# Patient Record
Sex: MALE | Race: WHITE | Employment: OTHER | URBAN - METROPOLITAN AREA
[De-identification: names, ages, dates, MRNs, and addresses within clinical notes are randomized per-mention and may not be internally consistent; named-entity substitution may affect disease eponyms.]

---

## 2018-03-18 ENCOUNTER — HOSPITAL ENCOUNTER (EMERGENCY)
Age: 68
Discharge: HOME | End: 2018-03-19
Payer: MEDICARE

## 2018-03-18 VITALS
WEIGHT: 180 LBS | RESPIRATION RATE: 14 BRPM | HEIGHT: 65 IN | SYSTOLIC BLOOD PRESSURE: 114 MMHG | OXYGEN SATURATION: 94 % | BODY MASS INDEX: 29.99 KG/M2 | DIASTOLIC BLOOD PRESSURE: 77 MMHG | HEART RATE: 82 BPM | TEMPERATURE: 98 F

## 2018-03-18 DIAGNOSIS — S01.01XA LACERATION OF SCALP, INITIAL ENCOUNTER: Primary | ICD-10-CM

## 2018-03-18 PROCEDURE — 99282 EMERGENCY DEPT VISIT SF MDM: CPT

## 2018-03-18 PROCEDURE — 12002 RPR S/N/AX/GEN/TRNK2.6-7.5CM: CPT

## 2018-04-14 ENCOUNTER — APPOINTMENT (OUTPATIENT)
Dept: CT IMAGING | Age: 68
End: 2018-04-14
Payer: MEDICARE

## 2018-04-14 ENCOUNTER — HOSPITAL ENCOUNTER (EMERGENCY)
Age: 68
Discharge: HOME | End: 2018-04-14
Attending: EMERGENCY MEDICINE
Payer: MEDICARE

## 2018-04-14 VITALS
TEMPERATURE: 97.9 F | OXYGEN SATURATION: 93 % | BODY MASS INDEX: 28.29 KG/M2 | SYSTOLIC BLOOD PRESSURE: 102 MMHG | HEART RATE: 60 BPM | WEIGHT: 170 LBS | DIASTOLIC BLOOD PRESSURE: 68 MMHG | RESPIRATION RATE: 14 BRPM

## 2018-04-14 DIAGNOSIS — S01.111A LACERATION OF RIGHT EYEBROW, INITIAL ENCOUNTER: Primary | ICD-10-CM

## 2018-04-14 PROCEDURE — 6360000002 HC RX W HCPCS: Performed by: EMERGENCY MEDICINE

## 2018-04-14 PROCEDURE — 2500000003 HC RX 250 WO HCPCS

## 2018-04-14 PROCEDURE — 99283 EMERGENCY DEPT VISIT LOW MDM: CPT

## 2018-04-14 PROCEDURE — 70450 CT HEAD/BRAIN W/O DYE: CPT

## 2018-04-14 PROCEDURE — 96372 THER/PROPH/DIAG INJ SC/IM: CPT

## 2018-04-14 PROCEDURE — 12013 RPR F/E/E/N/L/M 2.6-5.0 CM: CPT

## 2018-04-14 RX ORDER — LIDOCAINE HYDROCHLORIDE AND EPINEPHRINE 10; 10 MG/ML; UG/ML
INJECTION, SOLUTION INFILTRATION; PERINEURAL
Status: COMPLETED
Start: 2018-04-14 | End: 2018-04-14

## 2018-04-14 RX ORDER — LIDOCAINE HYDROCHLORIDE 10 MG/ML
INJECTION, SOLUTION INFILTRATION; PERINEURAL
Status: DISCONTINUED
Start: 2018-04-14 | End: 2018-04-14 | Stop reason: WASHOUT

## 2018-04-14 RX ORDER — LORAZEPAM 2 MG/ML
2 INJECTION INTRAMUSCULAR ONCE
Status: COMPLETED | OUTPATIENT
Start: 2018-04-14 | End: 2018-04-14

## 2018-04-14 RX ADMIN — LORAZEPAM 2 MG: 2 INJECTION, SOLUTION INTRAMUSCULAR; INTRAVENOUS at 10:22

## 2018-04-14 RX ADMIN — LIDOCAINE HYDROCHLORIDE AND EPINEPHRINE 5 ML: 10; 10 INJECTION, SOLUTION INFILTRATION; PERINEURAL at 11:45

## 2018-05-02 ENCOUNTER — OFFICE VISIT (OUTPATIENT)
Dept: NEUROLOGY | Age: 68
End: 2018-05-02
Payer: MEDICARE

## 2018-05-02 VITALS — SYSTOLIC BLOOD PRESSURE: 98 MMHG | DIASTOLIC BLOOD PRESSURE: 54 MMHG | HEART RATE: 65 BPM | HEIGHT: 65 IN

## 2018-05-02 DIAGNOSIS — G40.209 PARTIAL SYMPTOMATIC EPILEPSY WITH COMPLEX PARTIAL SEIZURES, NOT INTRACTABLE, WITHOUT STATUS EPILEPTICUS (HCC): Primary | ICD-10-CM

## 2018-05-02 PROCEDURE — 1123F ACP DISCUSS/DSCN MKR DOCD: CPT | Performed by: CLINICAL NURSE SPECIALIST

## 2018-05-02 PROCEDURE — 99214 OFFICE O/P EST MOD 30 MIN: CPT | Performed by: CLINICAL NURSE SPECIALIST

## 2018-05-02 PROCEDURE — 1036F TOBACCO NON-USER: CPT | Performed by: CLINICAL NURSE SPECIALIST

## 2018-05-02 PROCEDURE — G8419 CALC BMI OUT NRM PARAM NOF/U: HCPCS | Performed by: CLINICAL NURSE SPECIALIST

## 2018-05-02 PROCEDURE — G8427 DOCREV CUR MEDS BY ELIG CLIN: HCPCS | Performed by: CLINICAL NURSE SPECIALIST

## 2018-05-02 PROCEDURE — 3017F COLORECTAL CA SCREEN DOC REV: CPT | Performed by: CLINICAL NURSE SPECIALIST

## 2018-05-02 PROCEDURE — 4040F PNEUMOC VAC/ADMIN/RCVD: CPT | Performed by: CLINICAL NURSE SPECIALIST

## 2018-07-10 ENCOUNTER — OFFICE VISIT (OUTPATIENT)
Dept: VASCULAR SURGERY | Age: 68
End: 2018-07-10
Payer: MEDICARE

## 2018-07-10 DIAGNOSIS — I70.203 ATHEROSCLEROSIS OF NATIVE ARTERY OF BOTH LOWER EXTREMITIES, WITH UNSPECIFIED PRESENCE OF CLINICAL MANIFESTATION (HCC): Primary | ICD-10-CM

## 2018-07-10 PROCEDURE — G8419 CALC BMI OUT NRM PARAM NOF/U: HCPCS | Performed by: SURGERY

## 2018-07-10 PROCEDURE — G8599 NO ASA/ANTIPLAT THER USE RNG: HCPCS | Performed by: SURGERY

## 2018-07-10 PROCEDURE — G8427 DOCREV CUR MEDS BY ELIG CLIN: HCPCS | Performed by: SURGERY

## 2018-07-10 PROCEDURE — 1036F TOBACCO NON-USER: CPT | Performed by: SURGERY

## 2018-07-10 PROCEDURE — 99213 OFFICE O/P EST LOW 20 MIN: CPT | Performed by: SURGERY

## 2018-07-10 PROCEDURE — 4040F PNEUMOC VAC/ADMIN/RCVD: CPT | Performed by: SURGERY

## 2018-07-10 PROCEDURE — 3017F COLORECTAL CA SCREEN DOC REV: CPT | Performed by: SURGERY

## 2018-07-10 PROCEDURE — 1123F ACP DISCUSS/DSCN MKR DOCD: CPT | Performed by: SURGERY

## 2018-07-13 ENCOUNTER — TELEPHONE (OUTPATIENT)
Dept: NEUROLOGY | Age: 68
End: 2018-07-13

## 2018-07-13 NOTE — TELEPHONE ENCOUNTER
[facility] from [facility] called regarding pt's Valproic acid level results from this morning. [facility] states pt's level was 102 with a reference range of . Pt takes Depakote ER 500mg 1 tab at 7AM and 3 tabs at 9PM daily. [facility] was advised to send a copy of the lab results to our office for documentation (see ). Please contact [facility] with further recommendations.   Electronically signed by Rosemary Obrien on 7/13/18 at 3:56 PM

## 2018-07-16 ENCOUNTER — TELEPHONE (OUTPATIENT)
Dept: NEUROLOGY | Age: 68
End: 2018-07-16

## 2018-10-21 ENCOUNTER — HOSPITAL ENCOUNTER (EMERGENCY)
Age: 68
Discharge: HOME | End: 2018-10-21
Attending: FAMILY MEDICINE
Payer: MEDICARE

## 2018-10-21 VITALS
RESPIRATION RATE: 20 BRPM | SYSTOLIC BLOOD PRESSURE: 117 MMHG | DIASTOLIC BLOOD PRESSURE: 89 MMHG | OXYGEN SATURATION: 96 % | HEART RATE: 60 BPM | HEIGHT: 65 IN | BODY MASS INDEX: 28.32 KG/M2 | WEIGHT: 170 LBS | TEMPERATURE: 97.8 F

## 2018-10-21 DIAGNOSIS — S01.81XA LACERATION OF FOREHEAD, INITIAL ENCOUNTER: Primary | ICD-10-CM

## 2018-10-21 PROCEDURE — 99282 EMERGENCY DEPT VISIT SF MDM: CPT

## 2018-10-21 PROCEDURE — 12014 RPR F/E/E/N/L/M 5.1-7.5 CM: CPT

## 2018-10-21 PROCEDURE — 2500000003 HC RX 250 WO HCPCS: Performed by: FAMILY MEDICINE

## 2018-10-21 RX ORDER — LIDOCAINE HYDROCHLORIDE AND EPINEPHRINE 10; 10 MG/ML; UG/ML
5 INJECTION, SOLUTION INFILTRATION; PERINEURAL ONCE
Status: COMPLETED | OUTPATIENT
Start: 2018-10-21 | End: 2018-10-21

## 2018-10-21 RX ADMIN — LIDOCAINE HYDROCHLORIDE AND EPINEPHRINE 5 ML: 10; 10 INJECTION, SOLUTION INFILTRATION; PERINEURAL at 13:09

## 2018-10-21 ASSESSMENT — PAIN SCALES - GENERAL: PAINLEVEL_OUTOF10: 0

## 2018-10-21 NOTE — ED NOTES
Lacy Hood, patient's guardian called and wanting update on patient at this time      Annamaria Mahmood RN  10/21/18 [id number]

## 2019-04-22 ENCOUNTER — OFFICE VISIT (OUTPATIENT)
Dept: NEUROLOGY | Age: 69
End: 2019-04-22
Payer: MEDICARE

## 2019-04-22 ENCOUNTER — HOSPITAL ENCOUNTER (EMERGENCY)
Age: 69
Discharge: HOME | End: 2019-04-22
Attending: EMERGENCY MEDICINE
Payer: MEDICARE

## 2019-04-22 VITALS
OXYGEN SATURATION: 97 % | SYSTOLIC BLOOD PRESSURE: 139 MMHG | HEART RATE: 60 BPM | WEIGHT: 170 LBS | RESPIRATION RATE: 16 BRPM | TEMPERATURE: 97.9 F | DIASTOLIC BLOOD PRESSURE: 77 MMHG | HEIGHT: 65 IN | BODY MASS INDEX: 28.32 KG/M2

## 2019-04-22 VITALS — BODY MASS INDEX: 28.29 KG/M2 | RESPIRATION RATE: 18 BRPM | HEIGHT: 65 IN | HEART RATE: 80 BPM

## 2019-04-22 DIAGNOSIS — G40.209 PARTIAL SYMPTOMATIC EPILEPSY WITH COMPLEX PARTIAL SEIZURES, NOT INTRACTABLE, WITHOUT STATUS EPILEPTICUS (HCC): Primary | ICD-10-CM

## 2019-04-22 DIAGNOSIS — Z00.00 WELL ADULT HEALTH CHECK: Primary | ICD-10-CM

## 2019-04-22 PROCEDURE — 4040F PNEUMOC VAC/ADMIN/RCVD: CPT | Performed by: CLINICAL NURSE SPECIALIST

## 2019-04-22 PROCEDURE — G8599 NO ASA/ANTIPLAT THER USE RNG: HCPCS | Performed by: CLINICAL NURSE SPECIALIST

## 2019-04-22 PROCEDURE — 1036F TOBACCO NON-USER: CPT | Performed by: CLINICAL NURSE SPECIALIST

## 2019-04-22 PROCEDURE — 99214 OFFICE O/P EST MOD 30 MIN: CPT | Performed by: CLINICAL NURSE SPECIALIST

## 2019-04-22 PROCEDURE — 1123F ACP DISCUSS/DSCN MKR DOCD: CPT | Performed by: CLINICAL NURSE SPECIALIST

## 2019-04-22 PROCEDURE — G8427 DOCREV CUR MEDS BY ELIG CLIN: HCPCS | Performed by: CLINICAL NURSE SPECIALIST

## 2019-04-22 PROCEDURE — 3017F COLORECTAL CA SCREEN DOC REV: CPT | Performed by: CLINICAL NURSE SPECIALIST

## 2019-04-22 PROCEDURE — G8419 CALC BMI OUT NRM PARAM NOF/U: HCPCS | Performed by: CLINICAL NURSE SPECIALIST

## 2019-04-22 PROCEDURE — 99283 EMERGENCY DEPT VISIT LOW MDM: CPT

## 2019-04-22 NOTE — PROGRESS NOTES
daily as needed. Historical Provider, MD   divalproex (DEPAKOTE) 500 MG DR tablet Take 500 mg by mouth 2 times daily One tab at 7am and three tabs at 9pm  Historical Provider, MD   acetaminophen (TYLENOL) 325 MG tablet Take 650 mg by mouth every 4 hours as needed for Pain. Historical Provider, MD   guaiFENesin-dextromethorphan (ROBITUSSIN DM) 100-10 MG/5ML syrup Take 10 mLs by mouth 4 times daily as needed for Cough   Historical Provider, MD   ibuprofen (ADVIL;MOTRIN) 200 MG tablet Take 200 mg by mouth every 4 hours as needed for Pain   Historical Provider, MD   Bismuth Subsalicylate (PINK BISMUTH PO) Take by mouth as needed   Historical Provider, MD   simvastatin (ZOCOR) 20 MG tablet Take 20 mg by mouth nightly. Historical Provider, MD   benztropine (COGENTIN) 1 MG tablet Take 1 mg by mouth 2 times daily. TAKE AM OF OR WITH A LITTLE WATER 04/24/2015  Historical Provider, MD   levocarnitine (CARNITOR) 330 MG tablet Take 330 mg by mouth 2 times daily. Historical Provider, MD   vitamin D (CHOLECALCIFEROL) 1000 UNIT TABS tablet Take 1,000 Units by mouth daily.   Historical Provider, MD     Allergies as of 04/22/2019    (No Known Allergies)       Objective:     Pulse 80   Resp 18   Ht 5' 5\" (1.651 m)   BMI 28.29 kg/m²   Afebrile      General appearance: alert and cooperative  Head: Normocephalic, without obvious abnormality, atraumatic  Neck: no adenopathy, no carotid bruit and limited ROM - transmitted murmur audible  Lungs: clear to auscultation bilaterally  Heart: regular rate and rhythm and systolic ejection murmur audible  Extremities: boots on bilaterally - no edema  Pulses: 2+ and symmetric   Skin: no rashes or lesions     Mental Status: awake - moaning on occasion     Nonverbal  Not following commands    No Myerson's sign     Cranial Nerves:  I: smell    II: visual acuity     II: visual fields Bilateral threat responses   II: pupils NOMI   III,VII: ptosis    III,IV,VI: extraocular muscles  Looks around Radha Peterson  8:24 AM  4/22/2019

## 2019-04-23 NOTE — ED PROVIDER NOTES
Resp 16   Ht 5' 5\" (1.651 m)   Wt 170 lb (77.1 kg)   SpO2 97%   BMI 28.29 kg/m²   Oxygen Saturation Interpretation: Normal      ---------------------------------------------------PHYSICAL EXAM--------------------------------------      Constitutional/General: Alert a  Head: Normocephalic and atraumatic  Eyes: PERRL, EOMI  Mouth: Oropharynx clear, handling secretions, no trismus  Neck: Supple, full ROM, no meningeal illness  Pulmonary: Lungs clear to auscultation bilaterally, no wheezes, rales, or rhonchi. Not in respiratory distress  Cardiovascular:  Regular rate and rhythm, no murmurs, gallops, or rubs. 2+ distal pulses  Abdomen: Soft, non tender, non distended,   Extremities: Moves all extremities x 4. Warm and well perfused  Skin: warm and dry without rash  Neurologic: No focal deficits   Psych: Normal Affect      ------------------------------ ED COURSE/MEDICAL DECISION MAKING----------------------  Medications - No data to display      ED COURSE:       Medical Decision Making:    Patient hemodynamically stable. At his baseline mental status per staff. Therefore, patient was discharged. Counseling: The emergency provider has spoken with the patient and discussed todays results, in addition to providing specific details for the plan of care and counseling regarding the diagnosis and prognosis. Questions are answered at this time and they are agreeable with the plan.      --------------------------------- IMPRESSION AND DISPOSITION ---------------------------------    IMPRESSION  1. Well adult health check        DISPOSITION  Disposition: Discharge to home  Patient condition is stable      NOTE: This report was transcribed using voice recognition software.  Every effort was made to ensure accuracy; however, inadvertent computerized transcription errors may be present        Min Perdomo MD  04/22/19 6011

## 2019-07-15 ENCOUNTER — OFFICE VISIT (OUTPATIENT)
Dept: NEUROLOGY | Age: 69
End: 2019-07-15
Payer: MEDICARE

## 2019-07-15 VITALS — RESPIRATION RATE: 15 BRPM | HEART RATE: 88 BPM

## 2019-07-15 DIAGNOSIS — G40.209 PARTIAL SYMPTOMATIC EPILEPSY WITH COMPLEX PARTIAL SEIZURES, NOT INTRACTABLE, WITHOUT STATUS EPILEPTICUS (HCC): Primary | ICD-10-CM

## 2019-07-15 PROCEDURE — G8419 CALC BMI OUT NRM PARAM NOF/U: HCPCS | Performed by: CLINICAL NURSE SPECIALIST

## 2019-07-15 PROCEDURE — 3017F COLORECTAL CA SCREEN DOC REV: CPT | Performed by: CLINICAL NURSE SPECIALIST

## 2019-07-15 PROCEDURE — G8427 DOCREV CUR MEDS BY ELIG CLIN: HCPCS | Performed by: CLINICAL NURSE SPECIALIST

## 2019-07-15 PROCEDURE — 4040F PNEUMOC VAC/ADMIN/RCVD: CPT | Performed by: CLINICAL NURSE SPECIALIST

## 2019-07-15 PROCEDURE — 1036F TOBACCO NON-USER: CPT | Performed by: CLINICAL NURSE SPECIALIST

## 2019-07-15 PROCEDURE — 1123F ACP DISCUSS/DSCN MKR DOCD: CPT | Performed by: CLINICAL NURSE SPECIALIST

## 2019-07-15 PROCEDURE — G8599 NO ASA/ANTIPLAT THER USE RNG: HCPCS | Performed by: CLINICAL NURSE SPECIALIST

## 2019-07-15 PROCEDURE — 99214 OFFICE O/P EST MOD 30 MIN: CPT | Performed by: CLINICAL NURSE SPECIALIST

## 2019-07-15 NOTE — PROGRESS NOTES
sensation     V,VII: corneal reflex  Present   VII: facial muscle function - upper     VII: facial muscle function - lower Normal   VIII: hearing Normal   IX: soft palate elevation  Normal   IX,X: gag reflex Present   XI: trapezius strength     XI: sternocleidomastoid strength    XI: neck extension strength     XII: tongue strength  Normal     Motor:  Moving all limbs sporadically and symmetrically - though minimally  Normal bulk    Minimal right hand resting tremor appreciated  cogwheel rigidity noted in wrists     Sensory:  Withdraws to PP throughout    Gait:  In wheelchair today     DTR:   Right Brachioradialis reflex 0  Left Brachioradialis reflex 0  Right Biceps reflex 1+  Left Biceps reflex 1+  Right Quadriceps reflex 1+  Left Quadriceps reflex 1+  Right Achilles reflex 0  Left Achilles reflex 0    No Grey's     Laboratory/Radiology:     CBC:   Lab Results   Component Value Date    WBC 7.1 10/27/2017    RBC 4.51 10/27/2017    HGB 14.5 10/27/2017    HCT 42.8 10/27/2017    MCV 94.9 10/27/2017    MCH 32.2 10/27/2017    MCHC 33.9 10/27/2017    RDW 13.5 10/27/2017     10/27/2017    MPV 9.5 10/27/2017     BMP:    Lab Results   Component Value Date     10/27/2017    K 4.9 10/27/2017     10/27/2017    CO2 25 10/27/2017    BUN 22 10/27/2017    LABALBU 4.0 10/27/2017    LABALBU 4.5 05/18/2012    CREATININE 0.9 10/27/2017    CALCIUM 8.3 10/27/2017    GFRAA >60 10/27/2017    LABGLOM >60 10/27/2017    GLUCOSE 113 10/27/2017    GLUCOSE 71 05/18/2012     Depakote level 99    Tegretol level 13.3    Labs were personally reviewed by myself today   Assessment:     Seizure disorder   Stable -- one \"mild\" reported seizure    Maintained on dual therapy, though at minimal doses     Increasing tremors - unchanged with lower doses of Depakote    ?  Parkinsonisms vs underlying PD    Plan:     Stop Depakote    Try Neupro patches and re-evaluate in 6 weeks     Windy Ma  10:51 AM  7/15/2019

## 2019-09-27 ENCOUNTER — OFFICE VISIT (OUTPATIENT)
Dept: NEUROLOGY | Age: 69
End: 2019-09-27
Payer: MEDICARE

## 2019-09-27 VITALS — HEART RATE: 80 BPM | RESPIRATION RATE: 15 BRPM

## 2019-09-27 DIAGNOSIS — G40.209 PARTIAL SYMPTOMATIC EPILEPSY WITH COMPLEX PARTIAL SEIZURES, NOT INTRACTABLE, WITHOUT STATUS EPILEPTICUS (HCC): Primary | ICD-10-CM

## 2019-09-27 PROCEDURE — 4040F PNEUMOC VAC/ADMIN/RCVD: CPT | Performed by: CLINICAL NURSE SPECIALIST

## 2019-09-27 PROCEDURE — G8599 NO ASA/ANTIPLAT THER USE RNG: HCPCS | Performed by: CLINICAL NURSE SPECIALIST

## 2019-09-27 PROCEDURE — 99214 OFFICE O/P EST MOD 30 MIN: CPT | Performed by: CLINICAL NURSE SPECIALIST

## 2019-09-27 PROCEDURE — 1036F TOBACCO NON-USER: CPT | Performed by: CLINICAL NURSE SPECIALIST

## 2019-09-27 PROCEDURE — G8427 DOCREV CUR MEDS BY ELIG CLIN: HCPCS | Performed by: CLINICAL NURSE SPECIALIST

## 2019-09-27 PROCEDURE — 1123F ACP DISCUSS/DSCN MKR DOCD: CPT | Performed by: CLINICAL NURSE SPECIALIST

## 2019-09-27 PROCEDURE — G8419 CALC BMI OUT NRM PARAM NOF/U: HCPCS | Performed by: CLINICAL NURSE SPECIALIST

## 2019-09-27 PROCEDURE — 3017F COLORECTAL CA SCREEN DOC REV: CPT | Performed by: CLINICAL NURSE SPECIALIST

## 2019-09-27 NOTE — PROGRESS NOTES
V: mastication    V: facial light touch sensation     V,VII: corneal reflex  Present   VII: facial muscle function - upper     VII: facial muscle function - lower Normal   VIII: hearing Normal   IX: soft palate elevation  Normal   IX,X: gag reflex Present   XI: trapezius strength     XI: sternocleidomastoid strength    XI: neck extension strength     XII: tongue strength  Normal     Motor:  Moving all limbs sporadically and symmetrically - though minimally  Normal bulk    Minimal right hand resting tremor appreciated  cogwheel rigidity noted in wrists     Sensory:  Withdraws to PP throughout    Gait:  In wheelchair today     DTR:   Right Brachioradialis reflex 0  Left Brachioradialis reflex 0  Right Biceps reflex 1+  Left Biceps reflex 1+  Right Quadriceps reflex 1+  Left Quadriceps reflex 1+  Right Achilles reflex 0  Left Achilles reflex 0    No Grye's     Laboratory/Radiology:     CBC:   Lab Results   Component Value Date    WBC 7.1 10/27/2017    RBC 4.51 10/27/2017    HGB 14.5 10/27/2017    HCT 42.8 10/27/2017    MCV 94.9 10/27/2017    MCH 32.2 10/27/2017    MCHC 33.9 10/27/2017    RDW 13.5 10/27/2017     10/27/2017    MPV 9.5 10/27/2017     BMP:    Lab Results   Component Value Date     10/27/2017    K 4.9 10/27/2017     10/27/2017    CO2 25 10/27/2017    BUN 22 10/27/2017    LABALBU 4.0 10/27/2017    LABALBU 4.5 05/18/2012    CREATININE 0.9 10/27/2017    CALCIUM 8.3 10/27/2017    GFRAA >60 10/27/2017    LABGLOM >60 10/27/2017    GLUCOSE 113 10/27/2017    GLUCOSE 71 05/18/2012     Tegretol level 13.3    Labs were personally reviewed by myself today     Assessment:     Seizure disorder   Stable on Trileptal     Increasing tremors - unchanged with lower doses of Depakote    Not changed with low dose Neupro but his gait improved with Neupro        Plan:     Increase Neupro to 4mg daily    RTO 6 weeks     Remer Duane  10:42 AM  9/27/2019

## 2019-11-26 ENCOUNTER — OFFICE VISIT (OUTPATIENT)
Dept: NEUROLOGY | Age: 69
End: 2019-11-26
Payer: MEDICARE

## 2019-11-26 VITALS — RESPIRATION RATE: 18 BRPM | HEART RATE: 80 BPM

## 2019-11-26 DIAGNOSIS — G20 PARKINSON DISEASE (HCC): ICD-10-CM

## 2019-11-26 DIAGNOSIS — G40.209 PARTIAL SYMPTOMATIC EPILEPSY WITH COMPLEX PARTIAL SEIZURES, NOT INTRACTABLE, WITHOUT STATUS EPILEPTICUS (HCC): Primary | ICD-10-CM

## 2019-11-26 PROCEDURE — G8417 CALC BMI ABV UP PARAM F/U: HCPCS | Performed by: CLINICAL NURSE SPECIALIST

## 2019-11-26 PROCEDURE — 4040F PNEUMOC VAC/ADMIN/RCVD: CPT | Performed by: CLINICAL NURSE SPECIALIST

## 2019-11-26 PROCEDURE — G8599 NO ASA/ANTIPLAT THER USE RNG: HCPCS | Performed by: CLINICAL NURSE SPECIALIST

## 2019-11-26 PROCEDURE — 1036F TOBACCO NON-USER: CPT | Performed by: CLINICAL NURSE SPECIALIST

## 2019-11-26 PROCEDURE — 99214 OFFICE O/P EST MOD 30 MIN: CPT | Performed by: CLINICAL NURSE SPECIALIST

## 2019-11-26 PROCEDURE — 3017F COLORECTAL CA SCREEN DOC REV: CPT | Performed by: CLINICAL NURSE SPECIALIST

## 2019-11-26 PROCEDURE — 1123F ACP DISCUSS/DSCN MKR DOCD: CPT | Performed by: CLINICAL NURSE SPECIALIST

## 2019-11-26 PROCEDURE — G8484 FLU IMMUNIZE NO ADMIN: HCPCS | Performed by: CLINICAL NURSE SPECIALIST

## 2019-11-26 PROCEDURE — G8427 DOCREV CUR MEDS BY ELIG CLIN: HCPCS | Performed by: CLINICAL NURSE SPECIALIST

## 2020-01-14 ENCOUNTER — OFFICE VISIT (OUTPATIENT)
Dept: NEUROLOGY | Age: 70
End: 2020-01-14
Payer: MEDICARE

## 2020-01-14 VITALS — RESPIRATION RATE: 18 BRPM | HEART RATE: 84 BPM

## 2020-01-14 PROCEDURE — 99214 OFFICE O/P EST MOD 30 MIN: CPT | Performed by: CLINICAL NURSE SPECIALIST

## 2020-01-14 PROCEDURE — 3017F COLORECTAL CA SCREEN DOC REV: CPT | Performed by: CLINICAL NURSE SPECIALIST

## 2020-01-14 PROCEDURE — G8427 DOCREV CUR MEDS BY ELIG CLIN: HCPCS | Performed by: CLINICAL NURSE SPECIALIST

## 2020-01-14 PROCEDURE — 4040F PNEUMOC VAC/ADMIN/RCVD: CPT | Performed by: CLINICAL NURSE SPECIALIST

## 2020-01-14 PROCEDURE — G8417 CALC BMI ABV UP PARAM F/U: HCPCS | Performed by: CLINICAL NURSE SPECIALIST

## 2020-01-14 PROCEDURE — 1123F ACP DISCUSS/DSCN MKR DOCD: CPT | Performed by: CLINICAL NURSE SPECIALIST

## 2020-01-14 PROCEDURE — 1036F TOBACCO NON-USER: CPT | Performed by: CLINICAL NURSE SPECIALIST

## 2020-01-14 PROCEDURE — G8484 FLU IMMUNIZE NO ADMIN: HCPCS | Performed by: CLINICAL NURSE SPECIALIST

## 2020-01-14 NOTE — PROGRESS NOTES
Meagan Kaufman is a 81 y.o.  male     From [facility]    Long history of epilepsy but no seizures reported in years   Maintained on Tegretol 300BID   1 reported seizure over summer but remains on minimal AED (none since last appt)     His Depakote was suspected to have been started for behavioral issues rather than his epilepsy - this was discontinued d/t tremors     Its discontinuation did not improve his tremors -- we questioned PD vs parkinsonisms     We tried Neupro patches for PD vs parkinsonisms    His tremors have not improved but his walking did on low dose   We continued at 4mg and staff has noted no change in tremor but he has become more agitated and screaming out     ROS unobtainable due to patient condition    Prior to Visit Medications    Medication Sig Taking? Authorizing Provider   Loperamide HCl (IMODIUM PO) Take by mouth Yes Historical Provider, MD   magnesium citrate (CITROMA) SOLN Take 150 mLs by mouth as needed  Yes Historical Provider, MD   lactulose (CHRONULAC) 10 GM/15ML solution Take 30 g by mouth 2 times daily Yes Historical Provider, MD   LORazepam (ATIVAN) 2 MG tablet Take 2 mg by mouth as needed for Anxiety Yes Historical Provider, MD   melatonin 3 MG TABS tablet Take 5 mg by mouth daily  Yes Historical Provider, MD   polyethylene glycol (MIRALAX) powder Take 17 g by mouth daily Yes Historical Provider, MD   metoprolol (TOPROL-XL) 25 MG XL tablet Take 25 mg by mouth nightly Yes Historical Provider, MD   omeprazole (PRILOSEC) 20 MG capsule Take 20 mg by mouth 2 times daily Yes Historical Provider, MD   carBAMazepine (TEGRETOL) 200 MG tablet Take 200 mg by mouth 2 times daily  Yes Historical Provider, MD   fluvoxaMINE (LUVOX) 100 MG tablet Take 300 mg by mouth 2 times daily  Yes Historical Provider, MD   acetaminophen (TYLENOL) 325 MG tablet Take 650 mg by mouth every 4 hours as needed for Pain.  Yes Historical Provider, MD   guaiFENesin-dextromethorphan (ROBITUSSIN DM) 100-10 MG/5ML

## 2020-01-22 ENCOUNTER — HOSPITAL ENCOUNTER (EMERGENCY)
Age: 70
Discharge: HOME | End: 2020-01-22
Attending: EMERGENCY MEDICINE
Payer: MEDICARE

## 2020-01-22 ENCOUNTER — APPOINTMENT (OUTPATIENT)
Dept: CT IMAGING | Age: 70
End: 2020-01-22
Payer: MEDICARE

## 2020-01-22 ENCOUNTER — APPOINTMENT (OUTPATIENT)
Dept: GENERAL RADIOLOGY | Age: 70
End: 2020-01-22
Payer: MEDICARE

## 2020-01-22 VITALS
HEART RATE: 85 BPM | TEMPERATURE: 100.8 F | DIASTOLIC BLOOD PRESSURE: 67 MMHG | SYSTOLIC BLOOD PRESSURE: 107 MMHG | RESPIRATION RATE: 18 BRPM | OXYGEN SATURATION: 95 % | BODY MASS INDEX: 28.32 KG/M2 | WEIGHT: 170 LBS | HEIGHT: 65 IN

## 2020-01-22 LAB
ALBUMIN SERPL-MCNC: 3.5 G/DL (ref 3.5–5.2)
ALP BLD-CCNC: 93 U/L (ref 40–129)
ALT SERPL-CCNC: 27 U/L (ref 0–40)
ANION GAP SERPL CALCULATED.3IONS-SCNC: 14 MMOL/L (ref 7–16)
APTT: 29.5 SEC (ref 24.5–35.1)
AST SERPL-CCNC: 33 U/L (ref 0–39)
BACTERIA: ABNORMAL /HPF
BASOPHILS ABSOLUTE: 0.01 E9/L (ref 0–0.2)
BASOPHILS RELATIVE PERCENT: 0.2 % (ref 0–2)
BILIRUB SERPL-MCNC: 0.2 MG/DL (ref 0–1.2)
BILIRUBIN URINE: NEGATIVE
BLOOD, URINE: ABNORMAL
BUN BLDV-MCNC: 35 MG/DL (ref 8–23)
CALCIUM SERPL-MCNC: 8.7 MG/DL (ref 8.6–10.2)
CHLORIDE BLD-SCNC: 106 MMOL/L (ref 98–107)
CLARITY: CLEAR
CO2: 26 MMOL/L (ref 22–29)
COLOR: YELLOW
CREAT SERPL-MCNC: 0.8 MG/DL (ref 0.7–1.2)
CRYSTALS, UA: ABNORMAL
EKG ATRIAL RATE: 92 BPM
EKG P AXIS: 53 DEGREES
EKG P-R INTERVAL: 134 MS
EKG Q-T INTERVAL: 390 MS
EKG QRS DURATION: 74 MS
EKG QTC CALCULATION (BAZETT): 482 MS
EKG R AXIS: 62 DEGREES
EKG T AXIS: -36 DEGREES
EKG VENTRICULAR RATE: 92 BPM
EOSINOPHILS ABSOLUTE: 0 E9/L (ref 0.05–0.5)
EOSINOPHILS RELATIVE PERCENT: 0 % (ref 0–6)
GFR AFRICAN AMERICAN: >60
GFR NON-AFRICAN AMERICAN: >60 ML/MIN/1.73
GLUCOSE BLD-MCNC: 96 MG/DL (ref 74–99)
GLUCOSE URINE: NEGATIVE MG/DL
HCT VFR BLD CALC: 41.1 % (ref 37–54)
HEMOGLOBIN: 13.2 G/DL (ref 12.5–16.5)
IMMATURE GRANULOCYTES #: 0.01 E9/L
IMMATURE GRANULOCYTES %: 0.2 % (ref 0–5)
INR BLD: 1.1
KETONES, URINE: 15 MG/DL
LACTIC ACID: 0.9 MMOL/L (ref 0.5–2.2)
LEUKOCYTE ESTERASE, URINE: NEGATIVE
LYMPHOCYTES ABSOLUTE: 0.94 E9/L (ref 1.5–4)
LYMPHOCYTES RELATIVE PERCENT: 17.3 % (ref 20–42)
MAGNESIUM: 2.5 MG/DL (ref 1.6–2.6)
MCH RBC QN AUTO: 32 PG (ref 26–35)
MCHC RBC AUTO-ENTMCNC: 32.1 % (ref 32–34.5)
MCV RBC AUTO: 99.5 FL (ref 80–99.9)
MONOCYTES ABSOLUTE: 0.75 E9/L (ref 0.1–0.95)
MONOCYTES RELATIVE PERCENT: 13.8 % (ref 2–12)
NEUTROPHILS ABSOLUTE: 3.72 E9/L (ref 1.8–7.3)
NEUTROPHILS RELATIVE PERCENT: 68.5 % (ref 43–80)
NITRITE, URINE: NEGATIVE
PDW BLD-RTO: 12.8 FL (ref 11.5–15)
PH UA: 5.5 (ref 5–9)
PLATELET # BLD: 156 E9/L (ref 130–450)
PMV BLD AUTO: 9.5 FL (ref 7–12)
POTASSIUM REFLEX MAGNESIUM: 3.9 MMOL/L (ref 3.5–5)
PROTEIN UA: NEGATIVE MG/DL
PROTHROMBIN TIME: 12.7 SEC (ref 9.3–12.4)
RBC # BLD: 4.13 E12/L (ref 3.8–5.8)
RBC UA: ABNORMAL /HPF (ref 0–2)
SODIUM BLD-SCNC: 146 MMOL/L (ref 132–146)
SPECIFIC GRAVITY UA: >=1.03 (ref 1–1.03)
TOTAL PROTEIN: 6.8 G/DL (ref 6.4–8.3)
UROBILINOGEN, URINE: 0.2 E.U./DL
WBC # BLD: 5.4 E9/L (ref 4.5–11.5)
WBC UA: ABNORMAL /HPF (ref 0–5)

## 2020-01-22 PROCEDURE — 36415 COLL VENOUS BLD VENIPUNCTURE: CPT

## 2020-01-22 PROCEDURE — 70450 CT HEAD/BRAIN W/O DYE: CPT

## 2020-01-22 PROCEDURE — 71045 X-RAY EXAM CHEST 1 VIEW: CPT

## 2020-01-22 PROCEDURE — 85730 THROMBOPLASTIN TIME PARTIAL: CPT

## 2020-01-22 PROCEDURE — 93010 ELECTROCARDIOGRAM REPORT: CPT | Performed by: INTERNAL MEDICINE

## 2020-01-22 PROCEDURE — 6360000002 HC RX W HCPCS: Performed by: EMERGENCY MEDICINE

## 2020-01-22 PROCEDURE — 85610 PROTHROMBIN TIME: CPT

## 2020-01-22 PROCEDURE — 80053 COMPREHEN METABOLIC PANEL: CPT

## 2020-01-22 PROCEDURE — 81001 URINALYSIS AUTO W/SCOPE: CPT

## 2020-01-22 PROCEDURE — 93005 ELECTROCARDIOGRAM TRACING: CPT | Performed by: EMERGENCY MEDICINE

## 2020-01-22 PROCEDURE — 85025 COMPLETE CBC W/AUTO DIFF WBC: CPT

## 2020-01-22 PROCEDURE — 83735 ASSAY OF MAGNESIUM: CPT

## 2020-01-22 PROCEDURE — 83605 ASSAY OF LACTIC ACID: CPT

## 2020-01-22 PROCEDURE — 96374 THER/PROPH/DIAG INJ IV PUSH: CPT

## 2020-01-22 PROCEDURE — 99285 EMERGENCY DEPT VISIT HI MDM: CPT

## 2020-01-22 RX ORDER — LORAZEPAM 2 MG/ML
1 INJECTION INTRAMUSCULAR ONCE
Status: COMPLETED | OUTPATIENT
Start: 2020-01-22 | End: 2020-01-22

## 2020-01-22 RX ADMIN — LORAZEPAM 1 MG: 2 INJECTION INTRAMUSCULAR; INTRAVENOUS at 15:01

## 2020-01-22 NOTE — ED PROVIDER NOTES
HPI:  1/22/20,   Time: 3:09 PM       Susanna Bunyd is a 81 y.o. male presenting to the ED for epistaxis and shaking episodes, beginning 1 hr ago. The complaint has been intermittent, moderate in severity, and worsened by nothing. Pt nonverbal from [facility], hx seizures, last 6 months ago, had nosebleed at facility, and then multiple shaking episodes observed by ems    Review of Systems:   Pertinent positives and negatives are stated within HPI, all other systems reviewed and are negative.          --------------------------------------------- PAST HISTORY ---------------------------------------------  Past Medical History:  has a past medical history of Acquired deformity of neck, Autism spectrum, Bilateral cataracts, Blepharochalasis, Cardiomegaly, Cataract of both eyes, Developmental delay, Gastritis, Hyperlipidemia, Kyphosis of thoracic region, Mental retardation, Obsessive compulsive disorder, Parkinsonism ([address] Seizures ([address] and Tourette disorder. Past Surgical History:  has a past surgical history that includes Toe amputation (Left). Social History:  reports that he has never smoked. He has never used smokeless tobacco. He reports that he does not drink alcohol or use drugs. Family History: family history is not on file. The patients home medications have been reviewed. Allergies: Patient has no known allergies.         ---------------------------------------------------PHYSICAL EXAM--------------------------------------    Constitutional/General: Alert, non verbal.  well appearing, non toxic in NAD  Head: Normocephalic and atraumatic  Eyes: PERRL, EOMI, conjunctive normal, sclera non icteric  Mouth: Oropharynx clear, handling secretions, no trismus, no asymmetry of the posterior oropharynx or uvular edema  Neck: Supple, full ROM, non tender to palpation in the midline, no stridor, no crepitus, no meningeal signs  Respiratory: Lungs clear to auscultation bilaterally, no wheezes, rales, or DISPOSITION  Disposition: Discharge to nursing home  Patient condition is stable    NOTE: This report was transcribed using voice recognition software.  Every effort was made to ensure accuracy; however, inadvertent computerized transcription errors may be present        Peng Chong MD  01/24/20 1007

## 2020-01-22 NOTE — ED NOTES
Care giver reports she will take pt back [facility]. Guardian at bedside.      Feliz Miguel RN  01/22/20 1640

## 2020-01-28 ENCOUNTER — APPOINTMENT (OUTPATIENT)
Dept: GENERAL RADIOLOGY | Age: 70
DRG: 640 | End: 2020-01-28
Payer: MEDICARE

## 2020-01-28 ENCOUNTER — APPOINTMENT (OUTPATIENT)
Dept: CT IMAGING | Age: 70
DRG: 640 | End: 2020-01-28
Payer: MEDICARE

## 2020-01-28 ENCOUNTER — HOSPITAL ENCOUNTER (INPATIENT)
Age: 70
LOS: 4 days | Discharge: ANOTHER HOSPITAL | DRG: 640 | End: 2020-02-02
Attending: EMERGENCY MEDICINE | Admitting: INTERNAL MEDICINE
Payer: MEDICARE

## 2020-01-28 LAB
BASOPHILS ABSOLUTE: 0 E9/L (ref 0–0.2)
BASOPHILS RELATIVE PERCENT: 0.2 % (ref 0–2)
EOSINOPHILS ABSOLUTE: 0 E9/L (ref 0.05–0.5)
EOSINOPHILS RELATIVE PERCENT: 0 % (ref 0–6)
HCT VFR BLD CALC: 47.6 % (ref 37–54)
HEMOGLOBIN: 14.1 G/DL (ref 12.5–16.5)
LACTIC ACID: 1.7 MMOL/L (ref 0.5–2.2)
LYMPHOCYTES ABSOLUTE: 0.91 E9/L (ref 1.5–4)
LYMPHOCYTES RELATIVE PERCENT: 10.4 % (ref 20–42)
MCH RBC QN AUTO: 31.1 PG (ref 26–35)
MCHC RBC AUTO-ENTMCNC: 29.6 % (ref 32–34.5)
MCV RBC AUTO: 105.1 FL (ref 80–99.9)
MONOCYTES ABSOLUTE: 0.36 E9/L (ref 0.1–0.95)
MONOCYTES RELATIVE PERCENT: 4.3 % (ref 2–12)
NEUTROPHILS ABSOLUTE: 7.74 E9/L (ref 1.8–7.3)
NEUTROPHILS RELATIVE PERCENT: 85.2 % (ref 43–80)
PDW BLD-RTO: 13.7 FL (ref 11.5–15)
PLATELET # BLD: 164 E9/L (ref 130–450)
PMV BLD AUTO: 11.2 FL (ref 7–12)
POIKILOCYTES: ABNORMAL
POLYCHROMASIA: ABNORMAL
PRO-BNP: 315 PG/ML (ref 0–125)
RBC # BLD: 4.53 E12/L (ref 3.8–5.8)
SCHISTOCYTES: ABNORMAL
TEAR DROP CELLS: ABNORMAL
TROPONIN: 0.03 NG/ML (ref 0–0.03)
WBC # BLD: 9.1 E9/L (ref 4.5–11.5)

## 2020-01-28 PROCEDURE — 70450 CT HEAD/BRAIN W/O DYE: CPT

## 2020-01-28 PROCEDURE — 94640 AIRWAY INHALATION TREATMENT: CPT

## 2020-01-28 PROCEDURE — 2580000003 HC RX 258: Performed by: EMERGENCY MEDICINE

## 2020-01-28 PROCEDURE — 96361 HYDRATE IV INFUSION ADD-ON: CPT

## 2020-01-28 PROCEDURE — 84484 ASSAY OF TROPONIN QUANT: CPT

## 2020-01-28 PROCEDURE — 71045 X-RAY EXAM CHEST 1 VIEW: CPT

## 2020-01-28 PROCEDURE — 83605 ASSAY OF LACTIC ACID: CPT

## 2020-01-28 PROCEDURE — 36415 COLL VENOUS BLD VENIPUNCTURE: CPT

## 2020-01-28 PROCEDURE — 6360000002 HC RX W HCPCS

## 2020-01-28 PROCEDURE — 99285 EMERGENCY DEPT VISIT HI MDM: CPT

## 2020-01-28 PROCEDURE — 80053 COMPREHEN METABOLIC PANEL: CPT

## 2020-01-28 PROCEDURE — 87040 BLOOD CULTURE FOR BACTERIA: CPT

## 2020-01-28 PROCEDURE — 83880 ASSAY OF NATRIURETIC PEPTIDE: CPT

## 2020-01-28 PROCEDURE — 96372 THER/PROPH/DIAG INJ SC/IM: CPT

## 2020-01-28 PROCEDURE — 96360 HYDRATION IV INFUSION INIT: CPT

## 2020-01-28 PROCEDURE — 6370000000 HC RX 637 (ALT 250 FOR IP): Performed by: EMERGENCY MEDICINE

## 2020-01-28 PROCEDURE — 85025 COMPLETE CBC W/AUTO DIFF WBC: CPT

## 2020-01-28 PROCEDURE — 93005 ELECTROCARDIOGRAM TRACING: CPT | Performed by: EMERGENCY MEDICINE

## 2020-01-28 RX ORDER — IPRATROPIUM BROMIDE AND ALBUTEROL SULFATE 2.5; .5 MG/3ML; MG/3ML
1 SOLUTION RESPIRATORY (INHALATION) ONCE
Status: COMPLETED | OUTPATIENT
Start: 2020-01-28 | End: 2020-01-28

## 2020-01-28 RX ORDER — 0.9 % SODIUM CHLORIDE 0.9 %
1000 INTRAVENOUS SOLUTION INTRAVENOUS ONCE
Status: COMPLETED | OUTPATIENT
Start: 2020-01-28 | End: 2020-01-29

## 2020-01-28 RX ORDER — LORAZEPAM 2 MG/ML
INJECTION INTRAMUSCULAR
Status: COMPLETED
Start: 2020-01-28 | End: 2020-01-28

## 2020-01-28 RX ORDER — LORAZEPAM 2 MG/ML
0.5 INJECTION INTRAMUSCULAR ONCE
Status: COMPLETED | OUTPATIENT
Start: 2020-01-28 | End: 2020-01-28

## 2020-01-28 RX ADMIN — LORAZEPAM 0.5 MG: 2 INJECTION INTRAMUSCULAR; INTRAVENOUS at 20:54

## 2020-01-28 RX ADMIN — IPRATROPIUM BROMIDE AND ALBUTEROL SULFATE 1 AMPULE: 2.5; .5 SOLUTION RESPIRATORY (INHALATION) at 21:09

## 2020-01-28 RX ADMIN — SODIUM CHLORIDE 1000 ML: 9 INJECTION, SOLUTION INTRAVENOUS at 22:01

## 2020-01-28 RX ADMIN — LORAZEPAM 0.5 MG: 2 INJECTION INTRAMUSCULAR at 20:54

## 2020-01-29 PROBLEM — E87.0 HYPERNATREMIA: Status: ACTIVE | Noted: 2020-01-29

## 2020-01-29 LAB
ADENOVIRUS BY PCR: NOT DETECTED
ALBUMIN SERPL-MCNC: 3.2 G/DL (ref 3.5–5.2)
ALP BLD-CCNC: 88 U/L (ref 40–129)
ALT SERPL-CCNC: 47 U/L (ref 0–40)
AMMONIA: 28 UMOL/L (ref 16–60)
ANION GAP SERPL CALCULATED.3IONS-SCNC: 11 MMOL/L (ref 7–16)
ANION GAP SERPL CALCULATED.3IONS-SCNC: 16 MMOL/L (ref 7–16)
ANION GAP SERPL CALCULATED.3IONS-SCNC: 7 MMOL/L (ref 7–16)
ANION GAP SERPL CALCULATED.3IONS-SCNC: 8 MMOL/L (ref 7–16)
ANION GAP SERPL CALCULATED.3IONS-SCNC: 8 MMOL/L (ref 7–16)
ANION GAP SERPL CALCULATED.3IONS-SCNC: 9 MMOL/L (ref 7–16)
AST SERPL-CCNC: 67 U/L (ref 0–39)
B.E.: 1.3 MMOL/L (ref -3–3)
BACTERIA: ABNORMAL /HPF
BILIRUB SERPL-MCNC: 0.3 MG/DL (ref 0–1.2)
BILIRUBIN URINE: NEGATIVE
BLOOD, URINE: ABNORMAL
BORDETELLA PARAPERTUSSIS BY PCR: NOT DETECTED
BORDETELLA PERTUSSIS BY PCR: NOT DETECTED
BUN BLDV-MCNC: 40 MG/DL (ref 8–23)
BUN BLDV-MCNC: 45 MG/DL (ref 8–23)
BUN BLDV-MCNC: 45 MG/DL (ref 8–23)
BUN BLDV-MCNC: 51 MG/DL (ref 8–23)
BUN BLDV-MCNC: 55 MG/DL (ref 8–23)
BUN BLDV-MCNC: 67 MG/DL (ref 8–23)
CALCIUM SERPL-MCNC: 8.3 MG/DL (ref 8.6–10.2)
CALCIUM SERPL-MCNC: 8.4 MG/DL (ref 8.6–10.2)
CALCIUM SERPL-MCNC: 8.4 MG/DL (ref 8.6–10.2)
CALCIUM SERPL-MCNC: 8.5 MG/DL (ref 8.6–10.2)
CALCIUM SERPL-MCNC: 8.5 MG/DL (ref 8.6–10.2)
CALCIUM SERPL-MCNC: 9 MG/DL (ref 8.6–10.2)
CHLAMYDOPHILIA PNEUMONIAE BY PCR: NOT DETECTED
CHLORIDE BLD-SCNC: 137 MMOL/L (ref 98–107)
CHLORIDE BLD-SCNC: 138 MMOL/L (ref 98–107)
CHLORIDE BLD-SCNC: 140 MMOL/L (ref 98–107)
CHLORIDE BLD-SCNC: 140 MMOL/L (ref 98–107)
CHLORIDE BLD-SCNC: >140 MMOL/L (ref 98–107)
CHLORIDE BLD-SCNC: >140 MMOL/L (ref 98–107)
CHLORIDE URINE RANDOM: 99 MMOL/L
CLARITY: CLEAR
CO2: 25 MMOL/L (ref 22–29)
CO2: 27 MMOL/L (ref 22–29)
CO2: 27 MMOL/L (ref 22–29)
CO2: 28 MMOL/L (ref 22–29)
CO2: 28 MMOL/L (ref 22–29)
CO2: 30 MMOL/L (ref 22–29)
COHB: 0.2 % (ref 0–1.5)
COLOR: YELLOW
CORONAVIRUS 229E BY PCR: NOT DETECTED
CORONAVIRUS HKU1 BY PCR: DETECTED
CORONAVIRUS NL63 BY PCR: NOT DETECTED
CORONAVIRUS OC43 BY PCR: NOT DETECTED
CREAT SERPL-MCNC: 1 MG/DL (ref 0.7–1.2)
CREAT SERPL-MCNC: 1.1 MG/DL (ref 0.7–1.2)
CREAT SERPL-MCNC: 1.1 MG/DL (ref 0.7–1.2)
CREAT SERPL-MCNC: 1.2 MG/DL (ref 0.7–1.2)
CREAT SERPL-MCNC: 1.4 MG/DL (ref 0.7–1.2)
CREAT SERPL-MCNC: 1.8 MG/DL (ref 0.7–1.2)
CREATININE URINE: 93 MG/DL (ref 40–278)
CRITICAL: ABNORMAL
D DIMER: 1050 NG/ML DDU
DATE ANALYZED: ABNORMAL
DATE OF COLLECTION: ABNORMAL
EKG ATRIAL RATE: 124 BPM
EKG P AXIS: 38 DEGREES
EKG P-R INTERVAL: 120 MS
EKG Q-T INTERVAL: 274 MS
EKG QRS DURATION: 64 MS
EKG QTC CALCULATION (BAZETT): 393 MS
EKG R AXIS: 49 DEGREES
EKG T AXIS: 10 DEGREES
EKG VENTRICULAR RATE: 124 BPM
FOLATE: >20 NG/ML (ref 4.8–24.2)
GFR AFRICAN AMERICAN: 46
GFR AFRICAN AMERICAN: >60
GFR NON-AFRICAN AMERICAN: 38 ML/MIN/1.73
GFR NON-AFRICAN AMERICAN: 50 ML/MIN/1.73
GFR NON-AFRICAN AMERICAN: >60 ML/MIN/1.73
GLUCOSE BLD-MCNC: 125 MG/DL (ref 74–99)
GLUCOSE BLD-MCNC: 127 MG/DL (ref 74–99)
GLUCOSE BLD-MCNC: 128 MG/DL (ref 74–99)
GLUCOSE BLD-MCNC: 136 MG/DL (ref 74–99)
GLUCOSE BLD-MCNC: 164 MG/DL (ref 74–99)
GLUCOSE BLD-MCNC: 93 MG/DL (ref 74–99)
GLUCOSE URINE: NEGATIVE MG/DL
HCO3: 26.6 MMOL/L (ref 22–26)
HHB: 2.9 % (ref 0–5)
HUMAN METAPNEUMOVIRUS BY PCR: NOT DETECTED
HUMAN RHINOVIRUS/ENTEROVIRUS BY PCR: NOT DETECTED
INFLUENZA A H1-2009 BY PCR: DETECTED
INFLUENZA B BY PCR: NOT DETECTED
KETONES, URINE: NEGATIVE MG/DL
LAB: ABNORMAL
LEUKOCYTE ESTERASE, URINE: ABNORMAL
Lab: ABNORMAL
MAGNESIUM: 3 MG/DL (ref 1.6–2.6)
METHB: 0.4 % (ref 0–1.5)
MODE: ABNORMAL
MYCOPLASMA PNEUMONIAE BY PCR: NOT DETECTED
NITRITE, URINE: POSITIVE
O2 CONTENT: 16.7 ML/DL
O2 SATURATION: 97.1 % (ref 92–98.5)
O2HB: 96.5 % (ref 94–97)
OPERATOR ID: ABNORMAL
OSMOLALITY URINE: 800 MOSM/KG (ref 300–900)
PARAINFLUENZA VIRUS 1 BY PCR: NOT DETECTED
PARAINFLUENZA VIRUS 2 BY PCR: NOT DETECTED
PARAINFLUENZA VIRUS 3 BY PCR: NOT DETECTED
PARAINFLUENZA VIRUS 4 BY PCR: NOT DETECTED
PATIENT TEMP: 37 C
PCO2: 45.2 MMHG (ref 35–45)
PH BLOOD GAS: 7.39 (ref 7.35–7.45)
PH UA: 7 (ref 5–9)
PHOSPHORUS: 3.3 MG/DL (ref 2.5–4.5)
PO2: 105 MMHG (ref 75–100)
POTASSIUM REFLEX MAGNESIUM: 4.3 MMOL/L (ref 3.5–5)
POTASSIUM SERPL-SCNC: 3.6 MMOL/L (ref 3.5–5)
POTASSIUM SERPL-SCNC: 3.8 MMOL/L (ref 3.5–5)
POTASSIUM, UR: 61 MMOL/L
PROCALCITONIN: 0.35 NG/ML (ref 0–0.08)
PROTEIN UA: 30 MG/DL
RBC UA: ABNORMAL /HPF (ref 0–2)
REASON FOR REJECTION: NORMAL
REJECTED TEST: NORMAL
RESPIRATORY SYNCYTIAL VIRUS BY PCR: NOT DETECTED
SODIUM BLD-SCNC: 172 MMOL/L (ref 132–146)
SODIUM BLD-SCNC: 175 MMOL/L (ref 132–146)
SODIUM BLD-SCNC: 176 MMOL/L (ref 132–146)
SODIUM BLD-SCNC: 178 MMOL/L (ref 132–146)
SODIUM BLD-SCNC: 179 MMOL/L (ref 132–146)
SODIUM BLD-SCNC: 179 MMOL/L (ref 132–146)
SODIUM URINE: 108 MMOL/L
SOURCE, BLOOD GAS: ABNORMAL
SPECIFIC GRAVITY UA: 1.02 (ref 1–1.03)
THB: 12.2 G/DL (ref 11.5–16.5)
TIME ANALYZED: 957
TOTAL PROTEIN: 7.8 G/DL (ref 6.4–8.3)
TROPONIN: 0.03 NG/ML (ref 0–0.03)
TROPONIN: 0.03 NG/ML (ref 0–0.03)
UREA NITROGEN, UR: 1239 MG/DL (ref 800–1666)
UROBILINOGEN, URINE: 0.2 E.U./DL
VITAMIN B-12: 1230 PG/ML (ref 211–946)
WBC UA: ABNORMAL /HPF (ref 0–5)

## 2020-01-29 PROCEDURE — 82746 ASSAY OF FOLIC ACID SERUM: CPT

## 2020-01-29 PROCEDURE — 93010 ELECTROCARDIOGRAM REPORT: CPT | Performed by: INTERNAL MEDICINE

## 2020-01-29 PROCEDURE — 2500000003 HC RX 250 WO HCPCS: Performed by: INTERNAL MEDICINE

## 2020-01-29 PROCEDURE — 85378 FIBRIN DEGRADE SEMIQUANT: CPT

## 2020-01-29 PROCEDURE — 83935 ASSAY OF URINE OSMOLALITY: CPT

## 2020-01-29 PROCEDURE — 84100 ASSAY OF PHOSPHORUS: CPT

## 2020-01-29 PROCEDURE — 2580000003 HC RX 258: Performed by: INTERNAL MEDICINE

## 2020-01-29 PROCEDURE — 6360000002 HC RX W HCPCS: Performed by: INTERNAL MEDICINE

## 2020-01-29 PROCEDURE — 87081 CULTURE SCREEN ONLY: CPT

## 2020-01-29 PROCEDURE — 84145 PROCALCITONIN (PCT): CPT

## 2020-01-29 PROCEDURE — 82570 ASSAY OF URINE CREATININE: CPT

## 2020-01-29 PROCEDURE — 81001 URINALYSIS AUTO W/SCOPE: CPT

## 2020-01-29 PROCEDURE — 82436 ASSAY OF URINE CHLORIDE: CPT

## 2020-01-29 PROCEDURE — 87088 URINE BACTERIA CULTURE: CPT

## 2020-01-29 PROCEDURE — 2000000000 HC ICU R&B

## 2020-01-29 PROCEDURE — 82607 VITAMIN B-12: CPT

## 2020-01-29 PROCEDURE — 36415 COLL VENOUS BLD VENIPUNCTURE: CPT

## 2020-01-29 PROCEDURE — 96361 HYDRATE IV INFUSION ADD-ON: CPT

## 2020-01-29 PROCEDURE — 82805 BLOOD GASES W/O2 SATURATION: CPT

## 2020-01-29 PROCEDURE — 84300 ASSAY OF URINE SODIUM: CPT

## 2020-01-29 PROCEDURE — 83735 ASSAY OF MAGNESIUM: CPT

## 2020-01-29 PROCEDURE — C9113 INJ PANTOPRAZOLE SODIUM, VIA: HCPCS | Performed by: INTERNAL MEDICINE

## 2020-01-29 PROCEDURE — 6360000002 HC RX W HCPCS: Performed by: EMERGENCY MEDICINE

## 2020-01-29 PROCEDURE — 0100U HC RESPIRPTHGN MULT REV TRANS & AMP PRB TECH 21 TRGT: CPT

## 2020-01-29 PROCEDURE — 51702 INSERT TEMP BLADDER CATH: CPT

## 2020-01-29 PROCEDURE — 84540 ASSAY OF URINE/UREA-N: CPT

## 2020-01-29 PROCEDURE — 6370000000 HC RX 637 (ALT 250 FOR IP): Performed by: INTERNAL MEDICINE

## 2020-01-29 PROCEDURE — 6370000000 HC RX 637 (ALT 250 FOR IP): Performed by: EMERGENCY MEDICINE

## 2020-01-29 PROCEDURE — 82140 ASSAY OF AMMONIA: CPT

## 2020-01-29 PROCEDURE — 2580000003 HC RX 258: Performed by: EMERGENCY MEDICINE

## 2020-01-29 PROCEDURE — 80048 BASIC METABOLIC PNL TOTAL CA: CPT

## 2020-01-29 PROCEDURE — 84484 ASSAY OF TROPONIN QUANT: CPT

## 2020-01-29 PROCEDURE — 84133 ASSAY OF URINE POTASSIUM: CPT

## 2020-01-29 PROCEDURE — 99223 1ST HOSP IP/OBS HIGH 75: CPT | Performed by: INTERNAL MEDICINE

## 2020-01-29 RX ORDER — LORAZEPAM 1 MG/1
2 TABLET ORAL EVERY 6 HOURS PRN
Status: DISCONTINUED | OUTPATIENT
Start: 2020-01-29 | End: 2020-01-29

## 2020-01-29 RX ORDER — DEXTROSE AND SODIUM CHLORIDE 5; .2 G/100ML; G/100ML
INJECTION, SOLUTION INTRAVENOUS CONTINUOUS
Status: DISCONTINUED | OUTPATIENT
Start: 2020-01-29 | End: 2020-01-31

## 2020-01-29 RX ORDER — SODIUM CHLORIDE 0.9 % (FLUSH) 0.9 %
10 SYRINGE (ML) INJECTION EVERY 12 HOURS SCHEDULED
Status: DISCONTINUED | OUTPATIENT
Start: 2020-01-29 | End: 2020-01-29 | Stop reason: SDUPTHER

## 2020-01-29 RX ORDER — LORAZEPAM 1 MG/1
2 TABLET ORAL PRN
Status: DISCONTINUED | OUTPATIENT
Start: 2020-01-29 | End: 2020-01-29

## 2020-01-29 RX ORDER — METOPROLOL SUCCINATE 50 MG/1
25 TABLET, EXTENDED RELEASE ORAL NIGHTLY
Status: DISCONTINUED | OUTPATIENT
Start: 2020-01-29 | End: 2020-02-02 | Stop reason: HOSPADM

## 2020-01-29 RX ORDER — HEPARIN SODIUM 10000 [USP'U]/ML
5000 INJECTION, SOLUTION INTRAVENOUS; SUBCUTANEOUS EVERY 8 HOURS SCHEDULED
Status: DISCONTINUED | OUTPATIENT
Start: 2020-01-30 | End: 2020-02-02 | Stop reason: HOSPADM

## 2020-01-29 RX ORDER — SODIUM CHLORIDE 9 MG/ML
INJECTION, SOLUTION INTRAVENOUS CONTINUOUS
Status: DISCONTINUED | OUTPATIENT
Start: 2020-01-29 | End: 2020-01-29

## 2020-01-29 RX ORDER — SODIUM CHLORIDE 0.9 % (FLUSH) 0.9 %
10 SYRINGE (ML) INJECTION EVERY 12 HOURS SCHEDULED
Status: DISCONTINUED | OUTPATIENT
Start: 2020-01-29 | End: 2020-02-02 | Stop reason: HOSPADM

## 2020-01-29 RX ORDER — SODIUM CHLORIDE 450 MG/100ML
INJECTION, SOLUTION INTRAVENOUS ONCE
Status: COMPLETED | OUTPATIENT
Start: 2020-01-29 | End: 2020-01-29

## 2020-01-29 RX ORDER — SODIUM CHLORIDE 0.9 % (FLUSH) 0.9 %
10 SYRINGE (ML) INJECTION PRN
Status: DISCONTINUED | OUTPATIENT
Start: 2020-01-29 | End: 2020-02-02 | Stop reason: HOSPADM

## 2020-01-29 RX ORDER — CARBAMAZEPINE 200 MG/1
200 TABLET ORAL 2 TIMES DAILY
Status: DISCONTINUED | OUTPATIENT
Start: 2020-01-29 | End: 2020-02-02 | Stop reason: HOSPADM

## 2020-01-29 RX ORDER — FOLIC ACID 5 MG/ML
1 INJECTION, SOLUTION INTRAMUSCULAR; INTRAVENOUS; SUBCUTANEOUS DAILY
Status: DISCONTINUED | OUTPATIENT
Start: 2020-01-29 | End: 2020-02-02 | Stop reason: HOSPADM

## 2020-01-29 RX ORDER — ACETAMINOPHEN 650 MG/1
650 SUPPOSITORY RECTAL ONCE
Status: COMPLETED | OUTPATIENT
Start: 2020-01-29 | End: 2020-01-29

## 2020-01-29 RX ORDER — SODIUM CHLORIDE 9 MG/ML
10 INJECTION INTRAVENOUS DAILY
Status: DISCONTINUED | OUTPATIENT
Start: 2020-01-29 | End: 2020-02-02 | Stop reason: HOSPADM

## 2020-01-29 RX ORDER — SODIUM CHLORIDE 0.9 % (FLUSH) 0.9 %
10 SYRINGE (ML) INJECTION PRN
Status: DISCONTINUED | OUTPATIENT
Start: 2020-01-29 | End: 2020-01-29 | Stop reason: SDUPTHER

## 2020-01-29 RX ORDER — BENZTROPINE MESYLATE 1 MG/1
1 TABLET ORAL 2 TIMES DAILY
Status: DISCONTINUED | OUTPATIENT
Start: 2020-01-29 | End: 2020-02-02 | Stop reason: HOSPADM

## 2020-01-29 RX ORDER — OSELTAMIVIR PHOSPHATE 30 MG/1
30 CAPSULE ORAL DAILY
Status: DISCONTINUED | OUTPATIENT
Start: 2020-01-29 | End: 2020-01-29

## 2020-01-29 RX ORDER — DEXTROSE MONOHYDRATE 50 MG/ML
INJECTION, SOLUTION INTRAVENOUS EVERY 12 HOURS
Status: DISCONTINUED | OUTPATIENT
Start: 2020-01-30 | End: 2020-02-02 | Stop reason: HOSPADM

## 2020-01-29 RX ORDER — DEXTROSE AND SODIUM CHLORIDE 5; .45 G/100ML; G/100ML
INJECTION, SOLUTION INTRAVENOUS CONTINUOUS
Status: DISCONTINUED | OUTPATIENT
Start: 2020-01-29 | End: 2020-01-29

## 2020-01-29 RX ORDER — OSELTAMIVIR PHOSPHATE 30 MG/1
30 CAPSULE ORAL 2 TIMES DAILY
Status: DISCONTINUED | OUTPATIENT
Start: 2020-01-29 | End: 2020-02-02 | Stop reason: HOSPADM

## 2020-01-29 RX ORDER — ACETAMINOPHEN 325 MG/1
650 TABLET ORAL EVERY 4 HOURS PRN
Status: DISCONTINUED | OUTPATIENT
Start: 2020-01-29 | End: 2020-02-02 | Stop reason: HOSPADM

## 2020-01-29 RX ORDER — ONDANSETRON 2 MG/ML
4 INJECTION INTRAMUSCULAR; INTRAVENOUS EVERY 6 HOURS PRN
Status: DISCONTINUED | OUTPATIENT
Start: 2020-01-29 | End: 2020-02-02 | Stop reason: HOSPADM

## 2020-01-29 RX ORDER — DIMETHICONE, OXYBENZONE, AND PADIMATE O 2; 2.5; 6.6 G/100G; G/100G; G/100G
STICK TOPICAL PRN
Status: DISCONTINUED | OUTPATIENT
Start: 2020-01-29 | End: 2020-02-02 | Stop reason: HOSPADM

## 2020-01-29 RX ORDER — ACETAMINOPHEN 325 MG/1
650 TABLET ORAL EVERY 4 HOURS PRN
Status: DISCONTINUED | OUTPATIENT
Start: 2020-01-29 | End: 2020-01-29 | Stop reason: SDUPTHER

## 2020-01-29 RX ORDER — LORAZEPAM 2 MG/ML
1 INJECTION INTRAMUSCULAR EVERY 6 HOURS PRN
Status: DISCONTINUED | OUTPATIENT
Start: 2020-01-29 | End: 2020-02-02 | Stop reason: HOSPADM

## 2020-01-29 RX ORDER — PANTOPRAZOLE SODIUM 40 MG/10ML
40 INJECTION, POWDER, LYOPHILIZED, FOR SOLUTION INTRAVENOUS DAILY
Status: DISCONTINUED | OUTPATIENT
Start: 2020-01-29 | End: 2020-02-02 | Stop reason: HOSPADM

## 2020-01-29 RX ADMIN — SODIUM CHLORIDE, PRESERVATIVE FREE 10 ML: 5 INJECTION INTRAVENOUS at 09:44

## 2020-01-29 RX ADMIN — OLANZAPINE 7.5 MG: 5 TABLET, FILM COATED ORAL at 22:18

## 2020-01-29 RX ADMIN — OSELTAMIVIR PHOSPHATE 30 MG: 30 CAPSULE ORAL at 12:38

## 2020-01-29 RX ADMIN — DEXTROSE AND SODIUM CHLORIDE: 5; 200 INJECTION, SOLUTION INTRAVENOUS at 20:51

## 2020-01-29 RX ADMIN — SODIUM CHLORIDE: 4.5 INJECTION, SOLUTION INTRAVENOUS at 02:33

## 2020-01-29 RX ADMIN — PANTOPRAZOLE SODIUM 40 MG: 40 INJECTION, POWDER, FOR SOLUTION INTRAVENOUS at 09:44

## 2020-01-29 RX ADMIN — Medication 10 ML: at 09:45

## 2020-01-29 RX ADMIN — Medication: at 20:22

## 2020-01-29 RX ADMIN — CEFEPIME HYDROCHLORIDE 2 G: 2 INJECTION, POWDER, FOR SOLUTION INTRAVENOUS at 20:16

## 2020-01-29 RX ADMIN — ENOXAPARIN SODIUM 80 MG: 80 INJECTION SUBCUTANEOUS at 02:33

## 2020-01-29 RX ADMIN — OSELTAMIVIR PHOSPHATE 30 MG: 30 CAPSULE ORAL at 22:20

## 2020-01-29 RX ADMIN — CEFTRIAXONE SODIUM 1 G: 1 INJECTION, POWDER, FOR SOLUTION INTRAMUSCULAR; INTRAVENOUS at 02:33

## 2020-01-29 RX ADMIN — Medication 10 ML: at 20:53

## 2020-01-29 RX ADMIN — ACETAMINOPHEN 650 MG: 650 SUPPOSITORY RECTAL at 06:12

## 2020-01-29 RX ADMIN — LORAZEPAM 1 MG: 2 INJECTION INTRAMUSCULAR; INTRAVENOUS at 20:23

## 2020-01-29 RX ADMIN — DEXTROSE AND SODIUM CHLORIDE: 5; 450 INJECTION, SOLUTION INTRAVENOUS at 08:23

## 2020-01-29 RX ADMIN — DEXTROSE AND SODIUM CHLORIDE: 5; 450 INJECTION, SOLUTION INTRAVENOUS at 16:26

## 2020-01-29 RX ADMIN — LORAZEPAM 1 MG: 2 INJECTION INTRAMUSCULAR; INTRAVENOUS at 08:58

## 2020-01-29 RX ADMIN — FOLIC ACID 1 MG: 5 INJECTION, SOLUTION INTRAMUSCULAR; INTRAVENOUS; SUBCUTANEOUS at 09:44

## 2020-01-29 RX ADMIN — CARBAMAZEPINE 200 MG: 200 TABLET ORAL at 20:19

## 2020-01-29 RX ADMIN — METOPROLOL SUCCINATE 25 MG: 50 TABLET, EXTENDED RELEASE ORAL at 20:21

## 2020-01-29 RX ADMIN — BENZTROPINE MESYLATE 1 MG: 1 TABLET ORAL at 20:19

## 2020-01-29 RX ADMIN — CEFEPIME HYDROCHLORIDE 2 G: 2 INJECTION, POWDER, FOR SOLUTION INTRAVENOUS at 09:44

## 2020-01-29 ASSESSMENT — PAIN SCALES - GENERAL
PAINLEVEL_OUTOF10: 0
PAINLEVEL_OUTOF10: 0
PAINLEVEL_OUTOF10: 3
PAINLEVEL_OUTOF10: 0

## 2020-01-29 ASSESSMENT — PAIN SCALES - WONG BAKER: WONGBAKER_NUMERICALRESPONSE: 0

## 2020-01-29 ASSESSMENT — PAIN SCALES - PAIN ASSESSMENT IN ADVANCED DEMENTIA (PAINAD)
CONSOLABILITY: 0
BREATHING: 1
TOTALSCORE: 5
NEGVOCALIZATION: 0
BODYLANGUAGE: 2
TOTALSCORE: 3
TOTALSCORE: 3
NEGVOCALIZATION: 0
BREATHING: 1
CONSOLABILITY: 2
FACIALEXPRESSION: 0
BODYLANGUAGE: 2
BREATHING: 1
NEGVOCALIZATION: 0
FACIALEXPRESSION: 0
FACIALEXPRESSION: 0
BREATHING: 1
CONSOLABILITY: 0
FACIALEXPRESSION: 0
NEGVOCALIZATION: 0
BODYLANGUAGE: 2
BODYLANGUAGE: 2
CONSOLABILITY: 2
TOTALSCORE: 5

## 2020-01-29 NOTE — CONSULTS
daily    Historical Provider, MD   LORazepam (ATIVAN) 2 MG tablet Take 2 mg by mouth as needed for Anxiety    Historical Provider, MD   melatonin 3 MG TABS tablet Take 5 mg by mouth daily     Historical Provider, MD   polyethylene glycol (MIRALAX) powder Take 17 g by mouth daily    Historical Provider, MD   Lactobacillus (ACIDOPHILUS) CAPS capsule Take 2 capsules by mouth 2 times daily    Historical Provider, MD   metoprolol (TOPROL-XL) 25 MG XL tablet Take 25 mg by mouth nightly    Historical Provider, MD   OLANZapine (ZYPREXA) 10 MG tablet Take 7.5 mg by mouth nightly     Historical Provider, MD   omeprazole (PRILOSEC) 20 MG capsule Take 20 mg by mouth 2 times daily    Historical Provider, MD   carBAMazepine (TEGRETOL) 200 MG tablet Take 200 mg by mouth 2 times daily  6/5/15   Historical Provider, MD   fluvoxaMINE (LUVOX) 100 MG tablet Take 300 mg by mouth 2 times daily  6/11/15   Historical Provider, MD   desloratadine (CLARINEX) 5 MG tablet Take 5 mg by mouth daily as needed. Historical Provider, MD   acetaminophen (TYLENOL) 325 MG tablet Take 650 mg by mouth every 4 hours as needed for Pain. Historical Provider, MD   guaiFENesin-dextromethorphan (ROBITUSSIN DM) 100-10 MG/5ML syrup Take 10 mLs by mouth 4 times daily as needed for Cough     Historical Provider, MD   ibuprofen (ADVIL;MOTRIN) 200 MG tablet Take 200 mg by mouth every 4 hours as needed for Pain     Historical Provider, MD   Bismuth Subsalicylate (PINK BISMUTH PO) Take by mouth as needed     Historical Provider, MD   simvastatin (ZOCOR) 20 MG tablet Take 20 mg by mouth nightly. Historical Provider, MD   benztropine (COGENTIN) 1 MG tablet Take 1 mg by mouth 2 times daily. TAKE AM OF OR WITH A LITTLE WATER 04/24/2015    Historical Provider, MD   levocarnitine (CARNITOR) 330 MG tablet Take 330 mg by mouth 2 times daily. Historical Provider, MD   vitamin D (CHOLECALCIFEROL) 1000 UNIT TABS tablet Take 1,000 Units by mouth daily.     Historical Provider, MD       Allergies:  Patient has no known allergies. Social History:   TOBACCO:   reports that he has never smoked. He has never used smokeless tobacco.  ETOH:   reports no history of alcohol use. Family History:   History reviewed. No pertinent family history. REVIEW OF SYSTEMS:  Unable to obtain due to patient's current status    Physical Exam       Physical Exam:  · Vitals: /75   Pulse 115   Temp 99.1 °F (37.3 °C) (Rectal)   Resp 23   Ht 5' 5\" (1.651 m)   Wt 88 lb 1.6 oz (40 kg)   SpO2 95%   BMI 14.66 kg/m²     · I & O - 24hr: No intake/output data recorded. General appearance: Contracted, diffuse muscle wasting, agitated and nonresponsive  Skin: Color, texture, turgor reduced. No rashes or lesions. Head: Normocephalic. No masses, lesions, tenderness or abnormalities   Face: Symmetric no visible lesions  Eyes: Conjunctivae/cornea clear. Ears: External appearance normal.  Not assess hearing  Nose/Sinuses: Nares normal. No paranasal sinus tenderness. Mouth: Cannot assess lips appear normal  Neck: Contracted, should  Chest: Kyphotic with poor ventilatory excursion  Lungs: Clear to auscultation. No rhonchi, crackles or rales. Heart: S1 > S2. Rhythm is regular and rate is normal. No gallop rub or murmur.   Abdomen: Abdominal musculature tense there is no indication of pain cannot assess organs  Extremities: N multiple deformities  Musculoskeletal: Diffuse wasting cannot assess strength although it appears to be good based on his agitation  Neuro:   · Cannot fully assess although there is no focal deficit  · Mental status: Awake, nonresponsive noninteractive but agitated    Labs     CBC:   Lab Results   Component Value Date    WBC 9.1 01/28/2020    RBC 4.53 01/28/2020    HGB 14.1 01/28/2020    HCT 47.6 01/28/2020     01/28/2020    .1 01/28/2020     BMP:    Lab Results   Component Value Date     01/29/2020    K 3.8 01/29/2020    K 4.3 01/28/2020     01/29/2020    CO2 30 01/29/2020    BUN 51 01/29/2020    CREATININE 1.2 01/29/2020    GLUCOSE 136 01/29/2020    GLUCOSE 71 05/18/2012    CALCIUM 8.5 01/29/2020     Hepatic Function Panel:    Lab Results   Component Value Date    ALKPHOS 88 01/28/2020    AST 67 01/28/2020    ALT 47 01/28/2020    PROT 7.8 01/28/2020    LABALBU 3.2 01/28/2020    LABALBU 4.5 05/18/2012    BILITOT 0.3 01/28/2020     Cardiac Enzymes:   Lab Results   Component Value Date    CKTOTAL 799 (H) 10/27/2017    TROPONINI 0.03 01/29/2020    TROPONINI 0.03 01/28/2020    TROPONINI <0.01 07/12/2016     PT/INR:    Lab Results   Component Value Date    PROTIME 12.7 01/22/2020    INR 1.1 01/22/2020     ABG:  No results found for: PHART, LLI4JWO, PO2ART, C7YWJYDO, TNC1IKE, BEART  TSH:    Lab Results   Component Value Date    TSH 3.940 09/01/2015        Resident's Assessment & Plan     Assessment: 1. Acute hypoxic respiratory failure, 2/2 flu+ and Coronavirus   -Possibility of PE per ED records, Wells score moderate risk, s/p 1 dose of therapeutic Lovenox   -ABG findings noted, less likely PE  2. Hypernatremia, FWD related as 4.5L to correct Na 146,   - likely 2/2 poor po intake in the setting of viral illness   -Urine Na noted elevated with high osm, discussed with Nephrology, likely 2/2 high protein or soulte intake. 3.CRIS, likely pre-renal in the setting of poor po intake  4. UTI  5. Elevated troponin, 2/2 CRIS  6. Seizure-like disorder with possible underlying parkinsonism  7.  Behavioral abnormalities    Plan:  -Remains on 4L NC, sustaining good sats  -Started Tamiflu, renally dose, droplets isolation  -Continue D5 1/2NS, BMP Q6H, Nephrology following  -Follow pancultures, procalcitonin  -Will empirically cover with cefepime for UTI given the patient coming from a SNF  -Continue home meds, seizures precautions      DVT/GI prophylaxis  Heparin/Protonix      Rick Salmerno M.D. , PGY-2  Internal Medicine    Attending Physician: Dr. Sierra Alberts  Addendum ICU Attending Statement     Duran Roa was seen, examined and discussed with the multi-disciplinary ICU team during rounds. A addendum note may be separate to the residents note. If not then, I have personally seen and examined the patient and the key elements of the encounter were performed by me (> 85 % time). The medications & laboratory data was discussed and adjusted where necessary. The radiographic images were reviewed either as a group or with radiologist.  Any changes are document or changed if felt dis-concordant with the exam or history. The above findings were corroborated, plans confirmed and changes made if needed. Family was updated at the bedside as available. Key issues of the case were discussed among consultants. Critical Care time is documented if appropriate.       Sheryl Johnson DO, MPH  Professor of Medicine

## 2020-01-29 NOTE — PLAN OF CARE
Problem: Restraint Use - Nonviolent/Non-Self-Destructive Behavior:  Goal: Absence of restraint-related injury  Description  Absence of restraint-related injury  Outcome: Met This Shift     Problem: Falls - Risk of:  Goal: Will remain free from falls  Description  Will remain free from falls  Outcome: Met This Shift     Problem: Falls - Risk of:  Goal: Absence of physical injury  Description  Absence of physical injury  Outcome: Met This Shift     Problem: Risk for Impaired Skin Integrity  Goal: Tissue integrity - skin and mucous membranes  Description  Structural intactness and normal physiological function of skin and  mucous membranes. Outcome: Met This Shift   Plan of care discussed with patient / family.

## 2020-01-29 NOTE — CARE COORDINATION
MORA spoke with Legal Eddie Bush. [place] [facility] stated that Pt is to return to [facility]

## 2020-01-29 NOTE — H&P
Admission History and Physical                                                                                    Nitesh Bazzi MD, FACP                   Patient Name: Clay Ashby                   Age:  81 y.o. Gender:   male    CC: Altered mental status agitation hypoxia    HPI: This is a 68-year-old male resident of a group home. Severe mental and physical retardation, nonverbal, who presents with shortness of breath and tachypnea as well as hypoxia  Initial evaluation indicates that he has dehydration and severe hypernatremia, this morning in the 170s  He was transitioned to the intensive care unit    In the intensive care unit he is examined found to be noninteractive, agitated, severe cognitive impairment, multiple contractures and skeletal deformities    BUN was 67 creatinine 1.8 and sodium 179. There was no leukocytosis      Past Medical History:   Diagnosis Date    Acquired deformity of neck     Autism spectrum     Bilateral cataracts     Blepharochalasis     Cardiomegaly     Cataract of both eyes     Developmental delay     Gastritis     Hyperlipidemia     Kyphosis of thoracic region     Mental retardation     SEVERE MR  NON VERBAL, NEEDS WHEELCHAIR FOR LONG DISTANCE    Obsessive compulsive disorder     Parkinsonism (HCC)     Seizures (HCC)     Tourette disorder        Past Surgical History:   Procedure Laterality Date    TOE AMPUTATION Left     /3rd       Family Status   Relation Name Status    Mother      Father          Prior to Admission medications    Medication Sig Start Date End Date Taking?  Authorizing Provider   Loperamide HCl (IMODIUM PO) Take by mouth    Historical Provider, MD   magnesium citrate (CITROMA) SOLN Take 150 mLs by mouth as needed     Historical Provider, MD   lactulose (CHRONULAC) 10 GM/15ML solution Take 30 g by mouth 2 times daily    Historical Provider, MD LORazepam (ATIVAN) 2 MG tablet Take 2 mg by mouth as needed for Anxiety    Historical Provider, MD   melatonin 3 MG TABS tablet Take 5 mg by mouth daily     Historical Provider, MD   polyethylene glycol (MIRALAX) powder Take 17 g by mouth daily    Historical Provider, MD   Lactobacillus (ACIDOPHILUS) CAPS capsule Take 2 capsules by mouth 2 times daily    Historical Provider, MD   metoprolol (TOPROL-XL) 25 MG XL tablet Take 25 mg by mouth nightly    Historical Provider, MD   OLANZapine (ZYPREXA) 10 MG tablet Take 7.5 mg by mouth nightly     Historical Provider, MD   omeprazole (PRILOSEC) 20 MG capsule Take 20 mg by mouth 2 times daily    Historical Provider, MD   carBAMazepine (TEGRETOL) 200 MG tablet Take 200 mg by mouth 2 times daily  6/5/15   Historical Provider, MD   fluvoxaMINE (LUVOX) 100 MG tablet Take 300 mg by mouth 2 times daily  6/11/15   Historical Provider, MD   desloratadine (CLARINEX) 5 MG tablet Take 5 mg by mouth daily as needed. Historical Provider, MD   acetaminophen (TYLENOL) 325 MG tablet Take 650 mg by mouth every 4 hours as needed for Pain. Historical Provider, MD   guaiFENesin-dextromethorphan (ROBITUSSIN DM) 100-10 MG/5ML syrup Take 10 mLs by mouth 4 times daily as needed for Cough     Historical Provider, MD   ibuprofen (ADVIL;MOTRIN) 200 MG tablet Take 200 mg by mouth every 4 hours as needed for Pain     Historical Provider, MD   Bismuth Subsalicylate (PINK BISMUTH PO) Take by mouth as needed     Historical Provider, MD   simvastatin (ZOCOR) 20 MG tablet Take 20 mg by mouth nightly. Historical Provider, MD   benztropine (COGENTIN) 1 MG tablet Take 1 mg by mouth 2 times daily. TAKE AM OF OR WITH A LITTLE WATER 04/24/2015    Historical Provider, MD   levocarnitine (CARNITOR) 330 MG tablet Take 330 mg by mouth 2 times daily. Historical Provider, MD   vitamin D (CHOLECALCIFEROL) 1000 UNIT TABS tablet Take 1,000 Units by mouth daily.     Historical Provider, MD        Social History lesions. Head: Normocephalic. No masses, lesions, tenderness or abnormalities   Face: Symmetric no visible lesions  Eyes: Conjunctivae/cornea clear. Ears: External appearance normal.  Not assess hearing  Nose/Sinuses: Nares normal. No paranasal sinus tenderness. Mouth: Cannot assess lips appear normal  Neck: Contracted, should  Chest: Kyphotic with poor ventilatory excursion  Lungs: Clear to auscultation. No rhonchi, crackles or rales. Heart: S1 > S2. Rhythm is regular and rate is normal. No gallop rub or murmur.   Abdomen: Abdominal musculature tense there is no indication of pain cannot assess organs  Extremities: N multiple deformities  Musculoskeletal: Diffuse wasting cannot assess strength although it appears to be good based on his agitation  Neuro:   · Cannot fully assess although there is no focal deficit  Mental status: Awake, nonresponsive noninteractive agitated  He is nonambulatory     Labs     CBC:   Lab Results   Component Value Date    WBC 9.1 01/28/2020    RBC 4.53 01/28/2020    HGB 14.1 01/28/2020    HCT 47.6 01/28/2020     01/28/2020    .1 01/28/2020     BMP:    Lab Results   Component Value Date     01/29/2020    K 3.8 01/29/2020    K 4.3 01/28/2020    CL >140 01/29/2020    CO2 28 01/29/2020    BUN 55 01/29/2020    CREATININE 1.4 01/29/2020    GLUCOSE 93 01/29/2020    GLUCOSE 71 05/18/2012    CALCIUM 8.5 01/29/2020     Hepatic Function Panel:    Lab Results   Component Value Date    ALKPHOS 88 01/28/2020    AST 67 01/28/2020    ALT 47 01/28/2020    PROT 7.8 01/28/2020    LABALBU 3.2 01/28/2020    LABALBU 4.5 05/18/2012    BILITOT 0.3 01/28/2020     Magnesium:    Lab Results   Component Value Date    MG 3.0 01/29/2020     Cardiac Enzymes:   Lab Results   Component Value Date    CKTOTAL 799 (H) 10/27/2017    TROPONINI 0.03 01/28/2020    TROPONINI <0.01 07/12/2016    TROPONINI <0.01 06/20/2016     LDH:  No results found for: LDH  PT/INR:    Lab Results   Component Value Date PROTIME 12.7 01/22/2020    INR 1.1 01/22/2020     BNP: No results for input(s): BNP in the last 72 hours.    TSH:   Lab Results   Component Value Date    TSH 3.940 09/01/2015      Cardiac Injury Profile:   Recent Labs     01/28/20  2146   TROPONINI 0.03      Lipid Profile:   Lab Results   Component Value Date    TRIG 146 03/18/2016    HDL 44 03/18/2016    LDLCALC 93 03/18/2016    CHOL 166 03/18/2016      Hemoglobin A1C: No components found for: HGBA1C   U/A:   Lab Results   Component Value Date    LEUKOCYTESUR TRACE 01/29/2020    PHUR 7.0 01/29/2020    WBCUA 10-20 01/29/2020    RBCUA NONE 01/29/2020    RBCUA NONE 10/08/2012    BACTERIA MODERATE 01/29/2020    SPECGRAV 1.020 01/29/2020    BLOODU MODERATE 01/29/2020    GLUCOSEU Negative 01/29/2020    GLUCOSEU NEGATIVE 09/18/2011         ADMISSION SCHEDULED MEDS:   Current Facility-Administered Medications   Medication Dose Route Frequency Provider Last Rate Last Dose    benztropine (COGENTIN) tablet 1 mg  1 mg Oral BID Rafaela Cox MD        carBAMazepine (TEGRETOL) tablet 200 mg  200 mg Oral BID Rafaela Cox MD        OLANZapine (ZYPREXA) tablet 7.5 mg  7.5 mg Oral Nightly Rafaela Cox MD        metoprolol succinate (TOPROL XL) extended release tablet 25 mg  25 mg Oral Nightly Rafaela Cox MD        sodium chloride flush 0.9 % injection 10 mL  10 mL Intravenous 2 times per day Rafaela Cox MD   10 mL at 01/29/20 0945    sodium chloride flush 0.9 % injection 10 mL  10 mL Intravenous PRN Rafaela Cox MD        magnesium hydroxide (MILK OF MAGNESIA) 400 MG/5ML suspension 30 mL  30 mL Oral Daily PRN Rafaela Cox MD        ondansetron (ZOFRAN) injection 4 mg  4 mg Intravenous Q6H PRN Rafaela Cox MD        dextrose 5 % and 0.45 % sodium chloride infusion   Intravenous Continuous Shai Card  mL/hr at 01/29/20 0941      pantoprazole (PROTONIX) injection 40 mg  40 mg Intravenous Daily Rafaela Cox MD   40 mg at 01/29/20 0944    And    sodium chloride (PF) 0.9 % injection 10 mL  10 mL Intravenous Daily Polina Berg MD   10 mL at 20/74/62 6027    folic acid injection 1 mg  1 mg Intravenous Daily Polina Berg MD   1 mg at 01/29/20 0944    [START ON 1/30/2020] heparin (porcine) injection 5,000 Units  5,000 Units Subcutaneous 3 times per day Polina Berg MD        acetaminophen (TYLENOL) tablet 650 mg  650 mg Oral Q4H PRN Polina Berg MD        cefepime (MAXIPIME) 2 g IVPB extended (mini-bag)  2 g Intravenous Q12H Polina Berg MD 12.5 mL/hr at 01/29/20 0944 2 g at 01/29/20 0944    And    0.9 % sodium chloride infusion admixture   Intravenous Q12H Polina Berg MD        LORazepam (ATIVAN) injection 1 mg  1 mg Intravenous Q6H PRN Polina Berg MD   1 mg at 01/29/20 2806       Current  Infusions   dextrose 5 % and 0.45 % NaCl 200 mL/hr at 01/29/20 0941       Prn Meds  sodium chloride flush, magnesium hydroxide, ondansetron, acetaminophen, LORazepam    Radiology Review:  CT Head WO Contrast   Final Result      No acute intracranial hemorrhage or edema. XR CHEST PORTABLE   Final Result      No airspace opacities or pleural effusion.                   ASSESSMENT:  Active diagnoses treated at this admission:  · Profound hypernatremia  · Dehydration  · Protein calorie malnutrition  · Hypoxemia    Problem list:  Patient Active Problem List   Diagnosis    Seizure ([facility] [address]    Pressure ulcer of toe of right foot, stage 3 (HCC)    Atherosclerosis of native artery of both lower extremities (HCC)    Hypernatremia       PLAN:  Reviewed treatment plan under ICU protocol  Pulmonary support with nebulizers  Broad-spectrum antibiotics  Cultures  Viral scan        See  Orders  Raffaele Noel MD, Nimo Ayala, American Board of Internal Medicine  Diplomate, [address] of Internal Medicine  10:49 AM  1/29/2020

## 2020-01-29 NOTE — ED PROVIDER NOTES
Affect        ------------------------------ ED COURSE/MEDICAL DECISION MAKING----------------------  Medications   sodium chloride flush 0.9 % injection 10 mL (has no administration in time range)   sodium chloride flush 0.9 % injection 10 mL (has no administration in time range)   acetaminophen (TYLENOL) tablet 650 mg (has no administration in time range)   ipratropium-albuterol (DUONEB) nebulizer solution 1 ampule (1 ampule Inhalation Given 1/28/20 2109)   LORazepam (ATIVAN) injection 0.5 mg (0.5 mg Intramuscular Given 1/28/20 2054)   0.9 % sodium chloride bolus (0 mLs Intravenous Stopped 1/29/20 0051)   cefTRIAXone (ROCEPHIN) 1 g in dextrose 5 % 50 mL IVPB (vial-mate) (1 g Intravenous New Bag 1/29/20 0233)   enoxaparin (LOVENOX) injection 80 mg (80 mg Subcutaneous Given 1/29/20 0233)   0.45 % sodium chloride infusion ( Intravenous New Bag 1/29/20 0233)     EKG shows sinus tachycardia 124 bpm.  Normal axis. Normal QRS. Nonspecific ST-T wave changes noted. No significant ST depression or elevation. No STEMI. Medical Decision Making: This is a 73-year-old male who presented to the ED for lethargy as well as decreased oxygenation. On arrival to the ED the patient was tachycardic tachypneic with progressive tremors. The patient was given Ativan at this time with improvement of his symptoms. Patient underwent laboratory work-up revealed a normal CBC. Chemistry showed a sodium 179 chloride of 138 glucose of 125 with a BUN/creatinine 67/1.8. Patient's troponin was 0.03. BMP unremarkable at 315. Urinalysis consistent with UTI. Lactic acid was normal at 1.7. Head CT showed no acute findings. Chest x-ray unremarkable. Did the patient having an episode of hypoxia as well as being tachycardic the patient did have a d-dimer to try to rule out PE. It was +1050 therefore the patient will need PE rule out in the hospital.  We are unable to do the CTA at this time due to the patient's kidney function. Patient was given a one-time dose of Lovenox for possible PE. The patient's main problem is likely secondary to dehydration causing electrolyte abnormalities and an CRIS. The patient was given a dose of Rocephin for his UTI as well as IV fluids. The patient has been started on half-normal saline to slowly correct his hypernatremia at 0.5/h. The patient's case was discussed with the intensivist who has accepted the patient for admission. The case is also discussed with Dr. Vanda Martínez who is agreed to admit the patient for further care. Critical Care: 35 minutes    Counseling: The emergency provider has spoken with the patient and caregivers and wife and discussed todays results, in addition to providing specific details for the plan of care and counseling regarding the diagnosis and prognosis. Questions are answered at this time and they are agreeable with the plan.      --------------------------------- IMPRESSION AND DISPOSITION ---------------------------------    IMPRESSION  1. Severe dehydration    2. Hypernatremia    3. Hyperchloremia    4. Acute cystitis without hematuria        DISPOSITION  Disposition: Admit to CCU/ICU  Patient condition is stable    1/28/20, 8:51 PM.    This note is prepared by Ray Billy, acting as Scribe for Tony Gil DO. Tony Gil DO:  The scribe's documentation has been prepared under my direction and personally reviewed by me in its entirety. I confirm that the note above accurately reflects all work, treatment, procedures, and medical decision making performed by me.           Tony Gil DO  01/29/20 5252

## 2020-01-29 NOTE — ED NOTES
PTs guardian requested that we call her w/ outcome of ED visit. Phone number on file.       Catia Christensen RN  01/28/20 0067

## 2020-01-29 NOTE — CONSULTS
Department of Internal Medicine  Nephrology attending Consult Note      Reason for Consult:  Hypernatremia   Requesting Physician:  Dr Arevalo Cousins:  Lethargy     History Obtained From:  patient, electronic medical record    HISTORY OF PRESENT ILLNESS:  Briefly Isi May is 81 y.o. male with history of developmental delay, seizure disorder on carbamazepine, tourette disorder, kyphosis of thoracic region, parkinsonism on benztropine, bilateral cataracts who was admitted on 1/28/2020 from [facility] with complaints of shortness of breath and tachypnea. He was found to be hypoxic and tachycardic in the ED. according to the nursing home staff, patient was actively shaking yesterday afternoon and had decreasing positive attitude. The caregiver was unsure whether this is patient's baseline or experiencing seizure. Patient's initial BMP showed sodium of 179 mmol/liter, creatinine of 1.8 mg/dL which is the reason for this consultation.                Past Medical History:        Diagnosis Date    Acquired deformity of neck     Autism spectrum     Bilateral cataracts     Blepharochalasis     Cardiomegaly     Cataract of both eyes     Developmental delay     Gastritis     Hyperlipidemia     Kyphosis of thoracic region     Mental retardation     SEVERE MR  NON VERBAL, NEEDS WHEELCHAIR FOR LONG DISTANCE    Obsessive compulsive disorder     Parkinsonism (HCC)     Seizures (HCC)     Tourette disorder      Past Surgical History:        Procedure Laterality Date    TOE AMPUTATION Left     2nd/3rd     Current Medications:    Current Facility-Administered Medications: benztropine (COGENTIN) tablet 1 mg, 1 mg, Oral, BID  carBAMazepine (TEGRETOL) tablet 200 mg, 200 mg, Oral, BID  OLANZapine (ZYPREXA) tablet 7.5 mg, 7.5 mg, Oral, Nightly  metoprolol succinate (TOPROL XL) extended release tablet 25 mg, 25 mg, Oral, Nightly  sodium chloride flush 0.9 % injection 10 mL, 10 mL, Intravenous, 2 times per day  sodium chloride flush 0.9 % injection 10 mL, 10 mL, Intravenous, PRN  magnesium hydroxide (MILK OF MAGNESIA) 400 MG/5ML suspension 30 mL, 30 mL, Oral, Daily PRN  ondansetron (ZOFRAN) injection 4 mg, 4 mg, Intravenous, Q6H PRN  dextrose 5 % and 0.45 % sodium chloride infusion, , Intravenous, Continuous  pantoprazole (PROTONIX) injection 40 mg, 40 mg, Intravenous, Daily **AND** sodium chloride (PF) 0.9 % injection 10 mL, 10 mL, Intravenous, Daily  folic acid injection 1 mg, 1 mg, Intravenous, Daily  [START ON 1/30/2020] heparin (porcine) injection 5,000 Units, 5,000 Units, Subcutaneous, 3 times per day  acetaminophen (TYLENOL) tablet 650 mg, 650 mg, Oral, Q4H PRN  LORazepam (ATIVAN) injection 1 mg, 1 mg, Intravenous, Q6H PRN  oseltamivir (TAMIFLU) capsule 30 mg, 30 mg, Oral, Daily  cefepime (MAXIPIME) 2 g IVPB extended (mini-bag), 2 g, Intravenous, Q12H **AND** [START ON 1/30/2020] dextrose 5 % solution, , Intravenous, Q12H  Allergies:  Patient has no known allergies.     Social History:    Social History     Socioeconomic History    Marital status: Single     Spouse name: Not on file    Number of children: Not on file    Years of education: Not on file    Highest education level: Not on file   Occupational History    Not on file   Social Needs    Financial resource strain: Not on file    Food insecurity:     Worry: Not on file     Inability: Not on file    Transportation needs:     Medical: Not on file     Non-medical: Not on file   Tobacco Use    Smoking status: Never Smoker    Smokeless tobacco: Never Used   Substance and Sexual Activity    Alcohol use: No    Drug use: No    Sexual activity: Never   Lifestyle    Physical activity:     Days per week: Not on file     Minutes per session: Not on file    Stress: Not on file   Relationships    Social connections:     Talks on phone: Not on file     Gets together: Not on file     Attends Rastafari service: Not on file 01/29/2020    PO2 105.0 01/29/2020    HCO3 26.6 01/29/2020    BE 1.3 01/29/2020    O2SAT 97.1 01/29/2020       Urine studies   Urine osmolality  800   Ur Na  108  Ur K  61  Ur Cr  93  Ur  Nitrogen  1239      Family History:   History reviewed. No pertinent family history.     REVIEW OF SYSTEMS:    Patient nonverbal     PHYSICAL EXAM:      Vitals:    VITALS:  /67   Pulse 106   Temp 99.1 °F (37.3 °C) (Rectal)   Resp 24   Ht 5' 5\" (1.651 m)   Wt 88 lb 1.6 oz (40 kg)   SpO2 96%   BMI 14.66 kg/m²   24HR INTAKE/OUTPUT:      Intake/Output Summary (Last 24 hours) at 1/29/2020 1333  Last data filed at 1/29/2020 1300  Gross per 24 hour   Intake 50 ml   Output 370 ml   Net -320 ml     Constitutional: Nonverbal, agitated, looks very dry  HEENT: PERRLA  NECK: Supple  Respiratory: Bilateral clear  Cardiovascular/Edema: Increased rate, S1, S2   Gastrointestinal: Soft,  nontender  Musculoskeletal: Contracted lower extremities, skin tenting present  Neurologic: Nonverbal, underlying developmental delay  Other:  William catheter     DATA:    CBC:   Lab Results   Component Value Date    WBC 9.1 01/28/2020    RBC 4.53 01/28/2020    HGB 14.1 01/28/2020    HCT 47.6 01/28/2020    .1 01/28/2020    MCH 31.1 01/28/2020    MCHC 29.6 01/28/2020    RDW 13.7 01/28/2020     01/28/2020    MPV 11.2 01/28/2020     CMP:    Lab Results   Component Value Date     01/29/2020    K 3.8 01/29/2020    K 4.3 01/28/2020    CL >140 01/29/2020    CO2 28 01/29/2020    BUN 55 01/29/2020    CREATININE 1.4 01/29/2020    GFRAA >60 01/29/2020    LABGLOM 50 01/29/2020    GLUCOSE 93 01/29/2020    GLUCOSE 71 05/18/2012    PROT 7.8 01/28/2020    LABALBU 3.2 01/28/2020    LABALBU 4.5 05/18/2012    CALCIUM 8.5 01/29/2020    BILITOT 0.3 01/28/2020    ALKPHOS 88 01/28/2020    AST 67 01/28/2020    ALT 47 01/28/2020     Magnesium:    Lab Results   Component Value Date    MG 3.0 01/29/2020     Phosphorus:    Lab Results   Component Value Date PHOS 3.3 01/29/2020       Radiology Review:      1/28/2020 CT head WO contrast        No acute intracranial hemorrhage or edema. CXR 1/28/2020       No airspace opacities or pleural effusion. BRIEF SUMMARY OF INITIAL CONSULT:     Briefly Susanna Bundy is 81 y.o. male with history of developmental delay, seizure disorder, tourette disorder, kyphosis of thoracic region, parkinsonism , bilateral cataracts who was admitted on 1/28/[facility] from [facility] with complaints of shortness of breath and tachypnea. He was found to be hypoxic and tachycardic in the ED. according to the nursing home staff, patient was actively shaking yesterday afternoon and had decreasing positive attitude. The caregiver was unsure whether this is patient's baseline or experiencing seizure. Patient's initial BMP showed sodium of 179 mmol/liter, creatinine of 1.8 mg/dL which is the reason for this consultation. IMPRESSION/RECOMMENDATIONS:      1. Hypernatremia (sodium 179 mmol/L), with significant dehydration, and intravascular volume depletion due to impaired fluid intake in the setting of developmental delay. Urine osmolality 800 mL mOsm/L (> 600 mOsm/L). Urinary sodium noted to be high, 108 mmol/L (>20 mmol/L) which could be due to more protein/solute than free water intake. Total body water deficit calculated as about 6 L.     2. CRIS, stage 2, volume responsive prerenal CRIS, kidney function improved after fluids. 3. Acute hypoxic respiratory failure on nasal cannula  4. Influenza and coronavirus positive and respiratory panel on Tamiflu   5. Seizure disorder on carbamazepine  6. Parkinsonism on benztropine  7. Underlying developmental disorder  8. UTI under cefepime  9. Diet n.p.o.     PLAN:    · Continue D5 1/2 NS at 125 cc/h with a goal rate of correction of 10-12 mmol/ per day, avoid overcorrection  · Strict intake output  · Monitor BMP every 6 hour  · Droplet precaution      Thank you very much Dr. Pilo Boston for allowing us to participate in the care of Bartondayan Quintero.        Electronically signed by Franck Charles MD on 1/29/2020 at 1:33 PM

## 2020-01-30 LAB
ANION GAP SERPL CALCULATED.3IONS-SCNC: 12 MMOL/L (ref 7–16)
ANION GAP SERPL CALCULATED.3IONS-SCNC: 14 MMOL/L (ref 7–16)
ANION GAP SERPL CALCULATED.3IONS-SCNC: 7 MMOL/L (ref 7–16)
ANION GAP SERPL CALCULATED.3IONS-SCNC: 8 MMOL/L (ref 7–16)
BASOPHILS ABSOLUTE: 0.01 E9/L (ref 0–0.2)
BASOPHILS RELATIVE PERCENT: 0.1 % (ref 0–2)
BUN BLDV-MCNC: 27 MG/DL (ref 8–23)
BUN BLDV-MCNC: 31 MG/DL (ref 8–23)
BUN BLDV-MCNC: 34 MG/DL (ref 8–23)
BUN BLDV-MCNC: 38 MG/DL (ref 8–23)
CALCIUM SERPL-MCNC: 8.4 MG/DL (ref 8.6–10.2)
CALCIUM SERPL-MCNC: 8.4 MG/DL (ref 8.6–10.2)
CALCIUM SERPL-MCNC: 8.6 MG/DL (ref 8.6–10.2)
CALCIUM SERPL-MCNC: 8.6 MG/DL (ref 8.6–10.2)
CHLORIDE BLD-SCNC: 125 MMOL/L (ref 98–107)
CHLORIDE BLD-SCNC: 132 MMOL/L (ref 98–107)
CHLORIDE BLD-SCNC: 134 MMOL/L (ref 98–107)
CHLORIDE BLD-SCNC: 135 MMOL/L (ref 98–107)
CHLORIDE URINE RANDOM: 196 MMOL/L
CO2: 25 MMOL/L (ref 22–29)
CO2: 25 MMOL/L (ref 22–29)
CO2: 28 MMOL/L (ref 22–29)
CO2: 30 MMOL/L (ref 22–29)
CREAT SERPL-MCNC: 0.8 MG/DL (ref 0.7–1.2)
CREAT SERPL-MCNC: 0.9 MG/DL (ref 0.7–1.2)
CREAT SERPL-MCNC: 0.9 MG/DL (ref 0.7–1.2)
CREAT SERPL-MCNC: 1 MG/DL (ref 0.7–1.2)
CREATININE URINE: 93 MG/DL (ref 40–278)
EOSINOPHILS ABSOLUTE: 0.22 E9/L (ref 0.05–0.5)
EOSINOPHILS RELATIVE PERCENT: 2.4 % (ref 0–6)
GFR AFRICAN AMERICAN: >60
GFR NON-AFRICAN AMERICAN: >60 ML/MIN/1.73
GLUCOSE BLD-MCNC: 111 MG/DL (ref 74–99)
GLUCOSE BLD-MCNC: 125 MG/DL (ref 74–99)
GLUCOSE BLD-MCNC: 97 MG/DL (ref 74–99)
GLUCOSE BLD-MCNC: 98 MG/DL (ref 74–99)
HCT VFR BLD CALC: 45.6 % (ref 37–54)
HEMOGLOBIN: 13.1 G/DL (ref 12.5–16.5)
IMMATURE GRANULOCYTES #: 0.04 E9/L
IMMATURE GRANULOCYTES %: 0.4 % (ref 0–5)
LYMPHOCYTES ABSOLUTE: 1.76 E9/L (ref 1.5–4)
LYMPHOCYTES RELATIVE PERCENT: 19.6 % (ref 20–42)
MAGNESIUM: 3.1 MG/DL (ref 1.6–2.6)
MCH RBC QN AUTO: 31.4 PG (ref 26–35)
MCHC RBC AUTO-ENTMCNC: 28.7 % (ref 32–34.5)
MCV RBC AUTO: 109.4 FL (ref 80–99.9)
MONOCYTES ABSOLUTE: 0.47 E9/L (ref 0.1–0.95)
MONOCYTES RELATIVE PERCENT: 5.2 % (ref 2–12)
MRSA CULTURE ONLY: NORMAL
NEUTROPHILS ABSOLUTE: 6.49 E9/L (ref 1.8–7.3)
NEUTROPHILS RELATIVE PERCENT: 72.3 % (ref 43–80)
PDW BLD-RTO: 13.6 FL (ref 11.5–15)
PHOSPHORUS: 2.3 MG/DL (ref 2.5–4.5)
PLATELET # BLD: 146 E9/L (ref 130–450)
PMV BLD AUTO: 11.3 FL (ref 7–12)
POTASSIUM SERPL-SCNC: 3.6 MMOL/L (ref 3.5–5)
POTASSIUM SERPL-SCNC: 3.6 MMOL/L (ref 3.5–5)
POTASSIUM SERPL-SCNC: 3.8 MMOL/L (ref 3.5–5)
POTASSIUM SERPL-SCNC: 4 MMOL/L (ref 3.5–5)
POTASSIUM, UR: 35.3 MMOL/L
PROCALCITONIN: 0.32 NG/ML (ref 0–0.08)
RBC # BLD: 4.17 E12/L (ref 3.8–5.8)
RBC # BLD: NORMAL 10*6/UL
REASON FOR REJECTION: NORMAL
REJECTED TEST: NORMAL
SODIUM BLD-SCNC: 164 MMOL/L (ref 132–146)
SODIUM BLD-SCNC: 170 MMOL/L (ref 132–146)
SODIUM BLD-SCNC: 170 MMOL/L (ref 132–146)
SODIUM BLD-SCNC: 171 MMOL/L (ref 132–146)
SODIUM URINE: 172 MMOL/L
TSH SERPL DL<=0.05 MIU/L-ACNC: 2.37 UIU/ML (ref 0.27–4.2)
UREA NITROGEN, UR: 1263 MG/DL (ref 800–1666)
WBC # BLD: 9 E9/L (ref 4.5–11.5)

## 2020-01-30 PROCEDURE — 80048 BASIC METABOLIC PNL TOTAL CA: CPT

## 2020-01-30 PROCEDURE — 6370000000 HC RX 637 (ALT 250 FOR IP): Performed by: INTERNAL MEDICINE

## 2020-01-30 PROCEDURE — 84100 ASSAY OF PHOSPHORUS: CPT

## 2020-01-30 PROCEDURE — 2580000003 HC RX 258: Performed by: INTERNAL MEDICINE

## 2020-01-30 PROCEDURE — 83735 ASSAY OF MAGNESIUM: CPT

## 2020-01-30 PROCEDURE — 99233 SBSQ HOSP IP/OBS HIGH 50: CPT | Performed by: INTERNAL MEDICINE

## 2020-01-30 PROCEDURE — 84145 PROCALCITONIN (PCT): CPT

## 2020-01-30 PROCEDURE — 84133 ASSAY OF URINE POTASSIUM: CPT

## 2020-01-30 PROCEDURE — 36415 COLL VENOUS BLD VENIPUNCTURE: CPT

## 2020-01-30 PROCEDURE — 6360000002 HC RX W HCPCS: Performed by: INTERNAL MEDICINE

## 2020-01-30 PROCEDURE — 84443 ASSAY THYROID STIM HORMONE: CPT

## 2020-01-30 PROCEDURE — 82570 ASSAY OF URINE CREATININE: CPT

## 2020-01-30 PROCEDURE — 84300 ASSAY OF URINE SODIUM: CPT

## 2020-01-30 PROCEDURE — 2000000000 HC ICU R&B

## 2020-01-30 PROCEDURE — 85025 COMPLETE CBC W/AUTO DIFF WBC: CPT

## 2020-01-30 PROCEDURE — 82436 ASSAY OF URINE CHLORIDE: CPT

## 2020-01-30 PROCEDURE — C9113 INJ PANTOPRAZOLE SODIUM, VIA: HCPCS | Performed by: INTERNAL MEDICINE

## 2020-01-30 PROCEDURE — 2500000003 HC RX 250 WO HCPCS: Performed by: INTERNAL MEDICINE

## 2020-01-30 PROCEDURE — 84540 ASSAY OF URINE/UREA-N: CPT

## 2020-01-30 RX ADMIN — BENZTROPINE MESYLATE 1 MG: 1 TABLET ORAL at 09:30

## 2020-01-30 RX ADMIN — Medication 10 ML: at 19:43

## 2020-01-30 RX ADMIN — OSELTAMIVIR PHOSPHATE 30 MG: 30 CAPSULE ORAL at 09:31

## 2020-01-30 RX ADMIN — LORAZEPAM 1 MG: 2 INJECTION INTRAMUSCULAR; INTRAVENOUS at 22:11

## 2020-01-30 RX ADMIN — CEFEPIME HYDROCHLORIDE 2 G: 2 INJECTION, POWDER, FOR SOLUTION INTRAVENOUS at 07:48

## 2020-01-30 RX ADMIN — LORAZEPAM 1 MG: 2 INJECTION INTRAMUSCULAR; INTRAVENOUS at 16:11

## 2020-01-30 RX ADMIN — OLANZAPINE 7.5 MG: 5 TABLET, FILM COATED ORAL at 21:28

## 2020-01-30 RX ADMIN — POTASSIUM PHOSPHATE, MONOBASIC AND POTASSIUM PHOSPHATE, DIBASIC 30 MMOL: 224; 236 INJECTION, SOLUTION, CONCENTRATE INTRAVENOUS at 09:36

## 2020-01-30 RX ADMIN — OSELTAMIVIR PHOSPHATE 30 MG: 30 CAPSULE ORAL at 19:43

## 2020-01-30 RX ADMIN — LORAZEPAM 1 MG: 2 INJECTION INTRAMUSCULAR; INTRAVENOUS at 10:52

## 2020-01-30 RX ADMIN — CARBAMAZEPINE 200 MG: 200 TABLET ORAL at 21:28

## 2020-01-30 RX ADMIN — SODIUM CHLORIDE, PRESERVATIVE FREE 10 ML: 5 INJECTION INTRAVENOUS at 22:11

## 2020-01-30 RX ADMIN — HEPARIN SODIUM 5000 UNITS: 10000 INJECTION, SOLUTION INTRAVENOUS; SUBCUTANEOUS at 06:01

## 2020-01-30 RX ADMIN — HEPARIN SODIUM 5000 UNITS: 10000 INJECTION, SOLUTION INTRAVENOUS; SUBCUTANEOUS at 13:18

## 2020-01-30 RX ADMIN — HEPARIN SODIUM 5000 UNITS: 10000 INJECTION, SOLUTION INTRAVENOUS; SUBCUTANEOUS at 22:11

## 2020-01-30 RX ADMIN — DEXTROSE MONOHYDRATE: 50 INJECTION, SOLUTION INTRAVENOUS at 11:51

## 2020-01-30 RX ADMIN — LORAZEPAM 1 MG: 2 INJECTION INTRAMUSCULAR; INTRAVENOUS at 04:41

## 2020-01-30 RX ADMIN — DEXTROSE AND SODIUM CHLORIDE: 5; 200 INJECTION, SOLUTION INTRAVENOUS at 06:05

## 2020-01-30 RX ADMIN — METOPROLOL SUCCINATE 25 MG: 50 TABLET, EXTENDED RELEASE ORAL at 19:43

## 2020-01-30 RX ADMIN — CEFEPIME HYDROCHLORIDE 2 G: 2 INJECTION, POWDER, FOR SOLUTION INTRAVENOUS at 19:43

## 2020-01-30 RX ADMIN — DEXTROSE MONOHYDRATE: 50 INJECTION, SOLUTION INTRAVENOUS at 00:29

## 2020-01-30 RX ADMIN — CARBAMAZEPINE 200 MG: 200 TABLET ORAL at 09:30

## 2020-01-30 RX ADMIN — FOLIC ACID 1 MG: 5 INJECTION, SOLUTION INTRAMUSCULAR; INTRAVENOUS; SUBCUTANEOUS at 09:36

## 2020-01-30 RX ADMIN — Medication 10 ML: at 09:32

## 2020-01-30 RX ADMIN — DEXTROSE AND SODIUM CHLORIDE: 5; 200 INJECTION, SOLUTION INTRAVENOUS at 18:50

## 2020-01-30 RX ADMIN — PANTOPRAZOLE SODIUM 40 MG: 40 INJECTION, POWDER, FOR SOLUTION INTRAVENOUS at 09:31

## 2020-01-30 RX ADMIN — SODIUM CHLORIDE, PRESERVATIVE FREE 10 ML: 5 INJECTION INTRAVENOUS at 09:32

## 2020-01-30 RX ADMIN — BENZTROPINE MESYLATE 1 MG: 1 TABLET ORAL at 21:28

## 2020-01-30 ASSESSMENT — PAIN SCALES - PAIN ASSESSMENT IN ADVANCED DEMENTIA (PAINAD)
NEGVOCALIZATION: 1
FACIALEXPRESSION: 0
CONSOLABILITY: 1
TOTALSCORE: 2
FACIALEXPRESSION: 0
BREATHING: 0
FACIALEXPRESSION: 1
BREATHING: 0
TOTALSCORE: 0
CONSOLABILITY: 1
BREATHING: 0
BODYLANGUAGE: 1
CONSOLABILITY: 1
BODYLANGUAGE: 0
BODYLANGUAGE: 1
TOTALSCORE: 2
CONSOLABILITY: 1
BODYLANGUAGE: 0
BREATHING: 0
CONSOLABILITY: 0
TOTALSCORE: 2
NEGVOCALIZATION: 0
CONSOLABILITY: 1
TOTALSCORE: 4
TOTALSCORE: 3
CONSOLABILITY: 0
FACIALEXPRESSION: 0
CONSOLABILITY: 1
BREATHING: 0
TOTALSCORE: 2
FACIALEXPRESSION: 0
NEGVOCALIZATION: 0
CONSOLABILITY: 1
NEGVOCALIZATION: 0
FACIALEXPRESSION: 0
BREATHING: 0
NEGVOCALIZATION: 0
CONSOLABILITY: 1
BODYLANGUAGE: 2
FACIALEXPRESSION: 1
TOTALSCORE: 0
BODYLANGUAGE: 2
NEGVOCALIZATION: 0
BODYLANGUAGE: 1
NEGVOCALIZATION: 0
TOTALSCORE: 3
FACIALEXPRESSION: 0
BODYLANGUAGE: 1
TOTALSCORE: 4
BREATHING: 0
BODYLANGUAGE: 1
NEGVOCALIZATION: 0
FACIALEXPRESSION: 0
BREATHING: 0
BODYLANGUAGE: 1
NEGVOCALIZATION: 1
BREATHING: 0
NEGVOCALIZATION: 0
BREATHING: 0
FACIALEXPRESSION: 0

## 2020-01-30 NOTE — PROGRESS NOTES
Dr. Nino Patel notified of 2200 sodium being 172, see new order. To call with 0200 sodium if less than 170.

## 2020-01-30 NOTE — PROGRESS NOTES
13.6 01/30/2020     01/30/2020    MPV 11.3 01/30/2020     CMP:    Lab Results   Component Value Date     01/30/2020    K 3.6 01/30/2020    K 4.3 01/28/2020     01/30/2020    CO2 30 01/30/2020    BUN 31 01/30/2020    CREATININE 0.9 01/30/2020    GFRAA >60 01/30/2020    LABGLOM >60 01/30/2020    GLUCOSE 125 01/30/2020    GLUCOSE 71 05/18/2012    PROT 7.8 01/28/2020    LABALBU 3.2 01/28/2020    LABALBU 4.5 05/18/2012    CALCIUM 8.4 01/30/2020    BILITOT 0.3 01/28/2020    ALKPHOS 88 01/28/2020    AST 67 01/28/2020    ALT 47 01/28/2020     Magnesium:    Lab Results   Component Value Date    MG 3.1 01/30/2020     Phosphorus:    Lab Results   Component Value Date    PHOS 2.3 01/30/2020       Radiology Review:         1/28/2020 CT head WO contrast        No acute intracranial hemorrhage or edema.         CXR 1/28/2020       No airspace opacities or pleural effusion. BRIEF SUMMARY OF INITIAL CONSULT:      Briefly Nathan Sharma is 81 y.o. male with history of developmental delay, seizure disorder, tourette disorder, kyphosis of thoracic region, parkinsonism , bilateral cataracts who was admitted on 1/28/2020 from [facility] with complaints of shortness of breath and tachypnea. He was found to be hypoxic and tachycardic in the ED. according to the nursing home staff, patient was actively shaking yesterday afternoon and had decreasing positive attitude. The caregiver was unsure whether this is patient's baseline or experiencing seizure. Patient's initial BMP showed sodium of 179 mmol/liter, creatinine of 1.8 mg/dL which is the reason for this consultation.            IMPRESSION/RECOMMENDATIONS:       1. Hypernatremia (sodium 179 mmol/L), with significant dehydration, and intravascular volume depletion due to impaired fluid intake in the setting of developmental delay. Urine osmolality 800 mL mOsm/L (> 600 mOsm/L).   Urinary sodium noted to be high, 108 mmol/L (>20 mmol/L) which

## 2020-01-30 NOTE — PROGRESS NOTES
100.8 °F (38.2 °C) (Axillary)   04/22/19 97.9 °F (36.6 °C) (Temporal)     BP Readings from Last 3 Encounters:   01/30/20 112/73   01/22/20 107/67   04/22/19 139/77     Pulse Readings from Last 3 Encounters:   01/30/20 76   01/22/20 85   01/14/20 84       General appearance: Contracted, diffuse muscle wasting, agitated and nonresponsive  Skin: Color, texture, turgor reduced. No rashes or lesions. Head: Normocephalic. No masses, lesions, tenderness or abnormalities   Face: Symmetric no visible lesions  Eyes: Conjunctivae/cornea clear. Ears: External appearance normal.  Not assess hearing  Nose/Sinuses: Nares normal. No paranasal sinus tenderness. Mouth: Cannot assess lips appear normal  Neck: Contracted, should  Chest: Kyphotic with poor ventilatory excursion  Lungs: Clear to auscultation. No rhonchi, crackles or rales. Heart: S1 > S2. Rhythm is regular and rate is normal. No gallop rub or murmur.   Abdomen: Abdominal musculature tense there is no indication of pain cannot assess organs  Extremities: N multiple deformities  Musculoskeletal: Diffuse wasting cannot assess strength although it appears to be good based on his agitation  Neuro:   · Cannot fully assess although there is no focal deficit  Mental status: Awake, nonresponsive noninteractive agitated  He is nonambulatory     Labs     CBC:   Lab Results   Component Value Date    WBC 9.1 01/28/2020    RBC 4.53 01/28/2020    HGB 14.1 01/28/2020    HCT 47.6 01/28/2020     01/28/2020    .1 01/28/2020     BMP:    Lab Results   Component Value Date     01/30/2020    K 4.0 01/30/2020    K 4.3 01/28/2020     01/30/2020    CO2 25 01/30/2020    BUN 38 01/30/2020    CREATININE 1.0 01/30/2020    GLUCOSE 111 01/30/2020    GLUCOSE 71 05/18/2012    CALCIUM 8.6 01/30/2020     Hepatic Function Panel:    Lab Results   Component Value Date    ALKPHOS 88 01/28/2020    AST 67 01/28/2020    ALT 47 01/28/2020    PROT 7.8 01/28/2020    LABALBU 3.2 Intravenous PRN Monica Villagomez MD        magnesium hydroxide (MILK OF MAGNESIA) 400 MG/5ML suspension 30 mL  30 mL Oral Daily PRN Monica Villagomez MD        ondansetron [facility]) injection 4 mg  4 mg Intravenous Q6H PRN Monica Villagomez MD        pantoprazole (PROTONIX) injection 40 mg  40 mg Intravenous Daily Monica Villagomez MD   40 mg at 01/29/20 0944    And    sodium chloride (PF) 0.9 % injection 10 mL  10 mL Intravenous Daily Monica Villagomez MD   10 mL at 09/81/45 9109    folic acid injection 1 mg  1 mg Intravenous Daily Monica Villagomez MD   1 mg at 01/29/20 0944    heparin (porcine) injection 5,000 Units  5,000 Units Subcutaneous 3 times per day Monica Villagomez MD   5,000 Units at 01/30/20 0601    acetaminophen (TYLENOL) tablet 650 mg  650 mg Oral Q4H PRN Monica Villagomez MD        LORazepam (ATIVAN) injection 1 mg  1 mg Intravenous Q6H PRN Monica Villagomez MD   1 mg at 01/30/20 0441    cefepime (MAXIPIME) 2 g IVPB extended (mini-bag)  2 g Intravenous Q12H Irvin Saunders DO   Stopped at 01/30/20 9003    And    dextrose 5 % solution   Intravenous Q12H Irvin Saunders DO   Stopped at 01/30/20 0229    oseltamivir (TAMIFLU) capsule 30 mg  30 mg Oral BID Sandra Bennett MD   30 mg at 01/29/20 2220    medicated lip balm (BLISTEX/CARMEX) stick   Topical PRN Dilan Kaur MD        dextrose 5 % and 0.2 % NaCl infusion   Intravenous Continuous Vikash Plata MD 80 mL/hr at 01/30/20 0605         Current  Infusions   dextrose Stopped (01/30/20 0229)    dextrose 5 % and 0.2 % NaCl 80 mL/hr at 01/30/20 0605       Prn Meds  sodium chloride flush, magnesium hydroxide, ondansetron, acetaminophen, LORazepam, medicated lip balm    Radiology Review:  CT Head WO Contrast   Final Result      No acute intracranial hemorrhage or edema. XR CHEST PORTABLE   Final Result      No airspace opacities or pleural effusion.                   ASSESSMENT:  Active diagnoses treated at this admission:  · Profound hypernatremia  · Dehydration  · Protein calorie malnutrition  · Hypoxemia-with respiratory failure  · Influenza  · Coronavirus    Problem list:  Patient Active Problem List   Diagnosis    Seizure ([facility] [address]    Pressure ulcer of toe of right foot, stage 3 ([facility] [address]    Atherosclerosis of native artery of both lower extremities (HCC)    Hypernatremia       PLAN:  Reviewed treatment plan under ICU protocol  Pulmonary support with nebulizers  Broad-spectrum antibiotics  Cultures  Viral scan-completed results noted  Continue supportive treatment  Continue treatment for the hypernatremia  Basic metabolic panel monitored every 6 hours  Currently in droplet precautions        See  Orders  Rashaun Mazariegos MD, Ene Moore, American Board of Internal Medicine  Diplomate, [address] of Internal Medicine  6:39 AM  1/30/2020

## 2020-01-30 NOTE — PROGRESS NOTES
72 hours. Fasting Lipid Panel:    Lab Results   Component Value Date    CHOL 166 2016    TRIG 146 2016    HDL 44 2016       Cardiac Enzymes:    Lab Results   Component Value Date    CKTOTAL 799 (H) 10/27/2017    TROPONINI 0.03 2020    TROPONINI 0.03 2020    TROPONINI 0.03 2020       Notable Cultures:      Blood cultures   Blood Culture, Routine   Date Value Ref Range Status   2020 24 Hours- no growth  Preliminary     Respiratory cultures No results found for: RESPCULTURE   Gram Stain Result   Date Value Ref Range Status   2015 Refer to ordered Gram stain for results  Final     Urine   Urine Culture, Routine   Date Value Ref Range Status   2020 Growth not present, incubation continues  Preliminary     Legionella No results found for: LABLEGI  C Diff PCR No results found for: CDIFPCR  Wound culture/abscess: No results for input(s): WNDABS in the last 72 hours. Tip culture:No results for input(s): CXCATHTIP in the last 72 hours. Oxygen: Additional Respiratory  Assessments  Pulse: 90  Resp: 19  SpO2: 98 %  Oral Care: Mouth swabbed   Urethral Catheter Coude 16 fr-Output (mL): 60 mL    Imaging Studies:    Ct Head Wo Contrast    Result Date: 2020  Patient MRN:  [id number] : 1950 Age: 81 years Gender: Male Order Date:  2020 9:15 PM EXAM: CT HEAD WO CONTRAST COMPARISON: 2020 INDICATION:  seizure like activity seizure like activity TECHNIQUE: Axial unenhanced CT scanning was performed through the head without the use of intravenous contrast. Low-dose CT acquisition technique included one of the following options; 1. Automated exposure control, 2. Adjustment of mA and/or kV according to the patient's size or 3. Use of iterative reconstruction. FINDINGS: No acute intracranial hemorrhage or edema. No abnormal extra-axial fluid collections.  There is prominence of sulci, cisterns, and ventricles related to stable generalized proximal volume loss. There are areas of hypoattenuation in periventricular white matter appearing similar since previous examination suggestive of chronic microvascular ischemic change. Mild mucosal thickening is seen involving visualized left maxillary sinus. Mastoid air cells are clear. No acute intracranial hemorrhage or edema. Xr Chest Portable    Result Date: 2020  Patient MRN:  [id number] : 1950 Age: 81 years Gender: Male Order Date:  2020 9:00 PM EXAM: XR CHEST PORTABLE COMPARISON: 2020 INDICATION:  sob sob FINDINGS: The heart is normal in size. No focal airspace opacity. There is no pleural effusion. There is no pneumothorax. No free air beneath the diaphragms. There is a chronic right clavicle fracture. No airspace opacities or pleural effusion. Ct Head Wo Contrast    Result Date: 2020  Patient MRN:  [id number] : 1950 Age: 81 years Gender: Male Order Date:  2020 9:15 PM EXAM: CT HEAD WO CONTRAST COMPARISON: 2020 INDICATION:  seizure like activity seizure like activity TECHNIQUE: Axial unenhanced CT scanning was performed through the head without the use of intravenous contrast. Low-dose CT acquisition technique included one of the following options; 1. Automated exposure control, 2. Adjustment of mA and/or kV according to the patient's size or 3. Use of iterative reconstruction. FINDINGS: No acute intracranial hemorrhage or edema. No abnormal extra-axial fluid collections. There is prominence of sulci, cisterns, and ventricles related to stable generalized proximal volume loss. There are areas of hypoattenuation in periventricular white matter appearing similar since previous examination suggestive of chronic microvascular ischemic change. Mild mucosal thickening is seen involving visualized left maxillary sinus. Mastoid air cells are clear. No acute intracranial hemorrhage or edema.     Ct Head Wo Contrast    Result Date: 2020  Patient MRN: [id number] : 1950 Age:  81 years Gender: Male Order Date: 2020 3:00 PM Exam: CT HEAD WO CONTRAST Number of Images: 715 views Indication:   recurrent seizure recurrent seizure Comparison: 2018 TECHNIQUE: Region of study: Head. CT images acquired without intravenous contrast. One or more of these dose optimization techniques were utilized: Automated exposure control; mA and/or kV adjustment per patient size (includes targeted exams where dose is matched to clinical indication); or iterative reconstruction. FINDINGS: No mass, hemorrhage or midline shift. There is moderate atrophy and mild chronic microvascular ischemic disease. Bony calvarium unremarkable. Moderate atrophy and mild chronic microvascular ischemic disease. Xr Chest Portable    Result Date: 2020  Patient MRN:  [id number] : 1950 Age: 81 years Gender: Male Order Date:  2020 9:00 PM EXAM: XR CHEST PORTABLE COMPARISON: 2020 INDICATION:  sob sob FINDINGS: The heart is normal in size. No focal airspace opacity. There is no pleural effusion. There is no pneumothorax. No free air beneath the diaphragms. There is a chronic right clavicle fracture. No airspace opacities or pleural effusion. Xr Chest Portable    Result Date: 2020  Patient MRN: [id number] : 1950 Age:  81 years Gender: Male Order Date: 2020 3:00 PM Exam: XR CHEST PORTABLE Number of Images: 1 view Indication:   fever fever Comparison: Prior study from 10/27/2017 is available. Findings: The lungs are clear. There is hyperaeration of lungs with flattening of both hemidiaphragms suggesting of chronic obstructive pulmonary disease. There is no evidence of pulmonary infiltrate or pleural effusion. The pulmonary vascularity is unremarkable. There is mild cardiomegaly. There is uncoiling atherosclerotic change of thoracic aorta. .  The bony thorax demonstrates no gross abnormality.      Chronic obstructive pulmonary disease Cardiomegaly with left ventricular contour There is no evidence of any airspace consolidation or pulmonary venous congestion. Resident's Assessment and PLan     Assessment: 1. Acute hypoxic respiratory failure, 2/2 flu+ and Coronavirus - Improving              -Possibility of PE per ED records, Wells score moderate risk, s/p 1 dose of therapeutic Lovenox              -ABG findings noted, less likely PE  2. Hypernatremia - Improving              - likely 2/2 poor po intake in the setting of viral illness              -Urine Na noted elevated with high osm, discussed with Nephrology, likely 2/2 high protein or soulte intake. 3.CRIS, likely pre-renal in the setting of poor po intake  4. UTI  5. Elevated troponin, 2/2 CRIS  6. Seizure-like disorder with possible underlying parkinsonism  7. Behavioral abnormalities     Plan:  -Remains on 4L NC, sustaining good sats  -Continue Tamiflu, renally dose, droplets isolation  -IVF switched to D5 0.2 NS, follow BMP Q6H   -Culture remains negative, continue Cefepime  -Continue home meds, seizures precautions        DVT/GI prophylaxis  Heparin/Protonix        Rick Salmeron M.D. , PGY-2  Internal Medicine     Attending Physician: Dr. Sierra Alberts  Addendum ICU Attending Statement       Addendum ICU Attending Statement     Radhika Silverman was seen, examined and discussed with the multi-disciplinary ICU team during rounds. A addendum note may be separate to the residents note. If not then, I have personally seen and examined the patient and the key elements of the encounter were performed by me (> 85 % time). The medications & laboratory data was discussed and adjusted where necessary. The radiographic images were reviewed either as a group or with radiologist.  Any changes are document or changed if felt dis-concordant with the exam or history. The above findings were corroborated, plans confirmed and changes made if needed. Family was updated at the bedside as available.  Key issues of the case were discussed among consultants. Critical Care time is documented if appropriate.       Agata Joel DO, MPH  Professor of Medicine

## 2020-01-31 LAB
ANION GAP SERPL CALCULATED.3IONS-SCNC: 11 MMOL/L (ref 7–16)
ANION GAP SERPL CALCULATED.3IONS-SCNC: 14 MMOL/L (ref 7–16)
ANION GAP SERPL CALCULATED.3IONS-SCNC: 5 MMOL/L (ref 7–16)
ANION GAP SERPL CALCULATED.3IONS-SCNC: 9 MMOL/L (ref 7–16)
ATYPICAL LYMPHOCYTE RELATIVE PERCENT: 0.9 % (ref 0–4)
BASOPHILS ABSOLUTE: 0 E9/L (ref 0–0.2)
BASOPHILS RELATIVE PERCENT: 0.1 % (ref 0–2)
BUN BLDV-MCNC: 22 MG/DL (ref 8–23)
BUN BLDV-MCNC: 24 MG/DL (ref 8–23)
CALCIUM SERPL-MCNC: 8 MG/DL (ref 8.6–10.2)
CALCIUM SERPL-MCNC: 8.1 MG/DL (ref 8.6–10.2)
CALCIUM SERPL-MCNC: 8.2 MG/DL (ref 8.6–10.2)
CALCIUM SERPL-MCNC: 8.3 MG/DL (ref 8.6–10.2)
CHLORIDE BLD-SCNC: 121 MMOL/L (ref 98–107)
CHLORIDE BLD-SCNC: 122 MMOL/L (ref 98–107)
CHLORIDE BLD-SCNC: 122 MMOL/L (ref 98–107)
CHLORIDE BLD-SCNC: 123 MMOL/L (ref 98–107)
CO2: 21 MMOL/L (ref 22–29)
CO2: 24 MMOL/L (ref 22–29)
CO2: 24 MMOL/L (ref 22–29)
CO2: 29 MMOL/L (ref 22–29)
CREAT SERPL-MCNC: 0.7 MG/DL (ref 0.7–1.2)
CREAT SERPL-MCNC: 0.8 MG/DL (ref 0.7–1.2)
EOSINOPHILS ABSOLUTE: 0.19 E9/L (ref 0.05–0.5)
EOSINOPHILS RELATIVE PERCENT: 2.6 % (ref 0–6)
GFR AFRICAN AMERICAN: >60
GFR NON-AFRICAN AMERICAN: >60 ML/MIN/1.73
GLUCOSE BLD-MCNC: 112 MG/DL (ref 74–99)
GLUCOSE BLD-MCNC: 148 MG/DL (ref 74–99)
GLUCOSE BLD-MCNC: 93 MG/DL (ref 74–99)
GLUCOSE BLD-MCNC: 96 MG/DL (ref 74–99)
HCT VFR BLD CALC: 38.2 % (ref 37–54)
HEMOGLOBIN: 12 G/DL (ref 12.5–16.5)
LYMPHOCYTES ABSOLUTE: 1.18 E9/L (ref 1.5–4)
LYMPHOCYTES RELATIVE PERCENT: 14.8 % (ref 20–42)
MAGNESIUM: 2.3 MG/DL (ref 1.6–2.6)
MCH RBC QN AUTO: 31.7 PG (ref 26–35)
MCHC RBC AUTO-ENTMCNC: 31.4 % (ref 32–34.5)
MCV RBC AUTO: 100.8 FL (ref 80–99.9)
METAMYELOCYTES RELATIVE PERCENT: 0.9 % (ref 0–1)
MONOCYTES ABSOLUTE: 0 E9/L (ref 0.1–0.95)
MONOCYTES RELATIVE PERCENT: 3.9 % (ref 2–12)
NEUTROPHILS ABSOLUTE: 6.07 E9/L (ref 1.8–7.3)
NEUTROPHILS RELATIVE PERCENT: 80.9 % (ref 43–80)
PDW BLD-RTO: 12.8 FL (ref 11.5–15)
PHOSPHORUS: 2.6 MG/DL (ref 2.5–4.5)
PLATELET # BLD: 134 E9/L (ref 130–450)
PMV BLD AUTO: 11.3 FL (ref 7–12)
POLYCHROMASIA: ABNORMAL
POTASSIUM SERPL-SCNC: 3.5 MMOL/L (ref 3.5–5)
POTASSIUM SERPL-SCNC: 3.5 MMOL/L (ref 3.5–5)
POTASSIUM SERPL-SCNC: 4.1 MMOL/L (ref 3.5–5)
POTASSIUM SERPL-SCNC: 4.2 MMOL/L (ref 3.5–5)
RBC # BLD: 3.79 E12/L (ref 3.8–5.8)
REASON FOR REJECTION: NORMAL
REJECTED TEST: NORMAL
SODIUM BLD-SCNC: 154 MMOL/L (ref 132–146)
SODIUM BLD-SCNC: 157 MMOL/L (ref 132–146)
URINE CULTURE, ROUTINE: NORMAL
WBC # BLD: 7.4 E9/L (ref 4.5–11.5)

## 2020-01-31 PROCEDURE — 6360000002 HC RX W HCPCS: Performed by: INTERNAL MEDICINE

## 2020-01-31 PROCEDURE — 2580000003 HC RX 258: Performed by: INTERNAL MEDICINE

## 2020-01-31 PROCEDURE — 36415 COLL VENOUS BLD VENIPUNCTURE: CPT

## 2020-01-31 PROCEDURE — 85025 COMPLETE CBC W/AUTO DIFF WBC: CPT

## 2020-01-31 PROCEDURE — 2060000000 HC ICU INTERMEDIATE R&B

## 2020-01-31 PROCEDURE — 84100 ASSAY OF PHOSPHORUS: CPT

## 2020-01-31 PROCEDURE — 83735 ASSAY OF MAGNESIUM: CPT

## 2020-01-31 PROCEDURE — 2500000003 HC RX 250 WO HCPCS: Performed by: INTERNAL MEDICINE

## 2020-01-31 PROCEDURE — 6370000000 HC RX 637 (ALT 250 FOR IP): Performed by: INTERNAL MEDICINE

## 2020-01-31 PROCEDURE — 99233 SBSQ HOSP IP/OBS HIGH 50: CPT | Performed by: INTERNAL MEDICINE

## 2020-01-31 PROCEDURE — C9113 INJ PANTOPRAZOLE SODIUM, VIA: HCPCS | Performed by: INTERNAL MEDICINE

## 2020-01-31 PROCEDURE — 80048 BASIC METABOLIC PNL TOTAL CA: CPT

## 2020-01-31 RX ADMIN — Medication 10 ML: at 09:32

## 2020-01-31 RX ADMIN — OSELTAMIVIR PHOSPHATE 30 MG: 30 CAPSULE ORAL at 09:32

## 2020-01-31 RX ADMIN — FOLIC ACID 1 MG: 5 INJECTION, SOLUTION INTRAMUSCULAR; INTRAVENOUS; SUBCUTANEOUS at 09:31

## 2020-01-31 RX ADMIN — CEFEPIME HYDROCHLORIDE 2 G: 2 INJECTION, POWDER, FOR SOLUTION INTRAVENOUS at 08:11

## 2020-01-31 RX ADMIN — BENZTROPINE MESYLATE 1 MG: 1 TABLET ORAL at 09:31

## 2020-01-31 RX ADMIN — OLANZAPINE 7.5 MG: 5 TABLET, FILM COATED ORAL at 23:07

## 2020-01-31 RX ADMIN — OSELTAMIVIR PHOSPHATE 30 MG: 30 CAPSULE ORAL at 23:09

## 2020-01-31 RX ADMIN — DEXTROSE MONOHYDRATE: 50 INJECTION, SOLUTION INTRAVENOUS at 11:58

## 2020-01-31 RX ADMIN — BENZTROPINE MESYLATE 1 MG: 1 TABLET ORAL at 23:06

## 2020-01-31 RX ADMIN — CARBAMAZEPINE 200 MG: 200 TABLET ORAL at 23:09

## 2020-01-31 RX ADMIN — Medication 10 ML: at 22:19

## 2020-01-31 RX ADMIN — HEPARIN SODIUM 5000 UNITS: 10000 INJECTION, SOLUTION INTRAVENOUS; SUBCUTANEOUS at 23:10

## 2020-01-31 RX ADMIN — LORAZEPAM 1 MG: 2 INJECTION INTRAMUSCULAR; INTRAVENOUS at 07:50

## 2020-01-31 RX ADMIN — HEPARIN SODIUM 5000 UNITS: 10000 INJECTION, SOLUTION INTRAVENOUS; SUBCUTANEOUS at 13:32

## 2020-01-31 RX ADMIN — CEFEPIME HYDROCHLORIDE 2 G: 2 INJECTION, POWDER, FOR SOLUTION INTRAVENOUS at 22:19

## 2020-01-31 RX ADMIN — HEPARIN SODIUM 5000 UNITS: 10000 INJECTION, SOLUTION INTRAVENOUS; SUBCUTANEOUS at 07:15

## 2020-01-31 RX ADMIN — POTASSIUM CHLORIDE: 2 INJECTION, SOLUTION, CONCENTRATE INTRAVENOUS at 11:55

## 2020-01-31 RX ADMIN — SODIUM CHLORIDE, PRESERVATIVE FREE 10 ML: 5 INJECTION INTRAVENOUS at 09:32

## 2020-01-31 RX ADMIN — PANTOPRAZOLE SODIUM 40 MG: 40 INJECTION, POWDER, FOR SOLUTION INTRAVENOUS at 09:34

## 2020-01-31 RX ADMIN — METOPROLOL SUCCINATE 25 MG: 50 TABLET, EXTENDED RELEASE ORAL at 23:08

## 2020-01-31 RX ADMIN — CARBAMAZEPINE 200 MG: 200 TABLET ORAL at 09:31

## 2020-01-31 ASSESSMENT — PAIN SCALES - PAIN ASSESSMENT IN ADVANCED DEMENTIA (PAINAD)
TOTALSCORE: 2
TOTALSCORE: 2
NEGVOCALIZATION: 1
FACIALEXPRESSION: 0
NEGVOCALIZATION: 1
NEGVOCALIZATION: 1
BODYLANGUAGE: 1
BODYLANGUAGE: 1
CONSOLABILITY: 0
BREATHING: 0
BREATHING: 0
TOTALSCORE: 2
FACIALEXPRESSION: 0
BREATHING: 0
TOTALSCORE: 2
NEGVOCALIZATION: 1
FACIALEXPRESSION: 0
TOTALSCORE: 2
CONSOLABILITY: 0
CONSOLABILITY: 0
BODYLANGUAGE: 1
CONSOLABILITY: 0
FACIALEXPRESSION: 0
TOTALSCORE: 2
BODYLANGUAGE: 1
BODYLANGUAGE: 1
BREATHING: 0
CONSOLABILITY: 0
BREATHING: 0
TOTALSCORE: 2
BREATHING: 0
BODYLANGUAGE: 1
BODYLANGUAGE: 1
NEGVOCALIZATION: 1
BREATHING: 0
BREATHING: 0
CONSOLABILITY: 0
FACIALEXPRESSION: 0
CONSOLABILITY: 0
TOTALSCORE: 2
BODYLANGUAGE: 1
NEGVOCALIZATION: 1
NEGVOCALIZATION: 1
CONSOLABILITY: 0
NEGVOCALIZATION: 1
FACIALEXPRESSION: 0

## 2020-01-31 ASSESSMENT — PAIN SCALES - GENERAL: PAINLEVEL_OUTOF10: 0

## 2020-01-31 NOTE — PROGRESS NOTES
Bilateral wrist restraints discontinued at this time. Patient continued to attempt to pull out lines/tubes, but a patient  has been placed with the patient to prevent this.

## 2020-01-31 NOTE — PLAN OF CARE
Problem: Restraint Use - Nonviolent/Non-Self-Destructive Behavior:  Goal: Absence of restraint indications  Description  Absence of restraint indications  Outcome: Not Met This Shift     Problem: Restraint Use - Nonviolent/Non-Self-Destructive Behavior:  Goal: Absence of restraint-related injury  Description  Absence of restraint-related injury  1/30/2020 2054 by Ed Gauthier RN  Outcome: Met This Shift

## 2020-01-31 NOTE — PROGRESS NOTES
[facility] Quality Flow/Interdisciplinary Rounds Progress Note        Quality Flow Rounds held on January 31, 2020    Disciplines Attending:  Bedside Nurse, , , Nursing Unit Leadership, Physical Therapy, Occupational Therapy and ICU team     Deedee Monroe was admitted on 1/28/2020  8:31 PM    Anticipated Discharge Date:       Disposition:    Gino Score:  Gino Scale Score: 12    Readmission Risk              Risk of Unplanned Readmission:        18           Discussed patient goal for the day, patient clinical progression, and barriers to discharge.   The following Goal(s) of the Day/Commitment(s) have been identified:  Monitor labs, possible transfer if ok with renal.       Tiffany Vargas  January 31, 2020

## 2020-01-31 NOTE — PROGRESS NOTES
deficit  · Mental status: Awake, nonresponsive noninteractive but agitated       Medications     Continuous Infusions:   IV infusion builder 80 mL/hr at 01/31/20 1155    dextrose Stopped (01/31/20 1358)     Scheduled Meds:   benztropine  1 mg Oral BID    carBAMazepine  200 mg Oral BID    OLANZapine  7.5 mg Oral Nightly    metoprolol succinate  25 mg Oral Nightly    sodium chloride flush  10 mL Intravenous 2 times per day    pantoprazole  40 mg Intravenous Daily    And    sodium chloride (PF)  10 mL Intravenous Daily    folic acid  1 mg Intravenous Daily    heparin (porcine)  5,000 Units Subcutaneous 3 times per day    cefepime  2 g Intravenous Q12H    oseltamivir  30 mg Oral BID     PRN Meds: sodium chloride flush, magnesium hydroxide, ondansetron, acetaminophen, LORazepam, medicated lip balm    Labs and Imaging Studies     CBC:   Recent Labs     01/28/20 2146 01/30/20  0600 01/31/20  0710   WBC 9.1 9.0 7.4   RBC 4.53 4.17 3.79*   HGB 14.1 13.1 12.0*   HCT 47.6 45.6 38.2   .1* 109.4* 100.8*   MCH 31.1 31.4 31.7   MCHC 29.6* 28.7* 31.4*   RDW 13.7 13.6 12.8    146 134   MPV 11.2 11.3 11.3       BMP:    Recent Labs     01/28/20 2146 01/31/20  0230 01/31/20  0710 01/31/20  1130   *   < > 157* 157* 157*   K 4.3   < > 3.5 3.5 4.1   *   < > 122* 122* 123*   CO2 25   < > 24 21* 29   BUN 67*   < > 24* 22 22   CREATININE 1.8*   < > 0.7 0.7 0.8   GLUCOSE 125*   < > 112* 93 148*   CALCIUM 9.0   < > 8.0* 8.2* 8.3*   PROT 7.8  --   --   --   --    LABALBU 3.2*  --   --   --   --    BILITOT 0.3  --   --   --   --    ALKPHOS 88  --   --   --   --    AST 67*  --   --   --   --    ALT 47*  --   --   --   --     < > = values in this interval not displayed. LIVER PROFILE:   Recent Labs     01/28/20 2146   AST 67*   ALT 47*   BILITOT 0.3   ALKPHOS 88       PT/INR:   No results for input(s): PROTIME, INR in the last 72 hours. APTT:   No results for input(s):  APTT in the last 72 contour There is no evidence of any airspace consolidation or pulmonary venous congestion. Resident's Assessment and PLan     Assessment: 1. Acute hypoxic respiratory failure, 2/2 flu+ and Coronavirus - Improving              -Possibility of PE per ED records, Wells score moderate risk, s/p 1 dose of therapeutic Lovenox              -ABG findings noted, less likely PE  2. Hypernatremia - Improving              - likely 2/2 poor po intake in the setting of viral illness              -Urine Na noted elevated with high osm, discussed with Nephrology, likely 2/2 high protein or soulte intake. 3.CRIS, likely pre-renal in the setting of poor po intake  4. UTI  5. Elevated troponin, 2/2 CRIS  6. Seizure-like disorder with possible underlying parkinsonism  7. Behavioral abnormalities     Plan:  -Remains on 4L NC, sustaining good sats  -Continue Tamiflu, renally dose, droplets isolation  -IVF switched to D5 with KCL, follow BMP Q6H   -Culture remains negative, continue Cefepime to finish the course for UTI  -Continue home meds, seizures precautions        DVT/GI prophylaxis  Heparin/Protonix        Rick Salmeron M.D. , PGY-2  Internal Medicine     Attending Physician: Dr. Sierra Alberts  Addendum ICU Attending Statement     Radhika Herrera was seen, examined and discussed with the multi-disciplinary ICU team during rounds. A addendum note may be separate to the residents note. If not then, I have personally seen and examined the patient and the key elements of the encounter were performed by me (> 85 % time). The medications & laboratory data was discussed and adjusted where necessary. The radiographic images were reviewed either as a group or with radiologist.  Any changes are document or changed if felt dis-concordant with the exam or history. The above findings were corroborated, plans confirmed and changes made if needed. Family was updated at the bedside as available.  Key issues of the case were discussed among

## 2020-01-31 NOTE — PROGRESS NOTES
Component Value Date     01/31/2020    K 3.5 01/31/2020    K 4.3 01/28/2020     01/31/2020    CO2 21 01/31/2020    BUN 22 01/31/2020    CREATININE 0.7 01/31/2020    GFRAA >60 01/31/2020    LABGLOM >60 01/31/2020    GLUCOSE 93 01/31/2020    GLUCOSE 71 05/18/2012    PROT 7.8 01/28/2020    LABALBU 3.2 01/28/2020    LABALBU 4.5 05/18/2012    CALCIUM 8.2 01/31/2020    BILITOT 0.3 01/28/2020    ALKPHOS 88 01/28/2020    AST 67 01/28/2020    ALT 47 01/28/2020     Magnesium:    Lab Results   Component Value Date    MG 2.3 01/31/2020     Phosphorus:    Lab Results   Component Value Date    PHOS 2.6 01/31/2020       Radiology Review:         1/28/2020 CT head WO contrast        No acute intracranial hemorrhage or edema.         CXR 1/28/2020       No airspace opacities or pleural effusion. BRIEF SUMMARY OF INITIAL CONSULT:      Briefly Vinicius Vale is 81 y.o. male with history of developmental delay, seizure disorder, tourette disorder, kyphosis of thoracic region, parkinsonism , bilateral cataracts who was admitted on 1/28/2020 from [facility] with complaints of shortness of breath and tachypnea. He was found to be hypoxic and tachycardic in the ED. according to the nursing home staff, patient was actively shaking yesterday afternoon and had decreasing positive attitude. The caregiver was unsure whether this is patient's baseline or experiencing seizure. Patient's initial BMP showed sodium of 179 mmol/liter, creatinine of 1.8 mg/dL which is the reason for this consultation.            IMPRESSION/RECOMMENDATIONS:       1. Hypernatremia (sodium 179 mmol/L), with significant dehydration, and intravascular volume depletion due to impaired fluid intake in the setting of developmental delay. Urine osmolality 800 mL mOsm/L (> 600 mOsm/L). Urinary sodium noted to be high, 108 mmol/L (>20 mmol/L) which could be due to more protein/solute than free water intake.   · Sodium level 157 mmol/L today, at appropriate rate of correction  of<12 mmol/L/day last 48 hours.                                 2. CRIS, stage 2, volume responsive prerenal CRIS, resolved   3. Acute hypoxic respiratory failure on nasal cannula  4. Influenza and coronavirus positive, on Tamiflu   5. Seizure disorder on carbamazepine  6. Parkinsonism on benztropine  7. Underlying developmental disorder  8. UTI under cefepime  9. Diet n.p.o.     PLAN:     · Change fluids to D5W with 30 M EQ of KCl at 80 cc/h. Avoid overcorrection.    · Okay for diet  · Strict intake output  · Repeat urine studies   · Monitor BMP every 6 hour  · Droplet precaution              Electronically signed by Luis Enrique Khalil MD on 1/31/2020 at 10:57 AM

## 2020-01-31 NOTE — CARE COORDINATION
Spoke with patients guardian Sherri Jones regarding discharge planning. She would like plan to remain for patient to return to [facility] when medically stable. They do not handle IV ATB. If facility is needed prior to return, she would CONSIDER [facility] at the hospital but would need to call back and confirm the need with her- Rachel Corbett from [facility] is following. CM/SW will continue to follow for discharge planning.

## 2020-02-01 LAB
ANION GAP SERPL CALCULATED.3IONS-SCNC: 8 MMOL/L (ref 7–16)
ANION GAP SERPL CALCULATED.3IONS-SCNC: 9 MMOL/L (ref 7–16)
BASOPHILS ABSOLUTE: 0.02 E9/L (ref 0–0.2)
BASOPHILS RELATIVE PERCENT: 0.3 % (ref 0–2)
BUN BLDV-MCNC: 17 MG/DL (ref 8–23)
BUN BLDV-MCNC: 17 MG/DL (ref 8–23)
CALCIUM SERPL-MCNC: 8.2 MG/DL (ref 8.6–10.2)
CALCIUM SERPL-MCNC: 8.5 MG/DL (ref 8.6–10.2)
CHLORIDE BLD-SCNC: 113 MMOL/L (ref 98–107)
CHLORIDE BLD-SCNC: 114 MMOL/L (ref 98–107)
CO2: 26 MMOL/L (ref 22–29)
CO2: 27 MMOL/L (ref 22–29)
CREAT SERPL-MCNC: 0.6 MG/DL (ref 0.7–1.2)
CREAT SERPL-MCNC: 0.6 MG/DL (ref 0.7–1.2)
EOSINOPHILS ABSOLUTE: 0.13 E9/L (ref 0.05–0.5)
EOSINOPHILS RELATIVE PERCENT: 1.7 % (ref 0–6)
GFR AFRICAN AMERICAN: >60
GFR AFRICAN AMERICAN: >60
GFR NON-AFRICAN AMERICAN: >60 ML/MIN/1.73
GFR NON-AFRICAN AMERICAN: >60 ML/MIN/1.73
GLUCOSE BLD-MCNC: 100 MG/DL (ref 74–99)
GLUCOSE BLD-MCNC: 90 MG/DL (ref 74–99)
HCT VFR BLD CALC: 38.4 % (ref 37–54)
HEMOGLOBIN: 11.9 G/DL (ref 12.5–16.5)
IMMATURE GRANULOCYTES #: 0.02 E9/L
IMMATURE GRANULOCYTES %: 0.3 % (ref 0–5)
LYMPHOCYTES ABSOLUTE: 1.35 E9/L (ref 1.5–4)
LYMPHOCYTES RELATIVE PERCENT: 18.1 % (ref 20–42)
MAGNESIUM: 2.1 MG/DL (ref 1.6–2.6)
MCH RBC QN AUTO: 31.2 PG (ref 26–35)
MCHC RBC AUTO-ENTMCNC: 31 % (ref 32–34.5)
MCV RBC AUTO: 100.5 FL (ref 80–99.9)
MONOCYTES ABSOLUTE: 0.35 E9/L (ref 0.1–0.95)
MONOCYTES RELATIVE PERCENT: 4.7 % (ref 2–12)
NEUTROPHILS ABSOLUTE: 5.57 E9/L (ref 1.8–7.3)
NEUTROPHILS RELATIVE PERCENT: 74.9 % (ref 43–80)
PDW BLD-RTO: 12.4 FL (ref 11.5–15)
PHOSPHORUS: 2.2 MG/DL (ref 2.5–4.5)
PLATELET # BLD: 139 E9/L (ref 130–450)
PMV BLD AUTO: 12.1 FL (ref 7–12)
POTASSIUM SERPL-SCNC: 4.2 MMOL/L (ref 3.5–5)
POTASSIUM SERPL-SCNC: 4.8 MMOL/L (ref 3.5–5)
PROCALCITONIN: 0.16 NG/ML (ref 0–0.08)
RBC # BLD: 3.82 E12/L (ref 3.8–5.8)
SODIUM BLD-SCNC: 148 MMOL/L (ref 132–146)
SODIUM BLD-SCNC: 149 MMOL/L (ref 132–146)
WBC # BLD: 7.4 E9/L (ref 4.5–11.5)

## 2020-02-01 PROCEDURE — 2500000003 HC RX 250 WO HCPCS: Performed by: INTERNAL MEDICINE

## 2020-02-01 PROCEDURE — 2580000003 HC RX 258: Performed by: INTERNAL MEDICINE

## 2020-02-01 PROCEDURE — 84100 ASSAY OF PHOSPHORUS: CPT

## 2020-02-01 PROCEDURE — C9113 INJ PANTOPRAZOLE SODIUM, VIA: HCPCS | Performed by: INTERNAL MEDICINE

## 2020-02-01 PROCEDURE — 80048 BASIC METABOLIC PNL TOTAL CA: CPT

## 2020-02-01 PROCEDURE — 6370000000 HC RX 637 (ALT 250 FOR IP): Performed by: INTERNAL MEDICINE

## 2020-02-01 PROCEDURE — 6360000002 HC RX W HCPCS: Performed by: INTERNAL MEDICINE

## 2020-02-01 PROCEDURE — 36415 COLL VENOUS BLD VENIPUNCTURE: CPT

## 2020-02-01 PROCEDURE — 84145 PROCALCITONIN (PCT): CPT

## 2020-02-01 PROCEDURE — 85025 COMPLETE CBC W/AUTO DIFF WBC: CPT

## 2020-02-01 PROCEDURE — 2060000000 HC ICU INTERMEDIATE R&B

## 2020-02-01 PROCEDURE — 83735 ASSAY OF MAGNESIUM: CPT

## 2020-02-01 RX ADMIN — METOPROLOL SUCCINATE 25 MG: 50 TABLET, EXTENDED RELEASE ORAL at 21:53

## 2020-02-01 RX ADMIN — CARBAMAZEPINE 200 MG: 200 TABLET ORAL at 09:36

## 2020-02-01 RX ADMIN — BENZTROPINE MESYLATE 1 MG: 1 TABLET ORAL at 21:53

## 2020-02-01 RX ADMIN — Medication 10 ML: at 21:59

## 2020-02-01 RX ADMIN — DEXTROSE MONOHYDRATE: 50 INJECTION, SOLUTION INTRAVENOUS at 12:25

## 2020-02-01 RX ADMIN — CARBAMAZEPINE 200 MG: 200 TABLET ORAL at 21:54

## 2020-02-01 RX ADMIN — BENZTROPINE MESYLATE 1 MG: 1 TABLET ORAL at 09:36

## 2020-02-01 RX ADMIN — FOLIC ACID 1 MG: 5 INJECTION, SOLUTION INTRAMUSCULAR; INTRAVENOUS; SUBCUTANEOUS at 09:26

## 2020-02-01 RX ADMIN — SODIUM PHOSPHATE, MONOBASIC, MONOHYDRATE 10 MMOL: 276; 142 INJECTION, SOLUTION INTRAVENOUS at 15:47

## 2020-02-01 RX ADMIN — HEPARIN SODIUM 5000 UNITS: 10000 INJECTION, SOLUTION INTRAVENOUS; SUBCUTANEOUS at 21:55

## 2020-02-01 RX ADMIN — POTASSIUM CHLORIDE: 2 INJECTION, SOLUTION, CONCENTRATE INTRAVENOUS at 01:11

## 2020-02-01 RX ADMIN — CEFEPIME HYDROCHLORIDE 2 G: 2 INJECTION, POWDER, FOR SOLUTION INTRAVENOUS at 09:17

## 2020-02-01 RX ADMIN — OSELTAMIVIR PHOSPHATE 30 MG: 30 CAPSULE ORAL at 09:36

## 2020-02-01 RX ADMIN — LORAZEPAM 1 MG: 2 INJECTION INTRAMUSCULAR; INTRAVENOUS at 09:32

## 2020-02-01 RX ADMIN — LORAZEPAM 1 MG: 2 INJECTION INTRAMUSCULAR; INTRAVENOUS at 18:22

## 2020-02-01 RX ADMIN — Medication 10 ML: at 09:18

## 2020-02-01 RX ADMIN — PANTOPRAZOLE SODIUM 40 MG: 40 INJECTION, POWDER, FOR SOLUTION INTRAVENOUS at 09:30

## 2020-02-01 RX ADMIN — DEXTROSE MONOHYDRATE: 50 INJECTION, SOLUTION INTRAVENOUS at 02:05

## 2020-02-01 RX ADMIN — HEPARIN SODIUM 5000 UNITS: 10000 INJECTION, SOLUTION INTRAVENOUS; SUBCUTANEOUS at 15:53

## 2020-02-01 RX ADMIN — CEFEPIME HYDROCHLORIDE 2 G: 2 INJECTION, POWDER, FOR SOLUTION INTRAVENOUS at 21:46

## 2020-02-01 RX ADMIN — OLANZAPINE 7.5 MG: 5 TABLET, FILM COATED ORAL at 21:52

## 2020-02-01 RX ADMIN — POTASSIUM CHLORIDE: 2 INJECTION, SOLUTION, CONCENTRATE INTRAVENOUS at 21:49

## 2020-02-01 RX ADMIN — SODIUM CHLORIDE, PRESERVATIVE FREE 10 ML: 5 INJECTION INTRAVENOUS at 09:30

## 2020-02-01 RX ADMIN — LORAZEPAM 1 MG: 2 INJECTION INTRAMUSCULAR; INTRAVENOUS at 00:56

## 2020-02-01 RX ADMIN — HEPARIN SODIUM 5000 UNITS: 10000 INJECTION, SOLUTION INTRAVENOUS; SUBCUTANEOUS at 05:47

## 2020-02-01 RX ADMIN — OSELTAMIVIR PHOSPHATE 30 MG: 30 CAPSULE ORAL at 21:53

## 2020-02-01 ASSESSMENT — PAIN SCALES - GENERAL
PAINLEVEL_OUTOF10: 4
PAINLEVEL_OUTOF10: 5

## 2020-02-01 NOTE — PROGRESS NOTES
Janae Khalil MD, FACP                   Patient Name: Isi May                   Age:  81 y.o. Gender:   male    CC: Altered mental status agitation hypoxia    HPI: This is a 61-year-old male resident of a group home. Severe mental and physical retardation, nonverbal, who presents with shortness of breath and tachypnea as well as hypoxia  Initial evaluation indicates that he has dehydration and severe hypernatremia, this morning in the 170s  He was transitioned to the intensive care unit    In the intensive care unit he is examined found to be noninteractive, agitated, severe cognitive impairment, multiple contractures and skeletal deformities    BUN was 67 creatinine 1.8 and sodium 179.   There was no leukocytosis    1/30:  Patient status remains unchanged  Reviewed consultant evaluation  Hypernatremia slightly improved at 171  Positive for influenza and coronavirus  Tamiflu started    His mental status remains unchanged    2/1  Patient has transitioned to stepdown unit  Neurocognitive status unchanged  Sodium is near normal        Past Medical History:   Diagnosis Date    Acquired deformity of neck     Autism spectrum     Bilateral cataracts     Blepharochalasis     Cardiomegaly     Cataract of both eyes     Developmental delay     Gastritis     Hyperlipidemia     Kyphosis of thoracic region     Mental retardation     SEVERE MR  NON VERBAL, NEEDS WHEELCHAIR FOR LONG DISTANCE    Obsessive compulsive disorder     Parkinsonism ([facility]     Seizures ([facility])     Tourette disorder          The patient's medical records have been reviewed contingent on availability        Review of Systems:   · General: un-Obtainable      Physical Examination:      Wt Readings from Last 3 Encounters:   02/01/20 96 lb 1.6 oz (43.6 kg)   01/22/20 170 lb (77.1 kg)   04/22/19 170 lb (77.1 kg)     Temp Readings from Last 3 Encounters:   02/01/20 97.7 °F (36.5 °C) (Temporal)   01/22/20 100.8 °F (38.2 °C) (Axillary)   04/22/19 97.9 °F (36.6 °C) (Temporal)     BP Readings from Last 3 Encounters:   02/01/20 103/75   01/22/20 107/67   04/22/19 139/77     Pulse Readings from Last 3 Encounters:   02/01/20 105   01/22/20 85   01/14/20 84       General appearance: Contracted, diffuse muscle wasting, agitated and nonresponsive  Skin: Color, texture, turgor reduced. No rashes or lesions. Head: Normocephalic. No masses, lesions, tenderness or abnormalities   Face: Symmetric no visible lesions  Eyes: Conjunctivae/cornea clear. Ears: External appearance normal.  Not assess hearing  Nose/Sinuses: Nares normal. No paranasal sinus tenderness. Mouth: Cannot assess lips appear normal  Neck: Contracted, should  Chest: Kyphotic with poor ventilatory excursion  Lungs: Clear to auscultation. No rhonchi, crackles or rales. Heart: S1 > S2. Rhythm is regular and rate is normal. No gallop rub or murmur.   Abdomen: Abdominal musculature tense there is no indication of pain cannot assess organs  Extremities: N multiple deformities  Musculoskeletal: Diffuse wasting cannot assess strength although it appears to be good based on his agitation  Neuro:   · Cannot fully assess although there is no focal deficit  Mental status: Awake, nonresponsive noninteractive agitated  He is nonambulatory     Labs     CBC:   Lab Results   Component Value Date    WBC 7.4 02/01/2020    RBC 3.82 02/01/2020    HGB 11.9 02/01/2020    HCT 38.4 02/01/2020     02/01/2020    .5 02/01/2020     BMP:    Lab Results   Component Value Date     02/01/2020    K 4.2 02/01/2020    K 4.3 01/28/2020     02/01/2020    CO2 27 02/01/2020    BUN 17 02/01/2020    CREATININE 0.6 02/01/2020    GLUCOSE 100 02/01/2020    GLUCOSE 71 05/18/2012    CALCIUM 8.2 02/01/2020     Hepatic Function Panel:    Lab Results   Component Value Date ALKPHOS 88 01/28/2020    AST 67 01/28/2020    ALT 47 01/28/2020    PROT 7.8 01/28/2020    LABALBU 3.2 01/28/2020    LABALBU 4.5 05/18/2012    BILITOT 0.3 01/28/2020     Magnesium:    Lab Results   Component Value Date    MG 2.1 02/01/2020     Cardiac Enzymes:   Lab Results   Component Value Date    CKTOTAL 799 (H) 10/27/2017    TROPONINI 0.03 01/29/2020    TROPONINI 0.03 01/29/2020    TROPONINI 0.03 01/28/2020     LDH:  No results found for: LDH  PT/INR:    Lab Results   Component Value Date    PROTIME 12.7 01/22/2020    INR 1.1 01/22/2020     BNP: No results for input(s): BNP in the last 72 hours.    TSH:   Lab Results   Component Value Date    TSH 2.370 01/30/2020      Cardiac Injury Profile:   Recent Labs     01/29/20  1755   TROPONINI 0.03      Lipid Profile:   Lab Results   Component Value Date    TRIG 146 03/18/2016    HDL 44 03/18/2016    LDLCALC 93 03/18/2016    CHOL 166 03/18/2016      Hemoglobin A1C: No components found for: HGBA1C   U/A:   Lab Results   Component Value Date    LEUKOCYTESUR TRACE 01/29/2020    PHUR 7.0 01/29/2020    WBCUA 10-20 01/29/2020    RBCUA NONE 01/29/2020    RBCUA NONE 10/08/2012    BACTERIA MODERATE 01/29/2020    SPECGRAV 1.020 01/29/2020    BLOODU MODERATE 01/29/2020    GLUCOSEU Negative 01/29/2020    GLUCOSEU NEGATIVE 09/18/2011         ADMISSION SCHEDULED MEDS:   Current Facility-Administered Medications   Medication Dose Route Frequency Provider Last Rate Last Dose    sodium phosphate 10 mmol in dextrose 5 % 250 mL IVPB  10 mmol Intravenous Once Daria Gilliam MD        potassium chloride 30 mEq in dextrose 5 % 1,000 mL infusion   Intravenous Continuous Rosemary Ybarra MD 80 mL/hr at 02/01/20 0553      benztropine (COGENTIN) tablet 1 mg  1 mg Oral BID Liborio Geiger MD   1 mg at 02/01/20 0936    carBAMazepine (TEGRETOL) tablet 200 mg  200 mg Oral BID Liborio Geiger MD   200 mg at 02/01/20 0936    OLANZapine (ZYPREXA) tablet 7.5 mg  7.5 mg Oral Nightly Leartis Punch lip balm    Radiology Review:  CT Head WO Contrast   Final Result      No acute intracranial hemorrhage or edema. XR CHEST PORTABLE   Final Result      No airspace opacities or pleural effusion.                   ASSESSMENT:  Active diagnoses treated at this admission:  · Profound hypernatremia  · Dehydration  · Protein calorie malnutrition  · Hypoxemia-with respiratory failure  · Influenza  · Coronavirus  · Hypoxic respiratory failure    Problem list:  Patient Active Problem List   Diagnosis    Seizure ([address]    Pressure ulcer of toe of right foot, stage 3 ([address]    Atherosclerosis of native artery of both lower extremities ([address]    Hypernatremia       PLAN:  Continues to improve  Pulmonary support with nebulizers  Broad-spectrum antibiotics  Continue supportive treatment  Continue treatment for the hypernatremia  Basic metabolic panel monitored every 6 hours  Currently in droplet precautions  Prepare for discharge in 24 hours        See  Orders  Patricia Garcia MD, Shant Alvarado, American Board of Internal Medicine  Diplomate, [address] of Internal Medicine  11:14 AM  2/1/2020

## 2020-02-01 NOTE — PROGRESS NOTES
Lab Results   Component Value Date     02/01/2020    K 4.2 02/01/2020    K 4.3 01/28/2020     02/01/2020    CO2 27 02/01/2020    BUN 17 02/01/2020    CREATININE 0.6 02/01/2020    GFRAA >60 02/01/2020    LABGLOM >60 02/01/2020    GLUCOSE 100 02/01/2020    GLUCOSE 71 05/18/2012    PROT 7.8 01/28/2020    LABALBU 3.2 01/28/2020    LABALBU 4.5 05/18/2012    CALCIUM 8.2 02/01/2020    BILITOT 0.3 01/28/2020    ALKPHOS 88 01/28/2020    AST 67 01/28/2020    ALT 47 01/28/2020     Magnesium:    Lab Results   Component Value Date    MG 2.1 02/01/2020     Phosphorus:    Lab Results   Component Value Date    PHOS 2.2 02/01/2020       Radiology Review:         1/28/2020 CT head WO contrast        No acute intracranial hemorrhage or edema.         CXR 1/28/2020       No airspace opacities or pleural effusion. BRIEF SUMMARY OF INITIAL CONSULT:      Briefly Meagan Kaufman is 81 y.o. male with history of developmental delay, seizure disorder, tourette disorder, kyphosis of thoracic region, parkinsonism , bilateral cataracts who was admitted on 1/28/2020 from [facility] with complaints of shortness of breath and tachypnea. He was found to be hypoxic and tachycardic in the ED. according to the nursing home staff, patient was actively shaking yesterday afternoon and had decreasing positive attitude. The caregiver was unsure whether this is patient's baseline or experiencing seizure. Patient's initial BMP showed sodium of 179 mmol/liter, creatinine of 1.8 mg/dL which is the reason for this consultation. Problems resolved:    · Acute hypoxic respiratory failure on nasal cannula  · CRIS, stage 2, volume responsive prerenal RCIS, resolved       IMPRESSION/RECOMMENDATIONS:       1. Hypernatremia (sodium 179 mmol/L), with significant dehydration, and intravascular volume depletion due to impaired fluid intake in the setting of developmental delay.     · Sodium level 148 mmol/L today, at appropriate rate of correction  of<12 mmol/L/day last 72 hours.                                 2. Hypophosphatemia, due to lack of oral intake  3. Influenza and coronavirus positive, on Tamiflu   4. Seizure disorder on carbamazepine  5. Parkinsonism on benztropine  6. Underlying developmental disorder  7. UTI ,on cefepime     PLAN:     · Continue fluids D5W + 20 MEQ of KCl at 80 cc/h.    · Replace phosphorus  · Continue to monitor sodium levels, change BMP to every 8 hours     Electronically signed by Mikeal Cowden, MD on 2/1/2020 at 9:37 AM

## 2020-02-02 VITALS
TEMPERATURE: 97.8 F | BODY MASS INDEX: 16.23 KG/M2 | RESPIRATION RATE: 18 BRPM | HEART RATE: 105 BPM | DIASTOLIC BLOOD PRESSURE: 76 MMHG | OXYGEN SATURATION: 96 % | SYSTOLIC BLOOD PRESSURE: 147 MMHG | HEIGHT: 65 IN | WEIGHT: 97.4 LBS

## 2020-02-02 LAB
ANION GAP SERPL CALCULATED.3IONS-SCNC: 10 MMOL/L (ref 7–16)
ANION GAP SERPL CALCULATED.3IONS-SCNC: 11 MMOL/L (ref 7–16)
BASOPHILS ABSOLUTE: 0.01 E9/L (ref 0–0.2)
BASOPHILS RELATIVE PERCENT: 0.1 % (ref 0–2)
BUN BLDV-MCNC: 15 MG/DL (ref 8–23)
BUN BLDV-MCNC: 18 MG/DL (ref 8–23)
CALCIUM SERPL-MCNC: 8.2 MG/DL (ref 8.6–10.2)
CALCIUM SERPL-MCNC: 8.3 MG/DL (ref 8.6–10.2)
CHLORIDE BLD-SCNC: 109 MMOL/L (ref 98–107)
CHLORIDE BLD-SCNC: 110 MMOL/L (ref 98–107)
CO2: 24 MMOL/L (ref 22–29)
CO2: 24 MMOL/L (ref 22–29)
CREAT SERPL-MCNC: 0.6 MG/DL (ref 0.7–1.2)
CREAT SERPL-MCNC: 0.6 MG/DL (ref 0.7–1.2)
EOSINOPHILS ABSOLUTE: 0.15 E9/L (ref 0.05–0.5)
EOSINOPHILS RELATIVE PERCENT: 1.5 % (ref 0–6)
GFR AFRICAN AMERICAN: >60
GFR AFRICAN AMERICAN: >60
GFR NON-AFRICAN AMERICAN: >60 ML/MIN/1.73
GFR NON-AFRICAN AMERICAN: >60 ML/MIN/1.73
GLUCOSE BLD-MCNC: 90 MG/DL (ref 74–99)
GLUCOSE BLD-MCNC: 94 MG/DL (ref 74–99)
HCT VFR BLD CALC: 39 % (ref 37–54)
HEMOGLOBIN: 12.5 G/DL (ref 12.5–16.5)
IMMATURE GRANULOCYTES #: 0.05 E9/L
IMMATURE GRANULOCYTES %: 0.5 % (ref 0–5)
LYMPHOCYTES ABSOLUTE: 1.49 E9/L (ref 1.5–4)
LYMPHOCYTES RELATIVE PERCENT: 15 % (ref 20–42)
MAGNESIUM: 2.1 MG/DL (ref 1.6–2.6)
MCH RBC QN AUTO: 31.4 PG (ref 26–35)
MCHC RBC AUTO-ENTMCNC: 32.1 % (ref 32–34.5)
MCV RBC AUTO: 98 FL (ref 80–99.9)
MONOCYTES ABSOLUTE: 0.57 E9/L (ref 0.1–0.95)
MONOCYTES RELATIVE PERCENT: 5.7 % (ref 2–12)
NEUTROPHILS ABSOLUTE: 7.67 E9/L (ref 1.8–7.3)
NEUTROPHILS RELATIVE PERCENT: 77.2 % (ref 43–80)
PDW BLD-RTO: 12.2 FL (ref 11.5–15)
PHOSPHORUS: 1.9 MG/DL (ref 2.5–4.5)
PLATELET # BLD: 143 E9/L (ref 130–450)
PMV BLD AUTO: 12.5 FL (ref 7–12)
POIKILOCYTES: ABNORMAL
POTASSIUM SERPL-SCNC: 4 MMOL/L (ref 3.5–5)
POTASSIUM SERPL-SCNC: 4.1 MMOL/L (ref 3.5–5)
RBC # BLD: 3.98 E12/L (ref 3.8–5.8)
SCHISTOCYTES: ABNORMAL
SODIUM BLD-SCNC: 143 MMOL/L (ref 132–146)
SODIUM BLD-SCNC: 145 MMOL/L (ref 132–146)
WBC # BLD: 9.9 E9/L (ref 4.5–11.5)

## 2020-02-02 PROCEDURE — 80048 BASIC METABOLIC PNL TOTAL CA: CPT

## 2020-02-02 PROCEDURE — 2580000003 HC RX 258: Performed by: INTERNAL MEDICINE

## 2020-02-02 PROCEDURE — 6370000000 HC RX 637 (ALT 250 FOR IP): Performed by: INTERNAL MEDICINE

## 2020-02-02 PROCEDURE — 84100 ASSAY OF PHOSPHORUS: CPT

## 2020-02-02 PROCEDURE — C9113 INJ PANTOPRAZOLE SODIUM, VIA: HCPCS | Performed by: INTERNAL MEDICINE

## 2020-02-02 PROCEDURE — 2700000000 HC OXYGEN THERAPY PER DAY

## 2020-02-02 PROCEDURE — 6360000002 HC RX W HCPCS: Performed by: INTERNAL MEDICINE

## 2020-02-02 PROCEDURE — 83735 ASSAY OF MAGNESIUM: CPT

## 2020-02-02 PROCEDURE — 36415 COLL VENOUS BLD VENIPUNCTURE: CPT

## 2020-02-02 PROCEDURE — 85025 COMPLETE CBC W/AUTO DIFF WBC: CPT

## 2020-02-02 RX ORDER — OSELTAMIVIR PHOSPHATE 30 MG/1
30 CAPSULE ORAL 2 TIMES DAILY
Qty: 10 CAPSULE | Refills: 0 | DISCHARGE
Start: 2020-02-02 | End: 2020-02-02

## 2020-02-02 RX ORDER — OSELTAMIVIR PHOSPHATE 30 MG/1
30 CAPSULE ORAL 2 TIMES DAILY
Qty: 10 CAPSULE | Refills: 0 | Status: SHIPPED | OUTPATIENT
Start: 2020-02-02 | End: 2020-02-07

## 2020-02-02 RX ADMIN — BENZTROPINE MESYLATE 1 MG: 1 TABLET ORAL at 08:52

## 2020-02-02 RX ADMIN — Medication 10 ML: at 08:56

## 2020-02-02 RX ADMIN — DEXTROSE MONOHYDRATE: 50 INJECTION, SOLUTION INTRAVENOUS at 01:50

## 2020-02-02 RX ADMIN — SODIUM CHLORIDE, PRESERVATIVE FREE 10 ML: 5 INJECTION INTRAVENOUS at 08:56

## 2020-02-02 RX ADMIN — HEPARIN SODIUM 5000 UNITS: 10000 INJECTION, SOLUTION INTRAVENOUS; SUBCUTANEOUS at 06:58

## 2020-02-02 RX ADMIN — CEFEPIME HYDROCHLORIDE 2 G: 2 INJECTION, POWDER, FOR SOLUTION INTRAVENOUS at 09:08

## 2020-02-02 RX ADMIN — LORAZEPAM 1 MG: 2 INJECTION INTRAMUSCULAR; INTRAVENOUS at 08:59

## 2020-02-02 RX ADMIN — LORAZEPAM 1 MG: 2 INJECTION INTRAMUSCULAR; INTRAVENOUS at 00:28

## 2020-02-02 RX ADMIN — CARBAMAZEPINE 200 MG: 200 TABLET ORAL at 08:52

## 2020-02-02 RX ADMIN — POTASSIUM & SODIUM PHOSPHATES POWDER PACK 280-160-250 MG 250 MG: 280-160-250 PACK at 13:13

## 2020-02-02 RX ADMIN — PANTOPRAZOLE SODIUM 40 MG: 40 INJECTION, POWDER, FOR SOLUTION INTRAVENOUS at 08:56

## 2020-02-02 RX ADMIN — OSELTAMIVIR PHOSPHATE 30 MG: 30 CAPSULE ORAL at 08:52

## 2020-02-02 ASSESSMENT — PAIN SCALES - GENERAL: PAINLEVEL_OUTOF10: 5

## 2020-02-02 NOTE — DISCHARGE SUMMARY
Contrast    Result Date: 2020  Patient MRN:  [id number] : 1950 Age: 81 years Gender: Male Order Date:  2020 9:15 PM EXAM: CT HEAD WO CONTRAST COMPARISON: 2020 INDICATION:  seizure like activity seizure like activity TECHNIQUE: Axial unenhanced CT scanning was performed through the head without the use of intravenous contrast. Low-dose CT acquisition technique included one of the following options; 1. Automated exposure control, 2. Adjustment of mA and/or kV according to the patient's size or 3. Use of iterative reconstruction. FINDINGS: No acute intracranial hemorrhage or edema. No abnormal extra-axial fluid collections. There is prominence of sulci, cisterns, and ventricles related to stable generalized proximal volume loss. There are areas of hypoattenuation in periventricular white matter appearing similar since previous examination suggestive of chronic microvascular ischemic change. Mild mucosal thickening is seen involving visualized left maxillary sinus. Mastoid air cells are clear. No acute intracranial hemorrhage or edema. Xr Chest Portable    Result Date: 2020  Patient MRN:  [id number] : 1950 Age: 81 years Gender: Male Order Date:  2020 9:00 PM EXAM: XR CHEST PORTABLE COMPARISON: 2020 INDICATION:  sob sob FINDINGS: The heart is normal in size. No focal airspace opacity. There is no pleural effusion. There is no pneumothorax. No free air beneath the diaphragms. There is a chronic right clavicle fracture. No airspace opacities or pleural effusion. Hospital Course:  CC: Altered mental status agitation hypoxia     HPI: This is a 73-year-old male resident of a group home.   Severe mental and physical retardation, nonverbal, who presents with shortness of breath and tachypnea as well as hypoxia  Initial evaluation indicates that he has dehydration and severe hypernatremia, this morning in the 170s  He was transitioned to the intensive care 7.5 mg by mouth nightly              omeprazole (PRILOSEC) 20 MG capsule  Take 20 mg by mouth 2 times daily             oseltamivir (TAMIFLU) 30 MG capsule  Take 1 capsule by mouth 2 times daily for 5 days             polyethylene glycol (MIRALAX) powder  Take 17 g by mouth daily             vitamin D (CHOLECALCIFEROL) 1000 UNIT TABS tablet  Take 1,000 Units by mouth daily. Discharge Exam:  General appearance: Contracted, diffuse muscle wasting, agitated and nonresponsive  Skin: Color, texture, turgor reduced. No rashes or lesions. Head: Normocephalic. No masses, lesions, tenderness or abnormalities   Face: Symmetric no visible lesions  Eyes: Conjunctivae/cornea clear. Ears: External appearance normal.  Not assess hearing  Nose/Sinuses: Nares normal. No paranasal sinus tenderness. Mouth: Cannot assess lips appear normal  Neck: Contracted, should  Chest: Kyphotic with poor ventilatory excursion  Lungs: Clear to auscultation. No rhonchi, crackles or rales. Heart: S1 > S2. Rhythm is regular and rate is normal. No gallop rub or murmur.   Abdomen: Abdominal musculature tense there is no indication of pain cannot assess organs  Extremities: N multiple deformities  Musculoskeletal: Diffuse wasting cannot assess strength although it appears to be good based on his agitation  Neuro:   · Cannot fully assess although there is no focal deficit  · Mental status: Awake, nonresponsive noninteractive but agitated      Disposition: Facility    Patient Instructions:   REFER TO AVR or NICOLASA document    Signed:  Stefan Ortiz  [facility]  American Board of Geriatric Medicine  2/2/2020, 8:39 AM

## 2020-02-02 NOTE — PROGRESS NOTES
Lab Results   Component Value Date     02/02/2020    K 4.0 02/02/2020    K 4.3 01/28/2020     02/02/2020    CO2 24 02/02/2020    BUN 15 02/02/2020    CREATININE 0.6 02/02/2020    GFRAA >60 02/02/2020    LABGLOM >60 02/02/2020    GLUCOSE 94 02/02/2020    GLUCOSE 71 05/18/2012    PROT 7.8 01/28/2020    LABALBU 3.2 01/28/2020    LABALBU 4.5 05/18/2012    CALCIUM 8.2 02/02/2020    BILITOT 0.3 01/28/2020    ALKPHOS 88 01/28/2020    AST 67 01/28/2020    ALT 47 01/28/2020     Magnesium:    Lab Results   Component Value Date    MG 2.1 02/02/2020     Phosphorus:    Lab Results   Component Value Date    PHOS 1.9 02/02/2020       Radiology Review:         1/28/2020 CT head WO contrast        No acute intracranial hemorrhage or edema.         CXR 1/28/2020       No airspace opacities or pleural effusion. BRIEF SUMMARY OF INITIAL CONSULT:      Marcio Coronado is 81 y.o. male with history of developmental delay, seizure disorder, tourette disorder, kyphosis of thoracic region, parkinsonism , bilateral cataracts who was admitted on 1/28/2020 from [facility] with complaints of shortness of breath and tachypnea. He was found to be hypoxic and tachycardic in the ED. according to the nursing home staff, patient was actively shaking yesterday afternoon and had decreasing positive attitude. The caregiver was unsure whether this is patient's baseline or experiencing seizure. Patient's initial BMP showed sodium of 179 mmol/liter, creatinine of 1.8 mg/dL which is the reason for this consultation. Problems resolved:    · Acute hypoxic respiratory failure on nasal cannula  · CRIS, stage 2, volume responsive prerenal CRIS, resolved       IMPRESSION/RECOMMENDATIONS:       1. Hypernatremia (sodium 179 mmol/L on admission), with significant dehydration, and intravascular volume depletion due to impaired fluid intake in the setting of developmental delay.   Resolved, sodium levels quite improved at adequate rate of correction. .                                 2. Hypophosphatemia, due to lack of oral intake, rule out vitamin D deficiency  3. Influenza and coronavirus positive, on Tamiflu   4. Seizure disorder on carbamazepine  5. Parkinsonism on benztropine  6. Underlying developmental disorder  7. UTI ,on cefepime     PLAN:     · Continue fluids D5W + 20 MEQ of KCl at 80 cc/h.    · Replace phosphorus, K-Phos 15 mmol IV x1  · Continue to monitor sodium levels, change BMP to every 8 hours      Electronically signed by Jane Bright MD on 2/2/2020 at 9:37 AM

## 2020-02-02 NOTE — PROGRESS NOTES
Discharge instructions given to Ginger Mahoney from [address] [place] [address]. Faxed [facility] to number given. Spoke with Her about prescriptions sent to [facility].

## 2020-02-02 NOTE — DISCHARGE INSTR - COC
Continuity of Care Form    Patient Name: Duran Roa   :  1950  MRN:  [id number]    Admit date:  2020  Discharge date:  ***    Code Status Order: Full Code   Advance Directives:     Admitting Physician:  Radha Padilla MD  PCP: No primary care provider on file.     Discharging Nurse: [facility] Unit/Room#: 1942/1997-P  Discharging Unit Phone Number: ***    Emergency Contact:   Extended Emergency Contact Information  Primary Emergency Contact: Zheng Pitt [address] Phone: [phone]  Relation: Legal Guardian  Secondary Emergency Contact: Meg Phone: [phone]  Relation: Brother/Sister    Past Surgical History:  Past Surgical History:   Procedure Laterality Date    TOE AMPUTATION Left            Immunization History:   Immunization History   Administered Date(s) Administered    Influenza, High Dose (Fluzone 65 yrs and older) 10/17/2018    Tdap (Boostrix, Adacel) 2016       Active Problems:  Patient Active Problem List   Diagnosis Code    Seizure ([facility] [address] R56.9    Pressure ulcer of toe of right foot, stage 3 ([facility] [address] L94.030    Atherosclerosis of native artery of both lower extremities ([facility] [address] I70.203    Hypernatremia E87.0       Isolation/Infection:   Isolation          Droplet        Patient Infection Status     Infection Onset Added Last Indicated Last Indicated By Review Planned Expiration Resolved Resolved By    INFLUENZA 20 Respiratory Panel, Molecular 20            Nurse Assessment:  Last Vital Signs: /60   Pulse 101   Temp 98.6 °F (37 °C) (Oral)   Resp 18   Ht 5' 5\" (1.651 m)   Wt 97 lb 6.4 oz (44.2 kg)   SpO2 93%   BMI 16.21 kg/m²     Last documented pain score (0-10 scale): Pain Level: 5  Last Weight:   Wt Readings from Last 1 Encounters:   20 97 lb 6.4 oz (44.2 kg)     Mental Status:  {IP PT MENTAL STATUS:80379}    IV Access:  - None    Nursing Mobility/ADLs:  Walking

## 2020-02-02 NOTE — CARE COORDINATION
Call placed to [phone], nurse from [address] [place] [address]. Informed her that the pt will be returning to the facility today. She reported that the facility will make arrangements to transport the pt back to the facility.  Charge nurse notified

## 2020-02-03 ENCOUNTER — CARE COORDINATION (OUTPATIENT)
Dept: CASE MANAGEMENT | Age: 70
End: 2020-02-03

## 2020-02-03 LAB
BLOOD CULTURE, ROUTINE: NORMAL
CULTURE, BLOOD 2: NORMAL

## 2020-02-04 ENCOUNTER — HOSPITAL ENCOUNTER (OUTPATIENT)
Age: 70
Discharge: HOME | End: 2020-02-04
Payer: MEDICARE

## 2020-02-04 ENCOUNTER — CARE COORDINATION (OUTPATIENT)
Dept: CASE MANAGEMENT | Age: 70
End: 2020-02-04

## 2020-02-04 LAB
ANION GAP SERPL CALCULATED.3IONS-SCNC: 11 MMOL/L (ref 7–16)
BUN BLDV-MCNC: 24 MG/DL (ref 8–23)
CALCIUM SERPL-MCNC: 8.6 MG/DL (ref 8.6–10.2)
CHLORIDE BLD-SCNC: 109 MMOL/L (ref 98–107)
CO2: 24 MMOL/L (ref 22–29)
CREAT SERPL-MCNC: 0.7 MG/DL (ref 0.7–1.2)
GFR AFRICAN AMERICAN: >60
GFR NON-AFRICAN AMERICAN: >60 ML/MIN/1.73
GLUCOSE BLD-MCNC: 104 MG/DL (ref 74–99)
POTASSIUM SERPL-SCNC: 4.2 MMOL/L (ref 3.5–5)
SODIUM BLD-SCNC: 144 MMOL/L (ref 132–146)

## 2020-02-04 PROCEDURE — 80048 BASIC METABOLIC PNL TOTAL CA: CPT

## 2020-02-04 PROCEDURE — 1111F DSCHRG MED/CURRENT MED MERGE: CPT | Performed by: INTERNAL MEDICINE

## 2020-02-04 PROCEDURE — 36415 COLL VENOUS BLD VENIPUNCTURE: CPT

## 2020-02-04 PROCEDURE — 84443 ASSAY THYROID STIM HORMONE: CPT

## 2020-02-04 NOTE — CARE COORDINATION
baseline. She states patient's PCP is Dr. Adrienne Thompson.      Care Transitions 24 Hour Call    Do you have any ongoing symptoms?:  No  Do you have a copy of your discharge instructions?:  Yes  Do you have all of your prescriptions and are they filled?:  Yes  Have you scheduled your follow up appointment?:  No (Comment: Patient is seen by PCP that visits patient at facility)  Were you discharged with any Home Care or Post Acute Services:  No  Care Transitions Interventions                                 Follow Up  Future Appointments   Date Time Provider Ricco Hart   3/3/2020  9:40 [facility], [facility]       Tania Steward RN

## 2020-02-05 LAB — TSH SERPL DL<=0.05 MIU/L-ACNC: 1.7 UIU/ML (ref 0.27–4.2)

## 2020-03-06 ENCOUNTER — APPOINTMENT (OUTPATIENT)
Dept: ULTRASOUND IMAGING | Age: 70
End: 2020-03-06
Payer: MEDICARE

## 2020-03-06 ENCOUNTER — HOSPITAL ENCOUNTER (EMERGENCY)
Age: 70
Discharge: HOME | End: 2020-03-06
Attending: EMERGENCY MEDICINE
Payer: MEDICARE

## 2020-03-06 VITALS
SYSTOLIC BLOOD PRESSURE: 128 MMHG | OXYGEN SATURATION: 98 % | HEART RATE: 60 BPM | RESPIRATION RATE: 18 BRPM | DIASTOLIC BLOOD PRESSURE: 84 MMHG | TEMPERATURE: 98.5 F

## 2020-03-06 LAB
BASOPHILS ABSOLUTE: 0.05 E9/L (ref 0–0.2)
BASOPHILS RELATIVE PERCENT: 1 % (ref 0–2)
EOSINOPHILS ABSOLUTE: 0.11 E9/L (ref 0.05–0.5)
EOSINOPHILS RELATIVE PERCENT: 2.2 % (ref 0–6)
HCT VFR BLD CALC: 38.8 % (ref 37–54)
HEMOGLOBIN: 12.5 G/DL (ref 12.5–16.5)
IMMATURE GRANULOCYTES #: 0.02 E9/L
IMMATURE GRANULOCYTES %: 0.4 % (ref 0–5)
LYMPHOCYTES ABSOLUTE: 1.52 E9/L (ref 1.5–4)
LYMPHOCYTES RELATIVE PERCENT: 29.9 % (ref 20–42)
MCH RBC QN AUTO: 32.9 PG (ref 26–35)
MCHC RBC AUTO-ENTMCNC: 32.2 % (ref 32–34.5)
MCV RBC AUTO: 102.1 FL (ref 80–99.9)
MONOCYTES ABSOLUTE: 0.6 E9/L (ref 0.1–0.95)
MONOCYTES RELATIVE PERCENT: 11.8 % (ref 2–12)
NEUTROPHILS ABSOLUTE: 2.78 E9/L (ref 1.8–7.3)
NEUTROPHILS RELATIVE PERCENT: 54.7 % (ref 43–80)
PDW BLD-RTO: 14.5 FL (ref 11.5–15)
PLATELET # BLD: 219 E9/L (ref 130–450)
PMV BLD AUTO: 9.7 FL (ref 7–12)
RBC # BLD: 3.8 E12/L (ref 3.8–5.8)
WBC # BLD: 5.1 E9/L (ref 4.5–11.5)

## 2020-03-06 PROCEDURE — 93971 EXTREMITY STUDY: CPT

## 2020-03-06 PROCEDURE — 85025 COMPLETE CBC W/AUTO DIFF WBC: CPT

## 2020-03-06 PROCEDURE — 99283 EMERGENCY DEPT VISIT LOW MDM: CPT

## 2020-03-06 PROCEDURE — 36415 COLL VENOUS BLD VENIPUNCTURE: CPT

## 2020-03-06 RX ORDER — CEPHALEXIN 500 MG/1
500 CAPSULE ORAL 4 TIMES DAILY
Qty: 28 CAPSULE | Refills: 0 | Status: SHIPPED | OUTPATIENT
Start: 2020-03-06 | End: 2020-03-13

## 2020-03-06 RX ORDER — MUPIROCIN CALCIUM 20 MG/G
CREAM TOPICAL
Qty: 15 G | Refills: 0 | Status: SHIPPED | OUTPATIENT
Start: 2020-03-06 | End: 2020-04-05

## 2020-03-06 NOTE — ED PROVIDER NOTES
HPI:  3/6/20,   Time: 2:19 PM         Clay Ashby is a 78 y.o. male presenting to the ED for redness of left foot, beginning 1 day ago. The complaint has been persistent, moderate in severity, and worsened by nothing. Patient is disabled from a group home with diagnosis of autism spectrum disorder. He presents with his caregiver with redness of the left foot. ROS:   Pertinent positives and negatives are stated within HPI, all other systems reviewed and are negative.  --------------------------------------------- PAST HISTORY ---------------------------------------------  Past Medical History:  has a past medical history of Acquired deformity of neck, Autism spectrum, Bilateral cataracts, Blepharochalasis, Cardiomegaly, Cataract of both eyes, Developmental delay, Gastritis, Hyperlipidemia, Kyphosis of thoracic region, Mental retardation, Obsessive compulsive disorder, Parkinsonism ([address] Seizures ([address] and Tourette disorder. Past Surgical History:  has a past surgical history that includes Toe amputation (Left). Social History:  reports that he has never smoked. He has never used smokeless tobacco. He reports that he does not drink alcohol or use drugs. Family History: family history is not on file. The patients home medications have been reviewed. Allergies: Patient has no known allergies.     -------------------------------------------------- RESULTS -------------------------------------------------  All laboratory and radiology results have been personally reviewed by myself   LABS:  Results for orders placed or performed during the hospital encounter of 03/06/20   CBC Auto Differential   Result Value Ref Range    WBC 5.1 4.5 - 11.5 E9/L    RBC 3.80 3.80 - 5.80 E12/L    Hemoglobin 12.5 12.5 - 16.5 g/dL    Hematocrit 38.8 37.0 - 54.0 %    .1 (H) 80.0 - 99.9 fL    MCH 32.9 26.0 - 35.0 pg    MCHC 32.2 32.0 - 34.5 %    RDW 14.5 11.5 - 15.0 fL    Platelets 568 975 - 454 E9/L    MPV 9.7

## 2020-03-12 ENCOUNTER — HOSPITAL ENCOUNTER (OUTPATIENT)
Age: 70
Discharge: HOME | End: 2020-03-12
Payer: MEDICARE

## 2020-03-12 LAB
ALBUMIN SERPL-MCNC: 3.9 G/DL (ref 3.5–5.2)
ALP BLD-CCNC: 103 U/L (ref 40–129)
ALT SERPL-CCNC: 12 U/L (ref 0–40)
ANION GAP SERPL CALCULATED.3IONS-SCNC: 10 MMOL/L (ref 7–16)
AST SERPL-CCNC: 21 U/L (ref 0–39)
BILIRUB SERPL-MCNC: 0.3 MG/DL (ref 0–1.2)
BUN BLDV-MCNC: 19 MG/DL (ref 8–23)
C-REACTIVE PROTEIN: 0.1 MG/DL (ref 0–0.4)
CALCIUM SERPL-MCNC: 9.1 MG/DL (ref 8.6–10.2)
CHLORIDE BLD-SCNC: 103 MMOL/L (ref 98–107)
CO2: 29 MMOL/L (ref 22–29)
CREAT SERPL-MCNC: 0.7 MG/DL (ref 0.7–1.2)
GFR AFRICAN AMERICAN: >60
GFR NON-AFRICAN AMERICAN: >60 ML/MIN/1.73
GLUCOSE BLD-MCNC: 86 MG/DL (ref 74–99)
HCT VFR BLD CALC: 38.9 % (ref 37–54)
HEMOGLOBIN: 12.7 G/DL (ref 12.5–16.5)
MCH RBC QN AUTO: 32.9 PG (ref 26–35)
MCHC RBC AUTO-ENTMCNC: 32.6 % (ref 32–34.5)
MCV RBC AUTO: 100.8 FL (ref 80–99.9)
PDW BLD-RTO: 13.9 FL (ref 11.5–15)
PLATELET # BLD: 180 E9/L (ref 130–450)
PMV BLD AUTO: 9.5 FL (ref 7–12)
POTASSIUM SERPL-SCNC: 4 MMOL/L (ref 3.5–5)
RBC # BLD: 3.86 E12/L (ref 3.8–5.8)
SEDIMENTATION RATE, ERYTHROCYTE: 5 MM/HR (ref 0–15)
SODIUM BLD-SCNC: 142 MMOL/L (ref 132–146)
TOTAL PROTEIN: 6.9 G/DL (ref 6.4–8.3)
WBC # BLD: 4.8 E9/L (ref 4.5–11.5)

## 2020-03-12 PROCEDURE — 36415 COLL VENOUS BLD VENIPUNCTURE: CPT

## 2020-03-12 PROCEDURE — 85027 COMPLETE CBC AUTOMATED: CPT

## 2020-03-12 PROCEDURE — 85651 RBC SED RATE NONAUTOMATED: CPT

## 2020-03-12 PROCEDURE — 80053 COMPREHEN METABOLIC PANEL: CPT

## 2020-03-12 PROCEDURE — 86140 C-REACTIVE PROTEIN: CPT

## 2020-03-24 ENCOUNTER — OFFICE VISIT (OUTPATIENT)
Dept: NEUROLOGY | Age: 70
End: 2020-03-24
Payer: MEDICARE

## 2020-03-24 VITALS — RESPIRATION RATE: 18 BRPM

## 2020-03-24 PROCEDURE — 99214 OFFICE O/P EST MOD 30 MIN: CPT | Performed by: CLINICAL NURSE SPECIALIST

## 2020-03-24 PROCEDURE — 1036F TOBACCO NON-USER: CPT | Performed by: CLINICAL NURSE SPECIALIST

## 2020-03-24 PROCEDURE — 1123F ACP DISCUSS/DSCN MKR DOCD: CPT | Performed by: CLINICAL NURSE SPECIALIST

## 2020-03-24 PROCEDURE — G8427 DOCREV CUR MEDS BY ELIG CLIN: HCPCS | Performed by: CLINICAL NURSE SPECIALIST

## 2020-03-24 PROCEDURE — G8418 CALC BMI BLW LOW PARAM F/U: HCPCS | Performed by: CLINICAL NURSE SPECIALIST

## 2020-03-24 PROCEDURE — 4040F PNEUMOC VAC/ADMIN/RCVD: CPT | Performed by: CLINICAL NURSE SPECIALIST

## 2020-03-24 PROCEDURE — 3017F COLORECTAL CA SCREEN DOC REV: CPT | Performed by: CLINICAL NURSE SPECIALIST

## 2020-03-24 PROCEDURE — G8484 FLU IMMUNIZE NO ADMIN: HCPCS | Performed by: CLINICAL NURSE SPECIALIST

## 2020-03-24 RX ORDER — MIRTAZAPINE 30 MG/1
30 TABLET, FILM COATED ORAL NIGHTLY
Status: ON HOLD | COMMUNITY
Start: 2020-02-12 | End: 2021-01-01 | Stop reason: HOSPADM

## 2020-03-24 RX ORDER — FLUVOXAMINE MALEATE 150 MG/1
150 CAPSULE, EXTENDED RELEASE ORAL DAILY
Status: ON HOLD | COMMUNITY
End: 2021-01-01 | Stop reason: HOSPADM

## 2020-03-24 NOTE — PROGRESS NOTES
visual fields Bilateral threat responses   II: pupils NOMI   III,VII: ptosis    III,IV,VI: extraocular muscles  Looks around the room   V: mastication    V: facial light touch sensation     V,VII: corneal reflex  Present   VII: facial muscle function - upper     VII: facial muscle function - lower Normal   VIII: hearing Normal   IX: soft palate elevation  Normal   IX,X: gag reflex Present   XI: trapezius strength     XI: sternocleidomastoid strength    XI: neck extension strength     XII: tongue strength  Normal     Motor:  Moving all limbs sporadically and symmetrically - though minimally  Normal bulk    Minimal right hand resting tremor appreciated  cogwheel rigidity noted in wrists     Sensory:  Withdraws to PP throughout    Gait:  In wheelchair today     DTR:   Right Brachioradialis reflex 0  Left Brachioradialis reflex 0  Right Biceps reflex 1+  Left Biceps reflex 1+  Right Quadriceps reflex 1+  Left Quadriceps reflex 1+  Right Achilles reflex 0  Left Achilles reflex 0    No Grey's     Laboratory/Radiology:     CBC:   Lab Results   Component Value Date    WBC 4.8 03/12/2020    RBC 3.86 03/12/2020    HGB 12.7 03/12/2020    HCT 38.9 03/12/2020    .8 03/12/2020    MCH 32.9 03/12/2020    MCHC 32.6 03/12/2020    RDW 13.9 03/12/2020     03/12/2020    MPV 9.5 03/12/2020     BMP:    Lab Results   Component Value Date     03/12/2020    K 4.0 03/12/2020    K 4.3 01/28/2020     03/12/2020    CO2 29 03/12/2020    BUN 19 03/12/2020    LABALBU 3.9 03/12/2020    LABALBU 4.5 05/18/2012    CREATININE 0.7 03/12/2020    CALCIUM 9.1 03/12/2020    GFRAA >60 03/12/2020    LABGLOM >60 03/12/2020    GLUCOSE 86 03/12/2020    GLUCOSE 71 05/18/2012     Tegretol level 13.3    Labs were personally reviewed by myself today     Assessment:     Seizure disorder   Stable on Trileptal     Increasing tremors - unchanged with lower doses of Depakote - unchanged with Neupro at 4mg   Agitation improved with Neupro discontinuation      Plan:     Try small dose of Sinemet 25/100 to be given at 0800 and 1600      RTO 6-8 weeks     Nel Flower  1:45 PM  3/24/2020

## 2020-03-28 ENCOUNTER — APPOINTMENT (OUTPATIENT)
Dept: CT IMAGING | Age: 70
DRG: 101 | End: 2020-03-28
Payer: MEDICARE

## 2020-03-28 ENCOUNTER — APPOINTMENT (OUTPATIENT)
Dept: GENERAL RADIOLOGY | Age: 70
DRG: 101 | End: 2020-03-28
Payer: MEDICARE

## 2020-03-28 ENCOUNTER — HOSPITAL ENCOUNTER (INPATIENT)
Age: 70
LOS: 1 days | Discharge: HOME | DRG: 101 | End: 2020-03-31
Attending: EMERGENCY MEDICINE | Admitting: INTERNAL MEDICINE
Payer: MEDICARE

## 2020-03-28 PROBLEM — G40.901 STATUS EPILEPTICUS (HCC): Status: ACTIVE | Noted: 2020-03-28

## 2020-03-28 LAB
ALBUMIN SERPL-MCNC: 3.6 G/DL (ref 3.5–5.2)
ALP BLD-CCNC: 93 U/L (ref 40–129)
ALT SERPL-CCNC: <5 U/L (ref 0–40)
ANION GAP SERPL CALCULATED.3IONS-SCNC: 18 MMOL/L (ref 7–16)
APTT: 26.2 SEC (ref 24.5–35.1)
AST SERPL-CCNC: 22 U/L (ref 0–39)
BACTERIA: ABNORMAL /HPF
BASOPHILS ABSOLUTE: 0.02 E9/L (ref 0–0.2)
BASOPHILS RELATIVE PERCENT: 0.5 % (ref 0–2)
BILIRUB SERPL-MCNC: <0.2 MG/DL (ref 0–1.2)
BILIRUBIN URINE: NEGATIVE
BLOOD, URINE: NEGATIVE
BUN BLDV-MCNC: 23 MG/DL (ref 8–23)
CALCIUM SERPL-MCNC: 8.4 MG/DL (ref 8.6–10.2)
CHLORIDE BLD-SCNC: 103 MMOL/L (ref 98–107)
CLARITY: CLEAR
CO2: 19 MMOL/L (ref 22–29)
COLOR: YELLOW
CREAT SERPL-MCNC: 0.6 MG/DL (ref 0.7–1.2)
EOSINOPHILS ABSOLUTE: 0.03 E9/L (ref 0.05–0.5)
EOSINOPHILS RELATIVE PERCENT: 0.7 % (ref 0–6)
GFR AFRICAN AMERICAN: >60
GFR NON-AFRICAN AMERICAN: >60 ML/MIN/1.73
GLUCOSE BLD-MCNC: 127 MG/DL (ref 74–99)
GLUCOSE URINE: NEGATIVE MG/DL
HCT VFR BLD CALC: 33.7 % (ref 37–54)
HEMOGLOBIN: 10.7 G/DL (ref 12.5–16.5)
HYALINE CASTS: ABNORMAL /LPF (ref 0–2)
IMMATURE GRANULOCYTES #: 0.03 E9/L
IMMATURE GRANULOCYTES %: 0.7 % (ref 0–5)
INR BLD: 1.1
KETONES, URINE: NEGATIVE MG/DL
LACTIC ACID: 9.6 MMOL/L (ref 0.5–2.2)
LEUKOCYTE ESTERASE, URINE: NEGATIVE
LIPASE: 87 U/L (ref 13–60)
LYMPHOCYTES ABSOLUTE: 0.42 E9/L (ref 1.5–4)
LYMPHOCYTES RELATIVE PERCENT: 9.9 % (ref 20–42)
MAGNESIUM: 1.9 MG/DL (ref 1.6–2.6)
MCH RBC QN AUTO: 31.9 PG (ref 26–35)
MCHC RBC AUTO-ENTMCNC: 31.8 % (ref 32–34.5)
MCV RBC AUTO: 100.6 FL (ref 80–99.9)
MONOCYTES ABSOLUTE: 0.38 E9/L (ref 0.1–0.95)
MONOCYTES RELATIVE PERCENT: 9 % (ref 2–12)
NEUTROPHILS ABSOLUTE: 3.35 E9/L (ref 1.8–7.3)
NEUTROPHILS RELATIVE PERCENT: 79.2 % (ref 43–80)
NITRITE, URINE: NEGATIVE
PDW BLD-RTO: 13.2 FL (ref 11.5–15)
PH UA: 5.5 (ref 5–9)
PLATELET # BLD: 150 E9/L (ref 130–450)
PMV BLD AUTO: 9.9 FL (ref 7–12)
POTASSIUM REFLEX MAGNESIUM: 3.3 MMOL/L (ref 3.5–5)
PROTEIN UA: NORMAL MG/DL
PROTHROMBIN TIME: 12.5 SEC (ref 9.3–12.4)
RBC # BLD: 3.35 E12/L (ref 3.8–5.8)
RBC # BLD: NORMAL 10*6/UL
RBC UA: ABNORMAL /HPF (ref 0–2)
SODIUM BLD-SCNC: 140 MMOL/L (ref 132–146)
SPECIFIC GRAVITY UA: >=1.03 (ref 1–1.03)
TOTAL PROTEIN: 6 G/DL (ref 6.4–8.3)
TROPONIN: <0.01 NG/ML (ref 0–0.03)
UROBILINOGEN, URINE: 0.2 E.U./DL
WBC # BLD: 4.2 E9/L (ref 4.5–11.5)
WBC UA: ABNORMAL /HPF (ref 0–5)

## 2020-03-28 PROCEDURE — 96365 THER/PROPH/DIAG IV INF INIT: CPT

## 2020-03-28 PROCEDURE — 93005 ELECTROCARDIOGRAM TRACING: CPT | Performed by: GENERAL PRACTICE

## 2020-03-28 PROCEDURE — 6360000002 HC RX W HCPCS: Performed by: GENERAL PRACTICE

## 2020-03-28 PROCEDURE — 70450 CT HEAD/BRAIN W/O DYE: CPT

## 2020-03-28 PROCEDURE — 71045 X-RAY EXAM CHEST 1 VIEW: CPT

## 2020-03-28 PROCEDURE — 81001 URINALYSIS AUTO W/SCOPE: CPT

## 2020-03-28 PROCEDURE — 84484 ASSAY OF TROPONIN QUANT: CPT

## 2020-03-28 PROCEDURE — 87040 BLOOD CULTURE FOR BACTERIA: CPT

## 2020-03-28 PROCEDURE — 80053 COMPREHEN METABOLIC PANEL: CPT

## 2020-03-28 PROCEDURE — 2500000003 HC RX 250 WO HCPCS: Performed by: EMERGENCY MEDICINE

## 2020-03-28 PROCEDURE — 83605 ASSAY OF LACTIC ACID: CPT

## 2020-03-28 PROCEDURE — 96367 TX/PROPH/DG ADDL SEQ IV INF: CPT

## 2020-03-28 PROCEDURE — 85610 PROTHROMBIN TIME: CPT

## 2020-03-28 PROCEDURE — 85730 THROMBOPLASTIN TIME PARTIAL: CPT

## 2020-03-28 PROCEDURE — G0378 HOSPITAL OBSERVATION PER HR: HCPCS

## 2020-03-28 PROCEDURE — 83690 ASSAY OF LIPASE: CPT

## 2020-03-28 PROCEDURE — 2580000003 HC RX 258: Performed by: GENERAL PRACTICE

## 2020-03-28 PROCEDURE — 99285 EMERGENCY DEPT VISIT HI MDM: CPT

## 2020-03-28 PROCEDURE — 85025 COMPLETE CBC W/AUTO DIFF WBC: CPT

## 2020-03-28 PROCEDURE — 87088 URINE BACTERIA CULTURE: CPT

## 2020-03-28 PROCEDURE — 83735 ASSAY OF MAGNESIUM: CPT

## 2020-03-28 PROCEDURE — 2580000003 HC RX 258: Performed by: EMERGENCY MEDICINE

## 2020-03-28 RX ORDER — CARBAMAZEPINE 200 MG/1
200 TABLET ORAL 2 TIMES DAILY
Status: DISCONTINUED | OUTPATIENT
Start: 2020-03-29 | End: 2020-03-31 | Stop reason: HOSPADM

## 2020-03-28 RX ORDER — ONDANSETRON 2 MG/ML
4 INJECTION INTRAMUSCULAR; INTRAVENOUS EVERY 6 HOURS PRN
Status: DISCONTINUED | OUTPATIENT
Start: 2020-03-28 | End: 2020-03-31 | Stop reason: HOSPADM

## 2020-03-28 RX ORDER — SODIUM CHLORIDE 0.9 % (FLUSH) 0.9 %
10 SYRINGE (ML) INJECTION PRN
Status: DISCONTINUED | OUTPATIENT
Start: 2020-03-28 | End: 2020-03-31 | Stop reason: HOSPADM

## 2020-03-28 RX ORDER — ACETAMINOPHEN 650 MG/1
650 SUPPOSITORY RECTAL EVERY 6 HOURS PRN
Status: DISCONTINUED | OUTPATIENT
Start: 2020-03-28 | End: 2020-03-31 | Stop reason: HOSPADM

## 2020-03-28 RX ORDER — SODIUM CHLORIDE 0.9 % (FLUSH) 0.9 %
10 SYRINGE (ML) INJECTION EVERY 12 HOURS SCHEDULED
Status: DISCONTINUED | OUTPATIENT
Start: 2020-03-29 | End: 2020-03-31 | Stop reason: HOSPADM

## 2020-03-28 RX ORDER — POLYETHYLENE GLYCOL 3350 17 G/17G
17 POWDER, FOR SOLUTION ORAL DAILY PRN
Status: DISCONTINUED | OUTPATIENT
Start: 2020-03-28 | End: 2020-03-31 | Stop reason: HOSPADM

## 2020-03-28 RX ORDER — BENZTROPINE MESYLATE 1 MG/1
1 TABLET ORAL 2 TIMES DAILY
Status: DISCONTINUED | OUTPATIENT
Start: 2020-03-29 | End: 2020-03-31 | Stop reason: HOSPADM

## 2020-03-28 RX ORDER — SODIUM CHLORIDE 9 MG/ML
INJECTION, SOLUTION INTRAVENOUS CONTINUOUS
Status: DISCONTINUED | OUTPATIENT
Start: 2020-03-29 | End: 2020-03-31 | Stop reason: HOSPADM

## 2020-03-28 RX ORDER — POTASSIUM CHLORIDE 20 MEQ/1
40 TABLET, EXTENDED RELEASE ORAL ONCE
Status: DISCONTINUED | OUTPATIENT
Start: 2020-03-28 | End: 2020-03-31 | Stop reason: HOSPADM

## 2020-03-28 RX ORDER — MIRTAZAPINE 15 MG/1
15 TABLET, FILM COATED ORAL NIGHTLY
Status: DISCONTINUED | OUTPATIENT
Start: 2020-03-29 | End: 2020-03-31 | Stop reason: HOSPADM

## 2020-03-28 RX ORDER — 0.9 % SODIUM CHLORIDE 0.9 %
1000 INTRAVENOUS SOLUTION INTRAVENOUS ONCE
Status: COMPLETED | OUTPATIENT
Start: 2020-03-28 | End: 2020-03-28

## 2020-03-28 RX ORDER — POLYETHYLENE GLYCOL 3350 17 G/17G
17 POWDER, FOR SOLUTION ORAL DAILY
Status: DISCONTINUED | OUTPATIENT
Start: 2020-03-29 | End: 2020-03-31 | Stop reason: HOSPADM

## 2020-03-28 RX ORDER — ACETAMINOPHEN 325 MG/1
650 TABLET ORAL EVERY 4 HOURS PRN
Status: DISCONTINUED | OUTPATIENT
Start: 2020-03-28 | End: 2020-03-28 | Stop reason: SDUPTHER

## 2020-03-28 RX ORDER — LEVETIRACETAM 10 MG/ML
1000 INJECTION INTRAVASCULAR ONCE
Status: COMPLETED | OUTPATIENT
Start: 2020-03-28 | End: 2020-03-28

## 2020-03-28 RX ORDER — LACTULOSE 10 G/15ML
30 SOLUTION ORAL 2 TIMES DAILY
Status: DISCONTINUED | OUTPATIENT
Start: 2020-03-29 | End: 2020-03-31 | Stop reason: HOSPADM

## 2020-03-28 RX ORDER — PANTOPRAZOLE SODIUM 40 MG/1
40 TABLET, DELAYED RELEASE ORAL
Status: DISCONTINUED | OUTPATIENT
Start: 2020-03-29 | End: 2020-03-31 | Stop reason: HOSPADM

## 2020-03-28 RX ORDER — LORAZEPAM 1 MG/1
2 TABLET ORAL PRN
Status: DISCONTINUED | OUTPATIENT
Start: 2020-03-28 | End: 2020-03-31 | Stop reason: HOSPADM

## 2020-03-28 RX ORDER — FLUVOXAMINE MALEATE 150 MG/1
150 CAPSULE, EXTENDED RELEASE ORAL NIGHTLY
Status: DISCONTINUED | OUTPATIENT
Start: 2020-03-29 | End: 2020-03-31 | Stop reason: HOSPADM

## 2020-03-28 RX ORDER — PROMETHAZINE HYDROCHLORIDE 25 MG/1
12.5 TABLET ORAL EVERY 6 HOURS PRN
Status: DISCONTINUED | OUTPATIENT
Start: 2020-03-28 | End: 2020-03-31 | Stop reason: HOSPADM

## 2020-03-28 RX ORDER — METOPROLOL SUCCINATE 25 MG/1
25 TABLET, EXTENDED RELEASE ORAL NIGHTLY
Status: DISCONTINUED | OUTPATIENT
Start: 2020-03-29 | End: 2020-03-31 | Stop reason: HOSPADM

## 2020-03-28 RX ORDER — VITAMIN B COMPLEX
1000 TABLET ORAL DAILY
Status: DISCONTINUED | OUTPATIENT
Start: 2020-03-29 | End: 2020-03-31 | Stop reason: HOSPADM

## 2020-03-28 RX ORDER — ACETAMINOPHEN 325 MG/1
650 TABLET ORAL EVERY 6 HOURS PRN
Status: DISCONTINUED | OUTPATIENT
Start: 2020-03-28 | End: 2020-03-31 | Stop reason: HOSPADM

## 2020-03-28 RX ADMIN — LEVETIRACETAM 1000 MG: 10 INJECTION INTRAVENOUS at 20:36

## 2020-03-28 RX ADMIN — DOXYCYCLINE 100 MG: 100 INJECTION, POWDER, LYOPHILIZED, FOR SOLUTION INTRAVENOUS at 22:09

## 2020-03-28 RX ADMIN — SODIUM CHLORIDE 1000 ML: 9 INJECTION, SOLUTION INTRAVENOUS at 21:22

## 2020-03-28 RX ADMIN — SODIUM CHLORIDE 1000 ML: 9 INJECTION, SOLUTION INTRAVENOUS at 20:36

## 2020-03-28 ASSESSMENT — PAIN SCALES - PAIN ASSESSMENT IN ADVANCED DEMENTIA (PAINAD)
BREATHING: 0
FACIALEXPRESSION: 0
TOTALSCORE: 0
BODYLANGUAGE: 0
CONSOLABILITY: 0
NEGVOCALIZATION: 0

## 2020-03-29 LAB
EKG ATRIAL RATE: 92 BPM
EKG P AXIS: 80 DEGREES
EKG P-R INTERVAL: 154 MS
EKG Q-T INTERVAL: 368 MS
EKG QRS DURATION: 80 MS
EKG QTC CALCULATION (BAZETT): 455 MS
EKG R AXIS: 58 DEGREES
EKG T AXIS: 44 DEGREES
EKG VENTRICULAR RATE: 92 BPM
LACTIC ACID: 1 MMOL/L (ref 0.5–2.2)

## 2020-03-29 PROCEDURE — 6360000002 HC RX W HCPCS: Performed by: PSYCHIATRY & NEUROLOGY

## 2020-03-29 PROCEDURE — 93010 ELECTROCARDIOGRAM REPORT: CPT | Performed by: INTERNAL MEDICINE

## 2020-03-29 PROCEDURE — 96376 TX/PRO/DX INJ SAME DRUG ADON: CPT

## 2020-03-29 PROCEDURE — 99221 1ST HOSP IP/OBS SF/LOW 40: CPT | Performed by: PHYSICIAN ASSISTANT

## 2020-03-29 PROCEDURE — 99218 PR INITIAL OBSERVATION CARE/DAY 30 MINUTES: CPT | Performed by: PSYCHIATRY & NEUROLOGY

## 2020-03-29 PROCEDURE — 6370000000 HC RX 637 (ALT 250 FOR IP): Performed by: INTERNAL MEDICINE

## 2020-03-29 PROCEDURE — 2580000003 HC RX 258: Performed by: INTERNAL MEDICINE

## 2020-03-29 PROCEDURE — 83605 ASSAY OF LACTIC ACID: CPT

## 2020-03-29 PROCEDURE — 6360000002 HC RX W HCPCS: Performed by: INTERNAL MEDICINE

## 2020-03-29 PROCEDURE — G0378 HOSPITAL OBSERVATION PER HR: HCPCS

## 2020-03-29 PROCEDURE — 36415 COLL VENOUS BLD VENIPUNCTURE: CPT

## 2020-03-29 PROCEDURE — 96372 THER/PROPH/DIAG INJ SC/IM: CPT

## 2020-03-29 RX ORDER — LEVETIRACETAM 5 MG/ML
500 INJECTION INTRAVASCULAR EVERY 12 HOURS
Status: DISCONTINUED | OUTPATIENT
Start: 2020-03-29 | End: 2020-03-29

## 2020-03-29 RX ORDER — LEVETIRACETAM 10 MG/ML
1000 INJECTION INTRAVASCULAR EVERY 12 HOURS
Status: DISCONTINUED | OUTPATIENT
Start: 2020-03-29 | End: 2020-03-31 | Stop reason: HOSPADM

## 2020-03-29 RX ADMIN — CARBAMAZEPINE 200 MG: 200 TABLET ORAL at 08:47

## 2020-03-29 RX ADMIN — PANTOPRAZOLE SODIUM 40 MG: 40 TABLET, DELAYED RELEASE ORAL at 05:53

## 2020-03-29 RX ADMIN — ENOXAPARIN SODIUM 40 MG: 40 INJECTION SUBCUTANEOUS at 08:46

## 2020-03-29 RX ADMIN — MIRTAZAPINE 15 MG: 15 TABLET, FILM COATED ORAL at 21:34

## 2020-03-29 RX ADMIN — SODIUM CHLORIDE: 9 INJECTION, SOLUTION INTRAVENOUS at 02:00

## 2020-03-29 RX ADMIN — POTASSIUM & SODIUM PHOSPHATES POWDER PACK 280-160-250 MG 250 MG: 280-160-250 PACK at 08:46

## 2020-03-29 RX ADMIN — OLANZAPINE 7.5 MG: 2.5 TABLET, FILM COATED ORAL at 21:34

## 2020-03-29 RX ADMIN — CARBAMAZEPINE 200 MG: 200 TABLET ORAL at 21:34

## 2020-03-29 RX ADMIN — LORAZEPAM 2 MG: 1 TABLET ORAL at 21:50

## 2020-03-29 RX ADMIN — POTASSIUM & SODIUM PHOSPHATES POWDER PACK 280-160-250 MG 250 MG: 280-160-250 PACK at 21:37

## 2020-03-29 RX ADMIN — LEVETIRACETAM 500 MG: 5 INJECTION INTRAVENOUS at 11:13

## 2020-03-29 RX ADMIN — LEVETIRACETAM 1000 MG: 10 INJECTION INTRAVENOUS at 21:35

## 2020-03-29 RX ADMIN — LORAZEPAM 2 MG: 1 TABLET ORAL at 05:53

## 2020-03-29 RX ADMIN — BENZTROPINE MESYLATE 1 MG: 1 TABLET ORAL at 21:35

## 2020-03-29 ASSESSMENT — PAIN SCALES - PAIN ASSESSMENT IN ADVANCED DEMENTIA (PAINAD)
NEGVOCALIZATION: 0
BREATHING: 0
BODYLANGUAGE: 0
TOTALSCORE: 0
CONSOLABILITY: 0
CONSOLABILITY: 0
FACIALEXPRESSION: 0
BREATHING: 0
FACIALEXPRESSION: 0
NEGVOCALIZATION: 0
TOTALSCORE: 0
BODYLANGUAGE: 0

## 2020-03-29 NOTE — ED PROVIDER NOTES
medications, cardiac monitoring and continuous pulse oximetry    This patient has remained hemodynamically stable during their ED course. Diagnosis:  1. Status epilepticus ([facility] [facility]    2. Hypokalemia    3. Cellulitis of foot        Disposition:  Patient's disposition: Admit to telemetry  Patient's condition is fair. David Obando  PGY-1  11:06 PM EDT                 Adrian  43., DO  Resident  03/28/20 [id number]

## 2020-03-29 NOTE — CONSULTS
VENTRICLES:No hydrocephalus. No extra-axial fluid. CALVARIUM:The calvarium is intact without fracture or focal lesion. SINUSES:The visualized paranasal sinuses and mastoids are clear. No acute intracranial abnormality. Xr Chest Portable    Result Date: 3/28/2020  Patient MRN: [id number] : 1950 Age:  78 years Gender: Male Order Date: 3/28/2020 8:30 PM Exam: XR CHEST PORTABLE Number of Images: 1 view Indication:   hypotensive hypotensive Comparison: Prior study from 2020 is available Findings: Study demonstrate mild cardiomegaly. The lung fields are clear. There is hyperaeration of lungs with flattening of both hemidiaphragms suggesting of chronic obstructive pulmonary disease. There is no evidence of airspace consolidation or pulmonary venous congestion. There is uncoiling and atherosclerotic change of thoracic aorta. The bony thorax demonstrate no gross abnormality. Cardiomegaly Chronic obstructive pulmonary disease No evidence of airspace consolidation or pulmonary venous congestion. Assessment/Plan      Active Hospital Problems    Diagnosis Date Noted    Status epilepticus ([facility] [address] [G40.901] 2020       Seizure disorder:  -  Controlled for extended period, new onset  -   Neurology following, EEG, keppra    Palliative Care Encounter/Recommendations:  Palliative medicine to contact guardian. If the patient's code status is to be changed and a PEG tube will not be placed if needed there will likely need to be 2 physicians that will attest to the plan and support the change in code status. We will discuss further with guardian. Discharge planning: EEG, control of seizures  to be determined    Discussed patient and the plan of care with the Palliative medicine IDT members. Referrals to: none today    Time/Communication  Greater than 51% of time spent, total 30 minutes face-to-face with patient in his room.       Janet Fatima PA-C  Palliative Medicine    Thank you for allowing

## 2020-03-29 NOTE — H&P
Admission History and Physical                                                                                    Bobby Heller MD, FACP                   Patient Name: Trista Scruggs                   Age:  78 y.o. Gender:   male    CC: Seizure-like activity    HPI: This patient was brought to the emergency room from a group home reportedly having seizures 4 x 2 to 3 minutes each. This patient is nonverbal with rather advanced MR. This patient has been extensively evaluated for seizures  It has been determined that he has tremors and a history of seizures  He was most recently seen 5 days ago by neurology on 3/24    Medications for attempting to control these tremors have been adjusted and most recently Sinemet was trialed for again reported increasing tremors.     Staff indicate today that he does not take anything orally  He remains agitated and spits things out  He is not a candidate for PEG tube or for enteric feeding    This is all been discussed on previous admissions    Reviewed all of the notes from neurology with his regular visits  He is treated for both seizures and tremors  Neurology reports that he has not had any seizure breakthrough but increasing tremors      Past Medical History:   Diagnosis Date    Acquired deformity of neck     Autism spectrum     Bilateral cataracts     Blepharochalasis     Cardiomegaly     Cataract of both eyes     Developmental delay     Gastritis     Hyperlipidemia     Kyphosis of thoracic region     Mental retardation     SEVERE MR  NON VERBAL, NEEDS WHEELCHAIR FOR LONG DISTANCE    Obsessive compulsive disorder     Parkinsonism ([address]     Seizures ([address]     Tourette disorder        Past Surgical History:   Procedure Laterality Date    TOE AMPUTATION Left            Family Status   Relation Name Status    Mother      Father          Prior to Admission medications MG/5ML syrup Take 10 mLs by mouth 4 times daily as needed for Cough     Historical Provider, MD   Bismuth Subsalicylate (PINK BISMUTH PO) Take by mouth as needed     Historical Provider, MD   benztropine (COGENTIN) 1 MG tablet Take 1 mg by mouth 2 times daily. TAKE AM OF OR WITH A LITTLE WATER 04/24/2015    Historical Provider, MD   vitamin D (CHOLECALCIFEROL) 1000 UNIT TABS tablet Take 1,000 Units by mouth daily.     Historical Provider, MD        Social History     Socioeconomic History    Marital status: Single     Spouse name: None    Number of children: None    Years of education: None    Highest education level: None   Occupational History    None   Social Needs    Financial resource strain: None    Food insecurity     Worry: None     Inability: None    Transportation needs     Medical: None     Non-medical: None   Tobacco Use    Smoking status: Never Smoker    Smokeless tobacco: Never Used   Substance and Sexual Activity    Alcohol use: No    Drug use: No    Sexual activity: Never   Lifestyle    Physical activity     Days per week: None     Minutes per session: None    Stress: None   Relationships    Social connections     Talks on phone: None     Gets together: None     Attends Jain service: None     Active member of club or organization: None     Attends meetings of clubs or organizations: None     Relationship status: None    Intimate partner violence     Fear of current or ex partner: None     Emotionally abused: None     Physically abused: None     Forced sexual activity: None   Other Topics Concern    None   Social History Narrative    None       No Known Allergies    The patient's medical records have been reviewed contingent on availability        Review of Systems:   · General: Unobtainable due to the patient's general condition      Physical Examination:      Wt Readings from Last 3 Encounters:   03/28/20 110 lb 4 oz (50 kg)   02/02/20 97 lb 6.4 oz (44.2 kg)   01/22/20 170 lb (77.1 kg)     Temp Readings from Last 3 Encounters:   03/29/20 97.5 °F (36.4 °C) (Axillary)   03/06/20 98.5 °F (36.9 °C) (Temporal)   02/02/20 97.8 °F (36.6 °C) (Temporal)     BP Readings from Last 3 Encounters:   03/29/20 (!) 107/54   03/06/20 128/84   02/02/20 (!) 147/76     Pulse Readings from Last 3 Encounters:   03/29/20 74   03/06/20 60   02/02/20 105       General appearance: He is lying in the fetal position nonresponsive  Skin: Color, texture, turgor normal.  He has multiple areas of healed abrasion and irritation significant deformities of the feet  Head: Normocephalic. No masses, lesions, tenderness or abnormalities   Face: Symmetric no visible lesions  Eyes: Conjunctivae/cornea clear. Dickie Olguin. Sclera non icteric. Ears: External appearance normal.   Mouth: Cannot assess mouth  Neck:  Symmetric. No adenopathy. Does not move  Lungs: Clear to auscultation. No rhonchi, crackles or rales. Poor ventilatory excursion due to his positioning  Heart: S1 > S2. Rhythm is regular and rate is normal. No gallop rub or murmur.   Abdomen: Cannot assess due to the patient's fetal position however appears to be nontender  Extremities: Very resistant to any movement diffuse muscle wasting with atrophy deformities of the feet  Musculoskeletal: As noted above  Neuro:   · Cannot accurately assess however there appears to be no focal neurologic deficit  Mental status: Patient does not respond although he is awake episodic periods of agitation and tremor     Labs     CBC:   Lab Results   Component Value Date    WBC 4.2 03/28/2020    RBC 3.35 03/28/2020    HGB 10.7 03/28/2020    HCT 33.7 03/28/2020     03/28/2020    .6 03/28/2020     BMP:    Lab Results   Component Value Date     03/28/2020    K 3.3 03/28/2020     03/28/2020    CO2 19 03/28/2020    BUN 23 03/28/2020    CREATININE 0.6 03/28/2020    GLUCOSE 127 03/28/2020    GLUCOSE 71 05/18/2012    CALCIUM 8.4 03/28/2020     Hepatic Function Panel:    Lab Results   Component Value Date    ALKPHOS 93 03/28/2020    AST 22 03/28/2020    ALT <5 03/28/2020    PROT 6.0 03/28/2020    LABALBU 3.6 03/28/2020    LABALBU 4.5 05/18/2012    BILITOT <0.2 03/28/2020     Magnesium:    Lab Results   Component Value Date    MG 1.9 03/28/2020     Cardiac Enzymes:   Lab Results   Component Value Date    CKTOTAL 799 (H) 10/27/2017    TROPONINI <0.01 03/28/2020    TROPONINI 0.03 01/29/2020    TROPONINI 0.03 01/29/2020     LDH:  No results found for: LDH  PT/INR:    Lab Results   Component Value Date    PROTIME 12.5 03/28/2020    INR 1.1 03/28/2020     BNP: No results for input(s): BNP in the last 72 hours.    TSH:   Lab Results   Component Value Date    TSH 1.700 02/04/2020      Cardiac Injury Profile:   Recent Labs     03/28/20 2030   TROPONINI <0.01      Lipid Profile:   Lab Results   Component Value Date    TRIG 146 03/18/2016    HDL 44 03/18/2016    LDLCALC 93 03/18/2016    CHOL 166 03/18/2016      Hemoglobin A1C: No components found for: HGBA1C   U/A:   Lab Results   Component Value Date    LEUKOCYTESUR Negative 03/28/2020    PHUR 5.5 03/28/2020    WBCUA 1-3 03/28/2020    RBCUA NONE 03/28/2020    RBCUA NONE 10/08/2012    BACTERIA RARE 03/28/2020    SPECGRAV >=1.030 03/28/2020    BLOODU Negative 03/28/2020    GLUCOSEU Negative 03/28/2020    GLUCOSEU NEGATIVE 09/18/2011         ADMISSION SCHEDULED MEDS:   Current Facility-Administered Medications   Medication Dose Route Frequency Provider Last Rate Last Dose    potassium chloride (KLOR-CON M) extended release tablet 40 mEq  40 mEq Oral Once Awais Gonzalez MD        benztropine (COGENTIN) tablet 1 mg  1 mg Oral BID Awais Gonzalez MD        carBAMazepine (TEGRETOL) tablet 200 mg  200 mg Oral BID Awais Gonzalez MD   200 mg at 03/29/20 0847    fluvoxaMINE (LUVOX CR) extended release capsule 150 mg  150 mg Oral Nightly Awais Gonzalez MD        lactulose (CHRONULAC) 10 GM/15ML solution 30 g  30 g Oral BID flush, acetaminophen **OR** acetaminophen, polyethylene glycol, promethazine **OR** ondansetron    Radiology Review:  CT Head WO Contrast   Final Result      No acute intracranial abnormality. XR CHEST PORTABLE   Final Result      Cardiomegaly      Chronic obstructive pulmonary disease      No evidence of airspace consolidation or pulmonary venous congestion. ASSESSMENT:  Active diagnoses treated at this admission:  · Uncontrolled paroxysmal tremor  · Severe MR  · History of epilepsy and seizure    Problem list:  Patient Active Problem List   Diagnosis    Seizure ([address]    Pressure ulcer of toe of right foot, stage 3 ([address]    Atherosclerosis of native artery of both lower extremities (HCC)    Hypernatremia    Status epilepticus ([address]       PLAN:  Patient's condition is relatively poor  Options for feeding this patient are limited, he is requiring restraints.   PEG tube is not an option and nasogastric tube is not an option  IV would have a limited life due to his agitation  Await neurologic opinion\  Would recommend palliative care      See  Orders  Enio Montez MD, Micki Villalba, American Board of Internal Medicine  Diplomate, Geriatric Medicine, [address] [place] [address] Rd., [address] of Internal Medicine  9:30 AM  3/29/2020

## 2020-03-30 PROBLEM — R56.9 SEIZURES (HCC): Status: ACTIVE | Noted: 2020-03-30

## 2020-03-30 LAB
ANION GAP SERPL CALCULATED.3IONS-SCNC: 13 MMOL/L (ref 7–16)
BUN BLDV-MCNC: 8 MG/DL (ref 8–23)
CALCIUM SERPL-MCNC: 8.9 MG/DL (ref 8.6–10.2)
CHLORIDE BLD-SCNC: 106 MMOL/L (ref 98–107)
CO2: 24 MMOL/L (ref 22–29)
CREAT SERPL-MCNC: 0.5 MG/DL (ref 0.7–1.2)
GFR AFRICAN AMERICAN: >60
GFR NON-AFRICAN AMERICAN: >60 ML/MIN/1.73
GLUCOSE BLD-MCNC: 68 MG/DL (ref 74–99)
POTASSIUM REFLEX MAGNESIUM: 3.9 MMOL/L (ref 3.5–5)
SODIUM BLD-SCNC: 143 MMOL/L (ref 132–146)
URINE CULTURE, ROUTINE: NORMAL

## 2020-03-30 PROCEDURE — 6370000000 HC RX 637 (ALT 250 FOR IP): Performed by: INTERNAL MEDICINE

## 2020-03-30 PROCEDURE — 80048 BASIC METABOLIC PNL TOTAL CA: CPT

## 2020-03-30 PROCEDURE — 99225 PR SBSQ OBSERVATION CARE/DAY 25 MINUTES: CPT | Performed by: CLINICAL NURSE SPECIALIST

## 2020-03-30 PROCEDURE — 2060000000 HC ICU INTERMEDIATE R&B

## 2020-03-30 PROCEDURE — 6360000002 HC RX W HCPCS: Performed by: PSYCHIATRY & NEUROLOGY

## 2020-03-30 PROCEDURE — 2580000003 HC RX 258: Performed by: INTERNAL MEDICINE

## 2020-03-30 PROCEDURE — 36415 COLL VENOUS BLD VENIPUNCTURE: CPT

## 2020-03-30 PROCEDURE — 6360000002 HC RX W HCPCS: Performed by: INTERNAL MEDICINE

## 2020-03-30 RX ADMIN — LEVETIRACETAM 1000 MG: 10 INJECTION INTRAVENOUS at 10:08

## 2020-03-30 RX ADMIN — BENZTROPINE MESYLATE 1 MG: 1 TABLET ORAL at 21:50

## 2020-03-30 RX ADMIN — OLANZAPINE 7.5 MG: 2.5 TABLET, FILM COATED ORAL at 21:51

## 2020-03-30 RX ADMIN — MIRTAZAPINE 15 MG: 15 TABLET, FILM COATED ORAL at 22:43

## 2020-03-30 RX ADMIN — LACTULOSE 30 G: 20 SOLUTION ORAL at 21:52

## 2020-03-30 RX ADMIN — LORAZEPAM 2 MG: 1 TABLET ORAL at 23:33

## 2020-03-30 RX ADMIN — ENOXAPARIN SODIUM 40 MG: 40 INJECTION SUBCUTANEOUS at 10:08

## 2020-03-30 RX ADMIN — POTASSIUM & SODIUM PHOSPHATES POWDER PACK 280-160-250 MG 250 MG: 280-160-250 PACK at 21:52

## 2020-03-30 RX ADMIN — METOPROLOL SUCCINATE 25 MG: 25 TABLET, EXTENDED RELEASE ORAL at 21:34

## 2020-03-30 RX ADMIN — SODIUM CHLORIDE: 9 INJECTION, SOLUTION INTRAVENOUS at 04:33

## 2020-03-30 RX ADMIN — LEVETIRACETAM 1000 MG: 10 INJECTION INTRAVENOUS at 21:34

## 2020-03-30 RX ADMIN — PANTOPRAZOLE SODIUM 40 MG: 40 TABLET, DELAYED RELEASE ORAL at 06:30

## 2020-03-30 RX ADMIN — CARBAMAZEPINE 200 MG: 200 TABLET ORAL at 21:51

## 2020-03-30 ASSESSMENT — PAIN SCALES - PAIN ASSESSMENT IN ADVANCED DEMENTIA (PAINAD)
TOTALSCORE: 0
BODYLANGUAGE: 0
BODYLANGUAGE: 0
CONSOLABILITY: 0
FACIALEXPRESSION: 0
NEGVOCALIZATION: 0
CONSOLABILITY: 0
BODYLANGUAGE: 0
FACIALEXPRESSION: 0
BODYLANGUAGE: 0
CONSOLABILITY: 0
NEGVOCALIZATION: 0
CONSOLABILITY: 0
FACIALEXPRESSION: 0
BREATHING: 0
TOTALSCORE: 0
NEGVOCALIZATION: 0
BREATHING: 0
BREATHING: 0
NEGVOCALIZATION: 0
BREATHING: 0
FACIALEXPRESSION: 0
TOTALSCORE: 0
TOTALSCORE: 0

## 2020-03-30 NOTE — PROGRESS NOTES
Attempted to hook up patient for EEG when he pulled all the leads off and doesn't follow commands or won't cooperate for the test. Notified the floor and let his nurse know.  Dominick Kearns

## 2020-03-30 NOTE — PROGRESS NOTES
Present   VII: facial muscle function - upper     VII: facial muscle function - lower Normal   VIII: hearing Normal   IX: soft palate elevation  Normal   IX,X: gag reflex Present   XI: trapezius strength     XI: sternocleidomastoid strength    XI: neck extension strength     XII: tongue strength  Normal     Motor:  Moving all limbs sporadically and symmetrically - though minimally  Normal bulk - spastic legs   Minimal right hand resting tremor appreciated  cogwheel rigidity noted in wrists     Sensory:  Withdraws to PP throughout    Gait:  Not ambulated today     DTR:   Right Brachioradialis reflex 0  Left Brachioradialis reflex 0  Right Biceps reflex 1+  Left Biceps reflex 1+  Right Quadriceps reflex 0  Left Quadriceps reflex 0  Right Achilles reflex 0  Left Achilles reflex 0    No Grey's     Laboratory/Radiology:     CBC:   Lab Results   Component Value Date    WBC 4.2 03/28/2020    RBC 3.35 03/28/2020    HGB 10.7 03/28/2020    HCT 33.7 03/28/2020    .6 03/28/2020    MCH 31.9 03/28/2020    MCHC 31.8 03/28/2020    RDW 13.2 03/28/2020     03/28/2020    MPV 9.9 03/28/2020     BMP:    Lab Results   Component Value Date     03/28/2020    K 3.3 03/28/2020     03/28/2020    CO2 19 03/28/2020    BUN 23 03/28/2020    LABALBU 3.6 03/28/2020    LABALBU 4.5 05/18/2012    CREATININE 0.6 03/28/2020    CALCIUM 8.4 03/28/2020    GFRAA >60 03/28/2020    LABGLOM >60 03/28/2020    GLUCOSE 127 03/28/2020    GLUCOSE 71 05/18/2012     Tegretol level 13.3    CT Head: March 2020  No acute abnormality     Labs were personally reviewed by myself today     Assessment:     Seizure disorder   Was previously stable on Trileptal monotherapy   Keppra added this admission for recurrent seizures     Increasing tremors/movements - unchanged with lower doses of Depakote - unchanged with Neupro at 4mg   Agitation improved with Neupro discontinuation    Questionably responding to Sinemet at facility versus causing peak dose dyskinesias      Plan:     Review EEG    Continue Keppra and Tegretol    Administer Sinemet when taking PO    Thien Becerra  8:27 AM  3/30/2020

## 2020-03-30 NOTE — PLAN OF CARE
Problem: Falls - Risk of:  Goal: Will remain free from falls  Description: Will remain free from falls  Outcome: Met This Shift  Goal: Absence of physical injury  Description: Absence of physical injury  Outcome: Met This Shift     Problem: Restraint Use - Nonviolent/Non-Self-Destructive Behavior:  Goal: Absence of restraint-related injury  Description: Absence of restraint-related injury  Outcome: Met This Shift

## 2020-03-30 NOTE — PLAN OF CARE
Problem: Falls - Risk of:  Goal: Will remain free from falls  Description: Will remain free from falls  3/30/2020 0022 by Tiffanie Christiansen RN  Outcome: Met This Shift     Problem: Falls - Risk of:  Goal: Absence of physical injury  Description: Absence of physical injury  3/30/2020 0022 by Tiffanie Christiansen RN  Outcome: Met This Shift     Problem: Restraint Use - Nonviolent/Non-Self-Destructive Behavior:  Goal: Absence of restraint-related injury  Description: Absence of restraint-related injury  3/30/2020 0022 by Tiffanie Christiansen RN  Outcome: Met This Shift     Problem: Restraint Use - Nonviolent/Non-Self-Destructive Behavior:  Goal: Absence of restraint indications  Description: Absence of restraint indications  3/30/2020 0022 by Tiffanie Christiansen RN  Outcome: Not Met This Shift

## 2020-03-30 NOTE — PROGRESS NOTES
 promethazine (PHENERGAN) tablet 12.5 mg  12.5 mg Oral Q6H PRN Abby Summers MD        Or    ondansetron [facility]) injection 4 mg  4 mg Intravenous Q6H PRN Abby Summers MD        enoxaparin (LOVENOX) injection 40 mg  40 mg Subcutaneous Daily Abby Summers MD   40 mg at 03/30/20 1008    0.9 % sodium chloride infusion   Intravenous Continuous Abby Summers  mL/hr at 03/30/20 8511         Current  Infusions   sodium chloride 100 mL/hr at 03/30/20 0433       Prn Meds  LORazepam, magnesium citrate, sodium chloride flush, acetaminophen **OR** acetaminophen, polyethylene glycol, promethazine **OR** ondansetron    Radiology Review:  CT Head WO Contrast   Final Result      No acute intracranial abnormality. XR CHEST PORTABLE   Final Result      Cardiomegaly      Chronic obstructive pulmonary disease      No evidence of airspace consolidation or pulmonary venous congestion. ASSESSMENT:  Active diagnoses treated at this admission:  · Uncontrolled paroxysmal tremor being assessed and treated for both seizure and tremor  · Severe MR  · History of epilepsy and seizure    Problem list:  Patient Active Problem List   Diagnosis    Seizure ([address]    Pressure ulcer of toe of right foot, stage 3 ([address]    Atherosclerosis of native artery of both lower extremities ([address]    Hypernatremia    Status epilepticus ([address]    Seizures ([address]       PLAN:  Patient's condition is relatively poor  Options for feeding this patient are limited, he is requiring restraints.   PEG tube is not an option and nasogastric tube is not an option  IV would have a limited life due to his agitation  Await neurologic opinion\  Would recommend palliative care      See  Orders  Estrellita Glover MD, Lito Arauz, American Board of Internal Medicine  Diplomate, Geriatric Medicine, [address] of Internal Medicine  10:43 AM  3/30/2020

## 2020-03-30 NOTE — PROGRESS NOTES
Attempted to remove bilateral wrist restraints, pt still pulling at owens catheter, IV lines, and being combative. Pt placed back in bilateral wrist restraints. Will continue to monitor.

## 2020-03-31 VITALS
WEIGHT: 110.25 LBS | TEMPERATURE: 96.7 F | DIASTOLIC BLOOD PRESSURE: 70 MMHG | SYSTOLIC BLOOD PRESSURE: 113 MMHG | HEART RATE: 58 BPM | HEIGHT: 65 IN | BODY MASS INDEX: 18.37 KG/M2 | RESPIRATION RATE: 18 BRPM | OXYGEN SATURATION: 98 %

## 2020-03-31 PROBLEM — G40.901 SEIZURE DISORDER, STATUS EPILEPTICUS, CONVULSIVE (HCC): Status: ACTIVE | Noted: 2020-03-31

## 2020-03-31 PROCEDURE — 6370000000 HC RX 637 (ALT 250 FOR IP): Performed by: INTERNAL MEDICINE

## 2020-03-31 PROCEDURE — 99232 SBSQ HOSP IP/OBS MODERATE 35: CPT | Performed by: CLINICAL NURSE SPECIALIST

## 2020-03-31 PROCEDURE — 6360000002 HC RX W HCPCS: Performed by: INTERNAL MEDICINE

## 2020-03-31 PROCEDURE — 6360000002 HC RX W HCPCS: Performed by: PSYCHIATRY & NEUROLOGY

## 2020-03-31 RX ORDER — LEVETIRACETAM 500 MG/1
1000 TABLET ORAL 2 TIMES DAILY
Qty: 60 TABLET | Refills: 3 | DISCHARGE
Start: 2020-03-31 | End: 2020-09-30 | Stop reason: DRUGHIGH

## 2020-03-31 RX ADMIN — BENZTROPINE MESYLATE 1 MG: 1 TABLET ORAL at 09:35

## 2020-03-31 RX ADMIN — CARBAMAZEPINE 200 MG: 200 TABLET ORAL at 09:35

## 2020-03-31 RX ADMIN — LORAZEPAM 2 MG: 1 TABLET ORAL at 11:45

## 2020-03-31 RX ADMIN — ENOXAPARIN SODIUM 40 MG: 40 INJECTION SUBCUTANEOUS at 09:35

## 2020-03-31 RX ADMIN — POTASSIUM & SODIUM PHOSPHATES POWDER PACK 280-160-250 MG 250 MG: 280-160-250 PACK at 09:35

## 2020-03-31 RX ADMIN — LEVETIRACETAM 1000 MG: 10 INJECTION INTRAVENOUS at 09:35

## 2020-03-31 RX ADMIN — PANTOPRAZOLE SODIUM 40 MG: 40 TABLET, DELAYED RELEASE ORAL at 05:07

## 2020-03-31 RX ADMIN — VITAMIN D, TAB 1000IU (100/BT) 1000 UNITS: 25 TAB at 09:35

## 2020-03-31 ASSESSMENT — PAIN SCALES - PAIN ASSESSMENT IN ADVANCED DEMENTIA (PAINAD)
FACIALEXPRESSION: 0
BODYLANGUAGE: 0
BREATHING: 0
CONSOLABILITY: 0
TOTALSCORE: 0
CONSOLABILITY: 0
NEGVOCALIZATION: 0
TOTALSCORE: 0
TOTALSCORE: 0
BODYLANGUAGE: 0
NEGVOCALIZATION: 0
FACIALEXPRESSION: 0
BREATHING: 0
BREATHING: 0
BODYLANGUAGE: 0
CONSOLABILITY: 0
NEGVOCALIZATION: 0
FACIALEXPRESSION: 0

## 2020-03-31 NOTE — PROGRESS NOTES
Went in to assess pt and IV was infiltrated. IV removed and this RN and another attempted x3 for new IV site unsuccessfully.  IV team notified

## 2020-03-31 NOTE — PROGRESS NOTES
Looks around the room   V: mastication    V: facial light touch sensation     V,VII: corneal reflex  Present   VII: facial muscle function - upper     VII: facial muscle function - lower Normal   VIII: hearing Normal   IX: soft palate elevation  Normal   IX,X: gag reflex Present   XI: trapezius strength     XI: sternocleidomastoid strength    XI: neck extension strength     XII: tongue strength  Normal     Motor:  Moving all limbs sporadically and symmetrically - though minimally  spastic legs   right hand resting tremor appreciated  cogwheel rigidity noted in both wrists     Sensory:  Withdraws to PP throughout    Gait:  Not ambulated today   2pt soft restraints noted     DTR:   Right Brachioradialis reflex 0  Left Brachioradialis reflex 0  Right Biceps reflex 1+  Left Biceps reflex 1+  Right Quadriceps reflex 0  Left Quadriceps reflex 0  Right Achilles reflex 0  Left Achilles reflex 0    No Grey's     Laboratory/Radiology:     CBC:   Lab Results   Component Value Date    WBC 4.2 03/28/2020    RBC 3.35 03/28/2020    HGB 10.7 03/28/2020    HCT 33.7 03/28/2020    .6 03/28/2020    MCH 31.9 03/28/2020    MCHC 31.8 03/28/2020    RDW 13.2 03/28/2020     03/28/2020    MPV 9.9 03/28/2020     BMP:    Lab Results   Component Value Date     03/30/2020    K 3.9 03/30/2020     03/30/2020    CO2 24 03/30/2020    BUN 8 03/30/2020    LABALBU 3.6 03/28/2020    LABALBU 4.5 05/18/2012    CREATININE 0.5 03/30/2020    CALCIUM 8.9 03/30/2020    GFRAA >60 03/30/2020    LABGLOM >60 03/30/2020    GLUCOSE 68 03/30/2020    GLUCOSE 71 05/18/2012     Tegretol level 13.3    CT Head: March 2020  No acute abnormality     Labs were personally reviewed by myself today     Assessment:     Seizure disorder   Was previously stable on Trileptal monotherapy   Keppra added this admission for recurrent seizures     Increasing tremors/movements - unchanged with lower doses of Depakote - unchanged with Neupro at 4mg   Agitation

## 2020-03-31 NOTE — DISCHARGE SUMMARY
Discharge Summary    Shila Estrella  :  1950  MRN:  [id number]    Admit date:  3/28/2020  Discharge date:  3/31/2020 9:43 AM    Admitting Physician:  Arnav Zelaya MD    Discharge Diagnoses:    Patient Active Problem List    Diagnosis Date Noted    Seizure disorder, status epilepticus, convulsive ([address] 2020    Seizures ([address] 2020    Status epilepticus ([address] 2020    Hypernatremia 2020    Atherosclerosis of native artery of both lower extremities ([address] 07/10/2018    Pressure ulcer of toe of right foot, stage 3 ([address] 2016    Seizure ([address] 06/10/2013       Past Medical Hx :   Past Medical History:   Diagnosis Date    Acquired deformity of neck     Autism spectrum     Bilateral cataracts     Blepharochalasis     Cardiomegaly     Cataract of both eyes     Developmental delay     Gastritis     Hyperlipidemia     Kyphosis of thoracic region     Mental retardation     SEVERE MR  NON VERBAL, NEEDS WHEELCHAIR FOR LONG DISTANCE    Obsessive compulsive disorder     Parkinsonism ([address]     Seizures ([address]     Tourette disorder        Past Surgical Hx :   Past Surgical History:   Procedure Laterality Date    TOE AMPUTATION Left            Admission Condition:  poor    Discharged Condition:  fair    Labs:  CBC:   Lab Results   Component Value Date    WBC 4.2 2020    RBC 3.35 2020    HGB 10.7 2020    HCT 33.7 2020    .6 2020    MCH 31.9 2020    MCHC 31.8 2020    RDW 13.2 2020     2020    MPV 9.9 2020     CMP:    Lab Results   Component Value Date     2020    K 3.9 2020     2020    CO2 24 2020    BUN 8 2020    CREATININE 0.5 2020    GFRAA >60 2020    LABGLOM >60 2020    GLUCOSE 68 2020    GLUCOSE 71 2012    PROT 6.0 2020    LABALBU 3.6 2020    LABALBU 4.5 2012    CALCIUM 8.9 2020    BILITOT <0.2 recently seen 5 days ago by neurology on 3/24   Medications for attempting to control these tremors have been adjusted and most recently Sinemet was trialed for again reported increasing tremors. Staff indicate today that he does not take anything orally   He remains agitated and spits things out   He is not a candidate for PEG tube or for enteric feeding   This is all been discussed on previous admissions   Reviewed all of the notes from neurology with his regular visits   He is treated for both seizures and tremors   Neurology reports that he has not had any seizure breakthrough but increasing tremors   3/30   Patient has been evaluated by neurology and is currently being maintained on Keppra and Tegretol   Sinemet recommended when taking p.o. again   Patient remains agitated   EEG was attempted cannot be performed since the patient is so uncooperative    Following the administration of Keppra over the past 24 hours the patient has been stable without seizure activity and without tremors  The patient is started taking in orally satisfactory and consumed 100% of his meal  Medications cannot be administered orally    Discharge Medications:    [facility], [address] [place] [address] Road Medication Instructions HXT:[id number]    Printed on:03/31/20 4957   Medication Information                      acetaminophen (TYLENOL) 325 MG tablet  Take 650 mg by mouth every 4 hours as needed for Pain. benztropine (COGENTIN) 1 MG tablet  Take 1 mg by mouth 2 times daily.  TAKE AM OF OR WITH A LITTLE WATER 04/24/2015             Bismuth Subsalicylate (PINK BISMUTH PO)  Take by mouth as needed              carBAMazepine (TEGRETOL) 200 MG tablet  Take 200 mg by mouth 2 times daily              carbidopa-levodopa (SINEMET)  MG per tablet  Twice a day at 0800 and 1600             fluvoxaMINE (LUVOX CR) 150 MG CP24 extended release capsule  fluvoxamine  mg capsule,extended release 24 hr             guaiFENesin-dextromethorphan (ROBITUSSIN DM) 100-10 MG/5ML syrup  Take 10 mLs by mouth 4 times daily as needed for Cough              Lactobacillus (ACIDOPHILUS) CAPS capsule  Take 2 capsules by mouth 2 times daily             lactulose (CHRONULAC) 10 GM/15ML solution  Take 30 g by mouth 2 times daily             levETIRAcetam (KEPPRA) 500 MG tablet  Take 2 tablets by mouth 2 times daily             Loperamide HCl (IMODIUM PO)  Take by mouth             LORazepam (ATIVAN) 2 MG tablet  Take 2 mg by mouth as needed for Anxiety             magnesium citrate (CITROMA) SOLN  Take 150 mLs by mouth as needed              melatonin 3 MG TABS tablet  Take 5 mg by mouth daily              metoprolol (TOPROL-XL) 25 MG XL tablet  Take 25 mg by mouth nightly             mirtazapine (REMERON) 15 MG tablet               mupirocin (BACTROBAN) 2 % cream  Apply topically 3 times daily. OLANZapine (ZYPREXA) 10 MG tablet  Take 7.5 mg by mouth nightly              omeprazole (PRILOSEC) 20 MG capsule  Take 20 mg by mouth 2 times daily             polyethylene glycol (MIRALAX) powder  Take 17 g by mouth daily             potassium & sodium phosphates (PHOS-NAK) 280-160-250 MG PACK  Take 1 packet by mouth 2 times daily             vitamin D (CHOLECALCIFEROL) 1000 UNIT TABS tablet  Take 1,000 Units by mouth daily. Discharge Exam:  General appearance: This patient is in two-point restraints trying to get up he is awake but uncooperative   Skin: Color, texture, turgor normal. He has multiple areas of healed abrasion and irritation significant deformities of the feet   Head: Normocephalic. No masses, lesions, tenderness or abnormalities   Face: Symmetric no visible lesions   Eyes: Conjunctivae/cornea clear. Juliet Peon. Sclera non icteric. Mouth: Cannot assess mouth   Neck: Symmetric. No adenopathy. Does not move passively   Lungs: Clear to auscultation. No rhonchi, crackles or rales.  Poor ventilatory excursion due to his positioning

## 2020-03-31 NOTE — PROGRESS NOTES
Pt agitated, pulling at IV site, owens and attempting to bite/hit staff. Bilateral soft wrist restraints are still indicated at this time. ROM exercies preformed.  Will continue to monitor

## 2020-03-31 NOTE — DISCHARGE INSTR - COC
Continuity of Care Form    Patient Name: Janet Obando   :  1950  MRN:  [id number]    Admit date:  3/28/2020  Discharge date:  3/31    Code Status Order: Full Code   Advance Directives:   [facility] Documentation     Date/Time Healthcare Directive Type of Healthcare Directive Copy in [facility] [address] Agent's Name Healthcare Agent's Phone Number    [phone]  No, patient does not have an advance directive for healthcare treatment -- -- -- -- --          Admitting Physician:  Kayy Carver MD  PCP: No primary care provider on file.     Discharging Nurse: [facility] Unit/Room#: 4157/9721-G  Discharging Unit Phone Number: [phone]    Emergency Contact:   Extended Emergency Contact Information  Primary Emergency Contact: Bella Rosas   [address] Phone: [phone]  Relation: Legal Guardian    Past Surgical History:  Past Surgical History:   Procedure Laterality Date    TOE AMPUTATION Left            Immunization History:   Immunization History   Administered Date(s) Administered    Influenza, High Dose (Fluzone 65 yrs and older) 10/17/2018    Tdap (Boostrix, Adacel) 2016       Active Problems:  Patient Active Problem List   Diagnosis Code    Seizure ([facility] R56.9    Pressure ulcer of toe of right foot, stage 3 ([address] Q55.167    Atherosclerosis of native artery of both lower extremities ([facility] I70.203    Hypernatremia E87.0    Status epilepticus ([address] G40.901    Seizures ([address] R56.9    Seizure disorder, status epilepticus, convulsive ([address] G40.301       Isolation/Infection:   Isolation          No Isolation        Patient Infection Status     Infection Onset Added Last Indicated Last Indicated By Review Planned Expiration Resolved Resolved By    None active    Resolved    C-diff Rule Out 20 Clostridium difficile EIA (Ordered)   20 Lamar Zuñiga RN    INFLUENZA 20 select all that are sent with patient):  None    RN SIGNATURE:  Electronically signed by Shona Valentino RN on 3/31/20 at 11:22 AM EDT    CASE MANAGEMENT/SOCIAL WORK SECTION    Inpatient Status Date: ***    Readmission Risk Assessment Score:  Readmission Risk              Risk of Unplanned Readmission:        25           Discharging to Facility/ Agency   · Name:   · Address:  · Phone:  · Fax:    Dialysis Facility (if applicable)   · Name:  · Address:  · Dialysis Schedule:  · Phone:  · Fax:    / signature: {Esignature:[id number]}    PHYSICIAN SECTION    Prognosis: {Prognosis:2695723619}    Condition at Discharge: Percy Leyva Patient Condition:213704933}    Rehab Potential (if transferring to Rehab): {Prognosis:0631070937}    Recommended Labs or Other Treatments After Discharge: ***    Physician Certification: I certify the above information and transfer of Shila Estrella  is necessary for the continuing treatment of the diagnosis listed and that he requires {Admit to Appropriate Level of Care:99224} for {GREATER/LESS:073421822} 30 days.      Update Admission H&P: {CHP DME Changes in DHSIW:616167597}    PHYSICIAN SIGNATURE:  Electronically signed by Arnav Zelaya MD on 3/31/20 at 9:43 AM EDT

## 2020-03-31 NOTE — PROGRESS NOTES
CLINICAL PHARMACY: DISCHARGE MED RECONCILIATION/REVIEW    [facility] ([facility]) Select Patient?: Yes  Total # of Interventions Recommended: 0   -   Total # Interventions Accepted: 0  Intervention Severity:   - Level 1 Intervention Present?: No   - Level 2 #: 0   - Level 3 #: 0   Time Spent (min): 6801 Demetrio Ordonez, Pharm D 3/31/2020 10:24 AM

## 2020-04-01 ENCOUNTER — CARE COORDINATION (OUTPATIENT)
Dept: CASE MANAGEMENT | Age: 70
End: 2020-04-01

## 2020-04-01 NOTE — CARE COORDINATION
[facility] Transitions Initial Follow Up Call    Call within 2 business days of discharge: Yes    Patient: Shila Estrella Patient : 1950   MRN: [id number]  Reason for Admission: Status epilepticus  Discharge Date: 3/31/20 RARS: Readmission Risk Score: 25      Last Discharge [facility]       Complaint Diagnosis Description Type Department Provider    3/28/20 Seizures Status epilepticus ([facility] . .. ED to Hosp-Admission (Discharged) (ADMITTED) Y[facility] 8SE Anay De Los Santos MD; Denisa Dias. .. Spoke with: Tito GARCÍA    First attempt to reach the patient for Care Transition call post hospital discharge. Jef Almeida is currently driving and will return call to this CTN. Contact information provided.       Follow Up  Future Appointments   Date Time Provider Ricco Hart   6/3/2020  9:20 AM MARIJA Akhtar - CNS [facility]       Zhang Hwang RN

## 2020-04-02 ENCOUNTER — CARE COORDINATION (OUTPATIENT)
Dept: CASE MANAGEMENT | Age: 70
End: 2020-04-02

## 2020-04-02 LAB
BLOOD CULTURE, ROUTINE: NORMAL
CULTURE, BLOOD 2: NORMAL

## 2020-04-02 RX ORDER — ERGOCALCIFEROL 1.25 MG/1
50000 CAPSULE ORAL
COMMUNITY
End: 2021-01-01 | Stop reason: ALTCHOICE

## 2020-04-02 NOTE — CARE COORDINATION
daily. Patient takes Ativan prn prior to appts. Patient presented to the emergency department on 3/28/20 for seizures x 4. Per Jair Pantoja lpn, no seizure activity since discharge. Jair Pantoja reports patient is eating and taking his meds. Denies falls. Jair Pantoja lpn denies any further needs, questions, or concerns at this time. This CTN to sign off.     Follow Up  Future Appointments   Date Time Provider Ricco Hart   6/3/2020  9:20 AM MARIJA Jones - CNS [facility] Neuro [facility]       Ace Mariano RN

## 2020-06-08 ENCOUNTER — APPOINTMENT (OUTPATIENT)
Dept: GENERAL RADIOLOGY | Age: 70
DRG: 682 | End: 2020-06-08
Payer: MEDICARE

## 2020-06-08 ENCOUNTER — APPOINTMENT (OUTPATIENT)
Dept: CT IMAGING | Age: 70
DRG: 682 | End: 2020-06-08
Payer: MEDICARE

## 2020-06-08 ENCOUNTER — HOSPITAL ENCOUNTER (INPATIENT)
Age: 70
LOS: 14 days | Discharge: NURSING, REHAB OR LONG-TERM CARE | DRG: 682 | End: 2020-06-23
Attending: EMERGENCY MEDICINE | Admitting: INTERNAL MEDICINE
Payer: MEDICARE

## 2020-06-08 LAB
APTT: 28.9 SEC (ref 24.5–35.1)
B.E.: -4.6 MMOL/L (ref -3–3)
COHB: 0.8 % (ref 0–1.5)
CRITICAL: ABNORMAL
DATE ANALYZED: ABNORMAL
DATE OF COLLECTION: ABNORMAL
HCO3: 21.4 MMOL/L (ref 22–26)
HHB: 6.6 % (ref 0–5)
INR BLD: 1.4
LAB: ABNORMAL
LACTIC ACID, SEPSIS: 2.2 MMOL/L (ref 0.5–1.9)
Lab: ABNORMAL
METER GLUCOSE: 165 MG/DL (ref 74–99)
METHB: 0.3 % (ref 0–1.5)
MODE: ABNORMAL
O2 CONTENT: 19.5 ML/DL
O2 SATURATION: 93.3 % (ref 92–98.5)
O2HB: 92.3 % (ref 94–97)
OPERATOR ID: ABNORMAL
PATIENT TEMP: 37 C
PCO2: 42.7 MMHG (ref 35–45)
PH BLOOD GAS: 7.32 (ref 7.35–7.45)
PO2: 76.8 MMHG (ref 75–100)
PRO-BNP: 83 PG/ML (ref 0–125)
PROTHROMBIN TIME: 16.6 SEC (ref 9.3–12.4)
SOURCE, BLOOD GAS: ABNORMAL
THB: 15 G/DL (ref 11.5–16.5)
TIME ANALYZED: 2305

## 2020-06-08 PROCEDURE — 82962 GLUCOSE BLOOD TEST: CPT

## 2020-06-08 PROCEDURE — 36415 COLL VENOUS BLD VENIPUNCTURE: CPT

## 2020-06-08 PROCEDURE — 82436 ASSAY OF URINE CHLORIDE: CPT

## 2020-06-08 PROCEDURE — 80156 ASSAY CARBAMAZEPINE TOTAL: CPT

## 2020-06-08 PROCEDURE — 93005 ELECTROCARDIOGRAM TRACING: CPT | Performed by: EMERGENCY MEDICINE

## 2020-06-08 PROCEDURE — 83605 ASSAY OF LACTIC ACID: CPT

## 2020-06-08 PROCEDURE — 70450 CT HEAD/BRAIN W/O DYE: CPT

## 2020-06-08 PROCEDURE — 83935 ASSAY OF URINE OSMOLALITY: CPT

## 2020-06-08 PROCEDURE — 87040 BLOOD CULTURE FOR BACTERIA: CPT

## 2020-06-08 PROCEDURE — 84300 ASSAY OF URINE SODIUM: CPT

## 2020-06-08 PROCEDURE — 85025 COMPLETE CBC W/AUTO DIFF WBC: CPT

## 2020-06-08 PROCEDURE — 82805 BLOOD GASES W/O2 SATURATION: CPT

## 2020-06-08 PROCEDURE — 82570 ASSAY OF URINE CREATININE: CPT

## 2020-06-08 PROCEDURE — 84133 ASSAY OF URINE POTASSIUM: CPT

## 2020-06-08 PROCEDURE — 96360 HYDRATION IV INFUSION INIT: CPT

## 2020-06-08 PROCEDURE — 2580000003 HC RX 258: Performed by: EMERGENCY MEDICINE

## 2020-06-08 PROCEDURE — 99285 EMERGENCY DEPT VISIT HI MDM: CPT

## 2020-06-08 PROCEDURE — 71045 X-RAY EXAM CHEST 1 VIEW: CPT

## 2020-06-08 PROCEDURE — 81001 URINALYSIS AUTO W/SCOPE: CPT

## 2020-06-08 PROCEDURE — 83880 ASSAY OF NATRIURETIC PEPTIDE: CPT

## 2020-06-08 PROCEDURE — 80177 DRUG SCRN QUAN LEVETIRACETAM: CPT

## 2020-06-08 PROCEDURE — 85730 THROMBOPLASTIN TIME PARTIAL: CPT

## 2020-06-08 PROCEDURE — 80053 COMPREHEN METABOLIC PANEL: CPT

## 2020-06-08 PROCEDURE — 85610 PROTHROMBIN TIME: CPT

## 2020-06-08 PROCEDURE — 84484 ASSAY OF TROPONIN QUANT: CPT

## 2020-06-08 RX ORDER — 0.9 % SODIUM CHLORIDE 0.9 %
500 INTRAVENOUS SOLUTION INTRAVENOUS ONCE
Status: DISCONTINUED | OUTPATIENT
Start: 2020-06-08 | End: 2020-06-08

## 2020-06-08 RX ORDER — 0.9 % SODIUM CHLORIDE 0.9 %
30 INTRAVENOUS SOLUTION INTRAVENOUS ONCE
Status: COMPLETED | OUTPATIENT
Start: 2020-06-08 | End: 2020-06-09

## 2020-06-08 RX ORDER — SODIUM CHLORIDE 0.9 % (FLUSH) 0.9 %
10 SYRINGE (ML) INJECTION PRN
Status: DISCONTINUED | OUTPATIENT
Start: 2020-06-08 | End: 2020-06-23 | Stop reason: HOSPADM

## 2020-06-08 RX ORDER — SODIUM CHLORIDE 0.9 % (FLUSH) 0.9 %
10 SYRINGE (ML) INJECTION EVERY 12 HOURS SCHEDULED
Status: DISCONTINUED | OUTPATIENT
Start: 2020-06-08 | End: 2020-06-23 | Stop reason: HOSPADM

## 2020-06-08 RX ADMIN — SODIUM CHLORIDE 1497 ML: 9 INJECTION, SOLUTION INTRAVENOUS at 23:04

## 2020-06-09 ENCOUNTER — APPOINTMENT (OUTPATIENT)
Dept: ULTRASOUND IMAGING | Age: 70
DRG: 682 | End: 2020-06-09
Payer: MEDICARE

## 2020-06-09 ENCOUNTER — APPOINTMENT (OUTPATIENT)
Dept: GENERAL RADIOLOGY | Age: 70
DRG: 682 | End: 2020-06-09
Payer: MEDICARE

## 2020-06-09 LAB
ALBUMIN SERPL-MCNC: 3.3 G/DL (ref 3.5–5.2)
ALBUMIN SERPL-MCNC: 3.8 G/DL (ref 3.5–5.2)
ALP BLD-CCNC: 90 U/L (ref 40–129)
ALT SERPL-CCNC: 31 U/L (ref 0–40)
AMORPHOUS: ABNORMAL
ANION GAP SERPL CALCULATED.3IONS-SCNC: 15 MMOL/L (ref 7–16)
ANION GAP SERPL CALCULATED.3IONS-SCNC: 16 MMOL/L (ref 7–16)
AST SERPL-CCNC: 30 U/L (ref 0–39)
BACTERIA: ABNORMAL /HPF
BASOPHILS ABSOLUTE: 0.01 E9/L (ref 0–0.2)
BASOPHILS RELATIVE PERCENT: 0.1 % (ref 0–2)
BILIRUB SERPL-MCNC: 0.3 MG/DL (ref 0–1.2)
BILIRUBIN URINE: NEGATIVE
BLOOD, URINE: NEGATIVE
BUN BLDV-MCNC: 132 MG/DL (ref 8–23)
BUN BLDV-MCNC: 139 MG/DL (ref 8–23)
BUN BLDV-MCNC: 156 MG/DL (ref 8–23)
BUN BLDV-MCNC: 157 MG/DL (ref 8–23)
BUN BLDV-MCNC: 159 MG/DL (ref 8–23)
BUN BLDV-MCNC: 165 MG/DL (ref 8–23)
BUN BLDV-MCNC: 172 MG/DL (ref 8–23)
CALCIUM SERPL-MCNC: 7.7 MG/DL (ref 8.6–10.2)
CALCIUM SERPL-MCNC: 7.8 MG/DL (ref 8.6–10.2)
CALCIUM SERPL-MCNC: 8 MG/DL (ref 8.6–10.2)
CALCIUM SERPL-MCNC: 8.9 MG/DL (ref 8.6–10.2)
CARBAMAZEPINE DOSE: NORMAL
CARBAMAZEPINE LEVEL: 8.9 MCG/ML (ref 4–10)
CHLORIDE BLD-SCNC: >140 MMOL/L (ref 98–107)
CHLORIDE URINE RANDOM: <20 MMOL/L
CLARITY: CLEAR
CO2: 24 MMOL/L (ref 22–29)
CO2: 25 MMOL/L (ref 22–29)
COARSE CASTS, UA: ABNORMAL /LPF (ref 0–2)
COLOR: YELLOW
CREAT SERPL-MCNC: 2.1 MG/DL (ref 0.7–1.2)
CREAT SERPL-MCNC: 2.2 MG/DL (ref 0.7–1.2)
CREAT SERPL-MCNC: 2.5 MG/DL (ref 0.7–1.2)
CREAT SERPL-MCNC: 2.7 MG/DL (ref 0.7–1.2)
CREAT SERPL-MCNC: 2.7 MG/DL (ref 0.7–1.2)
CREAT SERPL-MCNC: 3 MG/DL (ref 0.7–1.2)
CREAT SERPL-MCNC: 3 MG/DL (ref 0.7–1.2)
CREATININE URINE: 182 MG/DL (ref 40–278)
EKG ATRIAL RATE: 100 BPM
EKG P AXIS: 33 DEGREES
EKG P-R INTERVAL: 120 MS
EKG Q-T INTERVAL: 302 MS
EKG QRS DURATION: 80 MS
EKG QTC CALCULATION (BAZETT): 389 MS
EKG R AXIS: 57 DEGREES
EKG T AXIS: 52 DEGREES
EKG VENTRICULAR RATE: 100 BPM
EOSINOPHILS ABSOLUTE: 0.01 E9/L (ref 0.05–0.5)
EOSINOPHILS RELATIVE PERCENT: 0.1 % (ref 0–6)
EPITHELIAL CELLS, UA: ABNORMAL /HPF
GFR AFRICAN AMERICAN: 25
GFR AFRICAN AMERICAN: 25
GFR AFRICAN AMERICAN: 29
GFR AFRICAN AMERICAN: 29
GFR AFRICAN AMERICAN: 31
GFR AFRICAN AMERICAN: 36
GFR AFRICAN AMERICAN: 38
GFR NON-AFRICAN AMERICAN: 21 ML/MIN/1.73
GFR NON-AFRICAN AMERICAN: 21 ML/MIN/1.73
GFR NON-AFRICAN AMERICAN: 24 ML/MIN/1.73
GFR NON-AFRICAN AMERICAN: 24 ML/MIN/1.73
GFR NON-AFRICAN AMERICAN: 26 ML/MIN/1.73
GFR NON-AFRICAN AMERICAN: 30 ML/MIN/1.73
GFR NON-AFRICAN AMERICAN: 32 ML/MIN/1.73
GLUCOSE BLD-MCNC: 110 MG/DL (ref 74–99)
GLUCOSE BLD-MCNC: 118 MG/DL (ref 74–99)
GLUCOSE BLD-MCNC: 118 MG/DL (ref 74–99)
GLUCOSE BLD-MCNC: 152 MG/DL (ref 74–99)
GLUCOSE BLD-MCNC: 165 MG/DL (ref 74–99)
GLUCOSE BLD-MCNC: 170 MG/DL (ref 74–99)
GLUCOSE BLD-MCNC: 193 MG/DL (ref 74–99)
GLUCOSE URINE: NEGATIVE MG/DL
HCT VFR BLD CALC: 48.9 % (ref 37–54)
HCT VFR BLD CALC: 54.1 % (ref 37–54)
HEMOGLOBIN: 14.1 G/DL (ref 12.5–16.5)
HEMOGLOBIN: 16 G/DL (ref 12.5–16.5)
HYALINE CASTS: ABNORMAL /LPF (ref 0–2)
IMMATURE GRANULOCYTES #: 0.05 E9/L
IMMATURE GRANULOCYTES %: 0.7 % (ref 0–5)
KETONES, URINE: NEGATIVE MG/DL
LACTIC ACID: 2.1 MMOL/L (ref 0.5–2.2)
LEUKOCYTE ESTERASE, URINE: NEGATIVE
LYMPHOCYTES ABSOLUTE: 0.95 E9/L (ref 1.5–4)
LYMPHOCYTES RELATIVE PERCENT: 13.3 % (ref 20–42)
MCH RBC QN AUTO: 32.4 PG (ref 26–35)
MCH RBC QN AUTO: 32.6 PG (ref 26–35)
MCHC RBC AUTO-ENTMCNC: 28.8 % (ref 32–34.5)
MCHC RBC AUTO-ENTMCNC: 29.6 % (ref 32–34.5)
MCV RBC AUTO: 109.5 FL (ref 80–99.9)
MCV RBC AUTO: 113.2 FL (ref 80–99.9)
MONOCYTES ABSOLUTE: 0.38 E9/L (ref 0.1–0.95)
MONOCYTES RELATIVE PERCENT: 5.3 % (ref 2–12)
NEUTROPHILS ABSOLUTE: 5.76 E9/L (ref 1.8–7.3)
NEUTROPHILS RELATIVE PERCENT: 80.5 % (ref 43–80)
NITRITE, URINE: NEGATIVE
OSMOLALITY URINE: 742 MOSM/KG (ref 300–900)
OSMOLALITY: 465 MOSM/KG (ref 285–310)
PDW BLD-RTO: 14.7 FL (ref 11.5–15)
PDW BLD-RTO: 14.7 FL (ref 11.5–15)
PH UA: 5 (ref 5–9)
PHOSPHORUS: 4.5 MG/DL (ref 2.5–4.5)
PLATELET # BLD: 42 E9/L (ref 130–450)
PLATELET # BLD: 48 E9/L (ref 130–450)
PLATELET CONFIRMATION: NORMAL
PLATELET CONFIRMATION: NORMAL
PMV BLD AUTO: 12.3 FL (ref 7–12)
PMV BLD AUTO: 13.3 FL (ref 7–12)
POTASSIUM SERPL-SCNC: 3.4 MMOL/L (ref 3.5–5)
POTASSIUM SERPL-SCNC: 3.6 MMOL/L (ref 3.5–5)
POTASSIUM SERPL-SCNC: 3.6 MMOL/L (ref 3.5–5)
POTASSIUM SERPL-SCNC: 3.8 MMOL/L (ref 3.5–5)
POTASSIUM SERPL-SCNC: 3.8 MMOL/L (ref 3.5–5)
POTASSIUM, UR: 73.2 MMOL/L
PROTEIN UA: ABNORMAL MG/DL
RBC # BLD: 4.32 E12/L (ref 3.8–5.8)
RBC # BLD: 4.94 E12/L (ref 3.8–5.8)
RBC UA: ABNORMAL /HPF (ref 0–2)
SARS-COV-2, NAAT: NOT DETECTED
SODIUM BLD-SCNC: >180 MMOL/L (ref 132–146)
SODIUM URINE: 39 MMOL/L
SPECIFIC GRAVITY UA: >=1.03 (ref 1–1.03)
TOTAL PROTEIN: 7.8 G/DL (ref 6.4–8.3)
TROPONIN: 0.06 NG/ML (ref 0–0.03)
UROBILINOGEN, URINE: 0.2 E.U./DL
WBC # BLD: 6.5 E9/L (ref 4.5–11.5)
WBC # BLD: 7.2 E9/L (ref 4.5–11.5)
WBC UA: ABNORMAL /HPF (ref 0–5)

## 2020-06-09 PROCEDURE — 99223 1ST HOSP IP/OBS HIGH 75: CPT | Performed by: INTERNAL MEDICINE

## 2020-06-09 PROCEDURE — 93010 ELECTROCARDIOGRAM REPORT: CPT | Performed by: INTERNAL MEDICINE

## 2020-06-09 PROCEDURE — 2580000003 HC RX 258: Performed by: EMERGENCY MEDICINE

## 2020-06-09 PROCEDURE — 2580000003 HC RX 258: Performed by: INTERNAL MEDICINE

## 2020-06-09 PROCEDURE — 96361 HYDRATE IV INFUSION ADD-ON: CPT

## 2020-06-09 PROCEDURE — U0002 COVID-19 LAB TEST NON-CDC: HCPCS

## 2020-06-09 PROCEDURE — 74018 RADEX ABDOMEN 1 VIEW: CPT

## 2020-06-09 PROCEDURE — 36415 COLL VENOUS BLD VENIPUNCTURE: CPT

## 2020-06-09 PROCEDURE — 6360000002 HC RX W HCPCS: Performed by: INTERNAL MEDICINE

## 2020-06-09 PROCEDURE — 80069 RENAL FUNCTION PANEL: CPT

## 2020-06-09 PROCEDURE — 83605 ASSAY OF LACTIC ACID: CPT

## 2020-06-09 PROCEDURE — 85027 COMPLETE CBC AUTOMATED: CPT

## 2020-06-09 PROCEDURE — 83930 ASSAY OF BLOOD OSMOLALITY: CPT

## 2020-06-09 PROCEDURE — 76770 US EXAM ABDO BACK WALL COMP: CPT

## 2020-06-09 PROCEDURE — 80048 BASIC METABOLIC PNL TOTAL CA: CPT

## 2020-06-09 PROCEDURE — 2060000000 HC ICU INTERMEDIATE R&B

## 2020-06-09 PROCEDURE — 99999 PR OFFICE/OUTPT VISIT,PROCEDURE ONLY: CPT | Performed by: INTERNAL MEDICINE

## 2020-06-09 PROCEDURE — 6370000000 HC RX 637 (ALT 250 FOR IP): Performed by: INTERNAL MEDICINE

## 2020-06-09 RX ORDER — SODIUM CHLORIDE 0.9 % (FLUSH) 0.9 %
10 SYRINGE (ML) INJECTION EVERY 12 HOURS SCHEDULED
Status: DISCONTINUED | OUTPATIENT
Start: 2020-06-09 | End: 2020-06-23 | Stop reason: HOSPADM

## 2020-06-09 RX ORDER — ACETAMINOPHEN 325 MG/1
650 TABLET ORAL EVERY 6 HOURS PRN
Status: DISCONTINUED | OUTPATIENT
Start: 2020-06-09 | End: 2020-06-23 | Stop reason: HOSPADM

## 2020-06-09 RX ORDER — DEXTROSE MONOHYDRATE 50 MG/ML
INJECTION, SOLUTION INTRAVENOUS CONTINUOUS
Status: DISCONTINUED | OUTPATIENT
Start: 2020-06-09 | End: 2020-06-11

## 2020-06-09 RX ORDER — SODIUM CHLORIDE 450 MG/100ML
INJECTION, SOLUTION INTRAVENOUS ONCE
Status: COMPLETED | OUTPATIENT
Start: 2020-06-09 | End: 2020-06-09

## 2020-06-09 RX ORDER — DEXTROSE AND POTASSIUM CHLORIDE 5; .15 G/100ML; G/100ML
SOLUTION INTRAVENOUS CONTINUOUS
Status: DISCONTINUED | OUTPATIENT
Start: 2020-06-09 | End: 2020-06-09

## 2020-06-09 RX ORDER — POLYETHYLENE GLYCOL 3350 17 G/17G
17 POWDER, FOR SOLUTION ORAL DAILY PRN
Status: DISCONTINUED | OUTPATIENT
Start: 2020-06-09 | End: 2020-06-23 | Stop reason: HOSPADM

## 2020-06-09 RX ORDER — ONDANSETRON 2 MG/ML
4 INJECTION INTRAMUSCULAR; INTRAVENOUS EVERY 6 HOURS PRN
Status: DISCONTINUED | OUTPATIENT
Start: 2020-06-09 | End: 2020-06-23 | Stop reason: HOSPADM

## 2020-06-09 RX ORDER — ACETAMINOPHEN 650 MG/1
650 SUPPOSITORY RECTAL EVERY 6 HOURS PRN
Status: DISCONTINUED | OUTPATIENT
Start: 2020-06-09 | End: 2020-06-23 | Stop reason: HOSPADM

## 2020-06-09 RX ORDER — SODIUM CHLORIDE 0.9 % (FLUSH) 0.9 %
10 SYRINGE (ML) INJECTION PRN
Status: DISCONTINUED | OUTPATIENT
Start: 2020-06-09 | End: 2020-06-23 | Stop reason: HOSPADM

## 2020-06-09 RX ORDER — PROMETHAZINE HYDROCHLORIDE 25 MG/1
12.5 TABLET ORAL EVERY 6 HOURS PRN
Status: DISCONTINUED | OUTPATIENT
Start: 2020-06-09 | End: 2020-06-23 | Stop reason: HOSPADM

## 2020-06-09 RX ORDER — 0.9 % SODIUM CHLORIDE 0.9 %
500 INTRAVENOUS SOLUTION INTRAVENOUS ONCE
Status: COMPLETED | OUTPATIENT
Start: 2020-06-09 | End: 2020-06-09

## 2020-06-09 RX ADMIN — SODIUM CHLORIDE, PRESERVATIVE FREE 10 ML: 5 INJECTION INTRAVENOUS at 06:40

## 2020-06-09 RX ADMIN — LEVETIRACETAM 1000 MG: 100 INJECTION, SOLUTION INTRAVENOUS at 06:59

## 2020-06-09 RX ADMIN — LEVETIRACETAM 1000 MG: 100 INJECTION, SOLUTION INTRAVENOUS at 17:33

## 2020-06-09 RX ADMIN — Medication 10 ML: at 09:45

## 2020-06-09 RX ADMIN — SODIUM CHLORIDE, PRESERVATIVE FREE 10 ML: 5 INJECTION INTRAVENOUS at 09:53

## 2020-06-09 RX ADMIN — SODIUM CHLORIDE 500 ML: 9 INJECTION, SOLUTION INTRAVENOUS at 06:40

## 2020-06-09 RX ADMIN — CARBIDOPA AND LEVODOPA 1 TABLET: 25; 100 TABLET ORAL at 09:44

## 2020-06-09 RX ADMIN — SODIUM CHLORIDE, PRESERVATIVE FREE 10 ML: 5 INJECTION INTRAVENOUS at 23:28

## 2020-06-09 RX ADMIN — SODIUM CHLORIDE: 4.5 INJECTION, SOLUTION INTRAVENOUS at 01:38

## 2020-06-09 RX ADMIN — DEXTROSE MONOHYDRATE: 50 INJECTION, SOLUTION INTRAVENOUS at 23:28

## 2020-06-09 RX ADMIN — POTASSIUM CHLORIDE AND DEXTROSE MONOHYDRATE 100 ML/HR: 150; 5 INJECTION, SOLUTION INTRAVENOUS at 10:34

## 2020-06-09 RX ADMIN — SODIUM CHLORIDE 500 ML: 4.5 INJECTION, SOLUTION INTRAVENOUS at 14:20

## 2020-06-09 NOTE — PROGRESS NOTES
Pt admitted earlier by night physician   Nephrology has already seen pt and continuing to monitor him on current floor. Would recommend higher level of care on discharge.    Chart reviewed and updated by nursing staff  D/w case management

## 2020-06-09 NOTE — CARE COORDINATION
SOCIAL WORK / DISCHARGE PLANNING:  COVID test pending today. Pt is MR, admitted from [address] [place] [address] group home on [address]. Radha spoke with pt's legal guardian, Alexis Escoto [phone]. She has been his guardian for approx 30 yrs. Sw did discuss with her concerns noted in ED and she was already aware of most of them as she had been present in ED for a period of time. The concern of home pt was transported via w/c with feet dragging she has already discussed with [facility]  [phone]. She states pt is non verbal, mobilizes mainly in a w/c but needs supervision due to impulsiveness. He has halter on chest in w/c. She states pt \"does what he wants, when he wants\". She states they have been very focused on getting pt to eat and drink more. She is not concerned of intentional abuse or neglect, she calls frequently, visits monthly. She has wondered if Robert Hernández may need higher level of care but has been concerned about his frequent moves in the last few years. He was at [facility] for many years but it closed and this is second group home. Pt attends workshop although has been on hold due to pandemic and Keisha Davis has recently verbalized to Cleo Tolbert,  [phone] that she is not ready for him to return. This is another concern she has of pt not returning to group home as she is positive they do not have COVID in the group home. Keisha Davis is questioning if pt's Depakote is affecting his dehyrdation, he has insomnia as well and they have been adjusting his psychotropics. Radha left voice message for [facility] grp home manager and request return call. Currently dc plan would potentially be to return to group home.                Electronically signed by STACIE Corado on 6/9/2020 at 11:54 AM

## 2020-06-09 NOTE — ED PROVIDER NOTES
61-year-old male dropped off in his wheelchair by caretakers from [facility]. Patient is nonverbal.  Caretakers dropped patient off and left without providing information beyond telling the triage nurse he has more unresponsive than normal.  Paperwork states history of intellectual disability, seizures, GI ulcers, Parkinson's, autism and constipation. Review of Systems   Unable to perform ROS: Patient nonverbal        Physical Exam  Constitutional:       Appearance: He is cachectic. He is ill-appearing. HENT:      Head: Normocephalic and atraumatic. Mouth/Throat:      Comments: Likely Candida to tongue  Eyes:      Pupils: Pupils are equal, round, and reactive to light. Neck:      Musculoskeletal: Neck rigidity present. Cardiovascular:      Rate and Rhythm: Tachycardia present. Heart sounds: No murmur. No friction rub. Pulmonary:      Effort: Tachypnea present. Breath sounds: Normal breath sounds. Abdominal:      General: There is no distension. Palpations: There is no mass. Tenderness: There is no abdominal tenderness. Comments: cachectic   Genitourinary:     Penis: Normal.    Musculoskeletal:         General: Swelling (Bilateral feet, mottled) present. Skin:     General: Skin is warm and dry. Comments: Patient with fresh abrasions to bilateral feet actively bleeding. Patient with wounds of various stages throughout body to shins, ecchymosis to upper chest near neck and left hip. Sacral pressure ulcers. Abrasions/ulcers to thoracic spine. Neurological:      Mental Status: He is lethargic. Comments: Opens eyes and occasionally moves limbs but otherwise unresponsive. contractures to arms and feet.   Severely kyphotic          Fresh abrasions to toes      Ecchymosis to sternum      Sacral ulcers      Procedures     MDM       --------------------------------------------- PAST HISTORY ---------------------------------------------  Past Medical History: has a past medical history of Acquired deformity of neck, Autism spectrum, Bilateral cataracts, Blepharochalasis, Cardiomegaly, Cataract of both eyes, Developmental delay, Gastritis, Hyperlipidemia, Kyphosis of thoracic region, Mental retardation, Obsessive compulsive disorder, Parkinsonism ([address] Seizures ([address] and Tourette disorder. Past Surgical History:  has a past surgical history that includes Toe amputation (Left). Social History:  reports that he has never smoked. He has never used smokeless tobacco. He reports that he does not drink alcohol or use drugs. Family History: family history is not on file. The patients home medications have been reviewed. Allergies: Patient has no known allergies.     -------------------------------------------------- RESULTS -------------------------------------------------  Labs:  Results for orders placed or performed during the hospital encounter of 06/08/20   Lactate, Sepsis   Result Value Ref Range    Lactic Acid, Sepsis 2.2 (H) 0.5 - 1.9 mmol/L   CBC Auto Differential   Result Value Ref Range    WBC 7.2 4.5 - 11.5 E9/L    RBC 4.94 3.80 - 5.80 E12/L    Hemoglobin 16.0 12.5 - 16.5 g/dL    Hematocrit 54.1 (H) 37.0 - 54.0 %    .5 (H) 80.0 - 99.9 fL    MCH 32.4 26.0 - 35.0 pg    MCHC 29.6 (L) 32.0 - 34.5 %    RDW 14.7 11.5 - 15.0 fL    Platelets 48 (L) 624 - 450 E9/L    MPV 13.3 (H) 7.0 - 12.0 fL    Neutrophils % 80.5 (H) 43.0 - 80.0 %    Immature Granulocytes % 0.7 0.0 - 5.0 %    Lymphocytes % 13.3 (L) 20.0 - 42.0 %    Monocytes % 5.3 2.0 - 12.0 %    Eosinophils % 0.1 0.0 - 6.0 %    Basophils % 0.1 0.0 - 2.0 %    Neutrophils Absolute 5.76 1.80 - 7.30 E9/L    Immature Granulocytes # 0.05 E9/L    Lymphocytes Absolute 0.95 (L) 1.50 - 4.00 E9/L    Monocytes Absolute 0.38 0.10 - 0.95 E9/L    Eosinophils Absolute 0.01 (L) 0.05 - 0.50 E9/L    Basophils Absolute 0.01 0.00 - 0.20 E9/L   Comprehensive Metabolic Panel   Result Value Ref Range    Sodium >180 (HH) 132 - 146 mmol/L    Potassium 3.6 3.5 - 5.0 mmol/L    Chloride >140 (HH) 98 - 107 mmol/L    CO2 24 22 - 29 mmol/L    Anion Gap 16 7 - 16 mmol/L    Glucose 193 (H) 74 - 99 mg/dL     (H) 8 - 23 mg/dL    CREATININE 3.0 (H) 0.7 - 1.2 mg/dL    GFR Non-African American 21 >=60 mL/min/1.73    GFR African American 25     Calcium 8.9 8.6 - 10.2 mg/dL    Total Protein 7.8 6.4 - 8.3 g/dL    Alb 3.8 3.5 - 5.2 g/dL    Total Bilirubin 0.3 0.0 - 1.2 mg/dL    Alkaline Phosphatase 90 40 - 129 U/L    ALT 31 0 - 40 U/L    AST 30 0 - 39 U/L   Urinalysis with Microscopic   Result Value Ref Range    Color, UA Yellow Straw/Yellow    Clarity, UA Clear Clear    Glucose, Ur Negative Negative mg/dL    Bilirubin Urine Negative Negative    Ketones, Urine Negative Negative mg/dL    Specific Gravity, UA >=1.030 1.005 - 1.030    Blood, Urine Negative Negative    pH, UA 5.0 5.0 - 9.0    Protein, UA TRACE Negative mg/dL    Urobilinogen, Urine 0.2 <2.0 E.U./dL    Nitrite, Urine Negative Negative    Leukocyte Esterase, Urine Negative Negative    Hyaline Casts, UA 3-5 (A) 0 - 2 /LPF    Coarse Casts, UA 0-2 0 - 2 /LPF    WBC, UA NONE 0 - 5 /HPF    RBC, UA 1-3 0 - 2 /HPF    Epithelial Cells, UA FEW /HPF    Bacteria, UA MODERATE (A) None Seen /HPF    Amorphous, UA FEW    Protime-INR   Result Value Ref Range    Protime 16.6 (H) 9.3 - 12.4 sec    INR 1.4    APTT   Result Value Ref Range    aPTT 28.9 24.5 - 35.1 sec   Troponin   Result Value Ref Range    Troponin 0.06 (H) 0.00 - 0.03 ng/mL   Brain Natriuretic Peptide   Result Value Ref Range    Pro-BNP 83 0 - 125 pg/mL   Carbamazepine level, total   Result Value Ref Range    Carbamazepine Lvl 8.9 4.0 - 10.0 mcg/mL    Carbamazepine Dose Unknown    Blood Gas, Arterial   Result Value Ref Range    Date Analyzed [id number]     Time Analyzed 5837     Source: Blood Arterial     pH, Blood Gas 7.317 (L) 7.350 - 7.450    PCO2 42.7 35.0 - 45.0 mmHg    PO2 76.8 75.0 - 100.0 mmHg    HCO3 21.4 (L) 22.0 - 26.0 mmol/L B.E. -4.6 (L) -3.0 - 3.0 mmol/L    O2 Sat 93.3 92.0 - 98.5 %    O2Hb 92.3 (L) 94.0 - 97.0 %    COHb 0.8 0.0 - 1.5 %    MetHb 0.3 0.0 - 1.5 %    O2 Content 19.5 mL/dL    HHb 6.6 (H) 0.0 - 5.0 %    tHb (est) 15.0 11.5 - 16.5 g/dL    Mode RA     Date Of Collection      Time Collected      Pt Temp 37.0 C     ID [id number]     Lab [id number]     Critical(s) Notified . No Critical Values    Platelet Confirmation   Result Value Ref Range    Platelet Confirmation CONFIRMED    Osmolality, Serum   Result Value Ref Range    Osmolality 465 (H) 285 - 310 mOsm/Kg   Creatinine, Random Urine   Result Value Ref Range    Creatinine, Ur 182 40 - 278 mg/dL   Urine electrolytes   Result Value Ref Range    Sodium, Ur 39 Not Established mmol/L    Potassium, Ur 73.2 Not Established mmol/L    Chloride <20 Not Established mmol/L   OSMOLALITY, URINE   Result Value Ref Range    Osmolality, Ur 742 300 - 900 mOsm/kg   Basic Metabolic Panel   Result Value Ref Range    Sodium >180 (HH) 132 - 146 mmol/L    Potassium 3.8 3.5 - 5.0 mmol/L    Chloride >140 (HH) 98 - 107 mmol/L    CO2 25 22 - 29 mmol/L    Anion Gap 15 7 - 16 mmol/L    Glucose 152 (H) 74 - 99 mg/dL     (H) 8 - 23 mg/dL    CREATININE 3.0 (H) 0.7 - 1.2 mg/dL    GFR Non-African American 21 >=60 mL/min/1.73    GFR African American 25     Calcium 8.0 (L) 8.6 - 10.2 mg/dL   POCT Glucose   Result Value Ref Range    Meter Glucose 165 (H) 74 - 99 mg/dL   EKG 12 Lead   Result Value Ref Range    Ventricular Rate 100 BPM    Atrial Rate 100 BPM    P-R Interval 120 ms    QRS Duration 80 ms    Q-T Interval 302 ms    QTc Calculation (Bazett) 389 ms    P Axis 33 degrees    R Axis 57 degrees    T Axis 52 degrees       Radiology:  CT Head WO Contrast   Final Result      No evidence for acute intracranial process. Cortical atrophy and chronic periventricular microangiopathy.       XR CHEST PORTABLE   Final Result      NO ACUTE CARDIOPULMONARY PROCESS             EKG:  This EKG is signed by emergency department physician. Rate: 100  Rhythm: Sinus  Interpretation: no acute changes  Comparison: normalized compared to previous 3/28/20    ------------------------- NURSING NOTES AND VITALS REVIEWED ---------------------------  Date / Time Roomed:  6/8/2020  9:58 PM  ED Bed Assignment:  09/09    The nursing notes within the ED encounter and vital signs as below have been reviewed. /67   Pulse 78   Temp 98.2 °F (36.8 °C) (Oral)   Resp 16   Ht 5' 5\" (1.651 m)   Wt 110 lb (49.9 kg)   BMI 18.30 kg/m²   Oxygen Saturation Interpretation: Normal        --------------------------------- ADDITIONAL PROVIDER NOTES ---------------------------------    ED Course as of Jun 09 0253 Mon Jun 08, 2020   2213 I called the on-call LPN, Bryan Taylor,  for [employer]. She reports she is not present take care of this patient but was called by his caretakers and told that he was less responsive than usual.  Her arrival patient was ill-appearing with the following vitals. 34, 120, 98/52 98.1 Unable to get a pulse ox. She called her supervisor suggesting that the patient be called [phone] due to his condition but they insisted that the caretakers would drive him. Caretakers do not give her information about how long he had been like this or any further information. This LPN does not know medical history and does not know his CODE STATUS she will call me back with further information    [MF]   [facility] - He is a full code. Guardian: [id number]  [phone]    [MF]   9263 Patient's guardian at bedside. Per [place] manager patient apparently had a fever on Wednesday of last week has been afebrile since then. She is unable to tell me when patient developed decreased responsiveness    [MF]   2300 Unable to get SPO2 reading on patient. ABG drawn from right radial artery is dark blue and sluggish.   Patient given IV fluids and placed on nonrebreather    [MF]   2318 O2Hb(!): 92.3 [MF]   2319 New thrombocytopenia Platelet Count(!): 48 [MF]      0006 Patient in severe hypernatremia and acute renal failure. Will recheck BMP now. Nephrology paged. Comprehensive Metabolic Panel(!!):    Sodium >180(!!)   Potassium 3.6   Chloride >140(!!)   CO2 24   Anion Gap 16   Glucose 193(!)   (!)   Creatinine 3.0(!)   GFR Non- 21   GFR African American 25   Calcium 8.9   Total Protein 7.8   Albumin 3.8   Bilirubin 0.3   Alk Phos 90   ALT 31   AST 30 [MF]   0022 Updated guardian on results. He is given 4 glasses of fluid daily per guardian. His PCP is Asiya Barajas. []   0118 Discussed case with Dr. Obey Rowell   Q 3  Na checks. 1/2 ns at 48     [MF]   0122 Discussed case with Dr. Mary Goodrich, he will admit patient. []      ED Course User Index  [] Tim Sheppard,              Medical Decision Makin old nonverbal male presents from group home for decreased responsiveness and abnormal vital signs. Patient is ill and cachectic appearing with diffuse bruises and wounds. Patient appears severely dehydrated and malnourished. Per nursing staff, patients' caretakers brought patient in his wheelchair with his feet dragging underneath him causing abrasions and bleeding to his toes, and then left without providing history beyond he is not acting himself. I have serious concerns about patient's caretakers. This was expressed to the managers at [facility] [employer] for better living as well as the admitting physicians. Recommend social work consult during day hours to further investigate. Patient immediately given 1/2 L IV fluids with improvement in vitals and responsiveness. We were initially unable to get an SPO2 reading but ABG showed a saturation of approximately 92% so he was placed on 2 L nasal cannula. I have reviewed interpreted the labs, which reveal a severe hyponatremia I think is likely hypovolemic as well as acute kidney injury.   Stat nephrology consult was obtained and patient started on half-normal saline per the recommendations. No infectious sources found at this time. Patient made for further evaluation and treatment. Counseling: The emergency provider has spoken with the patient and discussed todays results, in addition to providing specific details for the plan of care and counseling regarding the diagnosis and prognosis. Questions are answered at this time and they are agreeable with the plan.          --------------------------------- IMPRESSION AND DISPOSITION ---------------------------------    IMPRESSION  1. Hypernatremia    2. Acute renal failure, unspecified acute renal failure type ([address]    3. Dehydration        DISPOSITION  Disposition: Admit to telemetry  Patient condition is serious    New Prescriptions    No medications on file       NOTE: This report was transcribed using voice recognition software.  Every effort was made to ensure accuracy; however, inadvertent computerized transcription errors may be present           Brooks Klein DO  Resident  06/09/20 2038

## 2020-06-09 NOTE — FLOWSHEET NOTE
Initial npatient Wound Care    Admit Date: 6/8/2020  9:58 PM    Reason for consult:  Coccyx wounds  Significant history: severe MR, Tourette disorder, seizure, parkinsonism, OCD, kyphosis, neck deformity  Wound history:  POA  Findings: non verbal, not responsive    06/09/20 1739   Wound 06/09/20 Foot Left bunion area   Date First Assessed: 06/09/20   Present on Hospital Admission: Yes  Primary Wound Type: Pressure Injury  Location: Foot  Wound Location Orientation: Left  Wound Description (Comments): bunion area   Wound Image    Dressing Change Due 06/09/20   Wound Length (cm) 1 cm   Wound Width (cm) 1 cm   Wound Depth (cm) 0.1 cm   Wound Surface Area (cm^2) 1 cm^2   Wound Volume (cm^3) 0.1 cm^3   Wound Assessment Pink;Yellow   Drainage Amount None   Odor None   Fabby-wound Assessment Red   Wound 06/09/20 Sacrum Left   Date First Assessed/Time First Assessed: 06/09/20 1742   Present on Hospital Admission: Yes  Primary Wound Type: Pressure Injury  Location: Sacrum  Wound Location Orientation: Left   Wound Image    Wound Pressure Unstageable   Dressing Change Due 06/10/20   Wound Length (cm) 4 cm   Wound Width (cm) 4 cm   Wound Depth (cm)   (obs)   Wound Surface Area (cm^2) 16 cm^2   Wound Assessment Pink;Yellow  (2 yellow brown areas)   Drainage Amount Small   Drainage Description Serosanguinous   Odor None   Fabby-wound Assessment Pink   Wound 06/09/20 Coccyx   Date First Assessed/Time First Assessed: 06/09/20 1747   Present on Hospital Admission: Yes  Primary Wound Type: Pressure Injury  Location: Coccyx   Wound Image    Wound Pressure Unstageable   Dressing Change Due 06/10/20   Wound Length (cm) 2 cm   Wound Width (cm) 2 cm   Wound Depth (cm)   (obs)   Wound Surface Area (cm^2) 4 cm^2   Wound Assessment Pink;Yellow   Drainage Amount Scant   Drainage Description Serosanguinous   Odor None   Fabby-wound Assessment Pink   also has open area lateral to above coccyx area 1.4x1.4xo. 3. pink yellow    Rt hip red blanchable area approx 4x4. Patient tends to prefer that side  Impression:  unstageable coccyx and sacral pressure injuries POA  Wound bunion left POA  Interventions in place: SOS, lo air loss, mepilex to areas of sacrum and coccyx, egg crates  Dietician  Plan: continue above interventions  Wound gel opticel to sacral coccyx wounds  mepilex to left bunion wound  Preventive mepilex to rt hip  Rachel Timmons.  SHANELLE Pereira, Juan David 6/9/2020 5:51 PM

## 2020-06-09 NOTE — H&P
[facility] History and Physical      CHIEF COMPLAINT: Altered mental status    History of Present Illness: 32-year-old male with a history of severe MR resides in nursing home, seizure disorder and tremors. He is on Tegretol, Sinemet and Keppra for these. Recently admitted to the hospital and monitor for breakthrough tremor/seizure activity. Brought in from group home due to altered mental status. No further information provided. He was noted in the ED that he appeared neglected, reportedly found to have bruising, contaminated stool. Sodium was greater than 180 and he had a creatinine of 3 from baseline of 0.5. He was given IV fluid bolus with reported some improvement in his mentation. Nephrology was consulted recommended half-normal saline. Patient was admitted for further evaluation. When seen he was asleep and not readily arousable. Per RN the patient will grunt in response to stimulation. He is nonverbal at baseline. Informant(s) for H&P: Chart review    REVIEW OF SYSTEMS:  Unable to obtain as patient does not answer questions    PMH:  Past Medical History:   Diagnosis Date    Acquired deformity of neck     Autism spectrum     Bilateral cataracts     Blepharochalasis     Cardiomegaly     Cataract of both eyes     Developmental delay     Gastritis     Hyperlipidemia     Kyphosis of thoracic region     Mental retardation     SEVERE MR  NON VERBAL, NEEDS WHEELCHAIR FOR LONG DISTANCE    Obsessive compulsive disorder     Parkinsonism ([address]     Seizures (HCC)     Tourette disorder        Surgical History:  Past Surgical History:   Procedure Laterality Date    TOE AMPUTATION Left     2nd/3rd       Medications Prior to Admission:    Prior to Admission medications    Medication Sig Start Date End Date Taking?  Authorizing Provider   vitamin D (ERGOCALCIFEROL) 1.25 MG (04316 UT) CAPS capsule Take 50,000 Units by mouth every 30 days On the 14th of each month   Yes Historical Provider, MD   levETIRAcetam (KEPPRA) 500 MG tablet Take 2 tablets by mouth 2 times daily 3/31/20  Yes Santiago Glover MD   fluvoxaMINE (LUVOX CR) 150 MG CP24 extended release capsule Take 150 mg by mouth daily    Yes Historical Provider, MD   mirtazapine (REMERON) 30 MG tablet Take 30 mg by mouth nightly  2/12/20  Yes Historical Provider, MD   carbidopa-levodopa (SINEMET)  MG per tablet Twice a day at 0800 and 1600 3/24/20  Yes MARIJA Gomez - CNS   lactulose (CHRONULAC) 10 GM/15ML solution Take 30 g by mouth 2 times daily   Yes Historical Provider, MD   LORazepam (ATIVAN) 2 MG tablet Take 2 mg by mouth as needed for Anxiety (prior to appts). Yes Historical Provider, MD   melatonin 5 MG TABS tablet Take 5 mg by mouth nightly    Yes Historical Provider, MD   polyethylene glycol (MIRALAX) powder Take 17 g by mouth daily as needed    Yes Historical Provider, MD   metoprolol (TOPROL-XL) 25 MG XL tablet Take 25 mg by mouth nightly   Yes Historical Provider, MD   OLANZapine (ZYPREXA) 7.5 MG tablet Take 7.5 mg by mouth nightly    Yes Historical Provider, MD   omeprazole (PRILOSEC) 20 MG capsule Take 20 mg by mouth Daily    Yes Historical Provider, MD   carBAMazepine (TEGRETOL) 200 MG tablet Take 200 mg by mouth 2 times daily  6/5/15  Yes Historical Provider, MD   acetaminophen (TYLENOL) 325 MG tablet Take 650 mg by mouth every 4 hours as needed for Pain. Yes Historical Provider, MD   Bismuth Subsalicylate (PINK BISMUTH PO) Take 30 mLs by mouth as needed    Yes Historical Provider, MD   benztropine (COGENTIN) 1 MG tablet Take 1 mg by mouth 2 times daily.  TAKE AM OF OR WITH A LITTLE WATER 04/24/2015   Yes Historical Provider, MD   Loperamide HCl (IMODIUM PO) Take by mouth    Historical Provider, MD   magnesium citrate (CITROMA) SOLN Take 150 mLs by mouth as needed     Historical Provider, MD   Lactobacillus (ACIDOPHILUS) CAPS capsule Take 2 capsules by mouth 2 times daily    Historical Provider, MD   guaiFENesin-dextromethorphan (ROBITUSSIN DM) 100-10 MG/5ML syrup Take 10 mLs by mouth 4 times daily as needed for Cough     Historical Provider, MD       Allergies:    Patient has no known allergies. Social History:    reports that he has never smoked. He has never used smokeless tobacco. He reports that he does not drink alcohol or use drugs. Family History:   Unable to obtain as patient does not answer questions      PHYSICAL EXAM:  Vitals:  BP (!) 83/51   Pulse 73   Temp 97.4 °F (36.3 °C) (Temporal)   Resp 18   Ht 5' 5\" (1.651 m)   Wt 89 lb 12.8 oz (40.7 kg)   SpO2 99%   BMI 14.94 kg/m²   General Appearance: Cachectic. Not readily arousable when seen  Skin: warm and dry, turgor is diminished  Head: normocephalic and atraumatic  Eyes: Eyes were closed  Neck: neck supple and non tender without mass   Pulmonary/Chest: clear to auscultation bilaterally- no wheezes, rales or rhonchi, normal air movement, no respiratory distress  Cardiovascular: normal rate, normal S1 and S2 and no M/R/R  Abdomen: soft, non-tender, non-distended, normal bowel sounds, no masses or organomegaly  Extremities: no cyanosis, no clubbing and no edema  Neurologic: Limited by lack of cooperation        LABS:  Recent Labs     06/08/20  2254 06/09/20  0102   NA >180* >180*   K 3.6 3.8   CL >140* >140*   CO2 24 25   * 165*   CREATININE 3.0* 3.0*   GLUCOSE 193* 152*   CALCIUM 8.9 8.0*       Recent Labs     06/08/20  2254   WBC 7.2   RBC 4.94   HGB 16.0   HCT 54.1*   .5*   MCH 32.4   MCHC 29.6*   RDW 14.7   PLT 48*   MPV 13.3*       No results for input(s): POCGLU in the last 72 hours. Radiology:   CT Head WO Contrast   Final Result      No evidence for acute intracranial process. Cortical atrophy and chronic periventricular microangiopathy.       XR CHEST PORTABLE   Final Result      NO ACUTE CARDIOPULMONARY PROCESS             EKG: No acute normality    ASSESSMENT:      Active Problems: Hypernatremia  Acute kidney injury  Lactic acidosis  Metabolic encephalopathy  Thrombocytopenia  History of MR  History of seizures  History of tremors  Protein calorie malnutrition: Reportedly not a candidate for PEG tube and has prior decreased p.o. intake. Decubitus ulcers    PLAN:  Hypernatremia: Nephrology consulted in ED. Half-normal saline at 50 cc an hour. Precise sodium unavailable as it is too high to quantitate. Will trend sodium every 4 until it is within range of detection. Free water deficit at least 6 L. Has hypotension so we will pursue normal saline boluses. Repeat lactic acid. CRIS: From dehydration. May be ATN at this point. Trend    Metabolic encephalopathy: Likely due to above as well as possible component of uremia with  , trend with IV fluids. Nephrology on board. Monitor for any source of infection. History of seizures: On Tegretol and Keppra as an outpatient. Even on prior admission it was reported that he has difficulty with p.o. intake but is not a candidate for PEG tube. Switch Keppra to IV for now. Place seizure precautions. Thrombocytopenia: Unclear etiology. Repeat. Decubitus ulcers: Wound care    Code Status: Was full code during prior hospitalization  DVT prophylaxis: Hold pending eval of platelets      NOTE: This report was transcribed using voice recognition software. Every effort was made to ensure accuracy; however, inadvertent computerized transcription errors may be present.   Electronically signed by Prem Jama MD on 6/9/2020 at 5:32 AM

## 2020-06-09 NOTE — CONSULTS
Nephrology Consult Note    Patient's Name: Dina Peter  9:11 AM  6/9/2020    Nephrologist: Lyssa Banks MD    Reason for Consult:  CRIS, Hypernatremia, Hyperchloremia  Requesting Physician:  Yuriy Connelly MD    Chief Complaint: Altered mental status    History Obtained From: EMR     History of Present Ilness:    Dina Peter is a 78 y.o. male with Briefly Dina Peter is 81 y.o. male with history of developmental delay, seizure disorder on carbamazepine, tourette disorder, kyphosis of thoracic region, parkinsonism on benztropine, bilateral cataracts  is brought to the ED from his group home for altered mental status. All the history is obtained from EMR as there was no caregiver present at bedside, as per the nursing patient is able to eat and drink in the nursing home. There were signs of neglect noted on admission, labs on admission revealed a sodium of over 180, chloride of over 140, BUN of 172 with a creatinine of 3.0, lactic acid level of 2.2, he was hypotensive on admission with a blood pressure of 86/50. He received 1.5 L of normal saline bolus so far, he is lying in bed in fetal position, contractures of extremities noted, he is on supplemental oxygen at 3 L via nasal cannula. Past Medical History:   Diagnosis Date    Acquired deformity of neck     Autism spectrum     Bilateral cataracts     Blepharochalasis     Cardiomegaly     Cataract of both eyes     Developmental delay     Gastritis     Hyperlipidemia     Kyphosis of thoracic region     Mental retardation     SEVERE MR  NON VERBAL, NEEDS WHEELCHAIR FOR LONG DISTANCE    Obsessive compulsive disorder     Parkinsonism (HCC)     Seizures (HCC)     Tourette disorder        Past Surgical History:   Procedure Laterality Date    TOE AMPUTATION Left     2nd/3rd       History reviewed. No pertinent family history. reports that he has never smoked.  He has never used smokeless tobacco. He reports that he does not drink alcohol or use drugs. Allergies:  Patient has no known allergies.     Current Medications:    carbidopa-levodopa (SINEMET)  MG per tablet 1 tablet, BID  levETIRAcetam (KEPPRA) 1,000 mg in sodium chloride 0.9 % 100 mL IVPB, Q12H  sodium chloride flush 0.9 % injection 10 mL, 2 times per day  sodium chloride flush 0.9 % injection 10 mL, PRN  acetaminophen (TYLENOL) tablet 650 mg, Q6H PRN    Or  acetaminophen (TYLENOL) suppository 650 mg, Q6H PRN  polyethylene glycol (GLYCOLAX) packet 17 g, Daily PRN  promethazine (PHENERGAN) tablet 12.5 mg, Q6H PRN    Or  ondansetron (ZOFRAN) injection 4 mg, Q6H PRN  sodium chloride flush 0.9 % injection 10 mL, 2 times per day  sodium chloride flush 0.9 % injection 10 mL, PRN        Review of Systems:   Unable to obtain a 10 point review of system as the patient is altered and caregiver is not at bedside    Physical exam:   Constitutional:    Vitals: BP (!) 86/52   Pulse 73   Temp 97.4 °F (36.3 °C) (Temporal)   Resp 18   Ht 5' 5\" (1.651 m)   Wt 89 lb 12.8 oz (40.7 kg)   SpO2 99%   BMI 14.94 kg/m²      Elderly  male, appears in mild distress, disoriented, FiO2 requirement is at 3 L via nasal cannula  Skin: no rash, turgor wnl  Heent:  eomi, mmm  Neck: no bruits or jvd noted  Cardiovascular:  S1, S2 without m/r/g  Respiratory: CTA B without w/r/r, difficult exam, decreased breath sounds in bases  Abdomen:  +bs, soft, nt, nd, William catheter is in place draining clear urine  Ext: No lower extremity edema, contractures noted in all extremities  Psychiatric: Unable to assess    Data:   Labs:  CBC:   Lab Results   Component Value Date    WBC 6.5 06/09/2020    RBC 4.32 06/09/2020    HGB 14.1 06/09/2020    HCT 48.9 06/09/2020    .2 06/09/2020    MCH 32.6 06/09/2020    MCHC 28.8 06/09/2020    RDW 14.7 06/09/2020    PLT 42 06/09/2020    MPV 12.3 06/09/2020     CMP:    Lab Results   Component Value Date    NA >180 06/09/2020    K 3.4 06/09/2020    K 3.4 06/09/2020 K 3.9 03/30/2020    CL >140 06/09/2020    CO2 24 06/09/2020    CO2 24 06/09/2020     06/09/2020     06/09/2020    CREATININE 2.7 06/09/2020    CREATININE 2.7 06/09/2020    GFRAA 29 06/09/2020    GFRAA 29 06/09/2020    LABGLOM 24 06/09/2020    LABGLOM 24 06/09/2020    GLUCOSE 118 06/09/2020    GLUCOSE 118 06/09/2020    GLUCOSE 71 05/18/2012    PROT 7.8 06/08/2020    LABALBU 3.3 06/09/2020    LABALBU 4.5 05/18/2012    CALCIUM 7.8 06/09/2020    CALCIUM 7.8 06/09/2020    BILITOT 0.3 06/08/2020    ALKPHOS 90 06/08/2020    AST 30 06/08/2020    ALT 31 06/08/2020     BMP:    Lab Results   Component Value Date    NA >180 06/09/2020    K 3.4 06/09/2020    K 3.4 06/09/2020    K 3.9 03/30/2020    CL >140 06/09/2020    CO2 24 06/09/2020    CO2 24 06/09/2020     06/09/2020     06/09/2020    LABALBU 3.3 06/09/2020    LABALBU 4.5 05/18/2012    CREATININE 2.7 06/09/2020    CREATININE 2.7 06/09/2020    CALCIUM 7.8 06/09/2020    CALCIUM 7.8 06/09/2020    GFRAA 29 06/09/2020    GFRAA 29 06/09/2020    LABGLOM 24 06/09/2020    LABGLOM 24 06/09/2020    GLUCOSE 118 06/09/2020    GLUCOSE 118 06/09/2020    GLUCOSE 71 05/18/2012     Calcium:    Lab Results   Component Value Date    CALCIUM 7.8 06/09/2020    CALCIUM 7.8 06/09/2020     Phosphorus:    Lab Results   Component Value Date    PHOS 4.5 06/09/2020     Uric Acid:  No results found for: URICACID  U/A:    Lab Results   Component Value Date    NITRU Negative 06/08/2020    COLORU Yellow 06/08/2020    PHUR 5.0 06/08/2020    WBCUA NONE 06/08/2020    RBCUA 1-3 06/08/2020    RBCUA NONE 10/08/2012    BACTERIA MODERATE 06/08/2020    CLARITYU Clear 06/08/2020    SPECGRAV >=1.030 06/08/2020    LEUKOCYTESUR Negative 06/08/2020    UROBILINOGEN 0.2 06/08/2020    BILIRUBINUR Negative 06/08/2020    BILIRUBINUR NEGATIVE 09/18/2011    BLOODU Negative 06/08/2020    GLUCOSEU Negative 06/08/2020    GLUCOSEU NEGATIVE 09/18/2011    KETUA Negative 06/08/2020    AMORPHOUS FEW 2020    Results for Amber Zamora (MRN [id number]) as of 2020 09:11   Ref. Range 2020 22:54   Chloride Latest Ref Range: Not Established mmol/L <20   Creatinine, Ur Latest Ref Range: 40 - 278 mg/dL 182   Osmolality, Ur Latest Ref Range: 300 - 900 mOsm/kg 742   Potassium, Ur Latest Ref Range: Not Established mmol/L 73.2   Sodium, Ur Latest Ref Range: Not Established mmol/L 39       Imaging:  Ct Head Wo Contrast    Result Date: 2020  Clinical indications: Altered mental status. COMPARISON: 2020. 2020 Exposure control: This examination and all examinations utilizing ionizing radiation at this facility done so according to the ALARA (as low as reasonably achievable) principal for radiation dose reduction. Technique: Axial, sagittal and coronal computed tomography of the brain and calvarium was performed without contrast. FINDINGS: There is no evidence for acute intracranial hemorrhage, midline shift or mass effect. There is enlargement of the ventricles, sulci and cisterns bilaterally. There is patchy hypoattenuation in the periventricular deep white matter bilaterally. There are calcifications within the carotid siphons and vertebrobasilar arterial systems. Mucosal thickening within the paranasal sinuses but no fluid levels are evident. Otherwise the brain parenchyma, CSF spaces, paranasal sinuses and mastoid air cells and surrounding soft tissue and osseous structures have a satisfactory appearance. The gray-white matter junction is otherwise preserved. The cerebellopontine angle and cisterns are unremarkable. The bony calvarium is negative for acute fracture or aggressive process. The radha and brainstem are unremarkable. No evidence for acute intracranial process. Cortical atrophy and chronic periventricular microangiopathy.     Xr Chest Portable    Result Date: 2020  Patient MRN: [id number] : 1950 Age:  78 years Gender: Male Order Date: 2020 10:15 PM Exam: XR CHEST PORTABLE Number of Images: 1 view Indication:   possible sepsis possible sepsis Comparison: None. Findings: The lungs are clear. There is no evidence of pulmonary infiltrate or pleural effusion. The pulmonary vascularity is unremarkable. The cardiac, hilar and mediastinal silhouettes are satisfactory. There is uncoiling atherosclerotic change of thoracic ureter. There is a moderate-sized hiatal hernia. The bony thorax demonstrates osteopenia. NO ACUTE CARDIOPULMONARY PROCESS       Assessment  1. Severe hypernatremia secondary to poor p.o. intake and no access to free water, total calculated free water deficit is 6 L, goal of correction is 8 to 10 mEq in a 24-hour. 2. Acute kidney injury secondary to severe prerenal azotemia from volume depletion, severe hypotension  3. Hyperchloremia secondary to volume depletion  4. Hypokalemia, dietary from poor potassium intake  5. Parkinson's disease on Sinemet  6. History of seizure activity with breakthrough seizures    Plan  1. He received 1.5 L of normal saline so far, sodium is still over 180, start IV D5 water with 20 mEq of potassium chloride at 100 mL/h  2. Check BMP every 4 hours, nurse to call if the sodium is less than 170 or greater than 178  3. Urine electrolytes reviewed and urine osmolality is over 700 suggesting adequate ADH activity ruling out diabetes insipidus contributing to hypernatremia  4. He has evidence of recurrent hypernatremia, overall looks very deconditioned, he may need a PEG tube for adequate nutrition and water intake  5.  Obtain renal ultrasound to rule out obstruction  Discussed with the RN, discussed with the ER admitting physician    Thank you Dr. Reji Mota for allowing us to participate in care of Elsy Hugo           Electronically signed by Josephine Mabry MD on 6/9/2020 at 9:11 AM

## 2020-06-09 NOTE — ED NOTES
Pt has 3 small open areas to coccyx, with large reddened area. Bruising noted to chest. Right elbow red. Multiple open bloody areas noted to bilat toes. Multiple bruises and redness noted to bilat legs/knees. Pts arms and legs are contracted.      Lisandra Kennedy, RN  06/09/20 902 [address] [place] [address], RN  06/09/20 0489

## 2020-06-09 NOTE — PROGRESS NOTES
Spoke to Dr. Choco Gu. Patient just had nasogastric tube placement for tube feeding and free water. Patient immediately attempts to remove NGT despite verbal redirection, repositioning, and 2 staff members at the bedside. Patient is not redirectable. Order received for bilateral soft wrist restraints at this time.

## 2020-06-10 ENCOUNTER — APPOINTMENT (OUTPATIENT)
Dept: GENERAL RADIOLOGY | Age: 70
DRG: 682 | End: 2020-06-10
Payer: MEDICARE

## 2020-06-10 PROBLEM — E43 SEVERE PROTEIN-CALORIE MALNUTRITION (HCC): Chronic | Status: ACTIVE | Noted: 2020-06-10

## 2020-06-10 LAB
ANION GAP SERPL CALCULATED.3IONS-SCNC: 10 MMOL/L (ref 7–16)
ANION GAP SERPL CALCULATED.3IONS-SCNC: 12 MMOL/L (ref 7–16)
ANION GAP SERPL CALCULATED.3IONS-SCNC: 15 MMOL/L (ref 7–16)
ANION GAP SERPL CALCULATED.3IONS-SCNC: 15 MMOL/L (ref 7–16)
ANION GAP SERPL CALCULATED.3IONS-SCNC: 16 MMOL/L (ref 7–16)
ANION GAP SERPL CALCULATED.3IONS-SCNC: 16 MMOL/L (ref 7–16)
BUN BLDV-MCNC: 101 MG/DL (ref 8–23)
BUN BLDV-MCNC: 108 MG/DL (ref 8–23)
BUN BLDV-MCNC: 118 MG/DL (ref 8–23)
BUN BLDV-MCNC: 71 MG/DL (ref 8–23)
BUN BLDV-MCNC: 81 MG/DL (ref 8–23)
BUN BLDV-MCNC: 90 MG/DL (ref 8–23)
CALCIUM SERPL-MCNC: 7.5 MG/DL (ref 8.6–10.2)
CALCIUM SERPL-MCNC: 7.6 MG/DL (ref 8.6–10.2)
CALCIUM SERPL-MCNC: 7.8 MG/DL (ref 8.6–10.2)
CALCIUM SERPL-MCNC: 7.9 MG/DL (ref 8.6–10.2)
CALCIUM SERPL-MCNC: 7.9 MG/DL (ref 8.6–10.2)
CALCIUM SERPL-MCNC: 8 MG/DL (ref 8.6–10.2)
CHLORIDE BLD-SCNC: >140 MMOL/L (ref 98–107)
CHLORIDE URINE RANDOM: 30 MMOL/L
CO2: 23 MMOL/L (ref 22–29)
CO2: 23 MMOL/L (ref 22–29)
CO2: 24 MMOL/L (ref 22–29)
CO2: 24 MMOL/L (ref 22–29)
CO2: 25 MMOL/L (ref 22–29)
CO2: 25 MMOL/L (ref 22–29)
CREAT SERPL-MCNC: 1.3 MG/DL (ref 0.7–1.2)
CREAT SERPL-MCNC: 1.3 MG/DL (ref 0.7–1.2)
CREAT SERPL-MCNC: 1.4 MG/DL (ref 0.7–1.2)
CREAT SERPL-MCNC: 1.6 MG/DL (ref 0.7–1.2)
CREAT SERPL-MCNC: 1.6 MG/DL (ref 0.7–1.2)
CREAT SERPL-MCNC: 1.8 MG/DL (ref 0.7–1.2)
CREATININE URINE: 64 MG/DL (ref 40–278)
GFR AFRICAN AMERICAN: 46
GFR AFRICAN AMERICAN: 52
GFR AFRICAN AMERICAN: 52
GFR AFRICAN AMERICAN: >60
GFR NON-AFRICAN AMERICAN: 38 ML/MIN/1.73
GFR NON-AFRICAN AMERICAN: 43 ML/MIN/1.73
GFR NON-AFRICAN AMERICAN: 43 ML/MIN/1.73
GFR NON-AFRICAN AMERICAN: 50 ML/MIN/1.73
GFR NON-AFRICAN AMERICAN: 55 ML/MIN/1.73
GFR NON-AFRICAN AMERICAN: 55 ML/MIN/1.73
GLUCOSE BLD-MCNC: 102 MG/DL (ref 74–99)
GLUCOSE BLD-MCNC: 138 MG/DL (ref 74–99)
GLUCOSE BLD-MCNC: 166 MG/DL (ref 74–99)
GLUCOSE BLD-MCNC: 172 MG/DL (ref 74–99)
GLUCOSE BLD-MCNC: 185 MG/DL (ref 74–99)
GLUCOSE BLD-MCNC: 198 MG/DL (ref 74–99)
HCT VFR BLD CALC: 43.6 % (ref 37–54)
HEMOGLOBIN: 12.6 G/DL (ref 12.5–16.5)
KEPPRA: 45 UG/ML (ref 12–46)
MCH RBC QN AUTO: 32.5 PG (ref 26–35)
MCHC RBC AUTO-ENTMCNC: 28.9 % (ref 32–34.5)
MCV RBC AUTO: 112.4 FL (ref 80–99.9)
OSMOLALITY URINE: 845 MOSM/KG (ref 300–900)
OSMOLALITY: 398 MOSM/KG (ref 285–310)
PDW BLD-RTO: 14.6 FL (ref 11.5–15)
PLATELET # BLD: 36 E9/L (ref 130–450)
PLATELET CONFIRMATION: NORMAL
PMV BLD AUTO: 13.4 FL (ref 7–12)
POTASSIUM SERPL-SCNC: 3.1 MMOL/L (ref 3.5–5)
POTASSIUM SERPL-SCNC: 3.3 MMOL/L (ref 3.5–5)
POTASSIUM SERPL-SCNC: 3.5 MMOL/L (ref 3.5–5)
POTASSIUM SERPL-SCNC: 3.6 MMOL/L (ref 3.5–5)
POTASSIUM, UR: 28.6 MMOL/L
RBC # BLD: 3.88 E12/L (ref 3.8–5.8)
SODIUM BLD-SCNC: 175 MMOL/L (ref 132–146)
SODIUM BLD-SCNC: 175 MMOL/L (ref 132–146)
SODIUM BLD-SCNC: 178 MMOL/L (ref 132–146)
SODIUM BLD-SCNC: >180 MMOL/L (ref 132–146)
SODIUM URINE: 29 MMOL/L
WBC # BLD: 5.9 E9/L (ref 4.5–11.5)

## 2020-06-10 PROCEDURE — 73620 X-RAY EXAM OF FOOT: CPT

## 2020-06-10 PROCEDURE — 85027 COMPLETE CBC AUTOMATED: CPT

## 2020-06-10 PROCEDURE — 74018 RADEX ABDOMEN 1 VIEW: CPT

## 2020-06-10 PROCEDURE — 6370000000 HC RX 637 (ALT 250 FOR IP): Performed by: NURSE PRACTITIONER

## 2020-06-10 PROCEDURE — 2060000000 HC ICU INTERMEDIATE R&B

## 2020-06-10 PROCEDURE — 82436 ASSAY OF URINE CHLORIDE: CPT

## 2020-06-10 PROCEDURE — 71045 X-RAY EXAM CHEST 1 VIEW: CPT

## 2020-06-10 PROCEDURE — 99232 SBSQ HOSP IP/OBS MODERATE 35: CPT | Performed by: INTERNAL MEDICINE

## 2020-06-10 PROCEDURE — 83930 ASSAY OF BLOOD OSMOLALITY: CPT

## 2020-06-10 PROCEDURE — 80048 BASIC METABOLIC PNL TOTAL CA: CPT

## 2020-06-10 PROCEDURE — APPSS30 APP SPLIT SHARED TIME 16-30 MINUTES: Performed by: PHYSICIAN ASSISTANT

## 2020-06-10 PROCEDURE — 82570 ASSAY OF URINE CREATININE: CPT

## 2020-06-10 PROCEDURE — 2580000003 HC RX 258: Performed by: INTERNAL MEDICINE

## 2020-06-10 PROCEDURE — 84133 ASSAY OF URINE POTASSIUM: CPT

## 2020-06-10 PROCEDURE — 84300 ASSAY OF URINE SODIUM: CPT

## 2020-06-10 PROCEDURE — 83935 ASSAY OF URINE OSMOLALITY: CPT

## 2020-06-10 PROCEDURE — 6360000002 HC RX W HCPCS: Performed by: INTERNAL MEDICINE

## 2020-06-10 PROCEDURE — 36415 COLL VENOUS BLD VENIPUNCTURE: CPT

## 2020-06-10 PROCEDURE — 6370000000 HC RX 637 (ALT 250 FOR IP): Performed by: INTERNAL MEDICINE

## 2020-06-10 RX ORDER — OXYMETAZOLINE HYDROCHLORIDE 0.05 G/100ML
2 SPRAY NASAL ONCE
Status: COMPLETED | OUTPATIENT
Start: 2020-06-10 | End: 2020-06-10

## 2020-06-10 RX ORDER — LIDOCAINE HYDROCHLORIDE 20 MG/ML
JELLY TOPICAL ONCE
Status: COMPLETED | OUTPATIENT
Start: 2020-06-10 | End: 2020-06-10

## 2020-06-10 RX ADMIN — DEXTROSE MONOHYDRATE: 50 INJECTION, SOLUTION INTRAVENOUS at 06:22

## 2020-06-10 RX ADMIN — DEXTROSE MONOHYDRATE: 50 INJECTION, SOLUTION INTRAVENOUS at 13:08

## 2020-06-10 RX ADMIN — LIDOCAINE HYDROCHLORIDE: 20 JELLY TOPICAL at 11:11

## 2020-06-10 RX ADMIN — LEVETIRACETAM 1000 MG: 100 INJECTION, SOLUTION INTRAVENOUS at 06:23

## 2020-06-10 RX ADMIN — CARBIDOPA AND LEVODOPA 1 TABLET: 25; 100 TABLET ORAL at 22:08

## 2020-06-10 RX ADMIN — Medication 2 SPRAY: at 11:11

## 2020-06-10 RX ADMIN — LEVETIRACETAM 1000 MG: 100 INJECTION, SOLUTION INTRAVENOUS at 18:53

## 2020-06-10 ASSESSMENT — PAIN SCALES - WONG BAKER
WONGBAKER_NUMERICALRESPONSE: 10
WONGBAKER_NUMERICALRESPONSE: 0

## 2020-06-10 NOTE — PROGRESS NOTES
Notified Dr. Diane Cristina of critical labs and NGT finally being placed and TF with free water flushes started, continue to monitor.

## 2020-06-10 NOTE — PROGRESS NOTES
Paged Dr. Wolfgang Michaels, on call for Dr. Favio Schimdt regarding critical labs and NGT being placed and then removed by patient with unsuccessful attempts to replace it. New orders received.

## 2020-06-10 NOTE — PROGRESS NOTES
Nephrology Progress Note      Events Reviewed. Seen an examined at bedside. Nursing placing new NG as patient has pulled it out again despite being restrained. TF currently on hold until new Ng is placed. Now has a sitter. Subjective:   We are following Mr. Pat Leija for CRIS, hypernatremia, and hyperchloremia    Physical exam:     Vitals: BP (!) 87/56   Pulse 57   Temp 97.5 °F (36.4 °C) (Oral)   Resp 16   Ht 5' 5\" (1.651 m)   Wt 92 lb (41.7 kg)   SpO2 95%   BMI 15.31 kg/m²      Constitutional:  Elderly  male, appears in mild distress, disoriented, FiO2 requirement is at 3 L via nasal cannula  Skin: no rash, turgor wnl  Heent:  eomi, mmm  Neck: no bruits or jvd noted  Cardiovascular:  S1, S2 without m/r/g  Respiratory: CTA B without w/r/r, difficult exam, decreased breath sounds in bases  Abdomen:  +bs, soft, nt, nd, William catheter is in place draining clear urine  Ext: No lower extremity edema, contractures noted in all extremities  Psychiatric: Unable to assess    Data:   Labs:  CBC:   Lab Results   Component Value Date    WBC 5.9 06/10/2020    RBC 3.88 06/10/2020    HGB 12.6 06/10/2020    HCT 43.6 06/10/2020    .4 06/10/2020    MCH 32.5 06/10/2020    MCHC 28.9 06/10/2020    RDW 14.6 06/10/2020    PLT 36 06/10/2020    MPV 13.4 06/10/2020     CMP:    Lab Results   Component Value Date    NA >180 06/10/2020    K 3.5 06/10/2020    K 3.9 03/30/2020    CL >140 06/10/2020    CO2 24 06/10/2020     06/10/2020    CREATININE 1.6 06/10/2020    GFRAA 52 06/10/2020    LABGLOM 43 06/10/2020    GLUCOSE 172 06/10/2020    GLUCOSE 71 05/18/2012    PROT 7.8 06/08/2020    LABALBU 3.3 06/09/2020    LABALBU 4.5 05/18/2012    CALCIUM 7.9 06/10/2020    BILITOT 0.3 06/08/2020    ALKPHOS 90 06/08/2020    AST 30 06/08/2020    ALT 31 06/08/2020     BMP:    Lab Results   Component Value Date    NA >180 06/10/2020    K 3.5 06/10/2020    K 3.9 03/30/2020    CL >140 06/10/2020    CO2 24 06/10/2020     06/10/2020 enlargement of the ventricles, sulci and cisterns bilaterally. There is patchy hypoattenuation in the periventricular deep white matter bilaterally. There are calcifications within the carotid siphons and vertebrobasilar arterial systems. Mucosal thickening within the paranasal sinuses but no fluid levels are evident. Otherwise the brain parenchyma, CSF spaces, paranasal sinuses and mastoid air cells and surrounding soft tissue and osseous structures have a satisfactory appearance. The gray-white matter junction is otherwise preserved. The cerebellopontine angle and cisterns are unremarkable. The bony calvarium is negative for acute fracture or aggressive process. The radha and brainstem are unremarkable. No evidence for acute intracranial process. Cortical atrophy and chronic periventricular microangiopathy. Xr Chest Portable    Result Date: 2020  Patient MRN: [id number] : 1950 Age:  78 years Gender: Male Order Date: 2020 10:15 PM Exam: XR CHEST PORTABLE Number of Images: 1 view Indication:   possible sepsis possible sepsis Comparison: None. Findings: The lungs are clear. There is no evidence of pulmonary infiltrate or pleural effusion. The pulmonary vascularity is unremarkable. The cardiac, hilar and mediastinal silhouettes are satisfactory. There is uncoiling atherosclerotic change of thoracic ureter. There is a moderate-sized hiatal hernia. The bony thorax demonstrates osteopenia. NO ACUTE CARDIOPULMONARY PROCESS     US Retroperitoneal 20  The right kidney measures9.4 x 4.6 x 3.6 cm, and the left   kidney measures 9.1 x 5.3 x 4.3 cm.     1. Renal cortical echogenicity suggestive spectrum of medical renal   disease bilaterally, without evidence of obstruction on either side. 2. The bladder wall is thickened, without intraluminal debris. This   can be a manifestation of cystitis or hypertrophic change         Assessment  1.  Severe hypernatremia secondary to poor p.o. intake and no

## 2020-06-10 NOTE — PROGRESS NOTES
[facility]   Progress Note    Admitting Date and Time: 6/8/2020  9:58 PM  Admit Dx: Hypernatremia [E87.0]  Hypernatremia [E87.0]  Hypernatremia [E87.0]    Subjective:    Patient was admitted with Hypernatremia [E87.0]  Hypernatremia [E87.0]  Hypernatremia [E87.0]. Patient lying in bed is nonverbal.  Patient appears comfortable at this time. Sitter at bedside. Per RN: No acute events overnight. Patient did pull out NG tube. Now placing Dobbhoff tube    ROS: Not obtained secondary to nonverbal     carbidopa-levodopa  1 tablet Oral BID    levetiracetam  1,000 mg Intravenous Q12H    sodium chloride flush  10 mL Intravenous 2 times per day    sodium chloride flush  10 mL Intravenous 2 times per day     sodium chloride flush, 10 mL, PRN  acetaminophen, 650 mg, Q6H PRN    Or  acetaminophen, 650 mg, Q6H PRN  polyethylene glycol, 17 g, Daily PRN  promethazine, 12.5 mg, Q6H PRN    Or  ondansetron, 4 mg, Q6H PRN  sodium chloride flush, 10 mL, PRN         Objective:    /65   Pulse 63   Temp 97.5 °F (36.4 °C) (Axillary)   Resp 16   Ht 5' 5\" (1.651 m)   Wt 92 lb (41.7 kg)   SpO2 100%   BMI 15.31 kg/m²   General Appearance: in no acute distress, alert, frail-appearing and cachectic  Skin: warm and dry, no rash or erythema  Pulmonary/Chest: clear to auscultation bilaterally- no wheezes, rales or rhonchi, normal air movement, no respiratory distress  Cardiovascular: normal rate, regular rhythm, normal S1 and S2, no murmurs, no gallops and no JVD  Abdomen: soft, non-tender, non-distended, normal bowel sounds, no masses or organomegaly  Extremities: no cyanosis, no clubbing and multiple areas of erythema on bilateral lower extremities. Bilateral feet with some edema as well as erythema.       Recent Labs     06/10/20  0555 06/10/20  0953 06/10/20  1409   NA >180* >180* 178*   K 3.5 3.1* 3.5   CL >140* >140* >140*   CO2 24 24 23   * 101* 90*   CREATININE 1.6* 1.6* 1.4* GLUCOSE 172* 198* 138*   CALCIUM 7.9* 7.5* 7.8*       Recent Labs     06/08/20  2254 06/09/20  0815 06/10/20  0555   WBC 7.2 6.5 5.9   RBC 4.94 4.32 3.88   HGB 16.0 14.1 12.6   HCT 54.1* 48.9 43.6   .5* 113.2* 112.4*   MCH 32.4 32.6 32.5   MCHC 29.6* 28.8* 28.9*   RDW 14.7 14.7 14.6   PLT 48* 42* 36*   MPV 13.3* 12.3* 13.4*       Radiology:   XR Chest Abdomen Ng Placement   Final Result      XR Chest Abdomen Ng Placement   Final Result   The weighted feeding tube tip is in the epigastric region, directed up   into the right, presumably in the body the stomach, although the   inferior margin of the small hiatus hernia is not as well seen. Advancing the tube 3 to 4 cm should determine whether the tip is in   the stomach or the inferior margin of the hiatus hernia. Subtle density in the right lower lung may be artifact related to   expiratory imaging or could represent subtle interstitial disease   without effusion. Otherwise unremarkable. Conchetta Peaks ALERT:  THIS IS AN ABNORMAL REPORT-tube position is equivocal, likely   in the stomach, but the weighted portion could be in the inferior   margin of the hiatus hernia. Advancing the tube 4 5 cm with advanced   the weighted portion into the stomach if it is in the hiatus hernia,   and would confirm a distal gastric position if it is in the stomach. XR Chest Abdomen Ng Placement   Final Result   Nasogastric tube appears to be malpositioned, with the tip likely in   the right lower lobe bronchus. Findings were conveyed to patient's   nurse, Ms. Juline Brittle, RN 10:24  a.m. on 6/20/2020. She was aware   of the findings and had removed the NG tube. XR ABDOMEN FOR NG/OG/NE TUBE PLACEMENT   Final Result   Feeding tube is traceable to the stomach. This report has been electronically signed by Isatu Lim MD.      XR ABDOMEN FOR NG/OG/NE TUBE PLACEMENT   Final Result   Feeding tube is coiled, presumably within a hiatal hernia.  Consider retraction    by approximately 8 cm with slight readvance . This report has been electronically signed by Lili Meeks MD.      XR ABDOMEN FOR NG/OG/NE TUBE PLACEMENT   Final Result      Nasogastric tube follows the expected contours of the esophagus with   distal tip overlying the air in the gastric lumen and distal sideholes   overlying the esophagogastric junction. US RETROPERITONEAL COMPLETE   Final Result      1. Renal cortical echogenicity suggestive spectrum of medical renal   disease bilaterally, without evidence of obstruction on either side. 2. The bladder wall is thickened, without intraluminal debris. This   can be a manifestation of cystitis or hypertrophic change. CT Head WO Contrast   Final Result      No evidence for acute intracranial process. Cortical atrophy and chronic periventricular microangiopathy. XR CHEST PORTABLE   Final Result      NO ACUTE CARDIOPULMONARY PROCESS         XR FOOT LEFT (2 VIEWS)    (Results Pending)   XR FOOT RIGHT (2 VIEWS)    (Results Pending)       Assessment:    Active Problems:    Hypernatremia    Severe protein-calorie malnutrition (HCC)  Resolved Problems:    * No resolved hospital problems. *      Plan:  1. Hypernatremia  - Sodium still > 180 on labs,  Nephrology consulted, Urine studies reportedly ruled out DI   - Renal US per nephrology    2. CRIS  -Nephrology following, appreciate their recommendations. 3. Metabolic encephalopathy  -Unknown baseline, continue to monitor. 4. Hypokalemia   - K 3.1, appreciate nephrology replacement     5. H/o Seizure Disorder  -No seizure activity noted at this time. 6. Protein Calorie Malnutrition   - NG tube not positioned correctly on imaging, this was removed and Dobbhoff tube was placed    7. Thrombocytopenia  -Platelets are 36, down from 42. Will discuss with Dr. Chanel Rodriguez    8. Decubitus Ulcer   - Wound care to follow    9. Hypotension    - Intermittent, seems to be improving with fluids.  Will continue to monitor         Electronically signed by Stan Fletcher PA-C on 6/10/2020 at 3:16 PM   HOSPITALIST ATTENDING PHYSICIAN NOTE 6/10/2020 1954PM:    Details of the evaluation - subjective assessment (including medication profile, past medical, family and social history when applicable), examination, review of lab and test data, diagnostic impressions and medical decision making - performed by Stan Fletcher PA-C, were discussed with me on the date of service and I agree with clinical information herein unless otherwise noted. The patient has been evaluated by me personally earlier today. Pt reports no fevers, chills,n/v     Exam: heart reg at rate of 64,lungs cta, abd pos bs soft nt, ext neg for le edema     I agree with the assessment and plan of Azalia Lyles PA-C    Hypernatremia  cristina  Hypokalemia  dehydration  Severe protein calorie malnutrition  Thrombocytopenia  Elevated lactic acid level      Electronically signed by Daisy Mckeon D.O.   Hospitalist  [facility] [place] [address]

## 2020-06-10 NOTE — PLAN OF CARE
Problem: Malnutrition  (NI-5.2)  Goal: Food and/or Nutrient Delivery  Pt will tolerate nutrition progression  Description: Individualized approach for food/nutrient provision.   Outcome: Ongoing

## 2020-06-10 NOTE — CARE COORDINATION
SOCIAL WORK / DISCHARGE PLANNING:  COVID negative 6/9. Mora spoke with pt's legal guardian, Elmira Maldonado via phone today. Discussed that physicians are recommending higher level of care at discharge. She in not completely opposed to this if unable to return to [facility] but has discussed with Belén Parker and they are seeking transfer to one of their medical group homes that has 24 hr nursing on site. Mora did speak with Aurora about other level of cares such as LTAC, she is very open to this if he meets criteria. She choses [facility] as she is very pleased with this campus. Sw made referral to Shira Sale, [facility] to review if has criteria. Pt currently to have ng but pt pulled out, has soft restraints. MORA did discuss SNF if needed even short term at OR if medical group home is not available. Aurora had positive experience with another client at [facility] but again remains concerned about COVID. Mora did make referral to Fidelia Patel [facility] to follow but [facility] is not accepting pt until at least 6/17, may need to discuss alternate choice. Mora did speak with Belén Parker for [facility] [phone] [phone]. Sw discussed concerns with her about pt's admitting condition, she does state that investigation is in place but states that JOHNSON wishes for pt's to be in least restrictive environments and staff not always nurses give meds etc. If pt does not wish to eat , she says its is evident pt will only do what he wants and we can not restrain etc. She says the chances are unlikely that bed will be available in group home with nursing prior to dc. She is agreeable to LTAC as well but she likes [facility] but will defer to guardian. Addendum: 137pm- referral made to [address] [place] [address] to follow as well.            Electronically signed by STACIE Melo Res on 6/10/2020 at 11:20 AM

## 2020-06-10 NOTE — PROGRESS NOTES
6/10/2020  2:01 PM      Nutrition Assessment    Type and Reason for Visit: Consult, Positive Nutrition Screen, Initial    Nutrition Recommendations: Nutrition progression, as appropriate with POC    Nutrition Assessment: Pt admit from group home w/hypernatremia. Pt has significant impaired skin integrity/wounds. TF was ordered but pt pulled NGT out twice. Will continue to monitor POC. Pt would benefit nutritionally from EN support, pending level of care POC per POA    Malnutrition Assessment:  · Malnutrition Status: Meets the criteria for severe malnutrition  · Context: Chronic illness  · Findings of the 6 clinical characteristics of malnutrition (Minimum of 2 out of 6 clinical characteristics is required to make the diagnosis of moderate or severe Protein Calorie Malnutrition based on AND/ASPEN Guidelines):  1. Energy Intake-Less than or equal to 75% of estimated energy requirement, Unable to assess    2. Weight Loss-7.5% loss or greater, in 3 months  3. Fat Loss-Severe subcutaneous fat loss, Triceps  4. Muscle Loss-Severe muscle mass loss, Clavicles (pectoralis and deltoids)  5. Fluid Accumulation-No significant fluid accumulation,    6.   Strength-Not measured    Nutrition Risk Level: High    Nutrient Needs:  · Estimated Daily Total Kcal:  (MSJ x 1.35)  · Estimated Daily Protein (g): 60-75 (1.5-1.8 g/kg)  · Estimated Daily Total Fluid (ml/day):  (1 ml/deedee) or per Renal    Nutrition Diagnosis:   · Problem: Severe malnutrition, In context of chronic illness  · Etiology: related to Insufficient energy/nutrient consumption     Signs and symptoms:  as evidenced by Diet history of poor intake, NPO status due to medical condition, Weight loss greater than or equal to 7.5% in 3 months, Severe muscle loss, Severe loss of subcutaneous fat    Objective Information:  · Nutrition-Focused Physical Findings: underweight, contractures, nonverbal, +BS, +I/O, no edema  · Wound Type: Pressure Ulcer, Unstageable, Multiple(per wound care)  · Current Nutrition Therapies:  · Oral Diet Orders: NPO   · Anthropometric Measures:  · Ht: 5' 5\" (165.1 cm)   · Current Body Wt: 92 lb (41.7 kg)(6/10 bedwt)  · Admission Body Wt: 89 lb (40.4 kg)(6/9 bedwt)  · Usual Body Wt: 110 lb (49.9 kg)(3 mo ago EMR)  · % Weight Change:  ,  19% loss in 3 mo  · Ideal Body Wt: 136 lb (61.7 kg), % Ideal Body 68%  · BMI Classification: BMI <18.5 Underweight    Nutrition Interventions:   Continue NPO(pending POC (TF?))  Continued Inpatient Monitoring, Education Not Indicated    Nutrition Evaluation:   · Evaluation: Goals set   · Goals: Nutrition progression    · Monitoring: Nutrition Progression, Skin Integrity, Wound Healing, I&O, Weight, Pertinent Labs, Monitor Bowel Function      Electronically signed by Tresa Shah RD, CNSC, LD on 6/10/20 at 2:01 PM EDT    Contact Number: [phone]

## 2020-06-10 NOTE — PLAN OF CARE
This Shift  Goal: Decrease in sensory misperception frequency  Description: Decrease in sensory misperception frequency  Outcome: Met This Shift  Goal: Able to refrain from responding to false sensory perceptions  Description: Able to refrain from responding to false sensory perceptions  Outcome: Met This Shift  Goal: Demonstrates accurate environmental perceptions  Description: Demonstrates accurate environmental perceptions  Outcome: Met This Shift  Goal: Able to distinguish between reality-based and nonreality-based thinking  Description: Able to distinguish between reality-based and nonreality-based thinking  Outcome: Met This Shift  Goal: Able to interrupt nonreality-based thinking  Description: Able to interrupt nonreality-based thinking  Outcome: Met This Shift     Problem: Sleep Pattern Disturbance:  Goal: Appears well-rested  Description: Appears well-rested  Outcome: Met This Shift     Problem: Malnutrition  (NI-5.2)  Goal: Food and/or Nutrient Delivery  Description: Individualized approach for food/nutrient provision.   6/10/2020 1401 by Anneliese Romano RD, CNSC, LD  Outcome: Ongoing     Problem: Restraint Use - Nonviolent/Non-Self-Destructive Behavior:  Goal: Absence of restraint indications  Description: Absence of restraint indications  Outcome: Completed  Goal: Absence of restraint-related injury  Description: Absence of restraint-related injury  Outcome: Completed

## 2020-06-10 NOTE — PROGRESS NOTES
Upon entering pt room, NGT found to be pulled regardless of pt being 2 point soft wrist restrained. This RN attempted to replace NGT x2 with no success, will have another RN attempt.

## 2020-06-11 ENCOUNTER — APPOINTMENT (OUTPATIENT)
Dept: GENERAL RADIOLOGY | Age: 70
DRG: 682 | End: 2020-06-11
Payer: MEDICARE

## 2020-06-11 LAB
ANION GAP SERPL CALCULATED.3IONS-SCNC: 5 MMOL/L (ref 7–16)
ANION GAP SERPL CALCULATED.3IONS-SCNC: 6 MMOL/L (ref 7–16)
ANION GAP SERPL CALCULATED.3IONS-SCNC: 7 MMOL/L (ref 7–16)
ANION GAP SERPL CALCULATED.3IONS-SCNC: 7 MMOL/L (ref 7–16)
ANION GAP SERPL CALCULATED.3IONS-SCNC: 8 MMOL/L (ref 7–16)
BUN BLDV-MCNC: 49 MG/DL (ref 8–23)
BUN BLDV-MCNC: 53 MG/DL (ref 8–23)
BUN BLDV-MCNC: 56 MG/DL (ref 8–23)
BUN BLDV-MCNC: 59 MG/DL (ref 8–23)
BUN BLDV-MCNC: 66 MG/DL (ref 8–23)
CALCIUM SERPL-MCNC: 7.7 MG/DL (ref 8.6–10.2)
CALCIUM SERPL-MCNC: 7.8 MG/DL (ref 8.6–10.2)
CALCIUM SERPL-MCNC: 7.8 MG/DL (ref 8.6–10.2)
CALCIUM SERPL-MCNC: 7.9 MG/DL (ref 8.6–10.2)
CALCIUM SERPL-MCNC: 8 MG/DL (ref 8.6–10.2)
CHLORIDE BLD-SCNC: 132 MMOL/L (ref 98–107)
CHLORIDE BLD-SCNC: 135 MMOL/L (ref 98–107)
CHLORIDE BLD-SCNC: 137 MMOL/L (ref 98–107)
CHLORIDE BLD-SCNC: 139 MMOL/L (ref 98–107)
CHLORIDE BLD-SCNC: 139 MMOL/L (ref 98–107)
CO2: 21 MMOL/L (ref 22–29)
CO2: 22 MMOL/L (ref 22–29)
CO2: 24 MMOL/L (ref 22–29)
CO2: 25 MMOL/L (ref 22–29)
CO2: 25 MMOL/L (ref 22–29)
CREAT SERPL-MCNC: 1.1 MG/DL (ref 0.7–1.2)
CREAT SERPL-MCNC: 1.2 MG/DL (ref 0.7–1.2)
CREAT SERPL-MCNC: 1.3 MG/DL (ref 0.7–1.2)
GFR AFRICAN AMERICAN: >60
GFR NON-AFRICAN AMERICAN: 55 ML/MIN/1.73
GFR NON-AFRICAN AMERICAN: >60 ML/MIN/1.73
GLUCOSE BLD-MCNC: 109 MG/DL (ref 74–99)
GLUCOSE BLD-MCNC: 111 MG/DL (ref 74–99)
GLUCOSE BLD-MCNC: 112 MG/DL (ref 74–99)
GLUCOSE BLD-MCNC: 113 MG/DL (ref 74–99)
GLUCOSE BLD-MCNC: 117 MG/DL (ref 74–99)
POTASSIUM SERPL-SCNC: 3.3 MMOL/L (ref 3.5–5)
POTASSIUM SERPL-SCNC: 3.3 MMOL/L (ref 3.5–5)
POTASSIUM SERPL-SCNC: 3.4 MMOL/L (ref 3.5–5)
POTASSIUM SERPL-SCNC: 3.7 MMOL/L (ref 3.5–5)
POTASSIUM SERPL-SCNC: 3.7 MMOL/L (ref 3.5–5)
SODIUM BLD-SCNC: 162 MMOL/L (ref 132–146)
SODIUM BLD-SCNC: 165 MMOL/L (ref 132–146)
SODIUM BLD-SCNC: 167 MMOL/L (ref 132–146)
SODIUM BLD-SCNC: 169 MMOL/L (ref 132–146)
SODIUM BLD-SCNC: 169 MMOL/L (ref 132–146)

## 2020-06-11 PROCEDURE — 6360000002 HC RX W HCPCS: Performed by: INTERNAL MEDICINE

## 2020-06-11 PROCEDURE — 71045 X-RAY EXAM CHEST 1 VIEW: CPT

## 2020-06-11 PROCEDURE — 36415 COLL VENOUS BLD VENIPUNCTURE: CPT

## 2020-06-11 PROCEDURE — 6370000000 HC RX 637 (ALT 250 FOR IP): Performed by: INTERNAL MEDICINE

## 2020-06-11 PROCEDURE — 2060000000 HC ICU INTERMEDIATE R&B

## 2020-06-11 PROCEDURE — 2580000003 HC RX 258: Performed by: INTERNAL MEDICINE

## 2020-06-11 PROCEDURE — 80048 BASIC METABOLIC PNL TOTAL CA: CPT

## 2020-06-11 PROCEDURE — APPSS30 APP SPLIT SHARED TIME 16-30 MINUTES: Performed by: PHYSICIAN ASSISTANT

## 2020-06-11 PROCEDURE — 6360000002 HC RX W HCPCS: Performed by: NURSE PRACTITIONER

## 2020-06-11 PROCEDURE — 99233 SBSQ HOSP IP/OBS HIGH 50: CPT | Performed by: INTERNAL MEDICINE

## 2020-06-11 RX ORDER — POTASSIUM CHLORIDE 7.45 MG/ML
10 INJECTION INTRAVENOUS
Status: DISCONTINUED | OUTPATIENT
Start: 2020-06-11 | End: 2020-06-11 | Stop reason: ALTCHOICE

## 2020-06-11 RX ORDER — DEXTROSE AND POTASSIUM CHLORIDE 5; .15 G/100ML; G/100ML
SOLUTION INTRAVENOUS CONTINUOUS
Status: DISCONTINUED | OUTPATIENT
Start: 2020-06-11 | End: 2020-06-12

## 2020-06-11 RX ADMIN — LEVETIRACETAM 1000 MG: 100 INJECTION, SOLUTION INTRAVENOUS at 06:09

## 2020-06-11 RX ADMIN — LEVETIRACETAM 1000 MG: 100 INJECTION, SOLUTION INTRAVENOUS at 17:52

## 2020-06-11 RX ADMIN — CARBIDOPA AND LEVODOPA 1 TABLET: 25; 100 TABLET ORAL at 21:52

## 2020-06-11 RX ADMIN — ACETAMINOPHEN 650 MG: 650 SUPPOSITORY RECTAL at 15:31

## 2020-06-11 RX ADMIN — DEXTROSE MONOHYDRATE: 50 INJECTION, SOLUTION INTRAVENOUS at 10:14

## 2020-06-11 RX ADMIN — ACETAMINOPHEN 650 MG: 325 TABLET ORAL at 21:52

## 2020-06-11 RX ADMIN — POTASSIUM CHLORIDE 10 MEQ: 7.46 INJECTION, SOLUTION INTRAVENOUS at 10:10

## 2020-06-11 RX ADMIN — CARBIDOPA AND LEVODOPA 1 TABLET: 25; 100 TABLET ORAL at 09:23

## 2020-06-11 RX ADMIN — POTASSIUM CHLORIDE AND DEXTROSE MONOHYDRATE 60 ML/HR: 150; 5 INJECTION, SOLUTION INTRAVENOUS at 11:34

## 2020-06-11 RX ADMIN — ACETAMINOPHEN 650 MG: 650 SUPPOSITORY RECTAL at 07:49

## 2020-06-11 ASSESSMENT — PAIN SCALES - GENERAL
PAINLEVEL_OUTOF10: 0
PAINLEVEL_OUTOF10: 0

## 2020-06-11 NOTE — CARE COORDINATION
CASE MANAGEMENT. ... [facility] are both following Mr Orestes Gonzalez. At this time, [facility] states patient has minimal critera, but will cont to follow. [facility] with [facility] states patient has criteria and will address with administration. Will await pcp and renal input today. Cont to follow. IN ADDENDUM. ... [facility] states that patient currently has criteria and can accept as early as today. No precert needed. COVID TEST REQUIRED PRIOR TO DC. Spoke with patients guardian, Abi [facility] [phone]. I attempted to discuss discharge options with her. She was not interested in listening. States her plan is for Mr Orestes Gonzalez to start eating again and to discharge to a higher level group home -where nursing is available 24hrs/day, but if he needs [facility] she is agreeable.

## 2020-06-11 NOTE — PLAN OF CARE
Problem: Falls - Risk of:  Goal: Will remain free from falls  Description: Will remain free from falls  Outcome: Met This Shift  Goal: Absence of physical injury  Description: Absence of physical injury  Outcome: Met This Shift     Problem: Confusion - Acute:  Goal: Absence of continued neurological deterioration signs and symptoms  Description: Absence of continued neurological deterioration signs and symptoms  Outcome: Met This Shift  Goal: Mental status will be restored to baseline  Description: Mental status will be restored to baseline  Outcome: Met This Shift     Problem: Discharge Planning:  Goal: Ability to perform activities of daily living will improve  Description: Ability to perform activities of daily living will improve  Outcome: Met This Shift  Goal: Participates in care planning  Description: Participates in care planning  Outcome: Met This Shift     Problem: Injury - Risk of, Physical Injury:  Goal: Will remain free from falls  Description: Will remain free from falls  Outcome: Met This Shift  Goal: Absence of physical injury  Description: Absence of physical injury  Outcome: Met This Shift     Problem: Mood - Altered:  Goal: Mood stable  Description: Mood stable  Outcome: Met This Shift  Goal: Absence of abusive behavior  Description: Absence of abusive behavior  Outcome: Met This Shift  Goal: Verbalizations of feeling emotionally comfortable while being cared for will increase  Description: Verbalizations of feeling emotionally comfortable while being cared for will increase  Outcome: Met This Shift     Problem: Psychomotor Activity - Altered:  Goal: Absence of psychomotor disturbance signs and symptoms  Description: Absence of psychomotor disturbance signs and symptoms  Outcome: Met This Shift     Problem: Sensory Perception - Impaired:  Goal: Demonstrations of improved sensory functioning will increase  Description: Demonstrations of improved sensory functioning will increase  Outcome:  Met This Shift  Goal: Decrease in sensory misperception frequency  Description: Decrease in sensory misperception frequency  Outcome: Met This Shift  Goal: Able to refrain from responding to false sensory perceptions  Description: Able to refrain from responding to false sensory perceptions  Outcome: Met This Shift  Goal: Demonstrates accurate environmental perceptions  Description: Demonstrates accurate environmental perceptions  Outcome: Met This Shift  Goal: Able to distinguish between reality-based and nonreality-based thinking  Description: Able to distinguish between reality-based and nonreality-based thinking  Outcome: Met This Shift  Goal: Able to interrupt nonreality-based thinking  Description: Able to interrupt nonreality-based thinking  Outcome: Met This Shift     Problem: Sleep Pattern Disturbance:  Goal: Appears well-rested  Description: Appears well-rested  Outcome: Met This Shift

## 2020-06-11 NOTE — PROGRESS NOTES
[facility] Hospitalist   Progress Note    Admitting Date and Time: 6/8/2020  9:58 PM  Admit Dx: Hypernatremia [E87.0]  Hypernatremia [E87.0]  Hypernatremia [E87.0]    Subjective:    Patient was admitted with Hypernatremia [E87.0]  Hypernatremia [E87.0]  Hypernatremia [E87.0]. Patient Nonverbal. Resting in bed comfortably. Sitter at [facility]. Pt seems more awake today. Per RN: No acute events overnight. ROS: Not obtained secondary to mental status      potassium chloride  10 mEq Intravenous Q1H    carbidopa-levodopa  1 tablet Oral BID    levetiracetam  1,000 mg Intravenous Q12H    sodium chloride flush  10 mL Intravenous 2 times per day    sodium chloride flush  10 mL Intravenous 2 times per day     sodium chloride flush, 10 mL, PRN  acetaminophen, 650 mg, Q6H PRN    Or  acetaminophen, 650 mg, Q6H PRN  polyethylene glycol, 17 g, Daily PRN  promethazine, 12.5 mg, Q6H PRN    Or  ondansetron, 4 mg, Q6H PRN  sodium chloride flush, 10 mL, PRN         Objective:    BP (!) 95/54   Pulse 92   Temp 98.4 °F (36.9 °C) (Oral)   Resp 18   Ht 5' 5\" (1.651 m)   Wt 110 lb (49.9 kg)   SpO2 95%   BMI 18.30 kg/m²   General Appearance: in no acute distress and alert  Skin: warm and dry, no rash or erythema  Pulmonary/Chest: clear to auscultation bilaterally- no wheezes, rales or rhonchi, normal air movement, no respiratory distress  Cardiovascular: normal rate, regular rhythm, normal S1 and S2, no murmurs, no gallops and no JVD  Abdomen: soft, non-tender, non-distended, normal bowel sounds, no masses or organomegaly  Extremities: no cyanosis, no clubbing, no edema and multiple areas of erythema noted on bilateral lower extremities.       Recent Labs     06/10/20  2210 06/11/20  0210 06/11/20  0605   * 169* 169*   K 3.5 3.3* 3.3*   CL >140* 137* 139*   CO2 25 25 22   BUN 71* 66* 59*   CREATININE 1.3* 1.3* 1.2   GLUCOSE 102* 111* 113*   CALCIUM 7.9* 8.0* 7.8*       Recent Labs     06/08/20  5043 it is in the stomach. XR Chest Abdomen Ng Placement   Final Result   Nasogastric tube appears to be malpositioned, with the tip likely in   the right lower lobe bronchus. Findings were conveyed to patient's   nurse, Ms. Juline Brittle, RN 10:24  a.m. on 6/20/2020. She was aware   of the findings and had removed the NG tube. XR ABDOMEN FOR NG/OG/NE TUBE PLACEMENT   Final Result   Feeding tube is traceable to the stomach. This report has been electronically signed by Isatu Lim MD.      XR ABDOMEN FOR NG/OG/NE TUBE PLACEMENT   Final Result   Feeding tube is coiled, presumably within a hiatal hernia. Consider retraction    by approximately 8 cm with slight readvance . This report has been electronically signed by Isatu Lim MD.      XR ABDOMEN FOR NG/OG/NE TUBE PLACEMENT   Final Result      Nasogastric tube follows the expected contours of the esophagus with   distal tip overlying the air in the gastric lumen and distal sideholes   overlying the esophagogastric junction. US RETROPERITONEAL COMPLETE   Final Result      1. Renal cortical echogenicity suggestive spectrum of medical renal   disease bilaterally, without evidence of obstruction on either side. 2. The bladder wall is thickened, without intraluminal debris. This   can be a manifestation of cystitis or hypertrophic change. CT Head WO Contrast   Final Result      No evidence for acute intracranial process. Cortical atrophy and chronic periventricular microangiopathy. XR CHEST PORTABLE   Final Result      NO ACUTE CARDIOPULMONARY PROCESS             Assessment:    Active Problems:    Hypernatremia    Severe protein-calorie malnutrition (HCC)    Acute renal failure (HCC)    Thrombocytopenia (HCC)  Resolved Problems:    * No resolved hospital problems. *      Plan:  1.  Hypernatremia  - Sodium now 169,  Nephrology consulted, Urine studies reportedly ruled out DI   - Renal US without obstruction    2. CRIS  -Nephrology following, appreciate their recommendations.     3. Metabolic encephalopathy  -Unknown baseline, continue to monitor.     4. Hypokalemia   - K 3.3, appreciate nephrology replacement      5. H/o Seizure Disorder  -No seizure activity noted at this time.     6. Protein Calorie Malnutrition   - NG tube not positioned correctly on imaging, this was removed and Dobbhoff tube was placed     7. Thrombocytopenia  -Platelets are 36,  unknown etiology. Will discuss with Dr. Silva Hui     8. Decubitus Ulcer   - Wound care to follow     9. Hypotension    - Intermittent, seems to be improving with fluids. Will continue to monitor     10. ? Pneumonia   - mentioned on imaging to check NG placement. Pt now with low grade fever. Will order CXR.          Electronically signed by Dell Cotton PA-C on 6/11/2020 at 10:14 AM

## 2020-06-11 NOTE — PROGRESS NOTES
Nephrology Progress Note      Events Reviewed. Seen an examined at bedside. Sitter at bedside. Corpak has been placed. More alert today. Subjective: We are following Mr. Gerardo Flanagan for CRIS, hypernatremia, and hyperchloremia    Physical exam:     Vitals: BP (!) 95/54   Pulse 92   Temp 98.4 °F (36.9 °C) (Oral)   Resp 18   Ht 5' 5\" (1.651 m)   Wt 110 lb (49.9 kg)   SpO2 95%   BMI 18.30 kg/m²      Constitutional:  Elderly  male, appears in no distress, disoriented, FiO2 requirement is at 3 L via nasal cannula.   Skin: no rash, turgor wnl  Heent:  eomi, mmm  Neck: no bruits or jvd noted  Cardiovascular:  S1, S2 without m/r/g  Respiratory: CTA B without w/r/r, difficult exam, decreased breath sounds in bases  Abdomen:  +bs, soft, nt, nd, William catheter is in place draining clear urine  Ext:1+ lower extremity edema, contractures noted in all extremities  Psychiatric: Unable to assess    Data:   Labs:  CBC:   Lab Results   Component Value Date    WBC 5.9 06/10/2020    RBC 3.88 06/10/2020    HGB 12.6 06/10/2020    HCT 43.6 06/10/2020    .4 06/10/2020    MCH 32.5 06/10/2020    MCHC 28.9 06/10/2020    RDW 14.6 06/10/2020    PLT 36 06/10/2020    MPV 13.4 06/10/2020     CMP:    Lab Results   Component Value Date     06/11/2020    K 3.3 06/11/2020    K 3.9 03/30/2020     06/11/2020    CO2 22 06/11/2020    BUN 59 06/11/2020    CREATININE 1.2 06/11/2020    GFRAA >60 06/11/2020    LABGLOM >60 06/11/2020    GLUCOSE 113 06/11/2020    GLUCOSE 71 05/18/2012    PROT 7.8 06/08/2020    LABALBU 3.3 06/09/2020    LABALBU 4.5 05/18/2012    CALCIUM 7.8 06/11/2020    BILITOT 0.3 06/08/2020    ALKPHOS 90 06/08/2020    AST 30 06/08/2020    ALT 31 06/08/2020     BMP:    Lab Results   Component Value Date     06/11/2020    K 3.3 06/11/2020    K 3.9 03/30/2020     06/11/2020    CO2 22 06/11/2020    BUN 59 06/11/2020    LABALBU 3.3 06/09/2020    LABALBU 4.5 05/18/2012    CREATININE 1.2 06/11/2020 CALCIUM 7.8 06/11/2020    GFRAA >60 06/11/2020    LABGLOM >60 06/11/2020    GLUCOSE 113 06/11/2020    GLUCOSE 71 05/18/2012     Calcium:    Lab Results   Component Value Date    CALCIUM 7.8 06/11/2020     Phosphorus:    Lab Results   Component Value Date    PHOS 4.5 06/09/2020     Uric Acid:  No results found for: URICACID  U/A:    Lab Results   Component Value Date    NITRU Negative 06/08/2020    COLORU Yellow 06/08/2020    PHUR 5.0 06/08/2020    WBCUA NONE 06/08/2020    RBCUA 1-3 06/08/2020    RBCUA NONE 10/08/2012    BACTERIA MODERATE 06/08/2020    CLARITYU Clear 06/08/2020    SPECGRAV >=1.030 06/08/2020    LEUKOCYTESUR Negative 06/08/2020    UROBILINOGEN 0.2 06/08/2020    BILIRUBINUR Negative 06/08/2020    BILIRUBINUR NEGATIVE 09/18/2011    BLOODU Negative 06/08/2020    GLUCOSEU Negative 06/08/2020    GLUCOSEU NEGATIVE 09/18/2011    KETUA Negative 06/08/2020    AMORPHOUS FEW 06/08/2020      Results for Amy Clifton (MRN [id number]) as of 6/9/2020 09:11   Ref. Range 6/8/2020 22:54   Chloride Latest Ref Range: Not Established mmol/L <20   Creatinine, Ur Latest Ref Range: 40 - 278 mg/dL 182   Osmolality, Ur Latest Ref Range: 300 - 900 mOsm/kg 742   Potassium, Ur Latest Ref Range: Not Established mmol/L 73.2   Sodium, Ur Latest Ref Range: Not Established mmol/L 39       Imaging:  Ct Head Wo Contrast    Result Date: 6/8/2020  Clinical indications: Altered mental status. COMPARISON: March 28, 2020. January 28, 2020 Exposure control: This examination and all examinations utilizing ionizing radiation at this facility done so according to the ALARA (as low as reasonably achievable) principal for radiation dose reduction. Technique: Axial, sagittal and coronal computed tomography of the brain and calvarium was performed without contrast. FINDINGS: There is no evidence for acute intracranial hemorrhage, midline shift or mass effect. There is enlargement of the ventricles, sulci and cisterns bilaterally.  There is patchy hypoattenuation in the periventricular deep white matter bilaterally. There are calcifications within the carotid siphons and vertebrobasilar arterial systems. Mucosal thickening within the paranasal sinuses but no fluid levels are evident. Otherwise the brain parenchyma, CSF spaces, paranasal sinuses and mastoid air cells and surrounding soft tissue and osseous structures have a satisfactory appearance. The gray-white matter junction is otherwise preserved. The cerebellopontine angle and cisterns are unremarkable. The bony calvarium is negative for acute fracture or aggressive process. The radha and brainstem are unremarkable. No evidence for acute intracranial process. Cortical atrophy and chronic periventricular microangiopathy. Xr Chest Portable    Result Date: 2020  Patient MRN: [id number] : 1950 Age:  78 years Gender: Male Order Date: 2020 10:15 PM Exam: XR CHEST PORTABLE Number of Images: 1 view Indication:   possible sepsis possible sepsis Comparison: None. Findings: The lungs are clear. There is no evidence of pulmonary infiltrate or pleural effusion. The pulmonary vascularity is unremarkable. The cardiac, hilar and mediastinal silhouettes are satisfactory. There is uncoiling atherosclerotic change of thoracic ureter. There is a moderate-sized hiatal hernia. The bony thorax demonstrates osteopenia. NO ACUTE CARDIOPULMONARY PROCESS     US Retroperitoneal 20  The right kidney measures9.4 x 4.6 x 3.6 cm, and the left   kidney measures 9.1 x 5.3 x 4.3 cm.     1. Renal cortical echogenicity suggestive spectrum of medical renal   disease bilaterally, without evidence of obstruction on either side. 2. The bladder wall is thickened, without intraluminal debris. This   can be a manifestation of cystitis or hypertrophic change         Assessment  1.  Severe hypernatremia secondary to poor p.o. intake and no access to free water, total calculated free water deficit is 6 L, goal of correction is 8 to 10 mEq in a 24-hour. 2. Acute kidney injury secondary to severe prerenal azotemia from volume depletion, severe hypotension, continues to improve with fluid administration. Urine output 1,425mL in 24 hour period. 3. Hyperchloremia secondary to volume depletion  4. Hypokalemia, dietary from poor potassium intake  5. Parkinson's disease on Sinemet  6. History of seizure activity with breakthrough seizures, on Keppra and Tegretol  7. Vitamin D deficiency, on Cholecalciferol   8. Nutrition: TF at 30cc/hr. FW at 50cc/hr. Plan  1. Change IVF D5 water + 20kcl at 60 mL/h.  2. Change BMP checks to q 6 hours, nurse to call if sodium is less than 160 or greater than 169  3. KCL 20 meq IV once. 4. He has evidence of recurrent hypernatremia, overall looks very deconditioned, he may need a PEG tube for adequate nutrition and water intake for discharge.       Hubert Arias MD

## 2020-06-12 ENCOUNTER — APPOINTMENT (OUTPATIENT)
Dept: CT IMAGING | Age: 70
DRG: 682 | End: 2020-06-12
Payer: MEDICARE

## 2020-06-12 LAB
ADENOVIRUS BY PCR: NOT DETECTED
ALBUMIN SERPL-MCNC: 2.7 G/DL (ref 3.5–5.2)
ALP BLD-CCNC: 73 U/L (ref 40–129)
ALT SERPL-CCNC: 17 U/L (ref 0–40)
ANION GAP SERPL CALCULATED.3IONS-SCNC: 4 MMOL/L (ref 7–16)
ANION GAP SERPL CALCULATED.3IONS-SCNC: 7 MMOL/L (ref 7–16)
ANION GAP SERPL CALCULATED.3IONS-SCNC: 7 MMOL/L (ref 7–16)
ANTISTREPTOLYSIN-O: 379 IU/ML (ref 0–200)
AST SERPL-CCNC: 73 U/L (ref 0–39)
BACTERIA: ABNORMAL /HPF
BILIRUB SERPL-MCNC: 0.3 MG/DL (ref 0–1.2)
BILIRUBIN DIRECT: <0.2 MG/DL (ref 0–0.3)
BILIRUBIN URINE: NEGATIVE
BILIRUBIN, INDIRECT: ABNORMAL MG/DL (ref 0–1)
BLOOD, URINE: ABNORMAL
BORDETELLA PARAPERTUSSIS BY PCR: NOT DETECTED
BORDETELLA PERTUSSIS BY PCR: NOT DETECTED
BUN BLDV-MCNC: 38 MG/DL (ref 8–23)
BUN BLDV-MCNC: 41 MG/DL (ref 8–23)
BUN BLDV-MCNC: 44 MG/DL (ref 8–23)
C-REACTIVE PROTEIN: 3.6 MG/DL (ref 0–0.4)
CALCIUM SERPL-MCNC: 7.8 MG/DL (ref 8.6–10.2)
CALCIUM SERPL-MCNC: 7.9 MG/DL (ref 8.6–10.2)
CALCIUM SERPL-MCNC: 8.2 MG/DL (ref 8.6–10.2)
CHLAMYDOPHILIA PNEUMONIAE BY PCR: NOT DETECTED
CHLORIDE BLD-SCNC: 127 MMOL/L (ref 98–107)
CHLORIDE BLD-SCNC: 130 MMOL/L (ref 98–107)
CHLORIDE BLD-SCNC: 132 MMOL/L (ref 98–107)
CLARITY: ABNORMAL
CO2: 21 MMOL/L (ref 22–29)
CO2: 22 MMOL/L (ref 22–29)
CO2: 23 MMOL/L (ref 22–29)
COLOR: YELLOW
CORONAVIRUS 229E BY PCR: NOT DETECTED
CORONAVIRUS HKU1 BY PCR: NOT DETECTED
CORONAVIRUS NL63 BY PCR: NOT DETECTED
CORONAVIRUS OC43 BY PCR: NOT DETECTED
CREAT SERPL-MCNC: 1 MG/DL (ref 0.7–1.2)
CRYSTALS, UA: ABNORMAL /HPF
EPITHELIAL CELLS, UA: ABNORMAL /HPF
GFR AFRICAN AMERICAN: >60
GFR NON-AFRICAN AMERICAN: >60 ML/MIN/1.73
GLUCOSE BLD-MCNC: 112 MG/DL (ref 74–99)
GLUCOSE BLD-MCNC: 128 MG/DL (ref 74–99)
GLUCOSE BLD-MCNC: 97 MG/DL (ref 74–99)
GLUCOSE URINE: NEGATIVE MG/DL
HCT VFR BLD CALC: 34.8 % (ref 37–54)
HEMOGLOBIN: 10.8 G/DL (ref 12.5–16.5)
HUMAN METAPNEUMOVIRUS BY PCR: NOT DETECTED
HUMAN RHINOVIRUS/ENTEROVIRUS BY PCR: NOT DETECTED
INFLUENZA A BY PCR: NOT DETECTED
INFLUENZA B BY PCR: NOT DETECTED
KETONES, URINE: NEGATIVE MG/DL
LACTIC ACID: 1.1 MMOL/L (ref 0.5–2.2)
LEUKOCYTE ESTERASE, URINE: ABNORMAL
MCH RBC QN AUTO: 32.4 PG (ref 26–35)
MCHC RBC AUTO-ENTMCNC: 31 % (ref 32–34.5)
MCV RBC AUTO: 104.5 FL (ref 80–99.9)
MYCOPLASMA PNEUMONIAE BY PCR: NOT DETECTED
NITRITE, URINE: NEGATIVE
PARAINFLUENZA VIRUS 1 BY PCR: NOT DETECTED
PARAINFLUENZA VIRUS 2 BY PCR: NOT DETECTED
PARAINFLUENZA VIRUS 3 BY PCR: NOT DETECTED
PARAINFLUENZA VIRUS 4 BY PCR: NOT DETECTED
PDW BLD-RTO: 13.2 FL (ref 11.5–15)
PH UA: 7.5 (ref 5–9)
PLATELET # BLD: 38 E9/L (ref 130–450)
PLATELET CONFIRMATION: NORMAL
PMV BLD AUTO: ABNORMAL FL (ref 7–12)
POTASSIUM SERPL-SCNC: 3.7 MMOL/L (ref 3.5–5)
POTASSIUM SERPL-SCNC: 3.7 MMOL/L (ref 3.5–5)
POTASSIUM SERPL-SCNC: 3.8 MMOL/L (ref 3.5–5)
PROCALCITONIN: 0.17 NG/ML (ref 0–0.08)
PROTEIN UA: ABNORMAL MG/DL
RBC # BLD: 3.33 E12/L (ref 3.8–5.8)
RBC UA: ABNORMAL /HPF (ref 0–2)
RESPIRATORY SYNCYTIAL VIRUS BY PCR: NOT DETECTED
SEDIMENTATION RATE, ERYTHROCYTE: 14 MM/HR (ref 0–15)
SODIUM BLD-SCNC: 157 MMOL/L (ref 132–146)
SODIUM BLD-SCNC: 158 MMOL/L (ref 132–146)
SODIUM BLD-SCNC: 158 MMOL/L (ref 132–146)
SPECIFIC GRAVITY UA: 1.02 (ref 1–1.03)
TOTAL PROTEIN: 5.5 G/DL (ref 6.4–8.3)
UROBILINOGEN, URINE: 0.2 E.U./DL
WBC # BLD: 6.5 E9/L (ref 4.5–11.5)
WBC UA: >20 /HPF (ref 0–5)

## 2020-06-12 PROCEDURE — 87088 URINE BACTERIA CULTURE: CPT

## 2020-06-12 PROCEDURE — 86140 C-REACTIVE PROTEIN: CPT

## 2020-06-12 PROCEDURE — 99233 SBSQ HOSP IP/OBS HIGH 50: CPT | Performed by: INTERNAL MEDICINE

## 2020-06-12 PROCEDURE — 36415 COLL VENOUS BLD VENIPUNCTURE: CPT

## 2020-06-12 PROCEDURE — APPSS30 APP SPLIT SHARED TIME 16-30 MINUTES: Performed by: PHYSICIAN ASSISTANT

## 2020-06-12 PROCEDURE — 84145 PROCALCITONIN (PCT): CPT

## 2020-06-12 PROCEDURE — 71250 CT THORAX DX C-: CPT

## 2020-06-12 PROCEDURE — 0100U HC RESPIRPTHGN MULT REV TRANS & AMP PRB TECH 21 TRGT: CPT

## 2020-06-12 PROCEDURE — 6360000002 HC RX W HCPCS: Performed by: NURSE PRACTITIONER

## 2020-06-12 PROCEDURE — 87186 SC STD MICRODIL/AGAR DIL: CPT

## 2020-06-12 PROCEDURE — 2580000003 HC RX 258: Performed by: EMERGENCY MEDICINE

## 2020-06-12 PROCEDURE — 2580000003 HC RX 258: Performed by: INTERNAL MEDICINE

## 2020-06-12 PROCEDURE — 80076 HEPATIC FUNCTION PANEL: CPT

## 2020-06-12 PROCEDURE — 6360000002 HC RX W HCPCS: Performed by: INTERNAL MEDICINE

## 2020-06-12 PROCEDURE — 74150 CT ABDOMEN W/O CONTRAST: CPT

## 2020-06-12 PROCEDURE — 85027 COMPLETE CBC AUTOMATED: CPT

## 2020-06-12 PROCEDURE — 2060000000 HC ICU INTERMEDIATE R&B

## 2020-06-12 PROCEDURE — 2580000003 HC RX 258: Performed by: NURSE PRACTITIONER

## 2020-06-12 PROCEDURE — 83605 ASSAY OF LACTIC ACID: CPT

## 2020-06-12 PROCEDURE — 86060 ANTISTREPTOLYSIN O TITER: CPT

## 2020-06-12 PROCEDURE — 80048 BASIC METABOLIC PNL TOTAL CA: CPT

## 2020-06-12 PROCEDURE — 85651 RBC SED RATE NONAUTOMATED: CPT

## 2020-06-12 PROCEDURE — 81001 URINALYSIS AUTO W/SCOPE: CPT

## 2020-06-12 PROCEDURE — 6370000000 HC RX 637 (ALT 250 FOR IP): Performed by: INTERNAL MEDICINE

## 2020-06-12 RX ORDER — DEXTROSE AND POTASSIUM CHLORIDE 5; .15 G/100ML; G/100ML
SOLUTION INTRAVENOUS CONTINUOUS
Status: DISCONTINUED | OUTPATIENT
Start: 2020-06-12 | End: 2020-06-14

## 2020-06-12 RX ORDER — DEXTROSE, SODIUM CHLORIDE, AND POTASSIUM CHLORIDE 5; .45; .15 G/100ML; G/100ML; G/100ML
INJECTION INTRAVENOUS
Status: DISCONTINUED
Start: 2020-06-12 | End: 2020-06-12 | Stop reason: WASHOUT

## 2020-06-12 RX ADMIN — POTASSIUM CHLORIDE AND DEXTROSE MONOHYDRATE 75 ML/HR: 150; 5 INJECTION, SOLUTION INTRAVENOUS at 16:37

## 2020-06-12 RX ADMIN — CARBIDOPA AND LEVODOPA 1 TABLET: 25; 100 TABLET ORAL at 20:25

## 2020-06-12 RX ADMIN — SODIUM CHLORIDE, PRESERVATIVE FREE 10 ML: 5 INJECTION INTRAVENOUS at 08:52

## 2020-06-12 RX ADMIN — LEVETIRACETAM 1000 MG: 100 INJECTION, SOLUTION INTRAVENOUS at 17:24

## 2020-06-12 RX ADMIN — CALCIUM GLUCONATE 1 G: 98 INJECTION, SOLUTION INTRAVENOUS at 11:41

## 2020-06-12 RX ADMIN — LEVETIRACETAM 1000 MG: 100 INJECTION, SOLUTION INTRAVENOUS at 06:04

## 2020-06-12 RX ADMIN — CARBIDOPA AND LEVODOPA 1 TABLET: 25; 100 TABLET ORAL at 08:37

## 2020-06-12 ASSESSMENT — PAIN SCALES - GENERAL
PAINLEVEL_OUTOF10: 0
PAINLEVEL_OUTOF10: 0

## 2020-06-12 NOTE — PLAN OF CARE
Problem: Malnutrition  (NI-5.2)  Goal: Food and/or Nutrient Delivery  Pt will tolerate EN at goal rate  Description: Individualized approach for food/nutrient provision.   Outcome: Met This Shift

## 2020-06-12 NOTE — PATIENT CARE CONFERENCE
[facility] Quality Flow/Interdisciplinary Rounds Progress Note        Quality Flow Rounds held on June 12, 2020    Disciplines Attending:  Bedside Nurse, ,  and Nursing Unit Leadership    Angelo Daley was admitted on 6/8/2020  9:58 PM    Anticipated Discharge Date:  Expected Discharge Date: 06/12/20    Disposition:    Gino Score:  Gino Scale Score: 10    Readmission Score:         Discussed patient goal for the day, patient clinical progression, and barriers to discharge.   The following Goal(s) of the Day/Commitment(s) have been identified:    Discharge planning, monitor labs      Jim Witt  June 12, 2020

## 2020-06-12 NOTE — CARE COORDINATION
SOCIAL WORK / DISCHARGE PLANNING:  COVID neg 6/9. Pt from [facility], spoke with Tala Shipman, legal guardian via phone today. Per Bushra Iniguez pt currently does not have adequate LTAC critieria. Sw explained this to guardian who remains focused on pt returning to group home setting with more medical supervision. Sw attempted to reinforce with her that when this Sw spoke with Ludy Fournier DON an opening at more medical group home might take awhile. Sw then attempted to reinforce may need to consider short term [facility] which guardian says she would be ok with if recommended but remains focused on group home setting. Guardian had chosen [facility] who accepted only if does not have Corepak and can not accept pt's until 6/17. Discussed case with Roya Cat, [address] [place] [address] , obstacles to dc currently is Corepak and sitter unless to LTAC. Editha Goltz is continuing to follow.                  Electronically signed by STACIE Odonnell on 6/12/2020 at 1:03 PM

## 2020-06-12 NOTE — PROGRESS NOTES
3. 88 3.33*   HGB 12.6 10.8*   HCT 43.6 34.8*   .4* 104.5*   MCH 32.5 32.4   MCHC 28.9* 31.0*   RDW 14.6 13.2   PLT 36* 38*   MPV 13.4* NOT CALC         Radiology:   RADIOLOGY REPORT   Final Result      XR CHEST PORTABLE   Final Result      Bibasilar atelectasis is more pronounced in the right lung base      Feeding enteric tube with the tip in the antrum of the stomach. XR FOOT LEFT (2 VIEWS)   Final Result      The second and third toes are absent. Severe remodeling of the distal aspects of the first, second, and   third metatarsals. Severe degenerative changes with bunion formation of the first   metatarsophalangeal articulation. No evidence of fracture is appreciated. XR FOOT RIGHT (2 VIEWS)   Final Result      Plantar calcaneal spur is present. Severe degenerative change of the first metatarsophalangeal   articulation with bunion formation. Abnormal configuration of the second and third metatarsophalangeal   articulation which appears to be chronic and degenerative. No evidence of acute fracture is identified. Osteoarthritis. XR Chest Abdomen Ng Placement   Final Result      XR Chest Abdomen Ng Placement   Final Result   The weighted feeding tube tip is in the epigastric region, directed up   into the right, presumably in the body the stomach, although the   inferior margin of the small hiatus hernia is not as well seen. Advancing the tube 3 to 4 cm should determine whether the tip is in   the stomach or the inferior margin of the hiatus hernia. Subtle density in the right lower lung may be artifact related to   expiratory imaging or could represent subtle interstitial disease   without effusion. Otherwise unremarkable. Conchetta Peaks ALERT:  THIS IS AN ABNORMAL REPORT-tube position is equivocal, likely   in the stomach, but the weighted portion could be in the inferior   margin of the hiatus hernia.  Advancing the tube 4 5 cm with advanced the weighted portion into the stomach if it is in the hiatus hernia,   and would confirm a distal gastric position if it is in the stomach. XR Chest Abdomen Ng Placement   Final Result   Nasogastric tube appears to be malpositioned, with the tip likely in   the right lower lobe bronchus. Findings were conveyed to patient's   nurse, Ms. Linda Jaramillo RN 10:24  a.m. on 6/20/2020. She was aware   of the findings and had removed the NG tube. XR ABDOMEN FOR NG/OG/NE TUBE PLACEMENT   Final Result   Feeding tube is traceable to the stomach. This report has been electronically signed by Rhonda Padilla MD.      XR ABDOMEN FOR NG/OG/NE TUBE PLACEMENT   Final Result   Feeding tube is coiled, presumably within a hiatal hernia. Consider retraction    by approximately 8 cm with slight readvance . This report has been electronically signed by Rhonda Padilla MD.      XR ABDOMEN FOR NG/OG/NE TUBE PLACEMENT   Final Result      Nasogastric tube follows the expected contours of the esophagus with   distal tip overlying the air in the gastric lumen and distal sideholes   overlying the esophagogastric junction. US RETROPERITONEAL COMPLETE   Final Result      1. Renal cortical echogenicity suggestive spectrum of medical renal   disease bilaterally, without evidence of obstruction on either side. 2. The bladder wall is thickened, without intraluminal debris. This   can be a manifestation of cystitis or hypertrophic change. CT Head WO Contrast   Final Result      No evidence for acute intracranial process. Cortical atrophy and chronic periventricular microangiopathy. XR CHEST PORTABLE   Final Result      NO ACUTE CARDIOPULMONARY PROCESS             Assessment:    Active Problems:    Hypernatremia    Severe protein-calorie malnutrition (HCC)    Acute renal failure (HCC)    Thrombocytopenia (HCC)  Resolved Problems:    * No resolved hospital problems. *      Plan:  1.  Hypernatremia  - Sodium now 158,  Nephrology consulted, Urine studies reportedly ruled out DI   - Renal US without obstruction      2. CRIS  -Nephrology following, appreciate their recommendations.     3. Metabolic encephalopathy  -Unknown baseline, continue to monitor.     4. Hypokalemia   - K 3.8, appreciate nephrology replacement      5. H/o Seizure Disorder  -No seizure activity noted at this time.     6. Protein Calorie Malnutrition   -  Dobbhoff tube was placed     7. Thrombocytopenia  -Platelets are 29,  unknown etiology, possibly d/t infection      8. Decubitus Ulcer   - Wound care to follow, do not appear infected      9. Hypotension    - Intermittent, seems to be improving with fluids. Will continue to monitor      10. Fever  - Infiltrate mentioned on imaging to check NG placement. Repeat x-ray with only atelectisis, procal 0.17, no leukocytosis and CRP 3.6, still with fever 24 Hours Tmax 101. Unknown source of infection.  Discussed at length with Dr. Mauricio Matthew, Will check respiratory panel, ESR, Urine culture and some imaging        Electronically signed by Elsa Puga PA-C on 6/12/2020 at 9:55 AM

## 2020-06-12 NOTE — PROGRESS NOTES
6/12/2020  2:10 PM           Nutrition Assessment (Enteral Nutrition)    Type and Reason for Visit: Reassess    Nutrition Recommendations: Continue current TF and increase goal rate to 50 ml/hr. Also recommend add TF Modular - Wound Healing Modular (Milton) twice daily    TF RECOMMENDATION:  Standard TF with Fiber (Jevity 1.2) to goal rate 50 ml/hr and TF Modular-Wound Healing modular (Milton) BID and flushes per Renal  This will provide: 1200 ml, 1640 deedee, 67 g pro, 968 ml free water from TF     Nutrition Assessment: Pt has corepak and TF. Will continue to monitor and recommend rate to meet needs and recommend Wound Healing modular (Milton)    Malnutrition Assessment:  · Malnutrition Status: Meets the criteria for severe malnutrition  · Context: Chronic illness  · Findings of the 6 clinical characteristics of malnutrition (Minimum of 2 out of 6 clinical characteristics is required to make the diagnosis of moderate or severe Protein Calorie Malnutrition based on AND/ASPEN Guidelines):  1. Energy Intake-Less than or equal to 75% of estimated energy requirement, Unable to assess    2. Weight Loss-7.5% loss or greater, in 3 months  3. Fat Loss-Severe subcutaneous fat loss, Triceps  4. Muscle Loss-Severe muscle mass loss, Clavicles (pectoralis and deltoids)  5. Fluid Accumulation-No significant fluid accumulation,    6.  Strength-Not measured    Nutrition Risk Level:  Moderate    Nutrition Needs:  · Estimated Daily Total Kcal:  (MSJ x 1.35)  · Estimated Daily Protein (g): 60-75 (1.5-1.8 g/kg)  · Estimated Daily Fluid (ml/day):  (1 ml/deedee) or per Renal    Nutrition Diagnosis:   · Problem: Severe malnutrition, In context of chronic illness  · Etiology: related to Insufficient energy/nutrient consumption     Signs and symptoms:  as evidenced by Diet history of poor intake, NPO status due to medical condition, Weight loss greater than or equal to 7.5% in 3 months, Severe muscle loss, Severe loss of subcutaneous fat    Objective Information:  · Nutrition-Focused Physical Findings: contractures, NGT to TF/flushes, febrile, nonverbal, diarrhea, no edema, +I/O 3L  · Wound Type: Pressure Ulcer, Unstageable, Multiple(per wound care)  · Current Nutrition Therapies:  · Oral Diet Orders: NPO   · Tube Feeding (TF) Orders:   · Feeding Route: Nasogastric  · Formula: Standard w/Fiber  · Rate (ml/hr):30 ml/hr=goal rate    · Volume (ml/day): 720 ml/d  · Duration: Continuous  · Water Flushes: 50 ml/hr  · Current TF & Flush Orders Provides: at goal   · Goal TF & Flush Orders Provides: 864 deedee, 40 g pro, 1781 ml total free water  · Anthropometric Measures:  · Ht: 5' 5\" (165.1 cm)   · Current Body Wt: 105 lb (47.6 kg)(6/12 bedscale - wt gain w/+I/O 3L)  · Admission Body Wt: 89 lb (40.4 kg)(6/9 bedwt)  · Usual Body Wt: 110 lb (49.9 kg)(3 mo ago EMR)  · Weight Change: 19% loss in 3 mo   · Ideal Body Wt: 136 lb (61.7 kg), % Ideal Body 68% (admit)  ·     · BMI Classification: BMI <18.5 Underweight    Nutrition Interventions:   Modify current Tube Feeding(Gradually increase goal rate to 50 ml/hr and add Milton 2x/d)  Continued Inpatient Monitoring, Education not appropriate at this time    Nutrition Evaluation:   · Evaluation: Progressing toward goals   · Goals: Pt will tolerate EN at goal rate   · Monitoring: TF Intake, Skin Integrity, Wound Healing, I&O, Mental Status/Confusion, Weight, Pertinent Labs, Monitor Bowel Function, Diarrhea      Electronically signed by Jan Gibbs RD, CNSC, LD on 6/12/20 at 2:11 PM EDT    Contact Number: [phone]

## 2020-06-12 NOTE — PROGRESS NOTES
Nephrology Progress Note      Events Reviewed. Seen an examined at bedside. Sitter at bedside. Subjective: We are following Mr. Iqbal Factor for CRIS, hypernatremia, and hyperchloremia   no acute overnight events  I held his IV D5 water this a.m. Physical exam:     Vitals: BP (!) 119/51   Pulse 100   Temp 99 °F (37.2 °C) (Temporal)   Resp 16   Ht 5' 5\" (1.651 m)   Wt 105 lb 11.2 oz (47.9 kg)   SpO2 94%   BMI 17.59 kg/m²      Patient seen and examined today at bedside. Constitutional:  Elderly  male, appears in no distress, on room air  Skin: no rash, turgor wnl  Heent:  eomi, mmm, corpak in.   Neck: no bruits or jvd noted  Cardiovascular:  S1, S2 without m/r/g  Respiratory: CTA B without w/r/r, difficult exam  Abdomen:  +bs, soft, nt, nd, William catheter is in place draining clear urine  Ext:1+ lower extremity edema, contractures noted in all extremities  Psychiatric: Unable to assess, nonverbal    Data:   Labs:  CBC:   Lab Results   Component Value Date    WBC 6.5 06/12/2020    RBC 3.33 06/12/2020    HGB 10.8 06/12/2020    HCT 34.8 06/12/2020    .5 06/12/2020    MCH 32.4 06/12/2020    MCHC 31.0 06/12/2020    RDW 13.2 06/12/2020    PLT 38 06/12/2020    MPV NOT CALC 06/12/2020     CMP:    Lab Results   Component Value Date     06/12/2020    K 3.8 06/12/2020    K 3.9 03/30/2020     06/12/2020    CO2 21 06/12/2020    BUN 41 06/12/2020    CREATININE 1.0 06/12/2020    GFRAA >60 06/12/2020    LABGLOM >60 06/12/2020    GLUCOSE 112 06/12/2020    GLUCOSE 71 05/18/2012    PROT 7.8 06/08/2020    LABALBU 3.3 06/09/2020    LABALBU 4.5 05/18/2012    CALCIUM 7.9 06/12/2020    BILITOT 0.3 06/08/2020    ALKPHOS 90 06/08/2020    AST 30 06/08/2020    ALT 31 06/08/2020     BMP:    Lab Results   Component Value Date     06/12/2020    K 3.8 06/12/2020    K 3.9 03/30/2020     06/12/2020    CO2 21 06/12/2020    BUN 41 06/12/2020    LABALBU 3.3 06/09/2020    LABALBU 4.5 05/18/2012    CREATININE There is patchy hypoattenuation in the periventricular deep white matter bilaterally. There are calcifications within the carotid siphons and vertebrobasilar arterial systems. Mucosal thickening within the paranasal sinuses but no fluid levels are evident. Otherwise the brain parenchyma, CSF spaces, paranasal sinuses and mastoid air cells and surrounding soft tissue and osseous structures have a satisfactory appearance. The gray-white matter junction is otherwise preserved. The cerebellopontine angle and cisterns are unremarkable. The bony calvarium is negative for acute fracture or aggressive process. The radha and brainstem are unremarkable. No evidence for acute intracranial process. Cortical atrophy and chronic periventricular microangiopathy. Xr Chest Portable    Result Date: 2020  Patient MRN: [id number] : 1950 Age:  78 years Gender: Male Order Date: 2020 10:15 PM Exam: XR CHEST PORTABLE Number of Images: 1 view Indication:   possible sepsis possible sepsis Comparison: None. Findings: The lungs are clear. There is no evidence of pulmonary infiltrate or pleural effusion. The pulmonary vascularity is unremarkable. The cardiac, hilar and mediastinal silhouettes are satisfactory. There is uncoiling atherosclerotic change of thoracic ureter. There is a moderate-sized hiatal hernia. The bony thorax demonstrates osteopenia. NO ACUTE CARDIOPULMONARY PROCESS     US Retroperitoneal 20  The right kidney measures9.4 x 4.6 x 3.6 cm, and the left   kidney measures 9.1 x 5.3 x 4.3 cm.     1. Renal cortical echogenicity suggestive spectrum of medical renal   disease bilaterally, without evidence of obstruction on either side. 2. The bladder wall is thickened, without intraluminal debris.  This   can be a manifestation of cystitis or hypertrophic change     Problems resolved:   · Acute kidney injury secondary to severe prerenal azotemia from volume depletion, severe hypotension, Resolved- now 1.0.    Assessment  1. Severe hypernatremia secondary to poor p.o. intake and no access to free water as he is exclusively fed. Urine osmolality on admission was over 800 suggesting adequate ADH, ruling out central diabetes insipidus. Total calculated free water deficit improved to 3 L with IV D5 water infusion  2.  goal of correction is 8 to 10 mEq in a 24-hour. 3. Acute kidney injury secondary to severe prerenal azotemia from volume depletion, resolved now, creatinine returned to his baseline  4. Hyperchloremia secondary to volume depletion  5. Hypokalemia, dietary from poor potassium intake  6. Parkinson's disease on Sinemet  7. History of seizure activity with breakthrough seizures, on Keppra  8. Vitamin D deficiency, on Cholecalciferol   9. Hypocalcemia- Corrected calcium 8.1mg/dl- nutritional  10. Nutrition: TF at 30cc/hr. FW at 50cc/hr. Plan  1. Increase IV D5 water with 20 mEq of potassium chloride to 75 mL/h, continue with tube feed and free water flushes  2. Change BMP checks to every 8 hours now, nurse to call if the sodium is less than 150 or greater than 159  3. Discussed with Dr. Brianna Salgado about recurrent hypernatremia from lack of access to free water, he will benefit from a PEG tube placement. 4. Give 1 gm calcium gluconate x1.       Felisa Buerger, MD

## 2020-06-13 ENCOUNTER — APPOINTMENT (OUTPATIENT)
Dept: GENERAL RADIOLOGY | Age: 70
DRG: 682 | End: 2020-06-13
Payer: MEDICARE

## 2020-06-13 LAB
ANION GAP SERPL CALCULATED.3IONS-SCNC: 7 MMOL/L (ref 7–16)
ANION GAP SERPL CALCULATED.3IONS-SCNC: 7 MMOL/L (ref 7–16)
ANION GAP SERPL CALCULATED.3IONS-SCNC: 8 MMOL/L (ref 7–16)
BUN BLDV-MCNC: 27 MG/DL (ref 8–23)
BUN BLDV-MCNC: 31 MG/DL (ref 8–23)
BUN BLDV-MCNC: 33 MG/DL (ref 8–23)
CALCIUM SERPL-MCNC: 7.7 MG/DL (ref 8.6–10.2)
CALCIUM SERPL-MCNC: 8.1 MG/DL (ref 8.6–10.2)
CALCIUM SERPL-MCNC: 8.1 MG/DL (ref 8.6–10.2)
CHLORIDE BLD-SCNC: 112 MMOL/L (ref 98–107)
CHLORIDE BLD-SCNC: 118 MMOL/L (ref 98–107)
CHLORIDE BLD-SCNC: 122 MMOL/L (ref 98–107)
CO2: 24 MMOL/L (ref 22–29)
CREAT SERPL-MCNC: 0.8 MG/DL (ref 0.7–1.2)
CREAT SERPL-MCNC: 0.9 MG/DL (ref 0.7–1.2)
CREAT SERPL-MCNC: 0.9 MG/DL (ref 0.7–1.2)
GFR AFRICAN AMERICAN: >60
GFR NON-AFRICAN AMERICAN: >60 ML/MIN/1.73
GLUCOSE BLD-MCNC: 100 MG/DL (ref 74–99)
GLUCOSE BLD-MCNC: 108 MG/DL (ref 74–99)
GLUCOSE BLD-MCNC: 137 MG/DL (ref 74–99)
HCT VFR BLD CALC: 32.4 % (ref 37–54)
HEMOGLOBIN: 10.3 G/DL (ref 12.5–16.5)
MAGNESIUM: 2.2 MG/DL (ref 1.6–2.6)
MCH RBC QN AUTO: 32.2 PG (ref 26–35)
MCHC RBC AUTO-ENTMCNC: 31.8 % (ref 32–34.5)
MCV RBC AUTO: 101.3 FL (ref 80–99.9)
PDW BLD-RTO: 12.8 FL (ref 11.5–15)
PHOSPHORUS: 2.1 MG/DL (ref 2.5–4.5)
PLATELET # BLD: 45 E9/L (ref 130–450)
PLATELET CONFIRMATION: NORMAL
PMV BLD AUTO: 12.8 FL (ref 7–12)
POTASSIUM SERPL-SCNC: 3.5 MMOL/L (ref 3.5–5)
POTASSIUM SERPL-SCNC: 3.6 MMOL/L (ref 3.5–5)
POTASSIUM SERPL-SCNC: 3.8 MMOL/L (ref 3.5–5)
RBC # BLD: 3.2 E12/L (ref 3.8–5.8)
SODIUM BLD-SCNC: 144 MMOL/L (ref 132–146)
SODIUM BLD-SCNC: 149 MMOL/L (ref 132–146)
SODIUM BLD-SCNC: 153 MMOL/L (ref 132–146)
WBC # BLD: 5.5 E9/L (ref 4.5–11.5)

## 2020-06-13 PROCEDURE — 85027 COMPLETE CBC AUTOMATED: CPT

## 2020-06-13 PROCEDURE — 84100 ASSAY OF PHOSPHORUS: CPT

## 2020-06-13 PROCEDURE — 36415 COLL VENOUS BLD VENIPUNCTURE: CPT

## 2020-06-13 PROCEDURE — 6360000002 HC RX W HCPCS: Performed by: INTERNAL MEDICINE

## 2020-06-13 PROCEDURE — 2580000003 HC RX 258: Performed by: INTERNAL MEDICINE

## 2020-06-13 PROCEDURE — 83735 ASSAY OF MAGNESIUM: CPT

## 2020-06-13 PROCEDURE — 99233 SBSQ HOSP IP/OBS HIGH 50: CPT | Performed by: INTERNAL MEDICINE

## 2020-06-13 PROCEDURE — 2060000000 HC ICU INTERMEDIATE R&B

## 2020-06-13 PROCEDURE — 80048 BASIC METABOLIC PNL TOTAL CA: CPT

## 2020-06-13 PROCEDURE — 87040 BLOOD CULTURE FOR BACTERIA: CPT

## 2020-06-13 PROCEDURE — 2580000003 HC RX 258: Performed by: SPECIALIST

## 2020-06-13 PROCEDURE — 6360000002 HC RX W HCPCS: Performed by: SPECIALIST

## 2020-06-13 PROCEDURE — 6370000000 HC RX 637 (ALT 250 FOR IP): Performed by: INTERNAL MEDICINE

## 2020-06-13 PROCEDURE — APPSS30 APP SPLIT SHARED TIME 16-30 MINUTES: Performed by: PHYSICIAN ASSISTANT

## 2020-06-13 PROCEDURE — 99222 1ST HOSP IP/OBS MODERATE 55: CPT | Performed by: TRANSPLANT SURGERY

## 2020-06-13 PROCEDURE — 71045 X-RAY EXAM CHEST 1 VIEW: CPT

## 2020-06-13 RX ORDER — POTASSIUM CHLORIDE 7.45 MG/ML
10 INJECTION INTRAVENOUS ONCE
Status: COMPLETED | OUTPATIENT
Start: 2020-06-13 | End: 2020-06-13

## 2020-06-13 RX ORDER — SODIUM CHLORIDE 9 MG/ML
INJECTION, SOLUTION INTRAVENOUS ONCE
Status: COMPLETED | OUTPATIENT
Start: 2020-06-13 | End: 2020-06-13

## 2020-06-13 RX ADMIN — POTASSIUM CHLORIDE 10 MEQ: 7.46 INJECTION, SOLUTION INTRAVENOUS at 13:07

## 2020-06-13 RX ADMIN — CARBIDOPA AND LEVODOPA 1 TABLET: 25; 100 TABLET ORAL at 20:13

## 2020-06-13 RX ADMIN — CARBIDOPA AND LEVODOPA 1 TABLET: 25; 100 TABLET ORAL at 08:31

## 2020-06-13 RX ADMIN — CALCIUM GLUCONATE 1 G: 98 INJECTION, SOLUTION INTRAVENOUS at 12:06

## 2020-06-13 RX ADMIN — SODIUM CHLORIDE: 9 INJECTION, SOLUTION INTRAVENOUS at 19:30

## 2020-06-13 RX ADMIN — POTASSIUM CHLORIDE AND DEXTROSE MONOHYDRATE 75 ML/HR: 150; 5 INJECTION, SOLUTION INTRAVENOUS at 06:28

## 2020-06-13 RX ADMIN — CEFEPIME HYDROCHLORIDE 1 G: 1 INJECTION, POWDER, FOR SOLUTION INTRAMUSCULAR; INTRAVENOUS at 16:31

## 2020-06-13 RX ADMIN — POTASSIUM CHLORIDE AND DEXTROSE MONOHYDRATE 75 ML/HR: 150; 5 INJECTION, SOLUTION INTRAVENOUS at 20:12

## 2020-06-13 RX ADMIN — LEVETIRACETAM 1000 MG: 100 INJECTION, SOLUTION INTRAVENOUS at 06:36

## 2020-06-13 RX ADMIN — LEVETIRACETAM 1000 MG: 100 INJECTION, SOLUTION INTRAVENOUS at 17:30

## 2020-06-13 ASSESSMENT — PAIN SCALES - WONG BAKER: WONGBAKER_NUMERICALRESPONSE: 0

## 2020-06-13 ASSESSMENT — PAIN SCALES - GENERAL: PAINLEVEL_OUTOF10: 0

## 2020-06-13 NOTE — PROGRESS NOTES
[facility] AT [facility]   Progress Note    Admitting Date and Time: 6/8/2020  9:58 PM  Admit Dx: Hypernatremia [E87.0]  Hypernatremia [E87.0]  Hypernatremia [E87.0]    Subjective:    Patient was admitted with Hypernatremia [E87.0]  Hypernatremia [E87.0]  Hypernatremia [E87.0]. Patient nonverbal, lying in bed in no acute distress. Per RN: No acute events overnight    ROS: Not obtained secondary to mental status.      carbidopa-levodopa  1 tablet Oral BID    levetiracetam  1,000 mg Intravenous Q12H    sodium chloride flush  10 mL Intravenous 2 times per day    sodium chloride flush  10 mL Intravenous 2 times per day     sodium chloride flush, 10 mL, PRN  acetaminophen, 650 mg, Q6H PRN    Or  acetaminophen, 650 mg, Q6H PRN  polyethylene glycol, 17 g, Daily PRN  promethazine, 12.5 mg, Q6H PRN    Or  ondansetron, 4 mg, Q6H PRN  sodium chloride flush, 10 mL, PRN         Objective:    BP (!) 122/46   Pulse 85   Temp 98.8 °F (37.1 °C) (Axillary)   Resp 16   Ht 5' 5\" (1.651 m)   Wt 106 lb 14.4 oz (48.5 kg)   SpO2 99%   BMI 17.79 kg/m²   General Appearance: in no acute distress and alert  Skin: warm and dry, no rash or erythema  Pulmonary/Chest: clear to auscultation bilaterally- no wheezes, rales or rhonchi, normal air movement, no respiratory distress  Cardiovascular: normal rate, regular rhythm, normal S1 and S2, no murmurs, no gallops and no JVD  Abdomen: soft, non-tender, non-distended, normal bowel sounds, no masses or organomegaly  Extremities: no cyanosis, no clubbing and no edema, areas of erythema on bilateral lower extremities appear to be improving      Recent Labs     06/12/20  1430 06/12/20  2350 06/13/20  0856   * 153* 149*   K 3.7 3.6 3.5   * 122* 118*   CO2 23 24 24   BUN 38* 33* 31*   CREATININE 1.0 0.9 0.9   GLUCOSE 97 108* 137*   CALCIUM 8.2* 8.1* 7.7*       Recent Labs     06/12/20  0850 06/13/20  0856   WBC 6.5 5.5   RBC 3.33* 3.20*   HGB 10.8* 10.3*   HCT 34.8* although the   inferior margin of the small hiatus hernia is not as well seen. Advancing the tube 3 to 4 cm should determine whether the tip is in   the stomach or the inferior margin of the hiatus hernia. Subtle density in the right lower lung may be artifact related to   expiratory imaging or could represent subtle interstitial disease   without effusion. Otherwise unremarkable. Tonye Cogan ALERT:  THIS IS AN ABNORMAL REPORT-tube position is equivocal, likely   in the stomach, but the weighted portion could be in the inferior   margin of the hiatus hernia. Advancing the tube 4 5 cm with advanced   the weighted portion into the stomach if it is in the hiatus hernia,   and would confirm a distal gastric position if it is in the stomach. XR Chest Abdomen Ng Placement   Final Result   Nasogastric tube appears to be malpositioned, with the tip likely in   the right lower lobe bronchus. Findings were conveyed to patient's   nurse, Ms. Deon Badillo RN 10:24  a.m. on 6/20/2020. She was aware   of the findings and had removed the NG tube. XR ABDOMEN FOR NG/OG/NE TUBE PLACEMENT   Final Result   Feeding tube is traceable to the stomach. This report has been electronically signed by Steve Plummer MD.      XR ABDOMEN FOR NG/OG/NE TUBE PLACEMENT   Final Result   Feeding tube is coiled, presumably within a hiatal hernia. Consider retraction    by approximately 8 cm with slight readvance . This report has been electronically signed by Steve Plummer MD.      XR ABDOMEN FOR NG/OG/NE TUBE PLACEMENT   Final Result      Nasogastric tube follows the expected contours of the esophagus with   distal tip overlying the air in the gastric lumen and distal sideholes   overlying the esophagogastric junction. US RETROPERITONEAL COMPLETE   Final Result      1. Renal cortical echogenicity suggestive spectrum of medical renal   disease bilaterally, without evidence of obstruction on either side.    2. The bladder wall is thickened, without intraluminal debris. This   can be a manifestation of cystitis or hypertrophic change. CT Head WO Contrast   Final Result      No evidence for acute intracranial process. Cortical atrophy and chronic periventricular microangiopathy. XR CHEST PORTABLE   Final Result      NO ACUTE CARDIOPULMONARY PROCESS             Assessment:    Active Problems:    Hypernatremia    Severe protein-calorie malnutrition (HCC)    Acute renal failure (HCC)    Thrombocytopenia (HCC)    Fever of unknown origin  Resolved Problems:    * No resolved hospital problems. *      Plan:  1. Hypernatremia  - Sodium now 149,  Nephrology consulted, Urine studies reportedly ruled out DI   - Renal US without obstruction      2. CRIS  -Nephrology following, appreciate their recommendations.     3. Metabolic encephalopathy  -Unknown baseline, continue to monitor. 4. Hypokalemia   - K 3.5, continue to monitor     5. H/o Seizure Disorder  -No seizure activity noted at this time. 6. Protein Calorie Malnutrition   -  Dobbhoff tube was placed. General surgery consulted regarding PEG tube placement. 7. Thrombocytopenia  -Platelets slightly improved at 45, unknown etiology, possibly d/t infection     8. Decubitus Ulcer   - Wound care to follow, do not appear infected.       9. Hypotension    - Intermittent, seems to be improving with fluids. Will continue to monitor      10. Urinary tract infection  Urine cultures growing Proteus, ID was consulted. Appreciate their recommendations. 11.  Possible pneumonia with pleural effusion  -Infiltrate noted on CT of the chest, ID following. Appreciate their recommendations regarding antibiotics.  -Considering pulmonary consult. 12.  Gall[place]  -General surgery was consulted, no intervention needed at this time. 13.  Possible cellulitis of the foot  -ID consulted, appreciate their recommendations.     Electronically signed by Braxton Zamorano PA-C on 6/13/2020 at 10:39 AM

## 2020-06-13 NOTE — PROGRESS NOTES
Nephrology Progress Note      Events Reviewed. Seen an examined at bedside. Sitter at bedside. Subjective: We are following Mr. Claudio Lines for CRIS, hypernatremia, and hyperchloremia   no acute overnight events  Physical exam:     Vitals: BP (!) 122/46   Pulse 85   Temp 98.8 °F (37.1 °C) (Axillary)   Resp 16   Ht 5' 5\" (1.651 m)   Wt 106 lb 14.4 oz (48.5 kg)   SpO2 99%   BMI 17.79 kg/m²      Patient seen and examined today at bedside. Constitutional:  Elderly  male, appears in no distress, on room air  Skin: no rash, turgor wnl  Heent:  eomi, mmm, corpak in.   Neck: no bruits or jvd noted  Cardiovascular:  S1, S2 without m/r/g  Respiratory: CTA B without w/r/r, difficult exam  Abdomen:  +bs, soft, nt, nd, William catheter is in place draining clear urine  Ext:1+ lower extremity edema, contractures noted in all extremities  Psychiatric: Unable to assess, nonverbal    Data:   Labs:  CBC:   Lab Results   Component Value Date    WBC 5.5 06/13/2020    RBC 3.20 06/13/2020    HGB 10.3 06/13/2020    HCT 32.4 06/13/2020    .3 06/13/2020    MCH 32.2 06/13/2020    MCHC 31.8 06/13/2020    RDW 12.8 06/13/2020    PLT 45 06/13/2020    MPV 12.8 06/13/2020     CMP:    Lab Results   Component Value Date     06/13/2020    K 3.5 06/13/2020    K 3.9 03/30/2020     06/13/2020    CO2 24 06/13/2020    BUN 31 06/13/2020    CREATININE 0.9 06/13/2020    GFRAA >60 06/13/2020    LABGLOM >60 06/13/2020    GLUCOSE 137 06/13/2020    GLUCOSE 71 05/18/2012    PROT 5.5 06/12/2020    LABALBU 2.7 06/12/2020    LABALBU 4.5 05/18/2012    CALCIUM 7.7 06/13/2020    BILITOT 0.3 06/12/2020    ALKPHOS 73 06/12/2020    AST 73 06/12/2020    ALT 17 06/12/2020     BMP:    Lab Results   Component Value Date     06/13/2020    K 3.5 06/13/2020    K 3.9 03/30/2020     06/13/2020    CO2 24 06/13/2020    BUN 31 06/13/2020    LABALBU 2.7 06/12/2020    LABALBU 4.5 05/18/2012    CREATININE 0.9 06/13/2020    CALCIUM 7.7 06/13/2020    GFRAA >60 06/13/2020    LABGLOM >60 06/13/2020    GLUCOSE 137 06/13/2020    GLUCOSE 71 05/18/2012     Calcium:    Lab Results   Component Value Date    CALCIUM 7.7 06/13/2020     Phosphorus:    Lab Results   Component Value Date    PHOS 4.5 06/09/2020     Uric Acid:  No results found for: URICACID  U/A:    Lab Results   Component Value Date    NITRU Negative 06/12/2020    COLORU Yellow 06/12/2020    PHUR 7.5 06/12/2020    WBCUA >20 06/12/2020    RBCUA 5-10 06/12/2020    RBCUA NONE 10/08/2012    BACTERIA MANY 06/12/2020    CLARITYU SL CLOUDY 06/12/2020    SPECGRAV 1.020 06/12/2020    LEUKOCYTESUR SMALL 06/12/2020    UROBILINOGEN 0.2 06/12/2020    BILIRUBINUR Negative 06/12/2020    BILIRUBINUR NEGATIVE 09/18/2011    BLOODU MODERATE 06/12/2020    GLUCOSEU Negative 06/12/2020    GLUCOSEU NEGATIVE 09/18/2011    KETUA Negative 06/12/2020    AMORPHOUS FEW 06/08/2020      Results for Rita Rivera (MRN [id number]) as of 6/9/2020 09:11   Ref. Range 6/8/2020 22:54   Chloride Latest Ref Range: Not Established mmol/L <20   Creatinine, Ur Latest Ref Range: 40 - 278 mg/dL 182   Osmolality, Ur Latest Ref Range: 300 - 900 mOsm/kg 742   Potassium, Ur Latest Ref Range: Not Established mmol/L 73.2   Sodium, Ur Latest Ref Range: Not Established mmol/L 39       Imaging:  Ct Head Wo Contrast    Result Date: 6/8/2020  Clinical indications: Altered mental status. COMPARISON: March 28, 2020. January 28, 2020 Exposure control: This examination and all examinations utilizing ionizing radiation at this facility done so according to the ALARA (as low as reasonably achievable) principal for radiation dose reduction. Technique: Axial, sagittal and coronal computed tomography of the brain and calvarium was performed without contrast. FINDINGS: There is no evidence for acute intracranial hemorrhage, midline shift or mass effect. There is enlargement of the ventricles, sulci and cisterns bilaterally.  There is patchy hypoattenuation in hypernatremia secondary to poor p.o. intake and no access to free water as he is exclusively fed. Urine osmolality on admission was over 800 suggesting adequate ADH, ruling out central diabetes insipidus. Total calculated free water deficit improved to 3 L with IV D5 water infusion  2.  goal of correction is 8 to 10 mEq in a 24-hour. 3. Acute kidney injury secondary to severe prerenal azotemia from volume depletion, resolved now, creatinine returned to his baseline  4. Hyperchloremia secondary to volume depletion  5. Hypokalemia, dietary from poor potassium intake  6. Parkinson's disease on Sinemet  7. History of seizure activity with breakthrough seizures, on Keppra  8. Vitamin D deficiency, on Cholecalciferol   9. Hypocalcemia- Corrected calcium 8.1mg/dl- nutritional  10. Nutrition: TF at 30cc/hr. FW at 50cc/hr. Plan  1. Continue IV D5 water with 20 mEq of potassium chloride to 75 mL/h, continue with tube feed and free water flushes but decrease to 40 cc/hr  2. Continue BMP checks to every 8 hours now, nurse to call if the sodium is less than 145 or greater than 150  3. He will benefit from a PEG tube placement.   4. Give 1 gm calcium gluconate x1 and check amgnesium level    Discussed with JAZLYN Iyer MD

## 2020-06-13 NOTE — CONSULTS
Laterality Date    TOE AMPUTATION Left     2nd/3rd     Current Medications:   Scheduled Meds:   calcium gluconate IVPB  1 g Intravenous Once    potassium chloride  10 mEq Intravenous Once    carbidopa-levodopa  1 tablet Oral BID    levetiracetam  1,000 mg Intravenous Q12H    sodium chloride flush  10 mL Intravenous 2 times per day    sodium chloride flush  10 mL Intravenous 2 times per day     Continuous Infusions:   dextrose 5 % with KCl 20 mEq 75 mL/hr (06/13/20 0628)     PRN Meds:sodium chloride flush, acetaminophen **OR** acetaminophen, polyethylene glycol, promethazine **OR** ondansetron, sodium chloride flush    Allergies:  Patient has no known allergies. Social History:   Social History     Socioeconomic History    Marital status: Single     Spouse name: None    Number of children: None    Years of education: None    Highest education level: None   Occupational History    None   Social Needs    Financial resource strain: None    Food insecurity     Worry: None     Inability: None    Transportation needs     Medical: None     Non-medical: None   Tobacco Use    Smoking status: Never Smoker    Smokeless tobacco: Never Used   Substance and Sexual Activity    Alcohol use: No    Drug use: No    Sexual activity: Not Currently   Lifestyle    Physical activity     Days per week: None     Minutes per session: None    Stress: None   Relationships    Social connections     Talks on phone: None     Gets together: None     Attends [employer] service: None     Active member of club or organization: None     Attends meetings of clubs or organizations: None     Relationship status: None    Intimate partner violence     Fear of current or ex partner: None     Emotionally abused: None     Physically abused: None     Forced sexual activity: None   Other Topics Concern    None   Social History Narrative    None      [place] resident    Family History:   History reviewed. No pertinent family history. Rasheed Anderson Otherwise non-pertinent to the chief complaint. REVIEW OF SYSTEMS:    The patient has mental retardation and is nonverbal.  A 10 point review of systems could not be obtained from the patient. Otherwise, as in the HPI    PHYSICAL EXAM:    Vitals:   BP (!) 122/46   Pulse 85   Temp 98.8 °F (37.1 °C) (Axillary)   Resp 16   Ht 5' 5\" (1.651 m)   Wt 106 lb 14.4 oz (48.5 kg)   SpO2 99%   BMI 17.79 kg/m²   Constitutional: The patient is lying in bed. He is nonverbal.  He does not move spontaneously and does not respond. Slightly contracted. Skin: Warm and dry. No rashes were noted. HEENT: Eyes show round, and reactive pupils. No jaundice. Moist mucous membranes, no ulcerations, no thrush. NG tube. Neck: No lymphadenopathy. Mildly supple. Patient is contracted. Chest: No use of accessory muscles to breathe. Symmetrical expansion. Auscultation reveals no wheezing, crackles, or rhonchi. Cardiovascular: S1 and S2 are rhythmic and regular. No murmurs appreciated. Abdomen: Positive bowel sounds to auscultation. Benign to palpation. No masses felt. No hepatosplenomegaly. Extremities: Contracted. No edema. Deformity noted on both feet with severe hallux valgus and fusion of a couple of toes. Back: Stage II decubitus, shallow ulcers on coccyx. Lines: peripheral  William catheter.     CBC+dif:  Recent Labs     06/12/20  0850 06/13/20  0856   WBC 6.5 5.5   HGB 10.8* 10.3*   HCT 34.8* 32.4*   .5* 101.3*   PLT 38* 45*     Lab Results   Component Value Date    CRP 3.6 (H) 06/12/2020    CRP 0.1 03/12/2020    CRP 0.2 02/06/2015      No results found for: CRPHS  Lab Results   Component Value Date    SEDRATE 14 06/12/2020    SEDRATE 5 03/12/2020    SEDRATE 8 02/06/2015     Lab Results   Component Value Date    ALT 17 06/12/2020    AST 73 (H) 06/12/2020    ALKPHOS 73 06/12/2020    BILITOT 0.3 06/12/2020     Lab Results   Component Value Date     06/13/2020    K 3.5 06/13/2020    K 3.9 03/30/2020    CL 118 06/13/2020    CO2 24 06/13/2020    BUN 31 06/13/2020    CREATININE 0.9 06/13/2020    GFRAA >60 06/13/2020    LABGLOM >60 06/13/2020    GLUCOSE 137 06/13/2020    GLUCOSE 71 05/18/2012    PROT 5.5 06/12/2020    LABALBU 2.7 06/12/2020    LABALBU 4.5 05/18/2012    CALCIUM 7.7 06/13/2020    BILITOT 0.3 06/12/2020    ALKPHOS 73 06/12/2020    AST 73 06/12/2020    ALT 17 06/12/2020       Lab Results   Component Value Date    PROTIME 16.6 06/08/2020    INR 1.4 06/08/2020       Lab Results   Component Value Date    TSH 1.700 02/04/2020       Lab Results   Component Value Date    COLORU Yellow 06/12/2020    PHUR 7.5 06/12/2020    WBCUA >20 06/12/2020    RBCUA 5-10 06/12/2020    RBCUA NONE 10/08/2012    BACTERIA MANY 06/12/2020    CLARITYU SL CLOUDY 06/12/2020    SPECGRAV 1.020 06/12/2020    LEUKOCYTESUR SMALL 06/12/2020    UROBILINOGEN 0.2 06/12/2020    BILIRUBINUR Negative 06/12/2020    BILIRUBINUR NEGATIVE 09/18/2011    BLOODU MODERATE 06/12/2020    GLUCOSEU Negative 06/12/2020    GLUCOSEU NEGATIVE 09/18/2011    AMORPHOUS FEW 06/08/2020       Lab Results   Component Value Date    HCO3 21.4 06/08/2020    BE -4.6 06/08/2020    O2SAT 93.3 06/08/2020    PH 7.317 06/08/2020    PCO2 42.7 06/08/2020    PO2 76.8 06/08/2020     Radiology:  Noted    Microbiology:  Pending  No results for input(s): BC in the last 72 hours. Recent Labs     06/12/20  1525   ORG Proteus mirabilis*     No results for input(s): BLOODCULT2 in the last 72 hours. No results for input(s): STREPNEUMAGU in the last 72 hours. No results for input(s): LP1UAG in the last 72 hours. Recent Labs     06/12/20  1430   *     No results for input(s): CULTRESP in the last 72 hours.   Recent Labs     06/12/20  0237   PROCAL 0.17*       Assessment:  · Acute dehydration with volume contraction  · Severe hyponatremia  · Acute kidney injury associated to the above  · Probable complicated UTI secondary to the above    Plan:    · We will give him 1 g of Cefepime  · Repeat blood cultures, baseline ESR, CRP  · Monitor labs  · Will follow with you    Thank you for having us see this patient in consultation.   Discussed with hospitalist.    Jocelyn Meza  12:38 PM  6/13/2020

## 2020-06-13 NOTE — H&P
06/13/20 3305    sodium chloride flush 0.9 % injection 10 mL  10 mL Intravenous 2 times per day Dereje Dewitt MD   10 mL at 06/09/20 0945    sodium chloride flush 0.9 % injection 10 mL  10 mL Intravenous PRN Sharri Arellano MD   10 mL at 06/09/20 2328    acetaminophen (TYLENOL) tablet 650 mg  650 mg Oral Q6H PRN Dereje Dewitt MD   650 mg at 06/11/20 2152    Or    acetaminophen (TYLENOL) suppository 650 mg  650 mg Rectal Q6H PRN Dereje Dewitt MD   650 mg at 06/11/20 1531    polyethylene glycol (GLYCOLAX) packet 17 g  17 g Oral Daily PRN Sharri Arellano MD        promethazine (PHENERGAN) tablet 12.5 mg  12.5 mg Oral Q6H PRN Sharri Arellano MD        Or    ondansetron (ZOFRAN) injection 4 mg  4 mg Intravenous Q6H PRN Sharri Arellano MD        sodium chloride flush 0.9 % injection 10 mL  10 mL Intravenous 2 times per day Suzanne Zuñiga, DO   10 mL at 06/12/20 0852    sodium chloride flush 0.9 % injection 10 mL  10 mL Intravenous PRN Suzanne Escamillaguson, DO   10 mL at 06/09/20 0640       No Known Allergies    History reviewed. No pertinent family history.     Social History     Socioeconomic History    Marital status: Single     Spouse name: Not on file    Number of children: Not on file    Years of education: Not on file    Highest education level: Not on file   Occupational History    Not on file   Social Needs    Financial resource strain: Not on file    Food insecurity     Worry: Not on file     Inability: Not on file    Transportation needs     Medical: Not on file     Non-medical: Not on file   Tobacco Use    Smoking status: Never Smoker    Smokeless tobacco: Never Used   Substance and Sexual Activity    Alcohol use: No    Drug use: No    Sexual activity: Not Currently   Lifestyle    Physical activity     Days per week: Not on file     Minutes per session: Not on file    Stress: Not on file   Relationships    Social connections     Talks on phone: Not on file     Gets together: Not on file     Attends Orthodox service: Not on file     Active member of club or organization: Not on file     Attends meetings of clubs or organizations: Not on file     Relationship status: Not on file    Intimate partner violence     Fear of current or ex partner: Not on file     Emotionally abused: Not on file     Physically abused: Not on file     Forced sexual activity: Not on file   Other Topics Concern    Not on file   Social History Narrative    Not on file       ROS:   Unable to perform secondary to patient being non verbal;    Physical Exam:  Vitals:    06/13/20 0830   BP: (!) 122/46   Pulse: 85   Resp: 16   Temp: 98.8 °F (37.1 °C)   SpO2: 99%       PSYCH: mood and affect normal  CONSTITUTIONAL: No apparent distress, comfortable  EYES: Sclera white, pupils equal round and reactive to light  ENMT:  Hearing normal, trachea midline, ears externally intact  LYMPH: no lympadenopathy in neck. Nolympadenopathy in groins  RESP: Breath sounds were clear and equal with no rales, wheezes, or rhonchi. Respiratory effort was normal with no retractions or use of accessory muscles. CV: Heart soundswere normal with a regular rate and rhythm. No pedal edema  GI/ Abdomen: Soft, nondistended, nontender, no guarding, no peritoneal signs      MSK: no clubbing/ no cyanosis/ gaitnormal       Assessment/Plan:  Chronic cholecystitis secondary to cholelithiasis, dysphagia  - I reviewed his CT scan and compared it to his CTA chest from 2016 which includes his gallbladder  - his gallbladder is unchanged from then and appears the same  - will plan for PEG tube insertion with Dr. Luz Stovall Monday or Tuesday pending scheduling    Thank you for the consultation allowing me to take part in Mr. Sadie Palacio care.      Susan Lee M.D.  6/13/2020  12:26 PM

## 2020-06-13 NOTE — PLAN OF CARE
Problem: Falls - Risk of:  Goal: Will remain free from falls  Description: Will remain free from falls  Outcome: Met This Shift  Goal: Absence of physical injury  Description: Absence of physical injury  Outcome: Met This Shift     Problem: Injury - Risk of, Physical Injury:  Goal: Will remain free from falls  Description: Will remain free from falls  Outcome: Met This Shift  Goal: Absence of physical injury  Description: Absence of physical injury  Outcome: Met This Shift     Problem: Mood - Altered:  Goal: Absence of abusive behavior  Description: Absence of abusive behavior  Outcome: Met This Shift

## 2020-06-13 NOTE — PLAN OF CARE
Problem: Falls - Risk of:  Goal: Will remain free from falls  Description: Will remain free from falls  Outcome: Met This Shift     Problem: Falls - Risk of:  Goal: Absence of physical injury  Description: Absence of physical injury  Outcome: Met This Shift     Problem: Confusion - Acute:  Goal: Mental status will be restored to baseline  Description: Mental status will be restored to baseline  Outcome: Met This Shift     Problem: Injury - Risk of, Physical Injury:  Goal: Will remain free from falls  Description: Will remain free from falls  Outcome: Met This Shift     Problem: Injury - Risk of, Physical Injury:  Goal: Absence of physical injury  Description: Absence of physical injury  Outcome: Met This Shift

## 2020-06-14 ENCOUNTER — ANESTHESIA EVENT (OUTPATIENT)
Dept: INPATIENT UNIT | Age: 70
End: 2020-06-14

## 2020-06-14 ENCOUNTER — ANESTHESIA (OUTPATIENT)
Dept: INPATIENT UNIT | Age: 70
End: 2020-06-14

## 2020-06-14 LAB
ALBUMIN SERPL-MCNC: 2.6 G/DL (ref 3.5–5.2)
ALP BLD-CCNC: 76 U/L (ref 40–129)
ALT SERPL-CCNC: 38 U/L (ref 0–40)
ANION GAP SERPL CALCULATED.3IONS-SCNC: 7 MMOL/L (ref 7–16)
ANION GAP SERPL CALCULATED.3IONS-SCNC: 8 MMOL/L (ref 7–16)
AST SERPL-CCNC: 82 U/L (ref 0–39)
BILIRUB SERPL-MCNC: 0.4 MG/DL (ref 0–1.2)
BLOOD CULTURE, ROUTINE: NORMAL
BUN BLDV-MCNC: 21 MG/DL (ref 8–23)
BUN BLDV-MCNC: 23 MG/DL (ref 8–23)
C-REACTIVE PROTEIN: 4.2 MG/DL (ref 0–0.4)
CALCIUM SERPL-MCNC: 7.7 MG/DL (ref 8.6–10.2)
CALCIUM SERPL-MCNC: 8.3 MG/DL (ref 8.6–10.2)
CHLORIDE BLD-SCNC: 110 MMOL/L (ref 98–107)
CHLORIDE BLD-SCNC: 111 MMOL/L (ref 98–107)
CO2: 24 MMOL/L (ref 22–29)
CO2: 25 MMOL/L (ref 22–29)
CREAT SERPL-MCNC: 0.8 MG/DL (ref 0.7–1.2)
CREAT SERPL-MCNC: 0.9 MG/DL (ref 0.7–1.2)
CULTURE, BLOOD 2: NORMAL
GFR AFRICAN AMERICAN: >60
GFR AFRICAN AMERICAN: >60
GFR NON-AFRICAN AMERICAN: >60 ML/MIN/1.73
GFR NON-AFRICAN AMERICAN: >60 ML/MIN/1.73
GLUCOSE BLD-MCNC: 127 MG/DL (ref 74–99)
GLUCOSE BLD-MCNC: 88 MG/DL (ref 74–99)
HCT VFR BLD CALC: 36.2 % (ref 37–54)
HEMOGLOBIN: 11.7 G/DL (ref 12.5–16.5)
MCH RBC QN AUTO: 31.8 PG (ref 26–35)
MCHC RBC AUTO-ENTMCNC: 32.3 % (ref 32–34.5)
MCV RBC AUTO: 98.4 FL (ref 80–99.9)
ORGANISM: ABNORMAL
PDW BLD-RTO: 12.3 FL (ref 11.5–15)
PLATELET # BLD: 74 E9/L (ref 130–450)
PLATELET CONFIRMATION: NORMAL
PMV BLD AUTO: 13.1 FL (ref 7–12)
POTASSIUM SERPL-SCNC: 3.8 MMOL/L (ref 3.5–5)
POTASSIUM SERPL-SCNC: 4.2 MMOL/L (ref 3.5–5)
RBC # BLD: 3.68 E12/L (ref 3.8–5.8)
SEDIMENTATION RATE, ERYTHROCYTE: 43 MM/HR (ref 0–15)
SODIUM BLD-SCNC: 141 MMOL/L (ref 132–146)
SODIUM BLD-SCNC: 144 MMOL/L (ref 132–146)
TOTAL PROTEIN: 6.1 G/DL (ref 6.4–8.3)
URINE CULTURE, ROUTINE: ABNORMAL
VITAMIN D 25-HYDROXY: 17 NG/ML (ref 30–100)
WBC # BLD: 6 E9/L (ref 4.5–11.5)

## 2020-06-14 PROCEDURE — 86140 C-REACTIVE PROTEIN: CPT

## 2020-06-14 PROCEDURE — 82306 VITAMIN D 25 HYDROXY: CPT

## 2020-06-14 PROCEDURE — 2580000003 HC RX 258: Performed by: INTERNAL MEDICINE

## 2020-06-14 PROCEDURE — 2060000000 HC ICU INTERMEDIATE R&B

## 2020-06-14 PROCEDURE — 85027 COMPLETE CBC AUTOMATED: CPT

## 2020-06-14 PROCEDURE — 36415 COLL VENOUS BLD VENIPUNCTURE: CPT

## 2020-06-14 PROCEDURE — 99233 SBSQ HOSP IP/OBS HIGH 50: CPT | Performed by: INTERNAL MEDICINE

## 2020-06-14 PROCEDURE — 2500000003 HC RX 250 WO HCPCS: Performed by: INTERNAL MEDICINE

## 2020-06-14 PROCEDURE — 6360000002 HC RX W HCPCS: Performed by: INTERNAL MEDICINE

## 2020-06-14 PROCEDURE — 85651 RBC SED RATE NONAUTOMATED: CPT

## 2020-06-14 PROCEDURE — 6360000002 HC RX W HCPCS: Performed by: SPECIALIST

## 2020-06-14 PROCEDURE — 6370000000 HC RX 637 (ALT 250 FOR IP): Performed by: INTERNAL MEDICINE

## 2020-06-14 PROCEDURE — 80053 COMPREHEN METABOLIC PANEL: CPT

## 2020-06-14 PROCEDURE — APPSS30 APP SPLIT SHARED TIME 16-30 MINUTES: Performed by: PHYSICIAN ASSISTANT

## 2020-06-14 PROCEDURE — 2580000003 HC RX 258: Performed by: SPECIALIST

## 2020-06-14 PROCEDURE — 80048 BASIC METABOLIC PNL TOTAL CA: CPT

## 2020-06-14 RX ADMIN — LEVETIRACETAM 1000 MG: 100 INJECTION, SOLUTION INTRAVENOUS at 17:48

## 2020-06-14 RX ADMIN — POTASSIUM PHOSPHATE, MONOBASIC AND POTASSIUM PHOSPHATE, DIBASIC 10 MMOL: 224; 236 INJECTION, SOLUTION, CONCENTRATE INTRAVENOUS at 11:57

## 2020-06-14 RX ADMIN — CARBIDOPA AND LEVODOPA 1 TABLET: 25; 100 TABLET ORAL at 22:06

## 2020-06-14 RX ADMIN — WATER 1 G: 1 INJECTION INTRAMUSCULAR; INTRAVENOUS; SUBCUTANEOUS at 17:48

## 2020-06-14 RX ADMIN — Medication 10 ML: at 22:10

## 2020-06-14 RX ADMIN — CARBIDOPA AND LEVODOPA 1 TABLET: 25; 100 TABLET ORAL at 07:46

## 2020-06-14 RX ADMIN — LEVETIRACETAM 1000 MG: 100 INJECTION, SOLUTION INTRAVENOUS at 06:18

## 2020-06-14 ASSESSMENT — PAIN SCALES - GENERAL
PAINLEVEL_OUTOF10: 0

## 2020-06-14 ASSESSMENT — PAIN SCALES - WONG BAKER
WONGBAKER_NUMERICALRESPONSE: 0

## 2020-06-14 NOTE — PLAN OF CARE
Problem: Falls - Risk of:  Goal: Will remain free from falls  Description: Will remain free from falls  Outcome: Met This Shift  Goal: Absence of physical injury  Description: Absence of physical injury  Outcome: Met This Shift     Problem: Injury - Risk of, Physical Injury:  Goal: Will remain free from falls  Description: Will remain free from falls  Outcome: Met This Shift  Goal: Absence of physical injury  Description: Absence of physical injury  Outcome: Met This Shift     Problem: Mood - Altered:  Goal: Absence of abusive behavior  Description: Absence of abusive behavior  Outcome: Met This Shift     Problem: Sleep Pattern Disturbance:  Goal: Appears well-rested  Description: Appears well-rested  Outcome: Met This Shift

## 2020-06-14 NOTE — PROGRESS NOTES
[facility]   [facility] Note    Admitting Date and Time: 6/8/2020  9:58 PM  Admit Dx: Hypernatremia [E87.0]  Hypernatremia [E87.0]  Hypernatremia [E87.0]    Subjective:    Patient was admitted with Hypernatremia [E87.0]  Hypernatremia [E87.0]  Hypernatremia [E87.0]. Patient lying in bed resting comfortably. Patient is nonverbal appears to be in no acute distress. Sitter is at bedside reports no issues    Per RN: No acute events overnight. ROS: denies fever, chills, cp, sob, n/v, HA unless stated above.      carbidopa-levodopa  1 tablet Oral BID    levetiracetam  1,000 mg Intravenous Q12H    sodium chloride flush  10 mL Intravenous 2 times per day    sodium chloride flush  10 mL Intravenous 2 times per day     sodium chloride flush, 10 mL, PRN  acetaminophen, 650 mg, Q6H PRN    Or  acetaminophen, 650 mg, Q6H PRN  polyethylene glycol, 17 g, Daily PRN  promethazine, 12.5 mg, Q6H PRN    Or  ondansetron, 4 mg, Q6H PRN  sodium chloride flush, 10 mL, PRN         Objective:    BP (!) 112/53   Pulse 87   Temp 98.2 °F (36.8 °C) (Axillary)   Resp 16   Ht 5' 5\" (1.651 m)   Wt 109 lb (49.4 kg)   SpO2 99%   BMI 18.14 kg/m²   General Appearance: in no acute distress and alert  Skin: warm and dry, no rash or erythema  Pulmonary/Chest: clear to auscultation bilaterally- no wheezes, rales or rhonchi, normal air movement, no respiratory distress  Cardiovascular: normal rate, regular rhythm, normal S1 and S2, no murmurs, no gallops and no JVD  Abdomen: soft, non-tender, non-distended, normal bowel sounds, no masses or organomegaly  Extremities: no cyanosis, no clubbing, no edema and muscle wasting present       Recent Labs     06/13/20  1550 06/14/20  0115 06/14/20  0910    144 141   K 3.8 4.2 3.8   * 111* 110*   CO2 24 25 24   BUN 27* 23 21   CREATININE 0.8 0.9 0.8   GLUCOSE 100* 88 127*   CALCIUM 8.1* 8.3* 7.7*       Recent Labs     06/12/20  0850 06/13/20  0856 06/14/20  0115 WBC 6.5 5.5 6.0   RBC 3.33* 3.20* 3.68*   HGB 10.8* 10.3* 11.7*   HCT 34.8* 32.4* 36.2*   .5* 101.3* 98.4   MCH 32.4 32.2 31.8   MCHC 31.0* 31.8* 32.3   RDW 13.2 12.8 12.3   PLT 38* 45* 74*   MPV NOT CALC 12.8* 13.1*       Radiology:   XR Chest Abdomen Ng Placement   Final Result      Orogastric tube with the tip in the antrum of the stomach. Atelectasis and/or infiltrates in the right lung base         CT CHEST WO CONTRAST   Final Result      Infiltrate seen in the right lower lung posteriorly with moderate   right-sided pleural effusion      Feeding enteric tube with the tip in the antrum of the stomach. CT ABDOMEN WO CONTRAST Additional Contrast? Radiologist Recommendation   Final Result      Very distended gallbladder with a large gallstone seen in the neck   measuring 2.6 x 3.1 cm. I would recommend a gallbladder sonogram for   further workup. There is a feeding enteric tube with the tip in the antrum of the   stomach. Small to moderate right-sided pleural effusion with infiltrates in the   right lower lung. [facility] RADIOLOGY REPORT   Final Result      XR CHEST PORTABLE   Final Result      Bibasilar atelectasis is more pronounced in the right lung base      Feeding enteric tube with the tip in the antrum of the stomach. XR FOOT LEFT (2 VIEWS)   Final Result      The second and third toes are absent. Severe remodeling of the distal aspects of the first, second, and   third metatarsals. Severe degenerative changes with bunion formation of the first   metatarsophalangeal articulation. No evidence of fracture is appreciated. XR FOOT RIGHT (2 VIEWS)   Final Result      Plantar calcaneal spur is present. Severe degenerative change of the first metatarsophalangeal   articulation with bunion formation. Abnormal configuration of the second and third metatarsophalangeal   articulation which appears to be chronic and degenerative.        No evidence of acute fracture is identified. Osteoarthritis. XR Chest Abdomen Ng Placement   Final Result      XR Chest Abdomen Ng Placement   Final Result   The weighted feeding tube tip is in the epigastric region, directed up   into the right, presumably in the body the stomach, although the   inferior margin of the small hiatus hernia is not as well seen. Advancing the tube 3 to 4 cm should determine whether the tip is in   the stomach or the inferior margin of the hiatus hernia. Subtle density in the right lower lung may be artifact related to   expiratory imaging or could represent subtle interstitial disease   without effusion. Otherwise unremarkable. Socorro Diego ALERT:  THIS IS AN ABNORMAL REPORT-tube position is equivocal, likely   in the stomach, but the weighted portion could be in the inferior   margin of the hiatus hernia. Advancing the tube 4 5 cm with advanced   the weighted portion into the stomach if it is in the hiatus hernia,   and would confirm a distal gastric position if it is in the stomach. XR Chest Abdomen Ng Placement   Final Result   Nasogastric tube appears to be malpositioned, with the tip likely in   the right lower lobe bronchus. Findings were conveyed to patient's   nurse, Ms. Jeffy Pereira RN 10:24  a.m. on 6/20/2020. She was aware   of the findings and had removed the NG tube. XR ABDOMEN FOR NG/OG/NE TUBE PLACEMENT   Final Result   Feeding tube is traceable to the stomach. This report has been electronically signed by Flavio Matthew MD.      XR ABDOMEN FOR NG/OG/NE TUBE PLACEMENT   Final Result   Feeding tube is coiled, presumably within a hiatal hernia. Consider retraction    by approximately 8 cm with slight readvance .       This report has been electronically signed by Flavio Matthew MD.      XR ABDOMEN FOR NG/OG/NE TUBE PLACEMENT   Final Result      Nasogastric tube follows the expected contours of the esophagus with   distal tip overlying the air in the gastric lumen and distal sideholes   overlying the esophagogastric junction. US RETROPERITONEAL COMPLETE   Final Result      1. Renal cortical echogenicity suggestive spectrum of medical renal   disease bilaterally, without evidence of obstruction on either side. 2. The bladder wall is thickened, without intraluminal debris. This   can be a manifestation of cystitis or hypertrophic change. CT Head WO Contrast   Final Result      No evidence for acute intracranial process. Cortical atrophy and chronic periventricular microangiopathy. XR CHEST PORTABLE   Final Result      NO ACUTE CARDIOPULMONARY PROCESS             Assessment:    Active Problems:    Hypernatremia    Severe protein-calorie malnutrition (HCC)    Acute renal failure (HCC)    Thrombocytopenia (HCC)    Fever of unknown origin  Resolved Problems:    * No resolved hospital problems. *      Plan:  1. Hypernatremia  - Sodium now 141,  Nephrology consulted, Urine studies reportedly ruled out DI   - Renal US without obstruction      2. CRIS  -Nephrology following, appreciate their recommendations.     3. Metabolic encephalopathy  -Unknown baseline, continue to monitor.     4. Hypokalemia   - K 3.5, continue to monitor     5. H/o Seizure Disorder  -No seizure activity noted at this time.     6. Protein Calorie Malnutrition   -  Dobbhoff tube was placed. General surgery consulted regarding PEG tube placement. Planned for peg tomorrow     7. Thrombocytopenia  -Platelets slightly improved at 45, unknown etiology, possibly d/t infection      8. Decubitus Ulcer   - Wound care to follow, do not appear infected.       9. Hypotension    - Intermittent, seems to be improving with fluids. Will continue to monitor      10. Urinary tract infection  - Urine cultures growing Proteus, ID was consulted, gave pt cefepime     11. Possible pneumonia with pleural effusion  -Infiltrate noted on CT of the chest, ID following.   Appreciate their recommendations regarding antibiotics.   -Considering pulmonary consult.     12. Gallstone  -General surgery was consulted, no intervention needed at this time.         Electronically signed by Cleo Reid PA-C on 6/14/2020 at 10:25 AM

## 2020-06-14 NOTE — ANESTHESIA PRE PROCEDURE
Department of Anesthesiology  Preprocedure Note       Name:  Daryl Vega   Age:  78 y.o.  :  1950                                          MRN:  [id number]         Date:  2020      Surgeon: * No surgeons listed *    Procedure: * No procedures listed *    Medications prior to admission:   Prior to Admission medications    Medication Sig Start Date End Date Taking? Authorizing Provider   vitamin D (ERGOCALCIFEROL) 1.25 MG (39834 UT) CAPS capsule Take 50,000 Units by mouth every 30 days On the  of each month   Yes Historical Provider, MD   levETIRAcetam (KEPPRA) 500 MG tablet Take 2 tablets by mouth 2 times daily 3/31/20  Yes Sandra Cornejo MD   fluvoxaMINE (LUVOX CR) 150 MG CP24 extended release capsule Take 150 mg by mouth daily    Yes Historical Provider, MD   mirtazapine (REMERON) 30 MG tablet Take 30 mg by mouth nightly  20  Yes Historical Provider, MD   carbidopa-levodopa (SINEMET)  MG per tablet Twice a day at 0800 and 1600 3/24/20  Yes MARIJA Bazzi - CNS   lactulose (CHRONULAC) 10 GM/15ML solution Take 30 g by mouth 2 times daily   Yes Historical Provider, MD   LORazepam (ATIVAN) 2 MG tablet Take 2 mg by mouth as needed for Anxiety (prior to appts). Yes Historical Provider, MD   melatonin 5 MG TABS tablet Take 5 mg by mouth nightly    Yes Historical Provider, MD   polyethylene glycol (MIRALAX) powder Take 17 g by mouth daily as needed    Yes Historical Provider, MD   metoprolol (TOPROL-XL) 25 MG XL tablet Take 25 mg by mouth nightly   Yes Historical Provider, MD   OLANZapine (ZYPREXA) 7.5 MG tablet Take 7.5 mg by mouth nightly    Yes Historical Provider, MD   omeprazole (PRILOSEC) 20 MG capsule Take 20 mg by mouth Daily    Yes Historical Provider, MD   carBAMazepine (TEGRETOL) 200 MG tablet Take 200 mg by mouth 2 times daily  6/5/15  Yes Historical Provider, MD   acetaminophen (TYLENOL) 325 MG tablet Take 650 mg by mouth every 4 hours as needed for Pain.    Yes injection 4 mg  4 mg Intravenous Q6H PRN Sharri Arellano MD        sodium chloride flush 0.9 % injection 10 mL  10 mL Intravenous 2 times per day Marybel Pedraza, DO   10 mL at 06/12/20 0852    sodium chloride flush 0.9 % injection 10 mL  10 mL Intravenous PRN Suzanne Zuñiga, DO   10 mL at 06/09/20 8119       Allergies:  No Known Allergies    Problem List:    Patient Active Problem List   Diagnosis Code    Seizure ([address] R56.9    Pressure ulcer of toe of right foot, stage 3 ([facility]) E42.101    Atherosclerosis of native artery of both lower extremities ([facility]) I70.203    Hypernatremia E87.0    Status epilepticus ([address] G40.901    Seizures ([address] R56.9    Seizure disorder, status epilepticus, convulsive ([address] G40.301    Severe protein-calorie malnutrition ([address] E43    Acute renal failure ([facility]) N17.9    Thrombocytopenia ([facility]) D69.6    Fever of unknown origin R50.9       Past Medical History:        Diagnosis Date    Acquired deformity of neck     Autism spectrum     Bilateral cataracts     Blepharochalasis     Cardiomegaly     Cataract of both eyes     Developmental delay     Gastritis     Hyperlipidemia     Kyphosis of thoracic region     Mental retardation     SEVERE MR  NON VERBAL, NEEDS WHEELCHAIR FOR LONG DISTANCE    Obsessive compulsive disorder     Parkinsonism ([address]     Seizures ([address]     Tourette disorder        Past Surgical History:        Procedure Laterality Date    TOE AMPUTATION Left     2nd/3rd       Social History:    Social History     Tobacco Use    Smoking status: Never Smoker    Smokeless tobacco: Never Used   Substance Use Topics    Alcohol use:  No                                Counseling given: Not Answered      Vital Signs (Current):   Vitals:    06/13/20 1945 06/13/20 2323 06/14/20 0254 06/14/20 0730   BP: 112/67  111/65 (!) 112/53   Pulse: 83 85 95 87   Resp: 16  16 16   Temp: 37.2 °C (98.9 °F)  36.9 °C (98.4 °F) 36.8 °C (98.2 °F)   TempSrc: Axillary  Axillary and normal exam                               Cardiovascular:Negative CV ROS                      Neuro/Psych:   (+) seizures:, psychiatric history:             ROS comment: Non verbal pt d/t MR   GI/Hepatic/Renal:   (+) renal disease:,           Endo/Other: Negative Endo/Other ROS                    Abdominal:           Vascular:                                        Anesthesia Plan      general and MAC     ASA 3             Anesthetic plan and risks discussed with patient.                       Aida Osgood, APRN - CRNA   6/14/2020

## 2020-06-14 NOTE — PATIENT CARE CONFERENCE
Spoke with patient's legal guardian Christopher Kaufman this afternoon, who had several questions regarding PEG tube placement. She had wondered if this was truly necessary and I explained that placement of a PEG tube would allow us to provide adequate nutrition for Chirag Crawford and also measure exactly what he was taking in. This is especially true given that this is not the first admission for severe hypernatremia. She informed me that she was working with the group home to help patient gain weight; they had been giving him at least double meals and also multiple drinks per meal.  Her more significant concern, however, is that he at baseline will remove lines and devices such as monitor leads, NG tubes, and IVs, and says the only way we could be able to keep a PEG in would be to physically restrain him for the duration of the time the tube would be in. Self-removal of the PEG was also a concern I had had. She did say that she would be willing to consent to the procedure if it was truly necessary but would like to try to have the pt undergo swallow eval and try to avoid PEG at this time. Agreed to serial outpt labs for monitoring. Did explain that pt's imaging showed RLL infiltrate which is consistent with (but does not necessarily mean) aspiration. She would still like to try and avoid PEG for now.       Electronically signed by Cody Ferris DO on 6/14/2020 at 5:03 PM

## 2020-06-14 NOTE — PLAN OF CARE
Problem: Falls - Risk of:  Goal: Will remain free from falls  Description: Will remain free from falls  6/13/2020 2126 by Robyn Lemon RN  Outcome: Met This Shift  6/13/2020 1729 by Suri Davenport RN  Outcome: Met This Shift     Problem: Confusion - Acute:  Goal: Absence of continued neurological deterioration signs and symptoms  Description: Absence of continued neurological deterioration signs and symptoms  Outcome: Ongoing     Problem: Injury - Risk of, Physical Injury:  Goal: Will remain free from falls  Description: Will remain free from falls  6/13/2020 2126 by Robyn Lemon RN  Outcome: Met This Shift  6/13/2020 1729 by Suri Davenport RN  Outcome: Met This Shift     Problem: Mood - Altered:  Goal: Mood stable  Description: Mood stable  Outcome: Met This Shift

## 2020-06-14 NOTE — PLAN OF CARE
Problem: Mood - Altered:  Goal: Mood stable  Description: Mood stable  Outcome: Met This Shift     Problem: Skin Integrity:  Goal: Absence of new skin breakdown  Description: Absence of new skin breakdown  Outcome: Met This Shift

## 2020-06-14 NOTE — PROGRESS NOTES
[facility]    Chirichella covering for Angelina Rivera    Discussed case with Dr. Angelina Rivera  PEG tube scheduled for tomorrow    Electronically signed by Gage Paniagua MD on 6/14/2020 at 10:10 AM

## 2020-06-14 NOTE — PROGRESS NOTES
Nephrology Progress Note      Events Reviewed. Seen an examined at bedside. Sitter at bedside. Subjective: We are following Mr. Iqbal Factor for CRIS, hypernatremia, and hyperchloremia   no acute overnight events  Physical exam:     Vitals: BP (!) 112/53   Pulse 87   Temp 98.2 °F (36.8 °C) (Axillary)   Resp 16   Ht 5' 5\" (1.651 m)   Wt 109 lb (49.4 kg)   SpO2 99%   BMI 18.14 kg/m²      Patient seen and examined today at bedside. Constitutional:  Elderly  male, appears in no distress, on room air  Skin: no rash, turgor wnl  Heent:  eomi, mmm, corpak in.   Neck: no bruits or jvd noted  Cardiovascular:  S1, S2 without m/r/g  Respiratory: CTA B without w/r/r, difficult exam  Abdomen:  +bs, soft, nt, nd, William catheter is in place draining clear urine  Ext:1+ lower extremity edema, contractures noted in all extremities  Psychiatric: Unable to assess, nonverbal    Data:   Labs:  CBC:   Lab Results   Component Value Date    WBC 6.0 06/14/2020    RBC 3.68 06/14/2020    HGB 11.7 06/14/2020    HCT 36.2 06/14/2020    MCV 98.4 06/14/2020    MCH 31.8 06/14/2020    MCHC 32.3 06/14/2020    RDW 12.3 06/14/2020    PLT 74 06/14/2020    MPV 13.1 06/14/2020     CMP:    Lab Results   Component Value Date     06/14/2020    K 3.8 06/14/2020    K 3.9 03/30/2020     06/14/2020    CO2 24 06/14/2020    BUN 21 06/14/2020    CREATININE 0.8 06/14/2020    GFRAA >60 06/14/2020    LABGLOM >60 06/14/2020    GLUCOSE 127 06/14/2020    GLUCOSE 71 05/18/2012    PROT 6.1 06/14/2020    LABALBU 2.6 06/14/2020    LABALBU 4.5 05/18/2012    CALCIUM 7.7 06/14/2020    BILITOT 0.4 06/14/2020    ALKPHOS 76 06/14/2020    AST 82 06/14/2020    ALT 38 06/14/2020     BMP:    Lab Results   Component Value Date     06/14/2020    K 3.8 06/14/2020    K 3.9 03/30/2020     06/14/2020    CO2 24 06/14/2020    BUN 21 06/14/2020    LABALBU 2.6 06/14/2020    LABALBU 4.5 05/18/2012    CREATININE 0.8 06/14/2020    CALCIUM 7.7 06/14/2020 GFRAA >60 06/14/2020    LABGLOM >60 06/14/2020    GLUCOSE 127 06/14/2020    GLUCOSE 71 05/18/2012     Calcium:    Lab Results   Component Value Date    CALCIUM 7.7 06/14/2020     Phosphorus:    Lab Results   Component Value Date    PHOS 2.1 06/13/2020     Uric Acid:  No results found for: URICACID  U/A:    Lab Results   Component Value Date    NITRU Negative 06/12/2020    COLORU Yellow 06/12/2020    PHUR 7.5 06/12/2020    WBCUA >20 06/12/2020    RBCUA 5-10 06/12/2020    RBCUA NONE 10/08/2012    BACTERIA MANY 06/12/2020    CLARITYU SL CLOUDY 06/12/2020    SPECGRAV 1.020 06/12/2020    LEUKOCYTESUR SMALL 06/12/2020    UROBILINOGEN 0.2 06/12/2020    BILIRUBINUR Negative 06/12/2020    BILIRUBINUR NEGATIVE 09/18/2011    BLOODU MODERATE 06/12/2020    GLUCOSEU Negative 06/12/2020    GLUCOSEU NEGATIVE 09/18/2011    KETUA Negative 06/12/2020    AMORPHOUS FEW 06/08/2020      Results for Amber Zamora (MRN [id number]) as of 6/9/2020 09:11   Ref. Range 6/8/2020 22:54   Chloride Latest Ref Range: Not Established mmol/L <20   Creatinine, Ur Latest Ref Range: 40 - 278 mg/dL 182   Osmolality, Ur Latest Ref Range: 300 - 900 mOsm/kg 742   Potassium, Ur Latest Ref Range: Not Established mmol/L 73.2   Sodium, Ur Latest Ref Range: Not Established mmol/L 39       Imaging:  Ct Head Wo Contrast    Result Date: 6/8/2020  Clinical indications: Altered mental status. COMPARISON: March 28, 2020. January 28, 2020 Exposure control: This examination and all examinations utilizing ionizing radiation at this facility done so according to the ALARA (as low as reasonably achievable) principal for radiation dose reduction. Technique: Axial, sagittal and coronal computed tomography of the brain and calvarium was performed without contrast. FINDINGS: There is no evidence for acute intracranial hemorrhage, midline shift or mass effect. There is enlargement of the ventricles, sulci and cisterns bilaterally.  There is patchy hypoattenuation in the periventricular deep white matter bilaterally. There are calcifications within the carotid siphons and vertebrobasilar arterial systems. Mucosal thickening within the paranasal sinuses but no fluid levels are evident. Otherwise the brain parenchyma, CSF spaces, paranasal sinuses and mastoid air cells and surrounding soft tissue and osseous structures have a satisfactory appearance. The gray-white matter junction is otherwise preserved. The cerebellopontine angle and cisterns are unremarkable. The bony calvarium is negative for acute fracture or aggressive process. The radha and brainstem are unremarkable. No evidence for acute intracranial process. Cortical atrophy and chronic periventricular microangiopathy. Xr Chest Portable    Result Date: 2020  Patient MRN: [id number] : 1950 Age:  78 years Gender: Male Order Date: 2020 10:15 PM Exam: XR CHEST PORTABLE Number of Images: 1 view Indication:   possible sepsis possible sepsis Comparison: None. Findings: The lungs are clear. There is no evidence of pulmonary infiltrate or pleural effusion. The pulmonary vascularity is unremarkable. The cardiac, hilar and mediastinal silhouettes are satisfactory. There is uncoiling atherosclerotic change of thoracic ureter. There is a moderate-sized hiatal hernia. The bony thorax demonstrates osteopenia. NO ACUTE CARDIOPULMONARY PROCESS     US Retroperitoneal 20  The right kidney measures9.4 x 4.6 x 3.6 cm, and the left   kidney measures 9.1 x 5.3 x 4.3 cm.     1. Renal cortical echogenicity suggestive spectrum of medical renal   disease bilaterally, without evidence of obstruction on either side. 2. The bladder wall is thickened, without intraluminal debris. This   can be a manifestation of cystitis or hypertrophic change     Problems resolved:   · Acute kidney injury secondary to severe prerenal azotemia from volume depletion, severe hypotension, Resolved- now 1.0. Assessment  1.  Severe hypernatremia secondary to poor p.o. intake and no access to free water as he is exclusively fed. Urine osmolality on admission was over 800 suggesting adequate ADH, ruling out central diabetes insipidus. Serum sodium 141 , back to normal today  2. Acute kidney injury secondary to severe prerenal azotemia from volume depletion, resolved now, creatinine returned to his baseline  3. Hyperchloremia secondary to volume depletion  4. Hypokalemia, dietary from poor potassium intake  5. Parkinson's disease on Sinemet  6. History of seizure activity with breakthrough seizures, on Keppra  7. Vitamin D deficiency, on Cholecalciferol   8. Hypocalcemia- Corrected calcium 8.1mg/dl- nutritional  9. Nutrition: TF at 30cc/hr. FW at 40cc/hr. 10. Hypophosphatemia /?vit D deficiency    Plan  1. Continue IV D5 water with 20 mEq of potassium chloride to 75 mL/h, continue with tube feed and free water flushes at 40 cc/hr  2. Continue BMP checks to every 24 hours now, stop iv fluids  3. He will benefit from a PEG tube placement. 4. Check vit d level  5.  Replace phos    Discussed with JAZLYN Chiang MD

## 2020-06-15 ENCOUNTER — APPOINTMENT (OUTPATIENT)
Dept: GENERAL RADIOLOGY | Age: 70
DRG: 682 | End: 2020-06-15
Payer: MEDICARE

## 2020-06-15 LAB
ALBUMIN SERPL-MCNC: 2.7 G/DL (ref 3.5–5.2)
ALP BLD-CCNC: 82 U/L (ref 40–129)
ALT SERPL-CCNC: 88 U/L (ref 0–40)
ANION GAP SERPL CALCULATED.3IONS-SCNC: 8 MMOL/L (ref 7–16)
AST SERPL-CCNC: 103 U/L (ref 0–39)
BILIRUB SERPL-MCNC: <0.2 MG/DL (ref 0–1.2)
BUN BLDV-MCNC: 22 MG/DL (ref 8–23)
CALCIUM SERPL-MCNC: 8 MG/DL (ref 8.6–10.2)
CHLORIDE BLD-SCNC: 107 MMOL/L (ref 98–107)
CO2: 24 MMOL/L (ref 22–29)
CREAT SERPL-MCNC: 0.7 MG/DL (ref 0.7–1.2)
GFR AFRICAN AMERICAN: >60
GFR NON-AFRICAN AMERICAN: >60 ML/MIN/1.73
GLUCOSE BLD-MCNC: 95 MG/DL (ref 74–99)
HCT VFR BLD CALC: 33 % (ref 37–54)
HEMOGLOBIN: 10.9 G/DL (ref 12.5–16.5)
MCH RBC QN AUTO: 31.5 PG (ref 26–35)
MCHC RBC AUTO-ENTMCNC: 33 % (ref 32–34.5)
MCV RBC AUTO: 95.4 FL (ref 80–99.9)
PDW BLD-RTO: 12.1 FL (ref 11.5–15)
PHOSPHORUS: 3.4 MG/DL (ref 2.5–4.5)
PLATELET # BLD: 81 E9/L (ref 130–450)
PLATELET CONFIRMATION: NORMAL
PMV BLD AUTO: 13.7 FL (ref 7–12)
POTASSIUM SERPL-SCNC: 4.2 MMOL/L (ref 3.5–5)
RBC # BLD: 3.46 E12/L (ref 3.8–5.8)
SODIUM BLD-SCNC: 139 MMOL/L (ref 132–146)
TOTAL PROTEIN: 6 G/DL (ref 6.4–8.3)
WBC # BLD: 5 E9/L (ref 4.5–11.5)

## 2020-06-15 PROCEDURE — 92526 ORAL FUNCTION THERAPY: CPT | Performed by: SPEECH-LANGUAGE PATHOLOGIST

## 2020-06-15 PROCEDURE — APPSS30 APP SPLIT SHARED TIME 16-30 MINUTES: Performed by: PHYSICIAN ASSISTANT

## 2020-06-15 PROCEDURE — 36415 COLL VENOUS BLD VENIPUNCTURE: CPT

## 2020-06-15 PROCEDURE — 74230 X-RAY XM SWLNG FUNCJ C+: CPT

## 2020-06-15 PROCEDURE — 2060000000 HC ICU INTERMEDIATE R&B

## 2020-06-15 PROCEDURE — 99233 SBSQ HOSP IP/OBS HIGH 50: CPT | Performed by: INTERNAL MEDICINE

## 2020-06-15 PROCEDURE — 2580000003 HC RX 258: Performed by: SPECIALIST

## 2020-06-15 PROCEDURE — 6360000002 HC RX W HCPCS: Performed by: SPECIALIST

## 2020-06-15 PROCEDURE — 85027 COMPLETE CBC AUTOMATED: CPT

## 2020-06-15 PROCEDURE — 2580000003 HC RX 258: Performed by: INTERNAL MEDICINE

## 2020-06-15 PROCEDURE — 84100 ASSAY OF PHOSPHORUS: CPT

## 2020-06-15 PROCEDURE — 99232 SBSQ HOSP IP/OBS MODERATE 35: CPT | Performed by: SURGERY

## 2020-06-15 PROCEDURE — 6370000000 HC RX 637 (ALT 250 FOR IP): Performed by: INTERNAL MEDICINE

## 2020-06-15 PROCEDURE — 92611 MOTION FLUOROSCOPY/SWALLOW: CPT | Performed by: SPEECH-LANGUAGE PATHOLOGIST

## 2020-06-15 PROCEDURE — 6360000002 HC RX W HCPCS: Performed by: INTERNAL MEDICINE

## 2020-06-15 PROCEDURE — 80053 COMPREHEN METABOLIC PANEL: CPT

## 2020-06-15 PROCEDURE — 2500000003 HC RX 250 WO HCPCS: Performed by: RADIOLOGY

## 2020-06-15 RX ADMIN — BARIUM SULFATE 10 ML: 400 PASTE ORAL at 13:26

## 2020-06-15 RX ADMIN — Medication 10 ML: at 09:54

## 2020-06-15 RX ADMIN — BARIUM SULFATE 70 G: 0.81 POWDER, FOR SUSPENSION ORAL at 13:27

## 2020-06-15 RX ADMIN — Medication 10 ML: at 22:07

## 2020-06-15 RX ADMIN — CARBIDOPA AND LEVODOPA 1 TABLET: 25; 100 TABLET ORAL at 09:53

## 2020-06-15 RX ADMIN — LEVETIRACETAM 1000 MG: 100 INJECTION, SOLUTION INTRAVENOUS at 17:38

## 2020-06-15 RX ADMIN — BARIUM SULFATE 120 ML: 400 SUSPENSION ORAL at 13:26

## 2020-06-15 RX ADMIN — LEVETIRACETAM 1000 MG: 100 INJECTION, SOLUTION INTRAVENOUS at 05:58

## 2020-06-15 RX ADMIN — CARBIDOPA AND LEVODOPA 1 TABLET: 25; 100 TABLET ORAL at 22:07

## 2020-06-15 RX ADMIN — WATER 1 G: 1 INJECTION INTRAMUSCULAR; INTRAVENOUS; SUBCUTANEOUS at 14:28

## 2020-06-15 ASSESSMENT — PAIN SCALES - GENERAL
PAINLEVEL_OUTOF10: 0

## 2020-06-15 NOTE — PROGRESS NOTES
[address] [place] [address] [facility]  MATT  Progress Note    SUBJECTIVE:  Chief Complaint   Patient presents with    Altered Mental Status     Increasing over the past 2 weeks. Worsening in the am and progressively improves throughout day. [facility]. Patient is tolerating current antibiotics. He still has a feeding tube. Family has declined a PEG and wants a video swallow evaluation. Review of systems:  As stated above in the chief complaint, otherwise negative. Medications:  Scheduled Meds:   cefTRIAXone (ROCEPHIN) IV  1 g Intravenous Q24H    carbidopa-levodopa  1 tablet Oral BID    levetiracetam  1,000 mg Intravenous Q12H    sodium chloride flush  10 mL Intravenous 2 times per day    sodium chloride flush  10 mL Intravenous 2 times per day     Continuous Infusions:  PRN Meds:sodium chloride flush, acetaminophen **OR** acetaminophen, polyethylene glycol, promethazine **OR** ondansetron, sodium chloride flush    OBJECTIVE:  /60   Pulse 88   Temp 98.4 °F (36.9 °C) (Axillary)   Resp 18   Ht 5' 5\" (1.651 m)   Wt 107 lb 4.8 oz (48.7 kg)   SpO2 97%   BMI 17.86 kg/m²   Temp  Av.4 °F (36.9 °C)  Min: 98.4 °F (36.9 °C)  Max: 98.4 °F (36.9 °C)  Constitutional: The patient is lying in bed. Nonverbal.  Contracted. Sitter present. Somewhat emaciated. Skin: Warm and dry. No rashes were noted. HEENT: Round and reactive pupils. Moist mucous membranes. No ulcerations or thrush. CorPak feeding tube. Neck: Supple to movements. Chest: No respiratory distress. No crackles. Cardiovascular: Heart sounds rhythmic and regular. Abdomen: Positive bowel sounds to auscultation. Benign to palpation. Extremities: No clubbing, no cyanosis, no edema. Lines: peripheral  William catheter with clear urine.     Laboratory and Tests Review:  Lab Results   Component Value Date    WBC 5.0 06/15/2020    WBC 6.0 2020    WBC 5.5 2020    HGB 10.9 (L) 06/15/2020 HCT 33.0 (L) 06/15/2020    MCV 95.4 06/15/2020    PLT 81 (L) 06/15/2020     Lab Results   Component Value Date    NEUTROABS 5.76 06/08/2020    NEUTROABS 3.35 03/28/2020    NEUTROABS 2.78 03/06/2020     No results found for: [facility]  Lab Results   Component Value Date    ALT 88 (H) 06/15/2020     (H) 06/15/2020    ALKPHOS 82 06/15/2020    BILITOT <0.2 06/15/2020     Lab Results   Component Value Date     06/15/2020    K 4.2 06/15/2020    K 3.9 03/30/2020     06/15/2020    CO2 24 06/15/2020    BUN 22 06/15/2020    CREATININE 0.7 06/15/2020    CREATININE 0.8 06/14/2020    CREATININE 0.9 06/14/2020    GFRAA >60 06/15/2020    LABGLOM >60 06/15/2020    GLUCOSE 95 06/15/2020    GLUCOSE 71 05/18/2012    PROT 6.0 06/15/2020    LABALBU 2.7 06/15/2020    LABALBU 4.5 05/18/2012    CALCIUM 8.0 06/15/2020    BILITOT <0.2 06/15/2020    ALKPHOS 82 06/15/2020     06/15/2020    ALT 88 06/15/2020     Lab Results   Component Value Date    CRP 4.2 (H) 06/14/2020    CRP 3.6 (H) 06/12/2020    CRP 0.1 03/12/2020     Lab Results   Component Value Date    SEDRATE 43 (H) 06/14/2020    SEDRATE 14 06/12/2020    SEDRATE 5 03/12/2020     Radiology:    Microbiology:  Blood culture 6/8/2020: Negative x2  Blood culture 6/30/2020: Negative so far  Urine culture 6/12/2020 >100K Proteus mirabilis (Nitrofurantoin resistant)  Respiratory panel: Negative    Recent Labs     06/12/20  1430   *       Recent Labs     06/12/20  0237   PROCAL 0.17*       Assessment:  · Acute dehydration with volume contraction  · Severe hyponatremia  · Acute kidney injury associated to the above  · Complicated UTI associated to William catheter. Cultures growing Proteus  · Fever associated to the above, improving    Plan:    · Continue Ceftriaxone. · PEG has been declined by family.   They want a video swallow evaluation first  · CorPak to be removed for swallow evaluation    Tracy Siddiqui  12:06 PM  6/15/2020

## 2020-06-15 NOTE — PLAN OF CARE
Problem: Falls - Risk of:  Goal: Will remain free from falls  Description: Will remain free from falls  6/15/2020 0301 by Christiano Townsend RN  Outcome: Met This Shift     Problem: Falls - Risk of:  Goal: Absence of physical injury  Description: Absence of physical injury  6/15/2020 0301 by Christiano Townsend RN  Outcome: Met This Shift     Problem: Injury - Risk of, Physical Injury:  Goal: Will remain free from falls  Description: Will remain free from falls  6/15/2020 0301 by Christiano Townsend RN  Outcome: Met This Shift     Problem: Injury - Risk of, Physical Injury:  Goal: Absence of physical injury  Description: Absence of physical injury  6/15/2020 0301 by Christiano Townsend RN  Outcome: Met This Shift

## 2020-06-15 NOTE — PROGRESS NOTES
GENERAL SURGERY  DAILY PROGRESS NOTE  6/15/2020    Chief Complaint   Patient presents with    Altered Mental Status     Increasing over the past 2 weeks. Worsening in the am and progressively improves throughout day. [facility]. Subjective:  Guardian declining PEG yesterday evening due to concern that patient will remove PEG, and to have swallow evaluation    Objective:  BP (!) 116/59   Pulse 91   Temp 98.4 °F (36.9 °C) (Axillary)   Resp 16   Ht 5' 5\" (1.651 m)   Wt 107 lb 4.8 oz (48.7 kg)   SpO2 99%   BMI 17.86 kg/m²     General appearance: sleeping  Heart: RRR  Lungs: nonlabored breathingHeart: regular rate  Abdomen: soft, tube feeds running  Musculoskeletal: no c/c/e    Assessment/Plan:  78 y.o. male with poor PO intake    Please call if PEG is desired  Will be available as needed    Electronically signed by Fidelia Patterson MD on 6/15/2020 at 7:28 AM     I saw and examined the patient. I reviewed the above resident's note. I agree with the assessment and plan as outlined. Per nursing, the guardian would like to have a barium swallow done prior to agreeing to a PEG. Will await results.     Irina Padilla MD  General Surgery

## 2020-06-15 NOTE — PROGRESS NOTES
[facility]   Progress Note    Admitting Date and Time: 6/8/2020  9:58 PM  Admit Dx: Hypernatremia [E87.0]  Hypernatremia [E87.0]  Hypernatremia [E87.0]    Subjective:    Patient was admitted with Hypernatremia [E87.0]  Hypernatremia [E87.0]  Hypernatremia [E87.0]. Patient nonverbal. Resting in bed in the in the fetal position. Per RN: No acute events overnight. Patient to undergo swallow study. Cardiac restricted to PEG tube    ROS: Not obtained secondary to mental status     cefTRIAXone (ROCEPHIN) IV  1 g Intravenous Q24H    carbidopa-levodopa  1 tablet Oral BID    levetiracetam  1,000 mg Intravenous Q12H    sodium chloride flush  10 mL Intravenous 2 times per day    sodium chloride flush  10 mL Intravenous 2 times per day     sodium chloride flush, 10 mL, PRN  acetaminophen, 650 mg, Q6H PRN    Or  acetaminophen, 650 mg, Q6H PRN  polyethylene glycol, 17 g, Daily PRN  promethazine, 12.5 mg, Q6H PRN    Or  ondansetron, 4 mg, Q6H PRN  sodium chloride flush, 10 mL, PRN         Objective:    /60   Pulse 88   Temp 98.4 °F (36.9 °C) (Axillary)   Resp 18   Ht 5' 5\" (1.651 m)   Wt 107 lb 4.8 oz (48.7 kg)   SpO2 97%   BMI 17.86 kg/m²   General Appearance: in no acute distress, alert, frail-appearing and cachectic  Skin: warm and dry, no rash or erythema  Pulmonary/Chest: Coarse breath sounds noted, no adventitious breath sounds. Cardiovascular: normal rate, regular rhythm, normal S1 and S2, no murmurs, no gallops and no JVD  Abdomen: soft, non-tender, non-distended, normal bowel sounds, no masses or organomegaly  Extremities: no cyanosis, no clubbing, no edema and muscle wasting present.       Recent Labs     06/14/20  0115 06/14/20  0910 06/15/20  0812    141 139   K 4.2 3.8 4.2   * 110* 107   CO2 25 24 24   BUN 23 21 22   CREATININE 0.9 0.8 0.7   GLUCOSE 88 127* 95   CALCIUM 8.3* 7.7* 8.0*       Recent Labs     06/13/20  0856 06/14/20  0115 06/15/20  7895 WBC 5.5 6.0 5.0   RBC 3.20* 3.68* 3.46*   HGB 10.3* 11.7* 10.9*   HCT 32.4* 36.2* 33.0*   .3* 98.4 95.4   MCH 32.2 31.8 31.5   MCHC 31.8* 32.3 33.0   RDW 12.8 12.3 12.1   PLT 45* 74* 81*   MPV 12.8* 13.1* 13.7*       Radiology:   XR Chest Abdomen Ng Placement   Final Result      Orogastric tube with the tip in the antrum of the stomach. Atelectasis and/or infiltrates in the right lung base         CT CHEST WO CONTRAST   Final Result      Infiltrate seen in the right lower lung posteriorly with moderate   right-sided pleural effusion      Feeding enteric tube with the tip in the antrum of the stomach. CT ABDOMEN WO CONTRAST Additional Contrast? Radiologist Recommendation   Final Result      Very distended gallbladder with a large gallstone seen in the neck   measuring 2.6 x 3.1 cm. I would recommend a gallbladder sonogram for   further workup. There is a feeding enteric tube with the tip in the antrum of the   stomach. Small to moderate right-sided pleural effusion with infiltrates in the   right lower lung. Jeanette Cooper RADIOLOGY REPORT   Final Result      XR CHEST PORTABLE   Final Result      Bibasilar atelectasis is more pronounced in the right lung base      Feeding enteric tube with the tip in the antrum of the stomach. XR FOOT LEFT (2 VIEWS)   Final Result      The second and third toes are absent. Severe remodeling of the distal aspects of the first, second, and   third metatarsals. Severe degenerative changes with bunion formation of the first   metatarsophalangeal articulation. No evidence of fracture is appreciated. XR FOOT RIGHT (2 VIEWS)   Final Result      Plantar calcaneal spur is present. Severe degenerative change of the first metatarsophalangeal   articulation with bunion formation. Abnormal configuration of the second and third metatarsophalangeal   articulation which appears to be chronic and degenerative.        No evidence of acute fracture is identified. Osteoarthritis. XR Chest Abdomen Ng Placement   Final Result      XR Chest Abdomen Ng Placement   Final Result   The weighted feeding tube tip is in the epigastric region, directed up   into the right, presumably in the body the stomach, although the   inferior margin of the small hiatus hernia is not as well seen. Advancing the tube 3 to 4 cm should determine whether the tip is in   the stomach or the inferior margin of the hiatus hernia. Subtle density in the right lower lung may be artifact related to   expiratory imaging or could represent subtle interstitial disease   without effusion. Otherwise unremarkable. Socorro Diego ALERT:  THIS IS AN ABNORMAL REPORT-tube position is equivocal, likely   in the stomach, but the weighted portion could be in the inferior   margin of the hiatus hernia. Advancing the tube 4 5 cm with advanced   the weighted portion into the stomach if it is in the hiatus hernia,   and would confirm a distal gastric position if it is in the stomach. XR Chest Abdomen Ng Placement   Final Result   Nasogastric tube appears to be malpositioned, with the tip likely in   the right lower lobe bronchus. Findings were conveyed to patient's   nurse, Ms. Jeffy Pereira RN 10:24  a.m. on 6/20/2020. She was aware   of the findings and had removed the NG tube. XR ABDOMEN FOR NG/OG/NE TUBE PLACEMENT   Final Result   Feeding tube is traceable to the stomach. This report has been electronically signed by Flavio Matthew MD.      XR ABDOMEN FOR NG/OG/NE TUBE PLACEMENT   Final Result   Feeding tube is coiled, presumably within a hiatal hernia. Consider retraction    by approximately 8 cm with slight readvance .       This report has been electronically signed by Flavio Matthew MD.      XR ABDOMEN FOR NG/OG/NE TUBE PLACEMENT   Final Result      Nasogastric tube follows the expected contours of the esophagus with   distal tip overlying the air in the

## 2020-06-15 NOTE — PROGRESS NOTES
Nephrology Progress Note      Events Reviewed. Seen an examined at bedside. Sitter at bedside. Physical exam:     Vitals: /60   Pulse 88   Temp 98.4 °F (36.9 °C) (Axillary)   Resp 18   Ht 5' 5\" (1.651 m)   Wt 107 lb 4.8 oz (48.7 kg)   SpO2 97%   BMI 17.86 kg/m²      Patient seen and examined today at bedside. Constitutional:  Elderly  male, appears in no distress, on room air  Skin: no rash, turgor wnl  Heent:  eomi, mmm, corpak in.   Neck: no bruits or jvd noted  Cardiovascular:  S1, S2 without m/r/g  Respiratory: CTA B without w/r/r, difficult exam  Abdomen:  +bs, soft, nt, nd, William catheter is in place draining clear urine  Ext:1+ lower extremity edema, contractures noted in all extremities  Psychiatric: Unable to assess, nonverbal    Data:   Labs:  CBC:   Lab Results   Component Value Date    WBC 5.0 06/15/2020    RBC 3.46 06/15/2020    HGB 10.9 06/15/2020    HCT 33.0 06/15/2020    MCV 95.4 06/15/2020    MCH 31.5 06/15/2020    MCHC 33.0 06/15/2020    RDW 12.1 06/15/2020    PLT 81 06/15/2020    MPV 13.7 06/15/2020     CMP:    Lab Results   Component Value Date     06/15/2020    K 4.2 06/15/2020    K 3.9 03/30/2020     06/15/2020    CO2 24 06/15/2020    BUN 22 06/15/2020    CREATININE 0.7 06/15/2020    GFRAA >60 06/15/2020    LABGLOM >60 06/15/2020    GLUCOSE 95 06/15/2020    GLUCOSE 71 05/18/2012    PROT 6.0 06/15/2020    LABALBU 2.7 06/15/2020    LABALBU 4.5 05/18/2012    CALCIUM 8.0 06/15/2020    BILITOT <0.2 06/15/2020    ALKPHOS 82 06/15/2020     06/15/2020    ALT 88 06/15/2020     BMP:    Lab Results   Component Value Date     06/15/2020    K 4.2 06/15/2020    K 3.9 03/30/2020     06/15/2020    CO2 24 06/15/2020    BUN 22 06/15/2020    LABALBU 2.7 06/15/2020    LABALBU 4.5 05/18/2012    CREATININE 0.7 06/15/2020    CALCIUM 8.0 06/15/2020    GFRAA >60 06/15/2020    LABGLOM >60 06/15/2020    GLUCOSE 95 06/15/2020    GLUCOSE 71 05/18/2012 Calcium:    Lab Results   Component Value Date    CALCIUM 8.0 06/15/2020     Phosphorus:    Lab Results   Component Value Date    PHOS 3.4 06/15/2020     Uric Acid:  No results found for: URICACID  U/A:    Lab Results   Component Value Date    NITRU Negative 06/12/2020    COLORU Yellow 06/12/2020    PHUR 7.5 06/12/2020    WBCUA >20 06/12/2020    RBCUA 5-10 06/12/2020    RBCUA NONE 10/08/2012    BACTERIA MANY 06/12/2020    CLARITYU SL CLOUDY 06/12/2020    SPECGRAV 1.020 06/12/2020    LEUKOCYTESUR SMALL 06/12/2020    UROBILINOGEN 0.2 06/12/2020    BILIRUBINUR Negative 06/12/2020    BILIRUBINUR NEGATIVE 09/18/2011    BLOODU MODERATE 06/12/2020    GLUCOSEU Negative 06/12/2020    GLUCOSEU NEGATIVE 09/18/2011    KETUA Negative 06/12/2020    AMORPHOUS FEW 06/08/2020      Results for Moise Damon (MRN [id number]) as of 6/9/2020 09:11   Ref. Range 6/8/2020 22:54   Chloride Latest Ref Range: Not Established mmol/L <20   Creatinine, Ur Latest Ref Range: 40 - 278 mg/dL 182   Osmolality, Ur Latest Ref Range: 300 - 900 mOsm/kg 742   Potassium, Ur Latest Ref Range: Not Established mmol/L 73.2   Sodium, Ur Latest Ref Range: Not Established mmol/L 39       Imaging:  Ct Head Wo Contrast    Result Date: 6/8/2020  Clinical indications: Altered mental status. COMPARISON: March 28, 2020. January 28, 2020 Exposure control: This examination and all examinations utilizing ionizing radiation at this facility done so according to the ALARA (as low as reasonably achievable) principal for radiation dose reduction. Technique: Axial, sagittal and coronal computed tomography of the brain and calvarium was performed without contrast. FINDINGS: There is no evidence for acute intracranial hemorrhage, midline shift or mass effect. There is enlargement of the ventricles, sulci and cisterns bilaterally. There is patchy hypoattenuation in the periventricular deep white matter bilaterally.  There are calcifications within the carotid siphons and vertebrobasilar arterial systems. Mucosal thickening within the paranasal sinuses but no fluid levels are evident. Otherwise the brain parenchyma, CSF spaces, paranasal sinuses and mastoid air cells and surrounding soft tissue and osseous structures have a satisfactory appearance. The gray-white matter junction is otherwise preserved. The cerebellopontine angle and cisterns are unremarkable. The bony calvarium is negative for acute fracture or aggressive process. The radha and brainstem are unremarkable. No evidence for acute intracranial process. Cortical atrophy and chronic periventricular microangiopathy. Xr Chest Portable    Result Date: 2020  Patient MRN: [id number] : 1950 Age:  78 years Gender: Male Order Date: 2020 10:15 PM Exam: XR CHEST PORTABLE Number of Images: 1 view Indication:   possible sepsis possible sepsis Comparison: None. Findings: The lungs are clear. There is no evidence of pulmonary infiltrate or pleural effusion. The pulmonary vascularity is unremarkable. The cardiac, hilar and mediastinal silhouettes are satisfactory. There is uncoiling atherosclerotic change of thoracic ureter. There is a moderate-sized hiatal hernia. The bony thorax demonstrates osteopenia. NO ACUTE CARDIOPULMONARY PROCESS     US Retroperitoneal 20  The right kidney measures9.4 x 4.6 x 3.6 cm, and the left   kidney measures 9.1 x 5.3 x 4.3 cm.     1. Renal cortical echogenicity suggestive spectrum of medical renal   disease bilaterally, without evidence of obstruction on either side. 2. The bladder wall is thickened, without intraluminal debris. This   can be a manifestation of cystitis or hypertrophic change     Problems resolved:   · Acute kidney injury secondary to severe prerenal azotemia from volume depletion, severe hypotension, Resolved- now 1.0. Assessment  1. Severe hypernatremia secondary to poor p.o. intake and no access to free water as he is exclusively fed.   Urine osmolality on admission was over 800 suggesting adequate ADH, ruling out central diabetes insipidus. Serum sodium 141 , back to normal today  2. Acute kidney injury secondary to severe prerenal azotemia from volume depletion, resolved now, creatinine returned to his baseline  3. Hypokalemia, Resolved  4. Parkinson's disease on Sinemet  5. History of seizure activity with breakthrough seizures, on Keppra  6. Vitamin D deficiency, on Cholecalciferol   7. Hypocalcemia- Corrected calcium 8.1mg/dl- nutritional  8. Nutrition: TF at 30cc/hr. FW at 40cc/hr. 9. Hypophosphatemia /?vit D deficiency    Plan  1. Patient's fluid electrolyte problems are purely nutritional. Hypernatemia is from free water deficit. Nothing much to add renal stand point. Patient will need 1-1.2 liter water everyday to maintain Na level. PEG vs oral feed are the option. Patient can at least have temp feeding tube. 2. If not, nothing more to add stand stand point. Will follow from distance.     Discussed with RN      Aparna Morrow MD

## 2020-06-15 NOTE — PROGRESS NOTES
SPEECH/LANGUAGE PATHOLOGY  VIDEOFLUOROSCOPIC STUDY OF SWALLOWING (MBS)      PATIENT NAME:  Reanna Crowe      :  1950      TODAY'S DATE:  6/15/2020   ROOM:  [id number]    SUMMARY OF EVALUATION    Chart reviewed including most recent radiograph. Per chart pt was consuming solids and liquids prior to admit, however adequate oral intake was an issue. DYSPHAGIA DIAGNOSIS:  Mod-marked oral, moderate pharyngeal dysphagia-no penetration or aspiration observed, however limited view of pyriform sinus due to body habitus. Pt may require non oral feedings to meet nutritional needs. DIET RECOMMENDATIONS:  Dysphagia 1, Pureed solids with  nectar consistency (mildly thick) liquids as tolerated with [employer] Protocol to be initiated. Pt to be fed as upright as possible and with compensatory strategies controlled by staff to reduce risk of aspiration. Nectar thick liquids are recommended to improve oral control and reduce risk of aspiration if compensatory strategies are unable to followed at all times. FEEDING RECOMMENDATIONS:       Assistance level:  Full assistance is needed during all oral intake      Compensatory strategies recommended: Double swallow, Small bites/sips, Alternate solids and liquids, Check for oral pocketing and No straw-cues will be needed for all strategies    THERAPY RECOMMENDATIONS:      Dysphagia therapy is recommended 3-5 times per week for LOS or when goals are met.        Ongoing meal endurance analysis to provide diet modification and compensatory strategy implementation to minimize risk of aspiration associated with PO intake                  PROCEDURE     Consistencies Administered During the Evaluation   Liquids: thin liquid and nectar thick liquid   Solids:  pureed foods      Method of Intake:   cup, straw-pt unable to use, spoon  Fed by clinician      Position:   Seated, upright, Lateral plane-however view limited due to body habitus    Current the patient regarding results of evaluation. Explained that Speech Pathology intervention is warranted  at this time   Prognosis for improvements is fair -     This plan will be re-evaluated and revised in 1 week  if warranted. Patient stated goals: Could not state,   Treatment goals discussed with Patient   The Patient did not demonstrate understanding of the diagnosis, prognosis and plan of care         INTERVENTION/EDUCATION    Pt/nursing educated on above results and plan of care. Pt/nursing trained on compensatory strategies for safe swallow and [employer] Protocol with fair outcome. Pt/nursing encouraged to engage in question/answer session. All questions answered. CPT code:  05564  dysphagia study, 88685 dysphagia therapy 15 mins        [x]The admitting diagnosis and active problem list, as listed below have been reviewed prior to initiation of this evaluation.      ADMITTING DIAGNOSIS: Hypernatremia [E87.0]  Hypernatremia [E87.0]  Hypernatremia [E87.0]     ACTIVE PROBLEM LIST:   Patient Active Problem List   Diagnosis    Seizure ([address]    Pressure ulcer of toe of right foot, stage 3 ([address]    Atherosclerosis of native artery of both lower extremities (HCC)    Hypernatremia    Status epilepticus (HCC)    Seizures (HCC)    Seizure disorder, status epilepticus, convulsive ([address]    Severe protein-calorie malnutrition ([address]    Acute renal failure (HCC)    Thrombocytopenia (HCC)    Fever of unknown origin

## 2020-06-16 LAB
ANION GAP SERPL CALCULATED.3IONS-SCNC: 6 MMOL/L (ref 7–16)
BUN BLDV-MCNC: 21 MG/DL (ref 8–23)
CALCIUM SERPL-MCNC: 8.1 MG/DL (ref 8.6–10.2)
CHLORIDE BLD-SCNC: 109 MMOL/L (ref 98–107)
CO2: 25 MMOL/L (ref 22–29)
CREAT SERPL-MCNC: 0.7 MG/DL (ref 0.7–1.2)
GFR AFRICAN AMERICAN: >60
GFR NON-AFRICAN AMERICAN: >60 ML/MIN/1.73
GLUCOSE BLD-MCNC: 90 MG/DL (ref 74–99)
HCT VFR BLD CALC: 32.6 % (ref 37–54)
HEMOGLOBIN: 11.1 G/DL (ref 12.5–16.5)
MCH RBC QN AUTO: 32.3 PG (ref 26–35)
MCHC RBC AUTO-ENTMCNC: 34 % (ref 32–34.5)
MCV RBC AUTO: 94.8 FL (ref 80–99.9)
PDW BLD-RTO: 12.1 FL (ref 11.5–15)
PLATELET # BLD: 194 E9/L (ref 130–450)
PMV BLD AUTO: 11.4 FL (ref 7–12)
POTASSIUM SERPL-SCNC: 3.9 MMOL/L (ref 3.5–5)
RBC # BLD: 3.44 E12/L (ref 3.8–5.8)
SODIUM BLD-SCNC: 140 MMOL/L (ref 132–146)
WBC # BLD: 5.6 E9/L (ref 4.5–11.5)

## 2020-06-16 PROCEDURE — 97161 PT EVAL LOW COMPLEX 20 MIN: CPT

## 2020-06-16 PROCEDURE — 92526 ORAL FUNCTION THERAPY: CPT | Performed by: SPEECH-LANGUAGE PATHOLOGIST

## 2020-06-16 PROCEDURE — 6370000000 HC RX 637 (ALT 250 FOR IP): Performed by: INTERNAL MEDICINE

## 2020-06-16 PROCEDURE — 97165 OT EVAL LOW COMPLEX 30 MIN: CPT

## 2020-06-16 PROCEDURE — 2580000003 HC RX 258: Performed by: INTERNAL MEDICINE

## 2020-06-16 PROCEDURE — 99233 SBSQ HOSP IP/OBS HIGH 50: CPT | Performed by: INTERNAL MEDICINE

## 2020-06-16 PROCEDURE — 1200000000 HC SEMI PRIVATE

## 2020-06-16 PROCEDURE — 85027 COMPLETE CBC AUTOMATED: CPT

## 2020-06-16 PROCEDURE — 6370000000 HC RX 637 (ALT 250 FOR IP): Performed by: CLINICAL NURSE SPECIALIST

## 2020-06-16 PROCEDURE — 36415 COLL VENOUS BLD VENIPUNCTURE: CPT

## 2020-06-16 PROCEDURE — APPSS30 APP SPLIT SHARED TIME 16-30 MINUTES: Performed by: PHYSICIAN ASSISTANT

## 2020-06-16 PROCEDURE — 80048 BASIC METABOLIC PNL TOTAL CA: CPT

## 2020-06-16 PROCEDURE — 6360000002 HC RX W HCPCS: Performed by: INTERNAL MEDICINE

## 2020-06-16 RX ORDER — CEFDINIR 300 MG/1
300 CAPSULE ORAL EVERY 12 HOURS SCHEDULED
Status: DISCONTINUED | OUTPATIENT
Start: 2020-06-16 | End: 2020-06-22

## 2020-06-16 RX ADMIN — CARBIDOPA AND LEVODOPA 1 TABLET: 25; 100 TABLET ORAL at 09:16

## 2020-06-16 RX ADMIN — LEVETIRACETAM 1000 MG: 100 INJECTION, SOLUTION INTRAVENOUS at 17:02

## 2020-06-16 RX ADMIN — LEVETIRACETAM 1000 MG: 100 INJECTION, SOLUTION INTRAVENOUS at 05:40

## 2020-06-16 RX ADMIN — CEFDINIR 300 MG: 300 CAPSULE ORAL at 23:22

## 2020-06-16 RX ADMIN — CARBIDOPA AND LEVODOPA 1 TABLET: 25; 100 TABLET ORAL at 23:22

## 2020-06-16 RX ADMIN — Medication 10 ML: at 23:23

## 2020-06-16 RX ADMIN — ACETAMINOPHEN 650 MG: 325 TABLET ORAL at 23:30

## 2020-06-16 RX ADMIN — Medication 10 ML: at 09:16

## 2020-06-16 ASSESSMENT — PAIN SCALES - WONG BAKER: WONGBAKER_NUMERICALRESPONSE: 0

## 2020-06-16 NOTE — PROGRESS NOTES
[facility] CARE AT [facility] Hospitalist   Progress Note    Admitting Date and Time: 6/8/2020  9:58 PM  Admit Dx: Hypernatremia [E87.0]  Hypernatremia [E87.0]  Hypernatremia [E87.0]    Subjective:    Patient was admitted with Hypernatremia [E87.0]  Hypernatremia [E87.0]  Hypernatremia [E87.0]. Patient lying in bed resting comfortably. Patient is nonverbal  Per RN: No acute events overnight. Patient did eat all of breakfast and 2 applesauces    ROS: Not obtained secondary to mental status.      cefTRIAXone (ROCEPHIN) IV  1 g Intravenous Q24H    carbidopa-levodopa  1 tablet Oral BID    levetiracetam  1,000 mg Intravenous Q12H    sodium chloride flush  10 mL Intravenous 2 times per day    sodium chloride flush  10 mL Intravenous 2 times per day     sodium chloride flush, 10 mL, PRN  acetaminophen, 650 mg, Q6H PRN    Or  acetaminophen, 650 mg, Q6H PRN  polyethylene glycol, 17 g, Daily PRN  promethazine, 12.5 mg, Q6H PRN    Or  ondansetron, 4 mg, Q6H PRN  sodium chloride flush, 10 mL, PRN         Objective:    BP 96/61   Pulse 95   Temp 98.3 °F (36.8 °C) (Axillary)   Resp 16   Ht 5' 5\" (1.651 m)   Wt 107 lb 4.8 oz (48.7 kg)   SpO2 97%   BMI 17.86 kg/m²   General Appearance: in no acute distress and alert  Skin: warm and dry, no rash or erythema  Pulmonary/Chest: clear to auscultation bilaterally- no wheezes, rales or rhonchi, normal air movement, no respiratory distress  Cardiovascular: normal rate, regular rhythm, normal S1 and S2, no murmurs, no gallops and no JVD  Abdomen: soft, non-tender, non-distended, normal bowel sounds, no masses or organomegaly      Recent Labs     06/14/20  0115 06/14/20  0910 06/15/20  0812    141 139   K 4.2 3.8 4.2   * 110* 107   CO2 25 24 24   BUN 23 21 22   CREATININE 0.9 0.8 0.7   GLUCOSE 88 127* 95   CALCIUM 8.3* 7.7* 8.0*       Recent Labs     06/14/20  0115 06/15/20  0812 06/16/20  0925   WBC 6.0 5.0 5.6   RBC 3.68* 3.46* 3.44*   HGB 11.7* 10.9* 11.1*   HCT 36.2* 33.0* 32.6*   MCV 98.4 95.4 94.8   MCH 31.8 31.5 32.3   MCHC 32.3 33.0 34.0   RDW 12.3 12.1 12.1   PLT 74* 81*  --    MPV 13.1* 13.7* 11.4       Radiology:   Fluoroscopy modified barium swallow with video   Final Result   No evidence of aspiration or laryngeal penetration. The speech pathologist's report may be found in Epic. The study was performed and dictated by Karly Castaneda PA-C with   indirect attending supervision. Neelam Hayward. Marco Lamas MD, PhD has reviewed   the results and has finalized this report. XR Chest Abdomen Ng Placement   Final Result      Orogastric tube with the tip in the antrum of the stomach. Atelectasis and/or infiltrates in the right lung base         CT CHEST WO CONTRAST   Final Result      Infiltrate seen in the right lower lung posteriorly with moderate   right-sided pleural effusion      Feeding enteric tube with the tip in the antrum of the stomach. CT ABDOMEN WO CONTRAST Additional Contrast? Radiologist Recommendation   Final Result      Very distended gallbladder with a large gallstone seen in the neck   measuring 2.6 x 3.1 cm. I would recommend a gallbladder sonogram for   further workup. There is a feeding enteric tube with the tip in the antrum of the   stomach. Small to moderate right-sided pleural effusion with infiltrates in the   right lower lung. Binh Vigil RADIOLOGY REPORT   Final Result      XR CHEST PORTABLE   Final Result      Bibasilar atelectasis is more pronounced in the right lung base      Feeding enteric tube with the tip in the antrum of the stomach. XR FOOT LEFT (2 VIEWS)   Final Result      The second and third toes are absent. Severe remodeling of the distal aspects of the first, second, and   third metatarsals. Severe degenerative changes with bunion formation of the first   metatarsophalangeal articulation. No evidence of fracture is appreciated.             XR FOOT RIGHT (2 VIEWS)   Final Result by approximately 8 cm with slight readvance . This report has been electronically signed by Maritza Otoole MD.      XR ABDOMEN FOR NG/OG/NE TUBE PLACEMENT   Final Result      Nasogastric tube follows the expected contours of the esophagus with   distal tip overlying the air in the gastric lumen and distal sideholes   overlying the esophagogastric junction. US RETROPERITONEAL COMPLETE   Final Result      1. Renal cortical echogenicity suggestive spectrum of medical renal   disease bilaterally, without evidence of obstruction on either side. 2. The bladder wall is thickened, without intraluminal debris. This   can be a manifestation of cystitis or hypertrophic change. CT Head WO Contrast   Final Result      No evidence for acute intracranial process. Cortical atrophy and chronic periventricular microangiopathy. XR CHEST PORTABLE   Final Result      NO ACUTE CARDIOPULMONARY PROCESS             Assessment:    Active Problems:    Hypernatremia    Severe protein-calorie malnutrition (HCC)    Acute renal failure (HCC)    Thrombocytopenia (HCC)    Fever of unknown origin  Resolved Problems:    * No resolved hospital problems. *      Plan:  Plan:  1. Hypernatremia  - VEAWIW ,  Nephrology consulted, Urine studies reportedly ruled out DI . Nephrology stating patient will need 1 to 1.2 L/day to maintain sodium levels. Nephrology signing off to follow from a distance. -Nephrology does state that they feel patient would benefit from tube feedings.      2. CRIS  -Nephrology following, resolved.     3. Metabolic encephalopathy  -Unknown baseline, continue to monitor.     4. Hypokalemia   -Resolved, K 4.2, continue to monitor     5. H/o Seizure Disorder  -No seizure activity noted at this time.     6. Protein Calorie Malnutrition   -  Dobbhoff tube was placed.  General surgery consulted regarding PEG tube placement, guardian stating she would like to avoid PEG at this time.  -Patient did have a

## 2020-06-16 NOTE — PROGRESS NOTES
[address] [place] [address] [facility]  JAMESIDA  Progress Note    Face to face encounter   SUBJECTIVE:  Chief Complaint   Patient presents with    Altered Mental Status     Increasing over the past 2 weeks. Worsening in the am and progressively improves throughout day. [facility]. Patient is tolerating current antibiotics. Family has declined a PEG- had video swallow- no evidence of aspiration   Sitting at bedside- with therapy- no verbal communication     Review of systems:  As stated above in the chief complaint, otherwise negative. Medications:  Scheduled Meds:   cefTRIAXone (ROCEPHIN) IV  1 g Intravenous Q24H    carbidopa-levodopa  1 tablet Oral BID    levetiracetam  1,000 mg Intravenous Q12H    sodium chloride flush  10 mL Intravenous 2 times per day    sodium chloride flush  10 mL Intravenous 2 times per day     Continuous Infusions:  PRN Meds:sodium chloride flush, acetaminophen **OR** acetaminophen, polyethylene glycol, promethazine **OR** ondansetron, sodium chloride flush    OBJECTIVE:  BP 96/61   Pulse 95   Temp 98.3 °F (36.8 °C) (Axillary)   Resp 16   Ht 5' 5\" (1.651 m)   Wt 107 lb 4.8 oz (48.7 kg)   SpO2 97%   BMI 17.86 kg/m²   Temp  Av.9 °F (36.6 °C)  Min: 97.1 °F (36.2 °C)  Max: 98.3 °F (36.8 °C)  Constitutional: Nonverbal.  Contracted. Sitting at bedside with therapy. Somewhat emaciated. Skin: Warm and dry. No rashes were noted. HEENT: Round and reactive pupils. Moist mucous membranes. No ulcerations or thrush. Neck: Supple to movements. Chest: No respiratory distress. No crackles. + expiratory wheezes heard posteriorly   Cardiovascular: Heart sounds rhythmic and regular. Abdomen: Positive bowel sounds to auscultation. Benign to palpation. Extremities: No clubbing, no cyanosis, no edema. Lines: peripheral  William catheter with clear urine.     Laboratory and Tests Review:  Lab Results   Component Value Date    WBC 5.6 2020    WBC · Labs reviewed- LFTs- elevated     Leward Head  10:42 AM  6/16/2020    Patient seen and examined. I had a face to face encounter with the patient. Agree with exam.  Assessment and plan as outlined above and directed by me. Addition and corrections were done as deemed appropriate. My exam and plan include: The patient passed a swallow evaluation. Now eating food which is puréed. Obviously not finishing most of it. We are we will go ahead and change Ceftriaxone over to oral Cefdinir.   He can be discharged from [place] [address]  6/16/2020  1:47 PM

## 2020-06-16 NOTE — CARE COORDINATION
Social Work/Discharge Planning:  Received message to call Karina Junior with [place] [employer]. Called Roseanne Gold (ph: [phone]) and left a message. Will continue to follow.   Electronically signed by STACIE Zayas on 6/16/2020 at 3:46 PM

## 2020-06-16 NOTE — PLAN OF CARE
Problem: Falls - Risk of:  Goal: Will remain free from falls  Description: Will remain free from falls  Outcome: Met This Shift     Problem: Falls - Risk of:  Goal: Absence of physical injury  Description: Absence of physical injury  Outcome: Met This Shift     Problem: Injury - Risk of, Physical Injury:  Goal: Will remain free from falls  Description: Will remain free from falls  Outcome: Met This Shift     Problem: Injury - Risk of, Physical Injury:  Goal: Absence of physical injury  Description: Absence of physical injury  Outcome: Met This Shift

## 2020-06-16 NOTE — PROGRESS NOTES
Occupational Therapy  OCCUPATIONAL THERAPY INITIAL EVALUATION      Date:2020  Patient Name: Kurt Nick  MRN: [id number]  : 1950  Room: [address] [place] [address]    Referring Provider: Javier Ram DO    Evaluating OT: Brandy SOTOMAYOR/ISELA [id number]    AM-PAC Daily Activity Raw Score:     Recommended Adaptive Equipment: TBD    Diagnosis: hypernatremia. Pertinent Medical History: MR, autism, seizures, Parkinsonism, OCD   Precautions:  falls     Home Living: Pt is a poor historian unable to provide PLOF. PLOF from pt's legal guardian. Pt is from a group home. He completes short distance mobility with HHA in and out of the bathroom. Per guardian requires supervision due to pt's impulsiveness and tendency for falls. Use of wheelchair with harness if staff is not present to assist pt in mobility. Guardian reports pt is able to feed himself however receives staff assist to ensure he is getting enough calories. Staff assists in ADLs. Pain Level: no observable or reported signs of pain. Cognition: A&O: Pt does not respond to any stimuli or engage during session. Problem solving:  poor   Judgement/safety:  Poor     Functional Assessment:   Initial Eval Status  Date: 20 Treatment session:  Short Term Goals  Treatment frequency: 1-3x/wk PRN x1-3 wks     Feeding Dependent  Mod A   Grooming Dependent  Washing face  Max A   UB Dressing Dependent  Donning/doffing hospital gown  Max A   LB Dressing Dependent  Donning B socks      Bathing Dependent  Max A   Toileting Dependent  Mod A   Bed Mobility  Supine <> sit: Dependent  Rolling: Dependent     Functional Transfers STS: Dependent x4 trials  Blocking required at B feet and knees  Mod A   Functional Mobility Unable to complete  Mod A during ADLs   Balance Sitting: poor seated at EOB  Sitting catherine x9 min    Standing: poor  Standing catherine x3-5 seconds per trial, 4 trials     Activity Tolerance Poor  Cervical flexion, head down throughout session.  PROM to bring grossly to neutral but unable to maintain  standing catherine x3-4 min with fair minus balance during self care tasks   B UE Gentle PROM provided B UEs to tolerance, immediately postures UEs in flexion post session  Pt will tolerate gentle ROM techniques and HEP to maximize functional use of UEs during self care tasks and for contracture management. Treatment: Patient educated on techniques for completion of ADL, safe functional transfers and functional mobility. Patient required cues for follow through with proper hand/foot placement, pacing, safety, attention, sequencing, active participation and technique in bed mobility, functional transfers, UB dressing and LB dressing in preparation for maximum independence in all self care tasks.      Hand Dominance: Right []  Left []   Strength ROM Additional Info:    BUE  Does not follow formal assessment for strength tests Postures B UEs in flexion contracture  No active attempts to utilize UEs during assessment  Gentle PROM completed to pts tolerance  Poor  and FMC/dexterity noted during ADL tasks               Hearing: unknown does not respond to verbal stimuli  Vision: appears limited  Tone: postures B UE/LEs in flexion  Edema: none                             Long Term Goal (1-3 wks): Pt will maximize functional performance in all self care tasks/functional transfers with good follow through of all trained techniques for safe transition to next level of care    Eval Complexity: Low    Assessment of current deficits   Functional mobility [x]  ADLs [x] Strength [x]  Cognition []  Functional transfers  [x] IADLs [x] Safety Awareness [x]  Endurance [x]  Fine Motor Coordination [x] Balance [x] Vision/perception [] Sensation []   Gross Motor Coordination [x] ROM [x] Delirium []                  Motor Control [x]    Plan of Care:   ADL retraining [x]   Equipment needs [x]   Neuromuscular re-education [x] Energy Conservation Techniques [x]  Functional Transfer training [x] Patient and/or Family Education [x]  Functional Mobility training [x]  Environmental Modifications [x]  Cognitive re-training []   Compensatory techniques for ADLs [x]  Splinting Needs []   Positioning to improve overall function [x]   Therapeutic Activity [x]   Therapeutic Exercise  [x]  Visual/Perceptual: []    Delirium prevention/treatment  []   Other:  []    Rehab Potential: Guarded for established goals     Patient / Family Goal: Does not state goal.      Patient and/or family were instructed on functional diagnosis, prognosis/goals and OT plan of care. Pt unable to  verbalize understanding. Upon arrival, patient supine in bed. At end of session, patient supine in bed with call light and phone within reach, all lines and tubes intact. Pt would benefit from continued skilled OT to increase safety and independence with completion of ADL/IADL tasks for functional independence and quality of life. Bed/chair alarm: ON.      Low Evaluation + 0 timed treatment minutes    Evaluation time includes thorough review of current medical information, gathering information on past medical history/social history and prior level of function, completion of standardized testing/informal observation of tasks, assessment of data, and development of POC/Goals    Bebo Bryant OTR/L  [id number]

## 2020-06-16 NOTE — CARE COORDINATION
CASE MANAGEMENT. ..... Jeanette Cooper Chart reviewed. Patient passed bedside swallow eval yesterday. Per conversation with nursing, patient tolerated modified diet for breakfast this am with assistance and is taking his po meds. PT/OT evals completed. Per evaluation, patient is dependent. I had an extensive conversation with patients guardian, Mihaela Stearns [phone]. Mihaela Stearns states that prior to arrival, patient was unsteady on his feet but, with his shoes on and some assistance, he was able to transfer and ambulate. Due to patients mental retardation and multiple other medical issues, Mihaela Stearns is strongly requesting for patient to discharge back to [facility] to the facility that provides 24hr skilled nursing. She believes that he will do better in a smaller more familiar environment. If this is not feasible, she is agreeable to [facility], spoke with [facility], one of patients caregivers. She will bring in [employer] shoes and provide nursing with more information regarding his baseline activity etc. With patients labs improving and with him tolerating po diet and meds, I am anticipating he will be ready for discharge soon. SS aware of the above and, in the meantime, will start PASRR process for acceptance into [facility]. SS states that patient will more than likely \"trip the screen\". This means that patient will require state approval before entering a [facility] facility. COVID TEST REQUIRED BEFORE DISCHARGE TO [facility] OR [facility]. Will cont to follow along.

## 2020-06-16 NOTE — PROGRESS NOTES
Nephrology Progress Note      Events Reviewed. Seen an examined at bedside. Sitter at bedside. Physical exam:     Vitals: BP 96/61   Pulse 95   Temp 98.3 °F (36.8 °C) (Axillary)   Resp 16   Ht 5' 5\" (1.651 m)   Wt 107 lb 4.8 oz (48.7 kg)   SpO2 97%   BMI 17.86 kg/m²      Patient seen and examined today at bedside. Constitutional:  Elderly  male, appears in no distress, on room air  Skin: no rash, turgor wnl  Heent:  eomi, mmm, corpak in.   Neck: no bruits or jvd noted  Cardiovascular:  S1, S2 without m/r/g  Respiratory: CTA B without w/r/r, difficult exam  Abdomen:  +bs, soft, nt, nd, William catheter is in place draining clear urine  Ext:1+ lower extremity edema, contractures noted in all extremities  Psychiatric: Unable to assess, nonverbal    Data:   Labs:  CBC:   Lab Results   Component Value Date    WBC 5.6 06/16/2020    RBC 3.44 06/16/2020    HGB 11.1 06/16/2020    HCT 32.6 06/16/2020    MCV 94.8 06/16/2020    MCH 32.3 06/16/2020    MCHC 34.0 06/16/2020    RDW 12.1 06/16/2020     06/16/2020    MPV 11.4 06/16/2020     CMP:    Lab Results   Component Value Date     06/16/2020    K 3.9 06/16/2020    K 3.9 03/30/2020     06/16/2020    CO2 25 06/16/2020    BUN 21 06/16/2020    CREATININE 0.7 06/16/2020    GFRAA >60 06/16/2020    LABGLOM >60 06/16/2020    GLUCOSE 90 06/16/2020    GLUCOSE 71 05/18/2012    PROT 6.0 06/15/2020    LABALBU 2.7 06/15/2020    LABALBU 4.5 05/18/2012    CALCIUM 8.1 06/16/2020    BILITOT <0.2 06/15/2020    ALKPHOS 82 06/15/2020     06/15/2020    ALT 88 06/15/2020     BMP:    Lab Results   Component Value Date     06/16/2020    K 3.9 06/16/2020    K 3.9 03/30/2020     06/16/2020    CO2 25 06/16/2020    BUN 21 06/16/2020    LABALBU 2.7 06/15/2020    LABALBU 4.5 05/18/2012    CREATININE 0.7 06/16/2020    CALCIUM 8.1 06/16/2020    GFRAA >60 06/16/2020    LABGLOM >60 06/16/2020    GLUCOSE 90 06/16/2020    GLUCOSE 71 05/18/2012 Calcium:    Lab Results   Component Value Date    CALCIUM 8.1 06/16/2020     Phosphorus:    Lab Results   Component Value Date    PHOS 3.4 06/15/2020     Uric Acid:  No results found for: URICACID  U/A:    Lab Results   Component Value Date    NITRU Negative 06/12/2020    COLORU Yellow 06/12/2020    PHUR 7.5 06/12/2020    WBCUA >20 06/12/2020    RBCUA 5-10 06/12/2020    RBCUA NONE 10/08/2012    BACTERIA MANY 06/12/2020    CLARITYU SL CLOUDY 06/12/2020    SPECGRAV 1.020 06/12/2020    LEUKOCYTESUR SMALL 06/12/2020    UROBILINOGEN 0.2 06/12/2020    BILIRUBINUR Negative 06/12/2020    BILIRUBINUR NEGATIVE 09/18/2011    BLOODU MODERATE 06/12/2020    GLUCOSEU Negative 06/12/2020    GLUCOSEU NEGATIVE 09/18/2011    KETUA Negative 06/12/2020    AMORPHOUS FEW 06/08/2020      Results for Jeremy Daniel (MRN [id number]) as of 6/9/2020 09:11   Ref. Range 6/8/2020 22:54   Chloride Latest Ref Range: Not Established mmol/L <20   Creatinine, Ur Latest Ref Range: 40 - 278 mg/dL 182   Osmolality, Ur Latest Ref Range: 300 - 900 mOsm/kg 742   Potassium, Ur Latest Ref Range: Not Established mmol/L 73.2   Sodium, Ur Latest Ref Range: Not Established mmol/L 39       Imaging:  Ct Head Wo Contrast    Result Date: 6/8/2020  Clinical indications: Altered mental status. COMPARISON: March 28, 2020. January 28, 2020 Exposure control: This examination and all examinations utilizing ionizing radiation at this facility done so according to the ALARA (as low as reasonably achievable) principal for radiation dose reduction. Technique: Axial, sagittal and coronal computed tomography of the brain and calvarium was performed without contrast. FINDINGS: There is no evidence for acute intracranial hemorrhage, midline shift or mass effect. There is enlargement of the ventricles, sulci and cisterns bilaterally. There is patchy hypoattenuation in the periventricular deep white matter bilaterally.  There are calcifications within the carotid siphons and vertebrobasilar arterial systems. Mucosal thickening within the paranasal sinuses but no fluid levels are evident. Otherwise the brain parenchyma, CSF spaces, paranasal sinuses and mastoid air cells and surrounding soft tissue and osseous structures have a satisfactory appearance. The gray-white matter junction is otherwise preserved. The cerebellopontine angle and cisterns are unremarkable. The bony calvarium is negative for acute fracture or aggressive process. The radha and brainstem are unremarkable. No evidence for acute intracranial process. Cortical atrophy and chronic periventricular microangiopathy. Xr Chest Portable    Result Date: 2020  Patient MRN: [id number] : 1950 Age:  78 years Gender: Male Order Date: 2020 10:15 PM Exam: XR CHEST PORTABLE Number of Images: 1 view Indication:   possible sepsis possible sepsis Comparison: None. Findings: The lungs are clear. There is no evidence of pulmonary infiltrate or pleural effusion. The pulmonary vascularity is unremarkable. The cardiac, hilar and mediastinal silhouettes are satisfactory. There is uncoiling atherosclerotic change of thoracic ureter. There is a moderate-sized hiatal hernia. The bony thorax demonstrates osteopenia. NO ACUTE CARDIOPULMONARY PROCESS     US Retroperitoneal 20  The right kidney measures9.4 x 4.6 x 3.6 cm, and the left   kidney measures 9.1 x 5.3 x 4.3 cm.     1. Renal cortical echogenicity suggestive spectrum of medical renal   disease bilaterally, without evidence of obstruction on either side. 2. The bladder wall is thickened, without intraluminal debris. This   can be a manifestation of cystitis or hypertrophic change     Problems resolved:   · Acute kidney injury secondary to severe prerenal azotemia from volume depletion, severe hypotension, Resolved- now 1.0. Assessment  1. Severe hypernatremia secondary to poor p.o. intake and no access to free water as he is exclusively fed.   Urine osmolality on admission was over 800 suggesting adequate ADH, ruling out central diabetes insipidus. Serum sodium 140 , back to normal today  2. Acute kidney injury secondary to severe prerenal azotemia from volume depletion, resolved now, creatinine returned to his baseline  3. Hypokalemia, Resolved  4. Parkinson's disease on Sinemet  5. History of seizure activity with breakthrough seizures, on Keppra  6. Vitamin D deficiency, on Cholecalciferol   7. Hypocalcemia- Corrected calcium 8.1mg/dl- nutritional  8. Nutrition: TF at 30cc/hr. FW at 40cc/hr. 9. Hypophosphatemia /?vit D deficiency    Plan  1. Patient's fluid electrolyte problems are purely nutritional. Hypernatemia is from free water deficit. Nothing much to add renal stand point. Patient will need 1-1.2 liter water everyday to maintain Na level. PEG vs oral feed are the option. Patient can at least have temp feeding tube. 2. If not, nothing more to add stand stand point. Will follow from distance.     Discussed with JAZLYN Ervin MD

## 2020-06-16 NOTE — PROGRESS NOTES
Physical Therapy    Facility/Department: [address] INTERMEDIATE 1  Initial Assessment    NAME: Dina Peter  : 1950  MRN: [id number]    Date of Service: 2020       REQUIRES PT FOLLOW UP: Yes       Patient Diagnosis(es): The primary encounter diagnosis was Hypernatremia. Diagnoses of Acute renal failure, unspecified acute renal failure type ([address] and Dehydration were also pertinent to this visit. has a past medical history of Acquired deformity of neck, Autism spectrum, Bilateral cataracts, Blepharochalasis, Cardiomegaly, Cataract of both eyes, Developmental delay, Gastritis, Hyperlipidemia, Kyphosis of thoracic region, Mental retardation, Obsessive compulsive disorder, Parkinsonism ([address] Seizures ([address] and Tourette disorder. has a past surgical history that includes Toe amputation (Left). Evaluating Therapist: Elton Peace, PT     Referring Provider:  Veronica Abraham DO    Room #: 127   DIAGNOSIS:  Hypernatremia   Additional Pertinent History:MR, OCD, parkinsons, decubs   PRECAUTIONS:  Falls     Social:  Pt lives at a [facility] facility   Prior to admission: per pt's caregiver pt walked with HHA short distances. Caregiver states he sit in a chair with a harness and needs supervision due to pt impulsive      Initial Evaluation  Date:  2020 Treatment      Short Term/ Long Term   Goals   Was pt agreeable to Eval/treatment? Non verbal      Does pt have pain?  no pain behavior noted      Bed Mobility  Rolling:  Dep assist   Supine to sit:  Dep assist   Sit to supine:  Dep assist   Scooting:  Dep assist   Mod assist    Transfers Sit to stand:  Dep assist x 2   Stand to sit: dep assist   Stand pivot:  NT   Mod assist    Ambulation   NT , see comments    20  feet with  HHA  with  Mod assist    LE ROM  Did not follow commands for AROM, PROM  - decreased knee and hip ext and ankle df     LE strength       AM- PAC RAW score              Pt is alert.  Did not follow commands     Balance:  Dep assist in sit and stand   Bed/Chair alarm: yes      ASSESSMENT  Pt displays functional ability as noted in the objective portion of this evaluation. Treatment/Education:     pt lies in fetal position in bed. Sit <> stand x 4 for hygiene and linen change. Dep assist for balance. Pt stands on toes and with knees flexed. Was unable to take steps . Pt not placed in his chair due to  supervision unavailable. Pt educated on fall risk        Patient response to education:   Pt verbalized understanding Pt demonstrated skill Pt requires further education in this area   no no  yes        Comments:  Pt left  In bed after session, with call light in reach. Rehab potential is fair  for reaching above PT goals. Pts/ family goals   1. None stated      Patient and or family understand(s) diagnosis, prognosis, and plan of care. - pt no     PLAN  PT care will be provided in accordance with the objectives noted above. Whenever appropriate, clear delegation orders will be provided for nursing staff. Exercises and functional mobility practice will be used as well as appropriate assistive devices or modalities to obtain goals. Patient and family education will also be administered as needed. Frequency of treatments will be 2-5x/week x  7 days. Time in: 1020   Time out:  1036       Evaluation Time includes thorough review of current medical information, gathering information on past medical history/social history and prior level of function, completion of standardized testing/informal observation of tasks, assessment of data and education on plan of care and goals.     CPT codes:  [x] Low Complexity PT evaluation 83504  [] Moderate Complexity PT evaluation 47527  [] High Complexity PT evaluation 53397  [] PT Re-evaluation 25504  [] Gait training 92074  minutes  [] Therapeutic activities 86248  minutes  [] Therapeutic exercises 83889  minutes  [] Neuromuscular reeducation 83458  minutes       [facility] [address] number:  PT [phone]

## 2020-06-17 LAB
ANION GAP SERPL CALCULATED.3IONS-SCNC: 10 MMOL/L (ref 7–16)
BUN BLDV-MCNC: 24 MG/DL (ref 8–23)
CALCIUM SERPL-MCNC: 8.1 MG/DL (ref 8.6–10.2)
CHLORIDE BLD-SCNC: 108 MMOL/L (ref 98–107)
CO2: 24 MMOL/L (ref 22–29)
CREAT SERPL-MCNC: 0.6 MG/DL (ref 0.7–1.2)
GFR AFRICAN AMERICAN: >60
GFR NON-AFRICAN AMERICAN: >60 ML/MIN/1.73
GLUCOSE BLD-MCNC: 124 MG/DL (ref 74–99)
HCT VFR BLD CALC: 31.2 % (ref 37–54)
HEMOGLOBIN: 10.6 G/DL (ref 12.5–16.5)
MCH RBC QN AUTO: 32.7 PG (ref 26–35)
MCHC RBC AUTO-ENTMCNC: 34 % (ref 32–34.5)
MCV RBC AUTO: 96.3 FL (ref 80–99.9)
PDW BLD-RTO: 12.1 FL (ref 11.5–15)
PLATELET # BLD: 204 E9/L (ref 130–450)
PMV BLD AUTO: 10.7 FL (ref 7–12)
POTASSIUM SERPL-SCNC: 3.9 MMOL/L (ref 3.5–5)
RBC # BLD: 3.24 E12/L (ref 3.8–5.8)
SODIUM BLD-SCNC: 142 MMOL/L (ref 132–146)
WBC # BLD: 5.1 E9/L (ref 4.5–11.5)

## 2020-06-17 PROCEDURE — 2580000003 HC RX 258: Performed by: INTERNAL MEDICINE

## 2020-06-17 PROCEDURE — 97530 THERAPEUTIC ACTIVITIES: CPT

## 2020-06-17 PROCEDURE — 80048 BASIC METABOLIC PNL TOTAL CA: CPT

## 2020-06-17 PROCEDURE — APPSS30 APP SPLIT SHARED TIME 16-30 MINUTES: Performed by: NURSE PRACTITIONER

## 2020-06-17 PROCEDURE — 92526 ORAL FUNCTION THERAPY: CPT | Performed by: SPEECH-LANGUAGE PATHOLOGIST

## 2020-06-17 PROCEDURE — 6370000000 HC RX 637 (ALT 250 FOR IP): Performed by: CLINICAL NURSE SPECIALIST

## 2020-06-17 PROCEDURE — 6370000000 HC RX 637 (ALT 250 FOR IP): Performed by: INTERNAL MEDICINE

## 2020-06-17 PROCEDURE — 99233 SBSQ HOSP IP/OBS HIGH 50: CPT | Performed by: INTERNAL MEDICINE

## 2020-06-17 PROCEDURE — 1200000000 HC SEMI PRIVATE

## 2020-06-17 PROCEDURE — 97535 SELF CARE MNGMENT TRAINING: CPT

## 2020-06-17 PROCEDURE — 6360000002 HC RX W HCPCS: Performed by: INTERNAL MEDICINE

## 2020-06-17 PROCEDURE — 85027 COMPLETE CBC AUTOMATED: CPT

## 2020-06-17 PROCEDURE — 36415 COLL VENOUS BLD VENIPUNCTURE: CPT

## 2020-06-17 RX ADMIN — LEVETIRACETAM 1000 MG: 100 INJECTION, SOLUTION INTRAVENOUS at 17:01

## 2020-06-17 RX ADMIN — LEVETIRACETAM 1000 MG: 100 INJECTION, SOLUTION INTRAVENOUS at 06:25

## 2020-06-17 RX ADMIN — Medication 10 ML: at 21:23

## 2020-06-17 RX ADMIN — CARBIDOPA AND LEVODOPA 1 TABLET: 25; 100 TABLET ORAL at 21:23

## 2020-06-17 RX ADMIN — Medication 10 ML: at 08:45

## 2020-06-17 RX ADMIN — CARBIDOPA AND LEVODOPA 1 TABLET: 25; 100 TABLET ORAL at 08:45

## 2020-06-17 RX ADMIN — CEFDINIR 300 MG: 300 CAPSULE ORAL at 08:45

## 2020-06-17 RX ADMIN — CEFDINIR 300 MG: 300 CAPSULE ORAL at 21:23

## 2020-06-17 RX ADMIN — SODIUM CHLORIDE, PRESERVATIVE FREE 10 ML: 5 INJECTION INTRAVENOUS at 06:25

## 2020-06-17 ASSESSMENT — PAIN SCALES - GENERAL
PAINLEVEL_OUTOF10: 0

## 2020-06-17 NOTE — PROGRESS NOTES
SPEECH LANGUAGE PATHOLOGY  DAILY PROGRESS NOTE        PATIENT NAME:  Reginald Every      :  1950          TODAY'S DATE:  2020 ROOM:  [address] [place] [address]        SWALLOWING  Pt was seated upright in bed. Presented with cup sips of nectar thick liquids and puree with good toleration. No s/s of aspiration observed. RN reports good toleration of meals. DYSPHAGIA DIAGNOSIS:  Mod-marked oral, moderate pharyngeal dysphagia-no penetration or aspiration observed during video swallow study, however limited view of pyriform sinus due to body habitus.                    DIET RECOMMENDATIONS:  Dysphagia 1, Pureed solids with nectar consistency (mildly thick) liquids as tolerated with Nudge Protocol to be initiated.      Pt to be fed as upright as possible and with compensatory strategies controlled by staff to reduce risk of aspiration.      Nectar thick liquids are recommended to improve oral control and reduce risk of aspiration if compensatory strategies are unable to followed at all times.                  FEEDING RECOMMENDATIONS:                              Assistance level:  Full assistance is needed during all oral intake                             Compensatory strategies recommended: Double swallow, Small bites/sips, Alternate solids and liquids, Check for oral pocketing and No straw-cues will be needed for all strategies            Oral- inadequate labial seal resulting in anterior spillage and oral residue noted      Pharyngeal- No overt s/s of aspiration, no multiple swallows      Intervention/Education- Discussed PO intake and thickened liquids with RN. Will continue SP intervention as per previously established RIO. Emma Jovel  Student Speech Pathologist    Luisana FREITAS CCC/SLP [id number]  Speech Pathologist              CPT code(s) 85055  dysphagia tx  Total minutes :  15 minutes

## 2020-06-17 NOTE — PROGRESS NOTES
Occupational Therapy  OT BEDSIDE TREATMENT NOTE      Date:2020  Patient Name: Derek Neri  MRN: [id number]  : 1950  Room: [address] [place] [address]     Referring Provider: Grecia Copeland DO     Evaluating OT: Deanna Pascual OTR/L [id number]     AM-PAC Daily Activity Raw Score:      Recommended Adaptive Equipment: TBD     Diagnosis: hypernatremia. Pertinent Medical History: MR, autism, seizures, Parkinsonism, OCD   Precautions:  falls     Home Living: Pt is a poor historian unable to provide PLOF. PLOF from pt's legal guardian. Pt is from a group home. He completes short distance mobility with HHA in and out of the bathroom. Per guardian requires supervision due to pt's impulsiveness and tendency for falls. Use of wheelchair with harness if staff is not present to assist pt in mobility. Guardian reports pt is able to feed himself however receives staff assist to ensure he is getting enough calories. Staff assists in ADLs.    Pain Level: no observable or reported signs of pain.     Cognition: A&O: Pt does open eyes and is minimally able to visually track during assessment. Does occasionally attempt to squeeze B hands during functional transfers. Does not grasp hand on command.                Problem solving:  poor              Judgement/safety:  Poor                Functional Assessment:    Initial Eval Status  Date: 20 Treatment session:   20 Short Term Goals  Treatment frequency: 1-3x/wk PRN x1-3 wks      Feeding Dependent   Mod A   Grooming Dependent  Washing face  Dependent  Washing face Max A   UB Dressing Dependent  Donning/doffing hospital gown  Dependent  Donning/doffing hospital gown Max A   LB Dressing Dependent  Donning B socks Dependent  Donning B socks and shoes      Bathing Dependent   Max A   Toileting Dependent   Mod A   Bed Mobility  Supine <> sit: Dependent  Rolling: Dependent Supine <> Sit:   Dependent  Rolling: Dependent      Functional Transfers STS: Dependent x4 trials  Blocking required at B feet and knees STS: Dependent x3 trials  Blocking at B feet and knees  Pt does occasionally grasp B hands during transfer  Mod A   Functional Mobility Unable to complete   Mod A during ADLs   Balance Sitting: poor seated at EOB  Sitting catherine x9 min     Standing: poor  Standing catherine x3-5 seconds per trial, 4 trials Sitting: poor to poor plus seated EOB, increased attempts at trunk control seated EOB\  Sitting catherine x8 min    Standing: poor      Activity Tolerance Poor  Cervical flexion, head down throughout session. PROM to bring grossly to neutral but unable to maintain Poor  standing catherine x3-4 min with fair minus balance during self care tasks   B UE Gentle PROM provided B UEs to tolerance, immediately postures UEs in flexion post session Pt does not respond to UE ROM to command or with attempts at PROM. However at end of session pt observed to pull hands out of blankets and pull blanket down across his lap  Pt will tolerate gentle ROM techniques and HEP to maximize functional use of UEs during self care tasks and for contracture management. Education: Patient required cues for follow through with proper hand/foot placement, pacing, safety, attention, sequencing, active participation and technique in bed mobility, functional transfers, UB dressing and LB dressing in preparation for maximum independence in all self care tasks. Dependent in follow through of tasks. Comments: Upon arrival pt supine in bed. At end of session supine in bed all lines and tubes intact, call light and phone within reach. Bed/chair alarm: ON    · Pt has made poor progress towards set goals.    · Continue with current plan of care    Treatment Time In:930   Treatment Time Out: 945   Treatment Charges: Mins Units    ADL/Home Mgt 18318 9 1    Thera Activities 66897 6     Ther Ex 58813      Manual Therapy 08517      Neuro Re-ed 49774      Orthotic manage/training  27038      Non Billable Time      Total Timed

## 2020-06-17 NOTE — PLAN OF CARE
breakdown  Description: Absence of new skin breakdown  6/17/2020 0607 by Sherri Fraga, RN  Outcome: Met This Shift

## 2020-06-17 NOTE — CARE COORDINATION
Social Work/Discharge Planning:  Called patient Micaela Fowler (ph: [phone]) and she prefers patient to discharge to a [place] facility which provides 24 hour skilled nursing instead of rehab at [facility]. Sergey Zhang is okay with [facility], if there is no availability at [facility] 24 hour skilled facility. She states patient is at [facility] and will know by the end of today if patient will have a bed available at [facility]. Aline is working with [facility]. [facility] (ph[facility] [phone][facility] and confirmed they do not have a bed available at this time and she is uncertain when a bed will become available. This worker completed PASRR and patient tripped the screen, which requires him to be evaluated by the [place] and [facility], in order to determine if he is appropriate for snf placement.  faxed supporting documentation to [facility] and [facility]. Will continue to follow and wait for PASRR determination from [facility] and [facility]. Electronically signed by STACIE Monge on 6/17/2020 at 1:25 PM    Addendum:  [facility] with [facility] and confirmed they are following patient. Patient does not require a pre-cert, BUT they will need state results before patient can admit. Patient will need a negative COVID within 48 hours of discharge.   Electronically signed by STACIE Monge on 6/17/2020 at 3:54 PM

## 2020-06-17 NOTE — PROGRESS NOTES
Nutrition Therapies:  · Oral Diet Orders: Dysphagia Pureed (Dysphagia 1), Mildly Thick   · Oral Diet intake: 1-25%(pt at all of breakfast and extra applesauce this AM)  · Anthropometric Measures:  · Ht: 5' 5\" (165.1 cm)   · Current Body Wt: 101 lb (45.8 kg)(6/17 bedwt)  · Admission Body Wt: 89 lb (40.4 kg)(6/9 bedwt)  · Usual Body Wt: 110 lb (49.9 kg)(3 mo ago EMR)  · % Weight Change:  ,  19% loss in 3 mo  · Ideal Body Wt: 136 lb (61.7 kg), % Ideal Body 68% (admit)  · BMI Classification: BMI <18.5 Underweight    Nutrition Interventions:   Continue current diet, Start ONS  Continued Inpatient Monitoring, Education Not Indicated    Nutrition Evaluation:   · Evaluation: Progressing toward goals   · Goals: Pt will consume at least 50% at meals/ONS    · Monitoring: Meal Intake, Supplement Intake, Skin Integrity, Wound Healing, I&O, Mental Status/Confusion, Weight, Pertinent Labs, Chewing/Swallowing, Monitor Bowel Function      Electronically signed by Jc Howell RD, CNSC, LD on 6/17/20 at 11:02 AM EDT    Contact Number: [phone]

## 2020-06-17 NOTE — PROGRESS NOTES
Physical Therapy  Facility/Department: [address] INTERMEDIATE 1  Daily Treatment Note  NAME: Skye Phillips  : 1950  MRN: [id number]    Date of Service: 2020             Patient Diagnosis(es): The primary encounter diagnosis was Hypernatremia. Diagnoses of Acute renal failure, unspecified acute renal failure type ([address] and Dehydration were also pertinent to this visit. has a past medical history of Acquired deformity of neck, Autism spectrum, Bilateral cataracts, Blepharochalasis, Cardiomegaly, Cataract of both eyes, Developmental delay, Gastritis, Hyperlipidemia, Kyphosis of thoracic region, Mental retardation, Obsessive compulsive disorder, Parkinsonism ([address] Seizures ([address] and Tourette disorder. has a past surgical history that includes Toe amputation (Left). Evaluating Therapist: Deirdre Alexander PT      Referring Provider:  Beatrice Shepherd DO     Room #: 419   DIAGNOSIS:  Hypernatremia   Additional Pertinent History:MR, OCD, parkinsons, decubs   PRECAUTIONS:  Falls      Social:  Pt lives at a [facility]   Prior to admission: per pt's caregiver pt walked with HHA short distances. Caregiver states he sit in a chair with a harness and needs supervision due to pt impulsive        Initial Evaluation  Date:  2020 Treatment  2020      Short Term/ Long Term   Goals   Was pt agreeable to Eval/treatment?  Non verbal  None verbal       Does pt have pain?  no pain behavior noted   no pain behavior noted      Bed Mobility  Rolling:  Dep assist   Supine to sit:  Dep assist   Sit to supine:  Dep assist   Scooting:  Dep assist   dep assist all bed mobility  Mod assist    Transfers Sit to stand:  Dep assist x 2   Stand to sit: dep assist   Stand pivot:  NT   sit <> stand : dep assist x 1-2  Mod assist    Ambulation   NT , see comments   unable   20  feet with  HHA  with  Mod assist    LE ROM  Did not follow commands for AROM, PROM  - decreased knee and hip ext and ankle df       LE strength          AM- PAC RAW score  6/ 24 6/ 24               Pt is alert. Did not follow commands      Balance:  Dep assist in sit and stand   Bed/Chair alarm: yes      ASSESSMENT    Pt displays functional ability as noted in the objective portion of this treatment             Treatment/Education:     pt lies in fetal position in bed. Donned pt's shoes prior to mobility. Sit <> stand x 3 for  linen change. Dep assist for balance. Pt stands on toes and with knees flexed. Was unable to take steps . Pt not placed in his chair due to  supervision unavailable.      Pt educated on fall risk         Patient response to education:   Pt verbalized understanding Pt demonstrated skill Pt requires further education in this area   no no  yes          Comments:  Pt left  In bed after session, with call light in reach.            PLAN    PT care will be provided in accordance with the objectives noted above. Whenever appropriate, clear delegation orders will be provided for nursing staff. Exercises and functional mobility practice will be used as well as appropriate assistive devices or modalities to obtain goals. Patient and family education will also be administered as needed.     Frequency of treatments will be 2-5x/week x  7 days. Con't with PT POC      Time in: 0930   Time out:  0946           CPT codes:  []? Low Complexity PT evaluation [id number]  []? Moderate Complexity PT evaluation 72676  []? High Complexity PT evaluation [id number]  []? PT Re-evaluation [id number]  []? Gait training 70256  minutes  [x]? Therapeutic activities 09693 15  minutes  []? Therapeutic exercises 98645  minutes  []?  Neuromuscular reeducation 69363  minutes         Ay[facility] Nix number:  PT [id number]

## 2020-06-17 NOTE — PROGRESS NOTES
[address] [place] [address] [facility]  JAMESIDA  Progress Note    Face to face encounter   SUBJECTIVE:  Chief Complaint   Patient presents with    Altered Mental Status     Increasing over the past 2 weeks. Worsening in the am and progressively improves throughout day. [facility]. Patient is tolerating current antibiotics. Family has declined a PEG- had video swallow- no evidence of aspiration   Sitting at bedside- with therapy- no verbal communication     Review of systems:  As stated above in the chief complaint, otherwise negative. Medications:  Scheduled Meds:   cefdinir  300 mg Oral 2 times per day    carbidopa-levodopa  1 tablet Oral BID    levetiracetam  1,000 mg Intravenous Q12H    sodium chloride flush  10 mL Intravenous 2 times per day    sodium chloride flush  10 mL Intravenous 2 times per day     Continuous Infusions:  PRN Meds:sodium chloride flush, acetaminophen **OR** acetaminophen, polyethylene glycol, promethazine **OR** ondansetron, sodium chloride flush    OBJECTIVE:  BP (!) 91/58   Pulse 78   Temp 98.4 °F (36.9 °C) (Oral)   Resp 18   Ht 5' 5\" (1.651 m)   Wt 101 lb 8 oz (46 kg)   SpO2 98%   BMI 16.89 kg/m²   Temp  Av.3 °F (37.4 °C)  Min: 98.3 °F (36.8 °C)  Max: 101.3 °F (38.5 °C)  Constitutional: Nonverbal.  Contracted. Sitting at bedside with therapy. Somewhat emaciated. Skin: Warm and dry. No rashes were noted. HEENT: Round and reactive pupils. Moist mucous membranes. No ulcerations or thrush. Neck: Supple to movements. Chest: No respiratory distress. No crackles. + expiratory wheezes heard posteriorly   Cardiovascular: Heart sounds rhythmic and regular. Abdomen: Positive bowel sounds to auscultation. Benign to palpation. Extremities: Contracted. No edema.   Lines: peripheral    Laboratory and Tests Review:  Lab Results   Component Value Date    WBC 5.1 2020    WBC 5.6 2020    WBC 5.0 06/15/2020    HGB 10.6 (L) 06/17/2020    HCT 31.2 (L) 06/17/2020    MCV 96.3 06/17/2020     06/17/2020     Lab Results   Component Value Date    NEUTROABS 5.76 06/08/2020    NEUTROABS 3.35 03/28/2020    NEUTROABS 2.78 03/06/2020     No results found for: [facility]  Lab Results   Component Value Date    ALT 88 (H) 06/15/2020     (H) 06/15/2020    ALKPHOS 82 06/15/2020    BILITOT <0.2 06/15/2020     Lab Results   Component Value Date     06/17/2020    K 3.9 06/17/2020    K 3.9 03/30/2020     06/17/2020    CO2 24 06/17/2020    BUN 24 06/17/2020    CREATININE 0.6 06/17/2020    CREATININE 0.7 06/16/2020    CREATININE 0.7 06/15/2020    GFRAA >60 06/17/2020    LABGLOM >60 06/17/2020    GLUCOSE 124 06/17/2020    GLUCOSE 71 05/18/2012    PROT 6.0 06/15/2020    LABALBU 2.7 06/15/2020    LABALBU 4.5 05/18/2012    CALCIUM 8.1 06/17/2020    BILITOT <0.2 06/15/2020    ALKPHOS 82 06/15/2020     06/15/2020    ALT 88 06/15/2020     Lab Results   Component Value Date    CRP 4.2 (H) 06/14/2020    CRP 3.6 (H) 06/12/2020    CRP 0.1 03/12/2020     Lab Results   Component Value Date    SEDRATE 43 (H) 06/14/2020    SEDRATE 14 06/12/2020    SEDRATE 5 03/12/2020     Radiology:    Microbiology:  Blood culture 6/8/2020: Negative x2  Blood culture 6/30/2020: Negative so far  Urine culture 6/12/2020 >100K Proteus mirabilis (Nitrofurantoin resistant)  Respiratory panel: Negative    Recent Labs     06/12/20  1430   *       Recent Labs     06/12/20  0237   PROCAL 0.17*       Assessment:  · Acute dehydration with volume contraction  · Severe hyponatremia  · Acute kidney injury associated to the above  · Complicated UTI associated to William catheter. Cultures growing Proteus  · Fever associated to the above, improving  · Elevation of transaminases  · Malnutrition.   Patient eating poorly and family has declined a PEG    Plan:    · Continue Cefdinir until 6/22/2020    Quincy Estrada  12:23 PM  6/17/2020

## 2020-06-17 NOTE — CARE COORDINATION
CASE MANAGEMENT. Reece Galindo Spoke with Nav Harman from [address] [place] [address] [facility]. All questions answered regarding patient status.

## 2020-06-17 NOTE — PROGRESS NOTES
Nephrology Progress Note      Events Reviewed. Seen an examined at bedside. Sitter at bedside. Physical exam:     Vitals: BP (!) 91/58   Pulse 78   Temp 98.4 °F (36.9 °C) (Oral)   Resp 18   Ht 5' 5\" (1.651 m)   Wt 101 lb 8 oz (46 kg)   SpO2 98%   BMI 16.89 kg/m²      Patient seen and examined today at bedside. Constitutional:  Elderly  male, appears in no distress, on room air  Skin: no rash, turgor wnl  Heent:  eomi, mmm, corpak in.   Neck: no bruits or jvd noted  Cardiovascular:  S1, S2 without m/r/g  Respiratory: CTA B without w/r/r, difficult exam  Abdomen:  +bs, soft, nt, nd, William catheter is in place draining clear urine  Ext:1+ lower extremity edema, contractures noted in all extremities  Psychiatric: Unable to assess, nonverbal    Data:   Labs:  CBC:   Lab Results   Component Value Date    WBC 5.1 06/17/2020    RBC 3.24 06/17/2020    HGB 10.6 06/17/2020    HCT 31.2 06/17/2020    MCV 96.3 06/17/2020    MCH 32.7 06/17/2020    MCHC 34.0 06/17/2020    RDW 12.1 06/17/2020     06/17/2020    MPV 10.7 06/17/2020     CMP:    Lab Results   Component Value Date     06/17/2020    K 3.9 06/17/2020    K 3.9 03/30/2020     06/17/2020    CO2 24 06/17/2020    BUN 24 06/17/2020    CREATININE 0.6 06/17/2020    GFRAA >60 06/17/2020    LABGLOM >60 06/17/2020    GLUCOSE 124 06/17/2020    GLUCOSE 71 05/18/2012    PROT 6.0 06/15/2020    LABALBU 2.7 06/15/2020    LABALBU 4.5 05/18/2012    CALCIUM 8.1 06/17/2020    BILITOT <0.2 06/15/2020    ALKPHOS 82 06/15/2020     06/15/2020    ALT 88 06/15/2020     BMP:    Lab Results   Component Value Date     06/17/2020    K 3.9 06/17/2020    K 3.9 03/30/2020     06/17/2020    CO2 24 06/17/2020    BUN 24 06/17/2020    LABALBU 2.7 06/15/2020    LABALBU 4.5 05/18/2012    CREATININE 0.6 06/17/2020    CALCIUM 8.1 06/17/2020    GFRAA >60 06/17/2020    LABGLOM >60 06/17/2020    GLUCOSE 124 06/17/2020    GLUCOSE 71 05/18/2012     Calcium: arterial systems. Mucosal thickening within the paranasal sinuses but no fluid levels are evident. Otherwise the brain parenchyma, CSF spaces, paranasal sinuses and mastoid air cells and surrounding soft tissue and osseous structures have a satisfactory appearance. The gray-white matter junction is otherwise preserved. The cerebellopontine angle and cisterns are unremarkable. The bony calvarium is negative for acute fracture or aggressive process. The radha and brainstem are unremarkable. No evidence for acute intracranial process. Cortical atrophy and chronic periventricular microangiopathy. Xr Chest Portable    Result Date: 2020  Patient MRN: [id number] : 1950 Age:  78 years Gender: Male Order Date: 2020 10:15 PM Exam: XR CHEST PORTABLE Number of Images: 1 view Indication:   possible sepsis possible sepsis Comparison: None. Findings: The lungs are clear. There is no evidence of pulmonary infiltrate or pleural effusion. The pulmonary vascularity is unremarkable. The cardiac, hilar and mediastinal silhouettes are satisfactory. There is uncoiling atherosclerotic change of thoracic ureter. There is a moderate-sized hiatal hernia. The bony thorax demonstrates osteopenia. NO ACUTE CARDIOPULMONARY PROCESS     US Retroperitoneal 20  The right kidney measures9.4 x 4.6 x 3.6 cm, and the left   kidney measures 9.1 x 5.3 x 4.3 cm.     1. Renal cortical echogenicity suggestive spectrum of medical renal   disease bilaterally, without evidence of obstruction on either side. 2. The bladder wall is thickened, without intraluminal debris. This   can be a manifestation of cystitis or hypertrophic change     Problems resolved:   · Acute kidney injury secondary to severe prerenal azotemia from volume depletion, severe hypotension, Resolved- now 1.0. Assessment  1. Severe hypernatremia secondary to poor p.o. intake and no access to free water as he is exclusively fed.   Urine osmolality on admission was over 800 suggesting adequate ADH, ruling out central diabetes insipidus. Serum sodium 142 , back to normal today  2. Acute kidney injury secondary to severe prerenal azotemia from volume depletion, resolved now, creatinine returned to his baseline  3. Hypokalemia, Resolved  4. Parkinson's disease on Sinemet  5. History of seizure activity with breakthrough seizures, on Keppra  6. Vitamin D deficiency, on Cholecalciferol   7. Hypocalcemia- Corrected calcium 8.1mg/dl- nutritional  8. Nutrition: TF at 30cc/hr. FW at 40cc/hr. 9. Hypophosphatemia /?vit D deficiency    Plan  1. Patient's fluid electrolyte problems are purely nutritional. Hypernatemia is from free water deficit. Nothing much to add renal stand point. Patient will need 1-1.2 liter water everyday to maintain Na level. PEG vs oral feed are the option. Patient can at least have temp feeding tube. 2. If not, nothing more to add stand stand point  3.  Dietary input noted and appreciated        Mihaela Mckeon MD

## 2020-06-17 NOTE — PROGRESS NOTES
[facility] Progress Note    Admitting Date and Time: 6/8/2020  9:58 PM  Admit Dx: Hypernatremia [E87.0]  Hypernatremia [E87.0]  Hypernatremia [E87.0]    Subjective:  Patient is being followed for Hypernatremia [E87.0]  Hypernatremia [E87.0]  Hypernatremia [E87.0]     Patient awake, alert, resting in bed in no acute distress  Per nursing patient has been eating/ drinking with assistance- eating greater than 50% of meals  No diarrhea per nursing   T 101.3 last night        ROS: denies fever, chills, cp, sob, n/v, HA unless stated above.       cefdinir  300 mg Oral 2 times per day    carbidopa-levodopa  1 tablet Oral BID    levetiracetam  1,000 mg Intravenous Q12H    sodium chloride flush  10 mL Intravenous 2 times per day    sodium chloride flush  10 mL Intravenous 2 times per day     sodium chloride flush, 10 mL, PRN  acetaminophen, 650 mg, Q6H PRN    Or  acetaminophen, 650 mg, Q6H PRN  polyethylene glycol, 17 g, Daily PRN  promethazine, 12.5 mg, Q6H PRN    Or  ondansetron, 4 mg, Q6H PRN  sodium chloride flush, 10 mL, PRN         Objective:    BP (!) 91/58   Pulse 78   Temp 98.4 °F (36.9 °C) (Oral)   Resp 18   Ht 5' 5\" (1.651 m)   Wt 101 lb 8 oz (46 kg)   SpO2 98%   BMI 16.89 kg/m²   General Appearance: non verbal, h/o MR   Skin: warm and dry  Head: normocephalic and atraumatic  Eyes: pupils equal, round, and reactive to light, extraocular eye movements intact, conjunctivae normal  Neck: neck supple and non tender without mass   Pulmonary/Chest: clear to auscultation bilaterally  Cardiovascular: normal rate, normal S1 and S2 and no carotid bruits  Abdomen: soft, non-tender, non-distended, normal bowel sounds  Extremities: no cyanosis, no clubbing andcontracted   Neurologic: non verbal         Recent Labs     06/15/20  0812 06/16/20  0925 06/17/20  0856    140 142   K 4.2 3.9 3.9    109* 108*   CO2 24 25 24   BUN 22 21 24*   CREATININE 0.7 0.7 0.6*   GLUCOSE 95 90 124* CALCIUM 8.0* 8.1* 8.1*       Recent Labs     06/15/20  0812 06/16/20  0925 06/17/20  0856   WBC 5.0 5.6 5.1   RBC 3.46* 3.44* 3.24*   HGB 10.9* 11.1* 10.6*   HCT 33.0* 32.6* 31.2*   MCV 95.4 94.8 96.3   MCH 31.5 32.3 32.7   MCHC 33.0 34.0 34.0   RDW 12.1 12.1 12.1   PLT 81* 194 204   MPV 13.7* 11.4 10.7           Assessment:    Active Problems:    Hypernatremia    Severe protein-calorie malnutrition (HCC)    Acute renal failure (HCC)    Thrombocytopenia (HCC)    Fever of unknown origin  Resolved Problems:    * No resolved hospital problems. *      Plan:  1. Acute dehydration with hypernatremia: patient presented from the group home for altered mental status. NA on admission greater than 180. Appreciate nephrology input. Na 142 today. Per nursing patient tolerating diet- eating greater than 50% with assistance. Previously with corpak/TF. Guardian declining peg at this tim2. Barium swallow on 6/15 with no aspiration. Dysphagia 1 pureed solids. 2. Metabolic encephalopathy: likely due to above- unsure of baseline. Not verbalizing on exam.    3. CRIS: creatinine 2.7 on admission. Rehydrated- and resolving- creatinine 0.6    4. Hypokalemia: supplement    5. Protein calorie malnutrition. Dietary following. Guardian declining peg at this time. 6. Thrombocytopenia: monitor    7. Decubitus ulcer; does not appear infected    8. UTI: urine culture growing proteus: Appreciate ID input. Transitioned to po cefdinir    9. Gallstone: surgery consulted- no intervention    10. Deconditioning; PT/OT    11. H/o seizure disorder: continue meds      Dispo: patient has Guardian. Looking into other options for discharge planning. Possible placement      NOTE: This report was transcribed using voice recognition software. Every effort was made to ensure accuracy; however, inadvertent computerized transcription errors may be present.   Electronically signed by RICHARD Olivares on 6/17/2020 at 1:15 PM

## 2020-06-17 NOTE — CARE COORDINATION
Social Work/Discharge Planning:  Spoke with investigator Sj Arriola with [facility] Board of Developmental Disabilities and provided her with an update regarding patient status. Will continue to follow.   Electronically signed by STACIE Sawyer Cha on 6/17/2020 at 10:11 AM

## 2020-06-18 LAB
ANION GAP SERPL CALCULATED.3IONS-SCNC: 9 MMOL/L (ref 7–16)
BLOOD CULTURE, ROUTINE: NORMAL
BUN BLDV-MCNC: 22 MG/DL (ref 8–23)
CALCIUM SERPL-MCNC: 8.3 MG/DL (ref 8.6–10.2)
CHLORIDE BLD-SCNC: 108 MMOL/L (ref 98–107)
CO2: 24 MMOL/L (ref 22–29)
CREAT SERPL-MCNC: 0.6 MG/DL (ref 0.7–1.2)
CULTURE, BLOOD 2: NORMAL
GFR AFRICAN AMERICAN: >60
GFR NON-AFRICAN AMERICAN: >60 ML/MIN/1.73
GLUCOSE BLD-MCNC: 96 MG/DL (ref 74–99)
HCT VFR BLD CALC: 31.9 % (ref 37–54)
HEMOGLOBIN: 10.5 G/DL (ref 12.5–16.5)
MCH RBC QN AUTO: 31.5 PG (ref 26–35)
MCHC RBC AUTO-ENTMCNC: 32.9 % (ref 32–34.5)
MCV RBC AUTO: 95.8 FL (ref 80–99.9)
PDW BLD-RTO: 12.3 FL (ref 11.5–15)
PLATELET # BLD: 256 E9/L (ref 130–450)
PMV BLD AUTO: 10.4 FL (ref 7–12)
POTASSIUM SERPL-SCNC: 4.3 MMOL/L (ref 3.5–5)
RBC # BLD: 3.33 E12/L (ref 3.8–5.8)
SODIUM BLD-SCNC: 141 MMOL/L (ref 132–146)
WBC # BLD: 6.1 E9/L (ref 4.5–11.5)

## 2020-06-18 PROCEDURE — 6370000000 HC RX 637 (ALT 250 FOR IP): Performed by: INTERNAL MEDICINE

## 2020-06-18 PROCEDURE — 97535 SELF CARE MNGMENT TRAINING: CPT

## 2020-06-18 PROCEDURE — 2580000003 HC RX 258: Performed by: INTERNAL MEDICINE

## 2020-06-18 PROCEDURE — 80048 BASIC METABOLIC PNL TOTAL CA: CPT

## 2020-06-18 PROCEDURE — 36415 COLL VENOUS BLD VENIPUNCTURE: CPT

## 2020-06-18 PROCEDURE — 97530 THERAPEUTIC ACTIVITIES: CPT

## 2020-06-18 PROCEDURE — 6370000000 HC RX 637 (ALT 250 FOR IP): Performed by: CLINICAL NURSE SPECIALIST

## 2020-06-18 PROCEDURE — 99232 SBSQ HOSP IP/OBS MODERATE 35: CPT | Performed by: INTERNAL MEDICINE

## 2020-06-18 PROCEDURE — 85027 COMPLETE CBC AUTOMATED: CPT

## 2020-06-18 PROCEDURE — 6360000002 HC RX W HCPCS: Performed by: INTERNAL MEDICINE

## 2020-06-18 PROCEDURE — 6370000000 HC RX 637 (ALT 250 FOR IP): Performed by: NURSE PRACTITIONER

## 2020-06-18 PROCEDURE — 2580000003 HC RX 258: Performed by: EMERGENCY MEDICINE

## 2020-06-18 PROCEDURE — 1200000000 HC SEMI PRIVATE

## 2020-06-18 PROCEDURE — 92526 ORAL FUNCTION THERAPY: CPT | Performed by: SPEECH-LANGUAGE PATHOLOGIST

## 2020-06-18 RX ORDER — PANTOPRAZOLE SODIUM 40 MG/1
40 TABLET, DELAYED RELEASE ORAL
Status: DISCONTINUED | OUTPATIENT
Start: 2020-06-18 | End: 2020-06-23 | Stop reason: HOSPADM

## 2020-06-18 RX ORDER — LEVETIRACETAM 500 MG/1
1000 TABLET ORAL 2 TIMES DAILY
Status: DISCONTINUED | OUTPATIENT
Start: 2020-06-18 | End: 2020-06-18

## 2020-06-18 RX ORDER — LEVETIRACETAM 500 MG/1
1000 TABLET ORAL 2 TIMES DAILY
Status: DISCONTINUED | OUTPATIENT
Start: 2020-06-18 | End: 2020-06-19

## 2020-06-18 RX ORDER — OMEPRAZOLE 20 MG/1
20 CAPSULE, DELAYED RELEASE ORAL DAILY
Status: DISCONTINUED | OUTPATIENT
Start: 2020-06-18 | End: 2020-06-18

## 2020-06-18 RX ORDER — CARBAMAZEPINE 200 MG/1
200 TABLET ORAL 2 TIMES DAILY
Status: DISCONTINUED | OUTPATIENT
Start: 2020-06-18 | End: 2020-06-23 | Stop reason: HOSPADM

## 2020-06-18 RX ADMIN — LEVETIRACETAM 1000 MG: 100 INJECTION, SOLUTION INTRAVENOUS at 05:53

## 2020-06-18 RX ADMIN — CEFDINIR 300 MG: 300 CAPSULE ORAL at 22:30

## 2020-06-18 RX ADMIN — Medication 10 ML: at 08:41

## 2020-06-18 RX ADMIN — LEVETIRACETAM 1000 MG: 500 TABLET ORAL at 17:19

## 2020-06-18 RX ADMIN — SODIUM CHLORIDE, PRESERVATIVE FREE 10 ML: 5 INJECTION INTRAVENOUS at 08:41

## 2020-06-18 RX ADMIN — PANTOPRAZOLE SODIUM 40 MG: 40 TABLET, DELAYED RELEASE ORAL at 13:08

## 2020-06-18 RX ADMIN — CARBIDOPA AND LEVODOPA 1 TABLET: 25; 100 TABLET ORAL at 08:39

## 2020-06-18 RX ADMIN — SODIUM CHLORIDE, PRESERVATIVE FREE 10 ML: 5 INJECTION INTRAVENOUS at 05:53

## 2020-06-18 RX ADMIN — CARBIDOPA AND LEVODOPA 1 TABLET: 25; 100 TABLET ORAL at 22:30

## 2020-06-18 RX ADMIN — CARBAMAZEPINE 200 MG: 200 TABLET ORAL at 22:30

## 2020-06-18 RX ADMIN — OYSTER SHELL CALCIUM WITH VITAMIN D 1 TABLET: 500; 200 TABLET, FILM COATED ORAL at 22:30

## 2020-06-18 RX ADMIN — CARBAMAZEPINE 200 MG: 200 TABLET ORAL at 14:08

## 2020-06-18 RX ADMIN — Medication 10 ML: at 22:30

## 2020-06-18 RX ADMIN — CEFDINIR 300 MG: 300 CAPSULE ORAL at 08:39

## 2020-06-18 ASSESSMENT — PAIN SCALES - GENERAL
PAINLEVEL_OUTOF10: 0
PAINLEVEL_OUTOF10: 0

## 2020-06-18 NOTE — PROGRESS NOTES
Occupational Therapy  OT BEDSIDE TREATMENT NOTE      Date:2020  Patient Name: Curt Neal  MRN: [id number]  : 1950  Room: [address] [place], [place] [address]     Referring Provider: Matt Casas DO     Evaluating OT: Dwain Mendoza OTDAILY/L [id number]     AM-PAC Daily Activity Raw Score:      Recommended Adaptive Equipment: TBD     Diagnosis: hypernatremia.        Pertinent Medical History: MR, autism, seizures, Parkinsonism, OCD   Precautions:  falls     Home Living: Pt is a poor historian unable to provide PLOF. PLOF from pt's legal guardian. Pt is from a group home. He completes short distance mobility with HHA in and out of the bathroom. Per guardian requires supervision due to pt's impulsiveness and tendency for falls. Use of wheelchair with harness if staff is not present to assist pt in mobility. Guardian reports pt is able to feed himself however receives staff assist to ensure he is getting enough calories. Staff assists in ADLs.       Pain Level: No c/o pain demoed or verbalized     Cognition: Pt intermittently visually attends to stimuli but does not verbally respond to any questions.     Functional Assessment:    Initial Eval Status  Date: 20 Treatment session:   20 Short Term Goals  Treatment frequency: 1-3x/wk PRN x1-3 wks      Feeding Dependent   Mod A   Grooming Dependent  Washing face  Dependent  To wash face Max A   UB Dressing Dependent  Donning/doffing hospital gown  Dependent  To doff/ don gown Max A   LB Dressing Dependent  Donning B socks Dependent  Donning B socks      Bathing Dependent   Max A   Toileting Dependent   Mod A   Bed Mobility  Supine <> sit: Dependent  Rolling: Dependent Supine <> Sit:   Dependent   Rolling: Dependent      Functional Transfers STS: Dependent x4 trials  Blocking required at B feet and knees STS: NT due to increased flexor tone of trunk, B UE and B LE along with poor sitting balance.  Mod A   Functional Mobility Unable to complete   Mod A during ADLs   Balance

## 2020-06-18 NOTE — PLAN OF CARE
Verbalizations of feeling emotionally comfortable while being cared for will increase  Description: Verbalizations of feeling emotionally comfortable while being cared for will increase  Outcome: Met This Shift     Problem: Psychomotor Activity - Altered:  Goal: Absence of psychomotor disturbance signs and symptoms  Description: Absence of psychomotor disturbance signs and symptoms  Outcome: Met This Shift     Problem: Sensory Perception - Impaired:  Goal: Demonstrations of improved sensory functioning will increase  Description: Demonstrations of improved sensory functioning will increase  Outcome: Met This Shift  Goal: Decrease in sensory misperception frequency  Description: Decrease in sensory misperception frequency  Outcome: Met This Shift  Goal: Able to refrain from responding to false sensory perceptions  Description: Able to refrain from responding to false sensory perceptions  Outcome: Met This Shift  Goal: Demonstrates accurate environmental perceptions  Description: Demonstrates accurate environmental perceptions  Outcome: Met This Shift  Goal: Able to distinguish between reality-based and nonreality-based thinking  Description: Able to distinguish between reality-based and nonreality-based thinking  Outcome: Met This Shift  Goal: Able to interrupt nonreality-based thinking  Description: Able to interrupt nonreality-based thinking  Outcome: Met This Shift     Problem: Sleep Pattern Disturbance:  Goal: Appears well-rested  Description: Appears well-rested  Outcome: Met This Shift     Problem: Skin Integrity:  Goal: Will show no infection signs and symptoms  Description: Will show no infection signs and symptoms  Outcome: Met This Shift  Goal: Absence of new skin breakdown  Description: Absence of new skin breakdown  Outcome: Met This Shift

## 2020-06-18 NOTE — DISCHARGE INSTR - COC
Continuity of Care Form    Patient Name: Todd Hudson   :  1950  MRN:  [id number]    Admit date:  2020  Discharge date:  20    Code Status Order: Full Code   Advance Directives:   [facility] Documentation     Date/Time Healthcare Directive Type of Healthcare Directive Copy in [facility] [address] Agent's Name Healthcare Agent's Phone Number    20 [phone]  No, patient does not have an advance directive for healthcare treatment -- -- -- -- --          Admitting Physician:  Vita Woodward MD  PCP: Maruks Meza MD    Discharging Nurse: [facility] Unit/Room#: 8028/8780-R  Discharging Unit Phone Number: [phone]    Emergency Contact:   Extended Emergency Contact Information  Primary Emergency Contact: Bella Rosas   [address] Phone: [phone]  Relation: Legal Guardian    Past Surgical History:  Past Surgical History:   Procedure Laterality Date    TOE AMPUTATION Left            Immunization History:   Immunization History   Administered Date(s) Administered    Influenza, High Dose (Fluzone 65 yrs and older) 10/17/2018    Tdap (Boostrix, Adacel) 2016       Active Problems:  Patient Active Problem List   Diagnosis Code    Seizure ([address] R56.9    Pressure ulcer of toe of right foot, stage 3 ([address] A70.013    Atherosclerosis of native artery of both lower extremities ([address] I70.203    Hypernatremia E87.0    Status epilepticus ([address] G40.901    Seizures ([address] R56.9    Seizure disorder, status epilepticus, convulsive ([address] G40.301    Severe protein-calorie malnutrition ([address] E43    Acute renal failure ([address] N17.9    Thrombocytopenia ([address] D69.6    Fever of unknown origin R50.9       Isolation/Infection:   Isolation          No Isolation        Patient Infection Status     Infection Onset Added Last Indicated Last Indicated By Review Planned Expiration Resolved Resolved By    None active    Resolved COVID-19 Rule Out 20 COVID-19 (Ordered)   20 Rule-Out Test Resulted    Not detected 2020    C-diff Rule Out 20 Clostridium difficile EIA (Ordered)   20 Camacho Almendarez RN    INFLUENZA 20 Respiratory Panel, Molecular   20           Nurse Assessment:  Last Vital Signs: BP 91/64   Pulse 98   Temp 98.3 °F (36.8 °C) (Oral)   Resp 18   Ht 5' 5\" (1.651 m)   Wt 98 lb 11.2 oz (44.8 kg)   SpO2 98%   BMI 16.42 kg/m²     Last documented pain score (0-10 scale): Pain Level: 0  Last Weight:   Wt Readings from Last 1 Encounters:   20 98 lb 11.2 oz (44.8 kg)     Mental Status:  disoriented and unable to follow commands    IV Access:  - None    Nursing Mobility/ADLs:  Walking   Dependent  Transfer  Dependent  Bathing  Dependent  Dressing  Dependent  Toileting  Dependent  Feeding  Dependent  Med Admin  Dependent  Med Delivery   Crushed in pudding or apple sauce    Wound Care Documentation and Therapy:  Wound (Active)   Number of days:        Wound 20 Foot Left bunion area (Active)   Wound Image   2020  5:39 PM   Dressing Status Clean;Dry; Intact 2020  7:48 AM   Dressing Changed Changed/New 2020  3:11 AM   Dressing/Treatment Foam 2020  3:11 AM   Wound Cleansed Rinsed/Irrigated with saline 2020  3:11 AM   Dressing Change Due 20  7:48 AM   Wound Length (cm) 1 cm 2020  3:11 AM   Wound Width (cm) 1 cm 2020  3:11 AM   Wound Depth (cm) 0 cm 2020  3:11 AM   Wound Surface Area (cm^2) 1 cm^2 2020  3:11 AM   Change in Wound Size % (l*w) 0 2020  3:11 AM   Wound Volume (cm^3) 0 cm^3 2020  3:11 AM   Wound Healing % 100 2020  3:11 AM   Wound Assessment Other (Comment) 2020  7:48 AM   Drainage Amount None 2020  3:11 AM   Drainage Description Serosanguinous 2020  4:20 PM   Odor None 2020  4:20 PM   Fabby-wound Assessment Other (Comment) 6/18/2020  7:48 AM   Number of days: 9       Wound 06/09/20 Sacrum Left (Active)   Wound Image   6/9/2020  5:39 PM   Wound Pressure Unstageable 6/9/2020  5:39 PM   Dressing Status Changed;Clean;Dry; Intact 6/18/2020 10:28 AM   Dressing Changed Changed/New 6/18/2020 10:28 AM   Dressing/Treatment Alginate; Foam 6/18/2020 10:28 AM   Wound Cleansed Rinsed/Irrigated with saline 6/18/2020 10:28 AM   Dressing Change Due 06/19/20 6/18/2020 10:28 AM   Wound Length (cm) 4 cm 6/17/2020  3:11 AM   Wound Width (cm) 4 cm 6/17/2020  3:11 AM   Wound Depth (cm) 0 cm 6/17/2020  3:11 AM   Wound Surface Area (cm^2) 16 cm^2 6/17/2020  3:11 AM   Change in Wound Size % (l*w) 0 6/17/2020  3:11 AM   Wound Volume (cm^3) 0 cm^3 6/17/2020  3:11 AM   Wound Assessment Yellow;Pink; White 6/18/2020 10:28 AM   Drainage Amount Small 6/18/2020 10:28 AM   Drainage Description Serosanguinous 6/18/2020 10:28 AM   Odor None 6/18/2020 10:28 AM   Fabby-wound Assessment Pink;Fragile 6/18/2020 10:28 AM   Number of days: 8       Wound 06/09/20 Coccyx (Active)   Wound Image   6/9/2020  5:39 PM   Wound Pressure Unstageable 6/9/2020  5:39 PM   Dressing Status Changed;Clean;Dry; Intact 6/18/2020 10:28 AM   Dressing Changed Changed/New 6/18/2020 10:28 AM   Dressing/Treatment Alginate; Foam 6/18/2020 10:28 AM   Wound Cleansed Rinsed/Irrigated with saline 6/18/2020 10:28 AM   Dressing Change Due 06/19/20 6/18/2020 10:28 AM   Wound Length (cm) 1 cm 6/17/2020  3:11 AM   Wound Width (cm) 1 cm 6/17/2020  3:11 AM   Wound Surface Area (cm^2) 1 cm^2 6/17/2020  3:11 AM   Change in Wound Size % (l*w) 75 6/17/2020  3:11 AM   Wound Assessment Yellow; White;Pink 6/18/2020 10:28 AM   Drainage Amount Small 6/18/2020 10:28 AM   Drainage Description Serosanguinous 6/18/2020 10:28 AM   Odor None 6/18/2020 10:28 AM   Fabby-wound Assessment Intact;Fragile;Pink 6/18/2020 10:28 AM   Number of days: 8        Elimination:  Continence:   · Bowel: No  · Bladder: No  Urinary Catheter: None Colostomy/Ileostomy/Ileal Conduit: No       Date of Last BM: 6/20/20    Intake/Output Summary (Last 24 hours) at 6/18/2020 1451  Last data filed at 6/18/2020 1401  Gross per 24 hour   Intake 950 ml   Output 0 ml   Net 950 ml     I/O last 3 completed shifts: In: 1050 [P.O.:950; IV Piggyback:100]  Out: 400 [Urine:400]    Safety Concerns: At Risk for Falls and Aspiration Risk    Impairments/Disabilities:      Speech and Contractures - patient make some sounds but incomprehensible speech    Nutrition Therapy:  Current Nutrition Therapy:   - Oral Diet:  Dysphagia 1 pureed   Supplements frozen oral BID and Boost pudding BID    Routes of Feeding: Oral  Liquids: Nectar Thick Liquids  Daily Fluid Restriction: no  Last Modified Barium Swallow with Video (Video Swallowing Test): done on 6/15/20/***    Treatments at the Time of Hospital Discharge:   Respiratory Treatments:   Oxygen Therapy:  is not on home oxygen therapy. Ventilator:    - No ventilator support    Rehab Therapies: Physical Therapy and Occupational Therapy  Weight Bearing Status/Restrictions: No weight bearing restirctions  Other Medical Equipment (for information only, NOT a DME order):  wheelchair and hospital bed  Other Treatments:     Patient's personal belongings (please select all that are sent with patient):  wheelchair,pants, shirt    RN SIGNATURE:  Electronically signed by Mary Reynolds RN on 6/23/20 at 3:27 PM EDT    CASE MANAGEMENT/SOCIAL WORK SECTION    Inpatient Status Date: 6/9/2020    Readmission Risk Assessment Score:  Readmission Risk              Risk of Unplanned Readmission:        30           Discharging to Facility/ Agency   · Name: [facility]  · [facility]  [address] [place] [address]  · Phone: [phone]  · Fax: [phone]    Dialysis Facility (if applicable)   · Name:  · Address:  · Dialysis Schedule:  · Phone:  · Fax:    / signature: Electronically signed by Adonis Lanes,

## 2020-06-18 NOTE — PLAN OF CARE
Problem: Falls - Risk of:  Goal: Will remain free from falls  Description: Will remain free from falls  6/18/2020 0449 by Kenyatta Trinidad RN  Outcome: Met This Shift     Problem: Falls - Risk of:  Goal: Absence of physical injury  Description: Absence of physical injury  6/18/2020 0449 by Kenyatta Trinidad RN  Outcome: Met This Shift     Problem: Injury - Risk of, Physical Injury:  Goal: Will remain free from falls  Description: Will remain free from falls  6/18/2020 0449 by Kenyatta Trinidad RN  Outcome: Met This Shift     Problem: Injury - Risk of, Physical Injury:  Goal: Absence of physical injury  Description: Absence of physical injury  6/18/2020 0449 by Kenyatta Trinidad RN  Outcome: Met This Shift     Problem: Mood - Altered:  Goal: Mood stable  Description: Mood stable  6/18/2020 0449 by Kenyatta Trinidad RN  Outcome: Met This Shift     Problem: Mood - Altered:  Goal: Absence of abusive behavior  Description: Absence of abusive behavior  6/18/2020 0449 by Kenyatta Trinidad RN  Outcome: Met This Shift

## 2020-06-18 NOTE — PROGRESS NOTES
SPEECH LANGUAGE PATHOLOGY  DAILY PROGRESS NOTE        PATIENT NAME:  Malcolm Rodriguez      :  1950          TODAY'S DATE:  2020 ROOM:  [address] [place] [address]        SWALLOWING  Pt was seated upright in bed. Presented with cup sips of nectar thick liquids and puree with good toleration. No s/s of aspiration observed. DYSPHAGIA DIAGNOSIS:  Mod-marked oral, moderate pharyngeal dysphagia-no penetration or aspiration observed during video swallow study, however limited view of pyriform sinus due to body habitus.                    DIET RECOMMENDATIONS:  Dysphagia 1, Pureed solids with nectar consistency (mildly thick) liquids as tolerated with Add2paper Protocol to be initiated.      Pt to be fed as upright as possible and with compensatory strategies controlled by staff to reduce risk of aspiration.      Nectar thick liquids are recommended to improve oral control and reduce risk of aspiration if compensatory strategies are unable to followed at all times.                  FEEDING RECOMMENDATIONS:                              Assistance level:  Full assistance is needed during all oral intake                             Compensatory strategies recommended: Double swallow, Small bites/sips, Alternate solids and liquids, Check for oral pocketing and No straw-cues will be needed for all strategies. However, due to cognitive status implementation may vary. 0            Oral- inadequate labial seal resulting in anterior spillage and oral residue noted      Pharyngeal- No overt s/s of aspiration, no multiple swallows      Intervention/Education- Will continue to assess toleration of meals. Due to cognitive status education is not appropriate for pt. Will continue SP intervention as per previously established POC.       Abhishek Lee  Student Speech Pathologist        [facility] LOCO.[place], [address] Pathologist  [id number]  2020      CPT code(s) 21429 dysphagia tx  Total minutes :  15 minutes

## 2020-06-18 NOTE — PROGRESS NOTES
Physical Therapy  Facility/Department: [address] INTERMEDIATE 1  Daily Treatment Note  NAME: Yesika Christensen  : 1950  MRN: [id number]    Date of Service: 2020      Patient Diagnosis(es): The primary encounter diagnosis was Hypernatremia. Diagnoses of Acute renal failure, unspecified acute renal failure type ([address] and Dehydration were also pertinent to this visit. has a past medical history of Acquired deformity of neck, Autism spectrum, Bilateral cataracts, Blepharochalasis, Cardiomegaly, Cataract of both eyes, Developmental delay, Gastritis, Hyperlipidemia, Kyphosis of thoracic region, Mental retardation, Obsessive compulsive disorder, Parkinsonism ([address] Seizures ([address] and Tourette disorder. has a past surgical history that includes Toe amputation (Left). Evaluating Therapist: Claudell Junes, PT      Referring Provider:  Matt Marsh DO     Room #: 445   DIAGNOSIS:  Hypernatremia   Additional Pertinent History:MR, OCD, parkinsons, decubs   PRECAUTIONS:  Falls      Social:  Pt lives at a [facility] facility   Prior to admission: per pt's caregiver pt walked with HHA short distances. Caregiver states he sit in a chair with a harness and needs supervision due to pt impulsive        Initial Evaluation  Date:  2020 Treatment      Short Term/ Long Term   Goals   Was pt agreeable to Eval/treatment?  Non verbal  None verbal       Does pt have pain?  no pain behavior noted   no pain behavior noted      Bed Mobility  Rolling:  Dep assist   Supine to sit:  Dep assist   Sit to supine:  Dep assist   Scooting:  Dep assist   dep assist all bed mobility  Mod assist    Transfers Sit to stand:  Dep assist x 2   Stand to sit: dep assist   Stand pivot:  NT  NT Mod assist    Ambulation   NT , see comments   unable   20  feet with  HHA  with  Mod assist    LE ROM  Did not follow commands for AROM, PROM  - decreased knee and hip ext and ankle df       LE strength          AM- PAC RAW score             Therapeutic Exercises:  PROM to try to get pt;s knees out of flexion. Patient education  Pt educated on PT objectives during treatment session    Patient response to education:   Pt verbalized understanding Pt demonstrated skill Pt requires further education in this area       yes     ASSESSMENT:    Comments:  Pt found and left in bed with call light in reach and bed alarm on. Treatment:  Patient practiced and was instructed in the following treatment:    Functional mobility performed as documented above. Unable to get pt's B LEs past 90 degrees of flexion toward extension. PLAN:    Patient is making limited progress towards established goals. Will continue with current POC.      Time in  1051  Time out  1105    Total Treatment Time  14 minutes     CPT codes:    [x] Therapeutic activities 24681 14 minutes  [] Therapeutic exercises 28862  minutes      April Rebolledo, [address]

## 2020-06-18 NOTE — PROGRESS NOTES
[address] [place] [address] [facility]  Progress Note    Face to face encounter   SUBJECTIVE:  Chief Complaint   Patient presents with    Altered Mental Status     Increasing over the past 2 weeks. Worsening in the am and progressively improves throughout day. [facility]. Patient is tolerating current antibiotics. Family has declined a PEG- had video swallow- no evidence of aspiration   Sitting at bedside- with therapy- no verbal communication     Review of systems:  As stated above in the chief complaint, otherwise negative. Medications:  Scheduled Meds:   cefdinir  300 mg Oral 2 times per day    carbidopa-levodopa  1 tablet Oral BID    levetiracetam  1,000 mg Intravenous Q12H    sodium chloride flush  10 mL Intravenous 2 times per day    sodium chloride flush  10 mL Intravenous 2 times per day     Continuous Infusions:  PRN Meds:sodium chloride flush, acetaminophen **OR** acetaminophen, polyethylene glycol, promethazine **OR** ondansetron, sodium chloride flush    OBJECTIVE:  /72   Pulse 86   Temp 98.4 °F (36.9 °C) (Oral)   Resp 18   Ht 5' 5\" (1.651 m)   Wt 98 lb 11.2 oz (44.8 kg)   SpO2 98%   BMI 16.42 kg/m²   Temp  Av.3 °F (36.8 °C)  Min: 98.2 °F (36.8 °C)  Max: 98.4 °F (36.9 °C)  Constitutional: Nonverbal.  Contracted. Lying in bed. No distress. Skin: Warm and dry. No rashes were noted. HEENT: Round and reactive pupils. Moist mucous membranes. No ulcerations or thrush. Neck: Supple to movements. Chest: No respiratory distress. No crackles. Cardiovascular: Heart sounds rhythmic and regular. Abdomen: Positive bowel sounds to auscultation. Benign to palpation. Extremities: Contracted. No edema.   Lines: peripheral    Laboratory and Tests Review:  Lab Results   Component Value Date    WBC 6.1 2020    WBC 5.1 2020    WBC 5.6 2020    HGB 10.5 (L) 2020    HCT 31.9 (L) 2020    MCV 95.8 2020     Mandeep  11:48 AM  6/18/2020

## 2020-06-18 NOTE — PROGRESS NOTES
Date    CALCIUM 8.3 06/18/2020     Phosphorus:    Lab Results   Component Value Date    PHOS 3.4 06/15/2020     Uric Acid:  No results found for: URICACID  U/A:    Lab Results   Component Value Date    NITRU Negative 06/12/2020    COLORU Yellow 06/12/2020    PHUR 7.5 06/12/2020    WBCUA >20 06/12/2020    RBCUA 5-10 06/12/2020    RBCUA NONE 10/08/2012    BACTERIA MANY 06/12/2020    CLARITYU SL CLOUDY 06/12/2020    SPECGRAV 1.020 06/12/2020    LEUKOCYTESUR SMALL 06/12/2020    UROBILINOGEN 0.2 06/12/2020    BILIRUBINUR Negative 06/12/2020    BILIRUBINUR NEGATIVE 09/18/2011    BLOODU MODERATE 06/12/2020    GLUCOSEU Negative 06/12/2020    GLUCOSEU NEGATIVE 09/18/2011    KETUA Negative 06/12/2020    AMORPHOUS FEW 06/08/2020      Results for Jeff Dryer (MRN [id number]) as of 6/9/2020 09:11   Ref. Range 6/8/2020 22:54   Chloride Latest Ref Range: Not Established mmol/L <20   Creatinine, Ur Latest Ref Range: 40 - 278 mg/dL 182   Osmolality, Ur Latest Ref Range: 300 - 900 mOsm/kg 742   Potassium, Ur Latest Ref Range: Not Established mmol/L 73.2   Sodium, Ur Latest Ref Range: Not Established mmol/L 39       Imaging:  Ct Head Wo Contrast    Result Date: 6/8/2020  Clinical indications: Altered mental status. COMPARISON: March 28, 2020. January 28, 2020 Exposure control: This examination and all examinations utilizing ionizing radiation at this facility done so according to the ALARA (as low as reasonably achievable) principal for radiation dose reduction. Technique: Axial, sagittal and coronal computed tomography of the brain and calvarium was performed without contrast. FINDINGS: There is no evidence for acute intracranial hemorrhage, midline shift or mass effect. There is enlargement of the ventricles, sulci and cisterns bilaterally. There is patchy hypoattenuation in the periventricular deep white matter bilaterally. There are calcifications within the carotid siphons and vertebrobasilar arterial systems.  Mucosal thickening ruling out central diabetes insipidus. Serum sodium 142 , back to normal today  2. Acute kidney injury secondary to severe prerenal azotemia from volume depletion, resolved now, creatinine returned to his baseline  3. Hypokalemia, Resolved  4. Parkinson's disease on Sinemet  5. History of seizure activity with breakthrough seizures, on Keppra  6. Vitamin D deficiency, on Cholecalciferol   7. Hypocalcemia-from Vitamin D deifciency  8. Nutrition: taking PO Hypophosphatemia from Vitamin D deficiency  9. Plan  1. Sodium remains stable, his oral intake is adequater  2. Antibiotics per ID recommendation  3. Start Oscal D 1 tab PO BID   4.  OK to discharge from renal standpoint      Raz Akbar MD

## 2020-06-18 NOTE — PROGRESS NOTES
[facility] Progress Note    Admitting Date and Time: 6/8/2020  9:58 PM  Admit Dx: Hypernatremia [E87.0]  Hypernatremia [E87.0]  Hypernatremia [E87.0]    Subjective:  Patient is being followed for Hypernatremia [E87.0]  Hypernatremia [E87.0]  Hypernatremia [E87.0]     Patient awake, alert, resting in bed in no acute distress  Ate all of lunch  No issues with choking, gagging, coughing with eating per nursing   William dc- incontinent of urine per nursing         ROS: denies fever, chills, cp, sob, n/v, HA unless stated above.       cefdinir  300 mg Oral 2 times per day    carbidopa-levodopa  1 tablet Oral BID    levetiracetam  1,000 mg Intravenous Q12H    sodium chloride flush  10 mL Intravenous 2 times per day    sodium chloride flush  10 mL Intravenous 2 times per day     sodium chloride flush, 10 mL, PRN  acetaminophen, 650 mg, Q6H PRN    Or  acetaminophen, 650 mg, Q6H PRN  polyethylene glycol, 17 g, Daily PRN  promethazine, 12.5 mg, Q6H PRN    Or  ondansetron, 4 mg, Q6H PRN  sodium chloride flush, 10 mL, PRN         Objective:    /72   Pulse 86   Temp 98.4 °F (36.9 °C) (Oral)   Resp 18   Ht 5' 5\" (1.651 m)   Wt 98 lb 11.2 oz (44.8 kg)   SpO2 98%   BMI 16.42 kg/m²      General Appearance: non verbal, h/o MR   Skin: warm and dry  Head: normocephalic and atraumatic  Eyes: pupils equal, round, and reactive to light, extraocular eye movements intact, conjunctivae normal  Neck: neck supple and non tender without mass   Pulmonary/Chest: clear to auscultation bilaterally  Cardiovascular: normal rate, normal S1 and S2 and no carotid bruits  Abdomen: soft, non-tender, non-distended, normal bowel sounds  Extremities: no cyanosis, no clubbing andcontracted   Neurologic: non verbal          Recent Labs     06/16/20  0925 06/17/20  0856 06/18/20  0355    142 141   K 3.9 3.9 4.3   * 108* 108*   CO2 25 24 24   BUN 21 24* 22   CREATININE 0.7 0.6* 0.6*   GLUCOSE 90 124* 96   CALCIUM

## 2020-06-19 LAB
ANION GAP SERPL CALCULATED.3IONS-SCNC: 9 MMOL/L (ref 7–16)
BUN BLDV-MCNC: 18 MG/DL (ref 8–23)
CALCIUM SERPL-MCNC: 8.7 MG/DL (ref 8.6–10.2)
CHLORIDE BLD-SCNC: 103 MMOL/L (ref 98–107)
CO2: 25 MMOL/L (ref 22–29)
CREAT SERPL-MCNC: 0.6 MG/DL (ref 0.7–1.2)
GFR AFRICAN AMERICAN: >60
GFR NON-AFRICAN AMERICAN: >60 ML/MIN/1.73
GLUCOSE BLD-MCNC: 84 MG/DL (ref 74–99)
HCT VFR BLD CALC: 30.1 % (ref 37–54)
HEMOGLOBIN: 10.2 G/DL (ref 12.5–16.5)
MCH RBC QN AUTO: 32.8 PG (ref 26–35)
MCHC RBC AUTO-ENTMCNC: 33.9 % (ref 32–34.5)
MCV RBC AUTO: 96.8 FL (ref 80–99.9)
PDW BLD-RTO: 13 FL (ref 11.5–15)
PLATELET # BLD: 277 E9/L (ref 130–450)
PMV BLD AUTO: 10.4 FL (ref 7–12)
POTASSIUM SERPL-SCNC: 4 MMOL/L (ref 3.5–5)
RBC # BLD: 3.11 E12/L (ref 3.8–5.8)
SODIUM BLD-SCNC: 137 MMOL/L (ref 132–146)
WBC # BLD: 5 E9/L (ref 4.5–11.5)

## 2020-06-19 PROCEDURE — 80048 BASIC METABOLIC PNL TOTAL CA: CPT

## 2020-06-19 PROCEDURE — 85027 COMPLETE CBC AUTOMATED: CPT

## 2020-06-19 PROCEDURE — 36415 COLL VENOUS BLD VENIPUNCTURE: CPT

## 2020-06-19 PROCEDURE — 6370000000 HC RX 637 (ALT 250 FOR IP): Performed by: PSYCHIATRY & NEUROLOGY

## 2020-06-19 PROCEDURE — 99232 SBSQ HOSP IP/OBS MODERATE 35: CPT | Performed by: INTERNAL MEDICINE

## 2020-06-19 PROCEDURE — 6370000000 HC RX 637 (ALT 250 FOR IP): Performed by: INTERNAL MEDICINE

## 2020-06-19 PROCEDURE — APPSS30 APP SPLIT SHARED TIME 16-30 MINUTES: Performed by: NURSE PRACTITIONER

## 2020-06-19 PROCEDURE — 6370000000 HC RX 637 (ALT 250 FOR IP): Performed by: NURSE PRACTITIONER

## 2020-06-19 PROCEDURE — 92526 ORAL FUNCTION THERAPY: CPT | Performed by: SPEECH-LANGUAGE PATHOLOGIST

## 2020-06-19 PROCEDURE — 6370000000 HC RX 637 (ALT 250 FOR IP): Performed by: SPECIALIST

## 2020-06-19 PROCEDURE — 6370000000 HC RX 637 (ALT 250 FOR IP): Performed by: CLINICAL NURSE SPECIALIST

## 2020-06-19 PROCEDURE — 51798 US URINE CAPACITY MEASURE: CPT

## 2020-06-19 PROCEDURE — 1200000000 HC SEMI PRIVATE

## 2020-06-19 PROCEDURE — 2580000003 HC RX 258: Performed by: INTERNAL MEDICINE

## 2020-06-19 RX ORDER — BENZTROPINE MESYLATE 1 MG/1
1 TABLET ORAL 2 TIMES DAILY
Status: DISCONTINUED | OUTPATIENT
Start: 2020-06-19 | End: 2020-06-23 | Stop reason: HOSPADM

## 2020-06-19 RX ORDER — MIRTAZAPINE 15 MG/1
15 TABLET, FILM COATED ORAL NIGHTLY
Status: DISCONTINUED | OUTPATIENT
Start: 2020-06-19 | End: 2020-06-23 | Stop reason: HOSPADM

## 2020-06-19 RX ORDER — FLUVOXAMINE MALEATE 50 MG/1
150 TABLET, COATED ORAL NIGHTLY
Status: DISCONTINUED | OUTPATIENT
Start: 2020-06-19 | End: 2020-06-23 | Stop reason: HOSPADM

## 2020-06-19 RX ORDER — LEVETIRACETAM 100 MG/ML
1000 SOLUTION ORAL 2 TIMES DAILY
Status: DISCONTINUED | OUTPATIENT
Start: 2020-06-19 | End: 2020-06-23 | Stop reason: HOSPADM

## 2020-06-19 RX ADMIN — CARBIDOPA AND LEVODOPA 1 TABLET: 25; 100 TABLET ORAL at 09:04

## 2020-06-19 RX ADMIN — MIRTAZAPINE 15 MG: 15 TABLET, FILM COATED ORAL at 20:45

## 2020-06-19 RX ADMIN — LEVETIRACETAM 1000 MG: 500 TABLET ORAL at 06:34

## 2020-06-19 RX ADMIN — PANTOPRAZOLE SODIUM 40 MG: 40 TABLET, DELAYED RELEASE ORAL at 06:34

## 2020-06-19 RX ADMIN — Medication 10 ML: at 09:03

## 2020-06-19 RX ADMIN — FLUVOXAMINE MALEATE 150 MG: 50 TABLET, COATED ORAL at 20:45

## 2020-06-19 RX ADMIN — OYSTER SHELL CALCIUM WITH VITAMIN D 1 TABLET: 500; 200 TABLET, FILM COATED ORAL at 20:45

## 2020-06-19 RX ADMIN — LEVETIRACETAM 1000 MG: 500 SOLUTION ORAL at 20:45

## 2020-06-19 RX ADMIN — BENZTROPINE MESYLATE 1 MG: 1 TABLET ORAL at 20:45

## 2020-06-19 RX ADMIN — CARBAMAZEPINE 200 MG: 200 TABLET ORAL at 20:45

## 2020-06-19 RX ADMIN — BENZTROPINE MESYLATE 1 MG: 1 TABLET ORAL at 12:40

## 2020-06-19 RX ADMIN — CEFDINIR 300 MG: 300 CAPSULE ORAL at 09:04

## 2020-06-19 RX ADMIN — CEFDINIR 300 MG: 300 CAPSULE ORAL at 20:45

## 2020-06-19 RX ADMIN — OLANZAPINE 7.5 MG: 2.5 TABLET, FILM COATED ORAL at 20:45

## 2020-06-19 RX ADMIN — LEVETIRACETAM 1000 MG: 500 TABLET ORAL at 17:53

## 2020-06-19 RX ADMIN — CARBAMAZEPINE 200 MG: 200 TABLET ORAL at 09:04

## 2020-06-19 RX ADMIN — CARBIDOPA AND LEVODOPA 1 TABLET: 25; 100 TABLET ORAL at 20:45

## 2020-06-19 RX ADMIN — OYSTER SHELL CALCIUM WITH VITAMIN D 1 TABLET: 500; 200 TABLET, FILM COATED ORAL at 09:04

## 2020-06-19 RX ADMIN — Medication 10 ML: at 20:45

## 2020-06-19 ASSESSMENT — PAIN SCALES - GENERAL
PAINLEVEL_OUTOF10: 0
PAINLEVEL_OUTOF10: 0

## 2020-06-19 NOTE — CARE COORDINATION
Social Work/Discharge Planning:   received rule out for [facility] from [address] [place] [address]. Sent email to Adolfo Adair with [facility] Board of Developmental Disabilities regarding status of JOHNSON assessment. Catarina Porter states determination will be in HENS system once completed.  checked HENS system and no determination yet. Patient can NOT discharge to [facility] [place] until JOHNSON determination is received.  called liaison Matt Blanchard from [facility] [place] ot confirm if facility can accept patient over the weekend if he is ready and if results are received from JOHNSON. Matt Blanchard states they can accept patient ONCE results are received from JOHNSON. Patient does not require a pre-cert. Will continue to follow and wait for JOHNSON determination.   Electronically signed by STACIE Adams on 6/19/2020 at 3:08 PM

## 2020-06-19 NOTE — PLAN OF CARE
Problem: Falls - Risk of:  Goal: Will remain free from falls  Description: Will remain free from falls  Outcome: Met This Shift     Problem: Confusion - Acute:  Goal: Absence of continued neurological deterioration signs and symptoms  Description: Absence of continued neurological deterioration signs and symptoms  Outcome: Met This Shift     Problem: Discharge Planning:  Goal: Ability to perform activities of daily living will improve  Description: Ability to perform activities of daily living will improve  Outcome: Met This Shift     Problem: Injury - Risk of, Physical Injury:  Goal: Will remain free from falls  Description: Will remain free from falls  Outcome: Met This Shift     Problem: Mood - Altered:  Goal: Mood stable  Description: Mood stable  Outcome: Met This Shift     Problem: Mood - Altered:  Goal: Absence of abusive behavior  Description: Absence of abusive behavior  Outcome: Met This Shift     Problem: Sensory Perception - Impaired:  Goal: Demonstrations of improved sensory functioning will increase  Description: Demonstrations of improved sensory functioning will increase  Outcome: Met This Shift     Problem: Sensory Perception - Impaired:  Goal: Able to interrupt nonreality-based thinking  Description: Able to interrupt nonreality-based thinking  Outcome: Met This Shift     Problem: Sleep Pattern Disturbance:  Goal: Appears well-rested  Description: Appears well-rested  Outcome: Met This Shift

## 2020-06-19 NOTE — PROGRESS NOTES
Patient has had a decrease in urine output. Bladder scan obtained for >420. Dr. Gabrielle Petty notified. Order received for straight cath.

## 2020-06-19 NOTE — PATIENT CARE CONFERENCE
[place] Quality Flow/Interdisciplinary Rounds Progress Note        Quality Flow Rounds held on June 19, 2020    Disciplines Attending:  Bedside Nurse, ,  and Nursing Unit Leadership    Filomena Brooks was admitted on 6/8/2020  9:58 PM    Anticipated Discharge Date:  Expected Discharge Date: 06/12/20    Disposition:    Gino Score:  Gino Scale Score: 10    Readmission Score:         Discussed patient goal for the day, patient clinical progression, and barriers to discharge.   The following Goal(s) of the Day/Commitment(s) have been identified:  Continue to monitor labs, SS to assist with discharge plan to [facility]  June 19, 2020

## 2020-06-19 NOTE — PROGRESS NOTES
Nephrology Progress Note      Events Reviewed. Seen an examined at bedside. His oral intake is much improved  Urine output remains adequate      Physical exam:     Vitals: /66   Pulse 80   Temp 97.9 °F (36.6 °C) (Axillary)   Resp 18   Ht 5' 5\" (1.651 m)   Wt 97 lb 9.6 oz (44.3 kg)   SpO2 97%   BMI 16.24 kg/m²      Patient seen and examined today at bedside. Constitutional:  Elderly  male, appears in no distress, on room air  Skin: no rash, turgor wnl  Heent:  eomi, mmm, corpak in.   Neck: no bruits or jvd noted  Cardiovascular:  S1, S2 without m/r/g  Respiratory: CTA B without w/r/r, difficult exam  Abdomen:  +bs, soft, nt, nd  Ext: no lower extremity edema, contractures noted in all extremities  Psychiatric: Unable to assess, nonverbal    Data:   Labs:  CBC:   Lab Results   Component Value Date    WBC 5.0 06/19/2020    RBC 3.11 06/19/2020    HGB 10.2 06/19/2020    HCT 30.1 06/19/2020    MCV 96.8 06/19/2020    MCH 32.8 06/19/2020    MCHC 33.9 06/19/2020    RDW 13.0 06/19/2020     06/19/2020    MPV 10.4 06/19/2020     CMP:    Lab Results   Component Value Date     06/19/2020    K 4.0 06/19/2020    K 3.9 03/30/2020     06/19/2020    CO2 25 06/19/2020    BUN 18 06/19/2020    CREATININE 0.6 06/19/2020    GFRAA >60 06/19/2020    LABGLOM >60 06/19/2020    GLUCOSE 84 06/19/2020    GLUCOSE 71 05/18/2012    PROT 6.0 06/15/2020    LABALBU 2.7 06/15/2020    LABALBU 4.5 05/18/2012    CALCIUM 8.7 06/19/2020    BILITOT <0.2 06/15/2020    ALKPHOS 82 06/15/2020     06/15/2020    ALT 88 06/15/2020     BMP:    Lab Results   Component Value Date     06/19/2020    K 4.0 06/19/2020    K 3.9 03/30/2020     06/19/2020    CO2 25 06/19/2020    BUN 18 06/19/2020    LABALBU 2.7 06/15/2020    LABALBU 4.5 05/18/2012    CREATININE 0.6 06/19/2020    CALCIUM 8.7 06/19/2020    GFRAA >60 06/19/2020    LABGLOM >60 06/19/2020    GLUCOSE 84 06/19/2020    GLUCOSE 71 05/18/2012     Calcium: arterial systems. Mucosal thickening within the paranasal sinuses but no fluid levels are evident. Otherwise the brain parenchyma, CSF spaces, paranasal sinuses and mastoid air cells and surrounding soft tissue and osseous structures have a satisfactory appearance. The gray-white matter junction is otherwise preserved. The cerebellopontine angle and cisterns are unremarkable. The bony calvarium is negative for acute fracture or aggressive process. The radha and brainstem are unremarkable. No evidence for acute intracranial process. Cortical atrophy and chronic periventricular microangiopathy. Xr Chest Portable    Result Date: 2020  Patient MRN: [id number] : 1950 Age:  78 years Gender: Male Order Date: 2020 10:15 PM Exam: XR CHEST PORTABLE Number of Images: 1 view Indication:   possible sepsis possible sepsis Comparison: None. Findings: The lungs are clear. There is no evidence of pulmonary infiltrate or pleural effusion. The pulmonary vascularity is unremarkable. The cardiac, hilar and mediastinal silhouettes are satisfactory. There is uncoiling atherosclerotic change of thoracic ureter. There is a moderate-sized hiatal hernia. The bony thorax demonstrates osteopenia. NO ACUTE CARDIOPULMONARY PROCESS     US Retroperitoneal 20  The right kidney measures9.4 x 4.6 x 3.6 cm, and the left   kidney measures 9.1 x 5.3 x 4.3 cm.     1. Renal cortical echogenicity suggestive spectrum of medical renal   disease bilaterally, without evidence of obstruction on either side. 2. The bladder wall is thickened, without intraluminal debris. This   can be a manifestation of cystitis or hypertrophic change     Problems resolved:   · Acute kidney injury secondary to severe prerenal azotemia from volume depletion, severe hypotension, Resolved- now 1.0. Assessment  1. Severe hypernatremia secondary to poor p.o. intake and no access to free water as he is exclusively fed.   Urine osmolality on admission was over 800 suggesting adequate ADH, ruling out central diabetes insipidus. Serum sodium 142 , back to normal today  2. Acute kidney injury secondary to severe prerenal azotemia from volume depletion, resolved now, creatinine returned to his baseline  3. Hypokalemia, Resolved  4. Parkinson's disease on Sinemet  5. History of seizure activity with breakthrough seizures, on Keppra  6. Vitamin D deficiency, on Cholecalciferol   7. Hypocalcemia-from Vitamin D deifciency  8. Nutrition: taking PO Hypophosphatemia from Vitamin D deficiency  9. Plan  1. Sodium remains stable, his oral intake is adequate  2. Renal function returned to baseline  3. Antibiotics per ID recommendation  4. Start Oscal D 1 tab PO BID   5.  OK to discharge from renal standpoint      Yolanda Cortez MD

## 2020-06-19 NOTE — PROGRESS NOTES
[address] [place] [address] [facility]  Progress Note    Face to face encounter   SUBJECTIVE:  Chief Complaint   Patient presents with    Altered Mental Status     Increasing over the past 2 weeks. Worsening in the am and progressively improves throughout day. [facility]. Patient is tolerating antibiotics. Not eating 100% of his food. No fever. Review of systems:  As stated above in the chief complaint, otherwise negative. Medications:  Scheduled Meds:   OLANZapine  7.5 mg Oral Nightly    mirtazapine  15 mg Oral Nightly    fluvoxaMINE  150 mg Oral Nightly    benztropine  1 mg Oral BID    levETIRAcetam  1,000 mg Oral BID    pantoprazole  40 mg Oral QAM AC    carBAMazepine  200 mg Oral BID    calcium-vitamin D  1 tablet Oral BID    cefdinir  300 mg Oral 2 times per day    carbidopa-levodopa  1 tablet Oral BID    sodium chloride flush  10 mL Intravenous 2 times per day    sodium chloride flush  10 mL Intravenous 2 times per day     Continuous Infusions:  PRN Meds:sodium chloride flush, acetaminophen **OR** acetaminophen, polyethylene glycol, promethazine **OR** ondansetron, sodium chloride flush    OBJECTIVE:  /66   Pulse 80   Temp 97.9 °F (36.6 °C) (Axillary)   Resp 18   Ht 5' 5\" (1.651 m)   Wt 97 lb 9.6 oz (44.3 kg)   SpO2 97%   BMI 16.24 kg/m²   Temp  Av.1 °F (36.7 °C)  Min: 97.9 °F (36.6 °C)  Max: 98.3 °F (36.8 °C)  Constitutional: Nonverbal.  Contracted. Lying in bed. He is being spoonfed. No distress. Skin: Warm and dry. No rashes were noted. HEENT: Round and reactive pupils. Moist mucous membranes. No ulcerations or thrush. Neck: Supple to movements. Chest: No respiratory distress. No crackles. Cardiovascular: Heart sounds rhythmic and regular. Abdomen: Positive bowel sounds to auscultation. Benign to palpation. Extremities: Contracted. No edema.   Lines: peripheral    Laboratory and Tests Review:  Lab Results

## 2020-06-19 NOTE — CARE COORDINATION
CASE MANAGEMENT. ... Approval for [facility] still pending. Need authorization from the state for patient to discharge to [facility]. This may take some time. In the meantime, should bed become available at group home facility with 24 hr skilled nursing care, the plan will be to go there. No beds at this time. Will cont to follow along.

## 2020-06-19 NOTE — PROGRESS NOTES
[facility] Progress Note    Admitting Date and Time: 6/8/2020  9:58 PM  Admit Dx: Hypernatremia [E87.0]  Hypernatremia [E87.0]  Hypernatremia [E87.0]    Subjective:  Patient is being followed for Hypernatremia [E87.0]  Hypernatremia [E87.0]  Hypernatremia [E87.0]     Patient awake, alert, resting in bed comfortably in no acute distress  Nursing at bedside   Reporting good appetite today  No other issues        ROS: denies fever, chills, cp, sob, n/v, HA unless stated above.      OLANZapine  7.5 mg Oral Nightly    mirtazapine  15 mg Oral Nightly    fluvoxaMINE  150 mg Oral Nightly    benztropine  1 mg Oral BID    levETIRAcetam  1,000 mg Oral BID    pantoprazole  40 mg Oral QAM AC    carBAMazepine  200 mg Oral BID    calcium-vitamin D  1 tablet Oral BID    cefdinir  300 mg Oral 2 times per day    carbidopa-levodopa  1 tablet Oral BID    sodium chloride flush  10 mL Intravenous 2 times per day    sodium chloride flush  10 mL Intravenous 2 times per day     sodium chloride flush, 10 mL, PRN  acetaminophen, 650 mg, Q6H PRN    Or  acetaminophen, 650 mg, Q6H PRN  polyethylene glycol, 17 g, Daily PRN  promethazine, 12.5 mg, Q6H PRN    Or  ondansetron, 4 mg, Q6H PRN  sodium chloride flush, 10 mL, PRN         Objective:    /66   Pulse 80   Temp 97.9 °F (36.6 °C) (Axillary)   Resp 18   Ht 5' 5\" (1.651 m)   Wt 97 lb 9.6 oz (44.3 kg)   SpO2 97%   BMI 16.24 kg/m²      General Appearance: non verbal, h/o MR   Skin: warm and dry  Head: normocephalic and atraumatic  Eyes: pupils equal, round, and reactive to light, extraocular eye movements intact, conjunctivae normal  Neck: neck supple and non tender without mass   Pulmonary/Chest: clear to auscultation bilaterally  Cardiovascular: normal rate, normal S1 and S2 and no carotid bruits  Abdomen: soft, non-tender, non-distended, normal bowel sounds  Extremities: no cyanosis, no clubbing andcontracted   Neurologic: non disorder: continue meds     12. Psych disorder/ severe intellectual disability/ autism/ ocd/ non verbal: patient on multiple medications: most have been on hold during admission- Appreciate psych input. Psych reviewed medications and resumed all. remeron was restarted at lower dose. Per psych recommending neurology evaluation to evaluate anti-epileptic regimen. Unfortunately no neurology coverage here at [address]. He will need to follow outpatient.        Dispo: patient has [facility]. Appreciate social work input. Looking into other options for discharge planning. Plan is [facility] at discharge. Precert pending. NOTE: This report was transcribed using voice recognition software. Every effort was made to ensure accuracy; however, inadvertent computerized transcription errors may be present.   Electronically signed by RICHARD Goddard on 6/19/2020 at 11:04 AM

## 2020-06-19 NOTE — PROGRESS NOTES
SPEECH LANGUAGE PATHOLOGY  DAILY PROGRESS NOTE        PATIENT NAME:  Emma Issa      :  1950          TODAY'S DATE:  2020 ROOM:  [address] [place] [address]        SWALLOWING  Pt was seated upright in bed. Presented with cup sips of nectar thick liquids and puree with good toleration. No s/s of aspiration observed. RN reported no problems during feeding and pt is consistently consuming large quantity of meals. Recommending d/c from therapy due to pt's adequate swallowing function and no observed s/s of aspiration across multiple meal observations. DYSPHAGIA DIAGNOSIS:  Mod-marked oral, moderate pharyngeal dysphagia-no penetration or aspiration observed during video swallow study, however limited view of pyriform sinus due to body habitus.                    DIET RECOMMENDATIONS:  Dysphagia 1, Pureed solids with nectar consistency (mildly thick) liquids as tolerated with Object[employer] Protocol to be initiated.      Pt to be fed as upright as possible and with compensatory strategies controlled by staff to reduce risk of aspiration.      Nectar thick liquids are recommended to improve oral control and reduce risk of aspiration if compensatory strategies are unable to followed at all times.                  FEEDING RECOMMENDATIONS:                              Assistance level:  Full assistance is needed during all oral intake                             Compensatory strategies recommended: Double swallow, Small bites/sips, Alternate solids and liquids, Check for oral pocketing and No straw-cues will be needed for all strategies.  However, due to cognitive status implementation may vary.             Oral- inadequate labial seal resulting in anterior spillage and oral residue noted      Pharyngeal- No overt s/s of aspiration, no multiple swallows      Intervention/Education- Recommending d/c from therapy due to pt's adequate swallowing function and no observed s/s of aspiration across multiple meal [[email or web address]] : Reviewed labs, s/w pt. \par All acceptable.

## 2020-06-20 LAB
ANION GAP SERPL CALCULATED.3IONS-SCNC: 8 MMOL/L (ref 7–16)
BUN BLDV-MCNC: 17 MG/DL (ref 8–23)
CALCIUM SERPL-MCNC: 8.8 MG/DL (ref 8.6–10.2)
CHLORIDE BLD-SCNC: 102 MMOL/L (ref 98–107)
CO2: 27 MMOL/L (ref 22–29)
CREAT SERPL-MCNC: 0.6 MG/DL (ref 0.7–1.2)
GFR AFRICAN AMERICAN: >60
GFR NON-AFRICAN AMERICAN: >60 ML/MIN/1.73
GLUCOSE BLD-MCNC: 80 MG/DL (ref 74–99)
HCT VFR BLD CALC: 33.6 % (ref 37–54)
HEMOGLOBIN: 11.2 G/DL (ref 12.5–16.5)
MCH RBC QN AUTO: 32.1 PG (ref 26–35)
MCHC RBC AUTO-ENTMCNC: 33.3 % (ref 32–34.5)
MCV RBC AUTO: 96.3 FL (ref 80–99.9)
PDW BLD-RTO: 13.2 FL (ref 11.5–15)
PLATELET # BLD: 315 E9/L (ref 130–450)
PMV BLD AUTO: 10.1 FL (ref 7–12)
POTASSIUM SERPL-SCNC: 4.2 MMOL/L (ref 3.5–5)
RBC # BLD: 3.49 E12/L (ref 3.8–5.8)
SODIUM BLD-SCNC: 137 MMOL/L (ref 132–146)
WBC # BLD: 4.8 E9/L (ref 4.5–11.5)

## 2020-06-20 PROCEDURE — 6370000000 HC RX 637 (ALT 250 FOR IP): Performed by: SPECIALIST

## 2020-06-20 PROCEDURE — 2580000003 HC RX 258: Performed by: EMERGENCY MEDICINE

## 2020-06-20 PROCEDURE — 6370000000 HC RX 637 (ALT 250 FOR IP): Performed by: NURSE PRACTITIONER

## 2020-06-20 PROCEDURE — 6370000000 HC RX 637 (ALT 250 FOR IP): Performed by: INTERNAL MEDICINE

## 2020-06-20 PROCEDURE — 6370000000 HC RX 637 (ALT 250 FOR IP): Performed by: PSYCHIATRY & NEUROLOGY

## 2020-06-20 PROCEDURE — 85027 COMPLETE CBC AUTOMATED: CPT

## 2020-06-20 PROCEDURE — 36415 COLL VENOUS BLD VENIPUNCTURE: CPT

## 2020-06-20 PROCEDURE — 1200000000 HC SEMI PRIVATE

## 2020-06-20 PROCEDURE — 99232 SBSQ HOSP IP/OBS MODERATE 35: CPT | Performed by: INTERNAL MEDICINE

## 2020-06-20 PROCEDURE — 80048 BASIC METABOLIC PNL TOTAL CA: CPT

## 2020-06-20 PROCEDURE — 2580000003 HC RX 258: Performed by: INTERNAL MEDICINE

## 2020-06-20 RX ADMIN — FLUVOXAMINE MALEATE 150 MG: 50 TABLET, COATED ORAL at 21:14

## 2020-06-20 RX ADMIN — CEFDINIR 300 MG: 300 CAPSULE ORAL at 08:42

## 2020-06-20 RX ADMIN — CARBIDOPA AND LEVODOPA 1 TABLET: 25; 100 TABLET ORAL at 08:42

## 2020-06-20 RX ADMIN — BENZTROPINE MESYLATE 1 MG: 1 TABLET ORAL at 08:42

## 2020-06-20 RX ADMIN — CARBIDOPA AND LEVODOPA 1 TABLET: 25; 100 TABLET ORAL at 21:15

## 2020-06-20 RX ADMIN — OYSTER SHELL CALCIUM WITH VITAMIN D 1 TABLET: 500; 200 TABLET, FILM COATED ORAL at 21:15

## 2020-06-20 RX ADMIN — Medication 10 ML: at 08:42

## 2020-06-20 RX ADMIN — MIRTAZAPINE 15 MG: 15 TABLET, FILM COATED ORAL at 21:14

## 2020-06-20 RX ADMIN — LEVETIRACETAM 1000 MG: 500 SOLUTION ORAL at 21:14

## 2020-06-20 RX ADMIN — Medication 10 ML: at 21:15

## 2020-06-20 RX ADMIN — CARBAMAZEPINE 200 MG: 200 TABLET ORAL at 08:42

## 2020-06-20 RX ADMIN — OYSTER SHELL CALCIUM WITH VITAMIN D 1 TABLET: 500; 200 TABLET, FILM COATED ORAL at 08:43

## 2020-06-20 RX ADMIN — ACETAMINOPHEN 650 MG: 325 TABLET ORAL at 21:14

## 2020-06-20 RX ADMIN — CEFDINIR 300 MG: 300 CAPSULE ORAL at 21:15

## 2020-06-20 RX ADMIN — SODIUM CHLORIDE, PRESERVATIVE FREE 10 ML: 5 INJECTION INTRAVENOUS at 08:43

## 2020-06-20 RX ADMIN — LEVETIRACETAM 1000 MG: 500 SOLUTION ORAL at 08:42

## 2020-06-20 RX ADMIN — CARBAMAZEPINE 200 MG: 200 TABLET ORAL at 21:15

## 2020-06-20 RX ADMIN — OLANZAPINE 7.5 MG: 2.5 TABLET, FILM COATED ORAL at 21:15

## 2020-06-20 RX ADMIN — BENZTROPINE MESYLATE 1 MG: 1 TABLET ORAL at 21:15

## 2020-06-20 ASSESSMENT — PAIN SCALES - GENERAL
PAINLEVEL_OUTOF10: 0
PAINLEVEL_OUTOF10: 6
PAINLEVEL_OUTOF10: 1

## 2020-06-20 NOTE — PROGRESS NOTES
Nephrology Progress Note      Events Reviewed. Seen an examined at bedside. His oral intake is much improved  Urine output remains adequate      Physical exam:     Vitals: /73   Pulse 89   Temp 97.9 °F (36.6 °C) (Temporal)   Resp 18   Ht 5' 5\" (1.651 m)   Wt 92 lb 14.4 oz (42.1 kg)   SpO2 97%   BMI 15.46 kg/m²      Patient seen and examined today at bedside. Constitutional:  Elderly  male, appears in no distress, on room air  Skin: no rash, turgor wnl  Heent:  eomi, mmm, corpak in.   Neck: no bruits or jvd noted  Cardiovascular:  S1, S2 without m/r/g  Respiratory: CTA B without w/r/r, difficult exam  Abdomen:  +bs, soft, nt, nd  Ext: no lower extremity edema, contractures noted in all extremities  Psychiatric: Unable to assess, nonverbal    Data:   Labs:  CBC:   Lab Results   Component Value Date    WBC 4.8 06/20/2020    RBC 3.49 06/20/2020    HGB 11.2 06/20/2020    HCT 33.6 06/20/2020    MCV 96.3 06/20/2020    MCH 32.1 06/20/2020    MCHC 33.3 06/20/2020    RDW 13.2 06/20/2020     06/20/2020    MPV 10.1 06/20/2020     CMP:    Lab Results   Component Value Date     06/20/2020    K 4.2 06/20/2020    K 3.9 03/30/2020     06/20/2020    CO2 27 06/20/2020    BUN 17 06/20/2020    CREATININE 0.6 06/20/2020    GFRAA >60 06/20/2020    LABGLOM >60 06/20/2020    GLUCOSE 80 06/20/2020    GLUCOSE 71 05/18/2012    PROT 6.0 06/15/2020    LABALBU 2.7 06/15/2020    LABALBU 4.5 05/18/2012    CALCIUM 8.8 06/20/2020    BILITOT <0.2 06/15/2020    ALKPHOS 82 06/15/2020     06/15/2020    ALT 88 06/15/2020     BMP:    Lab Results   Component Value Date     06/20/2020    K 4.2 06/20/2020    K 3.9 03/30/2020     06/20/2020    CO2 27 06/20/2020    BUN 17 06/20/2020    LABALBU 2.7 06/15/2020    LABALBU 4.5 05/18/2012    CREATININE 0.6 06/20/2020    CALCIUM 8.8 06/20/2020    GFRAA >60 06/20/2020    LABGLOM >60 06/20/2020    GLUCOSE 80 06/20/2020    GLUCOSE 71 05/18/2012     Calcium: Lab Results   Component Value Date    CALCIUM 8.8 06/20/2020     Phosphorus:    Lab Results   Component Value Date    PHOS 3.4 06/15/2020     Uric Acid:  No results found for: URICACID  U/A:    Lab Results   Component Value Date    NITRU Negative 06/12/2020    COLORU Yellow 06/12/2020    PHUR 7.5 06/12/2020    WBCUA >20 06/12/2020    RBCUA 5-10 06/12/2020    RBCUA NONE 10/08/2012    BACTERIA MANY 06/12/2020    CLARITYU SL CLOUDY 06/12/2020    SPECGRAV 1.020 06/12/2020    LEUKOCYTESUR SMALL 06/12/2020    UROBILINOGEN 0.2 06/12/2020    BILIRUBINUR Negative 06/12/2020    BILIRUBINUR NEGATIVE 09/18/2011    BLOODU MODERATE 06/12/2020    GLUCOSEU Negative 06/12/2020    GLUCOSEU NEGATIVE 09/18/2011    KETUA Negative 06/12/2020    AMORPHOUS FEW 06/08/2020      Results for Michael Barefoot (MRN [id number]) as of 6/9/2020 09:11   Ref. Range 6/8/2020 22:54   Chloride Latest Ref Range: Not Established mmol/L <20   Creatinine, Ur Latest Ref Range: 40 - 278 mg/dL 182   Osmolality, Ur Latest Ref Range: 300 - 900 mOsm/kg 742   Potassium, Ur Latest Ref Range: Not Established mmol/L 73.2   Sodium, Ur Latest Ref Range: Not Established mmol/L 39       Imaging:  Ct Head Wo Contrast    Result Date: 6/8/2020  Clinical indications: Altered mental status. COMPARISON: March 28, 2020. January 28, 2020 Exposure control: This examination and all examinations utilizing ionizing radiation at this facility done so according to the ALARA (as low as reasonably achievable) principal for radiation dose reduction. Technique: Axial, sagittal and coronal computed tomography of the brain and calvarium was performed without contrast. FINDINGS: There is no evidence for acute intracranial hemorrhage, midline shift or mass effect. There is enlargement of the ventricles, sulci and cisterns bilaterally. There is patchy hypoattenuation in the periventricular deep white matter bilaterally.  There are calcifications within the carotid siphons and vertebrobasilar arterial systems. Mucosal thickening within the paranasal sinuses but no fluid levels are evident. Otherwise the brain parenchyma, CSF spaces, paranasal sinuses and mastoid air cells and surrounding soft tissue and osseous structures have a satisfactory appearance. The gray-white matter junction is otherwise preserved. The cerebellopontine angle and cisterns are unremarkable. The bony calvarium is negative for acute fracture or aggressive process. The radha and brainstem are unremarkable. No evidence for acute intracranial process. Cortical atrophy and chronic periventricular microangiopathy. Xr Chest Portable    Result Date: 2020  Patient MRN: [id number] : 1950 Age:  78 years Gender: Male Order Date: 2020 10:15 PM Exam: XR CHEST PORTABLE Number of Images: 1 view Indication:   possible sepsis possible sepsis Comparison: None. Findings: The lungs are clear. There is no evidence of pulmonary infiltrate or pleural effusion. The pulmonary vascularity is unremarkable. The cardiac, hilar and mediastinal silhouettes are satisfactory. There is uncoiling atherosclerotic change of thoracic ureter. There is a moderate-sized hiatal hernia. The bony thorax demonstrates osteopenia. NO ACUTE CARDIOPULMONARY PROCESS     US Retroperitoneal 20  The right kidney measures9.4 x 4.6 x 3.6 cm, and the left   kidney measures 9.1 x 5.3 x 4.3 cm.     1. Renal cortical echogenicity suggestive spectrum of medical renal   disease bilaterally, without evidence of obstruction on either side. 2. The bladder wall is thickened, without intraluminal debris. This   can be a manifestation of cystitis or hypertrophic change     Problems resolved:   · Acute kidney injury secondary to severe prerenal azotemia from volume depletion, severe hypotension, Resolved- now 1.0. Assessment  1. Severe hypernatremia secondary to poor p.o. intake and no access to free water as he is exclusively fed.   Urine osmolality on admission was over 800 suggesting adequate ADH, ruling out central diabetes insipidus. Serum sodium 142 , back to normal today  2. Acute kidney injury secondary to severe prerenal azotemia from volume depletion, resolved now, creatinine returned to his baseline  3. Hypokalemia, Resolved  4. Parkinson's disease on Sinemet  5. History of seizure activity with breakthrough seizures, on Keppra  6. Vitamin D deficiency, on Cholecalciferol   7. Hypocalcemia-from Vitamin D deifciency  8. Nutrition: taking PO Hypophosphatemia from Vitamin D deficiency  9. Plan  1. Sodium remains stable, his oral intake is adequate  2. Renal function returned to baseline  3. Renal sign off re consult if needed.

## 2020-06-20 NOTE — PROGRESS NOTES
[address] [place] [address] [facility]  JAMESIDA  Progress Note    Face to face encounter   SUBJECTIVE:  Chief Complaint   Patient presents with    Altered Mental Status     Increasing over the past 2 weeks. Worsening in the am and progressively improves throughout day. [facility]. Patient is tolerating antibiotics. Poor intake at lunch. No documented emesis or diarrhea  No fever. Review of systems:  As stated above in the chief complaint, otherwise negative. Medications:  Scheduled Meds:   OLANZapine  7.5 mg Oral Nightly    mirtazapine  15 mg Oral Nightly    fluvoxaMINE  150 mg Oral Nightly    benztropine  1 mg Oral BID    levETIRAcetam  1,000 mg Oral BID    pantoprazole  40 mg Oral QAM AC    carBAMazepine  200 mg Oral BID    calcium-vitamin D  1 tablet Oral BID    cefdinir  300 mg Oral 2 times per day    carbidopa-levodopa  1 tablet Oral BID    sodium chloride flush  10 mL Intravenous 2 times per day    sodium chloride flush  10 mL Intravenous 2 times per day     Continuous Infusions:  PRN Meds:sodium chloride flush, acetaminophen **OR** acetaminophen, polyethylene glycol, promethazine **OR** ondansetron, sodium chloride flush    OBJECTIVE:  /73   Pulse 89   Temp 97.9 °F (36.6 °C) (Temporal)   Resp 18   Ht 5' 5\" (1.651 m)   Wt 92 lb 14.4 oz (42.1 kg)   SpO2 97%   BMI 15.46 kg/m²   Temp  Av.8 °F (36.6 °C)  Min: 97.7 °F (36.5 °C)  Max: 97.9 °F (36.6 °C)  Constitutional: Nonverbal.  Contracted. Lying in bed. Nonverbal.  No distress. Skin: Warm and dry. No rashes were noted. HEENT: Round and reactive pupils. Moist mucous membranes. No ulcerations or thrush. Neck: Supple to movements. Chest: No respiratory distress. No crackles. Cardiovascular: Heart sounds rhythmic and regular. Abdomen: Positive bowel sounds to auscultation. Benign to palpation. Extremities: Contracted. No edema.   Lines: peripheral    Laboratory and Tests Review:  Lab Results   Component Value Date    WBC 4.8 06/20/2020    WBC 5.0 06/19/2020    WBC 6.1 06/18/2020    HGB 11.2 (L) 06/20/2020    HCT 33.6 (L) 06/20/2020    MCV 96.3 06/20/2020     06/20/2020     Lab Results   Component Value Date    NEUTROABS 5.76 06/08/2020    NEUTROABS 3.35 03/28/2020    NEUTROABS 2.78 03/06/2020     No results found for: [facility]  Lab Results   Component Value Date    ALT 88 (H) 06/15/2020     (H) 06/15/2020    ALKPHOS 82 06/15/2020    BILITOT <0.2 06/15/2020     Lab Results   Component Value Date     06/20/2020    K 4.2 06/20/2020    K 3.9 03/30/2020     06/20/2020    CO2 27 06/20/2020    BUN 17 06/20/2020    CREATININE 0.6 06/20/2020    CREATININE 0.6 06/19/2020    CREATININE 0.6 06/18/2020    GFRAA >60 06/20/2020    LABGLOM >60 06/20/2020    GLUCOSE 80 06/20/2020    GLUCOSE 71 05/18/2012    PROT 6.0 06/15/2020    LABALBU 2.7 06/15/2020    LABALBU 4.5 05/18/2012    CALCIUM 8.8 06/20/2020    BILITOT <0.2 06/15/2020    ALKPHOS 82 06/15/2020     06/15/2020    ALT 88 06/15/2020     Lab Results   Component Value Date    CRP 4.2 (H) 06/14/2020    CRP 3.6 (H) 06/12/2020    CRP 0.1 03/12/2020     Lab Results   Component Value Date    SEDRATE 43 (H) 06/14/2020    SEDRATE 14 06/12/2020    SEDRATE 5 03/12/2020     Radiology:    Microbiology:  Blood culture 6/8/2020: Negative x2  Blood culture 6/30/2020: Negative so far  Urine culture 6/12/2020 >100K Proteus mirabilis (Nitrofurantoin resistant)  Respiratory panel: Negative    Recent Labs     06/12/20  1430   *       Recent Labs     06/12/20  0237   PROCAL 0.17*       Assessment:  · Acute dehydration with volume contraction  · Severe hypernatremia, resolved  · Acute kidney injury associated to the above, resolved  · Complicated UTI associated to William catheter, resolved. Cultures growing Proteus  · Fever associated to the above, resolved  · Elevation of transaminases  · Malnutrition.   Patient eating poorly and family has declined a PEG    Plan:    · Continue Cefdinir until 6/22/2020. The patient might be finishing antibiotics here  · The patient can be discharged. Group home apparently will not take him back and he may have to go to a skilled nursing facility. Waiting for the state guardian to make transition    April Jose Reese  1:21 PM  6/20/2020  Pt seen and examined. Above discussed agree with advanced practice nurse. Labs, cultures, and radiographs reviewed. Face to Face encounter occurred. Changes made as necessary.      Samantha Benitez MD

## 2020-06-20 NOTE — PROGRESS NOTES
[facility] Progress Note    Admitting Date and Time: 6/8/2020  9:58 PM  Admit Dx: Hypernatremia [E87.0]  Hypernatremia [E87.0]  Hypernatremia [E87.0]    Subjective:  Patient is being followed for Hypernatremia [E87.0]  Hypernatremia [E87.0]  Hypernatremia [E87.0]     Patient resting and sleeping in bed in no acute distress- opens eyes and then back to sleep  Per nursing patient did eat 50% of breakfast, but did need encouragment  Bladder scan greater than 420- nursing was going to straight cath and then patient had large void  BM this am              ROS: denies fever, chills, cp, sob, n/v, HA unless stated above.       OLANZapine  7.5 mg Oral Nightly    mirtazapine  15 mg Oral Nightly    fluvoxaMINE  150 mg Oral Nightly    benztropine  1 mg Oral BID    levETIRAcetam  1,000 mg Oral BID    pantoprazole  40 mg Oral QAM AC    carBAMazepine  200 mg Oral BID    calcium-vitamin D  1 tablet Oral BID    cefdinir  300 mg Oral 2 times per day    carbidopa-levodopa  1 tablet Oral BID    sodium chloride flush  10 mL Intravenous 2 times per day    sodium chloride flush  10 mL Intravenous 2 times per day     sodium chloride flush, 10 mL, PRN  acetaminophen, 650 mg, Q6H PRN    Or  acetaminophen, 650 mg, Q6H PRN  polyethylene glycol, 17 g, Daily PRN  promethazine, 12.5 mg, Q6H PRN    Or  ondansetron, 4 mg, Q6H PRN  sodium chloride flush, 10 mL, PRN         Objective:    /73   Pulse 89   Temp 97.9 °F (36.6 °C) (Temporal)   Resp 18   Ht 5' 5\" (1.651 m)   Wt 92 lb 14.4 oz (42.1 kg)   SpO2 97%   BMI 15.46 kg/m²      General Appearance: non verbal, h/o MR   Skin: warm and dry  Head: normocephalic and atraumatic  Eyes: pupils equal, round, and reactive to light, extraocular eye movements intact, conjunctivae normal  Neck: neck supple and non tender without mass   Pulmonary/Chest: clear to auscultation bilaterally  Cardiovascular: normal rate, normal S1 and S2 and no carotid bruits  Abdomen: soft, non-tender, non-distended, normal bowel sounds  Extremities: no cyanosis, no clubbing andcontracted   Neurologic: non verbal          Recent Labs     06/18/20  0355 06/19/20  0330 06/20/20  0300    137 137   K 4.3 4.0 4.2   * 103 102   CO2 24 25 27   BUN 22 18 17   CREATININE 0.6* 0.6* 0.6*   GLUCOSE 96 84 80   CALCIUM 8.3* 8.7 8.8       Recent Labs     06/18/20  0355 06/19/20  0330 06/20/20  0300   WBC 6.1 5.0 4.8   RBC 3.33* 3.11* 3.49*   HGB 10.5* 10.2* 11.2*   HCT 31.9* 30.1* 33.6*   MCV 95.8 96.8 96.3   MCH 31.5 32.8 32.1   MCHC 32.9 33.9 33.3   RDW 12.3 13.0 13.2    277 315   MPV 10.4 10.4 10.1     Assessment:    Active Problems:    Hypernatremia    Severe protein-calorie malnutrition (HCC)    Acute renal failure (HCC)    Thrombocytopenia (HCC)    Fever of unknown origin    Encephalopathy    Urinary tract infection without hematuria  Resolved Problems:    * No resolved hospital problems. *      Plan:  1.  Acute dehydration with hypernatremia: patient presented from the group home for altered mental status. NA on admission greater than 180. Appreciate nephrology input. Na 137 today. Per nursing patient tolerating diet with good appetite- did need some encouragement today. Previously with corpak/TF. Guardian declining peg at this time. Barium swallow on 6/15 with no aspiration. Dysphagia 1 pureed solids- nectar thick liquids.     2. Metabolic encephalopathy: likely due to above- unsure of baseline. Not verbalizing on exam.     3. CRIS: creatinine 2.7 on admission. Rehydrated- and resolved.     4. Hypokalemia: supplemented. Resolved.      5. Protein calorie malnutrition. Dietary following. Guardian declining peg at this time.     6. Thrombocytopenia: resolved     7. Decubitus ulcer; does not appear infected     8. UTI ( not related to owens catheter which was inserted on admission)-: urine culture growing proteus: Appreciate ID input. Transitioned to po cefdinir until 6/22.  Owens cath [facility] and pt voiding without issue. Now with condom catheter.      9. Gallstone: surgery consulted- no intervention     10. Deconditioning; PT/OT     11. H/o seizure disorder: continue meds     12. Psych disorder/ severe intellectual disability/ autism/ ocd/ non verbal: patient on multiple medications: most have been on hold during admission- Appreciate psych input. Psych reviewed medications 6/19 and resumed all. remeron was restarted at lower dose. Per psych recommending neurology evaluation to evaluate anti-epileptic regimen. Unfortunately no neurology coverage here at [address] [place] [address]. He will need to follow outpatient. Today patient appears more drowsy on exam- discussed with attending. Monitor during the day- want to make sure meds are not too sedating/ which would cause decreased appetite.         Dispo: patient has [employer] work input. Looking into other options for discharge planning. Plan [facility]. Precert pending. Social work working on transitioning to different facility.       NOTE: This report was transcribed using voice recognition software. Every effort was made to ensure accuracy; however, inadvertent computerized transcription errors may be present.   Electronically signed by RICHARD Shirley on 6/20/2020 at 11:53 AM

## 2020-06-20 NOTE — PLAN OF CARE
Problem: Falls - Risk of:  Goal: Will remain free from falls  Description: Will remain free from falls  Outcome: Met This Shift     Problem: Falls - Risk of:  Goal: Absence of physical injury  Description: Absence of physical injury  Outcome: Met This Shift     Problem: Mood - Altered:  Goal: Mood stable  Description: Mood stable  Outcome: Met This Shift     Problem: Mood - Altered:  Goal: Absence of abusive behavior  Description: Absence of abusive behavior  Outcome: Met This Shift

## 2020-06-21 LAB
ANION GAP SERPL CALCULATED.3IONS-SCNC: 7 MMOL/L (ref 7–16)
BUN BLDV-MCNC: 19 MG/DL (ref 8–23)
CALCIUM SERPL-MCNC: 8.4 MG/DL (ref 8.6–10.2)
CHLORIDE BLD-SCNC: 105 MMOL/L (ref 98–107)
CO2: 29 MMOL/L (ref 22–29)
CREAT SERPL-MCNC: 0.6 MG/DL (ref 0.7–1.2)
GFR AFRICAN AMERICAN: >60
GFR NON-AFRICAN AMERICAN: >60 ML/MIN/1.73
GLUCOSE BLD-MCNC: 90 MG/DL (ref 74–99)
HCT VFR BLD CALC: 33.4 % (ref 37–54)
HEMOGLOBIN: 11 G/DL (ref 12.5–16.5)
MCH RBC QN AUTO: 31.9 PG (ref 26–35)
MCHC RBC AUTO-ENTMCNC: 32.9 % (ref 32–34.5)
MCV RBC AUTO: 96.8 FL (ref 80–99.9)
PDW BLD-RTO: 13.5 FL (ref 11.5–15)
PLATELET # BLD: 295 E9/L (ref 130–450)
PMV BLD AUTO: 9.5 FL (ref 7–12)
POTASSIUM SERPL-SCNC: 4.2 MMOL/L (ref 3.5–5)
RBC # BLD: 3.45 E12/L (ref 3.8–5.8)
SODIUM BLD-SCNC: 141 MMOL/L (ref 132–146)
WBC # BLD: 4.4 E9/L (ref 4.5–11.5)

## 2020-06-21 PROCEDURE — 6370000000 HC RX 637 (ALT 250 FOR IP): Performed by: INTERNAL MEDICINE

## 2020-06-21 PROCEDURE — 1200000000 HC SEMI PRIVATE

## 2020-06-21 PROCEDURE — 6370000000 HC RX 637 (ALT 250 FOR IP): Performed by: NURSE PRACTITIONER

## 2020-06-21 PROCEDURE — 2580000003 HC RX 258: Performed by: INTERNAL MEDICINE

## 2020-06-21 PROCEDURE — 99232 SBSQ HOSP IP/OBS MODERATE 35: CPT | Performed by: INTERNAL MEDICINE

## 2020-06-21 PROCEDURE — 80048 BASIC METABOLIC PNL TOTAL CA: CPT

## 2020-06-21 PROCEDURE — 6370000000 HC RX 637 (ALT 250 FOR IP): Performed by: SPECIALIST

## 2020-06-21 PROCEDURE — 36415 COLL VENOUS BLD VENIPUNCTURE: CPT

## 2020-06-21 PROCEDURE — 6370000000 HC RX 637 (ALT 250 FOR IP): Performed by: PSYCHIATRY & NEUROLOGY

## 2020-06-21 PROCEDURE — 2580000003 HC RX 258: Performed by: EMERGENCY MEDICINE

## 2020-06-21 PROCEDURE — 85027 COMPLETE CBC AUTOMATED: CPT

## 2020-06-21 PROCEDURE — APPSS30 APP SPLIT SHARED TIME 16-30 MINUTES: Performed by: NURSE PRACTITIONER

## 2020-06-21 RX ADMIN — CARBIDOPA AND LEVODOPA 1 TABLET: 25; 100 TABLET ORAL at 08:40

## 2020-06-21 RX ADMIN — FLUVOXAMINE MALEATE 150 MG: 50 TABLET, COATED ORAL at 21:03

## 2020-06-21 RX ADMIN — PANTOPRAZOLE SODIUM 40 MG: 40 TABLET, DELAYED RELEASE ORAL at 06:19

## 2020-06-21 RX ADMIN — OLANZAPINE 7.5 MG: 2.5 TABLET, FILM COATED ORAL at 21:02

## 2020-06-21 RX ADMIN — CARBAMAZEPINE 200 MG: 200 TABLET ORAL at 08:40

## 2020-06-21 RX ADMIN — CEFDINIR 300 MG: 300 CAPSULE ORAL at 21:02

## 2020-06-21 RX ADMIN — OYSTER SHELL CALCIUM WITH VITAMIN D 1 TABLET: 500; 200 TABLET, FILM COATED ORAL at 08:41

## 2020-06-21 RX ADMIN — MIRTAZAPINE 15 MG: 15 TABLET, FILM COATED ORAL at 21:02

## 2020-06-21 RX ADMIN — Medication 10 ML: at 08:40

## 2020-06-21 RX ADMIN — OYSTER SHELL CALCIUM WITH VITAMIN D 1 TABLET: 500; 200 TABLET, FILM COATED ORAL at 21:03

## 2020-06-21 RX ADMIN — CARBIDOPA AND LEVODOPA 1 TABLET: 25; 100 TABLET ORAL at 21:03

## 2020-06-21 RX ADMIN — BENZTROPINE MESYLATE 1 MG: 1 TABLET ORAL at 08:40

## 2020-06-21 RX ADMIN — CEFDINIR 300 MG: 300 CAPSULE ORAL at 08:40

## 2020-06-21 RX ADMIN — BENZTROPINE MESYLATE 1 MG: 1 TABLET ORAL at 21:03

## 2020-06-21 RX ADMIN — LEVETIRACETAM 1000 MG: 500 SOLUTION ORAL at 08:40

## 2020-06-21 RX ADMIN — Medication 10 ML: at 21:03

## 2020-06-21 RX ADMIN — LEVETIRACETAM 1000 MG: 500 SOLUTION ORAL at 21:03

## 2020-06-21 RX ADMIN — CARBAMAZEPINE 200 MG: 200 TABLET ORAL at 21:03

## 2020-06-21 RX ADMIN — SODIUM CHLORIDE, PRESERVATIVE FREE 10 ML: 5 INJECTION INTRAVENOUS at 21:03

## 2020-06-21 ASSESSMENT — PAIN SCALES - GENERAL
PAINLEVEL_OUTOF10: 0
PAINLEVEL_OUTOF10: 0

## 2020-06-21 NOTE — PROGRESS NOTES
[facility] Progress Note    Admitting Date and Time: 6/8/2020  9:58 PM  Admit Dx: Hypernatremia [E87.0]  Hypernatremia [E87.0]  Hypernatremia [E87.0]    Subjective:  Patient is being followed for Hypernatremia [E87.0]  Hypernatremia [E87.0]  Hypernatremia [E87.0]     Patient more awake, alert, today  During exam pushing my hands away  Does not appear to be in distress  Per nursing - guardian was at bedside earlier - apparently patient became agitated/ guardian claimed that \" he pinched her. \" unwitnessed            ROS: denies fever, chills, cp, sob, n/v, HA unless stated above.      OLANZapine  7.5 mg Oral Nightly    mirtazapine  15 mg Oral Nightly    fluvoxaMINE  150 mg Oral Nightly    benztropine  1 mg Oral BID    levETIRAcetam  1,000 mg Oral BID    pantoprazole  40 mg Oral QAM AC    carBAMazepine  200 mg Oral BID    calcium-vitamin D  1 tablet Oral BID    cefdinir  300 mg Oral 2 times per day    carbidopa-levodopa  1 tablet Oral BID    sodium chloride flush  10 mL Intravenous 2 times per day    sodium chloride flush  10 mL Intravenous 2 times per day     sodium chloride flush, 10 mL, PRN  acetaminophen, 650 mg, Q6H PRN    Or  acetaminophen, 650 mg, Q6H PRN  polyethylene glycol, 17 g, Daily PRN  promethazine, 12.5 mg, Q6H PRN    Or  ondansetron, 4 mg, Q6H PRN  sodium chloride flush, 10 mL, PRN         Objective:    /70   Pulse 85   Temp 97.5 °F (36.4 °C) (Temporal)   Resp 18   Ht 5' 5\" (1.651 m)   Wt 99 lb (44.9 kg)   SpO2 99%   BMI 16.47 kg/m²     General Appearance: non verbal, h/o MR- much more awake today  Skin: warm and dry  Head: normocephalic and atraumatic  Neck: neck supple and non tender without mass   Pulmonary/Chest: clear to auscultation bilaterally  Cardiovascular: normal rate, normal S1 and S2 and no carotid bruits  Abdomen: soft, non-tender, non-distended, normal bowel sounds  Extremities: no cyanosis, no clubbing andcontracted   Neurologic: non verbal       Recent Labs     06/19/20  0330 06/20/20  0300 06/21/20  0415    137 141   K 4.0 4.2 4.2    102 105   CO2 25 27 29   BUN 18 17 19   CREATININE 0.6* 0.6* 0.6*   GLUCOSE 84 80 90   CALCIUM 8.7 8.8 8.4*       Recent Labs     06/19/20  0330 06/20/20  0300 06/21/20  0415   WBC 5.0 4.8 4.4*   RBC 3.11* 3.49* 3.45*   HGB 10.2* 11.2* 11.0*   HCT 30.1* 33.6* 33.4*   MCV 96.8 96.3 96.8   MCH 32.8 32.1 31.9   MCHC 33.9 33.3 32.9   RDW 13.0 13.2 13.5    315 295   MPV 10.4 10.1 9.5           Assessment:    Active Problems:    Hypernatremia    Severe protein-calorie malnutrition (HCC)    Acute renal failure (HCC)    Thrombocytopenia (HCC)    Fever of unknown origin    Encephalopathy    Urinary tract infection without hematuria  Resolved Problems:    * No resolved hospital problems. *      Plan:  1.  Acute dehydration with hypernatremia: patient presented from the group home for altered mental status. NA on admission greater than 180. Appreciate nephrology input. Na 137 today. Per nursing patient tolerating diet with good appetite- did need some encouragement today. Previously with corpak/TF. Guardian declining peg at this time. Barium swallow on 6/15 with no aspiration. Dysphagia 1 pureed solids- nectar thick liquids.     2. Metabolic encephalopathy: likely due to above- unsure of baseline. Not verbalizing on exam.     3. CRIS: creatinine 2.7 on admission. Rehydrated- and resolved.     4. Hypokalemia: supplemented. Resolved.      5. Protein calorie malnutrition. Dietary following. Guardian declining peg at this time.     6. Thrombocytopenia: resolved     7. Decubitus ulcer; does not appear infected     8. UTI ( not related to owens catheter which was inserted on admission)-: urine culture growing proteus: Appreciate ID input. Transitioned to po cefdinir until 6/22. Owens cath dc and pt voiding without issue. Now with condom catheter.      9. Gallstone: surgery consulted- no intervention     10. Deconditioning; PT/OT     11. H/o seizure disorder: continue meds     12. Psych disorder/ severe intellectual disability/ autism/ ocd/ non verbal: patient on multiple medications: most have been on hold during admission- Appreciate psych input. Psych reviewed medications 6/19 and resumed all. remeron was restarted at lower dose. Per psych recommending neurology evaluation to evaluate anti-epileptic regimen. Unfortunately no neurology coverage here at [address] [place] [address]. He will need to follow outpatient. 6/20 patient appeared more drowsy on exam- discussed with attending. Monitor during the day- want to make sure meds are not too sedating/ which would cause decreased appetite. Today patient is much more awake/ alert. Attempting to push my hands away during exam.        Dispo: patient has [employer] work input. Looking into other options for discharge planning. Plan [facility]. Precert pending. Social work working on transitioning to different facility.         NOTE: This report was transcribed using voice recognition software. Every effort was made to ensure accuracy; however, inadvertent computerized transcription errors may be present.   Electronically signed by RICHARD Savage on 6/21/2020 at 10:11 AM

## 2020-06-21 NOTE — PROGRESS NOTES
[address] [place] [address] [facility]  Progress Note    Face to face encounter   SUBJECTIVE:  Chief Complaint   Patient presents with    Altered Mental Status     Increasing over the past 2 weeks. Worsening in the am and progressively improves throughout day. [facility]. Patient is tolerating antibiotics. Good intake at lunch. No documented emesis or diarrhea  No fever. Review of systems:  As stated above in the chief complaint, otherwise negative. Medications:  Scheduled Meds:   OLANZapine  7.5 mg Oral Nightly    mirtazapine  15 mg Oral Nightly    fluvoxaMINE  150 mg Oral Nightly    benztropine  1 mg Oral BID    levETIRAcetam  1,000 mg Oral BID    pantoprazole  40 mg Oral QAM AC    carBAMazepine  200 mg Oral BID    calcium-vitamin D  1 tablet Oral BID    cefdinir  300 mg Oral 2 times per day    carbidopa-levodopa  1 tablet Oral BID    sodium chloride flush  10 mL Intravenous 2 times per day    sodium chloride flush  10 mL Intravenous 2 times per day     Continuous Infusions:  PRN Meds:sodium chloride flush, acetaminophen **OR** acetaminophen, polyethylene glycol, promethazine **OR** ondansetron, sodium chloride flush    OBJECTIVE:  /70   Pulse 85   Temp 97.5 °F (36.4 °C) (Temporal)   Resp 18   Ht 5' 5\" (1.651 m)   Wt 99 lb (44.9 kg)   SpO2 99%   BMI 16.47 kg/m²   Temp  Av.9 °F (36.6 °C)  Min: 97.5 °F (36.4 °C)  Max: 98.2 °F (36.8 °C)  Constitutional: Nonverbal.  Contracted. Lying in bed. Nonverbal.  No distress. Skin: Warm and dry. No rashes were noted. HEENT: Round and reactive pupils. Moist mucous membranes. No ulcerations or thrush. Neck: Supple to movements. Chest: No respiratory distress. No crackles. Cardiovascular: Heart sounds rhythmic and regular. Abdomen: Positive bowel sounds to auscultation. Benign to palpation. Extremities: Contracted. No edema.   Lines: peripheral    Laboratory and Tests Review:  Lab Results Component Value Date    WBC 4.4 (L) 06/21/2020    WBC 4.8 06/20/2020    WBC 5.0 06/19/2020    HGB 11.0 (L) 06/21/2020    HCT 33.4 (L) 06/21/2020    MCV 96.8 06/21/2020     06/21/2020     Lab Results   Component Value Date    NEUTROABS 5.76 06/08/2020    NEUTROABS 3.35 03/28/2020    NEUTROABS 2.78 03/06/2020     No results found for: [facility]  Lab Results   Component Value Date    ALT 88 (H) 06/15/2020     (H) 06/15/2020    ALKPHOS 82 06/15/2020    BILITOT <0.2 06/15/2020     Lab Results   Component Value Date     06/21/2020    K 4.2 06/21/2020    K 3.9 03/30/2020     06/21/2020    CO2 29 06/21/2020    BUN 19 06/21/2020    CREATININE 0.6 06/21/2020    CREATININE 0.6 06/20/2020    CREATININE 0.6 06/19/2020    GFRAA >60 06/21/2020    LABGLOM >60 06/21/2020    GLUCOSE 90 06/21/2020    GLUCOSE 71 05/18/2012    PROT 6.0 06/15/2020    LABALBU 2.7 06/15/2020    LABALBU 4.5 05/18/2012    CALCIUM 8.4 06/21/2020    BILITOT <0.2 06/15/2020    ALKPHOS 82 06/15/2020     06/15/2020    ALT 88 06/15/2020     Lab Results   Component Value Date    CRP 4.2 (H) 06/14/2020    CRP 3.6 (H) 06/12/2020    CRP 0.1 03/12/2020     Lab Results   Component Value Date    SEDRATE 43 (H) 06/14/2020    SEDRATE 14 06/12/2020    SEDRATE 5 03/12/2020     Radiology:    Microbiology:  Blood culture 6/8/2020: Negative x2  Blood culture 6/30/2020: Negative so far  Urine culture 6/12/2020 >100K Proteus mirabilis (Nitrofurantoin resistant)  Respiratory panel: Negative    Recent Labs     06/12/20  1430   *       Recent Labs     06/12/20  0237   PROCAL 0.17*       Assessment:  · Acute dehydration with volume contraction  · Severe hypernatremia, resolved  · Acute kidney injury associated to the above, resolved  · Complicated UTI associated to William catheter, resolved. Cultures growing Proteus  · Fever associated to the above, resolved  · Elevation of transaminases  · Malnutrition.   Patient eating poorly and family has declined a PEG    Plan:    · Continue Cefdinir until 6/22/2020. The patient might be finishing antibiotics here  · The patient can be discharged. Group home apparently will not take him back and he may have to go to a skilled nursing facility. Waiting for the state guardian to make transition    April Roxanne Cline  12:36 PM  6/21/2020  Pt seen and examined. Above discussed agree with advanced practice nurse. Labs, cultures, and radiographs reviewed. Face to Face encounter occurred. Changes made as necessary.      Davy Montaño MD

## 2020-06-21 NOTE — PLAN OF CARE
Problem: Falls - Risk of:  Goal: Will remain free from falls  Description: Will remain free from falls  6/21/2020 0311 by Ekta Jennings RN  Outcome: Met This Shift     Problem: Falls - Risk of:  Goal: Absence of physical injury  Description: Absence of physical injury  6/21/2020 0311 by Ekta Jennings RN  Outcome: Met This Shift     Problem: Confusion - Acute:  Goal: Absence of continued neurological deterioration signs and symptoms  Description: Absence of continued neurological deterioration signs and symptoms  Outcome: Met This Shift     Problem: Confusion - Acute:  Goal: Mental status will be restored to baseline  Description: Mental status will be restored to baseline  Outcome: Met This Shift     Problem: Discharge Planning:  Goal: Ability to perform activities of daily living will improve  Description: Ability to perform activities of daily living will improve  Outcome: Met This Shift     Problem: Discharge Planning:  Goal: Participates in care planning  Description: Participates in care planning  Outcome: Met This Shift     Problem: Injury - Risk of, Physical Injury:  Goal: Will remain free from falls  Description: Will remain free from falls  6/21/2020 0311 by Ekta Jennings RN  Outcome: Met This Shift     Problem: Injury - Risk of, Physical Injury:  Goal: Absence of physical injury  Description: Absence of physical injury  6/21/2020 0311 by Ekta Jennings RN  Outcome: Met This Shift     Problem: Mood - Altered:  Goal: Mood stable  Description: Mood stable  6/21/2020 0311 by Ekta Jennings RN  Outcome: Met This Shift     Problem: Mood - Altered:  Goal: Absence of abusive behavior  Description: Absence of abusive behavior  6/21/2020 0311 by Ekta Jennings RN  Outcome: Met This Shift     Problem: Psychomotor Activity - Altered:  Goal: Absence of psychomotor disturbance signs and symptoms  Description: Absence of psychomotor disturbance signs and symptoms  Outcome: Met This Shift     Problem: Sensory Perception - Impaired:  Goal: Decrease in sensory misperception frequency  Description: Decrease in sensory misperception frequency  Outcome: Met This Shift     Problem: Sensory Perception - Impaired:  Goal: Able to refrain from responding to false sensory perceptions  Description: Able to refrain from responding to false sensory perceptions  Outcome: Met This Shift     Problem: Sensory Perception - Impaired:  Goal: Demonstrates accurate environmental perceptions  Description: Demonstrates accurate environmental perceptions  Outcome: Met This Shift     Problem: Sensory Perception - Impaired:  Goal: Able to distinguish between reality-based and nonreality-based thinking  Description: Able to distinguish between reality-based and nonreality-based thinking  Outcome: Met This Shift       Problem: Sensory Perception - Impaired:  Goal: Able to interrupt nonreality-based thinking  Description: Able to interrupt nonreality-based thinking  Outcome: Met This Shift     Problem: Sleep Pattern Disturbance:  Goal: Appears well-rested  Description: Appears well-rested  Outcome: Met This Shift     Problem: Skin Integrity:  Goal: Will show no infection signs and symptoms  Description: Will show no infection signs and symptoms  Outcome: Met This Shift     Problem: Skin Integrity:  Goal: Absence of new skin breakdown  Description: Absence of new skin breakdown  Outcome: Met This Shift

## 2020-06-21 NOTE — BH NOTE
Asked to reassess medications. Interim chart reviewed. Recommend continuing current treatment.     Electronically signed by Krista Steen MD on 6/21/2020 at 10:24 AM

## 2020-06-22 LAB
ANION GAP SERPL CALCULATED.3IONS-SCNC: 5 MMOL/L (ref 7–16)
BUN BLDV-MCNC: 20 MG/DL (ref 8–23)
CALCIUM SERPL-MCNC: 8.5 MG/DL (ref 8.6–10.2)
CHLORIDE BLD-SCNC: 107 MMOL/L (ref 98–107)
CO2: 30 MMOL/L (ref 22–29)
CREAT SERPL-MCNC: 0.6 MG/DL (ref 0.7–1.2)
GFR AFRICAN AMERICAN: >60
GFR NON-AFRICAN AMERICAN: >60 ML/MIN/1.73
GLUCOSE BLD-MCNC: 89 MG/DL (ref 74–99)
POTASSIUM SERPL-SCNC: 4.2 MMOL/L (ref 3.5–5)
SODIUM BLD-SCNC: 142 MMOL/L (ref 132–146)

## 2020-06-22 PROCEDURE — 2580000003 HC RX 258: Performed by: INTERNAL MEDICINE

## 2020-06-22 PROCEDURE — 80048 BASIC METABOLIC PNL TOTAL CA: CPT

## 2020-06-22 PROCEDURE — 99232 SBSQ HOSP IP/OBS MODERATE 35: CPT | Performed by: INTERNAL MEDICINE

## 2020-06-22 PROCEDURE — 36415 COLL VENOUS BLD VENIPUNCTURE: CPT

## 2020-06-22 PROCEDURE — 6370000000 HC RX 637 (ALT 250 FOR IP): Performed by: PSYCHIATRY & NEUROLOGY

## 2020-06-22 PROCEDURE — 6370000000 HC RX 637 (ALT 250 FOR IP): Performed by: INTERNAL MEDICINE

## 2020-06-22 PROCEDURE — 6370000000 HC RX 637 (ALT 250 FOR IP): Performed by: SPECIALIST

## 2020-06-22 PROCEDURE — 97530 THERAPEUTIC ACTIVITIES: CPT

## 2020-06-22 PROCEDURE — 6370000000 HC RX 637 (ALT 250 FOR IP): Performed by: NURSE PRACTITIONER

## 2020-06-22 PROCEDURE — 1200000000 HC SEMI PRIVATE

## 2020-06-22 RX ADMIN — PANTOPRAZOLE SODIUM 40 MG: 40 TABLET, DELAYED RELEASE ORAL at 06:42

## 2020-06-22 RX ADMIN — CARBAMAZEPINE 200 MG: 200 TABLET ORAL at 21:30

## 2020-06-22 RX ADMIN — CARBAMAZEPINE 200 MG: 200 TABLET ORAL at 09:09

## 2020-06-22 RX ADMIN — BENZTROPINE MESYLATE 1 MG: 1 TABLET ORAL at 09:09

## 2020-06-22 RX ADMIN — Medication 10 ML: at 21:30

## 2020-06-22 RX ADMIN — CARBIDOPA AND LEVODOPA 1 TABLET: 25; 100 TABLET ORAL at 21:30

## 2020-06-22 RX ADMIN — OYSTER SHELL CALCIUM WITH VITAMIN D 1 TABLET: 500; 200 TABLET, FILM COATED ORAL at 09:09

## 2020-06-22 RX ADMIN — Medication 10 ML: at 09:09

## 2020-06-22 RX ADMIN — CEFDINIR 300 MG: 300 CAPSULE ORAL at 09:09

## 2020-06-22 RX ADMIN — OYSTER SHELL CALCIUM WITH VITAMIN D 1 TABLET: 500; 200 TABLET, FILM COATED ORAL at 21:30

## 2020-06-22 RX ADMIN — FLUVOXAMINE MALEATE 150 MG: 50 TABLET, COATED ORAL at 21:30

## 2020-06-22 RX ADMIN — CARBIDOPA AND LEVODOPA 1 TABLET: 25; 100 TABLET ORAL at 09:09

## 2020-06-22 RX ADMIN — LEVETIRACETAM 1000 MG: 500 SOLUTION ORAL at 09:09

## 2020-06-22 RX ADMIN — OLANZAPINE 7.5 MG: 2.5 TABLET, FILM COATED ORAL at 21:30

## 2020-06-22 RX ADMIN — MIRTAZAPINE 15 MG: 15 TABLET, FILM COATED ORAL at 21:30

## 2020-06-22 RX ADMIN — BENZTROPINE MESYLATE 1 MG: 1 TABLET ORAL at 21:30

## 2020-06-22 RX ADMIN — LEVETIRACETAM 1000 MG: 500 SOLUTION ORAL at 21:30

## 2020-06-22 ASSESSMENT — PAIN SCALES - WONG BAKER: WONGBAKER_NUMERICALRESPONSE: 0

## 2020-06-22 ASSESSMENT — PAIN SCALES - GENERAL
PAINLEVEL_OUTOF10: 0
PAINLEVEL_OUTOF10: 0

## 2020-06-22 NOTE — PROGRESS NOTES
Physical Therapy  Facility/Department: [address] INTERMEDIATE 1  Daily Treatment Note  NAME: Krystin Cutler  : 1950  MRN: [id number]    Date of Service: 2020      Patient Diagnosis(es): The primary encounter diagnosis was Hypernatremia. Diagnoses of Acute renal failure, unspecified acute renal failure type ([address] and Dehydration were also pertinent to this visit. has a past medical history of Acquired deformity of neck, Autism spectrum, Bilateral cataracts, Blepharochalasis, Cardiomegaly, Cataract of both eyes, Developmental delay, Gastritis, Hyperlipidemia, Kyphosis of thoracic region, Mental retardation, Obsessive compulsive disorder, Parkinsonism ([address] Seizures ([address] and Tourette disorder. has a past surgical history that includes Toe amputation (Left). Evaluating Therapist: Jonelle Vitale PT      Referring Provider:  aMtt Payne DO     Room #: 445   DIAGNOSIS:  Hypernatremia   Additional Pertinent History:MR, OCD, parkinsons, decubs   PRECAUTIONS:  Falls      Social:  Pt lives at a [place] facility   Prior to admission: per pt's caregiver pt walked with HHA short distances. Caregiver states he sit in a chair with a harness and needs supervision due to pt impulsive        Initial Evaluation  Date:  2020 Treatment  2020    Short Term/ Long Term   Goals   Was pt agreeable to Eval/treatment? Non verbal  None verbal       Does pt have pain?  no pain behavior noted   no pain behavior noted      Bed Mobility  Rolling:  Dep assist   Supine to sit:  Dep assist   Sit to supine:  Dep assist   Scooting:  Dep assist  Rolling: Dep A  Supine to sit: Dep A  Sit to supine:  Mod A  Scooting: Dep A side to side in bed and up in bed supine Mod assist    Transfers Sit to stand:  Dep assist x 2   Stand to sit: dep assist   Stand pivot:  NT  NT Mod assist    Ambulation   NT , see comments   unable   20  feet with  HHA  with  Mod assist    LE ROM  Did not follow commands for AROM, PROM  - decreased knee and hip ext and ankle df       LE strength          AM- PAC RAW score  6/ 24 6/ 24           Pt is alert, unable to follow instruction  Balance: poor sitting EOB    Patient education  Pt was educated on UE usage to assist with sitting balance    Patient response to education:   Pt verbalized understanding Pt demonstrated skill Pt requires further education in this area   no With prompt yes     ASSESSMENT:   Comments: Nurse ok with rx. Pt found in bed, assisted to EOB, sat EOB with variation of max to min A for balance. Balance improved during EOB progression. After sitting approx 4 minutes, Pt then lifting his LEs back into bed, assist given to supine position. Treatment: Pt practiced and was instructed in the following treatment: Sitting balance      Pt was left in bed with call light in reach positioned on L side, pillow placed between LEs for comfort and to prevent heel rubbing    Time in 1405   Time out 1420   Total Treatment Time 15 minutes   CPT codes:     Therapeutic activities 31996 15 minutes   Therapeutic exercises 36389 0 minutes       Pt is showing consistent levels of functional activities with no progress towards PT goals. Continue with physical therapy current plan of care.     Sirena Carrasco PTA   License Number: [id number]

## 2020-06-22 NOTE — PROGRESS NOTES
Date    WBC 4.4 (L) 06/21/2020    WBC 4.8 06/20/2020    WBC 5.0 06/19/2020    HGB 11.0 (L) 06/21/2020    HCT 33.4 (L) 06/21/2020    MCV 96.8 06/21/2020     06/21/2020     Lab Results   Component Value Date    NEUTROABS 5.76 06/08/2020    NEUTROABS 3.35 03/28/2020    NEUTROABS 2.78 03/06/2020     No results found for: [facility]  Lab Results   Component Value Date    ALT 88 (H) 06/15/2020     (H) 06/15/2020    ALKPHOS 82 06/15/2020    BILITOT <0.2 06/15/2020     Lab Results   Component Value Date     06/22/2020    K 4.2 06/22/2020    K 3.9 03/30/2020     06/22/2020    CO2 30 06/22/2020    BUN 20 06/22/2020    CREATININE 0.6 06/22/2020    CREATININE 0.6 06/21/2020    CREATININE 0.6 06/20/2020    GFRAA >60 06/22/2020    LABGLOM >60 06/22/2020    GLUCOSE 89 06/22/2020    GLUCOSE 71 05/18/2012    PROT 6.0 06/15/2020    LABALBU 2.7 06/15/2020    LABALBU 4.5 05/18/2012    CALCIUM 8.5 06/22/2020    BILITOT <0.2 06/15/2020    ALKPHOS 82 06/15/2020     06/15/2020    ALT 88 06/15/2020     Lab Results   Component Value Date    CRP 4.2 (H) 06/14/2020    CRP 3.6 (H) 06/12/2020    CRP 0.1 03/12/2020     Lab Results   Component Value Date    SEDRATE 43 (H) 06/14/2020    SEDRATE 14 06/12/2020    SEDRATE 5 03/12/2020     Radiology:    Microbiology:  Blood culture 6/8/2020: Negative x2  Blood culture 6/30/2020: Negative so far  Urine culture 6/12/2020 >100K Proteus mirabilis (Nitrofurantoin resistant)  Respiratory panel: Negative    Recent Labs     06/12/20  1430   *       Recent Labs     06/12/20  0237   PROCAL 0.17*       Assessment:  · Acute dehydration with volume contraction  · Severe hypernatremia, resolved  · Acute kidney injury associated to the above, resolved  · Complicated UTI associated to William catheter, resolved. Cultures growing Proteus  · Fever associated to the above, resolved  · Elevation of transaminases  · Malnutrition.   Patient eating poorly and family has declined a PEG    Plan:    · Completed course of cefdinir today  · The patient can be discharged. Group home apparently will not take him back and he may have to go to a skilled nursing facility. Waiting for the state guardian to make transition  · ID will sign off.   Call if needed    Shira Key  11:42 AM  6/22/2020

## 2020-06-22 NOTE — CARE COORDINATION
Social Work/Discharge Planning:  Received call from patient [address] this morning states she was informed that [facility] has COVID patients at facility. Vincent Ochoa states she does not want patient to discharge to a facility that has COVID. Informed Bella that COVID can happen at any facility despite protocols that are in place. Bella requested referral to [facility]. Informed liaison Jonnie Faust from [facility] and she will continue to follow patient. Called liaison Dannial Scheuermann from [facility] and left a message regarding bed availability.  confirmed no results from [facility] on [facility] system. Called [facility] (ph: [phone]) and left a detailed message regarding status of PASRR results from [facility]. Will continue to follow. Electronically signed by STACIE Malik on 6/22/2020 at 11:26 AM     Addendum:  Referral made to liaison Dannial Scheuermann from [facility]. Dannial Scheuermann states facility will have a bed available tomorrow and will inform this worker if they can accept following review of patient information. Will continue to follow.   Electronically signed by STACIE Malik on 6/22/2020 at 1:24 PM

## 2020-06-22 NOTE — PROGRESS NOTES
[facility] Progress Note    Admitting Date and Time: 6/8/2020  9:58 PM  Admit Dx: Hypernatremia [E87.0]  Hypernatremia [E87.0]  Hypernatremia [E87.0]    Subjective:  Patient is being followed for Hypernatremia [E87.0]  Hypernatremia [E87.0]  Hypernatremia [E87.0]     Patient resting comfortably in bed in no acute distress  Awake and alert  Making eye contact  Attempting to push stethoscope away  Appetite good per nursing       ROS: denies fever, chills, cp, sob, n/v, HA unless stated above.  OLANZapine  7.5 mg Oral Nightly    mirtazapine  15 mg Oral Nightly    fluvoxaMINE  150 mg Oral Nightly    benztropine  1 mg Oral BID    levETIRAcetam  1,000 mg Oral BID    pantoprazole  40 mg Oral QAM AC    carBAMazepine  200 mg Oral BID    calcium-vitamin D  1 tablet Oral BID    cefdinir  300 mg Oral 2 times per day    carbidopa-levodopa  1 tablet Oral BID    sodium chloride flush  10 mL Intravenous 2 times per day    sodium chloride flush  10 mL Intravenous 2 times per day     sodium chloride flush, 10 mL, PRN  acetaminophen, 650 mg, Q6H PRN    Or  acetaminophen, 650 mg, Q6H PRN  polyethylene glycol, 17 g, Daily PRN  promethazine, 12.5 mg, Q6H PRN    Or  ondansetron, 4 mg, Q6H PRN  sodium chloride flush, 10 mL, PRN         Objective:    BP (!) 126/56   Pulse 77   Temp 98.2 °F (36.8 °C) (Axillary)   Resp 17   Ht 5' 5\" (1.651 m)   Wt 99 lb (44.9 kg)   SpO2 96%   BMI 16.47 kg/m²        General Appearance: non verbal, h/o MR- awake, alert, making eye contact.  Attempting to push hands away during exam.  Skin: warm and dry  Head: normocephalic and atraumatic  Neck: neck supple and non tender without mass   Pulmonary/Chest: clear to auscultation bilaterally  Cardiovascular: normal rate, normal S1 and S2 and no carotid bruits  Abdomen: soft, non-tender, non-distended, normal bowel sounds  Extremities: no cyanosis, no clubbing andcontracted   Neurologic: non verbal         Recent Labs 06/20/20  0300 06/21/20  0415 06/22/20  0342    141 142   K 4.2 4.2 4.2    105 107   CO2 27 29 30*   BUN 17 19 20   CREATININE 0.6* 0.6* 0.6*   GLUCOSE 80 90 89   CALCIUM 8.8 8.4* 8.5*       Recent Labs     06/20/20  0300 06/21/20  0415   WBC 4.8 4.4*   RBC 3.49* 3.45*   HGB 11.2* 11.0*   HCT 33.6* 33.4*   MCV 96.3 96.8   MCH 32.1 31.9   MCHC 33.3 32.9   RDW 13.2 13.5    295   MPV 10.1 9.5         Assessment:    Active Problems:    Hypernatremia    Severe protein-calorie malnutrition (HCC)    Acute renal failure (HCC)    Thrombocytopenia (HCC)    Fever of unknown origin    Encephalopathy    Urinary tract infection without hematuria  Resolved Problems:    * No resolved hospital problems. *      Plan:  1.  Acute dehydration with hypernatremia: patient presented from the group home for altered mental status. NA on admission greater than 180. Appreciate nephrology input. Na 142 today. Per nursing patient tolerating diet with good appetite- did need some encouragement today. Previously with corpak/TF. Guardian declining peg at this time. Barium swallow on 6/15 with no aspiration. Dysphagia 1 pureed solids- nectar thick liquids.     2. Metabolic encephalopathy: likely due to above- unsure of baseline. Awake/ alert. Non- verbal.     3. CRIS: creatinine 2.7 on admission. Rehydrated- and resolved.     4. Hypokalemia: supplemented. Resolved.      5. Protein calorie malnutrition. Dietary following. Guardian declining peg at this time.     6. Thrombocytopenia: resolved     7. Decubitus ulcer; does not appear infected     8. UTI ( not related to owens catheter which was inserted on admission)-: urine culture growing proteus: Appreciate ID input. Transitioned to po cefdinir until 6/22. Owens cath dc and pt voiding without issue. Now with condom catheter.      9. Gallstone: surgery consulted- no intervention     10. Deconditioning; PT/OT     11. H/o seizure disorder: continue meds     12.  Psych disorder/ severe intellectual disability/ autism/ ocd/ non verbal: patient on multiple medications: most have been on hold during admission- Appreciate psych input. Psych reviewed medications 6/19 and resumed all. remeron was restarted at lower dose. Per psych recommending neurology evaluation to evaluate anti-epileptic regimen. Unfortunately no neurology coverage here at [address] [place] [address]. He will need to follow outpatient. 6/20 patient appeared more drowsy on exam- discussed with attending. Psych recommended continuing medication regimen with no changes. He has been much more awake/ alert last 2 days. Appetite has been good.         Dispo: patient has [address] work input. Looking into other options for discharge planning. Plan is [facility] at [facility] working on transitioning to different facility. Patient needs ok from [facility] [address] [place] [address] assessment pending.             NOTE: This report was transcribed using voice recognition software. Every effort was made to ensure accuracy; however, inadvertent computerized transcription errors may be present.   Electronically signed by RICHARD Lynne on 6/22/2020 at 10:49 AM

## 2020-06-22 NOTE — PATIENT CARE CONFERENCE
[facility] Quality Flow/Interdisciplinary Rounds Progress Note        Quality Flow Rounds held on June 22, 2020    Disciplines Attending:  Bedside Nurse, ,  and Nursing Unit Leadership    Maylon Duverney was admitted on 6/8/2020  9:58 PM    Anticipated Discharge Date:  Expected Discharge Date: 06/12/20    Disposition:    Gino Score:  Gino Scale Score: 10    Readmission Score:         Discussed patient goal for the day, patient clinical progression, and barriers to discharge. The following Goal(s) of the Day/Commitment(s) have been identified:  Discharge planning to [facility].        Nanette Piña  June 22, 2020

## 2020-06-23 VITALS
TEMPERATURE: 97.9 F | DIASTOLIC BLOOD PRESSURE: 62 MMHG | BODY MASS INDEX: 16.68 KG/M2 | SYSTOLIC BLOOD PRESSURE: 115 MMHG | HEART RATE: 86 BPM | WEIGHT: 100.1 LBS | HEIGHT: 65 IN | RESPIRATION RATE: 16 BRPM | OXYGEN SATURATION: 96 %

## 2020-06-23 LAB — SARS-COV-2, NAAT: NOT DETECTED

## 2020-06-23 PROCEDURE — 6370000000 HC RX 637 (ALT 250 FOR IP): Performed by: INTERNAL MEDICINE

## 2020-06-23 PROCEDURE — 6370000000 HC RX 637 (ALT 250 FOR IP): Performed by: NURSE PRACTITIONER

## 2020-06-23 PROCEDURE — U0002 COVID-19 LAB TEST NON-CDC: HCPCS

## 2020-06-23 PROCEDURE — 99239 HOSP IP/OBS DSCHRG MGMT >30: CPT | Performed by: INTERNAL MEDICINE

## 2020-06-23 PROCEDURE — APPSS45 APP SPLIT SHARED TIME 31-45 MINUTES: Performed by: PHYSICIAN ASSISTANT

## 2020-06-23 PROCEDURE — 2580000003 HC RX 258: Performed by: INTERNAL MEDICINE

## 2020-06-23 PROCEDURE — 6370000000 HC RX 637 (ALT 250 FOR IP): Performed by: PSYCHIATRY & NEUROLOGY

## 2020-06-23 RX ADMIN — OYSTER SHELL CALCIUM WITH VITAMIN D 1 TABLET: 500; 200 TABLET, FILM COATED ORAL at 09:21

## 2020-06-23 RX ADMIN — CARBAMAZEPINE 200 MG: 200 TABLET ORAL at 09:18

## 2020-06-23 RX ADMIN — SODIUM CHLORIDE, PRESERVATIVE FREE 10 ML: 5 INJECTION INTRAVENOUS at 11:10

## 2020-06-23 RX ADMIN — LEVETIRACETAM 1000 MG: 500 SOLUTION ORAL at 09:17

## 2020-06-23 RX ADMIN — BENZTROPINE MESYLATE 1 MG: 1 TABLET ORAL at 09:17

## 2020-06-23 RX ADMIN — CARBIDOPA AND LEVODOPA 1 TABLET: 25; 100 TABLET ORAL at 09:17

## 2020-06-23 ASSESSMENT — PAIN SCALES - GENERAL: PAINLEVEL_OUTOF10: 0

## 2020-06-23 NOTE — CARE COORDINATION
Social Work/Discharge Planning:  Called [facility] (ph: [phone]) regarding status of PASRR determination and was transferred to [facility]. Left message for liaison Corbin Soto with [facility] regarding status of referral.  Will continue to follow and wait for return call.   Electronically signed by STACIE Martell on 6/23/2020 at 9:57 AM

## 2020-06-23 NOTE — DISCHARGE SUMMARY
[facility] EMERGENCY SERVICE Physician Discharge Summary       Kamar Bullock MD  [address] [facility] [address]  [phone]    Call in 1 week      [facility]  [address], [place] [address]    Call in 1 week        Activity level: As tolerated    Diet: DIET DYSPHAGIA PUREED; Dysphagia Pureed; Mildly Thick (Nectar)  Dietary Nutrition Supplements: Frozen Oral Supplement  Dietary Nutrition Supplements: Other Oral Supplement (see comment)    Dispo:Home    Condition at discharge: Stable    Patient ID:  Malcolm Rodriguez  [id number]  58 y.o.  1950    Admit date: 6/8/2020    Discharge date and time:  6/23/2020  3:38 PM    Admission Diagnoses: Active Problems:    Hypernatremia    Severe protein-calorie malnutrition (HCC)    Acute renal failure (HCC)    Thrombocytopenia (HCC)    Fever of unknown origin    Encephalopathy    Urinary tract infection without hematuria  Resolved Problems:    * No resolved hospital problems. *      Discharge Diagnoses: Active Problems:    Hypernatremia    Severe protein-calorie malnutrition (HCC)    Acute renal failure (HCC)    Thrombocytopenia (HCC)    Fever of unknown origin    Encephalopathy    Urinary tract infection without hematuria  Resolved Problems:    * No resolved hospital problems. *      Consults:  IP CONSULT TO DIETITIAN  IP CONSULT TO NEPHROLOGY  IP CONSULT TO IV TEAM  IP CONSULT TO IV TEAM  IP CONSULT TO INFECTIOUS DISEASES  IP CONSULT TO GENERAL SURGERY  IP CONSULT TO IV TEAM  IP CONSULT TO IV TEAM  IP CONSULT TO PSYCHIATRY  IP CONSULT TO IV TEAM    Procedures: None    Hospital Course: Patient was admitted with Hypernatremia [E87.0]  Hypernatremia [E87.0]  Hypernatremia [E87.0].  Patient is a 61-year-old male with a past medical history of developmental delay, seizure disorder and tremors who presented to the ED on 6-9-20 from his group home due to altered mental status. Patient was found to have severe hypernatremia on admission with a sodium greater than 180. Nephrology was consulted, patient received IV hydration and hypernatremia resolved. Nephrology states patient needs 1 to 1.2 L/day to maintain sodium levels. Due to fevers, ID was consulted. Patient was treated for urinary tract infection initially treated with IV antibiotics then transitioned to Ceftin ear and completed course of treatment on 6-23-20. Psychiatry was consulted due to patient having multiple psychiatric medications, home dosing were resumed and psychiatry recommend patient follow-up with neurology for antiepileptic regimen however we had noticed neurology coverage here [facility]. There was suggestion the patient received PEG tube however guardian was resistant to this. Swallow study was completed and patient is on dysphagia diet. Speech has been following patient regarding diet recommendations. Patient will be discharged to nursing facility today for further rehab. Discharge Exam:  Vitals:    06/23/20 0010 06/23/20 0030 06/23/20 0845 06/23/20 1459   BP: 129/67  111/68 115/62   Pulse: 73  82 86   Resp: 18  16 16   Temp: 97.3 °F (36.3 °C)  98.1 °F (36.7 °C) 97.9 °F (36.6 °C)   TempSrc: Temporal  Axillary Axillary   SpO2:   96%    Weight:  100 lb 1.6 oz (45.4 kg)     Height:           General Appearance: in no acute distress and alert  Skin: warm and dry, no rash or erythema  Pulmonary/Chest: clear to auscultation bilaterally- no wheezes, rales or rhonchi, normal air movement, no respiratory distress  Cardiovascular: normal rate, regular rhythm, normal S1 and S2, no murmurs, no gallops and no JVD  I/O last 3 completed shifts: In: 410 [P.O.:400; I.V.:10]  Out: 550 [Urine:550]  No intake/output data recorded.       LABS:  Recent Labs     06/21/20  0415 06/22/20  0342    142   K 4.2 4.2    107   CO2 29 30*   BUN 19 20   CREATININE 0.6* 0.6*   GLUCOSE 90 89   CALCIUM 8.4* 8.5*       Recent Labs     06/21/20  0415   WBC 4.4*   RBC 3.45*   HGB 11.0*   HCT 33.4*   MCV 96.8   MCH 31.9   MCHC 32.9   RDW 13.5      MPV 9.5       No results for input(s): POCGLU in the last 72 hours. Imaging:   Fluoroscopy modified barium swallow with video   Final Result   No evidence of aspiration or laryngeal penetration. The speech pathologist's report may be found in Epic. The study was performed and dictated by Gladis Monte PA-C with   indirect attending supervision. Angelita Ashley. Elizabeth Mchugh MD, PhD has reviewed   the results and has finalized this report. XR Chest Abdomen Ng Placement   Final Result      Orogastric tube with the tip in the antrum of the stomach. Atelectasis and/or infiltrates in the right lung base         CT CHEST WO CONTRAST   Final Result      Infiltrate seen in the right lower lung posteriorly with moderate   right-sided pleural effusion      Feeding enteric tube with the tip in the antrum of the stomach. CT ABDOMEN WO CONTRAST Additional Contrast? Radiologist Recommendation   Final Result      Very distended gallbladder with a large gallstone seen in the neck   measuring 2.6 x 3.1 cm. I would recommend a gallbladder sonogram for   further workup. There is a feeding enteric tube with the tip in the antrum of the   stomach. Small to moderate right-sided pleural effusion with infiltrates in the   right lower lung. [facility] RADIOLOGY REPORT   Final Result      XR CHEST PORTABLE   Final Result      Bibasilar atelectasis is more pronounced in the right lung base      Feeding enteric tube with the tip in the antrum of the stomach. XR FOOT LEFT (2 VIEWS)   Final Result      The second and third toes are absent. Severe remodeling of the distal aspects of the first, second, and   third metatarsals.       Severe degenerative changes with bunion formation of the first   metatarsophalangeal articulation. No evidence of fracture is appreciated. XR FOOT RIGHT (2 VIEWS)   Final Result      Plantar calcaneal spur is present. Severe degenerative change of the first metatarsophalangeal   articulation with bunion formation. Abnormal configuration of the second and third metatarsophalangeal   articulation which appears to be chronic and degenerative. No evidence of acute fracture is identified. Osteoarthritis. XR Chest Abdomen Ng Placement   Final Result      XR Chest Abdomen Ng Placement   Final Result   The weighted feeding tube tip is in the epigastric region, directed up   into the right, presumably in the body the stomach, although the   inferior margin of the small hiatus hernia is not as well seen. Advancing the tube 3 to 4 cm should determine whether the tip is in   the stomach or the inferior margin of the hiatus hernia. Subtle density in the right lower lung may be artifact related to   expiratory imaging or could represent subtle interstitial disease   without effusion. Otherwise unremarkable. [facility] ALERT:  THIS IS AN ABNORMAL REPORT-tube position is equivocal, likely   in the stomach, but the weighted portion could be in the inferior   margin of the hiatus hernia. Advancing the tube 4 5 cm with advanced   the weighted portion into the stomach if it is in the hiatus hernia,   and would confirm a distal gastric position if it is in the stomach. XR Chest Abdomen Ng Placement   Final Result   Nasogastric tube appears to be malpositioned, with the tip likely in   the right lower lobe bronchus. Findings were conveyed to patient's   nurse, Ms. Popeye Quevedo RN 10:24  a.m. on 6/20/2020. She was aware   of the findings and had removed the NG tube. XR ABDOMEN FOR NG/OG/NE TUBE PLACEMENT   Final Result   Feeding tube is traceable to the stomach.       This report has been electronically signed by Lana Newton MD.      XR ABDOMEN FOR NG/OG/NE TUBE PLACEMENT   Final Result   Feeding tube is coiled, presumably within a hiatal hernia. Consider retraction    by approximately 8 cm with slight readvance . This report has been electronically signed by Meng Paulson MD.      XR ABDOMEN FOR NG/OG/NE TUBE PLACEMENT   Final Result      Nasogastric tube follows the expected contours of the esophagus with   distal tip overlying the air in the gastric lumen and distal sideholes   overlying the esophagogastric junction. US RETROPERITONEAL COMPLETE   Final Result      1. Renal cortical echogenicity suggestive spectrum of medical renal   disease bilaterally, without evidence of obstruction on either side. 2. The bladder wall is thickened, without intraluminal debris. This   can be a manifestation of cystitis or hypertrophic change. CT Head WO Contrast   Final Result      No evidence for acute intracranial process. Cortical atrophy and chronic periventricular microangiopathy.       XR CHEST PORTABLE   Final Result      NO ACUTE CARDIOPULMONARY PROCESS             Patient Instructions:      Medication List      START taking these medications    calcium-vitamin D 500-200 MG-UNIT per tablet  Take 1 tablet by mouth 2 times daily        CONTINUE taking these medications    acetaminophen 325 MG tablet  Commonly known as:  TYLENOL     benztropine 1 MG tablet  Commonly known as:  COGENTIN     carBAMazepine 200 MG tablet  Commonly known as:  TEGRETOL     carbidopa-levodopa  MG per tablet  Commonly known as:  Sinemet  Twice a day at 0800 and 1600     fluvoxaMINE 150 MG Cp24 extended release capsule  Commonly known as:  LUVOX CR     levETIRAcetam 500 MG tablet  Commonly known as:  Keppra  Take 2 tablets by mouth 2 times daily     MiraLax 17 GM/SCOOP powder  Generic drug:  polyethylene glycol     mirtazapine 30 MG tablet  Commonly known as:  REMERON     OLANZapine 7.5 MG tablet  Commonly known as:  ZYPREXA     omeprazole 20 MG delayed release capsule  Commonly known as:  PRILOSEC     vitamin D 1.25 MG (08499 UT) Caps capsule  Commonly known as:  ERGOCALCIFEROL        STOP taking these medications    acidophilus Caps capsule     guaiFENesin-dextromethorphan 100-10 MG/5ML syrup  Commonly known as:  ROBITUSSIN DM     IMODIUM PO     lactulose 10 GM/15ML solution  Commonly known as:  CHRONULAC     LORazepam 2 MG tablet  Commonly known as:  ATIVAN     magnesium citrate Soln  Commonly known as:  CITROMA     melatonin 5 MG Tabs tablet     metoprolol succinate 25 MG extended release tablet  Commonly known as:  TOPROL XL     PINK BISMUTH PO     potassium & sodium phosphates 280-160-250 MG Pack  Commonly known as:  PHOS-NAK     vitamin D 1000 UNIT Tabs tablet  Commonly known as:  CHOLECALCIFEROL           Where to Get Your Medications      Information about where to get these medications is not yet available    Ask your nurse or doctor about these medications  · calcium-vitamin D 500-200 MG-UNIT per tablet           Note that more than 30 minutes was spent in preparing discharge papers, discussing discharge with patient, medication review, etc.    Signed:  Electronically signed by Dell Cotton PA-C on 6/23/2020 at 3:38 PM    NOTE: This report was transcribed using voice recognition software. Every effort was made to ensure accuracy; however, inadvertent computerized transcription errors may be present.

## 2020-06-23 NOTE — CARE COORDINATION
CASE MANAGEMENT. ... Following along with SS for discharge planning. I also reached out to Nimo De La Torre from [facility] regarding referral status. Await her decision and cont to follow. JOHNSON determination still pending.

## 2020-06-23 NOTE — PLAN OF CARE
Problem: Malnutrition  (NI-5.2)  Goal: Food and/or Nutrient Delivery  Continue Diet per SLP Rec. Continue Magic Cup Frozen ONS BID and Boost Pudding High Melvin ONS BID. Description: Individualized approach for food/nutrient provision.   Outcome: Met This Shift

## 2020-06-23 NOTE — PROGRESS NOTES
Unstageable  · Current Nutrition Therapies:  · Oral Diet Orders: Dysphagia Pureed (Dysphagia 1), Mildly Thick   · Oral Diet intake: %(per flowsheets)  · Oral Nutrition Supplement (ONS) Orders: Frozen Oral Supplement, Other Oral Supplement (See Comment)(Boost Pudding ONS)  · ONS intake: %  · Anthropometric Measures:  · Ht: 5' 5\" (165.1 cm)   · Current Body Wt: 100 lb (45.4 kg)(bed 6/23)  · Admission Body Wt: 89 lb (40.4 kg)(6/9 bedwt)  · Usual Body Wt: 110 lb (49.9 kg)(3 mo ago EMR)  · % Weight Change:  wt gain since adm noted however likely r/t fluid gains AEB +I/O's at this time  · Ideal Body Wt: 136 lb (61.7 kg), % Ideal Body 68% (admit)  · BMI Classification: BMI <18.5 Underweight    Nutrition Interventions:   Continue current diet, Continue current ONS(Continue Diet per SLP Rec. Continue Magic Cup Frozen ONS BID and Boost Pudding High Melvin ONS BID.   )  Continued Inpatient Monitoring, Education not appropriate at this time    Nutrition Evaluation:   · Evaluation: Goals set   · Goals: PO intake >75% of meals/ONS.      · Monitoring: Meal Intake, Supplement Intake, Diet Tolerance, Skin Integrity, Wound Healing, I&O, Mental Status/Confusion, Weight, Pertinent Labs, Chewing/Swallowing, Monitor Bowel Function      Electronically signed by Anay Pineda RD, MARY on 6/23/20 at 2:13 PM EDT    Contact Number: ext [phone]

## 2020-06-23 NOTE — PATIENT CARE CONFERENCE
[facility] Quality Flow/Interdisciplinary Rounds Progress Note        Quality Flow Rounds held on June 23, 2020    Disciplines Attending:  Bedside Nurse, ,  and Nursing Unit Leadership    Cele Cole was admitted on 6/8/2020  9:58 PM    Anticipated Discharge Date:  Expected Discharge Date: 06/12/20    Disposition:    Gino Score:  Gino Scale Score: 10    Readmission Score:         Discussed patient goal for the day, patient clinical progression, and barriers to discharge. The following Goal(s) of the Day/Commitment(s) have been identified:  Await precert to salvatore.        Benjamin Huddleston  June 23, 2020

## 2020-06-23 NOTE — PROGRESS NOTES
32.9   RDW 13.5      MPV 9.5        Radiology:   Fluoroscopy modified barium swallow with video   Final Result   No evidence of aspiration or laryngeal penetration. The speech pathologist's report may be found in Epic. The study was performed and dictated by Sandra Cason PA-C with   indirect attending supervision. Rehan Jackson. Yaneth Murrell MD, PhD has reviewed   the results and has finalized this report. XR Chest Abdomen Ng Placement   Final Result      Orogastric tube with the tip in the antrum of the stomach. Atelectasis and/or infiltrates in the right lung base         CT CHEST WO CONTRAST   Final Result      Infiltrate seen in the right lower lung posteriorly with moderate   right-sided pleural effusion      Feeding enteric tube with the tip in the antrum of the stomach. CT ABDOMEN WO CONTRAST Additional Contrast? Radiologist Recommendation   Final Result      Very distended gallbladder with a large gallstone seen in the neck   measuring 2.6 x 3.1 cm. I would recommend a gallbladder sonogram for   further workup. There is a feeding enteric tube with the tip in the antrum of the   stomach. Small to moderate right-sided pleural effusion with infiltrates in the   right lower lung. [facility] RADIOLOGY REPORT   Final Result      XR CHEST PORTABLE   Final Result      Bibasilar atelectasis is more pronounced in the right lung base      Feeding enteric tube with the tip in the antrum of the stomach. XR FOOT LEFT (2 VIEWS)   Final Result      The second and third toes are absent. Severe remodeling of the distal aspects of the first, second, and   third metatarsals. Severe degenerative changes with bunion formation of the first   metatarsophalangeal articulation. No evidence of fracture is appreciated. XR FOOT RIGHT (2 VIEWS)   Final Result      Plantar calcaneal spur is present.       Severe degenerative change of the first metatarsophalangeal articulation with bunion formation. Abnormal configuration of the second and third metatarsophalangeal   articulation which appears to be chronic and degenerative. No evidence of acute fracture is identified. Osteoarthritis. XR Chest Abdomen Ng Placement   Final Result      XR Chest Abdomen Ng Placement   Final Result   The weighted feeding tube tip is in the epigastric region, directed up   into the right, presumably in the body the stomach, although the   inferior margin of the small hiatus hernia is not as well seen. Advancing the tube 3 to 4 cm should determine whether the tip is in   the stomach or the inferior margin of the hiatus hernia. Subtle density in the right lower lung may be artifact related to   expiratory imaging or could represent subtle interstitial disease   without effusion. Otherwise unremarkable. [facility] Caballero ALERT:  THIS IS AN ABNORMAL REPORT-tube position is equivocal, likely   in the stomach, but the weighted portion could be in the inferior   margin of the hiatus hernia. Advancing the tube 4 5 cm with advanced   the weighted portion into the stomach if it is in the hiatus hernia,   and would confirm a distal gastric position if it is in the stomach. XR Chest Abdomen Ng Placement   Final Result   Nasogastric tube appears to be malpositioned, with the tip likely in   the right lower lobe bronchus. Findings were conveyed to patient's   nurse, Ms. Khari Weller RN 10:24  a.m. on 6/20/2020. She was aware   of the findings and had removed the NG tube. XR ABDOMEN FOR NG/OG/NE TUBE PLACEMENT   Final Result   Feeding tube is traceable to the stomach. This report has been electronically signed by Sade Dean MD.      XR ABDOMEN FOR NG/OG/NE TUBE PLACEMENT   Final Result   Feeding tube is coiled, presumably within a hiatal hernia. Consider retraction    by approximately 8 cm with slight readvance .       This report has been electronically signed by Mel Caban MD.      XR ABDOMEN FOR NG/OG/NE TUBE PLACEMENT   Final Result      Nasogastric tube follows the expected contours of the esophagus with   distal tip overlying the air in the gastric lumen and distal sideholes   overlying the esophagogastric junction. US RETROPERITONEAL COMPLETE   Final Result      1. Renal cortical echogenicity suggestive spectrum of medical renal   disease bilaterally, without evidence of obstruction on either side. 2. The bladder wall is thickened, without intraluminal debris. This   can be a manifestation of cystitis or hypertrophic change. CT Head WO Contrast   Final Result      No evidence for acute intracranial process. Cortical atrophy and chronic periventricular microangiopathy. XR CHEST PORTABLE   Final Result      NO ACUTE CARDIOPULMONARY PROCESS             Assessment:    Active Problems:    Hypernatremia    Severe protein-calorie malnutrition (HCC)    Acute renal failure (HCC)    Thrombocytopenia (HCC)    Fever of unknown origin    Encephalopathy    Urinary tract infection without hematuria  Resolved Problems:    * No resolved hospital problems. *      Plan:  1. Hypernatremia  -Improved, nephrology consulted, Urine studies reportedly ruled out DI .  Nephrology stating patient will need 1 to 1.2 L/day to maintain sodium levels.  Nephrology signing off to follow from a distance. -Nephrology does state that they feel patient would benefit from tube feedings.  Guardian declining PEG tube. Currently on dysphagia 1 puréed solids/nectar thick     2. [facility]  -Nephrology following, resolved.     3. Metabolic encephalopathy  -Unknown baseline, continue to monitor.     4. Hypokalemia   -Resolved, K 4.2, continue to monitor     5. H/o Seizure Disorder  -No seizure activity noted at this time.     6. Protein Calorie Malnutrition   -  Dobbhoff tube was placed.  General surgery consulted regarding PEG tube placement, guardian stating she would like to avoid PEG at this time.  -Patient did have a swallow study. Eating okay.     7. Thrombocytopenia   -Platelets improved HK 95, YEMYVAV etiology, possibly d/t infection      8. Decubitus Ulcer   - Wound care to follow, do not appear infected.       9. Hypotension    - Intermittent, seems to be improving with fluids. Will continue to monitor      10.  Urinary tract infection  - Urine cultures growing Proteus, ID was consulted, transition to cefdinir on 6-22-20, completed course 6-23-20     11.  Gallstone  -General surgery was consulted, no intervention needed at this time. 12.  Psych disorder/intellectual disability  -Psych was consulted, multiple medications were held during admission however resumed by psychiatry. Psychiatry recommends evaluation by neurology for antiepileptic regimen however no neurology coverage here [facility].     Dispo: Appreciate social work recommendations for set up at [facility]. Apparently patient needs approval from [facility] of developmental disabilities. Assessment is pending.         Electronically signed by Akila Garza PA-C on 6/23/2020 at 9:27 AM

## 2020-07-31 ENCOUNTER — TELEPHONE (OUTPATIENT)
Dept: NEUROLOGY | Age: 70
End: 2020-07-31

## 2020-07-31 NOTE — TELEPHONE ENCOUNTER
Trace Amos is questioning the diagnosis of paitent - tremors and/or parkinson's  Electronically signed by Cuca Javier on 7/31/20 at 3:05 PM EDT

## 2020-08-20 ENCOUNTER — CARE COORDINATION (OUTPATIENT)
Dept: CASE MANAGEMENT | Age: 70
End: 2020-08-20

## 2020-08-20 NOTE — CARE COORDINATION
[facility] Transitions Initial Follow Up Call    Call within 2 business days of discharge: Yes    Patient: Hermila Rivas Patient : 1950   MRN: <[id number]>  Reason for Admission:   Discharge Date: 20 RARS: Readmission Risk Score: 31      Last Discharge [facility]       Complaint Diagnosis Description Type Department Provider    20 Altered Mental Status Hypernatremia . .. ED to Hosp-Admission (Discharged) (ADMITTED) ARIK Cuadra MD; Case Gonzalez. .. Per Yovani patient is not followed d/t transition to LTC for needs.  NETTE Riley RN  Care Transition Nurse [phone]         Spoke with: Email from [address] [place] [address]: [address] [place] [address] services provided:      Care Transitions 24 Hour Call    Care Transitions Interventions         Follow Up  Future Appointments   Date Time Provider Ricco Hart   2020 11:20 AM MARIJA Fontanez - CNS BDM Neuro [facility]       Ismael Snow RN

## 2020-08-28 ENCOUNTER — APPOINTMENT (OUTPATIENT)
Dept: GENERAL RADIOLOGY | Age: 70
End: 2020-08-28
Payer: MEDICARE

## 2020-08-28 ENCOUNTER — HOSPITAL ENCOUNTER (EMERGENCY)
Age: 70
Discharge: HOME | End: 2020-08-29
Attending: EMERGENCY MEDICINE
Payer: MEDICARE

## 2020-08-28 ENCOUNTER — APPOINTMENT (OUTPATIENT)
Dept: CT IMAGING | Age: 70
End: 2020-08-28
Payer: MEDICARE

## 2020-08-28 LAB
ALBUMIN SERPL-MCNC: 3.8 G/DL (ref 3.5–5.2)
ALP BLD-CCNC: 108 U/L (ref 40–129)
ALT SERPL-CCNC: 14 U/L (ref 0–40)
ANION GAP SERPL CALCULATED.3IONS-SCNC: 14 MMOL/L (ref 7–16)
AST SERPL-CCNC: 21 U/L (ref 0–39)
BASOPHILS ABSOLUTE: 0.03 E9/L (ref 0–0.2)
BASOPHILS RELATIVE PERCENT: 0.6 % (ref 0–2)
BILIRUB SERPL-MCNC: <0.2 MG/DL (ref 0–1.2)
BUN BLDV-MCNC: 31 MG/DL (ref 8–23)
CALCIUM SERPL-MCNC: 8.6 MG/DL (ref 8.6–10.2)
CHLORIDE BLD-SCNC: 104 MMOL/L (ref 98–107)
CO2: 26 MMOL/L (ref 22–29)
CREAT SERPL-MCNC: 0.7 MG/DL (ref 0.7–1.2)
EOSINOPHILS ABSOLUTE: 0.16 E9/L (ref 0.05–0.5)
EOSINOPHILS RELATIVE PERCENT: 3.1 % (ref 0–6)
GFR AFRICAN AMERICAN: >60
GFR NON-AFRICAN AMERICAN: >60 ML/MIN/1.73
GLUCOSE BLD-MCNC: 117 MG/DL (ref 74–99)
HCT VFR BLD CALC: 35 % (ref 37–54)
HEMOGLOBIN: 11.5 G/DL (ref 12.5–16.5)
IMMATURE GRANULOCYTES #: 0.02 E9/L
IMMATURE GRANULOCYTES %: 0.4 % (ref 0–5)
INR BLD: 1
LACTIC ACID: 2.2 MMOL/L (ref 0.5–2.2)
LYMPHOCYTES ABSOLUTE: 0.85 E9/L (ref 1.5–4)
LYMPHOCYTES RELATIVE PERCENT: 16.4 % (ref 20–42)
MCH RBC QN AUTO: 32.8 PG (ref 26–35)
MCHC RBC AUTO-ENTMCNC: 32.9 % (ref 32–34.5)
MCV RBC AUTO: 99.7 FL (ref 80–99.9)
MONOCYTES ABSOLUTE: 0.69 E9/L (ref 0.1–0.95)
MONOCYTES RELATIVE PERCENT: 13.3 % (ref 2–12)
NEUTROPHILS ABSOLUTE: 3.44 E9/L (ref 1.8–7.3)
NEUTROPHILS RELATIVE PERCENT: 66.2 % (ref 43–80)
PDW BLD-RTO: 12.7 FL (ref 11.5–15)
PLATELET # BLD: 180 E9/L (ref 130–450)
PMV BLD AUTO: 9.3 FL (ref 7–12)
POTASSIUM SERPL-SCNC: 4.4 MMOL/L (ref 3.5–5)
PRO-BNP: 31 PG/ML (ref 0–125)
PROTHROMBIN TIME: 10.8 SEC (ref 9.3–12.4)
RBC # BLD: 3.51 E12/L (ref 3.8–5.8)
SARS-COV-2, NAAT: NOT DETECTED
SODIUM BLD-SCNC: 144 MMOL/L (ref 132–146)
TOTAL PROTEIN: 6.5 G/DL (ref 6.4–8.3)
TROPONIN: <0.01 NG/ML (ref 0–0.03)
WBC # BLD: 5.2 E9/L (ref 4.5–11.5)

## 2020-08-28 PROCEDURE — 83605 ASSAY OF LACTIC ACID: CPT

## 2020-08-28 PROCEDURE — 6360000004 HC RX CONTRAST MEDICATION: Performed by: RADIOLOGY

## 2020-08-28 PROCEDURE — 83880 ASSAY OF NATRIURETIC PEPTIDE: CPT

## 2020-08-28 PROCEDURE — 85610 PROTHROMBIN TIME: CPT

## 2020-08-28 PROCEDURE — 87040 BLOOD CULTURE FOR BACTERIA: CPT

## 2020-08-28 PROCEDURE — 84484 ASSAY OF TROPONIN QUANT: CPT

## 2020-08-28 PROCEDURE — 6370000000 HC RX 637 (ALT 250 FOR IP): Performed by: EMERGENCY MEDICINE

## 2020-08-28 PROCEDURE — 94664 DEMO&/EVAL PT USE INHALER: CPT

## 2020-08-28 PROCEDURE — 96374 THER/PROPH/DIAG INJ IV PUSH: CPT

## 2020-08-28 PROCEDURE — 36415 COLL VENOUS BLD VENIPUNCTURE: CPT

## 2020-08-28 PROCEDURE — 99284 EMERGENCY DEPT VISIT MOD MDM: CPT

## 2020-08-28 PROCEDURE — 71275 CT ANGIOGRAPHY CHEST: CPT

## 2020-08-28 PROCEDURE — 71045 X-RAY EXAM CHEST 1 VIEW: CPT

## 2020-08-28 PROCEDURE — 85025 COMPLETE CBC W/AUTO DIFF WBC: CPT

## 2020-08-28 PROCEDURE — 94640 AIRWAY INHALATION TREATMENT: CPT

## 2020-08-28 PROCEDURE — 99285 EMERGENCY DEPT VISIT HI MDM: CPT

## 2020-08-28 PROCEDURE — 80053 COMPREHEN METABOLIC PANEL: CPT

## 2020-08-28 PROCEDURE — U0002 COVID-19 LAB TEST NON-CDC: HCPCS

## 2020-08-28 PROCEDURE — 93005 ELECTROCARDIOGRAM TRACING: CPT | Performed by: EMERGENCY MEDICINE

## 2020-08-28 RX ORDER — LEVETIRACETAM 10 MG/ML
1000 INJECTION INTRAVASCULAR ONCE
Status: COMPLETED | OUTPATIENT
Start: 2020-08-28 | End: 2020-08-29

## 2020-08-28 RX ORDER — IPRATROPIUM BROMIDE AND ALBUTEROL SULFATE 2.5; .5 MG/3ML; MG/3ML
1 SOLUTION RESPIRATORY (INHALATION)
Status: COMPLETED | OUTPATIENT
Start: 2020-08-28 | End: 2020-08-28

## 2020-08-28 RX ADMIN — IPRATROPIUM BROMIDE AND ALBUTEROL SULFATE 1 AMPULE: .5; 3 SOLUTION RESPIRATORY (INHALATION) at 23:13

## 2020-08-28 RX ADMIN — IPRATROPIUM BROMIDE AND ALBUTEROL SULFATE 1 AMPULE: .5; 3 SOLUTION RESPIRATORY (INHALATION) at 23:20

## 2020-08-28 RX ADMIN — IPRATROPIUM BROMIDE AND ALBUTEROL SULFATE 1 AMPULE: .5; 3 SOLUTION RESPIRATORY (INHALATION) at 23:25

## 2020-08-28 RX ADMIN — IOPAMIDOL 75 ML: 755 INJECTION, SOLUTION INTRAVENOUS at 22:21

## 2020-08-29 VITALS
RESPIRATION RATE: 18 BRPM | HEIGHT: 65 IN | HEART RATE: 91 BPM | BODY MASS INDEX: 16.66 KG/M2 | OXYGEN SATURATION: 94 % | TEMPERATURE: 98.6 F | SYSTOLIC BLOOD PRESSURE: 113 MMHG | DIASTOLIC BLOOD PRESSURE: 71 MMHG | WEIGHT: 100 LBS

## 2020-08-29 LAB
EKG ATRIAL RATE: 124 BPM
EKG P AXIS: 12 DEGREES
EKG P-R INTERVAL: 120 MS
EKG Q-T INTERVAL: 416 MS
EKG QRS DURATION: 78 MS
EKG QTC CALCULATION (BAZETT): 597 MS
EKG R AXIS: 29 DEGREES
EKG T AXIS: 50 DEGREES
EKG VENTRICULAR RATE: 124 BPM

## 2020-08-29 PROCEDURE — 6360000002 HC RX W HCPCS: Performed by: EMERGENCY MEDICINE

## 2020-08-29 RX ADMIN — LEVETIRACETAM 1000 MG: 10 INJECTION, SOLUTION INTRAVENOUS at 00:26

## 2020-08-29 NOTE — ED PROVIDER NOTES
midline, no stridor, no crepitus, no meningeal signs  Pulmonary: Lungs clear to auscultation bilaterally, no wheezes, rales, or rhonchi. Not in respiratory distress  Cardiovascular: Tachycardic. Regular rhythm. No murmurs, gallops, or rubs. 2+ distal pulses  Chest: no chest wall tenderness  Abdomen: Soft. Non tender. Non distended. +BS. No rebound, guarding, or rigidity. No pulsatile masses appreciated. Musculoskeletal: Patient does not really commands as far as raising arms or legs. EMS reports he is at his baseline  Skin: warm and dry. No rashes. Neurologic: unAble to assess orientation    -------------------------------------------------- RESULTS -------------------------------------------------  I have personally reviewed all laboratory and imaging results for this patient. Results are listed below.      LABS:  Results for orders placed or performed during the hospital encounter of 08/28/20   CBC auto differential   Result Value Ref Range    WBC 5.2 4.5 - 11.5 E9/L    RBC 3.51 (L) 3.80 - 5.80 E12/L    Hemoglobin 11.5 (L) 12.5 - 16.5 g/dL    Hematocrit 35.0 (L) 37.0 - 54.0 %    MCV 99.7 80.0 - 99.9 fL    MCH 32.8 26.0 - 35.0 pg    MCHC 32.9 32.0 - 34.5 %    RDW 12.7 11.5 - 15.0 fL    Platelets 727 919 - 422 E9/L    MPV 9.3 7.0 - 12.0 fL    Neutrophils % 66.2 43.0 - 80.0 %    Immature Granulocytes % 0.4 0.0 - 5.0 %    Lymphocytes % 16.4 (L) 20.0 - 42.0 %    Monocytes % 13.3 (H) 2.0 - 12.0 %    Eosinophils % 3.1 0.0 - 6.0 %    Basophils % 0.6 0.0 - 2.0 %    Neutrophils Absolute 3.44 1.80 - 7.30 E9/L    Immature Granulocytes # 0.02 E9/L    Lymphocytes Absolute 0.85 (L) 1.50 - 4.00 E9/L    Monocytes Absolute 0.69 0.10 - 0.95 E9/L    Eosinophils Absolute 0.16 0.05 - 0.50 E9/L    Basophils Absolute 0.03 0.00 - 0.20 E9/L   Comprehensive Metabolic Panel   Result Value Ref Range    Sodium 144 132 - 146 mmol/L    Potassium 4.4 3.5 - 5.0 mmol/L    Chloride 104 98 - 107 mmol/L    CO2 26 22 - 29 mmol/L    Anion Gap 14 7 - 16 mmol/L    Glucose 117 (H) 74 - 99 mg/dL    BUN 31 (H) 8 - 23 mg/dL    CREATININE 0.7 0.7 - 1.2 mg/dL    GFR Non-African American >60 >=60 mL/min/1.73    GFR African American >60     Calcium 8.6 8.6 - 10.2 mg/dL    Total Protein 6.5 6.4 - 8.3 g/dL    Alb 3.8 3.5 - 5.2 g/dL    Total Bilirubin <0.2 0.0 - 1.2 mg/dL    Alkaline Phosphatase 108 40 - 129 U/L    ALT 14 0 - 40 U/L    AST 21 0 - 39 U/L   Troponin   Result Value Ref Range    Troponin <0.01 0.00 - 0.03 ng/mL   Brain Natriuretic Peptide   Result Value Ref Range    Pro-BNP 31 0 - 125 pg/mL   Lactic Acid, Plasma   Result Value Ref Range    Lactic Acid 2.2 0.5 - 2.2 mmol/L   Protime-INR   Result Value Ref Range    Protime 10.8 9.3 - 12.4 sec    INR 1.0    COVID-19   Result Value Ref Range    SARS-CoV-2, NAAT Not Detected Not Detected       RADIOLOGY:  Interpreted by Radiologist.  CTA CHEST W CONTRAST   Final Result      1. No large central pulmonary embolism detected. Segmental and   subsegmental pulmonary arteries are not optimally opacified and are   not optimally assessed. 2. Atelectatic changes are present in the right lung base. 3. Large hiatal hernia with adjacent compressive atelectasis within   medial left lower lobe. 4. View of the upper abdomen shows large amount of stool seen within   visualized colon. 4. Large calcified gallstone present within the gallbladder. XR CHEST PORTABLE   Final Result   Moderate size hiatal hand with discrete areas of   atelectasis in the left base. No acute cardiopulmonary process. EKG:  This EKG is signed and interpreted by me. Rate: 124  Rhythm: Sinus tachycardia  Interpretation: non-specific EKG  Comparison: no previous EKG available        ------------------------- NURSING NOTES AND VITALS REVIEWED ---------------------------   The nursing notes within the ED encounter and vital signs as below have been reviewed by myself.   /71   Pulse 91   Temp 99 °F (37.2 °C) (Temporal)   Resp 18   Ht 5' 5\" (1.651 m)   Wt 100 lb (45.4 kg)   SpO2 94%   BMI 16.64 kg/m²   Oxygen Saturation Interpretation: noted    The patients available past medical records and past encounters were reviewed. ------------------------------ ED COURSE/MEDICAL DECISION MAKING----------------------  Medications   levetiracetam (KEPPRA) 1000 mg/100 mL IVPB (has no administration in time range)   iopamidol (ISOVUE-370) 76 % injection 75 mL (75 mLs Intravenous Given 8/28/20 2221)   ipratropium-albuterol (DUONEB) nebulizer solution 1 ampule (1 ampule Inhalation Given 8/28/20 2325)             Medical Decision Making:        Re-Evaluations:             Re-evaluation. Patients symptoms show no change    Patient reevaluated and in no distress I did check his pulse ox myself on room air patient's pulse ox was 94%. Patient lungs are clear. Patient abdomen is soft and nontender. Patient rechecked multiple times is never dropped his pulse ox below 90 here. Patient current pulse ox is in 94-95%. Patient will be discharged home  Consultations:               Did speak to Shant Carroll and discussed labs and CT findings and felt patient can be discharged back to facility. Patient to return if symptoms worsen or persist  Critical Care: This patient's ED course included: a personal history and physicial eaxmination    This patient has been closely monitored during their ED course. Counseling: The emergency provider has spoken with the  and discussed todays results, in addition to providing specific details for the plan of care and counseling regarding the diagnosis and prognosis. Questions are answered at this time and they are agreeable with the plan.       --------------------------------- IMPRESSION AND DISPOSITION ---------------------------------    IMPRESSION  1. Dyspnea and respiratory abnormalities    2.  Hiatal hernia        DISPOSITION  Disposition: Discharge home  Patient

## 2020-08-29 NOTE — ED NOTES
POA called for update on pt, advised waiting on results and that pt is currently in CT. POA starts yelling at this RN regarding pt being alone in the room when she had first arrived. POA was advised that pt is now a COVID R/O and is no longer allowed to have visitors. POA continued yelling at RN and demanding to speak to charge RN. Yoon Nuñez notified of situation, she advised caretaker may be in room as long as she and the patient stay masked at all times . POA notified however still displeased with the situation.       Jordyn Norris RN  08/28/20 5331

## 2020-09-02 LAB
BLOOD CULTURE, ROUTINE: NORMAL
CULTURE, BLOOD 2: NORMAL

## 2020-09-30 ENCOUNTER — OFFICE VISIT (OUTPATIENT)
Dept: NEUROLOGY | Age: 70
End: 2020-09-30
Payer: MEDICARE

## 2020-09-30 VITALS
TEMPERATURE: 97.8 F | BODY MASS INDEX: 16.64 KG/M2 | OXYGEN SATURATION: 89 % | WEIGHT: 100 LBS | DIASTOLIC BLOOD PRESSURE: 83 MMHG | SYSTOLIC BLOOD PRESSURE: 124 MMHG

## 2020-09-30 PROCEDURE — 1123F ACP DISCUSS/DSCN MKR DOCD: CPT | Performed by: CLINICAL NURSE SPECIALIST

## 2020-09-30 PROCEDURE — G8427 DOCREV CUR MEDS BY ELIG CLIN: HCPCS | Performed by: CLINICAL NURSE SPECIALIST

## 2020-09-30 PROCEDURE — 1036F TOBACCO NON-USER: CPT | Performed by: CLINICAL NURSE SPECIALIST

## 2020-09-30 PROCEDURE — G8418 CALC BMI BLW LOW PARAM F/U: HCPCS | Performed by: CLINICAL NURSE SPECIALIST

## 2020-09-30 PROCEDURE — 99214 OFFICE O/P EST MOD 30 MIN: CPT | Performed by: CLINICAL NURSE SPECIALIST

## 2020-09-30 PROCEDURE — 4040F PNEUMOC VAC/ADMIN/RCVD: CPT | Performed by: CLINICAL NURSE SPECIALIST

## 2020-09-30 PROCEDURE — 3017F COLORECTAL CA SCREEN DOC REV: CPT | Performed by: CLINICAL NURSE SPECIALIST

## 2020-09-30 RX ORDER — LEVETIRACETAM 1000 MG/1
TABLET ORAL
COMMUNITY
Start: 2020-09-12 | End: 2020-09-30

## 2020-09-30 RX ORDER — LEVETIRACETAM 500 MG/1
1000 TABLET ORAL 2 TIMES DAILY
Qty: 60 TABLET | Refills: 3 | Status: SHIPPED
Start: 2020-09-30 | End: 2021-01-01

## 2020-09-30 NOTE — PROGRESS NOTES
Quique Miller is a 78 y.o.  male     From [facility]    Long history of epilepsy but no seizures reported in years   Maintained on Tegretol 300BID   1 reported seizure since last appt but remains on minimal AED (and daily Keppra?)    His Depakote was suspected to have been started for behavioral issues rather than his epilepsy - this was discontinued d/t tremors     Its discontinuation did not improve his tremors -- we questioned PD vs parkinsonisms     We tried Neupro patches for PD vs parkinsonisms    His tremors have not improved but his walking did on low dose   We continued at 4mg and staff has noted no change in tremor but he has become more agitated and screaming out    Neupro was discontinued and we started Sinemet twice a day    Staff has noted marked improvement -- not shaking as much -- able to hold juice cup -- doing well     No longer agitated per staff but tremors remain      ROS unobtainable due to patient condition    Prior to Visit Medications    Medication Sig Taking?  Authorizing Provider   levETIRAcetam (KEPPRA) 500 MG tablet Take 2 tablets by mouth 2 times daily Yes MARIJA Garvin - CNS   Calcium Carb-Cholecalciferol (CALCIUM-VITAMIN D) 500-200 MG-UNIT per tablet Take 1 tablet by mouth 2 times daily Yes Azalia Lyles PA-C   vitamin D (ERGOCALCIFEROL) 1.25 MG (60176 UT) CAPS capsule Take 50,000 Units by mouth every 30 days On the 14th of each month Yes Historical Provider, MD   fluvoxaMINE (LUVOX CR) 150 MG CP24 extended release capsule Take 150 mg by mouth daily  Yes Historical Provider, MD   mirtazapine (REMERON) 30 MG tablet Take 30 mg by mouth nightly  Yes Historical Provider, MD   carbidopa-levodopa (SINEMET)  MG per tablet Twice a day at 0800 and 1600 Yes MARIJA Garvin - CNS   polyethylene glycol (MIRALAX) powder Take 17 g by mouth daily as needed  Yes Historical Provider, MD   OLANZapine (ZYPREXA) 7.5 MG tablet Take 7.5 mg by mouth nightly  Yes Historical Provider, MD   omeprazole (PRILOSEC) 20 MG capsule Take 20 mg by mouth Daily  Yes Historical Provider, MD   carBAMazepine (TEGRETOL) 200 MG tablet Take 200 mg by mouth 2 times daily  Yes Historical Provider, MD   acetaminophen (TYLENOL) 325 MG tablet Take 650 mg by mouth every 4 hours as needed for Pain. Yes Historical Provider, MD   benztropine (COGENTIN) 1 MG tablet Take 1 mg by mouth 2 times daily.  TAKE AM OF OR WITH A LITTLE WATER 04/24/2015 Yes Historical Provider, MD     Allergies as of 09/30/2020    (No Known Allergies)     Objective:     /83 (Site: Left Upper Arm, Position: Sitting, Cuff Size: Medium Adult)   Temp 97.8 °F (36.6 °C)   Wt 100 lb (45.4 kg)   SpO2 (!) 89%   BMI 16.64 kg/m²  - unable to obtain BP      General appearance: alert and minimally cooperative  Head: Normocephalic, without obvious abnormality, atraumatic  Extremities: boots on bilaterally - no edema  Pulses: 2+ and symmetric   Skin: no rashes or lesions     Mental Status: awake - laughing     Nonverbal  Not following commands    No Myerson's sign     Cranial Nerves:  I: smell    II: visual acuity     II: visual fields Bilateral threat responses   II: pupils NOMI   III,VII: ptosis    III,IV,VI: extraocular muscles  Looks around the room   V: mastication    V: facial light touch sensation     V,VII: corneal reflex  Present   VII: facial muscle function - upper     VII: facial muscle function - lower Normal   VIII: hearing Normal   IX: soft palate elevation  Normal   IX,X: gag reflex Present   XI: trapezius strength     XI: sternocleidomastoid strength    XI: neck extension strength     XII: tongue strength  Normal     Motor:  Moving all limbs sporadically and symmetrically - though minimally  Normal bulk    No right hand resting tremor appreciated today   No cogwheel rigidity noted in wrists today     Sensory:  Withdraws to PP throughout    Gait:  In wheelchair today     DTR:   Right Brachioradialis reflex 0  Left Brachioradialis reflex 0  Right Biceps reflex 1+  Left Biceps reflex 1+  Right Quadriceps reflex 1+  Left Quadriceps reflex 1+  Right Achilles reflex 0  Left Achilles reflex 0    No Grey's     Laboratory/Radiology:     CBC:   Lab Results   Component Value Date    WBC 5.2 08/28/2020    RBC 3.51 08/28/2020    HGB 11.5 08/28/2020    HCT 35.0 08/28/2020    MCV 99.7 08/28/2020    MCH 32.8 08/28/2020    MCHC 32.9 08/28/2020    RDW 12.7 08/28/2020     08/28/2020    MPV 9.3 08/28/2020     BMP:    Lab Results   Component Value Date     08/28/2020    K 4.4 08/28/2020    K 3.9 03/30/2020     08/28/2020    CO2 26 08/28/2020    BUN 31 08/28/2020    LABALBU 3.8 08/28/2020    LABALBU 4.5 05/18/2012    CREATININE 0.7 08/28/2020    CALCIUM 8.6 08/28/2020    GFRAA >60 08/28/2020    LABGLOM >60 08/28/2020    GLUCOSE 117 08/28/2020    GLUCOSE 71 05/18/2012     Tegretol level 13.3    Labs were personally reviewed by myself today     Assessment:     Seizure disorder   One reported seizure -- now on Keppra 1000mg daily     Increasing tremors - unchanged with lower doses of Depakote - unchanged with Neupro at 4mg   Agitation improved with Neupro discontinuation    Tremors improved with Sinemet therefore I suspect Parkinson's disease      Plan:     Continue Sinemet 25/100 to be given at 0800 and 1600      Switch Keppra to 500mg BID     RTO 8 weeks     Dorcus Bobby  2:57 PM  9/30/2020

## 2020-10-19 ENCOUNTER — HOSPITAL ENCOUNTER (EMERGENCY)
Age: 70
Discharge: HOME | End: 2020-10-19
Attending: EMERGENCY MEDICINE
Payer: MEDICARE

## 2020-10-19 ENCOUNTER — APPOINTMENT (OUTPATIENT)
Dept: GENERAL RADIOLOGY | Age: 70
End: 2020-10-19
Payer: MEDICARE

## 2020-10-19 VITALS
SYSTOLIC BLOOD PRESSURE: 121 MMHG | OXYGEN SATURATION: 95 % | HEIGHT: 65 IN | RESPIRATION RATE: 16 BRPM | TEMPERATURE: 98.4 F | DIASTOLIC BLOOD PRESSURE: 80 MMHG | BODY MASS INDEX: 16.66 KG/M2 | WEIGHT: 100 LBS | HEART RATE: 92 BPM

## 2020-10-19 LAB
ALBUMIN SERPL-MCNC: 3.8 G/DL (ref 3.5–5.2)
ALP BLD-CCNC: 113 U/L (ref 40–129)
ALT SERPL-CCNC: 6 U/L (ref 0–40)
ANION GAP SERPL CALCULATED.3IONS-SCNC: 12 MMOL/L (ref 7–16)
AST SERPL-CCNC: 18 U/L (ref 0–39)
BASOPHILS ABSOLUTE: 0.05 E9/L (ref 0–0.2)
BASOPHILS RELATIVE PERCENT: 1.1 % (ref 0–2)
BILIRUB SERPL-MCNC: <0.2 MG/DL (ref 0–1.2)
BUN BLDV-MCNC: 30 MG/DL (ref 8–23)
CALCIUM SERPL-MCNC: 8.9 MG/DL (ref 8.6–10.2)
CHLORIDE BLD-SCNC: 103 MMOL/L (ref 98–107)
CO2: 24 MMOL/L (ref 22–29)
CREAT SERPL-MCNC: 0.7 MG/DL (ref 0.7–1.2)
EOSINOPHILS ABSOLUTE: 0.19 E9/L (ref 0.05–0.5)
EOSINOPHILS RELATIVE PERCENT: 4 % (ref 0–6)
GFR AFRICAN AMERICAN: >60
GFR NON-AFRICAN AMERICAN: >60 ML/MIN/1.73
GLUCOSE BLD-MCNC: 99 MG/DL (ref 74–99)
HCT VFR BLD CALC: 39.9 % (ref 37–54)
HEMOGLOBIN: 13.2 G/DL (ref 12.5–16.5)
IMMATURE GRANULOCYTES #: 0.01 E9/L
IMMATURE GRANULOCYTES %: 0.2 % (ref 0–5)
LYMPHOCYTES ABSOLUTE: 1.47 E9/L (ref 1.5–4)
LYMPHOCYTES RELATIVE PERCENT: 30.9 % (ref 20–42)
MCH RBC QN AUTO: 31.7 PG (ref 26–35)
MCHC RBC AUTO-ENTMCNC: 33.1 % (ref 32–34.5)
MCV RBC AUTO: 95.7 FL (ref 80–99.9)
MONOCYTES ABSOLUTE: 0.75 E9/L (ref 0.1–0.95)
MONOCYTES RELATIVE PERCENT: 15.8 % (ref 2–12)
NEUTROPHILS ABSOLUTE: 2.29 E9/L (ref 1.8–7.3)
NEUTROPHILS RELATIVE PERCENT: 48 % (ref 43–80)
PDW BLD-RTO: 12.3 FL (ref 11.5–15)
PLATELET # BLD: 148 E9/L (ref 130–450)
PMV BLD AUTO: 9.5 FL (ref 7–12)
POTASSIUM SERPL-SCNC: 4.4 MMOL/L (ref 3.5–5)
PRO-BNP: 41 PG/ML (ref 0–125)
RBC # BLD: 4.17 E12/L (ref 3.8–5.8)
SODIUM BLD-SCNC: 139 MMOL/L (ref 132–146)
TOTAL PROTEIN: 6.5 G/DL (ref 6.4–8.3)
TROPONIN: <0.01 NG/ML (ref 0–0.03)
WBC # BLD: 4.8 E9/L (ref 4.5–11.5)

## 2020-10-19 PROCEDURE — 99283 EMERGENCY DEPT VISIT LOW MDM: CPT

## 2020-10-19 PROCEDURE — 83880 ASSAY OF NATRIURETIC PEPTIDE: CPT

## 2020-10-19 PROCEDURE — 85025 COMPLETE CBC W/AUTO DIFF WBC: CPT

## 2020-10-19 PROCEDURE — 84484 ASSAY OF TROPONIN QUANT: CPT

## 2020-10-19 PROCEDURE — 71045 X-RAY EXAM CHEST 1 VIEW: CPT

## 2020-10-19 PROCEDURE — 93005 ELECTROCARDIOGRAM TRACING: CPT | Performed by: EMERGENCY MEDICINE

## 2020-10-19 PROCEDURE — 80053 COMPREHEN METABOLIC PANEL: CPT

## 2020-10-20 ENCOUNTER — HOSPITAL ENCOUNTER (EMERGENCY)
Age: 70
Discharge: HOME | End: 2020-10-20
Attending: EMERGENCY MEDICINE
Payer: MEDICARE

## 2020-10-20 VITALS
WEIGHT: 100 LBS | SYSTOLIC BLOOD PRESSURE: 131 MMHG | HEART RATE: 103 BPM | DIASTOLIC BLOOD PRESSURE: 81 MMHG | HEIGHT: 65 IN | BODY MASS INDEX: 16.66 KG/M2 | RESPIRATION RATE: 18 BRPM | OXYGEN SATURATION: 91 % | TEMPERATURE: 98.7 F

## 2020-10-20 LAB
BILIRUBIN URINE: NEGATIVE
BLOOD, URINE: NEGATIVE
CLARITY: CLEAR
COLOR: YELLOW
GLUCOSE URINE: NEGATIVE MG/DL
KETONES, URINE: NEGATIVE MG/DL
LEUKOCYTE ESTERASE, URINE: NEGATIVE
NITRITE, URINE: NEGATIVE
PH UA: 7 (ref 5–9)
PROTEIN UA: NEGATIVE MG/DL
SPECIFIC GRAVITY UA: 1.01 (ref 1–1.03)
UROBILINOGEN, URINE: 0.2 E.U./DL

## 2020-10-20 PROCEDURE — 99283 EMERGENCY DEPT VISIT LOW MDM: CPT

## 2020-10-20 PROCEDURE — 81003 URINALYSIS AUTO W/O SCOPE: CPT

## 2020-10-20 NOTE — ED NOTES
Bed: 20  Expected date:   Expected time:   Means of arrival:   Comments:  ems     Titi Adair RN  10/20/20 5664

## 2020-10-20 NOTE — ED PROVIDER NOTES
HPI:  10/19/20, Time: 8:40 PM EDT         John Conway is a 78 y.o. male presenting to the ED for history of wheezing, beginning hours ago. The complaint has been persistent, mild in severity, and worsened by nothing. Patient unable to give history. Patient does have history of developmentally being delayed. Patient reportedly was having history of wheezing at facility. Patient currently not wheezing and no cough. Patient does have history of Parkinson's and MR. Patient unable to give history. There is no history of vomiting or diarrhea there is no history of any fall or injury. ROS:   Pertinent positives and negatives are stated within HPI, all other systems reviewed and are negative.  --------------------------------------------- PAST HISTORY ---------------------------------------------  Past Medical History:  has a past medical history of Acquired deformity of neck, Autism spectrum, Bilateral cataracts, Blepharochalasis, Cardiomegaly, Cataract of both eyes, Developmental delay, Gastritis, Hyperlipidemia, Kyphosis of thoracic region, Mental retardation, Obsessive compulsive disorder, Parkinsonism ([address] Seizures ([address] and Tourette disorder. Past Surgical History:  has a past surgical history that includes Toe amputation (Left). Social History:  reports that he has never smoked. He has never used smokeless tobacco. He reports that he does not drink alcohol or use drugs. Family History: family history is not on file. The patients home medications have been reviewed. Allergies: Patient has no known allergies. ---------------------------------------------------PHYSICAL EXAM--------------------------------------    Constitutional/General: Alert and awake does not appear in any distress.   Head: Normocephalic and atraumatic  Eyes: PERRL, EOMI  Mouth: Oropharynx clear, handling secretions, no trismus  Neck: Supple, full ROM, non tender to palpation in the midline, no stridor, no crepitus, mmol/L    CO2 24 22 - 29 mmol/L    Anion Gap 12 7 - 16 mmol/L    Glucose 99 74 - 99 mg/dL    BUN 30 (H) 8 - 23 mg/dL    CREATININE 0.7 0.7 - 1.2 mg/dL    GFR Non-African American >60 >=60 mL/min/1.73    GFR African American >60     Calcium 8.9 8.6 - 10.2 mg/dL    Total Protein 6.5 6.4 - 8.3 g/dL    Alb 3.8 3.5 - 5.2 g/dL    Total Bilirubin <0.2 0.0 - 1.2 mg/dL    Alkaline Phosphatase 113 40 - 129 U/L    ALT 6 0 - 40 U/L    AST 18 0 - 39 U/L   Troponin   Result Value Ref Range    Troponin <0.01 0.00 - 0.03 ng/mL   Brain Natriuretic Peptide   Result Value Ref Range    Pro-BNP 41 0 - 125 pg/mL       RADIOLOGY:  Interpreted by Radiologist.  XR CHEST PORTABLE   Final Result   No acute disease on limited study. If there is high suspicion for pneumonia,   recommend follow up study with complete visualization of the lungs. EKG:  This EKG is signed and interpreted by me. Rate: 92  Rhythm: Sinus  Interpretation: no acute changes  Comparison: stable as compared to patient's most recent EKG        ------------------------- NURSING NOTES AND VITALS REVIEWED ---------------------------   The nursing notes within the ED encounter and vital signs as below have been reviewed by myself. /84   Pulse 104   Temp 97.3 °F (36.3 °C) (Temporal)   Resp 20   Ht 5' 5\" (1.651 m)   Wt 100 lb (45.4 kg)   SpO2 93%   BMI 16.64 kg/m²   Oxygen Saturation Interpretation: Normal    The patients available past medical records and past encounters were reviewed. ------------------------------ ED COURSE/MEDICAL DECISION MAKING----------------------  Medications - No data to display          Medical Decision Making:        Re-Evaluations:             Re-evaluation. Patients symptoms are improving  Patient reevaluated no distress lungs are clear. Patient present with guardian and acting appropriately. Patient vital signs noted. Patient is in no respiratory distress and plan will be to discharge home.   Patient's labs and chest x-ray showed no acute pathology. Consultations:                 Critical Care: This patient's ED course included: a personal history and physicial eaxmination    This patient has been closely monitored during their ED course. Counseling: The emergency provider has spoken with the patient and discussed todays results, in addition to providing specific details for the plan of care and counseling regarding the diagnosis and prognosis. Questions are answered at this time and they are agreeable with the plan.       --------------------------------- IMPRESSION AND DISPOSITION ---------------------------------    IMPRESSION  1. Hx of wheezing        DISPOSITION  Disposition: Discharge to home  Patient condition is stable        NOTE: This report was transcribed using voice recognition software.  Every effort was made to ensure accuracy; however, inadvertent computerized transcription errors may be present          Juliana Moore MD  10/19/20 1323       Juliana Moore MD  10/19/20 7149

## 2020-10-20 NOTE — ED NOTES
Nurse to nurse called to [address] at [address] [place] [address], [address] [place] [address]  10/19/20 [facility]

## 2020-10-20 NOTE — ED PROVIDER NOTES
person, place, and time. Procedures   BEDSIDE  ABDOMINAL ULTRASOUND   10/20/20       Time: 15:30    Risks, benefits and alternatives (for applicable procedures below) described. Performed By: Jamey Bruner DO. Indication: Bladder eval  Informed consent: by patient or legal gardian. Procedural Quadrants/Interpretations:        Pelvis: Bladder filled with urine. MDM  Number of Diagnoses or Management Options  Urinary retention:   Diagnosis management comments: Patient seen and examined.  + Ultrasound revealed patient did have urine in his bladder. Patient was straight cathed and had 600 mL of urine out. Patient urine was sent for testing he was felt safe for discharge with recommendation to return if he is still unable to urinate he likely need to be admitted for William catheter due to lack of ability to manage William at nursing home. The patient was felt safe for discharge after urinalysis found to be reassuring and nursing staff updated on possible need to straight cath patient. He is also provided urology follow-up and instructed to do so in the next week.             --------------------------------------------- PAST HISTORY ---------------------------------------------  Past Medical History:  has a past medical history of Acquired deformity of neck, Autism spectrum, Bilateral cataracts, Blepharochalasis, Cardiomegaly, Cataract of both eyes, Developmental delay, Gastritis, Hyperlipidemia, Kyphosis of thoracic region, Mental retardation, Obsessive compulsive disorder, Parkinsonism ([address] Seizures ([facility] [address] and Tourette disorder. Past Surgical History:  has a past surgical history that includes Toe amputation (Left). Social History:  reports that he has never smoked. He has never used smokeless tobacco. He reports that he does not drink alcohol or use drugs. Family History: family history is not on file. The patients home medications have been reviewed.     Allergies: Patient has no known allergies. -------------------------------------------------- RESULTS -------------------------------------------------  Labs:  Results for orders placed or performed during the hospital encounter of 10/20/20   URINALYSIS   Result Value Ref Range    Color, UA Yellow Straw/Yellow    Clarity, UA Clear Clear    Glucose, Ur Negative Negative mg/dL    Bilirubin Urine Negative Negative    Ketones, Urine Negative Negative mg/dL    Specific Gravity, UA 1.015 1.005 - 1.030    Blood, Urine Negative Negative    pH, UA 7.0 5.0 - 9.0    Protein, UA Negative Negative mg/dL    Urobilinogen, Urine 0.2 <2.0 E.U./dL    Nitrite, Urine Negative Negative    Leukocyte Esterase, Urine Negative Negative       Radiology:  No orders to display       ------------------------- NURSING NOTES AND VITALS REVIEWED ---------------------------  Date / Time Roomed:  10/20/2020  3:12 PM  ED Bed Assignment:  LYDIA/LYDIA    The nursing notes within the ED encounter and vital signs as below have been reviewed. /81   Pulse 103   Temp 98.7 °F (37.1 °C) (Tympanic)   Resp 18   Ht 5' 5\" (1.651 m)   Wt 100 lb (45.4 kg)   SpO2 91%   BMI 16.64 kg/m²   Oxygen Saturation Interpretation: Normal      ------------------------------------------ PROGRESS NOTES ------------------------------------------  I have spoken with the patient and discussed todays results, in addition to providing specific details for the plan of care and counseling regarding the diagnosis and prognosis. Their questions are answered at this time and they are agreeable with the plan. I discussed at length with them reasons for immediate return here for re evaluation. They will followup with their primary care physician by calling their office tomorrow.       --------------------------------- ADDITIONAL PROVIDER NOTES ---------------------------------  At this time the patient is without objective evidence of an acute process requiring hospitalization or inpatient management. They have remained hemodynamically stable throughout their entire ED visit and are stable for discharge with outpatient follow-up. The plan has been discussed in detail and they are aware of the specific conditions for emergent return, as well as the importance of follow-up. Discharge Medication List as of 10/20/2020  5:23 PM          Diagnosis:  1. Urinary retention        Disposition:  Patient's disposition: Discharge to home  Patient's condition is stable.          Jaleesa Mejía, DO  10/20/20 5454

## 2020-10-21 LAB
EKG ATRIAL RATE: 92 BPM
EKG P AXIS: 44 DEGREES
EKG P-R INTERVAL: 150 MS
EKG Q-T INTERVAL: 354 MS
EKG QRS DURATION: 78 MS
EKG QTC CALCULATION (BAZETT): 437 MS
EKG R AXIS: 31 DEGREES
EKG T AXIS: 21 DEGREES
EKG VENTRICULAR RATE: 92 BPM

## 2020-10-21 PROCEDURE — 93010 ELECTROCARDIOGRAM REPORT: CPT | Performed by: INTERNAL MEDICINE

## 2020-10-27 ENCOUNTER — TELEPHONE (OUTPATIENT)
Dept: NEUROLOGY | Age: 70
End: 2020-10-27

## 2020-10-27 NOTE — TELEPHONE ENCOUNTER
[facility] w/[facility] to set up Jan 2021 w/Eb Finch in [place]  Electronically signed by Falguni Herrmann on 10/27/20 at 9:36 AM EDT

## 2020-10-28 ENCOUNTER — APPOINTMENT (OUTPATIENT)
Dept: GENERAL RADIOLOGY | Age: 70
DRG: 206 | End: 2020-10-28
Payer: MEDICARE

## 2020-10-28 ENCOUNTER — HOSPITAL ENCOUNTER (INPATIENT)
Age: 70
LOS: 1 days | Discharge: NURSING, REHAB OR LONG-TERM CARE | DRG: 206 | End: 2020-10-29
Attending: EMERGENCY MEDICINE | Admitting: INTERNAL MEDICINE
Payer: MEDICARE

## 2020-10-28 PROBLEM — J18.9 PNEUMONIA: Status: ACTIVE | Noted: 2020-10-28

## 2020-10-28 LAB
ALBUMIN SERPL-MCNC: 4.1 G/DL (ref 3.5–5.2)
ALP BLD-CCNC: 136 U/L (ref 40–129)
ALT SERPL-CCNC: 6 U/L (ref 0–40)
ANION GAP SERPL CALCULATED.3IONS-SCNC: 12 MMOL/L (ref 7–16)
APTT: 29.7 SEC (ref 24.5–35.1)
AST SERPL-CCNC: 22 U/L (ref 0–39)
B.E.: 0.7 MMOL/L (ref -3–3)
BASOPHILS ABSOLUTE: 0.04 E9/L (ref 0–0.2)
BASOPHILS RELATIVE PERCENT: 0.7 % (ref 0–2)
BILIRUB SERPL-MCNC: <0.2 MG/DL (ref 0–1.2)
BUN BLDV-MCNC: 44 MG/DL (ref 8–23)
CALCIUM SERPL-MCNC: 9.6 MG/DL (ref 8.6–10.2)
CHLORIDE BLD-SCNC: 108 MMOL/L (ref 98–107)
CO2: 27 MMOL/L (ref 22–29)
COHB: 0.6 % (ref 0–1.5)
CREAT SERPL-MCNC: 1 MG/DL (ref 0.7–1.2)
CRITICAL: ABNORMAL
DATE ANALYZED: ABNORMAL
DATE OF COLLECTION: ABNORMAL
EOSINOPHILS ABSOLUTE: 0.22 E9/L (ref 0.05–0.5)
EOSINOPHILS RELATIVE PERCENT: 4 % (ref 0–6)
GFR AFRICAN AMERICAN: >60
GFR NON-AFRICAN AMERICAN: >60 ML/MIN/1.73
GLUCOSE BLD-MCNC: 107 MG/DL (ref 74–99)
HCO3: 25.3 MMOL/L (ref 22–26)
HCT VFR BLD CALC: 43.6 % (ref 37–54)
HEMOGLOBIN: 14.1 G/DL (ref 12.5–16.5)
HHB: 9.9 % (ref 0–5)
IMMATURE GRANULOCYTES #: 0.01 E9/L
IMMATURE GRANULOCYTES %: 0.2 % (ref 0–5)
INR BLD: 1
LAB: ABNORMAL
LACTIC ACID: 1.8 MMOL/L (ref 0.5–2.2)
LYMPHOCYTES ABSOLUTE: 1.49 E9/L (ref 1.5–4)
LYMPHOCYTES RELATIVE PERCENT: 26.8 % (ref 20–42)
Lab: ABNORMAL
MCH RBC QN AUTO: 31.6 PG (ref 26–35)
MCHC RBC AUTO-ENTMCNC: 32.3 % (ref 32–34.5)
MCV RBC AUTO: 97.8 FL (ref 80–99.9)
METHB: 0.1 % (ref 0–1.5)
MODE: ABNORMAL
MONOCYTES ABSOLUTE: 0.77 E9/L (ref 0.1–0.95)
MONOCYTES RELATIVE PERCENT: 13.8 % (ref 2–12)
NEUTROPHILS ABSOLUTE: 3.03 E9/L (ref 1.8–7.3)
NEUTROPHILS RELATIVE PERCENT: 54.5 % (ref 43–80)
O2 CONTENT: 18.7 ML/DL
O2 SATURATION: 90 % (ref 92–98.5)
O2HB: 89.4 % (ref 94–97)
OPERATOR ID: ABNORMAL
PATIENT TEMP: 37 C
PCO2: 40.8 MMHG (ref 35–45)
PDW BLD-RTO: 12.5 FL (ref 11.5–15)
PH BLOOD GAS: 7.41 (ref 7.35–7.45)
PLATELET # BLD: 185 E9/L (ref 130–450)
PMV BLD AUTO: 9.8 FL (ref 7–12)
PO2: 56 MMHG (ref 75–100)
POTASSIUM REFLEX MAGNESIUM: 4.3 MMOL/L (ref 3.5–5)
PROTHROMBIN TIME: 10.9 SEC (ref 9.3–12.4)
RBC # BLD: 4.46 E12/L (ref 3.8–5.8)
SARS-COV-2, NAAT: NOT DETECTED
SODIUM BLD-SCNC: 147 MMOL/L (ref 132–146)
SOURCE, BLOOD GAS: ABNORMAL
THB: 14.9 G/DL (ref 11.5–16.5)
TIME ANALYZED: 1950
TOTAL PROTEIN: 7.6 G/DL (ref 6.4–8.3)
TROPONIN: <0.01 NG/ML (ref 0–0.03)
WBC # BLD: 5.6 E9/L (ref 4.5–11.5)

## 2020-10-28 PROCEDURE — 2580000003 HC RX 258: Performed by: EMERGENCY MEDICINE

## 2020-10-28 PROCEDURE — U0002 COVID-19 LAB TEST NON-CDC: HCPCS

## 2020-10-28 PROCEDURE — 85610 PROTHROMBIN TIME: CPT

## 2020-10-28 PROCEDURE — 85025 COMPLETE CBC W/AUTO DIFF WBC: CPT

## 2020-10-28 PROCEDURE — 87040 BLOOD CULTURE FOR BACTERIA: CPT

## 2020-10-28 PROCEDURE — 71045 X-RAY EXAM CHEST 1 VIEW: CPT

## 2020-10-28 PROCEDURE — 82805 BLOOD GASES W/O2 SATURATION: CPT

## 2020-10-28 PROCEDURE — 93005 ELECTROCARDIOGRAM TRACING: CPT | Performed by: EMERGENCY MEDICINE

## 2020-10-28 PROCEDURE — 80053 COMPREHEN METABOLIC PANEL: CPT

## 2020-10-28 PROCEDURE — 83605 ASSAY OF LACTIC ACID: CPT

## 2020-10-28 PROCEDURE — 99283 EMERGENCY DEPT VISIT LOW MDM: CPT

## 2020-10-28 PROCEDURE — 85730 THROMBOPLASTIN TIME PARTIAL: CPT

## 2020-10-28 PROCEDURE — 6360000002 HC RX W HCPCS: Performed by: EMERGENCY MEDICINE

## 2020-10-28 PROCEDURE — 84484 ASSAY OF TROPONIN QUANT: CPT

## 2020-10-28 PROCEDURE — 1200000000 HC SEMI PRIVATE

## 2020-10-28 RX ADMIN — CEFTRIAXONE SODIUM 1 G: 1 INJECTION, POWDER, FOR SOLUTION INTRAMUSCULAR; INTRAVENOUS at 21:10

## 2020-10-28 RX ADMIN — AZITHROMYCIN MONOHYDRATE 500 MG: 500 INJECTION, POWDER, LYOPHILIZED, FOR SOLUTION INTRAVENOUS at 21:11

## 2020-10-28 NOTE — ED PROVIDER NOTES
HPI:  10/28/20,   Time: 7:25 PM EDT       Dwain Bellamy is a 78 y.o. male presenting to the ED for low o2, beginning 1 hr ago. The complaint has been persistent, moderate in severity, and worsened by nothing. No hx from pt, from [facility], checked o2 sat, found to be 88% on ra, sent for eval.  Ems brought, reporst no hx of cough/fever/n/v/d/change in ms    Review of Systems:   Pertinent positives and negatives are stated within HPI, all other systems reviewed and are negative.          --------------------------------------------- PAST HISTORY ---------------------------------------------  Past Medical History:  has a past medical history of Acquired deformity of neck, Autism spectrum, Bilateral cataracts, Blepharochalasis, Cardiomegaly, Cataract of both eyes, Developmental delay, Gastritis, Hyperlipidemia, Kyphosis of thoracic region, Mental retardation, Obsessive compulsive disorder, Parkinsonism ([address] Seizures ([address] and Tourette disorder. Past Surgical History:  has a past surgical history that includes Toe amputation (Left). Social History:  reports that he has never smoked. He has never used smokeless tobacco. He reports that he does not drink alcohol or use drugs. Family History: family history is not on file. The patients home medications have been reviewed. Allergies: Patient has no known allergies. ---------------------------------------------------PHYSICAL EXAM--------------------------------------    Constitutional/General: Alert non verbal, tremulous  Head: Normocephalic and atraumatic  Eyes: PERRL, EOMI, conjunctive normal, sclera non icteric  Mouth: Oropharynx clear, handling secretions, no trismus, no asymmetry of the posterior oropharynx or uvular edema  Neck: Supple, full ROM, non tender to palpation in the midline, no stridor, no crepitus, no meningeal signs  Respiratory: coarse bs deloris, tachypnic  Cardiovascular: tachy rate. Regular rhythm. No murmurs, gallops, or rubs.  2+ distal pulses  Chest: No chest wall tenderness  GI:  Abdomen Soft, Non tender, Non distended. +BS. No organomegaly, no palpable masses,  No rebound, guarding, or rigidity. Musculoskeletal: Moves all extremities x 4. Warm and well perfused, no clubbing, cyanosis, or edema. Capillary refill <3 seconds  Integument: skin warm and dry. No rashes. Lymphatic: no lymphadenopathy noted  Neurologic: GCS 10, no focal deficits, tremulous  Psychiatric: Normal Affect  -------------------------------------------------- RESULTS -------------------------------------------------  I have personally reviewed all laboratory and imaging results for this patient. Results are listed below.      LABS:  Results for orders placed or performed during the hospital encounter of 10/28/20   CBC Auto Differential   Result Value Ref Range    WBC 5.6 4.5 - 11.5 E9/L    RBC 4.46 3.80 - 5.80 E12/L    Hemoglobin 14.1 12.5 - 16.5 g/dL    Hematocrit 43.6 37.0 - 54.0 %    MCV 97.8 80.0 - 99.9 fL    MCH 31.6 26.0 - 35.0 pg    MCHC 32.3 32.0 - 34.5 %    RDW 12.5 11.5 - 15.0 fL    Platelets 201 364 - 753 E9/L    MPV 9.8 7.0 - 12.0 fL    Neutrophils % 54.5 43.0 - 80.0 %    Immature Granulocytes % 0.2 0.0 - 5.0 %    Lymphocytes % 26.8 20.0 - 42.0 %    Monocytes % 13.8 (H) 2.0 - 12.0 %    Eosinophils % 4.0 0.0 - 6.0 %    Basophils % 0.7 0.0 - 2.0 %    Neutrophils Absolute 3.03 1.80 - 7.30 E9/L    Immature Granulocytes # 0.01 E9/L    Lymphocytes Absolute 1.49 (L) 1.50 - 4.00 E9/L    Monocytes Absolute 0.77 0.10 - 0.95 E9/L    Eosinophils Absolute 0.22 0.05 - 0.50 E9/L    Basophils Absolute 0.04 0.00 - 0.20 E9/L   Comprehensive Metabolic Panel w/ Reflex to MG   Result Value Ref Range    Sodium 147 (H) 132 - 146 mmol/L    Potassium reflex Magnesium 4.3 3.5 - 5.0 mmol/L    Chloride 108 (H) 98 - 107 mmol/L    CO2 27 22 - 29 mmol/L    Anion Gap 12 7 - 16 mmol/L    Glucose 107 (H) 74 - 99 mg/dL    BUN 44 (H) 8 - 23 mg/dL    CREATININE 1.0 0.7 - 1.2 mg/dL    GFR Non-African American >60 >=60 mL/min/1.73    GFR African American >60     Calcium 9.6 8.6 - 10.2 mg/dL    Total Protein 7.6 6.4 - 8.3 g/dL    Alb 4.1 3.5 - 5.2 g/dL    Total Bilirubin <0.2 0.0 - 1.2 mg/dL    Alkaline Phosphatase 136 (H) 40 - 129 U/L    ALT 6 0 - 40 U/L    AST 22 0 - 39 U/L   Troponin   Result Value Ref Range    Troponin <0.01 0.00 - 0.03 ng/mL   Protime-INR   Result Value Ref Range    Protime 10.9 9.3 - 12.4 sec    INR 1.0    APTT   Result Value Ref Range    aPTT 29.7 24.5 - 35.1 sec   Lactic Acid, Plasma   Result Value Ref Range    Lactic Acid 1.8 0.5 - 2.2 mmol/L   COVID-19   Result Value Ref Range    SARS-CoV-2, NAAT Not Detected Not Detected   Blood Gas, Arterial   Result Value Ref Range    Date Analyzed 20201028     Time Analyzed 1950     Source: Blood Arterial     pH, Blood Gas 7.411 7.350 - 7.450    PCO2 40.8 35.0 - 45.0 mmHg    PO2 56.0 (L) 75.0 - 100.0 mmHg    HCO3 25.3 22.0 - 26.0 mmol/L    B.E. 0.7 -3.0 - 3.0 mmol/L    O2 Sat 90.0 (L) 92.0 - 98.5 %    O2Hb 89.4 (L) 94.0 - 97.0 %    COHb 0.6 0.0 - 1.5 %    MetHb 0.1 0.0 - 1.5 %    O2 Content 18.7 mL/dL    HHb 9.9 (H) 0.0 - 5.0 %    tHb (est) 14.9 11.5 - 16.5 g/dL    Mode RA     Date Of Collection      Time Collected      Pt Temp 37.0 C     ID [id number]     Lab [id number]     Critical(s) Notified . No Critical Values    EKG 12 Lead   Result Value Ref Range    Ventricular Rate 127 BPM    Atrial Rate 127 BPM    P-R Interval 136 ms    QRS Duration 74 ms    Q-T Interval 294 ms    QTc Calculation (Bazett) 427 ms    P Axis 21 degrees    R Axis 15 degrees    T Axis 14 degrees       RADIOLOGY:  Interpreted by Radiologist.  XR CHEST PORTABLE   Final Result   Atelectasis/infiltrates in the lung bases left more than right concerning for   pneumonia. EKG:  This EKG is signed and interpreted by the EP.     Time: 1949  Rate: 130  Rhythm: Sinus  Interpretation: non-specific EKG  Comparison: None      ------------------------- NURSING NOTES AND VITALS REVIEWED ---------------------------   The nursing notes within the ED encounter and vital signs as below have been reviewed by myself. BP (!) 158/56   Pulse 120   Temp 98 °F (36.7 °C)   Resp 18   Ht 5' 5\" (1.651 m)   Wt 125 lb (56.7 kg)   SpO2 90%   BMI 20.80 kg/m²   Oxygen Saturation Interpretation: Abnormal and Improved after treatment    The patients available past medical records and past encounters were reviewed. ------------------------------ ED COURSE/MEDICAL DECISION MAKING----------------------  Medications   cefTRIAXone (ROCEPHIN) 1 g in sterile water 10 mL IV syringe (has no administration in time range)     And   azithromycin (ZITHROMAX) 500 mg in D5W 250ml Vial Mate (has no administration in time range)         ED COURSE:       Medical Decision Making:    Hypoxia confirmed with abg, o2 titrated to keep sat >92, covid neg, cxr with pna, admit for further care      This patient's ED course included: a personal history and physicial examination    This patient has remained hemodynamically stable during their ED course. Re-Evaluations:             Re-evaluation. Patients symptoms are improving    Re-examination  10/28/20   7:25 PM EDT          Vital Signs:   Vitals:    10/28/20 1917   BP: (!) 158/56   Pulse: 120   Resp: 18   Temp: 98 °F (36.7 °C)   SpO2: 90%   Weight: 125 lb (56.7 kg)   Height: 5' 5\" (1.651 m)     Card/Pulm:  Rhythm: normal rate. Heart Sounds: no murmurs, gallops, or rubs. coarse bs deloris unlabored breathing. Capillary Refill: normal.  Radial Pulse:  equal.  Skin:  Warm. Consultations:             Dr kaden Sanders: 35 min             --------------------------------- IMPRESSION AND DISPOSITION ---------------------------------    IMPRESSION  1. Pneumonia due to organism    2. Hypoxia    3.  Hypernatremia        DISPOSITION  Disposition: Admit to telemetry  Patient condition is stable    NOTE: This report was transcribed using voice

## 2020-10-29 VITALS
WEIGHT: 125 LBS | SYSTOLIC BLOOD PRESSURE: 134 MMHG | TEMPERATURE: 98.2 F | OXYGEN SATURATION: 94 % | BODY MASS INDEX: 20.83 KG/M2 | DIASTOLIC BLOOD PRESSURE: 74 MMHG | HEART RATE: 86 BPM | RESPIRATION RATE: 18 BRPM | HEIGHT: 65 IN

## 2020-10-29 PROBLEM — E86.0 DEHYDRATION: Status: ACTIVE | Noted: 2020-10-28

## 2020-10-29 PROBLEM — J98.11 ATELECTASIS: Status: ACTIVE | Noted: 2020-10-29

## 2020-10-29 PROBLEM — R06.89 HYPOVENTILATION SYNDROME: Status: ACTIVE | Noted: 2020-10-29

## 2020-10-29 PROBLEM — J96.00 RESPIRATORY FAILURE, ACUTE (HCC): Status: ACTIVE | Noted: 2020-10-29

## 2020-10-29 LAB
ANION GAP SERPL CALCULATED.3IONS-SCNC: 9 MMOL/L (ref 7–16)
BUN BLDV-MCNC: 32 MG/DL (ref 8–23)
CALCIUM SERPL-MCNC: 8.7 MG/DL (ref 8.6–10.2)
CHLORIDE BLD-SCNC: 105 MMOL/L (ref 98–107)
CO2: 25 MMOL/L (ref 22–29)
CREAT SERPL-MCNC: 0.8 MG/DL (ref 0.7–1.2)
EKG ATRIAL RATE: 127 BPM
EKG P AXIS: 21 DEGREES
EKG P-R INTERVAL: 136 MS
EKG Q-T INTERVAL: 294 MS
EKG QRS DURATION: 74 MS
EKG QTC CALCULATION (BAZETT): 427 MS
EKG R AXIS: 15 DEGREES
EKG T AXIS: 14 DEGREES
EKG VENTRICULAR RATE: 127 BPM
GFR AFRICAN AMERICAN: >60
GFR NON-AFRICAN AMERICAN: >60 ML/MIN/1.73
GLUCOSE BLD-MCNC: 82 MG/DL (ref 74–99)
POTASSIUM REFLEX MAGNESIUM: 4.2 MMOL/L (ref 3.5–5)
PROCALCITONIN: 0.04 NG/ML (ref 0–0.08)
SODIUM BLD-SCNC: 139 MMOL/L (ref 132–146)

## 2020-10-29 PROCEDURE — 93010 ELECTROCARDIOGRAM REPORT: CPT | Performed by: INTERNAL MEDICINE

## 2020-10-29 PROCEDURE — 84145 PROCALCITONIN (PCT): CPT

## 2020-10-29 PROCEDURE — 6370000000 HC RX 637 (ALT 250 FOR IP): Performed by: INTERNAL MEDICINE

## 2020-10-29 PROCEDURE — 94640 AIRWAY INHALATION TREATMENT: CPT

## 2020-10-29 PROCEDURE — 36415 COLL VENOUS BLD VENIPUNCTURE: CPT

## 2020-10-29 PROCEDURE — 2580000003 HC RX 258: Performed by: INTERNAL MEDICINE

## 2020-10-29 PROCEDURE — 80048 BASIC METABOLIC PNL TOTAL CA: CPT

## 2020-10-29 PROCEDURE — 94664 DEMO&/EVAL PT USE INHALER: CPT

## 2020-10-29 PROCEDURE — 6360000002 HC RX W HCPCS: Performed by: INTERNAL MEDICINE

## 2020-10-29 RX ORDER — BENZTROPINE MESYLATE 1 MG/1
1 TABLET ORAL 2 TIMES DAILY
Status: DISCONTINUED | OUTPATIENT
Start: 2020-10-29 | End: 2020-10-29 | Stop reason: HOSPADM

## 2020-10-29 RX ORDER — POLYETHYLENE GLYCOL 3350 17 G/17G
17 POWDER, FOR SOLUTION ORAL 2 TIMES DAILY
Status: DISCONTINUED | OUTPATIENT
Start: 2020-10-29 | End: 2020-10-29 | Stop reason: SDUPTHER

## 2020-10-29 RX ORDER — POLYETHYLENE GLYCOL 3350 17 G/17G
17 POWDER, FOR SOLUTION ORAL 2 TIMES DAILY
Status: DISCONTINUED | OUTPATIENT
Start: 2020-10-29 | End: 2020-10-29 | Stop reason: HOSPADM

## 2020-10-29 RX ORDER — PROMETHAZINE HYDROCHLORIDE 25 MG/1
12.5 TABLET ORAL EVERY 6 HOURS PRN
Status: DISCONTINUED | OUTPATIENT
Start: 2020-10-29 | End: 2020-10-29 | Stop reason: HOSPADM

## 2020-10-29 RX ORDER — IPRATROPIUM BROMIDE AND ALBUTEROL SULFATE 2.5; .5 MG/3ML; MG/3ML
3 SOLUTION RESPIRATORY (INHALATION)
Qty: 372 ML | Refills: 0
Start: 2020-10-29 | End: 2020-10-29

## 2020-10-29 RX ORDER — OLANZAPINE 2.5 MG/1
7.5 TABLET ORAL NIGHTLY
Status: DISCONTINUED | OUTPATIENT
Start: 2020-10-29 | End: 2020-10-29 | Stop reason: HOSPADM

## 2020-10-29 RX ORDER — MIRTAZAPINE 15 MG/1
30 TABLET, FILM COATED ORAL NIGHTLY
Status: DISCONTINUED | OUTPATIENT
Start: 2020-10-29 | End: 2020-10-29 | Stop reason: HOSPADM

## 2020-10-29 RX ORDER — LEVETIRACETAM 500 MG/1
1000 TABLET ORAL 2 TIMES DAILY
Status: DISCONTINUED | OUTPATIENT
Start: 2020-10-29 | End: 2020-10-29 | Stop reason: HOSPADM

## 2020-10-29 RX ORDER — SODIUM CHLORIDE 9 MG/ML
INJECTION, SOLUTION INTRAVENOUS CONTINUOUS
Status: DISCONTINUED | OUTPATIENT
Start: 2020-10-29 | End: 2020-10-29 | Stop reason: HOSPADM

## 2020-10-29 RX ORDER — IPRATROPIUM BROMIDE AND ALBUTEROL SULFATE 2.5; .5 MG/3ML; MG/3ML
3 SOLUTION RESPIRATORY (INHALATION)
Qty: 360 ML | DISCHARGE
Start: 2020-10-29 | End: 2020-10-29

## 2020-10-29 RX ORDER — IPRATROPIUM BROMIDE AND ALBUTEROL SULFATE 2.5; .5 MG/3ML; MG/3ML
1 SOLUTION RESPIRATORY (INHALATION)
Status: DISCONTINUED | OUTPATIENT
Start: 2020-10-29 | End: 2020-10-29 | Stop reason: HOSPADM

## 2020-10-29 RX ORDER — POLYETHYLENE GLYCOL 3350 17 G/17G
17 POWDER, FOR SOLUTION ORAL DAILY PRN
Status: DISCONTINUED | OUTPATIENT
Start: 2020-10-29 | End: 2020-10-29 | Stop reason: HOSPADM

## 2020-10-29 RX ORDER — CARBAMAZEPINE 200 MG/1
200 TABLET ORAL 2 TIMES DAILY
Status: DISCONTINUED | OUTPATIENT
Start: 2020-10-29 | End: 2020-10-29 | Stop reason: HOSPADM

## 2020-10-29 RX ORDER — ONDANSETRON 2 MG/ML
4 INJECTION INTRAMUSCULAR; INTRAVENOUS EVERY 6 HOURS PRN
Status: DISCONTINUED | OUTPATIENT
Start: 2020-10-29 | End: 2020-10-29 | Stop reason: HOSPADM

## 2020-10-29 RX ORDER — ACETAMINOPHEN 650 MG/1
650 SUPPOSITORY RECTAL EVERY 6 HOURS PRN
Status: DISCONTINUED | OUTPATIENT
Start: 2020-10-29 | End: 2020-10-29 | Stop reason: HOSPADM

## 2020-10-29 RX ORDER — IPRATROPIUM BROMIDE AND ALBUTEROL SULFATE 2.5; .5 MG/3ML; MG/3ML
3 SOLUTION RESPIRATORY (INHALATION)
Qty: 372 ML | Refills: 0 | Status: SHIPPED | OUTPATIENT
Start: 2020-10-29 | End: 2021-01-01

## 2020-10-29 RX ORDER — SODIUM CHLORIDE 0.9 % (FLUSH) 0.9 %
10 SYRINGE (ML) INJECTION PRN
Status: DISCONTINUED | OUTPATIENT
Start: 2020-10-29 | End: 2020-10-29 | Stop reason: HOSPADM

## 2020-10-29 RX ORDER — PANTOPRAZOLE SODIUM 40 MG/1
40 TABLET, DELAYED RELEASE ORAL
Status: DISCONTINUED | OUTPATIENT
Start: 2020-10-29 | End: 2020-10-29 | Stop reason: HOSPADM

## 2020-10-29 RX ORDER — SODIUM CHLORIDE 0.9 % (FLUSH) 0.9 %
10 SYRINGE (ML) INJECTION EVERY 12 HOURS SCHEDULED
Status: DISCONTINUED | OUTPATIENT
Start: 2020-10-29 | End: 2020-10-29 | Stop reason: HOSPADM

## 2020-10-29 RX ORDER — ACETAMINOPHEN 325 MG/1
650 TABLET ORAL EVERY 4 HOURS PRN
Status: DISCONTINUED | OUTPATIENT
Start: 2020-10-29 | End: 2020-10-29 | Stop reason: SDUPTHER

## 2020-10-29 RX ORDER — ACETAMINOPHEN 325 MG/1
650 TABLET ORAL EVERY 6 HOURS PRN
Status: DISCONTINUED | OUTPATIENT
Start: 2020-10-29 | End: 2020-10-29 | Stop reason: HOSPADM

## 2020-10-29 RX ORDER — FLUVOXAMINE MALEATE 150 MG/1
150 CAPSULE, EXTENDED RELEASE ORAL DAILY
Status: DISCONTINUED | OUTPATIENT
Start: 2020-10-29 | End: 2020-10-29 | Stop reason: HOSPADM

## 2020-10-29 RX ADMIN — CARBAMAZEPINE 200 MG: 200 TABLET ORAL at 09:11

## 2020-10-29 RX ADMIN — BENZTROPINE MESYLATE 1 MG: 1 TABLET ORAL at 09:11

## 2020-10-29 RX ADMIN — SODIUM CHLORIDE: 9 INJECTION, SOLUTION INTRAVENOUS at 00:49

## 2020-10-29 RX ADMIN — IPRATROPIUM BROMIDE AND ALBUTEROL SULFATE 1 AMPULE: .5; 3 SOLUTION RESPIRATORY (INHALATION) at 19:58

## 2020-10-29 RX ADMIN — OLANZAPINE 7.5 MG: 2.5 TABLET, FILM COATED ORAL at 00:48

## 2020-10-29 RX ADMIN — CARBAMAZEPINE 200 MG: 200 TABLET ORAL at 00:47

## 2020-10-29 RX ADMIN — LEVETIRACETAM 1000 MG: 500 TABLET ORAL at 00:47

## 2020-10-29 RX ADMIN — LEVETIRACETAM 1000 MG: 500 TABLET ORAL at 09:12

## 2020-10-29 RX ADMIN — BENZTROPINE MESYLATE 1 MG: 1 TABLET ORAL at 00:47

## 2020-10-29 RX ADMIN — MIRTAZAPINE 30 MG: 15 TABLET, FILM COATED ORAL at 00:47

## 2020-10-29 RX ADMIN — IPRATROPIUM BROMIDE AND ALBUTEROL SULFATE 1 AMPULE: .5; 3 SOLUTION RESPIRATORY (INHALATION) at 10:32

## 2020-10-29 RX ADMIN — IPRATROPIUM BROMIDE AND ALBUTEROL SULFATE 1 AMPULE: .5; 3 SOLUTION RESPIRATORY (INHALATION) at 13:13

## 2020-10-29 RX ADMIN — CARBIDOPA AND LEVODOPA 1 TABLET: 25; 100 TABLET ORAL at 09:11

## 2020-10-29 RX ADMIN — CARBIDOPA AND LEVODOPA 1 TABLET: 25; 100 TABLET ORAL at 00:48

## 2020-10-29 RX ADMIN — POLYETHYLENE GLYCOL 3350 17 G: 17 POWDER, FOR SOLUTION ORAL at 09:11

## 2020-10-29 RX ADMIN — ENOXAPARIN SODIUM 40 MG: 40 INJECTION SUBCUTANEOUS at 09:12

## 2020-10-29 RX ADMIN — PANTOPRAZOLE SODIUM 40 MG: 40 TABLET, DELAYED RELEASE ORAL at 06:54

## 2020-10-29 RX ADMIN — SODIUM CHLORIDE: 9 INJECTION, SOLUTION INTRAVENOUS at 11:57

## 2020-10-29 NOTE — ED NOTES
Spoke with Nicole Sandra who is patients guardian, gave update on patients conditions and admit status. Informed her on visiting hours on the floors and she states she will be up at 7am to see the patient.        Bianca Currie RN  10/28/20 2534

## 2020-10-29 NOTE — H&P
Admission History and Physical                                                                                    Filemon Mann MD, FACP                   Patient Name: Ekta Scott                   Age:  78 y.o. Gender:   male    CC: Low oxygen saturation    HPI: This patient presents from a group home after having been found to have an oxygen saturation of 88% on room air  There is no history of cough fever nausea vomiting diarrhea or change in mental status  X-ray is consistent with atelectasis although there is possible mention of consideration of pneumonia    CTA of the chest performed in August demonstrates the same changes atelectasis in the right base  Chest x-ray in  demonstrates the same thing    Currently the patient is comfortable oxygen saturation ranges from 92 to 96% on room air and he is in no distress      Past Medical History:   Diagnosis Date    Acquired deformity of neck     Autism spectrum     Bilateral cataracts     Blepharochalasis     Cardiomegaly     Cataract of both eyes     Developmental delay     Gastritis     Hyperlipidemia     Kyphosis of thoracic region     Mental retardation     SEVERE MR  NON VERBAL, NEEDS WHEELCHAIR FOR LONG DISTANCE    Obsessive compulsive disorder     Parkinsonism ([address]     Seizures ([address]     Tourette disorder        Past Surgical History:   Procedure Laterality Date    TOE AMPUTATION Left            Family Status   Relation Name Status    Mother      Father          Prior to Admission medications    Medication Sig Start Date End Date Taking?  Authorizing Provider   levETIRAcetam (KEPPRA) 500 MG tablet Take 2 tablets by mouth 2 times daily 20  Yes MARIJA Harvey - CNS   Calcium Carb-Cholecalciferol (CALCIUM-VITAMIN D) 500-200 MG-UNIT per tablet Take 1 tablet by mouth 2 times daily 20  Yes Azalia Lyles PA-C   vitamin D (ERGOCALCIFEROL) 1.25 MG (20013 UT) CAPS capsule Take 50,000 Units by mouth every 30 days On the 14th of each month   Yes Historical Provider, MD   fluvoxaMINE (LUVOX CR) 150 MG CP24 extended release capsule Take 150 mg by mouth daily    Yes Historical Provider, MD   mirtazapine (REMERON) 30 MG tablet Take 30 mg by mouth nightly  2/12/20  Yes Historical Provider, MD   carbidopa-levodopa (SINEMET)  MG per tablet Twice a day at 0800 and 1600 3/24/20  Yes Bob Rodriguez APRN - CNS   polyethylene glycol (MIRALAX) powder Take 17 g by mouth daily as needed    Yes Historical Provider, MD   OLANZapine (ZYPREXA) 7.5 MG tablet Take 7.5 mg by mouth nightly    Yes Historical Provider, MD   omeprazole (PRILOSEC) 20 MG capsule Take 20 mg by mouth Daily    Yes Historical Provider, MD   carBAMazepine (TEGRETOL) 200 MG tablet Take 200 mg by mouth 2 times daily  6/5/15  Yes Historical Provider, MD   acetaminophen (TYLENOL) 325 MG tablet Take 650 mg by mouth every 4 hours as needed for Pain. Yes Historical Provider, MD   benztropine (COGENTIN) 1 MG tablet Take 1 mg by mouth 2 times daily.  TAKE AM OF OR WITH A LITTLE WATER 04/24/2015   Yes Historical Provider, MD        Social History     Socioeconomic History    Marital status: Single     Spouse name: Not on file    Number of children: Not on file    Years of education: Not on file    Highest education level: Not on file   Occupational History    Not on file   Social Needs    Financial resource strain: Not on file    Food insecurity     Worry: Not on file     Inability: Not on file    Transportation needs     Medical: Not on file     Non-medical: Not on file   Tobacco Use    Smoking status: Never Smoker    Smokeless tobacco: Never Used   Substance and Sexual Activity    Alcohol use: No    Drug use: No    Sexual activity: Not Currently   Lifestyle    Physical activity     Days per week: Not on file     Minutes per session: Not on file    Stress: Not on file Relationships    Social connections     Talks on phone: Not on file     Gets together: Not on file     Attends Synagogue service: Not on file     Active member of club or organization: Not on file     Attends meetings of clubs or organizations: Not on file     Relationship status: Not on file    Intimate partner violence     Fear of current or ex partner: Not on file     Emotionally abused: Not on file     Physically abused: Not on file     Forced sexual activity: Not on file   Other Topics Concern    Not on file   Social History Narrative    Not on file       No Known Allergies    The patient's medical records have been reviewed contingent on availability        Review of Systems:   · General: Denies malaise or weakness. Denies fever or chills. · Review of systems is limited but there are no other changes      Physical Examination:      Wt Readings from Last 3 Encounters:   10/28/20 125 lb (56.7 kg)   10/20/20 100 lb (45.4 kg)   10/19/20 100 lb (45.4 kg)     Temp Readings from Last 3 Encounters:   10/29/20 99.8 °F (37.7 °C) (Axillary)   10/20/20 98.7 °F (37.1 °C) (Tympanic)   10/19/20 98.4 °F (36.9 °C)     BP Readings from Last 3 Encounters:   10/29/20 128/75   10/20/20 131/81   10/19/20 121/80     Pulse Readings from Last 3 Encounters:   10/29/20 112   10/20/20 103   10/19/20 92       General appearance: Awake with no verbal response patient is curled in a fetal position  Skin: Color, texture, turgor normal. No rashes or lesions. Head: Normocephalic. No masses, lesions, tenderness or abnormalities   Face: Symmetric no visible lesions  Eyes: Conjunctivae/cornea clear. Radha Jenn. Sclera non icteric. Ears: External appearance normal.  Hearing grossly normal  Nose/Sinuses: Nares normal. No paranasal sinus tenderness. Mouth: Somewhat dry but cannot assess tongue or dentition  Neck:  Symmetric. No adenopathy. Chest: Even excursion limited excursion  Lungs: Clear to auscultation.  No rhonchi, crackles or rales. Very poor passive ventilation  Heart: S1 > S2. Rhythm is regular and rate is normal. No gallop rub or murmur. Abdomen: Soft, mildly protuberant, non-tender. Extremities: Patient is in a fetal position with contractures  Musculoskeletal: No unusual pain or swelling. Muscular strength intact. Neuro:   · There is no focal gross abnormality  Mental status: Awake, alert, cognizant of person, place, time. Patient appears capable of directing self care   Mood: Normal and appropriate affect  Gait & balance: not assessed:     Labs     CBC:   Lab Results   Component Value Date    WBC 5.6 10/28/2020    RBC 4.46 10/28/2020    HGB 14.1 10/28/2020    HCT 43.6 10/28/2020     10/28/2020    MCV 97.8 10/28/2020     BMP:    Lab Results   Component Value Date     10/29/2020    K 4.2 10/29/2020     10/29/2020    CO2 25 10/29/2020    BUN 32 10/29/2020    CREATININE 0.8 10/29/2020    GLUCOSE 82 10/29/2020    GLUCOSE 71 05/18/2012    CALCIUM 8.7 10/29/2020     Hepatic Function Panel:    Lab Results   Component Value Date    ALKPHOS 136 10/28/2020    AST 22 10/28/2020    ALT 6 10/28/2020    PROT 7.6 10/28/2020    LABALBU 4.1 10/28/2020    LABALBU 4.5 05/18/2012    BILITOT <0.2 10/28/2020     Magnesium:    Lab Results   Component Value Date    MG 2.2 06/13/2020     Cardiac Enzymes:   Lab Results   Component Value Date    CKTOTAL 799 (H) 10/27/2017    TROPONINI <0.01 10/28/2020    TROPONINI <0.01 10/19/2020    TROPONINI <0.01 08/28/2020     LDH:  No results found for: LDH  PT/INR:    Lab Results   Component Value Date    PROTIME 10.9 10/28/2020    INR 1.0 10/28/2020     BNP: No results for input(s): BNP in the last 72 hours.    TSH:   Lab Results   Component Value Date    TSH 1.700 02/04/2020      Cardiac Injury Profile:   Recent Labs     10/28/20  1933   TROPONINI <0.01      Lipid Profile:   Lab Results   Component Value Date    TRIG 146 03/18/2016    HDL 44 03/18/2016    LDLCALC 93 03/18/2016    CHOL 166 03/18/2016      Hemoglobin A1C: No components found for: HGBA1C   U/A:   Lab Results   Component Value Date    LEUKOCYTESUR Negative 10/20/2020    PHUR 7.0 10/20/2020    WBCUA >20 06/12/2020    RBCUA 5-10 06/12/2020    RBCUA NONE 10/08/2012    BACTERIA MANY 06/12/2020    SPECGRAV 1.015 10/20/2020    BLOODU Negative 10/20/2020    GLUCOSEU Negative 10/20/2020    GLUCOSEU NEGATIVE 09/18/2011         ADMISSION SCHEDULED MEDS:   Current Facility-Administered Medications   Medication Dose Route Frequency Provider Last Rate Last Dose    benztropine (COGENTIN) tablet 1 mg  1 mg Oral BID Trey Manuel MD   1 mg at 10/29/20 0911    carBAMazepine (TEGRETOL) tablet 200 mg  200 mg Oral BID Trey Manuel MD   200 mg at 10/29/20 0911    carbidopa-levodopa (SINEMET)  MG per tablet 1 tablet  1 tablet Oral BID Trey Manuel MD   1 tablet at 10/29/20 0911    fluvoxaMINE (LUVOX CR) extended release capsule 150 mg  150 mg Oral Daily Trey Manuel MD        levETIRAcetam (KEPPRA) tablet 1,000 mg  1,000 mg Oral BID Trey Manuel MD   1,000 mg at 10/29/20 0912    mirtazapine (REMERON) tablet 30 mg  30 mg Oral Nightly Trey Manuel MD   30 mg at 10/29/20 0047    OLANZapine (ZYPREXA) tablet 7.5 mg  7.5 mg Oral Nightly Trey Manuel MD   7.5 mg at 10/29/20 0048    pantoprazole (PROTONIX) tablet 40 mg  40 mg Oral QAM AC Trey Manuel MD   40 mg at 10/29/20 0654    sodium chloride flush 0.9 % injection 10 mL  10 mL Intravenous 2 times per day Trey Manuel MD        sodium chloride flush 0.9 % injection 10 mL  10 mL Intravenous PRN Trey Manuel MD        acetaminophen (TYLENOL) tablet 650 mg  650 mg Oral Q6H PRN Trey Manuel MD        Or    acetaminophen (TYLENOL) suppository 650 mg  650 mg Rectal Q6H PRN Trey Manuel MD        polyethylene glycol [facility]) packet 17 g  17 g Oral Daily PRN Trey Manuel MD        promethazine (PHENERGAN) tablet 12.5 mg  12.5 mg Oral Q6H PRN Maryjane Donald MD        Or    ondansetron [facility]) injection 4 mg  4 mg Intravenous Q6H PRN Maryjane Donald MD        enoxaparin (LOVENOX) injection 40 mg  40 mg Subcutaneous Daily Maryjane Donald MD   40 mg at 10/29/20 0912    ipratropium-albuterol (DUONEB) nebulizer solution 1 ampule  1 ampule Inhalation Q4H WA Maryjane Donald MD   1 ampule at 10/29/20 1032    cefTRIAXone (ROCEPHIN) 1 g in sterile water 10 mL IV syringe  1 g Intravenous Q24H Maryjane Donald MD        And    azithromycin (ZITHROMAX) 500 mg in D5W 250ml Vial Mate  500 mg Intravenous Q24H Maryjane Donald MD        0.9 % sodium chloride infusion   Intravenous Continuous Maryjane Donald  mL/hr at 10/29/20 0049      polyethylene glycol (GLYCOLAX) packet 17 g  17 g Oral BID Maryjane Donald MD   17 g at 10/29/20 0911       Current  Infusions   sodium chloride 100 mL/hr at 10/29/20 0049       Prn Meds  sodium chloride flush, acetaminophen **OR** acetaminophen, polyethylene glycol, promethazine **OR** ondansetron    Radiology Review:  XR CHEST PORTABLE   Final Result   Atelectasis/infiltrates in the lung bases left more than right concerning for   pneumonia.                ASSESSMENT:  Active diagnoses treated at this admission:  · At this time there is no evidence of pneumonia  · He appears to be oxygenating reasonably well  · He does have a restrictive pattern due to his positioning and contracture  · He has basal atelectasis which is chronic   · He is dehydrated and that has been corrected    Problem list:  Patient Active Problem List   Diagnosis    Seizure ([address]    Pressure ulcer of toe of right foot, stage 3 ([address]    Atherosclerosis of native artery of both lower extremities ([address]    Hypernatremia    Status epilepticus ([address]    Seizures ([address]    Seizure disorder, status epilepticus, convulsive ([address]    Severe protein-calorie malnutrition ([address]    Acute renal failure ([address]    Thrombocytopenia ([address]    Fever

## 2020-10-29 NOTE — PROGRESS NOTES
Spoke with [place] to tell them pt may leave but will need script signed from [facility] tomorrow for the nebulizer kit.

## 2020-10-29 NOTE — CARE COORDINATION
Rupa Mireles returned call. Plan is or Pt to return to [facility] if appropriate. Aline reported that about 7 - 1 days ago Pt got wheezy after dinner and on hi way to shower. Aline wondering if Pt is becoming Lactose intolerant Pt's pulse oxy usually runs between 94 - 96%. and causing more phlegm or a mucous plug?  told facility not to do pulse oxy on fingers but Pt's feet are deformed.    Rachel Valverde, L.S.W.  [phone]

## 2020-10-29 NOTE — DISCHARGE SUMMARY
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 contracture  Patient Active Problem List    Diagnosis Date Noted    Respiratory failure, acute ([address] 10/29/2020    Pneumonia 10/28/2020    Encephalopathy     Urinary tract infection without hematuria     Fever of unknown origin     Severe protein-calorie malnutrition ([address] 06/10/2020    Acute renal failure (HCC)     Thrombocytopenia (HCC)     Seizure disorder, status epilepticus, convulsive ([address] 2020    Seizures ([address] 2020    Status epilepticus ([address] 2020    Hypernatremia 2020    Atherosclerosis of native artery of both lower extremities ([address] 07/10/2018    Pressure ulcer of toe of right foot, stage 3 ([address] 2016    Seizure ([address] 06/10/2013       Past Medical Hx :   Past Medical History:   Diagnosis Date    Acquired deformity of neck     Autism spectrum     Bilateral cataracts     Blepharochalasis     Cardiomegaly     Cataract of both eyes     Developmental delay     Gastritis     Hyperlipidemia     Kyphosis of thoracic region     Mental retardation     SEVERE MR  NON VERBAL, NEEDS WHEELCHAIR FOR LONG DISTANCE    Obsessive compulsive disorder     Parkinsonism ([address]     Seizures ([address]     Tourette disorder        Past Surgical Hx :   Past Surgical History:   Procedure Laterality Date    TOE AMPUTATION Left            Admission Condition:  fair    Discharged Condition:  fair    Labs:  CBC:   Lab Results   Component Value Date    WBC 5.6 10/28/2020    RBC 4.46 10/28/2020    HGB 14.1 10/28/2020    HCT 43.6 10/28/2020    MCV 97.8 10/28/2020    MCH 31.6 10/28/2020    MCHC 32.3 10/28/2020    RDW 12.5 10/28/2020     10/28/2020    MPV 9.8 10/28/2020     CMP:    Lab Results   Component Value Date     10/29/2020    K 4.2 10/29/2020     10/29/2020    CO2 25 10/29/2020    BUN 32 10/29/2020    CREATININE 0.8 10/29/2020    GFRAA >60 10/29/2020    LABGLOM >60 10/29/2020    GLUCOSE 82 10/29/2020    GLUCOSE 71 05/18/2012    PROT 7.6 10/28/2020    LABALBU 4.1 10/28/2020    LABALBU 4.5 05/18/2012    CALCIUM 8.7 10/29/2020    BILITOT <0.2 10/28/2020    ALKPHOS 136 10/28/2020    AST 22 10/28/2020    ALT 6 10/28/2020        Radiology Results: Xr Chest Portable    Result Date: 10/28/2020  EXAMINATION: ONE XRAY VIEW OF THE CHEST 10/28/2020 8:01 pm COMPARISON: October 19, 2020 HISTORY: ORDERING SYSTEM PROVIDED HISTORY: low o2 TECHNOLOGIST PROVIDED HISTORY: Reason for exam:->low o2 What reading provider will be dictating this exam?->CRC FINDINGS: There is low lung volumes with cardiomegaly. There is atelectasis/infiltrates in the left lung base and minimal atelectasis in the right lung base. The remainder of the lungs are clear. Dilated bowel loops are noted. Atelectasis/infiltrates in the lung bases left more than right concerning for pneumonia.        Hospital Course:  CC: Low oxygen saturation     HPI: This patient presents from a group home after having been found to have an oxygen saturation of 88% on room air  There is no history of cough fever nausea vomiting diarrhea or change in mental status  X-ray is consistent with atelectasis although there is possible mention of consideration of pneumonia     CTA of the chest performed in August demonstrates the same changes atelectasis in the right base  Chest x-ray in June demonstrates the same thing     Currently the patient is comfortable oxygen saturation ranges from 92 to 96% on room air and he is in no distress    Patient clinical history was reviewed    He has chronic atelectasis associated with positioning most likely the fact that he is chronically in a fetal position  He has episodic duction and oxygen saturation  At this admission there is no evidence of pneumonia  Procalcitonin is negative  There is no fever or leukocytosis  Was mildly to moderately dehydrated    Following rehydration he is being returned to the group home  Cannot participate in incentive spirometry  Nor can he participate in external noninvasive ventilation  Recommendation would be for continued bronchodilator aerosol treatments  Oxygen as needed    Discharge Medications:    Risa, [address] Medication Instructions XDI:[id number]    Printed on:10/29/20 1122   Medication Information                      acetaminophen (TYLENOL) 325 MG tablet  Take 650 mg by mouth every 4 hours as needed for Pain. benztropine (COGENTIN) 1 MG tablet  Take 1 mg by mouth 2 times daily. TAKE AM OF OR WITH A LITTLE WATER 04/24/2015             Calcium Carb-Cholecalciferol (CALCIUM-VITAMIN D) 500-200 MG-UNIT per tablet  Take 1 tablet by mouth 2 times daily             carBAMazepine (TEGRETOL) 200 MG tablet  Take 200 mg by mouth 2 times daily              carbidopa-levodopa (SINEMET)  MG per tablet  Twice a day at 0800 and 1600             fluvoxaMINE (LUVOX CR) 150 MG CP24 extended release capsule  Take 150 mg by mouth daily              ipratropium-albuterol (DUONEB) 0.5-2.5 (3) MG/3ML SOLN nebulizer solution  Inhale 3 mLs into the lungs every 4 hours (while awake)             levETIRAcetam (KEPPRA) 500 MG tablet  Take 2 tablets by mouth 2 times daily             mirtazapine (REMERON) 30 MG tablet  Take 30 mg by mouth nightly              OLANZapine (ZYPREXA) 7.5 MG tablet  Take 7.5 mg by mouth nightly              omeprazole (PRILOSEC) 20 MG capsule  Take 20 mg by mouth Daily              polyethylene glycol (MIRALAX) powder  Take 17 g by mouth daily as needed              Respiratory Therapy Supplies (FULL KIT NEBULIZER SET) MISC  Use as directed with nebulized medication.              vitamin D (ERGOCALCIFEROL) 1.25 MG (22664 UT) CAPS capsule  Take 50,000 Units by mouth every

## 2020-10-29 NOTE — CARE COORDINATION
Left VM for Legal Guardian Tom Slade about Transition Plan of Care. Left  VM for [facility] to see if Pt can return and if they were any conditions that prevented Pt from returning. SW/ZAHIRA to follow for discharge needs.  Envelope on soft chart    MELO FrancoSALEXANDER  [phone]

## 2020-10-29 NOTE — PROGRESS NOTES
Instructed by Dr. Vic Garza to let pt leave with unsigned script and he will sign it in the morning and fax it to [facility].

## 2020-10-29 NOTE — CARE COORDINATION
Spoke with Group home . Can take back late tonight. Send any new scripts with Pt. Call N-t-N. Number on envelope in chart. informed Nurse.    Broderick Miramontes, MELOS.W.  [phone]

## 2020-10-29 NOTE — DISCHARGE INSTR - COC
Continuity of Care Form    Patient Name: Mike Liu   :  1950  MRN:  [id number]    [facility] date:  10/28/2020  Discharge date:  10/29/2020    Code Status Order: Full Code   Advance Directives:   [facility] Documentation       Date/Time Healthcare Directive Type of Healthcare Directive Copy in [address] Agent's Name Healthcare Agent's Phone Number    10/28/20 6760  Yes, patient has an advance directive for healthcare treatment  Other (Comment) guardianship  --  Legal Yaritza Victor  [phone]            Admitting Physician:  Malvin Joyner MD  PCP: Guillermo Cruz MD    Discharging Nurse: [facility] Unit/Room#: 8427/5500-H  Discharging Unit Phone Number: [phone]    Emergency Contact:   Extended Emergency Contact Information  Primary Emergency Contact: Bella Rosas   [address] Phone: [phone]  Relation: Legal Guardian    Past Surgical History:  Past Surgical History:   Procedure Laterality Date    TOE AMPUTATION Left            Immunization History:   Immunization History   Administered Date(s) Administered    Influenza, High Dose (Fluzone 65 yrs and older) 10/17/2018    Tdap (Boostrix, Adacel) 2016       Active Problems:  Patient Active Problem List   Diagnosis Code    Seizure ([address] R56.9    Pressure ulcer of toe of right foot, stage 3 ([address] C37.911    Atherosclerosis of native artery of both lower extremities ([address] I70.203    Hypernatremia E87.0    Status epilepticus ([address] G40.901    Seizures ([address] R56.9    Seizure disorder, status epilepticus, convulsive ([address] G40.901    Severe protein-calorie malnutrition ([address] E43    Acute renal failure ([address] N17.9    Thrombocytopenia (HCC) D69.6    Fever of unknown origin R50.9    Encephalopathy G93.40    Urinary tract infection without hematuria N39.0    Pneumonia J18.9    Respiratory failure, acute ([address] J96.00       Isolation/Infection: Isolation            No Isolation          Patient Infection Status       Infection Onset Added Last Indicated Last Indicated By Review Planned Expiration Resolved Resolved By    None active    Resolved    COVID-19 Rule Out 10/28/20 10/28/20 10/28/20 COVID-19 (Ordered)   10/28/20 Rule-Out Test Resulted    COVID-19 Rule Out 20 COVID-19 (Ordered)   20 Rule-Out Test Resulted    COVID-19 Rule Out 20 COVID-19 (Ordered)   20 Rule-Out Test Resulted    COVID-19 Rule Out 20 COVID-19 (Ordered)   20 Rule-Out Test Resulted    Not detected 2020    C-diff Rule Out 20 Clostridium difficile EIA (Ordered)   20 David Green RN    INFLUENZA 20 Respiratory Panel, Molecular   20             Nurse Assessment:  Last Vital Signs: /75   Pulse 112   Temp 99.8 °F (37.7 °C) (Axillary)   Resp 19   Ht 5' 5\" (1.651 m)   Wt 125 lb (56.7 kg)   SpO2 96%   BMI 20.80 kg/m²     Last documented pain score (0-10 scale):    Last Weight:   Wt Readings from Last 1 Encounters:   10/28/20 125 lb (56.7 kg)     Mental Status:  disoriented and alert    IV Access:  - None    Nursing Mobility/ADLs:  Walking   Dependent  Transfer  Dependent  Bathing  Dependent  Dressing  Dependent  Toileting  Dependent  Feeding  Dependent  Med Admin  Dependent  Med Delivery   crushed    Wound Care Documentation and Therapy:  Wound (Active)   Number of days:        Wound 20 Foot Left bunion area (Active)   Number of days: 142       Wound 20 Sacrum Left (Active)   Number of days: 141       Wound 20 Coccyx (Active)   Number of days: 141        Elimination:  Continence:   · Bowel: No  · Bladder: No  Urinary Catheter: None   Colostomy/Ileostomy/Ileal Conduit: No       Date of Last BM: 10/28/2020    Intake/Output Summary (Last 24 hours) at 10/29/2020 1126  Last data filed at 10/29/2020 1656  Gross per 24 hour   Intake 240 ml   Output --   Net 240 ml     No intake/output data recorded. Safety Concerns:     None    Impairments/Disabilities:      Speech and Contractures - ble    Nutrition Therapy:  Current Nutrition Therapy:   - Oral Diet:  Dysphagia 1 pureed    Routes of Feeding: Oral  Liquids: Nectar Thick Liquids  Daily Fluid Restriction: no  Last Modified Barium Swallow with Video (Video Swallowing Test): not done    Treatments at the Time of Hospital Discharge:   Respiratory Treatments: ***  Oxygen Therapy:  is not on home oxygen therapy.   Ventilator:    - No ventilator support    Rehab Therapies: Physical Therapy, Occupational Therapy and Speech/Language Therapy  Weight Bearing Status/Restrictions: No weight bearing restirctions  Other Medical Equipment (for information only, NOT a DME order):  hospital bed  Other Treatments: ***    Patient's personal belongings (please select all that are sent with patient):  None    RN SIGNATURE:  Electronically signed by Lio Blanco RN on 10/29/20 at 2:17 PM EDT    CASE MANAGEMENT/SOCIAL WORK SECTION    Inpatient Status Date: ***    Readmission Risk Assessment Score:  Readmission Risk              Risk of Unplanned Readmission:        31           Discharging to Facility/ Agency   · Name:   · Address:  · Phone:  · Fax:    Dialysis Facility (if applicable)   · Name:  · Address:  · Dialysis Schedule:  · Phone:  · Fax:    / signature: {Esignature:[id number]:::0}    PHYSICIAN SECTION    Prognosis: {Prognosis:9981302474:::0}    Condition at Discharge: Percy Leyva Patient Condition:617513930:::0}    Rehab Potential (if transferring to Rehab): {Prognosis:0302501267:::0}    Recommended Labs or Other Treatments After Discharge: ***    Following recommendations are made:  · Encourage deep breathing  · Bronchodilator treatment  · Supplemental oxygen when needed  · There is no evidence of pneumonia  · Encourage fluid intake  ·   Physician Certification: I certify the above information and transfer of Elia uDnn  is necessary for the continuing treatment of the diagnosis listed and that he requires {Admit to Appropriate Level of Care:44797:::0} for {GREATER/LESS:369576152} 30 days.      Update Admission H&P: {CHP DME Changes in HandP:411477710:::0}    PHYSICIAN SIGNATURE:  Electronically signed by Trey Manuel MD on 10/29/20 at 11:27 AM EDT

## 2020-10-30 ENCOUNTER — CARE COORDINATION (OUTPATIENT)
Dept: CASE MANAGEMENT | Age: 70
End: 2020-10-30

## 2020-10-31 ENCOUNTER — CARE COORDINATION (OUTPATIENT)
Dept: CASE MANAGEMENT | Age: 70
End: 2020-10-31

## 2020-10-31 NOTE — CARE COORDINATION
[facility] Transitions Initial Follow Up Call    Call within 2 business days of discharge: Yes    Patient: Elier Reid Patient : 1950   MRN: [id number]  Reason for Admission: PNA  Discharge Date: 10/29/20 RARS: Readmission Risk Score: 32      Last Discharge [address] [address]       Complaint Diagnosis Description Type Department Provider    10/28/20 Other Pneumonia due to organism . .. ED to Hosp-Admission (Discharged) (ADMITTED) SEYZ 8WE Rosi Rivera MD; Erinn Caban. .. Attempted to contact [facility] today 10/31/20 for hospital discharge follow up. Left message for Dinora Guthrie at [phone] requesting a return call back to CTN and provided contact information. Attempted to [phone] and received message saying closed as reach during non-business hours. CTN will continue with outreach.       MARIJA Scott

## 2020-11-02 ENCOUNTER — CARE COORDINATION (OUTPATIENT)
Dept: CASE MANAGEMENT | Age: 70
End: 2020-11-02

## 2020-11-02 LAB
BLOOD CULTURE, ROUTINE: NORMAL
CULTURE, BLOOD 2: NORMAL

## 2020-11-02 NOTE — CARE COORDINATION
[facility] Transitions Initial Follow Up Call    Call within 2 business days of discharge: Yes    Patient: Jose Ramon Aguirre Patient : 1950   MRN: [id number]  Reason for Admission: pneumonia  Discharge Date: 10/29/20 RARS: Readmission Risk Score: 32      Last Discharge [facility]       Complaint Diagnosis Description Type Department Provider    10/28/20 Other Pneumonia due to organism . .. ED to Hosp-Admission (Discharged) (ADMITTED) AKLE Cabrera MD; Rhonda Cruz. .. Non-face-to-face services provided:  Scheduled appointment with PCP-20 per Vladimir Luo at [address] and reviewed discharge summary and/or continuity of care documents    Care Transitions 24 Hour Call    Do you have any ongoing symptoms?:  No  Do you have a copy of your discharge instructions?:  Yes  Do you have all of your prescriptions and are they filled?:  Yes  Have you been contacted by a [address]?:  No  Have you scheduled your follow up appointment?:  Yes (Comment: patient will be seen 20 by PCP at facility per Vladimir Luo with [facility])  Were you discharged with any Home Care or Post Acute Services:  No  Care Transitions Interventions  No Identified Needs       -Spoke with Jailene at [facility] for initial care transition call post hospital discharge.   -Vladimir Luo states patient is doing \"pretty good\" with oximeter maintaining 93-97% in RA. -Med review not completed. CTN able to confirm with Vladimir Luo that patient has nebulizer kit and has been taking Duoneb treatments as per AVS which appear to be helping patient. -PCP to in person visit with patient on 20 but Jailene krishnamurthy [facility] is in constant communication with PCP when needed. -Vladimir Luo denies any needs, questions, or concerns at this time. -CTN to sign off. Follow Up  No future appointments.     Elinor Pena RN

## 2020-11-12 NOTE — PROGRESS NOTES
Physician Progress Note      PATIENTOrson Lot  CSN #:                  [id number]  :                       1950  ADMIT DATE:       10/28/2020 7:09 PM  [address] [place] DATE:        10/29/2020 8:30 PM  RESPONDING  PROVIDER #:        Jef Joaquin MD          QUERY TEXT:    Patient admitted with atelectasis. Noted documentation of acute respiratory   failure in H&P and d/c summary dated 10/29. Patient noted to be on room air   during admission. Please indicate one of the following and document in the   medical record: The medical record reflects the following:  Risk Factors: atelectasis, hypoventilation  Clinical Indicators: Per ER documentation: Hypoxia confirmed with abg, o2   titrated to keep sat >92. Per H&P: Low oxygen saturation HPI: This patient   presents from a group home after having been found to have an oxygen   saturation of 88% on room air. ... no leukocytosis no hypoxia. .Patient has   recurrent presentation due to atelectasis. Would benefit from consistent   breathing treatments. He cannot participate in noninvasive ventilation.   He   cannot participate in incentive spirometry  Treatment: ABGs, Bronchodilator treatment, labs and monitoring, Supplemental   oxygen when needed per d/c summary    Acute Respiratory Failure Clinical Indicators per [employer] Training Guide and   Quick Reference Guide:  pO2 < 60 mmHg or SpO2 (pulse oximetry) < 91% breathing room air  pCO2 > 50 and pH < 7.35  P/F ratio (pO2 / FIO2) < 300  pO2 decrease or pCO2 increase by 10 mmHg from baseline (if known)  Supplemental oxygen of 40% or more  Presence of respiratory distress, tachypnea, dyspnea, shortness of breath,   wheezing  Unable to speak in complete sentences  Use of accessory muscles to breathe  Extreme anxiety and feeling of impending doom  Tripod position  Confusion/altered mental status/obtunded  Options provided:  -- Acute Respiratory Failure ruled out after study  -- Acute Respiratory Failure

## 2020-11-23 ENCOUNTER — TELEPHONE (OUTPATIENT)
Dept: NEUROLOGY | Age: 70
End: 2020-11-23

## 2020-11-23 NOTE — TELEPHONE ENCOUNTER
Received fax from [facility] and [facility] for patient regarding medication. They are requesting permission to discontinue patients Sinemet because it may be counteracting with Zyprexa that patient is also taking. Fax was also sent on 10/22/2020 with request. Please advise.

## 2020-11-28 PROBLEM — E86.0 DEHYDRATION: Status: RESOLVED | Noted: 2020-10-28 | Resolved: 2020-11-28

## 2021-01-01 ENCOUNTER — HOSPITAL ENCOUNTER (OUTPATIENT)
Dept: WOUND CARE | Age: 71
Discharge: HOME | End: 2021-10-04
Payer: MEDICARE

## 2021-01-01 ENCOUNTER — CARE COORDINATION (OUTPATIENT)
Dept: CASE MANAGEMENT | Age: 71
End: 2021-01-01

## 2021-01-01 ENCOUNTER — HOSPITAL ENCOUNTER (INPATIENT)
Age: 71
LOS: 6 days | Discharge: NURSING, REHAB OR LONG-TERM CARE | DRG: 641 | End: 2021-10-28
Attending: EMERGENCY MEDICINE | Admitting: INTERNAL MEDICINE
Payer: MEDICARE

## 2021-01-01 ENCOUNTER — HOSPITAL ENCOUNTER (EMERGENCY)
Age: 71
Discharge: HOME | End: 2021-03-05
Attending: EMERGENCY MEDICINE
Payer: MEDICARE

## 2021-01-01 ENCOUNTER — ANESTHESIA (OUTPATIENT)
Dept: ENDOSCOPY | Age: 71
DRG: 189 | End: 2021-01-01
Payer: MEDICARE

## 2021-01-01 ENCOUNTER — OFFICE VISIT (OUTPATIENT)
Dept: ENDOCRINOLOGY | Age: 71
End: 2021-01-01
Payer: MEDICARE

## 2021-01-01 ENCOUNTER — TELEPHONE (OUTPATIENT)
Dept: NEUROLOGY | Age: 71
End: 2021-01-01

## 2021-01-01 ENCOUNTER — APPOINTMENT (OUTPATIENT)
Dept: GENERAL RADIOLOGY | Age: 71
DRG: 871 | End: 2021-01-01
Payer: MEDICARE

## 2021-01-01 ENCOUNTER — HOSPITAL ENCOUNTER (OUTPATIENT)
Dept: WOUND CARE | Age: 71
Discharge: HOME | End: 2021-09-13
Payer: MEDICARE

## 2021-01-01 ENCOUNTER — APPOINTMENT (OUTPATIENT)
Dept: GENERAL RADIOLOGY | Age: 71
DRG: 189 | End: 2021-01-01
Payer: MEDICARE

## 2021-01-01 ENCOUNTER — OFFICE VISIT (OUTPATIENT)
Dept: NEUROLOGY | Age: 71
End: 2021-01-01
Payer: MEDICARE

## 2021-01-01 ENCOUNTER — APPOINTMENT (OUTPATIENT)
Dept: CT IMAGING | Age: 71
DRG: 641 | End: 2021-01-01
Payer: MEDICARE

## 2021-01-01 ENCOUNTER — HOSPITAL ENCOUNTER (OUTPATIENT)
Dept: WOUND CARE | Age: 71
Discharge: HOME | End: 2021-09-27
Payer: MEDICARE

## 2021-01-01 ENCOUNTER — HOSPITAL ENCOUNTER (OUTPATIENT)
Dept: GENERAL RADIOLOGY | Age: 71
Discharge: HOME | End: 2021-03-17
Payer: MEDICARE

## 2021-01-01 ENCOUNTER — HOSPITAL ENCOUNTER (EMERGENCY)
Age: 71
Discharge: HOME | End: 2021-03-29
Attending: EMERGENCY MEDICINE
Payer: MEDICARE

## 2021-01-01 ENCOUNTER — APPOINTMENT (OUTPATIENT)
Dept: CT IMAGING | Age: 71
DRG: 871 | End: 2021-01-01
Payer: MEDICARE

## 2021-01-01 ENCOUNTER — HOSPITAL ENCOUNTER (OUTPATIENT)
Dept: WOUND CARE | Age: 71
Discharge: HOME | End: 2021-10-25
Payer: MEDICARE

## 2021-01-01 ENCOUNTER — ANESTHESIA EVENT (OUTPATIENT)
Dept: ENDOSCOPY | Age: 71
DRG: 189 | End: 2021-01-01
Payer: MEDICARE

## 2021-01-01 ENCOUNTER — HOSPITAL ENCOUNTER (INPATIENT)
Age: 71
LOS: 9 days | Discharge: NURSING, REHAB OR LONG-TERM CARE | DRG: 189 | End: 2021-03-04
Attending: EMERGENCY MEDICINE | Admitting: FAMILY MEDICINE
Payer: MEDICARE

## 2021-01-01 ENCOUNTER — HOSPITAL ENCOUNTER (OUTPATIENT)
Dept: WOUND CARE | Age: 71
Discharge: HOME | End: 2021-10-11
Payer: MEDICARE

## 2021-01-01 ENCOUNTER — HOSPITAL ENCOUNTER (EMERGENCY)
Age: 71
Discharge: HOME | DRG: 871 | End: 2021-01-26
Attending: EMERGENCY MEDICINE
Payer: MEDICARE

## 2021-01-01 ENCOUNTER — APPOINTMENT (OUTPATIENT)
Dept: GENERAL RADIOLOGY | Age: 71
End: 2021-01-01
Payer: MEDICARE

## 2021-01-01 ENCOUNTER — APPOINTMENT (OUTPATIENT)
Dept: CT IMAGING | Age: 71
DRG: 189 | End: 2021-01-01
Payer: MEDICARE

## 2021-01-01 ENCOUNTER — HOSPITAL ENCOUNTER (OUTPATIENT)
Dept: WOUND CARE | Age: 71
Discharge: HOME | End: 2021-04-16

## 2021-01-01 ENCOUNTER — CARE COORDINATION (OUTPATIENT)
Dept: CARE COORDINATION | Age: 71
End: 2021-01-01

## 2021-01-01 ENCOUNTER — HOSPITAL ENCOUNTER (EMERGENCY)
Age: 71
Discharge: HOME | End: 2021-12-31
Attending: EMERGENCY MEDICINE
Payer: MEDICARE

## 2021-01-01 ENCOUNTER — HOSPITAL ENCOUNTER (EMERGENCY)
Age: 71
Discharge: HOME | End: 2021-11-10
Attending: EMERGENCY MEDICINE
Payer: MEDICARE

## 2021-01-01 ENCOUNTER — APPOINTMENT (OUTPATIENT)
Dept: ULTRASOUND IMAGING | Age: 71
DRG: 641 | End: 2021-01-01
Payer: MEDICARE

## 2021-01-01 ENCOUNTER — APPOINTMENT (OUTPATIENT)
Dept: NEUROLOGY | Age: 71
DRG: 871 | End: 2021-01-01
Payer: MEDICARE

## 2021-01-01 ENCOUNTER — HOSPITAL ENCOUNTER (OUTPATIENT)
Dept: WOUND CARE | Age: 71
Discharge: HOME | End: 2021-11-08
Payer: MEDICARE

## 2021-01-01 ENCOUNTER — HOSPITAL ENCOUNTER (OUTPATIENT)
Dept: WOUND CARE | Age: 71
Discharge: HOME | End: 2021-12-20
Payer: MEDICARE

## 2021-01-01 ENCOUNTER — APPOINTMENT (OUTPATIENT)
Dept: GENERAL RADIOLOGY | Age: 71
DRG: 641 | End: 2021-01-01
Payer: MEDICARE

## 2021-01-01 ENCOUNTER — HOSPITAL ENCOUNTER (INPATIENT)
Age: 71
LOS: 7 days | Discharge: NURSING, REHAB OR LONG-TERM CARE | DRG: 871 | End: 2021-02-04
Attending: EMERGENCY MEDICINE | Admitting: HOSPITALIST
Payer: MEDICARE

## 2021-01-01 ENCOUNTER — HOSPITAL ENCOUNTER (OUTPATIENT)
Dept: WOUND CARE | Age: 71
Discharge: HOME | End: 2021-12-06
Payer: MEDICARE

## 2021-01-01 VITALS
DIASTOLIC BLOOD PRESSURE: 66 MMHG | RESPIRATION RATE: 20 BRPM | HEART RATE: 84 BPM | TEMPERATURE: 97.7 F | SYSTOLIC BLOOD PRESSURE: 110 MMHG | BODY MASS INDEX: 18.88 KG/M2 | WEIGHT: 110 LBS

## 2021-01-01 VITALS
WEIGHT: 118 LBS | SYSTOLIC BLOOD PRESSURE: 104 MMHG | DIASTOLIC BLOOD PRESSURE: 61 MMHG | RESPIRATION RATE: 16 BRPM | BODY MASS INDEX: 20.14 KG/M2 | HEART RATE: 78 BPM | HEIGHT: 64 IN | TEMPERATURE: 98.4 F | OXYGEN SATURATION: 97 %

## 2021-01-01 VITALS
TEMPERATURE: 97.1 F | HEART RATE: 80 BPM | RESPIRATION RATE: 16 BRPM | DIASTOLIC BLOOD PRESSURE: 41 MMHG | SYSTOLIC BLOOD PRESSURE: 105 MMHG | OXYGEN SATURATION: 93 %

## 2021-01-01 VITALS — DIASTOLIC BLOOD PRESSURE: 65 MMHG | SYSTOLIC BLOOD PRESSURE: 103 MMHG | OXYGEN SATURATION: 95 %

## 2021-01-01 VITALS
RESPIRATION RATE: 20 BRPM | OXYGEN SATURATION: 97 % | HEART RATE: 108 BPM | DIASTOLIC BLOOD PRESSURE: 64 MMHG | TEMPERATURE: 99.6 F | SYSTOLIC BLOOD PRESSURE: 108 MMHG

## 2021-01-01 VITALS
SYSTOLIC BLOOD PRESSURE: 111 MMHG | BODY MASS INDEX: 20.17 KG/M2 | RESPIRATION RATE: 16 BRPM | HEART RATE: 100 BPM | TEMPERATURE: 97.3 F | DIASTOLIC BLOOD PRESSURE: 82 MMHG | WEIGHT: 121.03 LBS | OXYGEN SATURATION: 94 % | HEIGHT: 65 IN

## 2021-01-01 VITALS
HEART RATE: 80 BPM | HEIGHT: 64 IN | SYSTOLIC BLOOD PRESSURE: 102 MMHG | DIASTOLIC BLOOD PRESSURE: 70 MMHG | WEIGHT: 114 LBS | TEMPERATURE: 97.1 F | BODY MASS INDEX: 19.46 KG/M2 | RESPIRATION RATE: 16 BRPM

## 2021-01-01 VITALS
TEMPERATURE: 97.9 F | DIASTOLIC BLOOD PRESSURE: 73 MMHG | HEART RATE: 102 BPM | SYSTOLIC BLOOD PRESSURE: 105 MMHG | OXYGEN SATURATION: 95 %

## 2021-01-01 VITALS
SYSTOLIC BLOOD PRESSURE: 156 MMHG | DIASTOLIC BLOOD PRESSURE: 92 MMHG | HEART RATE: 102 BPM | TEMPERATURE: 97.7 F | RESPIRATION RATE: 20 BRPM | OXYGEN SATURATION: 96 %

## 2021-01-01 VITALS
WEIGHT: 114 LBS | DIASTOLIC BLOOD PRESSURE: 58 MMHG | BODY MASS INDEX: 19.46 KG/M2 | RESPIRATION RATE: 16 BRPM | HEIGHT: 64 IN | TEMPERATURE: 97.7 F | SYSTOLIC BLOOD PRESSURE: 102 MMHG | HEART RATE: 60 BPM

## 2021-01-01 VITALS
TEMPERATURE: 97.5 F | SYSTOLIC BLOOD PRESSURE: 116 MMHG | BODY MASS INDEX: 18.88 KG/M2 | HEART RATE: 78 BPM | DIASTOLIC BLOOD PRESSURE: 70 MMHG | WEIGHT: 110 LBS | RESPIRATION RATE: 16 BRPM

## 2021-01-01 VITALS
SYSTOLIC BLOOD PRESSURE: 132 MMHG | TEMPERATURE: 97.8 F | RESPIRATION RATE: 20 BRPM | DIASTOLIC BLOOD PRESSURE: 81 MMHG | HEART RATE: 79 BPM | OXYGEN SATURATION: 96 %

## 2021-01-01 VITALS
WEIGHT: 110 LBS | DIASTOLIC BLOOD PRESSURE: 64 MMHG | BODY MASS INDEX: 18.88 KG/M2 | RESPIRATION RATE: 20 BRPM | SYSTOLIC BLOOD PRESSURE: 122 MMHG | TEMPERATURE: 98.3 F | HEART RATE: 92 BPM

## 2021-01-01 VITALS
DIASTOLIC BLOOD PRESSURE: 56 MMHG | TEMPERATURE: 98.4 F | BODY MASS INDEX: 19.53 KG/M2 | RESPIRATION RATE: 18 BRPM | WEIGHT: 114.38 LBS | HEIGHT: 64 IN | SYSTOLIC BLOOD PRESSURE: 118 MMHG | HEART RATE: 120 BPM

## 2021-01-01 VITALS
DIASTOLIC BLOOD PRESSURE: 78 MMHG | HEART RATE: 82 BPM | OXYGEN SATURATION: 96 % | HEIGHT: 64 IN | RESPIRATION RATE: 18 BRPM | BODY MASS INDEX: 19.53 KG/M2 | TEMPERATURE: 98.7 F | WEIGHT: 114.4 LBS | SYSTOLIC BLOOD PRESSURE: 124 MMHG

## 2021-01-01 VITALS
WEIGHT: 118 LBS | HEIGHT: 65 IN | OXYGEN SATURATION: 93 % | BODY MASS INDEX: 19.66 KG/M2 | DIASTOLIC BLOOD PRESSURE: 72 MMHG | HEART RATE: 101 BPM | SYSTOLIC BLOOD PRESSURE: 99 MMHG | TEMPERATURE: 99.6 F | RESPIRATION RATE: 18 BRPM

## 2021-01-01 VITALS — TEMPERATURE: 98.2 F | RESPIRATION RATE: 17 BRPM

## 2021-01-01 VITALS — BODY MASS INDEX: 19.57 KG/M2 | WEIGHT: 114 LBS

## 2021-01-01 VITALS — TEMPERATURE: 97.1 F

## 2021-01-01 DIAGNOSIS — Z13.6 ENCOUNTER FOR SCREENING FOR CARDIOVASCULAR DISORDERS: ICD-10-CM

## 2021-01-01 DIAGNOSIS — A41.9 SEPSIS, DUE TO UNSPECIFIED ORGANISM, UNSPECIFIED WHETHER ACUTE ORGAN DYSFUNCTION PRESENT (HCC): Primary | ICD-10-CM

## 2021-01-01 DIAGNOSIS — J96.01 ACUTE RESPIRATORY FAILURE WITH HYPOXIA (HCC): Primary | ICD-10-CM

## 2021-01-01 DIAGNOSIS — E87.0 HYPERNATREMIA: Primary | ICD-10-CM

## 2021-01-01 DIAGNOSIS — L89.893 PRESSURE INJURY OF DORSUM OF RIGHT FOOT, STAGE 3 (HCC): ICD-10-CM

## 2021-01-01 DIAGNOSIS — G20.A1 PARKINSON DISEASE: ICD-10-CM

## 2021-01-01 DIAGNOSIS — G20 PARKINSON DISEASE (HCC): ICD-10-CM

## 2021-01-01 DIAGNOSIS — Z71.1 FEARED COMPLAINT WITHOUT DIAGNOSIS: Primary | ICD-10-CM

## 2021-01-01 DIAGNOSIS — R50.9 FEVER, UNSPECIFIED: ICD-10-CM

## 2021-01-01 DIAGNOSIS — B02.9 HERPES ZOSTER WITHOUT COMPLICATION: Primary | ICD-10-CM

## 2021-01-01 DIAGNOSIS — Z43.1 ATTENTION TO G-TUBE (HCC): Primary | ICD-10-CM

## 2021-01-01 DIAGNOSIS — E87.0 HYPERNATREMIA: ICD-10-CM

## 2021-01-01 DIAGNOSIS — R61 SWEATING PROFUSELY: Primary | ICD-10-CM

## 2021-01-01 DIAGNOSIS — L89.893 PRESSURE INJURY OF DORSUM OF RIGHT FOOT, STAGE 3 (HCC): Primary | ICD-10-CM

## 2021-01-01 DIAGNOSIS — R41.82 ALTERED MENTAL STATUS, UNSPECIFIED ALTERED MENTAL STATUS TYPE: ICD-10-CM

## 2021-01-01 DIAGNOSIS — E23.7 PITUITARY DYSFUNCTION (HCC): ICD-10-CM

## 2021-01-01 DIAGNOSIS — R61 DIAPHORESIS: ICD-10-CM

## 2021-01-01 DIAGNOSIS — R09.02 HYPOXIA: Primary | ICD-10-CM

## 2021-01-01 DIAGNOSIS — G40.209 PARTIAL SYMPTOMATIC EPILEPSY WITH COMPLEX PARTIAL SEIZURES, NOT INTRACTABLE, WITHOUT STATUS EPILEPTICUS (HCC): Primary | ICD-10-CM

## 2021-01-01 DIAGNOSIS — D69.2 PURPURA (HCC): ICD-10-CM

## 2021-01-01 DIAGNOSIS — R21 RASH AND OTHER NONSPECIFIC SKIN ERUPTION: Primary | ICD-10-CM

## 2021-01-01 DIAGNOSIS — R61 SWEATING PROFUSELY: ICD-10-CM

## 2021-01-01 DIAGNOSIS — R13.10 DYSPHAGIA, UNSPECIFIED TYPE: ICD-10-CM

## 2021-01-01 DIAGNOSIS — N39.0 URINARY TRACT INFECTION WITHOUT HEMATURIA, SITE UNSPECIFIED: Primary | ICD-10-CM

## 2021-01-01 DIAGNOSIS — K80.20 GALLSTONES: ICD-10-CM

## 2021-01-01 DIAGNOSIS — G40.209 PARTIAL SYMPTOMATIC EPILEPSY WITH COMPLEX PARTIAL SEIZURES, NOT INTRACTABLE, WITHOUT STATUS EPILEPTICUS (HCC): ICD-10-CM

## 2021-01-01 LAB
ADENOVIRUS BY PCR: NOT DETECTED
ALBUMIN SERPL-MCNC: 2.8 G/DL (ref 3.5–5.2)
ALBUMIN SERPL-MCNC: 2.8 G/DL (ref 3.5–5.2)
ALBUMIN SERPL-MCNC: 2.9 G/DL (ref 3.5–5.2)
ALBUMIN SERPL-MCNC: 2.9 G/DL (ref 3.5–5.2)
ALBUMIN SERPL-MCNC: 3 G/DL (ref 3.5–5.2)
ALBUMIN SERPL-MCNC: 3 G/DL (ref 3.5–5.2)
ALBUMIN SERPL-MCNC: 3.1 G/DL (ref 3.5–5.2)
ALBUMIN SERPL-MCNC: 3.2 G/DL (ref 3.5–5.2)
ALBUMIN SERPL-MCNC: 3.2 G/DL (ref 3.5–5.2)
ALBUMIN SERPL-MCNC: 3.3 G/DL (ref 3.5–5.2)
ALBUMIN SERPL-MCNC: 3.5 G/DL (ref 3.5–5.2)
ALBUMIN SERPL-MCNC: 3.8 G/DL (ref 3.5–5.2)
ALBUMIN SERPL-MCNC: 3.8 G/DL (ref 3.5–5.2)
ALBUMIN SERPL-MCNC: 3.9 G/DL (ref 3.5–5.2)
ALBUMIN SERPL-MCNC: 4 G/DL (ref 3.5–5.2)
ALBUMIN SERPL-MCNC: 4.3 G/DL (ref 3.5–5.2)
ALBUMIN SERPL-MCNC: 4.4 G/DL (ref 3.5–5.2)
ALBUMIN SERPL-MCNC: 4.5 G/DL (ref 3.5–5.2)
ALBUMIN SERPL-MCNC: 4.7 G/DL (ref 3.5–5.2)
ALP BLD-CCNC: 103 U/L (ref 40–129)
ALP BLD-CCNC: 104 U/L (ref 40–129)
ALP BLD-CCNC: 107 U/L (ref 40–129)
ALP BLD-CCNC: 108 U/L (ref 40–129)
ALP BLD-CCNC: 130 U/L (ref 40–129)
ALP BLD-CCNC: 149 U/L (ref 40–129)
ALP BLD-CCNC: 62 U/L (ref 40–129)
ALP BLD-CCNC: 63 U/L (ref 40–129)
ALP BLD-CCNC: 72 U/L (ref 40–129)
ALP BLD-CCNC: 78 U/L (ref 40–129)
ALP BLD-CCNC: 80 U/L (ref 40–129)
ALP BLD-CCNC: 84 U/L (ref 40–129)
ALP BLD-CCNC: 85 U/L (ref 40–129)
ALP BLD-CCNC: 88 U/L (ref 40–129)
ALP BLD-CCNC: 89 U/L (ref 40–129)
ALP BLD-CCNC: 90 U/L (ref 40–129)
ALP BLD-CCNC: 91 U/L (ref 40–129)
ALP BLD-CCNC: 92 U/L (ref 40–129)
ALP BLD-CCNC: 92 U/L (ref 40–129)
ALP BLD-CCNC: 93 U/L (ref 40–129)
ALP BLD-CCNC: 95 U/L (ref 40–129)
ALP BLD-CCNC: 96 U/L (ref 40–129)
ALP BLD-CCNC: 96 U/L (ref 40–129)
ALP BLD-CCNC: 97 U/L (ref 40–129)
ALP BLD-CCNC: 98 U/L (ref 40–129)
ALP BLD-CCNC: 99 U/L (ref 40–129)
ALT SERPL-CCNC: 105 U/L (ref 0–40)
ALT SERPL-CCNC: 11 U/L (ref 0–40)
ALT SERPL-CCNC: 113 U/L (ref 0–40)
ALT SERPL-CCNC: 121 U/L (ref 0–40)
ALT SERPL-CCNC: 127 U/L (ref 0–40)
ALT SERPL-CCNC: 13 U/L (ref 0–40)
ALT SERPL-CCNC: 13 U/L (ref 0–40)
ALT SERPL-CCNC: 132 U/L (ref 0–40)
ALT SERPL-CCNC: 135 U/L (ref 0–40)
ALT SERPL-CCNC: 138 U/L (ref 0–40)
ALT SERPL-CCNC: 14 U/L (ref 0–40)
ALT SERPL-CCNC: 142 U/L (ref 0–40)
ALT SERPL-CCNC: 15 U/L (ref 0–40)
ALT SERPL-CCNC: 15 U/L (ref 0–40)
ALT SERPL-CCNC: 16 U/L (ref 0–40)
ALT SERPL-CCNC: 17 U/L (ref 0–40)
ALT SERPL-CCNC: 17 U/L (ref 0–40)
ALT SERPL-CCNC: 18 U/L (ref 0–40)
ALT SERPL-CCNC: 19 U/L (ref 0–40)
ALT SERPL-CCNC: 20 U/L (ref 0–40)
ALT SERPL-CCNC: 22 U/L (ref 0–40)
ALT SERPL-CCNC: 26 U/L (ref 0–40)
ALT SERPL-CCNC: 26 U/L (ref 0–40)
ALT SERPL-CCNC: 5 U/L (ref 0–40)
ALT SERPL-CCNC: 60 U/L (ref 0–40)
ALT SERPL-CCNC: 78 U/L (ref 0–40)
ALT SERPL-CCNC: 8 U/L (ref 0–40)
ALT SERPL-CCNC: 8 U/L (ref 0–40)
ALT SERPL-CCNC: 81 U/L (ref 0–40)
ALT SERPL-CCNC: 94 U/L (ref 0–40)
ALT SERPL-CCNC: <5 U/L (ref 0–40)
ALT SERPL-CCNC: <5 U/L (ref 0–40)
ANA TITER: NORMAL
ANION GAP SERPL CALCULATED.3IONS-SCNC: 10 MMOL/L (ref 7–16)
ANION GAP SERPL CALCULATED.3IONS-SCNC: 11 MMOL/L (ref 7–16)
ANION GAP SERPL CALCULATED.3IONS-SCNC: 12 MMOL/L (ref 7–16)
ANION GAP SERPL CALCULATED.3IONS-SCNC: 14 MMOL/L (ref 7–16)
ANION GAP SERPL CALCULATED.3IONS-SCNC: 15 MMOL/L (ref 7–16)
ANION GAP SERPL CALCULATED.3IONS-SCNC: 17 MMOL/L (ref 7–16)
ANION GAP SERPL CALCULATED.3IONS-SCNC: 17 MMOL/L (ref 7–16)
ANION GAP SERPL CALCULATED.3IONS-SCNC: 3 MMOL/L (ref 7–16)
ANION GAP SERPL CALCULATED.3IONS-SCNC: 4 MMOL/L (ref 7–16)
ANION GAP SERPL CALCULATED.3IONS-SCNC: 5 MMOL/L (ref 7–16)
ANION GAP SERPL CALCULATED.3IONS-SCNC: 6 MMOL/L (ref 7–16)
ANION GAP SERPL CALCULATED.3IONS-SCNC: 7 MMOL/L (ref 7–16)
ANION GAP SERPL CALCULATED.3IONS-SCNC: 8 MMOL/L (ref 7–16)
ANION GAP SERPL CALCULATED.3IONS-SCNC: 9 MMOL/L (ref 7–16)
ANISOCYTOSIS: ABNORMAL
APTT: 28.1 SEC (ref 24.5–35.1)
APTT: 31.4 SEC (ref 24.5–35.1)
AST SERPL-CCNC: 100 U/L (ref 0–39)
AST SERPL-CCNC: 109 U/L (ref 0–39)
AST SERPL-CCNC: 124 U/L (ref 0–39)
AST SERPL-CCNC: 18 U/L (ref 0–39)
AST SERPL-CCNC: 19 U/L (ref 0–39)
AST SERPL-CCNC: 191 U/L (ref 0–39)
AST SERPL-CCNC: 203 U/L (ref 0–39)
AST SERPL-CCNC: 208 U/L (ref 0–39)
AST SERPL-CCNC: 21 U/L (ref 0–39)
AST SERPL-CCNC: 21 U/L (ref 0–39)
AST SERPL-CCNC: 213 U/L (ref 0–39)
AST SERPL-CCNC: 225 U/L (ref 0–39)
AST SERPL-CCNC: 23 U/L (ref 0–39)
AST SERPL-CCNC: 27 U/L (ref 0–39)
AST SERPL-CCNC: 27 U/L (ref 0–39)
AST SERPL-CCNC: 28 U/L (ref 0–39)
AST SERPL-CCNC: 288 U/L (ref 0–39)
AST SERPL-CCNC: 29 U/L (ref 0–39)
AST SERPL-CCNC: 29 U/L (ref 0–39)
AST SERPL-CCNC: 30 U/L (ref 0–39)
AST SERPL-CCNC: 32 U/L (ref 0–39)
AST SERPL-CCNC: 32 U/L (ref 0–39)
AST SERPL-CCNC: 329 U/L (ref 0–39)
AST SERPL-CCNC: 36 U/L (ref 0–39)
AST SERPL-CCNC: 37 U/L (ref 0–39)
AST SERPL-CCNC: 41 U/L (ref 0–39)
AST SERPL-CCNC: 52 U/L (ref 0–39)
AST SERPL-CCNC: 57 U/L (ref 0–39)
AST SERPL-CCNC: 60 U/L (ref 0–39)
AST SERPL-CCNC: 62 U/L (ref 0–39)
AST SERPL-CCNC: 67 U/L (ref 0–39)
AST SERPL-CCNC: 82 U/L (ref 0–39)
ATYPICAL LYMPHOCYTE RELATIVE PERCENT: 0.9 % (ref 0–4)
B.E.: -1.3 MMOL/L (ref -3–3)
B.E.: -1.5 MMOL/L (ref -3–3)
B.E.: -2.7 MMOL/L (ref -3–3)
B.E.: 0.7 MMOL/L (ref -3–0)
B.E.: 2.6 MMOL/L (ref -3–3)
B.E.: 3 MMOL/L (ref -3–0)
BACTERIA: ABNORMAL /HPF
BACTERIA: NORMAL /HPF
BASOPHILS ABSOLUTE: 0 E9/L (ref 0–0.2)
BASOPHILS ABSOLUTE: 0 E9/L (ref 0–0.2)
BASOPHILS ABSOLUTE: 0.01 E9/L (ref 0–0.2)
BASOPHILS ABSOLUTE: 0.02 E9/L (ref 0–0.2)
BASOPHILS ABSOLUTE: 0.03 E9/L (ref 0–0.2)
BASOPHILS ABSOLUTE: 0.04 E9/L (ref 0–0.2)
BASOPHILS ABSOLUTE: 0.05 E9/L (ref 0–0.2)
BASOPHILS ABSOLUTE: 0.06 E9/L (ref 0–0.2)
BASOPHILS RELATIVE PERCENT: 0 % (ref 0–2)
BASOPHILS RELATIVE PERCENT: 0.1 % (ref 0–2)
BASOPHILS RELATIVE PERCENT: 0.2 % (ref 0–2)
BASOPHILS RELATIVE PERCENT: 0.3 % (ref 0–2)
BASOPHILS RELATIVE PERCENT: 0.4 % (ref 0–2)
BASOPHILS RELATIVE PERCENT: 0.4 % (ref 0–2)
BASOPHILS RELATIVE PERCENT: 0.5 % (ref 0–2)
BASOPHILS RELATIVE PERCENT: 0.6 % (ref 0–2)
BASOPHILS RELATIVE PERCENT: 0.7 % (ref 0–2)
BASOPHILS RELATIVE PERCENT: 0.9 % (ref 0–2)
BILIRUB SERPL-MCNC: 0.2 MG/DL (ref 0–1.2)
BILIRUB SERPL-MCNC: 0.3 MG/DL (ref 0–1.2)
BILIRUB SERPL-MCNC: 0.4 MG/DL (ref 0–1.2)
BILIRUB SERPL-MCNC: 0.5 MG/DL (ref 0–1.2)
BILIRUB SERPL-MCNC: 0.5 MG/DL (ref 0–1.2)
BILIRUB SERPL-MCNC: 0.6 MG/DL (ref 0–1.2)
BILIRUB SERPL-MCNC: 0.7 MG/DL (ref 0–1.2)
BILIRUB SERPL-MCNC: <0.2 MG/DL (ref 0–1.2)
BILIRUBIN URINE: NEGATIVE
BLOOD CULTURE, ROUTINE: ABNORMAL
BLOOD CULTURE, ROUTINE: NORMAL
BLOOD, URINE: ABNORMAL
BLOOD, URINE: ABNORMAL
BLOOD, URINE: NEGATIVE
BORDETELLA PARAPERTUSSIS BY PCR: NOT DETECTED
BORDETELLA PERTUSSIS BY PCR: NOT DETECTED
BUN BLDV-MCNC: 10 MG/DL (ref 8–23)
BUN BLDV-MCNC: 11 MG/DL (ref 8–23)
BUN BLDV-MCNC: 12 MG/DL (ref 8–23)
BUN BLDV-MCNC: 14 MG/DL (ref 8–23)
BUN BLDV-MCNC: 14 MG/DL (ref 8–23)
BUN BLDV-MCNC: 16 MG/DL (ref 8–23)
BUN BLDV-MCNC: 16 MG/DL (ref 8–23)
BUN BLDV-MCNC: 17 MG/DL (ref 6–23)
BUN BLDV-MCNC: 17 MG/DL (ref 8–23)
BUN BLDV-MCNC: 19 MG/DL (ref 6–23)
BUN BLDV-MCNC: 19 MG/DL (ref 8–23)
BUN BLDV-MCNC: 19 MG/DL (ref 8–23)
BUN BLDV-MCNC: 20 MG/DL (ref 6–23)
BUN BLDV-MCNC: 20 MG/DL (ref 8–23)
BUN BLDV-MCNC: 21 MG/DL (ref 8–23)
BUN BLDV-MCNC: 22 MG/DL (ref 6–23)
BUN BLDV-MCNC: 22 MG/DL (ref 8–23)
BUN BLDV-MCNC: 22 MG/DL (ref 8–23)
BUN BLDV-MCNC: 23 MG/DL (ref 6–23)
BUN BLDV-MCNC: 23 MG/DL (ref 8–23)
BUN BLDV-MCNC: 23 MG/DL (ref 8–23)
BUN BLDV-MCNC: 24 MG/DL (ref 8–23)
BUN BLDV-MCNC: 26 MG/DL (ref 6–23)
BUN BLDV-MCNC: 27 MG/DL (ref 8–23)
BUN BLDV-MCNC: 29 MG/DL (ref 6–23)
BUN BLDV-MCNC: 31 MG/DL (ref 8–23)
BUN BLDV-MCNC: 31 MG/DL (ref 8–23)
BUN BLDV-MCNC: 33 MG/DL (ref 6–23)
BUN BLDV-MCNC: 33 MG/DL (ref 8–23)
BUN BLDV-MCNC: 34 MG/DL (ref 6–23)
BUN BLDV-MCNC: 34 MG/DL (ref 8–23)
BUN BLDV-MCNC: 35 MG/DL (ref 6–23)
BUN BLDV-MCNC: 36 MG/DL (ref 6–23)
BUN BLDV-MCNC: 36 MG/DL (ref 8–23)
BUN BLDV-MCNC: 37 MG/DL (ref 8–23)
BUN BLDV-MCNC: 41 MG/DL (ref 8–23)
BUN BLDV-MCNC: 43 MG/DL (ref 8–23)
BUN BLDV-MCNC: 44 MG/DL (ref 6–23)
BUN BLDV-MCNC: 44 MG/DL (ref 8–23)
BUN BLDV-MCNC: 45 MG/DL (ref 8–23)
BUN BLDV-MCNC: 46 MG/DL (ref 8–23)
BUN BLDV-MCNC: 48 MG/DL (ref 8–23)
BUN BLDV-MCNC: 49 MG/DL (ref 8–23)
BUN BLDV-MCNC: 55 MG/DL (ref 8–23)
BUN BLDV-MCNC: 55 MG/DL (ref 8–23)
BUN BLDV-MCNC: 59 MG/DL (ref 8–23)
BUN BLDV-MCNC: 61 MG/DL (ref 8–23)
C-REACTIVE PROTEIN: 0.3 MG/DL (ref 0–0.4)
CALCIUM SERPL-MCNC: 7.5 MG/DL (ref 8.6–10.2)
CALCIUM SERPL-MCNC: 7.6 MG/DL (ref 8.6–10.2)
CALCIUM SERPL-MCNC: 7.7 MG/DL (ref 8.6–10.2)
CALCIUM SERPL-MCNC: 7.8 MG/DL (ref 8.6–10.2)
CALCIUM SERPL-MCNC: 7.9 MG/DL (ref 8.6–10.2)
CALCIUM SERPL-MCNC: 8 MG/DL (ref 8.6–10.2)
CALCIUM SERPL-MCNC: 8.1 MG/DL (ref 8.6–10.2)
CALCIUM SERPL-MCNC: 8.2 MG/DL (ref 8.6–10.2)
CALCIUM SERPL-MCNC: 8.2 MG/DL (ref 8.6–10.2)
CALCIUM SERPL-MCNC: 8.3 MG/DL (ref 8.6–10.2)
CALCIUM SERPL-MCNC: 8.4 MG/DL (ref 8.6–10.2)
CALCIUM SERPL-MCNC: 8.5 MG/DL (ref 8.6–10.2)
CALCIUM SERPL-MCNC: 8.6 MG/DL (ref 8.6–10.2)
CALCIUM SERPL-MCNC: 8.8 MG/DL (ref 8.6–10.2)
CALCIUM SERPL-MCNC: 8.8 MG/DL (ref 8.6–10.2)
CALCIUM SERPL-MCNC: 9 MG/DL (ref 8.6–10.2)
CALCIUM SERPL-MCNC: 9.1 MG/DL (ref 8.6–10.2)
CALCIUM SERPL-MCNC: 9.2 MG/DL (ref 8.6–10.2)
CALCIUM SERPL-MCNC: 9.3 MG/DL (ref 8.6–10.2)
CALCIUM SERPL-MCNC: 9.6 MG/DL (ref 8.6–10.2)
CALCIUM SERPL-MCNC: 9.8 MG/DL (ref 8.6–10.2)
CALCIUM SERPL-MCNC: 9.8 MG/DL (ref 8.6–10.2)
CARBAMAZEPINE DOSE: NORMAL
CARBAMAZEPINE DOSE: NORMAL
CARBAMAZEPINE LEVEL: 6.7 MCG/ML (ref 4–10)
CARBAMAZEPINE LEVEL: 9.9 MCG/ML (ref 4–10)
CHLAMYDOPHILIA PNEUMONIAE BY PCR: NOT DETECTED
CHLORIDE BLD-SCNC: 104 MMOL/L (ref 98–107)
CHLORIDE BLD-SCNC: 104 MMOL/L (ref 98–107)
CHLORIDE BLD-SCNC: 105 MMOL/L (ref 98–107)
CHLORIDE BLD-SCNC: 105 MMOL/L (ref 98–107)
CHLORIDE BLD-SCNC: 106 MMOL/L (ref 98–107)
CHLORIDE BLD-SCNC: 107 MMOL/L (ref 98–107)
CHLORIDE BLD-SCNC: 107 MMOL/L (ref 98–107)
CHLORIDE BLD-SCNC: 108 MMOL/L (ref 98–107)
CHLORIDE BLD-SCNC: 109 MMOL/L (ref 98–107)
CHLORIDE BLD-SCNC: 110 MMOL/L (ref 98–107)
CHLORIDE BLD-SCNC: 111 MMOL/L (ref 98–107)
CHLORIDE BLD-SCNC: 112 MMOL/L (ref 98–107)
CHLORIDE BLD-SCNC: 113 MMOL/L (ref 98–107)
CHLORIDE BLD-SCNC: 114 MMOL/L (ref 98–107)
CHLORIDE BLD-SCNC: 114 MMOL/L (ref 98–107)
CHLORIDE BLD-SCNC: 115 MMOL/L (ref 98–107)
CHLORIDE BLD-SCNC: 116 MMOL/L (ref 98–107)
CHLORIDE BLD-SCNC: 117 MMOL/L (ref 98–107)
CHLORIDE BLD-SCNC: 118 MMOL/L (ref 98–107)
CHLORIDE BLD-SCNC: 118 MMOL/L (ref 98–107)
CHLORIDE BLD-SCNC: 120 MMOL/L (ref 98–107)
CHLORIDE BLD-SCNC: 121 MMOL/L (ref 98–107)
CHLORIDE BLD-SCNC: 122 MMOL/L (ref 98–107)
CHLORIDE BLD-SCNC: 123 MMOL/L (ref 98–107)
CHLORIDE BLD-SCNC: 124 MMOL/L (ref 98–107)
CHLORIDE BLD-SCNC: 128 MMOL/L (ref 98–107)
CHLORIDE BLD-SCNC: 131 MMOL/L (ref 98–107)
CHLORIDE BLD-SCNC: 135 MMOL/L (ref 98–107)
CHLORIDE BLD-SCNC: 135 MMOL/L (ref 98–107)
CHLORIDE BLD-SCNC: 98 MMOL/L (ref 98–107)
CHP ED QC CHECK: NORMAL
CK MB: 4 NG/ML (ref 0–7.7)
CLARITY: CLEAR
CO2: 20 MMOL/L (ref 22–29)
CO2: 20 MMOL/L (ref 22–29)
CO2: 22 MMOL/L (ref 22–29)
CO2: 23 MMOL/L (ref 22–29)
CO2: 24 MMOL/L (ref 22–29)
CO2: 25 MMOL/L (ref 22–29)
CO2: 26 MMOL/L (ref 22–29)
CO2: 27 MMOL/L (ref 22–29)
CO2: 28 MMOL/L (ref 22–29)
CO2: 29 MMOL/L (ref 22–29)
CO2: 30 MMOL/L (ref 22–29)
CO2: 31 MMOL/L (ref 22–29)
CO2: 32 MMOL/L (ref 22–29)
CO2: 33 MMOL/L (ref 22–29)
COHB: 0.6 % (ref 0–1.5)
COHB: 0.9 % (ref 0–1.5)
COHB: 1.3 % (ref 0–1.5)
COHB: 1.3 % (ref 0–1.5)
COLOR: ABNORMAL
COLOR: YELLOW
CORONAVIRUS 229E BY PCR: NOT DETECTED
CORONAVIRUS HKU1 BY PCR: NOT DETECTED
CORONAVIRUS NL63 BY PCR: NOT DETECTED
CORONAVIRUS OC43 BY PCR: NOT DETECTED
CREAT SERPL-MCNC: 0.5 MG/DL (ref 0.7–1.2)
CREAT SERPL-MCNC: 0.6 MG/DL (ref 0.7–1.2)
CREAT SERPL-MCNC: 0.7 MG/DL (ref 0.7–1.2)
CREAT SERPL-MCNC: 0.8 MG/DL (ref 0.7–1.2)
CREAT SERPL-MCNC: 0.9 MG/DL (ref 0.7–1.2)
CREAT SERPL-MCNC: 1 MG/DL (ref 0.7–1.2)
CREAT SERPL-MCNC: 1.2 MG/DL (ref 0.7–1.2)
CREAT SERPL-MCNC: 1.3 MG/DL (ref 0.7–1.2)
CREAT SERPL-MCNC: 1.3 MG/DL (ref 0.7–1.2)
CREAT SERPL-MCNC: 1.4 MG/DL (ref 0.7–1.2)
CREAT SERPL-MCNC: 1.5 MG/DL (ref 0.7–1.2)
CRITICAL: ABNORMAL
CULTURE, BLOOD 2: ABNORMAL
CULTURE, BLOOD 2: ABNORMAL
CULTURE, BLOOD 2: NORMAL
D DIMER: 243 NG/ML DDU
D DIMER: <200 NG/ML DDU
DATE ANALYZED: ABNORMAL
DATE OF COLLECTION: ABNORMAL
DELIVERY SYSTEMS: ABNORMAL
DELIVERY SYSTEMS: ABNORMAL
DEVICE: ABNORMAL
DEVICE: ABNORMAL
EKG ATRIAL RATE: 129 BPM
EKG ATRIAL RATE: 133 BPM
EKG ATRIAL RATE: 84 BPM
EKG ATRIAL RATE: 86 BPM
EKG ATRIAL RATE: 87 BPM
EKG P AXIS: 14 DEGREES
EKG P AXIS: 27 DEGREES
EKG P AXIS: 30 DEGREES
EKG P AXIS: 33 DEGREES
EKG P AXIS: 40 DEGREES
EKG P-R INTERVAL: 110 MS
EKG P-R INTERVAL: 120 MS
EKG P-R INTERVAL: 120 MS
EKG P-R INTERVAL: 132 MS
EKG P-R INTERVAL: 138 MS
EKG Q-T INTERVAL: 268 MS
EKG Q-T INTERVAL: 300 MS
EKG Q-T INTERVAL: 326 MS
EKG Q-T INTERVAL: 358 MS
EKG Q-T INTERVAL: 364 MS
EKG QRS DURATION: 64 MS
EKG QRS DURATION: 72 MS
EKG QRS DURATION: 76 MS
EKG QRS DURATION: 76 MS
EKG QRS DURATION: 78 MS
EKG QTC CALCULATION (BAZETT): 390 MS
EKG QTC CALCULATION (BAZETT): 392 MS
EKG QTC CALCULATION (BAZETT): 423 MS
EKG QTC CALCULATION (BAZETT): 438 MS
EKG QTC CALCULATION (BAZETT): 446 MS
EKG R AXIS: 34 DEGREES
EKG R AXIS: 36 DEGREES
EKG R AXIS: 49 DEGREES
EKG R AXIS: 54 DEGREES
EKG R AXIS: 8 DEGREES
EKG T AXIS: -16 DEGREES
EKG T AXIS: 12 DEGREES
EKG T AXIS: 12 DEGREES
EKG T AXIS: 22 DEGREES
EKG T AXIS: 47 DEGREES
EKG VENTRICULAR RATE: 129 BPM
EKG VENTRICULAR RATE: 133 BPM
EKG VENTRICULAR RATE: 84 BPM
EKG VENTRICULAR RATE: 86 BPM
EKG VENTRICULAR RATE: 87 BPM
EOSINOPHILS ABSOLUTE: 0 E9/L (ref 0.05–0.5)
EOSINOPHILS ABSOLUTE: 0.01 E9/L (ref 0.05–0.5)
EOSINOPHILS ABSOLUTE: 0.04 E9/L (ref 0.05–0.5)
EOSINOPHILS ABSOLUTE: 0.06 E9/L (ref 0.05–0.5)
EOSINOPHILS ABSOLUTE: 0.08 E9/L (ref 0.05–0.5)
EOSINOPHILS ABSOLUTE: 0.09 E9/L (ref 0.05–0.5)
EOSINOPHILS ABSOLUTE: 0.1 E9/L (ref 0.05–0.5)
EOSINOPHILS ABSOLUTE: 0.11 E9/L (ref 0.05–0.5)
EOSINOPHILS ABSOLUTE: 0.12 E9/L (ref 0.05–0.5)
EOSINOPHILS ABSOLUTE: 0.15 E9/L (ref 0.05–0.5)
EOSINOPHILS ABSOLUTE: 0.16 E9/L (ref 0.05–0.5)
EOSINOPHILS ABSOLUTE: 0.16 E9/L (ref 0.05–0.5)
EOSINOPHILS ABSOLUTE: 0.18 E9/L (ref 0.05–0.5)
EOSINOPHILS ABSOLUTE: 0.2 E9/L (ref 0.05–0.5)
EOSINOPHILS ABSOLUTE: 0.23 E9/L (ref 0.05–0.5)
EOSINOPHILS ABSOLUTE: 0.27 E9/L (ref 0.05–0.5)
EOSINOPHILS ABSOLUTE: 0.3 E9/L (ref 0.05–0.5)
EOSINOPHILS RELATIVE PERCENT: 0 % (ref 0–6)
EOSINOPHILS RELATIVE PERCENT: 0 % (ref 0–6)
EOSINOPHILS RELATIVE PERCENT: 0.1 % (ref 0–6)
EOSINOPHILS RELATIVE PERCENT: 0.1 % (ref 0–6)
EOSINOPHILS RELATIVE PERCENT: 0.2 % (ref 0–6)
EOSINOPHILS RELATIVE PERCENT: 0.5 % (ref 0–6)
EOSINOPHILS RELATIVE PERCENT: 0.5 % (ref 0–6)
EOSINOPHILS RELATIVE PERCENT: 1 % (ref 0–6)
EOSINOPHILS RELATIVE PERCENT: 1.5 % (ref 0–6)
EOSINOPHILS RELATIVE PERCENT: 1.6 % (ref 0–6)
EOSINOPHILS RELATIVE PERCENT: 1.6 % (ref 0–6)
EOSINOPHILS RELATIVE PERCENT: 1.7 % (ref 0–6)
EOSINOPHILS RELATIVE PERCENT: 1.9 % (ref 0–6)
EOSINOPHILS RELATIVE PERCENT: 2.1 % (ref 0–6)
EOSINOPHILS RELATIVE PERCENT: 2.3 % (ref 0–6)
EOSINOPHILS RELATIVE PERCENT: 2.3 % (ref 0–6)
EOSINOPHILS RELATIVE PERCENT: 2.7 % (ref 0–6)
EOSINOPHILS RELATIVE PERCENT: 3.2 % (ref 0–6)
EOSINOPHILS RELATIVE PERCENT: 3.2 % (ref 0–6)
EOSINOPHILS RELATIVE PERCENT: 3.3 % (ref 0–6)
EOSINOPHILS RELATIVE PERCENT: 4.9 % (ref 0–6)
EOSINOPHILS RELATIVE PERCENT: 5 % (ref 0–6)
EOSINOPHILS RELATIVE PERCENT: 6 % (ref 0–6)
EPITHELIAL CELLS, UA: ABNORMAL /HPF
FIBRINOGEN: 640 MG/DL (ref 225–540)
FIO2 ARTERIAL: 4
FOLATE: 14.89
GFR AFRICAN AMERICAN: 56
GFR AFRICAN AMERICAN: >60
GFR NON-AFRICAN AMERICAN: 46 ML/MIN/1.73
GFR NON-AFRICAN AMERICAN: 50 ML/MIN/1.73
GFR NON-AFRICAN AMERICAN: 55 ML/MIN/1.73
GFR NON-AFRICAN AMERICAN: 55 ML/MIN/1.73
GFR NON-AFRICAN AMERICAN: >60 ML/MIN/1.73
GLUCOSE BLD-MCNC: 103 MG/DL (ref 74–99)
GLUCOSE BLD-MCNC: 103 MG/DL (ref 74–99)
GLUCOSE BLD-MCNC: 104 MG/DL (ref 74–99)
GLUCOSE BLD-MCNC: 105 MG/DL (ref 74–99)
GLUCOSE BLD-MCNC: 106 MG/DL (ref 74–99)
GLUCOSE BLD-MCNC: 106 MG/DL (ref 74–99)
GLUCOSE BLD-MCNC: 107 MG/DL (ref 74–99)
GLUCOSE BLD-MCNC: 109 MG/DL (ref 74–99)
GLUCOSE BLD-MCNC: 111 MG/DL (ref 74–99)
GLUCOSE BLD-MCNC: 112 MG/DL (ref 74–99)
GLUCOSE BLD-MCNC: 114 MG/DL (ref 74–99)
GLUCOSE BLD-MCNC: 115 MG/DL (ref 74–99)
GLUCOSE BLD-MCNC: 117 MG/DL (ref 74–99)
GLUCOSE BLD-MCNC: 119 MG/DL (ref 74–99)
GLUCOSE BLD-MCNC: 121 MG/DL (ref 74–99)
GLUCOSE BLD-MCNC: 122 MG/DL (ref 74–99)
GLUCOSE BLD-MCNC: 124 MG/DL (ref 74–99)
GLUCOSE BLD-MCNC: 126 MG/DL (ref 74–99)
GLUCOSE BLD-MCNC: 127 MG/DL (ref 74–99)
GLUCOSE BLD-MCNC: 130 MG/DL (ref 74–99)
GLUCOSE BLD-MCNC: 134 MG/DL (ref 74–99)
GLUCOSE BLD-MCNC: 135 MG/DL (ref 74–99)
GLUCOSE BLD-MCNC: 136 MG/DL
GLUCOSE BLD-MCNC: 136 MG/DL (ref 74–99)
GLUCOSE BLD-MCNC: 139 MG/DL (ref 74–99)
GLUCOSE BLD-MCNC: 154 MG/DL (ref 74–99)
GLUCOSE BLD-MCNC: 171 MG/DL (ref 74–99)
GLUCOSE BLD-MCNC: 185 MG/DL (ref 74–99)
GLUCOSE BLD-MCNC: 232 MG/DL (ref 74–99)
GLUCOSE BLD-MCNC: 62 MG/DL (ref 74–99)
GLUCOSE BLD-MCNC: 74 MG/DL (ref 74–99)
GLUCOSE BLD-MCNC: 75 MG/DL (ref 74–99)
GLUCOSE BLD-MCNC: 82 MG/DL (ref 74–99)
GLUCOSE BLD-MCNC: 85 MG/DL (ref 74–99)
GLUCOSE BLD-MCNC: 86 MG/DL (ref 74–99)
GLUCOSE BLD-MCNC: 86 MG/DL (ref 74–99)
GLUCOSE BLD-MCNC: 87 MG/DL (ref 74–99)
GLUCOSE BLD-MCNC: 88 MG/DL (ref 74–99)
GLUCOSE BLD-MCNC: 89 MG/DL (ref 74–99)
GLUCOSE BLD-MCNC: 91 MG/DL (ref 74–99)
GLUCOSE BLD-MCNC: 92 MG/DL (ref 74–99)
GLUCOSE BLD-MCNC: 92 MG/DL (ref 74–99)
GLUCOSE BLD-MCNC: 93 MG/DL (ref 74–99)
GLUCOSE BLD-MCNC: 93 MG/DL (ref 74–99)
GLUCOSE BLD-MCNC: 94 MG/DL (ref 74–99)
GLUCOSE BLD-MCNC: 95 MG/DL (ref 74–99)
GLUCOSE BLD-MCNC: 95 MG/DL (ref 74–99)
GLUCOSE BLD-MCNC: 96 MG/DL (ref 74–99)
GLUCOSE BLD-MCNC: 96 MG/DL (ref 74–99)
GLUCOSE BLD-MCNC: 97 MG/DL (ref 74–99)
GLUCOSE BLD-MCNC: 98 MG/DL (ref 74–99)
GLUCOSE BLD-MCNC: 98 MG/DL (ref 74–99)
GLUCOSE BLD-MCNC: 99 MG/DL (ref 74–99)
GLUCOSE BLD-MCNC: 99 MG/DL (ref 74–99)
GLUCOSE URINE: NEGATIVE MG/DL
HAV IGM SER IA-ACNC: NORMAL
HCO3 ARTERIAL: 24.7 MMOL/L (ref 22–26)
HCO3 ARTERIAL: 27.7 MMOL/L (ref 22–26)
HCO3: 22.7 MMOL/L (ref 22–26)
HCO3: 24.4 MMOL/L (ref 22–26)
HCO3: 25.3 MMOL/L (ref 22–26)
HCO3: 27.5 MMOL/L (ref 22–26)
HCT (EST): 33 % (ref 37–54)
HCT VFR BLD CALC: 31.3 % (ref 37–54)
HCT VFR BLD CALC: 32.3 % (ref 37–54)
HCT VFR BLD CALC: 32.5 % (ref 37–54)
HCT VFR BLD CALC: 35.8 % (ref 37–54)
HCT VFR BLD CALC: 35.8 % (ref 37–54)
HCT VFR BLD CALC: 36.1 % (ref 37–54)
HCT VFR BLD CALC: 36.4 % (ref 37–54)
HCT VFR BLD CALC: 36.6 % (ref 37–54)
HCT VFR BLD CALC: 37.6 % (ref 37–54)
HCT VFR BLD CALC: 38 % (ref 37–54)
HCT VFR BLD CALC: 38.8 % (ref 37–54)
HCT VFR BLD CALC: 39.5 % (ref 37–54)
HCT VFR BLD CALC: 41 % (ref 37–54)
HCT VFR BLD CALC: 41.4 % (ref 37–54)
HCT VFR BLD CALC: 41.5 % (ref 37–54)
HCT VFR BLD CALC: 42 % (ref 37–54)
HCT VFR BLD CALC: 46.3 % (ref 37–54)
HCT VFR BLD CALC: 46.3 % (ref 37–54)
HCT VFR BLD CALC: 46.4 % (ref 37–54)
HCT VFR BLD CALC: 46.5 % (ref 37–54)
HCT VFR BLD CALC: 46.8 % (ref 37–54)
HCT VFR BLD CALC: 48.7 % (ref 37–54)
HCT VFR BLD CALC: 48.9 % (ref 37–54)
HCT VFR BLD CALC: 49.2 % (ref 37–54)
HEMOGLOBIN: 10.2 G/DL (ref 12.5–16.5)
HEMOGLOBIN: 10.8 G/DL (ref 12.5–16.5)
HEMOGLOBIN: 10.9 G/DL (ref 12.5–16.5)
HEMOGLOBIN: 11 G/DL (ref 12.5–16.5)
HEMOGLOBIN: 11.7 G/DL (ref 12.5–16.5)
HEMOGLOBIN: 11.9 G/DL (ref 12.5–16.5)
HEMOGLOBIN: 12.4 G/DL (ref 12.5–16.5)
HEMOGLOBIN: 12.4 G/DL (ref 12.5–16.5)
HEMOGLOBIN: 12.9 G/DL (ref 12.5–16.5)
HEMOGLOBIN: 13.1 G/DL (ref 12.5–16.5)
HEMOGLOBIN: 13.2 G/DL (ref 12.5–16.5)
HEMOGLOBIN: 13.4 G/DL (ref 12.5–16.5)
HEMOGLOBIN: 14.3 G/DL (ref 12.5–16.5)
HEMOGLOBIN: 14.4 G/DL (ref 12.5–16.5)
HEMOGLOBIN: 14.5 G/DL (ref 12.5–16.5)
HEMOGLOBIN: 14.7 G/DL (ref 12.5–16.5)
HEMOGLOBIN: 14.8 G/DL (ref 12.5–16.5)
HEMOGLOBIN: 14.9 G/DL (ref 12.5–16.5)
HEMOGLOBIN: 15.4 G/DL (ref 12.5–16.5)
HEMOGLOBIN: 15.8 G/DL (ref 12.5–16.5)
HEPATITIS B CORE IGM ANTIBODY: NORMAL
HEPATITIS B SURFACE ANTIGEN INTERPRETATION: NORMAL
HEPATITIS C ANTIBODY INTERPRETATION: NORMAL
HGB, (EST): 11.2 G/DL (ref 12.5–16.5)
HHB: 1.5 % (ref 0–5)
HHB: 11 % (ref 0–5)
HHB: 4.7 % (ref 0–5)
HHB: 8.6 % (ref 0–5)
HUMAN METAPNEUMOVIRUS BY PCR: NOT DETECTED
HUMAN RHINOVIRUS/ENTEROVIRUS BY PCR: NOT DETECTED
HYALINE CASTS: ABNORMAL /LPF (ref 0–2)
IMMATURE GRANULOCYTES #: 0.01 E9/L
IMMATURE GRANULOCYTES #: 0.02 E9/L
IMMATURE GRANULOCYTES #: 0.03 E9/L
IMMATURE GRANULOCYTES #: 0.05 E9/L
IMMATURE GRANULOCYTES %: 0.1 % (ref 0–5)
IMMATURE GRANULOCYTES %: 0.1 % (ref 0–5)
IMMATURE GRANULOCYTES %: 0.2 % (ref 0–5)
IMMATURE GRANULOCYTES %: 0.3 % (ref 0–5)
IMMATURE GRANULOCYTES %: 0.4 % (ref 0–5)
IMMATURE GRANULOCYTES %: 0.5 % (ref 0–5)
INFLUENZA A BY PCR: NOT DETECTED
INFLUENZA B BY PCR: NOT DETECTED
INR BLD: 1.2
KETONES, URINE: ABNORMAL MG/DL
KETONES, URINE: NEGATIVE MG/DL
KETONES, URINE: NEGATIVE MG/DL
L. PNEUMOPHILA SEROGP 1 UR AG: NORMAL
LAB: ABNORMAL
LACTIC ACID, SEPSIS: 0.7 MMOL/L (ref 0.5–1.9)
LACTIC ACID, SEPSIS: 1 MMOL/L (ref 0.5–1.9)
LACTIC ACID, SEPSIS: 5.8 MMOL/L (ref 0.5–1.9)
LACTIC ACID: 0.8 MMOL/L (ref 0.5–2.2)
LACTIC ACID: 0.9 MMOL/L (ref 0.5–2.2)
LACTIC ACID: 1.2 MMOL/L (ref 0.5–2.2)
LACTIC ACID: 1.2 MMOL/L (ref 0.5–2.2)
LACTIC ACID: 1.8 MMOL/L (ref 0.5–2.2)
LEUKOCYTE ESTERASE, URINE: ABNORMAL
LEUKOCYTE ESTERASE, URINE: NEGATIVE
LIPASE: 43 U/L (ref 13–60)
LIPASE: 44 U/L (ref 13–60)
LYMPHOCYTES ABSOLUTE: 0.38 E9/L (ref 1.5–4)
LYMPHOCYTES ABSOLUTE: 0.61 E9/L (ref 1.5–4)
LYMPHOCYTES ABSOLUTE: 1.1 E9/L (ref 1.5–4)
LYMPHOCYTES ABSOLUTE: 1.16 E9/L (ref 1.5–4)
LYMPHOCYTES ABSOLUTE: 1.16 E9/L (ref 1.5–4)
LYMPHOCYTES ABSOLUTE: 1.17 E9/L (ref 1.5–4)
LYMPHOCYTES ABSOLUTE: 1.2 E9/L (ref 1.5–4)
LYMPHOCYTES ABSOLUTE: 1.24 E9/L (ref 1.5–4)
LYMPHOCYTES ABSOLUTE: 1.25 E9/L (ref 1.5–4)
LYMPHOCYTES ABSOLUTE: 1.37 E9/L (ref 1.5–4)
LYMPHOCYTES ABSOLUTE: 1.37 E9/L (ref 1.5–4)
LYMPHOCYTES ABSOLUTE: 1.4 E9/L (ref 1.5–4)
LYMPHOCYTES ABSOLUTE: 1.42 E9/L (ref 1.5–4)
LYMPHOCYTES ABSOLUTE: 1.55 E9/L (ref 1.5–4)
LYMPHOCYTES ABSOLUTE: 1.56 E9/L (ref 1.5–4)
LYMPHOCYTES ABSOLUTE: 1.59 E9/L (ref 1.5–4)
LYMPHOCYTES ABSOLUTE: 1.71 E9/L (ref 1.5–4)
LYMPHOCYTES ABSOLUTE: 1.82 E9/L (ref 1.5–4)
LYMPHOCYTES ABSOLUTE: 2.02 E9/L (ref 1.5–4)
LYMPHOCYTES ABSOLUTE: 2.24 E9/L (ref 1.5–4)
LYMPHOCYTES ABSOLUTE: 2.56 E9/L (ref 1.5–4)
LYMPHOCYTES ABSOLUTE: 2.78 E9/L (ref 1.5–4)
LYMPHOCYTES ABSOLUTE: 2.97 E9/L (ref 1.5–4)
LYMPHOCYTES RELATIVE PERCENT: 16.5 % (ref 20–42)
LYMPHOCYTES RELATIVE PERCENT: 17.3 % (ref 20–42)
LYMPHOCYTES RELATIVE PERCENT: 17.4 % (ref 20–42)
LYMPHOCYTES RELATIVE PERCENT: 18.5 % (ref 20–42)
LYMPHOCYTES RELATIVE PERCENT: 18.7 % (ref 20–42)
LYMPHOCYTES RELATIVE PERCENT: 20.7 % (ref 20–42)
LYMPHOCYTES RELATIVE PERCENT: 20.8 % (ref 20–42)
LYMPHOCYTES RELATIVE PERCENT: 22.1 % (ref 20–42)
LYMPHOCYTES RELATIVE PERCENT: 23.1 % (ref 20–42)
LYMPHOCYTES RELATIVE PERCENT: 25.5 % (ref 20–42)
LYMPHOCYTES RELATIVE PERCENT: 25.5 % (ref 20–42)
LYMPHOCYTES RELATIVE PERCENT: 26 % (ref 20–42)
LYMPHOCYTES RELATIVE PERCENT: 26.7 % (ref 20–42)
LYMPHOCYTES RELATIVE PERCENT: 27.1 % (ref 20–42)
LYMPHOCYTES RELATIVE PERCENT: 28.6 % (ref 20–42)
LYMPHOCYTES RELATIVE PERCENT: 29.4 % (ref 20–42)
LYMPHOCYTES RELATIVE PERCENT: 30.4 % (ref 20–42)
LYMPHOCYTES RELATIVE PERCENT: 33.8 % (ref 20–42)
LYMPHOCYTES RELATIVE PERCENT: 34.6 % (ref 20–42)
LYMPHOCYTES RELATIVE PERCENT: 37.9 % (ref 20–42)
LYMPHOCYTES RELATIVE PERCENT: 39.7 % (ref 20–42)
LYMPHOCYTES RELATIVE PERCENT: 5.3 % (ref 20–42)
LYMPHOCYTES RELATIVE PERCENT: 8.8 % (ref 20–42)
Lab: ABNORMAL
MAGNESIUM: 1.9 MG/DL (ref 1.6–2.6)
MAGNESIUM: 2 MG/DL (ref 1.6–2.6)
MAGNESIUM: 2.1 MG/DL (ref 1.6–2.6)
MAGNESIUM: 2.2 MG/DL (ref 1.6–2.6)
MAGNESIUM: 2.3 MG/DL (ref 1.6–2.6)
MAGNESIUM: 2.5 MG/DL (ref 1.6–2.6)
MAGNESIUM: 2.8 MG/DL (ref 1.6–2.6)
MAGNESIUM: 2.8 MG/DL (ref 1.6–2.6)
MCH RBC QN AUTO: 30.5 PG (ref 26–35)
MCH RBC QN AUTO: 30.8 PG (ref 26–35)
MCH RBC QN AUTO: 30.9 PG (ref 26–35)
MCH RBC QN AUTO: 31 PG (ref 26–35)
MCH RBC QN AUTO: 31 PG (ref 26–35)
MCH RBC QN AUTO: 31.2 PG (ref 26–35)
MCH RBC QN AUTO: 31.4 PG (ref 26–35)
MCH RBC QN AUTO: 31.4 PG (ref 26–35)
MCH RBC QN AUTO: 31.5 PG (ref 26–35)
MCH RBC QN AUTO: 31.6 PG (ref 26–35)
MCH RBC QN AUTO: 31.7 PG (ref 26–35)
MCH RBC QN AUTO: 31.8 PG (ref 26–35)
MCH RBC QN AUTO: 31.8 PG (ref 26–35)
MCH RBC QN AUTO: 31.9 PG (ref 26–35)
MCH RBC QN AUTO: 32 PG (ref 26–35)
MCH RBC QN AUTO: 32 PG (ref 26–35)
MCH RBC QN AUTO: 32.1 PG (ref 26–35)
MCH RBC QN AUTO: 32.2 PG (ref 26–35)
MCH RBC QN AUTO: 32.4 PG (ref 26–35)
MCHC RBC AUTO-ENTMCNC: 30.4 % (ref 32–34.5)
MCHC RBC AUTO-ENTMCNC: 30.6 % (ref 32–34.5)
MCHC RBC AUTO-ENTMCNC: 30.7 % (ref 32–34.5)
MCHC RBC AUTO-ENTMCNC: 30.7 % (ref 32–34.5)
MCHC RBC AUTO-ENTMCNC: 31 % (ref 32–34.5)
MCHC RBC AUTO-ENTMCNC: 31.2 % (ref 32–34.5)
MCHC RBC AUTO-ENTMCNC: 31.3 % (ref 32–34.5)
MCHC RBC AUTO-ENTMCNC: 31.5 % (ref 32–34.5)
MCHC RBC AUTO-ENTMCNC: 31.6 % (ref 32–34.5)
MCHC RBC AUTO-ENTMCNC: 31.6 % (ref 32–34.5)
MCHC RBC AUTO-ENTMCNC: 31.7 % (ref 32–34.5)
MCHC RBC AUTO-ENTMCNC: 32 % (ref 32–34.5)
MCHC RBC AUTO-ENTMCNC: 32 % (ref 32–34.5)
MCHC RBC AUTO-ENTMCNC: 32.1 % (ref 32–34.5)
MCHC RBC AUTO-ENTMCNC: 32.2 % (ref 32–34.5)
MCHC RBC AUTO-ENTMCNC: 32.6 % (ref 32–34.5)
MCHC RBC AUTO-ENTMCNC: 32.7 % (ref 32–34.5)
MCHC RBC AUTO-ENTMCNC: 32.7 % (ref 32–34.5)
MCHC RBC AUTO-ENTMCNC: 33 % (ref 32–34.5)
MCHC RBC AUTO-ENTMCNC: 33.4 % (ref 32–34.5)
MCHC RBC AUTO-ENTMCNC: 33.4 % (ref 32–34.5)
MCHC RBC AUTO-ENTMCNC: 33.5 % (ref 32–34.5)
MCHC RBC AUTO-ENTMCNC: 33.9 % (ref 32–34.5)
MCHC RBC AUTO-ENTMCNC: 34.6 % (ref 32–34.5)
MCV RBC AUTO: 100.2 FL (ref 80–99.9)
MCV RBC AUTO: 100.5 FL (ref 80–99.9)
MCV RBC AUTO: 101.2 FL (ref 80–99.9)
MCV RBC AUTO: 101.4 FL (ref 80–99.9)
MCV RBC AUTO: 101.7 FL (ref 80–99.9)
MCV RBC AUTO: 102.9 FL (ref 80–99.9)
MCV RBC AUTO: 103.4 FL (ref 80–99.9)
MCV RBC AUTO: 103.5 FL (ref 80–99.9)
MCV RBC AUTO: 92.7 FL (ref 80–99.9)
MCV RBC AUTO: 93.6 FL (ref 80–99.9)
MCV RBC AUTO: 94.5 FL (ref 80–99.9)
MCV RBC AUTO: 95.8 FL (ref 80–99.9)
MCV RBC AUTO: 96.3 FL (ref 80–99.9)
MCV RBC AUTO: 96.3 FL (ref 80–99.9)
MCV RBC AUTO: 96.8 FL (ref 80–99.9)
MCV RBC AUTO: 97.1 FL (ref 80–99.9)
MCV RBC AUTO: 97.4 FL (ref 80–99.9)
MCV RBC AUTO: 97.6 FL (ref 80–99.9)
MCV RBC AUTO: 98.2 FL (ref 80–99.9)
MCV RBC AUTO: 98.3 FL (ref 80–99.9)
MCV RBC AUTO: 98.4 FL (ref 80–99.9)
MCV RBC AUTO: 99 FL (ref 80–99.9)
MCV RBC AUTO: 99.2 FL (ref 80–99.9)
MCV RBC AUTO: 99.8 FL (ref 80–99.9)
METAMYELOCYTES RELATIVE PERCENT: 0.9 % (ref 0–1)
METER GLUCOSE: 123 MG/DL (ref 74–99)
METER GLUCOSE: 156 MG/DL (ref 74–99)
METER GLUCOSE: 83 MG/DL (ref 74–99)
METER GLUCOSE: 96 MG/DL (ref 74–99)
METER GLUCOSE: 97 MG/DL (ref 74–99)
METER GLUCOSE: 98 MG/DL (ref 74–99)
METHB: 0.2 % (ref 0–1.5)
METHB: 0.3 % (ref 0–1.5)
MODE: ABNORMAL
MONOCYTES ABSOLUTE: 0.37 E9/L (ref 0.1–0.95)
MONOCYTES ABSOLUTE: 0.42 E9/L (ref 0.1–0.95)
MONOCYTES ABSOLUTE: 0.43 E9/L (ref 0.1–0.95)
MONOCYTES ABSOLUTE: 0.45 E9/L (ref 0.1–0.95)
MONOCYTES ABSOLUTE: 0.48 E9/L (ref 0.1–0.95)
MONOCYTES ABSOLUTE: 0.49 E9/L (ref 0.1–0.95)
MONOCYTES ABSOLUTE: 0.49 E9/L (ref 0.1–0.95)
MONOCYTES ABSOLUTE: 0.56 E9/L (ref 0.1–0.95)
MONOCYTES ABSOLUTE: 0.57 E9/L (ref 0.1–0.95)
MONOCYTES ABSOLUTE: 0.64 E9/L (ref 0.1–0.95)
MONOCYTES ABSOLUTE: 0.67 E9/L (ref 0.1–0.95)
MONOCYTES ABSOLUTE: 0.72 E9/L (ref 0.1–0.95)
MONOCYTES ABSOLUTE: 0.73 E9/L (ref 0.1–0.95)
MONOCYTES ABSOLUTE: 0.83 E9/L (ref 0.1–0.95)
MONOCYTES ABSOLUTE: 0.87 E9/L (ref 0.1–0.95)
MONOCYTES ABSOLUTE: 0.9 E9/L (ref 0.1–0.95)
MONOCYTES ABSOLUTE: 1.01 E9/L (ref 0.1–0.95)
MONOCYTES ABSOLUTE: 1.07 E9/L (ref 0.1–0.95)
MONOCYTES ABSOLUTE: 1.08 E9/L (ref 0.1–0.95)
MONOCYTES ABSOLUTE: 1.1 E9/L (ref 0.1–0.95)
MONOCYTES ABSOLUTE: 1.14 E9/L (ref 0.1–0.95)
MONOCYTES ABSOLUTE: 1.32 E9/L (ref 0.1–0.95)
MONOCYTES ABSOLUTE: 1.58 E9/L (ref 0.1–0.95)
MONOCYTES RELATIVE PERCENT: 10 % (ref 2–12)
MONOCYTES RELATIVE PERCENT: 10.2 % (ref 2–12)
MONOCYTES RELATIVE PERCENT: 10.2 % (ref 2–12)
MONOCYTES RELATIVE PERCENT: 10.4 % (ref 2–12)
MONOCYTES RELATIVE PERCENT: 11 % (ref 2–12)
MONOCYTES RELATIVE PERCENT: 12.9 % (ref 2–12)
MONOCYTES RELATIVE PERCENT: 13 % (ref 2–12)
MONOCYTES RELATIVE PERCENT: 13.6 % (ref 2–12)
MONOCYTES RELATIVE PERCENT: 14.5 % (ref 2–12)
MONOCYTES RELATIVE PERCENT: 15.1 % (ref 2–12)
MONOCYTES RELATIVE PERCENT: 15.4 % (ref 2–12)
MONOCYTES RELATIVE PERCENT: 15.7 % (ref 2–12)
MONOCYTES RELATIVE PERCENT: 15.8 % (ref 2–12)
MONOCYTES RELATIVE PERCENT: 16.8 % (ref 2–12)
MONOCYTES RELATIVE PERCENT: 18.1 % (ref 2–12)
MONOCYTES RELATIVE PERCENT: 7.1 % (ref 2–12)
MONOCYTES RELATIVE PERCENT: 7.4 % (ref 2–12)
MONOCYTES RELATIVE PERCENT: 8.1 % (ref 2–12)
MONOCYTES RELATIVE PERCENT: 8.2 % (ref 2–12)
MONOCYTES RELATIVE PERCENT: 8.3 % (ref 2–12)
MONOCYTES RELATIVE PERCENT: 8.7 % (ref 2–12)
MONOCYTES RELATIVE PERCENT: 9.1 % (ref 2–12)
MONOCYTES RELATIVE PERCENT: 9.8 % (ref 2–12)
MUCUS: PRESENT /LPF
MYCOPLASMA PNEUMONIAE BY PCR: NOT DETECTED
MYELOCYTE PERCENT: 0.9 % (ref 0–0)
NEUTROPHILS ABSOLUTE: 1.66 E9/L (ref 1.8–7.3)
NEUTROPHILS ABSOLUTE: 2.09 E9/L (ref 1.8–7.3)
NEUTROPHILS ABSOLUTE: 2.64 E9/L (ref 1.8–7.3)
NEUTROPHILS ABSOLUTE: 3.18 E9/L (ref 1.8–7.3)
NEUTROPHILS ABSOLUTE: 3.23 E9/L (ref 1.8–7.3)
NEUTROPHILS ABSOLUTE: 3.23 E9/L (ref 1.8–7.3)
NEUTROPHILS ABSOLUTE: 3.25 E9/L (ref 1.8–7.3)
NEUTROPHILS ABSOLUTE: 3.33 E9/L (ref 1.8–7.3)
NEUTROPHILS ABSOLUTE: 3.49 E9/L (ref 1.8–7.3)
NEUTROPHILS ABSOLUTE: 3.59 E9/L (ref 1.8–7.3)
NEUTROPHILS ABSOLUTE: 3.75 E9/L (ref 1.8–7.3)
NEUTROPHILS ABSOLUTE: 3.75 E9/L (ref 1.8–7.3)
NEUTROPHILS ABSOLUTE: 4.21 E9/L (ref 1.8–7.3)
NEUTROPHILS ABSOLUTE: 4.25 E9/L (ref 1.8–7.3)
NEUTROPHILS ABSOLUTE: 4.47 E9/L (ref 1.8–7.3)
NEUTROPHILS ABSOLUTE: 4.74 E9/L (ref 1.8–7.3)
NEUTROPHILS ABSOLUTE: 4.74 E9/L (ref 1.8–7.3)
NEUTROPHILS ABSOLUTE: 4.98 E9/L (ref 1.8–7.3)
NEUTROPHILS ABSOLUTE: 5.38 E9/L (ref 1.8–7.3)
NEUTROPHILS ABSOLUTE: 5.58 E9/L (ref 1.8–7.3)
NEUTROPHILS ABSOLUTE: 5.77 E9/L (ref 1.8–7.3)
NEUTROPHILS ABSOLUTE: 5.81 E9/L (ref 1.8–7.3)
NEUTROPHILS ABSOLUTE: 5.91 E9/L (ref 1.8–7.3)
NEUTROPHILS RELATIVE PERCENT: 36.1 % (ref 43–80)
NEUTROPHILS RELATIVE PERCENT: 44.5 % (ref 43–80)
NEUTROPHILS RELATIVE PERCENT: 45.5 % (ref 43–80)
NEUTROPHILS RELATIVE PERCENT: 50.3 % (ref 43–80)
NEUTROPHILS RELATIVE PERCENT: 54.3 % (ref 43–80)
NEUTROPHILS RELATIVE PERCENT: 56.5 % (ref 43–80)
NEUTROPHILS RELATIVE PERCENT: 57.3 % (ref 43–80)
NEUTROPHILS RELATIVE PERCENT: 58.4 % (ref 43–80)
NEUTROPHILS RELATIVE PERCENT: 59.1 % (ref 43–80)
NEUTROPHILS RELATIVE PERCENT: 59.5 % (ref 43–80)
NEUTROPHILS RELATIVE PERCENT: 61.7 % (ref 43–80)
NEUTROPHILS RELATIVE PERCENT: 62.7 % (ref 43–80)
NEUTROPHILS RELATIVE PERCENT: 62.8 % (ref 43–80)
NEUTROPHILS RELATIVE PERCENT: 63.3 % (ref 43–80)
NEUTROPHILS RELATIVE PERCENT: 64.3 % (ref 43–80)
NEUTROPHILS RELATIVE PERCENT: 67 % (ref 43–80)
NEUTROPHILS RELATIVE PERCENT: 67.4 % (ref 43–80)
NEUTROPHILS RELATIVE PERCENT: 67.6 % (ref 43–80)
NEUTROPHILS RELATIVE PERCENT: 69.5 % (ref 43–80)
NEUTROPHILS RELATIVE PERCENT: 70.5 % (ref 43–80)
NEUTROPHILS RELATIVE PERCENT: 70.6 % (ref 43–80)
NEUTROPHILS RELATIVE PERCENT: 80.3 % (ref 43–80)
NEUTROPHILS RELATIVE PERCENT: 82.1 % (ref 43–80)
NITRITE, URINE: NEGATIVE
NITRITE, URINE: POSITIVE
O2 CONTENT: 18.3 ML/DL
O2 CONTENT: 18.4 ML/DL
O2 CONTENT: 18.9 ML/DL
O2 CONTENT: 19.9 ML/DL
O2 SATURATION: 88.9 % (ref 92–98.5)
O2 SATURATION: 91.3 % (ref 92–98.5)
O2 SATURATION: 92.8 % (ref 92–98.5)
O2 SATURATION: 93.3 % (ref 92–98.5)
O2 SATURATION: 95.2 % (ref 92–98.5)
O2 SATURATION: 98.5 % (ref 92–98.5)
O2HB: 88.1 % (ref 94–97)
O2HB: 89.8 % (ref 94–97)
O2HB: 93.7 % (ref 94–97)
O2HB: 97.4 % (ref 94–97)
OPERATOR ID: 1556
OPERATOR ID: 1874
OPERATOR ID: 5100
OPERATOR ID: 914
OPERATOR ID: ABNORMAL
OPERATOR ID: ABNORMAL
ORGANISM: ABNORMAL
OSMOLALITY URINE: 1133 MOSM/KG (ref 300–900)
OSMOLALITY: 334 MOSM/KG (ref 285–310)
OSMOLALITY: 350 MOSM/KG (ref 285–310)
OVALOCYTES: ABNORMAL
OVALOCYTES: ABNORMAL
PARAINFLUENZA VIRUS 1 BY PCR: NOT DETECTED
PARAINFLUENZA VIRUS 2 BY PCR: NOT DETECTED
PARAINFLUENZA VIRUS 3 BY PCR: NOT DETECTED
PARAINFLUENZA VIRUS 4 BY PCR: NOT DETECTED
PATIENT TEMP: 37
PATIENT TEMP: 37 C
PCO2 (TEMP CORRECTED): 36.1 MMHG (ref 35–45)
PCO2 ARTERIAL: 41.6 MMHG (ref 35–45)
PCO2: 35.9 MMHG (ref 35–45)
PCO2: 43.3 MMHG (ref 35–45)
PCO2: 51 MMHG (ref 35–45)
PCO2: 51.2 MMHG (ref 35–45)
PDW BLD-RTO: 11.6 FL (ref 11.5–15)
PDW BLD-RTO: 11.7 FL (ref 11.5–15)
PDW BLD-RTO: 11.8 FL (ref 11.5–15)
PDW BLD-RTO: 11.8 FL (ref 11.5–15)
PDW BLD-RTO: 11.9 FL (ref 11.5–15)
PDW BLD-RTO: 12.1 FL (ref 11.5–15)
PDW BLD-RTO: 12.4 FL (ref 11.5–15)
PDW BLD-RTO: 12.5 FL (ref 11.5–15)
PDW BLD-RTO: 12.6 FL (ref 11.5–15)
PDW BLD-RTO: 12.7 FL (ref 11.5–15)
PDW BLD-RTO: 12.8 FL (ref 11.5–15)
PDW BLD-RTO: 12.9 FL (ref 11.5–15)
PDW BLD-RTO: 13.1 FL (ref 11.5–15)
PDW BLD-RTO: 13.3 FL (ref 11.5–15)
PDW BLD-RTO: 13.3 FL (ref 11.5–15)
PDW BLD-RTO: 13.4 FL (ref 11.5–15)
PDW BLD-RTO: 13.6 FL (ref 11.5–15)
PDW BLD-RTO: 13.6 FL (ref 11.5–15)
PDW BLD-RTO: 13.7 FL (ref 11.5–15)
PDW BLD-RTO: 13.9 FL (ref 11.5–15)
PDW BLD-RTO: 14.5 FL (ref 11.5–15)
PH (TEMPERATURE CORRECTED): 7.44 (ref 7.35–7.45)
PH BLOOD GAS: 7.3 (ref 7.35–7.45)
PH BLOOD GAS: 7.31 (ref 7.35–7.45)
PH BLOOD GAS: 7.42 (ref 7.35–7.45)
PH BLOOD GAS: 7.42 (ref 7.35–7.45)
PH BLOOD GAS: 7.43 (ref 7.35–7.45)
PH UA: 5 (ref 5–9)
PH UA: 5.5 (ref 5–9)
PH UA: 6.5 (ref 5–9)
PHOSPHORUS: 3.4 MG/DL (ref 2.5–4.5)
PHOSPHORUS: 6.4 MG/DL (ref 2.5–4.5)
PLATELET # BLD: 100 E9/L (ref 130–450)
PLATELET # BLD: 111 E9/L (ref 130–450)
PLATELET # BLD: 117 E9/L (ref 130–450)
PLATELET # BLD: 119 E9/L (ref 130–450)
PLATELET # BLD: 120 E9/L (ref 130–450)
PLATELET # BLD: 122 E9/L (ref 130–450)
PLATELET # BLD: 130 E9/L (ref 130–450)
PLATELET # BLD: 132 E9/L (ref 130–450)
PLATELET # BLD: 132 E9/L (ref 130–450)
PLATELET # BLD: 138 E9/L (ref 130–450)
PLATELET # BLD: 145 E9/L (ref 130–450)
PLATELET # BLD: 146 E9/L (ref 130–450)
PLATELET # BLD: 164 E9/L (ref 130–450)
PLATELET # BLD: 169 E9/L (ref 130–450)
PLATELET # BLD: 199 E9/L (ref 130–450)
PLATELET # BLD: 216 E9/L (ref 130–450)
PLATELET # BLD: 244 E9/L (ref 130–450)
PLATELET # BLD: 257 E9/L (ref 130–450)
PLATELET # BLD: 77 E9/L (ref 130–450)
PLATELET # BLD: 84 E9/L (ref 130–450)
PLATELET # BLD: 88 E9/L (ref 130–450)
PLATELET # BLD: 92 E9/L (ref 130–450)
PLATELET CONFIRMATION: NORMAL
PMV BLD AUTO: 10 FL (ref 7–12)
PMV BLD AUTO: 10.1 FL (ref 7–12)
PMV BLD AUTO: 10.1 FL (ref 7–12)
PMV BLD AUTO: 10.6 FL (ref 7–12)
PMV BLD AUTO: 10.6 FL (ref 7–12)
PMV BLD AUTO: 10.8 FL (ref 7–12)
PMV BLD AUTO: 10.9 FL (ref 7–12)
PMV BLD AUTO: 11.4 FL (ref 7–12)
PMV BLD AUTO: 11.4 FL (ref 7–12)
PMV BLD AUTO: 11.5 FL (ref 7–12)
PMV BLD AUTO: 11.5 FL (ref 7–12)
PMV BLD AUTO: 11.6 FL (ref 7–12)
PMV BLD AUTO: 11.7 FL (ref 7–12)
PMV BLD AUTO: 11.7 FL (ref 7–12)
PMV BLD AUTO: 12.1 FL (ref 7–12)
PMV BLD AUTO: 9.1 FL (ref 7–12)
PMV BLD AUTO: 9.4 FL (ref 7–12)
PMV BLD AUTO: 9.6 FL (ref 7–12)
PMV BLD AUTO: 9.7 FL (ref 7–12)
PMV BLD AUTO: 9.8 FL (ref 7–12)
PMV BLD AUTO: 9.8 FL (ref 7–12)
PO2 (TEMP CORRECTED): 63.2 MMHG (ref 60–80)
PO2 ARTERIAL: 66.2 MMHG (ref 60–80)
PO2: 124.8 MMHG (ref 75–100)
PO2: 52.9 MMHG (ref 75–100)
PO2: 64.9 MMHG (ref 75–100)
PO2: 77.6 MMHG (ref 75–100)
POIKILOCYTES: ABNORMAL
POIKILOCYTES: ABNORMAL
POLYCHROMASIA: ABNORMAL
POTASSIUM REFLEX MAGNESIUM: 3 MMOL/L (ref 3.5–5)
POTASSIUM REFLEX MAGNESIUM: 3.4 MMOL/L (ref 3.5–5)
POTASSIUM REFLEX MAGNESIUM: 3.4 MMOL/L (ref 3.5–5)
POTASSIUM REFLEX MAGNESIUM: 3.5 MMOL/L (ref 3.5–5)
POTASSIUM REFLEX MAGNESIUM: 3.5 MMOL/L (ref 3.5–5)
POTASSIUM REFLEX MAGNESIUM: 3.6 MMOL/L (ref 3.5–5)
POTASSIUM REFLEX MAGNESIUM: 3.7 MMOL/L (ref 3.5–5)
POTASSIUM REFLEX MAGNESIUM: 3.8 MMOL/L (ref 3.5–5)
POTASSIUM REFLEX MAGNESIUM: 3.9 MMOL/L (ref 3.5–5)
POTASSIUM REFLEX MAGNESIUM: 4.1 MMOL/L (ref 3.5–5)
POTASSIUM REFLEX MAGNESIUM: 4.2 MMOL/L (ref 3.5–5)
POTASSIUM REFLEX MAGNESIUM: 4.3 MMOL/L (ref 3.5–5)
POTASSIUM REFLEX MAGNESIUM: 4.4 MMOL/L (ref 3.5–5)
POTASSIUM REFLEX MAGNESIUM: 4.5 MMOL/L (ref 3.5–5)
POTASSIUM SERPL-SCNC: 3 MMOL/L (ref 3.5–5)
POTASSIUM SERPL-SCNC: 3.3 MMOL/L (ref 3.5–5)
POTASSIUM SERPL-SCNC: 3.4 MMOL/L (ref 3.5–5)
POTASSIUM SERPL-SCNC: 3.5 MMOL/L (ref 3.5–5)
POTASSIUM SERPL-SCNC: 3.6 MMOL/L (ref 3.5–5)
POTASSIUM SERPL-SCNC: 3.7 MMOL/L (ref 3.5–5)
POTASSIUM SERPL-SCNC: 3.7 MMOL/L (ref 3.5–5)
POTASSIUM SERPL-SCNC: 3.8 MMOL/L (ref 3.5–5)
POTASSIUM SERPL-SCNC: 3.9 MMOL/L (ref 3.5–5)
POTASSIUM SERPL-SCNC: 4 MMOL/L (ref 3.5–5)
POTASSIUM SERPL-SCNC: 4 MMOL/L (ref 3.5–5)
POTASSIUM SERPL-SCNC: 4.3 MMOL/L (ref 3.5–5)
POTASSIUM SERPL-SCNC: 4.3 MMOL/L (ref 3.5–5)
POTASSIUM SERPL-SCNC: 4.4 MMOL/L (ref 3.5–5)
POTASSIUM SERPL-SCNC: 4.6 MMOL/L (ref 3.5–5)
POTASSIUM SERPL-SCNC: 4.7 MMOL/L (ref 3.5–5)
POTASSIUM SERPL-SCNC: 5.2 MMOL/L (ref 3.5–5)
PREALBUMIN: 13 MG/DL (ref 20–40)
PRO-BNP: 264 PG/ML (ref 0–125)
PRO-BNP: 66 PG/ML (ref 0–125)
PROCALCITONIN: 0.08 NG/ML (ref 0–0.08)
PROCALCITONIN: 0.1 NG/ML (ref 0–0.08)
PROCALCITONIN: 0.14 NG/ML (ref 0–0.08)
PROCALCITONIN: 0.85 NG/ML (ref 0–0.08)
PROTEIN UA: 100 MG/DL
PROTEIN UA: 100 MG/DL
PROTEIN UA: NEGATIVE MG/DL
PROTHROMBIN TIME: 13 SEC (ref 9.3–12.4)
RBC # BLD: 3.18 E12/L (ref 3.8–5.8)
RBC # BLD: 3.37 E12/L (ref 3.8–5.8)
RBC # BLD: 3.44 E12/L (ref 3.8–5.8)
RBC # BLD: 3.53 E12/L (ref 3.8–5.8)
RBC # BLD: 3.69 E12/L (ref 3.8–5.8)
RBC # BLD: 3.75 E12/L (ref 3.8–5.8)
RBC # BLD: 3.75 E12/L (ref 3.8–5.8)
RBC # BLD: 3.86 E12/L (ref 3.8–5.8)
RBC # BLD: 3.86 E12/L (ref 3.8–5.8)
RBC # BLD: 3.95 E12/L (ref 3.8–5.8)
RBC # BLD: 4.06 E12/L (ref 3.8–5.8)
RBC # BLD: 4.08 E12/L (ref 3.8–5.8)
RBC # BLD: 4.09 E12/L (ref 3.8–5.8)
RBC # BLD: 4.18 E12/L (ref 3.8–5.8)
RBC # BLD: 4.2 E12/L (ref 3.8–5.8)
RBC # BLD: 4.22 E12/L (ref 3.8–5.8)
RBC # BLD: 4.5 E12/L (ref 3.8–5.8)
RBC # BLD: 4.52 E12/L (ref 3.8–5.8)
RBC # BLD: 4.64 E12/L (ref 3.8–5.8)
RBC # BLD: 4.64 E12/L (ref 3.8–5.8)
RBC # BLD: 4.71 E12/L (ref 3.8–5.8)
RBC # BLD: 4.81 E12/L (ref 3.8–5.8)
RBC # BLD: 4.82 E12/L (ref 3.8–5.8)
RBC # BLD: 4.97 E12/L (ref 3.8–5.8)
RBC # BLD: NORMAL 10*6/UL
RBC UA: ABNORMAL /HPF (ref 0–2)
RBC UA: NORMAL /HPF (ref 0–2)
REASON FOR REJECTION: NORMAL
REJECTED TEST: NORMAL
RESPIRATORY SYNCYTIAL VIRUS BY PCR: NOT DETECTED
ROULEAUX: ABNORMAL
RPR TITER: NORMAL
RPR: NON REACTIVE
SARS-COV-2, NAAT: NOT DETECTED
SARS-COV-2, PCR: NOT DETECTED
SEDIMENTATION RATE, ERYTHROCYTE: 6 MM/HR (ref 0–15)
SEX HORMONE BINDING GLOBULIN: 52 NMOL/L (ref 11–80)
SODIUM BLD-SCNC: 133 MMOL/L (ref 132–146)
SODIUM BLD-SCNC: 138 MMOL/L (ref 132–146)
SODIUM BLD-SCNC: 138 MMOL/L (ref 132–146)
SODIUM BLD-SCNC: 139 MMOL/L (ref 132–146)
SODIUM BLD-SCNC: 140 MMOL/L (ref 132–146)
SODIUM BLD-SCNC: 141 MMOL/L (ref 132–146)
SODIUM BLD-SCNC: 142 MMOL/L (ref 132–146)
SODIUM BLD-SCNC: 142 MMOL/L (ref 132–146)
SODIUM BLD-SCNC: 143 MMOL/L (ref 132–146)
SODIUM BLD-SCNC: 143 MMOL/L (ref 132–146)
SODIUM BLD-SCNC: 144 MMOL/L (ref 132–146)
SODIUM BLD-SCNC: 145 MMOL/L (ref 132–146)
SODIUM BLD-SCNC: 146 MMOL/L (ref 132–146)
SODIUM BLD-SCNC: 147 MMOL/L (ref 132–146)
SODIUM BLD-SCNC: 148 MMOL/L (ref 132–146)
SODIUM BLD-SCNC: 149 MMOL/L (ref 132–146)
SODIUM BLD-SCNC: 150 MMOL/L (ref 132–146)
SODIUM BLD-SCNC: 151 MMOL/L (ref 132–146)
SODIUM BLD-SCNC: 152 MMOL/L (ref 132–146)
SODIUM BLD-SCNC: 153 MMOL/L (ref 132–146)
SODIUM BLD-SCNC: 153 MMOL/L (ref 132–146)
SODIUM BLD-SCNC: 154 MMOL/L (ref 132–146)
SODIUM BLD-SCNC: 155 MMOL/L (ref 132–146)
SODIUM BLD-SCNC: 155 MMOL/L (ref 132–146)
SODIUM BLD-SCNC: 156 MMOL/L (ref 132–146)
SODIUM BLD-SCNC: 156 MMOL/L (ref 132–146)
SODIUM BLD-SCNC: 157 MMOL/L (ref 132–146)
SODIUM BLD-SCNC: 158 MMOL/L (ref 132–146)
SODIUM BLD-SCNC: 158 MMOL/L (ref 132–146)
SODIUM BLD-SCNC: 159 MMOL/L (ref 132–146)
SODIUM BLD-SCNC: 160 MMOL/L (ref 132–146)
SODIUM BLD-SCNC: 161 MMOL/L (ref 132–146)
SODIUM BLD-SCNC: 162 MMOL/L (ref 132–146)
SODIUM BLD-SCNC: 163 MMOL/L (ref 132–146)
SODIUM BLD-SCNC: 165 MMOL/L (ref 132–146)
SODIUM BLD-SCNC: 165 MMOL/L (ref 132–146)
SODIUM BLD-SCNC: 168 MMOL/L (ref 132–146)
SODIUM BLD-SCNC: 171 MMOL/L (ref 132–146)
SODIUM BLD-SCNC: 173 MMOL/L (ref 132–146)
SODIUM URINE: 43 MMOL/L
SOURCE, BLOOD GAS: ABNORMAL
SPECIFIC GRAVITY UA: 1.02 (ref 1–1.03)
SPECIFIC GRAVITY UA: >=1.03 (ref 1–1.03)
SPECIFIC GRAVITY UA: >=1.03 (ref 1–1.03)
STREP PNEUMONIAE ANTIGEN, URINE: NORMAL
T4 FREE: 1.36 NG/DL (ref 0.93–1.7)
T4 FREE: 7.7
TESTOSTERONE FREE-NONMALE: 14.7 PG/ML (ref 47–244)
TESTOSTERONE TOTAL: 107 NG/DL (ref 220–1000)
THB: 13.9 G/DL (ref 11.5–16.5)
THB: 14.4 G/DL (ref 11.5–16.5)
THB: 14.9 G/DL (ref 11.5–16.5)
THB: 15 G/DL (ref 11.5–16.5)
TIME ANALYZED: 1334
TIME ANALYZED: 1833
TIME ANALYZED: 1941
TIME ANALYZED: 524
TOTAL CK: 1177 U/L (ref 20–200)
TOTAL CK: 1523 U/L (ref 20–200)
TOTAL CK: 3788 U/L (ref 20–200)
TOTAL CK: 507 U/L (ref 20–200)
TOTAL CK: 516 U/L (ref 20–200)
TOTAL CK: 7410 U/L (ref 20–200)
TOTAL CK: 8912 U/L (ref 20–200)
TOTAL CK: ABNORMAL U/L (ref 20–200)
TOTAL PROTEIN: 5.2 G/DL (ref 6.4–8.3)
TOTAL PROTEIN: 5.2 G/DL (ref 6.4–8.3)
TOTAL PROTEIN: 5.4 G/DL (ref 6.4–8.3)
TOTAL PROTEIN: 5.4 G/DL (ref 6.4–8.3)
TOTAL PROTEIN: 5.5 G/DL (ref 6.4–8.3)
TOTAL PROTEIN: 5.5 G/DL (ref 6.4–8.3)
TOTAL PROTEIN: 5.6 G/DL (ref 6.4–8.3)
TOTAL PROTEIN: 5.7 G/DL (ref 6.4–8.3)
TOTAL PROTEIN: 5.8 G/DL (ref 6.4–8.3)
TOTAL PROTEIN: 5.9 G/DL (ref 6.4–8.3)
TOTAL PROTEIN: 6 G/DL (ref 6.4–8.3)
TOTAL PROTEIN: 6.2 G/DL (ref 6.4–8.3)
TOTAL PROTEIN: 6.2 G/DL (ref 6.4–8.3)
TOTAL PROTEIN: 6.4 G/DL (ref 6.4–8.3)
TOTAL PROTEIN: 6.5 G/DL (ref 6.4–8.3)
TOTAL PROTEIN: 6.5 G/DL (ref 6.4–8.3)
TOTAL PROTEIN: 6.6 G/DL (ref 6.4–8.3)
TOTAL PROTEIN: 6.7 G/DL (ref 6.4–8.3)
TOTAL PROTEIN: 6.7 G/DL (ref 6.4–8.3)
TOTAL PROTEIN: 6.8 G/DL (ref 6.4–8.3)
TOTAL PROTEIN: 7 G/DL (ref 6.4–8.3)
TOTAL PROTEIN: 7.2 G/DL (ref 6.4–8.3)
TOTAL PROTEIN: 7.4 G/DL (ref 6.4–8.3)
TOTAL PROTEIN: 7.5 G/DL (ref 6.4–8.3)
TOTAL PROTEIN: 7.7 G/DL (ref 6.4–8.3)
TOTAL PROTEIN: 7.7 G/DL (ref 6.4–8.3)
TOTAL PROTEIN: 7.8 G/DL (ref 6.4–8.3)
TOTAL PROTEIN: 8 G/DL (ref 6.4–8.3)
TOTAL PROTEIN: 8.1 G/DL (ref 6.4–8.3)
TOTAL PROTEIN: 8.3 G/DL (ref 6.4–8.3)
TROPONIN, HIGH SENSITIVITY: 52 NG/L (ref 0–11)
TROPONIN, HIGH SENSITIVITY: 53 NG/L (ref 0–11)
TROPONIN: 0.01 NG/ML (ref 0–0.03)
TROPONIN: 0.02 NG/ML (ref 0–0.03)
TROPONIN: <0.01 NG/ML (ref 0–0.03)
TSH SERPL DL<=0.05 MIU/L-ACNC: 1.6 UIU/ML (ref 0.27–4.2)
TSH SERPL DL<=0.05 MIU/L-ACNC: 2.91 UIU/ML
URINE CULTURE, ROUTINE: ABNORMAL
URINE CULTURE, ROUTINE: NORMAL
UROBILINOGEN, URINE: 0.2 E.U./DL
UROBILINOGEN, URINE: 1 E.U./DL
VITAMIN B-12: 416
WBC # BLD: 10.3 E9/L (ref 4.5–11.5)
WBC # BLD: 4.5 E9/L (ref 4.5–11.5)
WBC # BLD: 4.6 E9/L (ref 4.5–11.5)
WBC # BLD: 4.6 E9/L (ref 4.5–11.5)
WBC # BLD: 5 E9/L (ref 4.5–11.5)
WBC # BLD: 5.1 E9/L (ref 4.5–11.5)
WBC # BLD: 5.3 E9/L (ref 4.5–11.5)
WBC # BLD: 5.5 E9/L (ref 4.5–11.5)
WBC # BLD: 5.6 E9/L (ref 4.5–11.5)
WBC # BLD: 6 E9/L (ref 4.5–11.5)
WBC # BLD: 6.1 E9/L (ref 4.5–11.5)
WBC # BLD: 6.3 E9/L (ref 4.5–11.5)
WBC # BLD: 6.4 E9/L (ref 4.5–11.5)
WBC # BLD: 6.6 E9/L (ref 4.5–11.5)
WBC # BLD: 6.6 E9/L (ref 4.5–11.5)
WBC # BLD: 6.7 E9/L (ref 4.5–11.5)
WBC # BLD: 7 E9/L (ref 4.5–11.5)
WBC # BLD: 7 E9/L (ref 4.5–11.5)
WBC # BLD: 7.2 E9/L (ref 4.5–11.5)
WBC # BLD: 7.8 E9/L (ref 4.5–11.5)
WBC # BLD: 7.8 E9/L (ref 4.5–11.5)
WBC # BLD: 7.9 E9/L (ref 4.5–11.5)
WBC # BLD: 8.3 E9/L (ref 4.5–11.5)
WBC # BLD: 8.7 E9/L (ref 4.5–11.5)
WBC UA: >20 /HPF (ref 0–5)
WBC UA: ABNORMAL /HPF (ref 0–5)
WBC UA: ABNORMAL /HPF (ref 0–5)
WBC UA: NORMAL /HPF (ref 0–5)

## 2021-01-01 PROCEDURE — 99213 OFFICE O/P EST LOW 20 MIN: CPT | Performed by: CLINICAL NURSE SPECIALIST

## 2021-01-01 PROCEDURE — 82803 BLOOD GASES ANY COMBINATION: CPT

## 2021-01-01 PROCEDURE — 0202U NFCT DS 22 TRGT SARS-COV-2: CPT

## 2021-01-01 PROCEDURE — 2580000003 HC RX 258: Performed by: INTERNAL MEDICINE

## 2021-01-01 PROCEDURE — 71045 X-RAY EXAM CHEST 1 VIEW: CPT

## 2021-01-01 PROCEDURE — 6370000000 HC RX 637 (ALT 250 FOR IP): Performed by: INTERNAL MEDICINE

## 2021-01-01 PROCEDURE — 2580000003 HC RX 258: Performed by: FAMILY MEDICINE

## 2021-01-01 PROCEDURE — 92611 MOTION FLUOROSCOPY/SWALLOW: CPT | Performed by: SPEECH-LANGUAGE PATHOLOGIST

## 2021-01-01 PROCEDURE — 84484 ASSAY OF TROPONIN QUANT: CPT

## 2021-01-01 PROCEDURE — 85027 COMPLETE CBC AUTOMATED: CPT

## 2021-01-01 PROCEDURE — 1123F ACP DISCUSS/DSCN MKR DOCD: CPT | Performed by: INTERNAL MEDICINE

## 2021-01-01 PROCEDURE — 71275 CT ANGIOGRAPHY CHEST: CPT

## 2021-01-01 PROCEDURE — 99283 EMERGENCY DEPT VISIT LOW MDM: CPT

## 2021-01-01 PROCEDURE — 82805 BLOOD GASES W/O2 SATURATION: CPT

## 2021-01-01 PROCEDURE — 6360000004 HC RX CONTRAST MEDICATION: Performed by: RADIOLOGY

## 2021-01-01 PROCEDURE — 11042 DBRDMT SUBQ TIS 1ST 20SQCM/<: CPT

## 2021-01-01 PROCEDURE — 87635 SARS-COV-2 COVID-19 AMP PRB: CPT

## 2021-01-01 PROCEDURE — 0DH64UZ INSERTION OF FEEDING DEVICE INTO STOMACH, PERCUTANEOUS ENDOSCOPIC APPROACH: ICD-10-PCS | Performed by: INTERNAL MEDICINE

## 2021-01-01 PROCEDURE — 2060000000 HC ICU INTERMEDIATE R&B

## 2021-01-01 PROCEDURE — 6370000000 HC RX 637 (ALT 250 FOR IP): Performed by: PODIATRIST

## 2021-01-01 PROCEDURE — 80048 BASIC METABOLIC PNL TOTAL CA: CPT

## 2021-01-01 PROCEDURE — 80051 ELECTROLYTE PANEL: CPT

## 2021-01-01 PROCEDURE — 87040 BLOOD CULTURE FOR BACTERIA: CPT

## 2021-01-01 PROCEDURE — G8420 CALC BMI NORM PARAMETERS: HCPCS | Performed by: CLINICAL NURSE SPECIALIST

## 2021-01-01 PROCEDURE — 2580000003 HC RX 258: Performed by: EMERGENCY MEDICINE

## 2021-01-01 PROCEDURE — 2700000000 HC OXYGEN THERAPY PER DAY

## 2021-01-01 PROCEDURE — 6370000000 HC RX 637 (ALT 250 FOR IP): Performed by: NURSE PRACTITIONER

## 2021-01-01 PROCEDURE — 1200000000 HC SEMI PRIVATE

## 2021-01-01 PROCEDURE — 83605 ASSAY OF LACTIC ACID: CPT

## 2021-01-01 PROCEDURE — 6360000002 HC RX W HCPCS: Performed by: INTERNAL MEDICINE

## 2021-01-01 PROCEDURE — 80053 COMPREHEN METABOLIC PANEL: CPT

## 2021-01-01 PROCEDURE — U0002 COVID-19 LAB TEST NON-CDC: HCPCS

## 2021-01-01 PROCEDURE — 71250 CT THORAX DX C-: CPT

## 2021-01-01 PROCEDURE — 93010 ELECTROCARDIOGRAM REPORT: CPT | Performed by: INTERNAL MEDICINE

## 2021-01-01 PROCEDURE — 3700000000 HC ANESTHESIA ATTENDED CARE: Performed by: SURGERY

## 2021-01-01 PROCEDURE — 82553 CREATINE MB FRACTION: CPT

## 2021-01-01 PROCEDURE — 84134 ASSAY OF PREALBUMIN: CPT

## 2021-01-01 PROCEDURE — 6370000000 HC RX 637 (ALT 250 FOR IP)

## 2021-01-01 PROCEDURE — 6360000002 HC RX W HCPCS: Performed by: SPECIALIST

## 2021-01-01 PROCEDURE — 96374 THER/PROPH/DIAG INJ IV PUSH: CPT

## 2021-01-01 PROCEDURE — 84300 ASSAY OF URINE SODIUM: CPT

## 2021-01-01 PROCEDURE — 94640 AIRWAY INHALATION TREATMENT: CPT

## 2021-01-01 PROCEDURE — 2500000003 HC RX 250 WO HCPCS: Performed by: SURGERY

## 2021-01-01 PROCEDURE — 87077 CULTURE AEROBIC IDENTIFY: CPT

## 2021-01-01 PROCEDURE — 84100 ASSAY OF PHOSPHORUS: CPT

## 2021-01-01 PROCEDURE — 81001 URINALYSIS AUTO W/SCOPE: CPT

## 2021-01-01 PROCEDURE — 85025 COMPLETE CBC W/AUTO DIFF WBC: CPT

## 2021-01-01 PROCEDURE — 99214 OFFICE O/P EST MOD 30 MIN: CPT | Performed by: CLINICAL NURSE SPECIALIST

## 2021-01-01 PROCEDURE — 85378 FIBRIN DEGRADE SEMIQUANT: CPT

## 2021-01-01 PROCEDURE — 93005 ELECTROCARDIOGRAM TRACING: CPT | Performed by: EMERGENCY MEDICINE

## 2021-01-01 PROCEDURE — 2500000003 HC RX 250 WO HCPCS: Performed by: RADIOLOGY

## 2021-01-01 PROCEDURE — 36415 COLL VENOUS BLD VENIPUNCTURE: CPT

## 2021-01-01 PROCEDURE — 80074 ACUTE HEPATITIS PANEL: CPT

## 2021-01-01 PROCEDURE — 6360000002 HC RX W HCPCS: Performed by: FAMILY MEDICINE

## 2021-01-01 PROCEDURE — G8484 FLU IMMUNIZE NO ADMIN: HCPCS | Performed by: CLINICAL NURSE SPECIALIST

## 2021-01-01 PROCEDURE — 83690 ASSAY OF LIPASE: CPT

## 2021-01-01 PROCEDURE — 82962 GLUCOSE BLOOD TEST: CPT

## 2021-01-01 PROCEDURE — 2000000000 HC ICU R&B

## 2021-01-01 PROCEDURE — XW03396 INTRODUCTION OF CEFTOLOZANE/TAZOBACTAM ANTI-INFECTIVE INTO PERIPHERAL VEIN, PERCUTANEOUS APPROACH, NEW TECHNOLOGY GROUP 6: ICD-10-PCS | Performed by: EMERGENCY MEDICINE

## 2021-01-01 PROCEDURE — 99233 SBSQ HOSP IP/OBS HIGH 50: CPT | Performed by: CLINICAL NURSE SPECIALIST

## 2021-01-01 PROCEDURE — 6370000000 HC RX 637 (ALT 250 FOR IP): Performed by: FAMILY MEDICINE

## 2021-01-01 PROCEDURE — 6370000000 HC RX 637 (ALT 250 FOR IP): Performed by: SPECIALIST

## 2021-01-01 PROCEDURE — 99285 EMERGENCY DEPT VISIT HI MDM: CPT

## 2021-01-01 PROCEDURE — 83880 ASSAY OF NATRIURETIC PEPTIDE: CPT

## 2021-01-01 PROCEDURE — 99223 1ST HOSP IP/OBS HIGH 75: CPT | Performed by: CLINICAL NURSE SPECIALIST

## 2021-01-01 PROCEDURE — 80156 ASSAY CARBAMAZEPINE TOTAL: CPT

## 2021-01-01 PROCEDURE — G8427 DOCREV CUR MEDS BY ELIG CLIN: HCPCS | Performed by: CLINICAL NURSE SPECIALIST

## 2021-01-01 PROCEDURE — 7100000000 HC PACU RECOVERY - FIRST 15 MIN: Performed by: SURGERY

## 2021-01-01 PROCEDURE — 96361 HYDRATE IV INFUSION ADD-ON: CPT

## 2021-01-01 PROCEDURE — 4040F PNEUMOC VAC/ADMIN/RCVD: CPT | Performed by: CLINICAL NURSE SPECIALIST

## 2021-01-01 PROCEDURE — 83930 ASSAY OF BLOOD OSMOLALITY: CPT

## 2021-01-01 PROCEDURE — 76705 ECHO EXAM OF ABDOMEN: CPT

## 2021-01-01 PROCEDURE — 7100000001 HC PACU RECOVERY - ADDTL 15 MIN: Performed by: SURGERY

## 2021-01-01 PROCEDURE — 92526 ORAL FUNCTION THERAPY: CPT | Performed by: SPEECH-LANGUAGE PATHOLOGIST

## 2021-01-01 PROCEDURE — 2580000003 HC RX 258: Performed by: SPECIALIST

## 2021-01-01 PROCEDURE — 2140000000 HC CCU INTERMEDIATE R&B

## 2021-01-01 PROCEDURE — 4040F PNEUMOC VAC/ADMIN/RCVD: CPT | Performed by: INTERNAL MEDICINE

## 2021-01-01 PROCEDURE — 74019 RADEX ABDOMEN 2 VIEWS: CPT

## 2021-01-01 PROCEDURE — 99232 SBSQ HOSP IP/OBS MODERATE 35: CPT | Performed by: CLINICAL NURSE SPECIALIST

## 2021-01-01 PROCEDURE — 2580000003 HC RX 258: Performed by: STUDENT IN AN ORGANIZED HEALTH CARE EDUCATION/TRAINING PROGRAM

## 2021-01-01 PROCEDURE — 85651 RBC SED RATE NONAUTOMATED: CPT

## 2021-01-01 PROCEDURE — 84145 PROCALCITONIN (PCT): CPT

## 2021-01-01 PROCEDURE — 6360000002 HC RX W HCPCS: Performed by: STUDENT IN AN ORGANIZED HEALTH CARE EDUCATION/TRAINING PROGRAM

## 2021-01-01 PROCEDURE — 3017F COLORECTAL CA SCREEN DOC REV: CPT | Performed by: INTERNAL MEDICINE

## 2021-01-01 PROCEDURE — 74177 CT ABD & PELVIS W/CONTRAST: CPT

## 2021-01-01 PROCEDURE — 99221 1ST HOSP IP/OBS SF/LOW 40: CPT | Performed by: PSYCHIATRY & NEUROLOGY

## 2021-01-01 PROCEDURE — 1036F TOBACCO NON-USER: CPT | Performed by: CLINICAL NURSE SPECIALIST

## 2021-01-01 PROCEDURE — 3700000001 HC ADD 15 MINUTES (ANESTHESIA): Performed by: SURGERY

## 2021-01-01 PROCEDURE — 1111F DSCHRG MED/CURRENT MED MERGE: CPT | Performed by: CLINICAL NURSE SPECIALIST

## 2021-01-01 PROCEDURE — 85384 FIBRINOGEN ACTIVITY: CPT

## 2021-01-01 PROCEDURE — 6360000002 HC RX W HCPCS: Performed by: EMERGENCY MEDICINE

## 2021-01-01 PROCEDURE — 3017F COLORECTAL CA SCREEN DOC REV: CPT | Performed by: CLINICAL NURSE SPECIALIST

## 2021-01-01 PROCEDURE — 96360 HYDRATION IV INFUSION INIT: CPT

## 2021-01-01 PROCEDURE — 6360000002 HC RX W HCPCS

## 2021-01-01 PROCEDURE — 83935 ASSAY OF URINE OSMOLALITY: CPT

## 2021-01-01 PROCEDURE — 99213 OFFICE O/P EST LOW 20 MIN: CPT

## 2021-01-01 PROCEDURE — 1123F ACP DISCUSS/DSCN MKR DOCD: CPT | Performed by: CLINICAL NURSE SPECIALIST

## 2021-01-01 PROCEDURE — 87088 URINE BACTERIA CULTURE: CPT

## 2021-01-01 PROCEDURE — 43246 EGD PLACE GASTROSTOMY TUBE: CPT | Performed by: SURGERY

## 2021-01-01 PROCEDURE — 2580000003 HC RX 258: Performed by: NURSE PRACTITIONER

## 2021-01-01 PROCEDURE — 99291 CRITICAL CARE FIRST HOUR: CPT | Performed by: INTERNAL MEDICINE

## 2021-01-01 PROCEDURE — 86140 C-REACTIVE PROTEIN: CPT

## 2021-01-01 PROCEDURE — 93005 ELECTROCARDIOGRAM TRACING: CPT | Performed by: STUDENT IN AN ORGANIZED HEALTH CARE EDUCATION/TRAINING PROGRAM

## 2021-01-01 PROCEDURE — 2500000003 HC RX 250 WO HCPCS: Performed by: EMERGENCY MEDICINE

## 2021-01-01 PROCEDURE — 94667 MNPJ CHEST WALL 1ST: CPT

## 2021-01-01 PROCEDURE — 6370000000 HC RX 637 (ALT 250 FOR IP): Performed by: EMERGENCY MEDICINE

## 2021-01-01 PROCEDURE — 82550 ASSAY OF CK (CPK): CPT

## 2021-01-01 PROCEDURE — 1111F DSCHRG MED/CURRENT MED MERGE: CPT | Performed by: INTERNAL MEDICINE

## 2021-01-01 PROCEDURE — 83735 ASSAY OF MAGNESIUM: CPT

## 2021-01-01 PROCEDURE — 97535 SELF CARE MNGMENT TRAINING: CPT

## 2021-01-01 PROCEDURE — 85730 THROMBOPLASTIN TIME PARTIAL: CPT

## 2021-01-01 PROCEDURE — 99222 1ST HOSP IP/OBS MODERATE 55: CPT | Performed by: NURSE PRACTITIONER

## 2021-01-01 PROCEDURE — 74176 CT ABD & PELVIS W/O CONTRAST: CPT

## 2021-01-01 PROCEDURE — 93005 ELECTROCARDIOGRAM TRACING: CPT | Performed by: INTERNAL MEDICINE

## 2021-01-01 PROCEDURE — 97162 PT EVAL MOD COMPLEX 30 MIN: CPT

## 2021-01-01 PROCEDURE — G8484 FLU IMMUNIZE NO ADMIN: HCPCS | Performed by: INTERNAL MEDICINE

## 2021-01-01 PROCEDURE — 87186 SC STD MICRODIL/AGAR DIL: CPT

## 2021-01-01 PROCEDURE — 99282 EMERGENCY DEPT VISIT SF MDM: CPT

## 2021-01-01 PROCEDURE — 87449 NOS EACH ORGANISM AG IA: CPT

## 2021-01-01 PROCEDURE — 99203 OFFICE O/P NEW LOW 30 MIN: CPT | Performed by: INTERNAL MEDICINE

## 2021-01-01 PROCEDURE — 99284 EMERGENCY DEPT VISIT MOD MDM: CPT

## 2021-01-01 PROCEDURE — 1036F TOBACCO NON-USER: CPT | Performed by: INTERNAL MEDICINE

## 2021-01-01 PROCEDURE — 3609013300 HC EGD TUBE PLACEMENT: Performed by: SURGERY

## 2021-01-01 PROCEDURE — 2580000003 HC RX 258: Performed by: SURGERY

## 2021-01-01 PROCEDURE — G8428 CUR MEDS NOT DOCUMENT: HCPCS | Performed by: INTERNAL MEDICINE

## 2021-01-01 PROCEDURE — 74230 X-RAY XM SWLNG FUNCJ C+: CPT

## 2021-01-01 PROCEDURE — G8420 CALC BMI NORM PARAMETERS: HCPCS | Performed by: INTERNAL MEDICINE

## 2021-01-01 PROCEDURE — 99233 SBSQ HOSP IP/OBS HIGH 50: CPT | Performed by: NURSE PRACTITIONER

## 2021-01-01 PROCEDURE — 87502 INFLUENZA DNA AMP PROBE: CPT

## 2021-01-01 PROCEDURE — 6370000000 HC RX 637 (ALT 250 FOR IP): Performed by: PSYCHIATRY & NEUROLOGY

## 2021-01-01 PROCEDURE — 51701 INSERT BLADDER CATHETER: CPT

## 2021-01-01 PROCEDURE — 95816 EEG AWAKE AND DROWSY: CPT | Performed by: PSYCHIATRY & NEUROLOGY

## 2021-01-01 PROCEDURE — 85610 PROTHROMBIN TIME: CPT

## 2021-01-01 PROCEDURE — 99233 SBSQ HOSP IP/OBS HIGH 50: CPT | Performed by: INTERNAL MEDICINE

## 2021-01-01 PROCEDURE — 97530 THERAPEUTIC ACTIVITIES: CPT

## 2021-01-01 PROCEDURE — 2580000003 HC RX 258: Performed by: RADIOLOGY

## 2021-01-01 PROCEDURE — 99223 1ST HOSP IP/OBS HIGH 75: CPT | Performed by: INTERNAL MEDICINE

## 2021-01-01 PROCEDURE — 97166 OT EVAL MOD COMPLEX 45 MIN: CPT

## 2021-01-01 PROCEDURE — 51702 INSERT TEMP BLADDER CATH: CPT

## 2021-01-01 PROCEDURE — 74018 RADEX ABDOMEN 1 VIEW: CPT

## 2021-01-01 PROCEDURE — 2580000003 HC RX 258

## 2021-01-01 PROCEDURE — 81003 URINALYSIS AUTO W/O SCOPE: CPT

## 2021-01-01 PROCEDURE — 94668 MNPJ CHEST WALL SBSQ: CPT

## 2021-01-01 PROCEDURE — 99212 OFFICE O/P EST SF 10 MIN: CPT

## 2021-01-01 PROCEDURE — 6370000000 HC RX 637 (ALT 250 FOR IP): Performed by: STUDENT IN AN ORGANIZED HEALTH CARE EDUCATION/TRAINING PROGRAM

## 2021-01-01 PROCEDURE — P9612 CATHETERIZE FOR URINE SPEC: HCPCS

## 2021-01-01 PROCEDURE — 2709999900 HC NON-CHARGEABLE SUPPLY: Performed by: SURGERY

## 2021-01-01 PROCEDURE — 95816 EEG AWAKE AND DROWSY: CPT

## 2021-01-01 RX ORDER — ACETAMINOPHEN 325 MG/1
650 TABLET ORAL EVERY 4 HOURS PRN
Status: DISCONTINUED | OUTPATIENT
Start: 2021-01-01 | End: 2021-01-01 | Stop reason: SDUPTHER

## 2021-01-01 RX ORDER — DIPHENHYDRAMINE HYDROCHLORIDE 50 MG/ML
50 INJECTION INTRAMUSCULAR; INTRAVENOUS EVERY 6 HOURS PRN
Status: DISCONTINUED | OUTPATIENT
Start: 2021-01-01 | End: 2021-01-01

## 2021-01-01 RX ORDER — LIDOCAINE HYDROCHLORIDE 40 MG/ML
SOLUTION TOPICAL ONCE
Status: COMPLETED | OUTPATIENT
Start: 2021-01-01 | End: 2021-01-01

## 2021-01-01 RX ORDER — ACETAMINOPHEN 650 MG/1
650 SUPPOSITORY RECTAL ONCE
Status: COMPLETED | OUTPATIENT
Start: 2021-01-01 | End: 2021-01-01

## 2021-01-01 RX ORDER — PROMETHAZINE HYDROCHLORIDE 25 MG/1
12.5 TABLET ORAL EVERY 6 HOURS PRN
Status: DISCONTINUED | OUTPATIENT
Start: 2021-01-01 | End: 2021-01-01 | Stop reason: HOSPADM

## 2021-01-01 RX ORDER — BISACODYL 10 MG
10 SUPPOSITORY, RECTAL RECTAL DAILY
Status: DISCONTINUED | OUTPATIENT
Start: 2021-01-01 | End: 2021-01-01

## 2021-01-01 RX ORDER — FLUVOXAMINE MALEATE 150 MG/1
150 CAPSULE, EXTENDED RELEASE ORAL DAILY
Status: DISCONTINUED | OUTPATIENT
Start: 2021-01-01 | End: 2021-01-01

## 2021-01-01 RX ORDER — GINSENG 100 MG
CAPSULE ORAL ONCE
Status: CANCELLED | OUTPATIENT
Start: 2021-01-01 | End: 2021-01-01

## 2021-01-01 RX ORDER — DIMETHICONE, OXYBENZONE, AND PADIMATE O 2; 2.5; 6.6 G/100G; G/100G; G/100G
STICK TOPICAL
Status: COMPLETED
Start: 2021-01-01 | End: 2021-01-01

## 2021-01-01 RX ORDER — ONDANSETRON 2 MG/ML
4 INJECTION INTRAMUSCULAR; INTRAVENOUS EVERY 6 HOURS PRN
Status: DISCONTINUED | OUTPATIENT
Start: 2021-01-01 | End: 2021-01-01 | Stop reason: HOSPADM

## 2021-01-01 RX ORDER — BETAMETHASONE DIPROPIONATE 0.05 %
OINTMENT (GRAM) TOPICAL ONCE
Status: CANCELLED | OUTPATIENT
Start: 2021-01-01 | End: 2021-01-01

## 2021-01-01 RX ORDER — PANTOPRAZOLE SODIUM 20 MG/1
20 TABLET, DELAYED RELEASE ORAL
Status: DISCONTINUED | OUTPATIENT
Start: 2021-01-01 | End: 2021-01-01 | Stop reason: SDUPTHER

## 2021-01-01 RX ORDER — 0.9 % SODIUM CHLORIDE 0.9 %
600 INTRAVENOUS SOLUTION INTRAVENOUS ONCE
Status: COMPLETED | OUTPATIENT
Start: 2021-01-01 | End: 2021-01-01

## 2021-01-01 RX ORDER — CARBAMAZEPINE 200 MG/1
200 TABLET ORAL 2 TIMES DAILY
Status: DISCONTINUED | OUTPATIENT
Start: 2021-01-01 | End: 2021-01-01 | Stop reason: HOSPADM

## 2021-01-01 RX ORDER — ACETAMINOPHEN 325 MG/1
650 TABLET ORAL EVERY 6 HOURS PRN
Status: DISCONTINUED | OUTPATIENT
Start: 2021-01-01 | End: 2021-01-01 | Stop reason: HOSPADM

## 2021-01-01 RX ORDER — AMOXICILLIN AND CLAVULANATE POTASSIUM 562.5; 437.5; 62.5 MG/1; MG/1; MG/1
1 TABLET, FILM COATED, EXTENDED RELEASE ORAL 2 TIMES DAILY
Qty: 20 TABLET | Refills: 0 | Status: SHIPPED | OUTPATIENT
Start: 2021-01-01 | End: 2021-01-01

## 2021-01-01 RX ORDER — BENZTROPINE MESYLATE 0.5 MG/1
1 TABLET ORAL 2 TIMES DAILY
Status: DISCONTINUED | OUTPATIENT
Start: 2021-01-01 | End: 2021-01-01 | Stop reason: HOSPADM

## 2021-01-01 RX ORDER — SENNA PLUS 8.6 MG/1
1 TABLET ORAL DAILY PRN
Status: DISCONTINUED | OUTPATIENT
Start: 2021-01-01 | End: 2021-01-01 | Stop reason: HOSPADM

## 2021-01-01 RX ORDER — DEXTROSE MONOHYDRATE 50 MG/ML
INJECTION, SOLUTION INTRAVENOUS CONTINUOUS
Status: DISCONTINUED | OUTPATIENT
Start: 2021-01-01 | End: 2021-01-01

## 2021-01-01 RX ORDER — LANSOPRAZOLE
15 KIT
Qty: 300 ML | DISCHARGE
Start: 2021-01-01 | End: 2021-01-01 | Stop reason: DRUGHIGH

## 2021-01-01 RX ORDER — MIRTAZAPINE 15 MG/1
30 TABLET, FILM COATED ORAL NIGHTLY
Status: DISCONTINUED | OUTPATIENT
Start: 2021-01-01 | End: 2021-01-01 | Stop reason: HOSPADM

## 2021-01-01 RX ORDER — CEFDINIR 300 MG/1
300 CAPSULE ORAL 2 TIMES DAILY
Qty: 14 CAPSULE | Refills: 0 | Status: SHIPPED | OUTPATIENT
Start: 2021-01-01 | End: 2021-01-01 | Stop reason: SDUPTHER

## 2021-01-01 RX ORDER — LIDOCAINE HYDROCHLORIDE 20 MG/ML
JELLY TOPICAL ONCE
Status: CANCELLED | OUTPATIENT
Start: 2021-01-01 | End: 2021-01-01

## 2021-01-01 RX ORDER — OLANZAPINE 5 MG/1
7.5 TABLET ORAL NIGHTLY
Status: DISCONTINUED | OUTPATIENT
Start: 2021-01-01 | End: 2021-01-01

## 2021-01-01 RX ORDER — POLYETHYLENE GLYCOL 3350 17 G/17G
17 POWDER, FOR SOLUTION ORAL DAILY
Status: DISCONTINUED | OUTPATIENT
Start: 2021-01-01 | End: 2021-01-01 | Stop reason: HOSPADM

## 2021-01-01 RX ORDER — LACTOBACILLUS RHAMNOSUS GG 10B CELL
1 CAPSULE ORAL DAILY
Status: DISCONTINUED | OUTPATIENT
Start: 2021-01-01 | End: 2021-01-01 | Stop reason: HOSPADM

## 2021-01-01 RX ORDER — SODIUM CHLORIDE 0.9 % (FLUSH) 0.9 %
10 SYRINGE (ML) INJECTION PRN
Status: DISCONTINUED | OUTPATIENT
Start: 2021-01-01 | End: 2021-01-01 | Stop reason: HOSPADM

## 2021-01-01 RX ORDER — BACITRACIN, NEOMYCIN, POLYMYXIN B 400; 3.5; 5 [USP'U]/G; MG/G; [USP'U]/G
OINTMENT TOPICAL ONCE
Status: CANCELLED | OUTPATIENT
Start: 2021-01-01 | End: 2021-01-01

## 2021-01-01 RX ORDER — LACTOBACILLUS RHAMNOSUS GG 10B CELL
1 CAPSULE ORAL DAILY
Qty: 30 CAPSULE | Refills: 0 | Status: ON HOLD
Start: 2021-01-01 | End: 2021-01-01 | Stop reason: HOSPADM

## 2021-01-01 RX ORDER — ACETAMINOPHEN 650 MG/1
650 SUPPOSITORY RECTAL EVERY 6 HOURS PRN
Status: DISCONTINUED | OUTPATIENT
Start: 2021-01-01 | End: 2021-01-01 | Stop reason: HOSPADM

## 2021-01-01 RX ORDER — LEVETIRACETAM 100 MG/ML
500 SOLUTION ORAL 2 TIMES DAILY
Status: DISCONTINUED | OUTPATIENT
Start: 2021-01-01 | End: 2021-01-01 | Stop reason: HOSPADM

## 2021-01-01 RX ORDER — SODIUM CHLORIDE 0.9 % (FLUSH) 0.9 %
10 SYRINGE (ML) INJECTION EVERY 12 HOURS SCHEDULED
Status: DISCONTINUED | OUTPATIENT
Start: 2021-01-01 | End: 2021-01-01 | Stop reason: HOSPADM

## 2021-01-01 RX ORDER — MORPHINE SULFATE 2 MG/ML
1 INJECTION, SOLUTION INTRAMUSCULAR; INTRAVENOUS EVERY 5 MIN PRN
Status: DISCONTINUED | OUTPATIENT
Start: 2021-01-01 | End: 2021-01-01 | Stop reason: HOSPADM

## 2021-01-01 RX ORDER — LEVOFLOXACIN 500 MG/1
500 TABLET, FILM COATED ORAL DAILY
Status: DISCONTINUED | OUTPATIENT
Start: 2021-01-01 | End: 2021-01-01 | Stop reason: HOSPADM

## 2021-01-01 RX ORDER — LIDOCAINE 40 MG/G
CREAM TOPICAL ONCE
Status: CANCELLED | OUTPATIENT
Start: 2021-01-01 | End: 2021-01-01

## 2021-01-01 RX ORDER — GENTAMICIN SULFATE 1 MG/G
OINTMENT TOPICAL ONCE
Status: CANCELLED | OUTPATIENT
Start: 2021-01-01 | End: 2021-01-01

## 2021-01-01 RX ORDER — CLOBETASOL PROPIONATE 0.5 MG/G
OINTMENT TOPICAL ONCE
Status: CANCELLED | OUTPATIENT
Start: 2021-01-01 | End: 2021-01-01

## 2021-01-01 RX ORDER — LEVETIRACETAM 500 MG/1
500 TABLET ORAL 2 TIMES DAILY
Status: DISCONTINUED | OUTPATIENT
Start: 2021-01-01 | End: 2021-01-01 | Stop reason: ALTCHOICE

## 2021-01-01 RX ORDER — 0.9 % SODIUM CHLORIDE 0.9 %
1000 INTRAVENOUS SOLUTION INTRAVENOUS ONCE
Status: DISCONTINUED | OUTPATIENT
Start: 2021-01-01 | End: 2021-01-01 | Stop reason: HOSPADM

## 2021-01-01 RX ORDER — PROPOFOL 10 MG/ML
INJECTION, EMULSION INTRAVENOUS PRN
Status: DISCONTINUED | OUTPATIENT
Start: 2021-01-01 | End: 2021-01-01 | Stop reason: SDUPTHER

## 2021-01-01 RX ORDER — MINERAL OIL/HYDROPHIL PETROLAT
OINTMENT (GRAM) TOPICAL 2 TIMES DAILY PRN
Status: DISCONTINUED | OUTPATIENT
Start: 2021-01-01 | End: 2021-01-01 | Stop reason: HOSPADM

## 2021-01-01 RX ORDER — CEFDINIR 300 MG/1
300 CAPSULE ORAL 2 TIMES DAILY
Qty: 14 CAPSULE | Refills: 0 | Status: SHIPPED | OUTPATIENT
Start: 2021-01-01 | End: 2021-01-01

## 2021-01-01 RX ORDER — MAGNESIUM SULFATE IN WATER 40 MG/ML
2000 INJECTION, SOLUTION INTRAVENOUS ONCE
Status: COMPLETED | OUTPATIENT
Start: 2021-01-01 | End: 2021-01-01

## 2021-01-01 RX ORDER — LORAZEPAM 2 MG/ML
0.5 INJECTION INTRAMUSCULAR ONCE
Status: COMPLETED | OUTPATIENT
Start: 2021-01-01 | End: 2021-01-01

## 2021-01-01 RX ORDER — 0.9 % SODIUM CHLORIDE 0.9 %
1000 INTRAVENOUS SOLUTION INTRAVENOUS ONCE
Status: COMPLETED | OUTPATIENT
Start: 2021-01-01 | End: 2021-01-01

## 2021-01-01 RX ORDER — IPRATROPIUM BROMIDE AND ALBUTEROL SULFATE 2.5; .5 MG/3ML; MG/3ML
3 SOLUTION RESPIRATORY (INHALATION)
Status: DISCONTINUED | OUTPATIENT
Start: 2021-01-01 | End: 2021-01-01 | Stop reason: HOSPADM

## 2021-01-01 RX ORDER — BACITRACIN ZINC AND POLYMYXIN B SULFATE 500; 1000 [USP'U]/G; [USP'U]/G
OINTMENT TOPICAL ONCE
Status: CANCELLED | OUTPATIENT
Start: 2021-01-01 | End: 2021-01-01

## 2021-01-01 RX ORDER — LEVETIRACETAM 500 MG/1
1000 TABLET ORAL 2 TIMES DAILY
Status: DISCONTINUED | OUTPATIENT
Start: 2021-01-01 | End: 2021-01-01

## 2021-01-01 RX ORDER — ONDANSETRON 4 MG/1
4 TABLET, ORALLY DISINTEGRATING ORAL EVERY 8 HOURS PRN
Status: DISCONTINUED | OUTPATIENT
Start: 2021-01-01 | End: 2021-01-01 | Stop reason: HOSPADM

## 2021-01-01 RX ORDER — PROMETHAZINE HYDROCHLORIDE 25 MG/ML
6.25 INJECTION, SOLUTION INTRAMUSCULAR; INTRAVENOUS EVERY 10 MIN PRN
Status: DISCONTINUED | OUTPATIENT
Start: 2021-01-01 | End: 2021-01-01 | Stop reason: HOSPADM

## 2021-01-01 RX ORDER — SODIUM CHLORIDE, SODIUM LACTATE, POTASSIUM CHLORIDE, CALCIUM CHLORIDE 600; 310; 30; 20 MG/100ML; MG/100ML; MG/100ML; MG/100ML
INJECTION, SOLUTION INTRAVENOUS CONTINUOUS
Status: DISCONTINUED | OUTPATIENT
Start: 2021-01-01 | End: 2021-01-01 | Stop reason: HOSPADM

## 2021-01-01 RX ORDER — FOLIC ACID 1 MG/1
1 TABLET ORAL DAILY
COMMUNITY

## 2021-01-01 RX ORDER — SODIUM CHLORIDE 0.9 % (FLUSH) 0.9 %
10 SYRINGE (ML) INJECTION PRN
Status: DISCONTINUED | OUTPATIENT
Start: 2021-01-01 | End: 2021-01-01

## 2021-01-01 RX ORDER — CALCIUM CARBONATE 200(500)MG
1 TABLET,CHEWABLE ORAL 2 TIMES DAILY
COMMUNITY
End: 2021-01-01 | Stop reason: ALTCHOICE

## 2021-01-01 RX ORDER — CEFDINIR 300 MG/1
300 CAPSULE ORAL ONCE
Status: COMPLETED | OUTPATIENT
Start: 2021-01-01 | End: 2021-01-01

## 2021-01-01 RX ORDER — AMOXICILLIN AND CLAVULANATE POTASSIUM 600; 42.9 MG/5ML; MG/5ML
1200 POWDER, FOR SUSPENSION ORAL EVERY 12 HOURS SCHEDULED
Status: DISCONTINUED | OUTPATIENT
Start: 2021-01-01 | End: 2021-01-01 | Stop reason: HOSPADM

## 2021-01-01 RX ORDER — DEXTROSE AND SODIUM CHLORIDE 5; .2 G/100ML; G/100ML
INJECTION, SOLUTION INTRAVENOUS CONTINUOUS
Status: DISCONTINUED | OUTPATIENT
Start: 2021-01-01 | End: 2021-01-01 | Stop reason: ALTCHOICE

## 2021-01-01 RX ORDER — DEXTROSE AND SODIUM CHLORIDE 5; .45 G/100ML; G/100ML
INJECTION, SOLUTION INTRAVENOUS CONTINUOUS
Status: DISCONTINUED | OUTPATIENT
Start: 2021-01-01 | End: 2021-01-01

## 2021-01-01 RX ORDER — SODIUM CHLORIDE 9 MG/ML
INJECTION, SOLUTION INTRAVENOUS CONTINUOUS
Status: DISCONTINUED | OUTPATIENT
Start: 2021-01-01 | End: 2021-01-01

## 2021-01-01 RX ORDER — SODIUM CHLORIDE 450 MG/100ML
INJECTION, SOLUTION INTRAVENOUS CONTINUOUS
Status: DISCONTINUED | OUTPATIENT
Start: 2021-01-01 | End: 2021-01-01

## 2021-01-01 RX ORDER — ACETAMINOPHEN 325 MG/1
650 TABLET ORAL EVERY 4 HOURS PRN
Status: DISCONTINUED | OUTPATIENT
Start: 2021-01-01 | End: 2021-01-01 | Stop reason: HOSPADM

## 2021-01-01 RX ORDER — 0.9 % SODIUM CHLORIDE 0.9 %
500 INTRAVENOUS SOLUTION INTRAVENOUS ONCE
Status: COMPLETED | OUTPATIENT
Start: 2021-01-01 | End: 2021-01-01

## 2021-01-01 RX ORDER — KETOROLAC TROMETHAMINE 30 MG/ML
15 INJECTION, SOLUTION INTRAMUSCULAR; INTRAVENOUS ONCE
Status: COMPLETED | OUTPATIENT
Start: 2021-01-01 | End: 2021-01-01

## 2021-01-01 RX ORDER — LIDOCAINE HYDROCHLORIDE 40 MG/ML
SOLUTION TOPICAL ONCE
Status: CANCELLED | OUTPATIENT
Start: 2021-01-01 | End: 2021-01-01

## 2021-01-01 RX ORDER — POTASSIUM CHLORIDE 7.45 MG/ML
10 INJECTION INTRAVENOUS PRN
Status: DISCONTINUED | OUTPATIENT
Start: 2021-01-01 | End: 2021-01-01 | Stop reason: HOSPADM

## 2021-01-01 RX ORDER — IPRATROPIUM BROMIDE AND ALBUTEROL SULFATE 2.5; .5 MG/3ML; MG/3ML
1 SOLUTION RESPIRATORY (INHALATION)
Status: DISPENSED | OUTPATIENT
Start: 2021-01-01 | End: 2021-01-01

## 2021-01-01 RX ORDER — LIDOCAINE 50 MG/G
OINTMENT TOPICAL ONCE
Status: CANCELLED | OUTPATIENT
Start: 2021-01-01 | End: 2021-01-01

## 2021-01-01 RX ORDER — LEVETIRACETAM 500 MG/1
500 TABLET ORAL 2 TIMES DAILY
Status: DISCONTINUED | OUTPATIENT
Start: 2021-01-01 | End: 2021-01-01

## 2021-01-01 RX ORDER — LIDOCAINE HYDROCHLORIDE 40 MG/ML
SOLUTION TOPICAL ONCE
Status: DISCONTINUED | OUTPATIENT
Start: 2021-01-01 | End: 2021-01-01 | Stop reason: HOSPADM

## 2021-01-01 RX ORDER — OYSTER SHELL CALCIUM WITH VITAMIN D 500; 200 MG/1; [IU]/1
1 TABLET, FILM COATED ORAL 2 TIMES DAILY
Status: DISCONTINUED | OUTPATIENT
Start: 2021-01-01 | End: 2021-01-01 | Stop reason: HOSPADM

## 2021-01-01 RX ORDER — SODIUM PHOSPHATE, DIBASIC AND SODIUM PHOSPHATE, MONOBASIC 3.5; 9.5 G/66ML; G/66ML
ENEMA RECTAL ONCE
Status: COMPLETED | OUTPATIENT
Start: 2021-01-01 | End: 2021-01-01

## 2021-01-01 RX ORDER — SODIUM CHLORIDE 0.9 % (FLUSH) 0.9 %
10 SYRINGE (ML) INJECTION
Status: COMPLETED | OUTPATIENT
Start: 2021-01-01 | End: 2021-01-01

## 2021-01-01 RX ORDER — HYDROCODONE BITARTRATE AND ACETAMINOPHEN 5; 325 MG/1; MG/1
1 TABLET ORAL PRN
Status: DISCONTINUED | OUTPATIENT
Start: 2021-01-01 | End: 2021-01-01 | Stop reason: HOSPADM

## 2021-01-01 RX ORDER — ACETAMINOPHEN 325 MG/1
650 TABLET ORAL ONCE
Status: DISCONTINUED | OUTPATIENT
Start: 2021-01-01 | End: 2021-01-01

## 2021-01-01 RX ORDER — ACETAMINOPHEN 650 MG/1
SUPPOSITORY RECTAL
Status: DISPENSED
Start: 2021-01-01 | End: 2021-01-01

## 2021-01-01 RX ORDER — SODIUM CHLORIDE 9 MG/ML
25 INJECTION, SOLUTION INTRAVENOUS PRN
Status: DISCONTINUED | OUTPATIENT
Start: 2021-01-01 | End: 2021-01-01 | Stop reason: HOSPADM

## 2021-01-01 RX ORDER — PANTOPRAZOLE SODIUM 40 MG/1
40 TABLET, DELAYED RELEASE ORAL
Status: DISCONTINUED | OUTPATIENT
Start: 2021-01-01 | End: 2021-01-01 | Stop reason: HOSPADM

## 2021-01-01 RX ORDER — NICOTINE 14MG/24HR
1 PATCH, TRANSDERMAL 24 HOURS TRANSDERMAL 2 TIMES DAILY
COMMUNITY

## 2021-01-01 RX ORDER — VALACYCLOVIR HYDROCHLORIDE 1 G/1
1000 TABLET, FILM COATED ORAL 3 TIMES DAILY
Qty: 21 TABLET | Refills: 0 | Status: SHIPPED | OUTPATIENT
Start: 2021-01-01 | End: 2021-01-01

## 2021-01-01 RX ORDER — IPRATROPIUM BROMIDE AND ALBUTEROL SULFATE 2.5; .5 MG/3ML; MG/3ML
1 SOLUTION RESPIRATORY (INHALATION) EVERY 4 HOURS PRN
Status: DISCONTINUED | OUTPATIENT
Start: 2021-01-01 | End: 2021-01-01 | Stop reason: HOSPADM

## 2021-01-01 RX ORDER — LIDOCAINE HYDROCHLORIDE 10 MG/ML
INJECTION, SOLUTION INFILTRATION; PERINEURAL PRN
Status: DISCONTINUED | OUTPATIENT
Start: 2021-01-01 | End: 2021-01-01 | Stop reason: ALTCHOICE

## 2021-01-01 RX ORDER — OMEPRAZOLE 20 MG/1
20 CAPSULE, DELAYED RELEASE ORAL DAILY
COMMUNITY

## 2021-01-01 RX ORDER — FLUVOXAMINE MALEATE 150 MG/1
150 CAPSULE, EXTENDED RELEASE ORAL DAILY
Status: DISCONTINUED | OUTPATIENT
Start: 2021-01-01 | End: 2021-01-01 | Stop reason: HOSPADM

## 2021-01-01 RX ORDER — SODIUM CHLORIDE 9 MG/ML
INJECTION, SOLUTION INTRAVENOUS CONTINUOUS PRN
Status: DISCONTINUED | OUTPATIENT
Start: 2021-01-01 | End: 2021-01-01 | Stop reason: SDUPTHER

## 2021-01-01 RX ORDER — LEVETIRACETAM 500 MG/1
500 TABLET ORAL 2 TIMES DAILY
Status: ON HOLD | COMMUNITY
End: 2021-01-01 | Stop reason: HOSPADM

## 2021-01-01 RX ORDER — SODIUM CHLORIDE, SODIUM LACTATE, POTASSIUM CHLORIDE, CALCIUM CHLORIDE 600; 310; 30; 20 MG/100ML; MG/100ML; MG/100ML; MG/100ML
INJECTION, SOLUTION INTRAVENOUS CONTINUOUS
Status: DISCONTINUED | OUTPATIENT
Start: 2021-01-01 | End: 2021-01-01

## 2021-01-01 RX ORDER — ERGOCALCIFEROL (VITAMIN D2) 200 MCG/ML
50000 DROPS ORAL
COMMUNITY

## 2021-01-01 RX ORDER — LORAZEPAM 2 MG/ML
1 INJECTION INTRAMUSCULAR ONCE
Status: COMPLETED | OUTPATIENT
Start: 2021-01-01 | End: 2021-01-01

## 2021-01-01 RX ORDER — BISACODYL 10 MG
10 SUPPOSITORY, RECTAL RECTAL
Status: DISCONTINUED | OUTPATIENT
Start: 2021-01-01 | End: 2021-01-01 | Stop reason: HOSPADM

## 2021-01-01 RX ORDER — METHYL SALICYLATE
OIL (ML) MISCELLANEOUS PRN
Status: DISCONTINUED | OUTPATIENT
Start: 2021-01-01 | End: 2021-01-01 | Stop reason: HOSPADM

## 2021-01-01 RX ORDER — LEVETIRACETAM 10 MG/ML
1000 INJECTION INTRAVASCULAR EVERY 12 HOURS
Status: DISCONTINUED | OUTPATIENT
Start: 2021-01-01 | End: 2021-01-01 | Stop reason: HOSPADM

## 2021-01-01 RX ORDER — DEXTROSE AND SODIUM CHLORIDE 5; .45 G/100ML; G/100ML
INJECTION, SOLUTION INTRAVENOUS CONTINUOUS
Status: ACTIVE | OUTPATIENT
Start: 2021-01-01 | End: 2021-01-01

## 2021-01-01 RX ORDER — POLYETHYLENE GLYCOL 3350 17 G/17G
17 POWDER, FOR SOLUTION ORAL DAILY PRN
Status: DISCONTINUED | OUTPATIENT
Start: 2021-01-01 | End: 2021-01-01 | Stop reason: HOSPADM

## 2021-01-01 RX ORDER — BENZTROPINE MESYLATE 1 MG/1
1 TABLET ORAL 2 TIMES DAILY
Status: DISCONTINUED | OUTPATIENT
Start: 2021-01-01 | End: 2021-01-01 | Stop reason: HOSPADM

## 2021-01-01 RX ORDER — LORAZEPAM 2 MG/ML
INJECTION INTRAMUSCULAR
Status: COMPLETED
Start: 2021-01-01 | End: 2021-01-01

## 2021-01-01 RX ORDER — OLANZAPINE 5 MG/1
7.5 TABLET ORAL NIGHTLY
Status: DISCONTINUED | OUTPATIENT
Start: 2021-01-01 | End: 2021-01-01 | Stop reason: HOSPADM

## 2021-01-01 RX ORDER — CARBAMAZEPINE 100 MG/5ML
200 SUSPENSION ORAL 2 TIMES DAILY
Status: DISCONTINUED | OUTPATIENT
Start: 2021-01-01 | End: 2021-01-01 | Stop reason: HOSPADM

## 2021-01-01 RX ORDER — POTASSIUM CHLORIDE 7.45 MG/ML
10 INJECTION INTRAVENOUS 4 TIMES DAILY
Status: COMPLETED | OUTPATIENT
Start: 2021-01-01 | End: 2021-01-01

## 2021-01-01 RX ORDER — MIRTAZAPINE 15 MG/1
30 TABLET, FILM COATED ORAL NIGHTLY
Status: DISCONTINUED | OUTPATIENT
Start: 2021-01-01 | End: 2021-01-01

## 2021-01-01 RX ORDER — POTASSIUM CHLORIDE 20 MEQ/1
40 TABLET, EXTENDED RELEASE ORAL PRN
Status: DISCONTINUED | OUTPATIENT
Start: 2021-01-01 | End: 2021-01-01 | Stop reason: HOSPADM

## 2021-01-01 RX ORDER — DIPHENHYDRAMINE HYDROCHLORIDE 50 MG/ML
25 INJECTION INTRAMUSCULAR; INTRAVENOUS EVERY 4 HOURS PRN
Status: DISCONTINUED | OUTPATIENT
Start: 2021-01-01 | End: 2021-01-01 | Stop reason: HOSPADM

## 2021-01-01 RX ORDER — LEVETIRACETAM 100 MG/ML
500 SOLUTION ORAL 2 TIMES DAILY
Refills: 3 | DISCHARGE
Start: 2021-01-01

## 2021-01-01 RX ORDER — LEVETIRACETAM 10 MG/ML
1000 INJECTION INTRAVASCULAR ONCE
Status: COMPLETED | OUTPATIENT
Start: 2021-01-01 | End: 2021-01-01

## 2021-01-01 RX ORDER — ACETAMINOPHEN 160 MG/5ML
650 SOLUTION ORAL ONCE
Status: COMPLETED | OUTPATIENT
Start: 2021-01-01 | End: 2021-01-01

## 2021-01-01 RX ORDER — PETROLATUM 42 G/100G
OINTMENT TOPICAL 2 TIMES DAILY PRN
Status: DISCONTINUED | OUTPATIENT
Start: 2021-01-01 | End: 2021-01-01 | Stop reason: SDUPTHER

## 2021-01-01 RX ORDER — ACETAMINOPHEN 325 MG/1
650 TABLET ORAL EVERY 4 HOURS PRN
Status: DISCONTINUED | OUTPATIENT
Start: 2021-01-01 | End: 2021-01-01

## 2021-01-01 RX ORDER — MEPERIDINE HYDROCHLORIDE 25 MG/ML
12.5 INJECTION INTRAMUSCULAR; INTRAVENOUS; SUBCUTANEOUS EVERY 5 MIN PRN
Status: DISCONTINUED | OUTPATIENT
Start: 2021-01-01 | End: 2021-01-01 | Stop reason: HOSPADM

## 2021-01-01 RX ORDER — ACETAMINOPHEN 650 MG/1
650 SUPPOSITORY RECTAL EVERY 6 HOURS PRN
Status: DISCONTINUED | OUTPATIENT
Start: 2021-01-01 | End: 2021-01-01

## 2021-01-01 RX ORDER — HYDROCODONE BITARTRATE AND ACETAMINOPHEN 5; 325 MG/1; MG/1
2 TABLET ORAL PRN
Status: DISCONTINUED | OUTPATIENT
Start: 2021-01-01 | End: 2021-01-01 | Stop reason: HOSPADM

## 2021-01-01 RX ORDER — LORAZEPAM 2 MG/ML
1 INJECTION INTRAMUSCULAR ONCE
Status: DISCONTINUED | OUTPATIENT
Start: 2021-01-01 | End: 2021-01-01 | Stop reason: HOSPADM

## 2021-01-01 RX ORDER — ACETAMINOPHEN 325 MG/1
650 TABLET ORAL EVERY 6 HOURS PRN
Status: DISCONTINUED | OUTPATIENT
Start: 2021-01-01 | End: 2021-01-01

## 2021-01-01 RX ORDER — LORAZEPAM 2 MG/ML
INJECTION INTRAMUSCULAR
Status: DISCONTINUED
Start: 2021-01-01 | End: 2021-01-01 | Stop reason: WASHOUT

## 2021-01-01 RX ORDER — LORAZEPAM 2 MG/ML
2 INJECTION INTRAMUSCULAR EVERY 4 HOURS
Status: DISCONTINUED | OUTPATIENT
Start: 2021-01-01 | End: 2021-01-01

## 2021-01-01 RX ORDER — KETAMINE HYDROCHLORIDE 10 MG/ML
20 INJECTION, SOLUTION INTRAMUSCULAR; INTRAVENOUS ONCE
Status: COMPLETED | OUTPATIENT
Start: 2021-01-01 | End: 2021-01-01

## 2021-01-01 RX ORDER — LANSOPRAZOLE
15 KIT
Status: DISCONTINUED | OUTPATIENT
Start: 2021-01-01 | End: 2021-01-01 | Stop reason: HOSPADM

## 2021-01-01 RX ORDER — DEXTROSE MONOHYDRATE 50 MG/ML
INJECTION, SOLUTION INTRAVENOUS ONCE
Status: COMPLETED | OUTPATIENT
Start: 2021-01-01 | End: 2021-01-01

## 2021-01-01 RX ORDER — MORPHINE SULFATE 2 MG/ML
2 INJECTION, SOLUTION INTRAMUSCULAR; INTRAVENOUS EVERY 5 MIN PRN
Status: DISCONTINUED | OUTPATIENT
Start: 2021-01-01 | End: 2021-01-01 | Stop reason: HOSPADM

## 2021-01-01 RX ORDER — BISACODYL 10 MG
10 SUPPOSITORY, RECTAL RECTAL ONCE
Status: COMPLETED | OUTPATIENT
Start: 2021-01-01 | End: 2021-01-01

## 2021-01-01 RX ORDER — IPRATROPIUM BROMIDE AND ALBUTEROL SULFATE 2.5; .5 MG/3ML; MG/3ML
1 SOLUTION RESPIRATORY (INHALATION) EVERY 4 HOURS PRN
COMMUNITY

## 2021-01-01 RX ORDER — DEXTROSE AND SODIUM CHLORIDE 5; .9 G/100ML; G/100ML
INJECTION, SOLUTION INTRAVENOUS CONTINUOUS
Status: DISCONTINUED | OUTPATIENT
Start: 2021-01-01 | End: 2021-01-01

## 2021-01-01 RX ORDER — CARBAMAZEPINE 200 MG/1
200 TABLET ORAL 2 TIMES DAILY
Status: DISCONTINUED | OUTPATIENT
Start: 2021-01-01 | End: 2021-01-01

## 2021-01-01 RX ORDER — LORAZEPAM 2 MG/ML
1 INJECTION INTRAMUSCULAR EVERY 6 HOURS
Status: DISCONTINUED | OUTPATIENT
Start: 2021-01-01 | End: 2021-01-01 | Stop reason: HOSPADM

## 2021-01-01 RX ORDER — KETOROLAC TROMETHAMINE 30 MG/ML
30 INJECTION, SOLUTION INTRAMUSCULAR; INTRAVENOUS ONCE
Status: COMPLETED | OUTPATIENT
Start: 2021-01-01 | End: 2021-01-01

## 2021-01-01 RX ADMIN — CARBAMAZEPINE 200 MG: 200 TABLET ORAL at 20:47

## 2021-01-01 RX ADMIN — DEXTROSE AND SODIUM CHLORIDE: 5; 450 INJECTION, SOLUTION INTRAVENOUS at 09:36

## 2021-01-01 RX ADMIN — DIATRIZOATE MEGLUMINE AND DIATRIZOATE SODIUM 30 ML: 600; 100 SOLUTION ORAL; RECTAL at 04:33

## 2021-01-01 RX ADMIN — IPRATROPIUM BROMIDE AND ALBUTEROL SULFATE 3 ML: 2.5; .5 SOLUTION RESPIRATORY (INHALATION) at 08:10

## 2021-01-01 RX ADMIN — PIPERACILLIN AND TAZOBACTAM 3375 MG: 3; .375 INJECTION, POWDER, LYOPHILIZED, FOR SOLUTION INTRAVENOUS at 15:58

## 2021-01-01 RX ADMIN — PANTOPRAZOLE SODIUM 40 MG: 40 TABLET, DELAYED RELEASE ORAL at 06:00

## 2021-01-01 RX ADMIN — Medication 10 ML: at 20:54

## 2021-01-01 RX ADMIN — MUPIROCIN: 20 OINTMENT TOPICAL at 11:38

## 2021-01-01 RX ADMIN — ENOXAPARIN SODIUM 40 MG: 40 INJECTION SUBCUTANEOUS at 09:30

## 2021-01-01 RX ADMIN — SODIUM CHLORIDE 1000 ML: 9 INJECTION, SOLUTION INTRAVENOUS at 05:00

## 2021-01-01 RX ADMIN — ENOXAPARIN SODIUM 40 MG: 40 INJECTION SUBCUTANEOUS at 09:03

## 2021-01-01 RX ADMIN — CALCIUM CARBONATE-VITAMIN D TAB 500 MG-200 UNIT 1 TABLET: 500-200 TAB at 09:10

## 2021-01-01 RX ADMIN — LEVETIRACETAM 500 MG: 500 TABLET ORAL at 22:56

## 2021-01-01 RX ADMIN — BARIUM SULFATE 10 ML: 400 PASTE ORAL at 11:06

## 2021-01-01 RX ADMIN — CARBAMAZEPINE 200 MG: 200 TABLET ORAL at 08:50

## 2021-01-01 RX ADMIN — CARBIDOPA AND LEVODOPA 1 TABLET: 25; 100 TABLET ORAL at 22:59

## 2021-01-01 RX ADMIN — PANTOPRAZOLE SODIUM 20 MG: 20 TABLET, DELAYED RELEASE ORAL at 06:46

## 2021-01-01 RX ADMIN — CARBAMAZEPINE 200 MG: 200 TABLET ORAL at 22:57

## 2021-01-01 RX ADMIN — SODIUM CHLORIDE: 9 INJECTION, SOLUTION INTRAVENOUS at 21:42

## 2021-01-01 RX ADMIN — IPRATROPIUM BROMIDE AND ALBUTEROL SULFATE 3 ML: 2.5; .5 SOLUTION RESPIRATORY (INHALATION) at 16:41

## 2021-01-01 RX ADMIN — CARBAMAZEPINE 200 MG: 200 TABLET ORAL at 20:53

## 2021-01-01 RX ADMIN — BENZTROPINE MESYLATE 1 MG: 0.5 TABLET ORAL at 20:47

## 2021-01-01 RX ADMIN — CARBAMAZEPINE 200 MG: 200 TABLET ORAL at 07:55

## 2021-01-01 RX ADMIN — DEXTROSE MONOHYDRATE: 50 INJECTION, SOLUTION INTRAVENOUS at 11:38

## 2021-01-01 RX ADMIN — Medication 10 ML: at 10:49

## 2021-01-01 RX ADMIN — CARBIDOPA AND LEVODOPA 2 TABLET: 25; 100 TABLET ORAL at 14:25

## 2021-01-01 RX ADMIN — LEVETIRACETAM 1000 MG: 10 INJECTION INTRAVENOUS at 12:05

## 2021-01-01 RX ADMIN — BARIUM SULFATE 120 ML: 400 SUSPENSION ORAL at 11:06

## 2021-01-01 RX ADMIN — CARBAMAZEPINE 200 MG: 200 TABLET ORAL at 08:59

## 2021-01-01 RX ADMIN — CALCIUM CARBONATE-VITAMIN D TAB 500 MG-200 UNIT 1 TABLET: 500-200 TAB at 07:54

## 2021-01-01 RX ADMIN — SODIUM CHLORIDE, POTASSIUM CHLORIDE, SODIUM LACTATE AND CALCIUM CHLORIDE: 600; 310; 30; 20 INJECTION, SOLUTION INTRAVENOUS at 09:26

## 2021-01-01 RX ADMIN — DEXTROSE AND SODIUM CHLORIDE: 5; 450 INJECTION, SOLUTION INTRAVENOUS at 17:27

## 2021-01-01 RX ADMIN — Medication 10 ML: at 22:14

## 2021-01-01 RX ADMIN — LEVETIRACETAM 500 MG: 500 SOLUTION ORAL at 09:06

## 2021-01-01 RX ADMIN — IPRATROPIUM BROMIDE AND ALBUTEROL SULFATE 3 ML: 2.5; .5 SOLUTION RESPIRATORY (INHALATION) at 15:30

## 2021-01-01 RX ADMIN — CARBAMAZEPINE 200 MG: 200 TABLET ORAL at 08:53

## 2021-01-01 RX ADMIN — LORAZEPAM 1 MG: 2 INJECTION INTRAMUSCULAR; INTRAVENOUS at 20:55

## 2021-01-01 RX ADMIN — MUPIROCIN: 20 OINTMENT TOPICAL at 10:20

## 2021-01-01 RX ADMIN — CARBIDOPA AND LEVODOPA 1 TABLET: 25; 100 TABLET ORAL at 12:46

## 2021-01-01 RX ADMIN — Medication 10 ML: at 09:27

## 2021-01-01 RX ADMIN — POLYETHYLENE GLYCOL 3350 17 G: 17 POWDER, FOR SOLUTION ORAL at 09:10

## 2021-01-01 RX ADMIN — DEXTROSE MONOHYDRATE: 50 INJECTION, SOLUTION INTRAVENOUS at 10:01

## 2021-01-01 RX ADMIN — ACETAMINOPHEN 650 MG: 650 SUPPOSITORY RECTAL at 07:40

## 2021-01-01 RX ADMIN — CALCIUM CARBONATE-VITAMIN D TAB 500 MG-200 UNIT 1 TABLET: 500-200 TAB at 22:13

## 2021-01-01 RX ADMIN — Medication 10 ML: at 08:59

## 2021-01-01 RX ADMIN — LEVETIRACETAM 500 MG: 500 TABLET ORAL at 19:55

## 2021-01-01 RX ADMIN — DEXTROSE AND SODIUM CHLORIDE: 5; 450 INJECTION, SOLUTION INTRAVENOUS at 10:09

## 2021-01-01 RX ADMIN — LORAZEPAM 1 MG: 2 INJECTION INTRAMUSCULAR; INTRAVENOUS at 02:49

## 2021-01-01 RX ADMIN — ENOXAPARIN SODIUM 40 MG: 40 INJECTION SUBCUTANEOUS at 08:08

## 2021-01-01 RX ADMIN — IPRATROPIUM BROMIDE AND ALBUTEROL SULFATE 3 ML: 2.5; .5 SOLUTION RESPIRATORY (INHALATION) at 16:45

## 2021-01-01 RX ADMIN — LEVOFLOXACIN 500 MG: 500 TABLET, FILM COATED ORAL at 20:53

## 2021-01-01 RX ADMIN — LORAZEPAM 2 MG: 2 INJECTION INTRAMUSCULAR; INTRAVENOUS at 14:00

## 2021-01-01 RX ADMIN — CALCIUM CARBONATE-VITAMIN D TAB 500 MG-200 UNIT 1 TABLET: 500-200 TAB at 08:43

## 2021-01-01 RX ADMIN — PANTOPRAZOLE SODIUM 40 MG: 40 TABLET, DELAYED RELEASE ORAL at 05:29

## 2021-01-01 RX ADMIN — CARBIDOPA AND LEVODOPA 2 TABLET: 25; 100 TABLET ORAL at 07:54

## 2021-01-01 RX ADMIN — LEVETIRACETAM 500 MG: 500 SOLUTION ORAL at 12:33

## 2021-01-01 RX ADMIN — ACETAMINOPHEN 650 MG: 325 TABLET ORAL at 08:34

## 2021-01-01 RX ADMIN — Medication 10 ML: at 00:51

## 2021-01-01 RX ADMIN — PANTOPRAZOLE SODIUM 40 MG: 40 TABLET, DELAYED RELEASE ORAL at 08:21

## 2021-01-01 RX ADMIN — PANTOPRAZOLE SODIUM 40 MG: 40 TABLET, DELAYED RELEASE ORAL at 09:19

## 2021-01-01 RX ADMIN — MIRTAZAPINE 30 MG: 15 TABLET, FILM COATED ORAL at 22:13

## 2021-01-01 RX ADMIN — ACETAMINOPHEN 650 MG: 650 SOLUTION ORAL at 18:35

## 2021-01-01 RX ADMIN — POLYETHYLENE GLYCOL 3350 17 G: 17 POWDER, FOR SOLUTION ORAL at 07:55

## 2021-01-01 RX ADMIN — LEVETIRACETAM 1000 MG: 500 TABLET ORAL at 12:00

## 2021-01-01 RX ADMIN — CALCIUM CARBONATE-VITAMIN D TAB 500 MG-200 UNIT 1 TABLET: 500-200 TAB at 10:01

## 2021-01-01 RX ADMIN — DEXTROSE MONOHYDRATE: 50 INJECTION, SOLUTION INTRAVENOUS at 17:29

## 2021-01-01 RX ADMIN — CARBAMAZEPINE 200 MG: 200 SUSPENSION ORAL at 08:30

## 2021-01-01 RX ADMIN — IOPAMIDOL 80 ML: 755 INJECTION, SOLUTION INTRAVENOUS at 19:06

## 2021-01-01 RX ADMIN — IPRATROPIUM BROMIDE AND ALBUTEROL SULFATE 3 ML: 2.5; .5 SOLUTION RESPIRATORY (INHALATION) at 15:11

## 2021-01-01 RX ADMIN — CARBIDOPA AND LEVODOPA 2 TABLET: 25; 100 TABLET ORAL at 16:42

## 2021-01-01 RX ADMIN — BENZTROPINE MESYLATE 1 MG: 0.5 TABLET ORAL at 08:50

## 2021-01-01 RX ADMIN — LORAZEPAM 1 MG: 2 INJECTION INTRAMUSCULAR; INTRAVENOUS at 08:20

## 2021-01-01 RX ADMIN — LANSOPRAZOLE 15 MG: KIT at 06:52

## 2021-01-01 RX ADMIN — BENZTROPINE MESYLATE 1 MG: 0.5 TABLET ORAL at 11:12

## 2021-01-01 RX ADMIN — PIPERACILLIN AND TAZOBACTAM 3375 MG: 3; .375 INJECTION, POWDER, LYOPHILIZED, FOR SOLUTION INTRAVENOUS at 01:06

## 2021-01-01 RX ADMIN — CARBIDOPA AND LEVODOPA 1 TABLET: 25; 100 TABLET ORAL at 19:33

## 2021-01-01 RX ADMIN — SODIUM CHLORIDE: 9 INJECTION, SOLUTION INTRAVENOUS at 06:45

## 2021-01-01 RX ADMIN — ACETAMINOPHEN 650 MG: 325 TABLET ORAL at 17:05

## 2021-01-01 RX ADMIN — LEVETIRACETAM 1000 MG: 10 INJECTION INTRAVENOUS at 22:45

## 2021-01-01 RX ADMIN — LEVETIRACETAM 500 MG: 500 TABLET ORAL at 20:21

## 2021-01-01 RX ADMIN — CARBAMAZEPINE 200 MG: 200 TABLET ORAL at 11:11

## 2021-01-01 RX ADMIN — LEVETIRACETAM 1000 MG: 500 TABLET ORAL at 22:46

## 2021-01-01 RX ADMIN — OLANZAPINE 7.5 MG: 5 TABLET, FILM COATED ORAL at 20:21

## 2021-01-01 RX ADMIN — CARBAMAZEPINE 200 MG: 200 SUSPENSION ORAL at 22:02

## 2021-01-01 RX ADMIN — PANTOPRAZOLE SODIUM 40 MG: 40 TABLET, DELAYED RELEASE ORAL at 05:20

## 2021-01-01 RX ADMIN — CEFEPIME 2000 MG: 2 INJECTION, POWDER, FOR SOLUTION INTRAVENOUS at 03:56

## 2021-01-01 RX ADMIN — ACETAMINOPHEN 650 MG: 325 TABLET ORAL at 14:10

## 2021-01-01 RX ADMIN — LORAZEPAM 1 MG: 2 INJECTION INTRAMUSCULAR; INTRAVENOUS at 15:38

## 2021-01-01 RX ADMIN — IPRATROPIUM BROMIDE AND ALBUTEROL SULFATE 3 ML: 2.5; .5 SOLUTION RESPIRATORY (INHALATION) at 07:55

## 2021-01-01 RX ADMIN — BISACODYL 10 MG: 10 SUPPOSITORY RECTAL at 08:08

## 2021-01-01 RX ADMIN — PIPERACILLIN AND TAZOBACTAM 3375 MG: 3; .375 INJECTION, POWDER, LYOPHILIZED, FOR SOLUTION INTRAVENOUS at 09:02

## 2021-01-01 RX ADMIN — AMOXICILLIN AND CLAVULANATE POTASSIUM 1200 MG: 600; 42.9 POWDER, FOR SUSPENSION ORAL at 10:39

## 2021-01-01 RX ADMIN — CALCIUM CARBONATE-VITAMIN D TAB 500 MG-200 UNIT 1 TABLET: 500-200 TAB at 09:37

## 2021-01-01 RX ADMIN — BENZTROPINE MESYLATE 1 MG: 0.5 TABLET ORAL at 22:14

## 2021-01-01 RX ADMIN — IPRATROPIUM BROMIDE AND ALBUTEROL SULFATE 3 ML: 2.5; .5 SOLUTION RESPIRATORY (INHALATION) at 22:22

## 2021-01-01 RX ADMIN — LEVETIRACETAM 1000 MG: 10 INJECTION INTRAVENOUS at 13:01

## 2021-01-01 RX ADMIN — DEXTROSE AND SODIUM CHLORIDE: 5; 200 INJECTION, SOLUTION INTRAVENOUS at 11:14

## 2021-01-01 RX ADMIN — BENZTROPINE MESYLATE 1 MG: 0.5 TABLET ORAL at 10:03

## 2021-01-01 RX ADMIN — MUPIROCIN: 20 OINTMENT TOPICAL at 21:43

## 2021-01-01 RX ADMIN — POLYETHYLENE GLYCOL 3350 17 G: 17 POWDER, FOR SOLUTION ORAL at 10:20

## 2021-01-01 RX ADMIN — Medication 10 ML: at 10:01

## 2021-01-01 RX ADMIN — CARBAMAZEPINE 200 MG: 200 TABLET ORAL at 19:33

## 2021-01-01 RX ADMIN — OLANZAPINE 7.5 MG: 5 TABLET, FILM COATED ORAL at 22:47

## 2021-01-01 RX ADMIN — CARBAMAZEPINE 200 MG: 200 TABLET ORAL at 08:43

## 2021-01-01 RX ADMIN — CARBAMAZEPINE 200 MG: 200 TABLET ORAL at 09:10

## 2021-01-01 RX ADMIN — SODIUM CHLORIDE: 9 INJECTION, SOLUTION INTRAVENOUS at 21:16

## 2021-01-01 RX ADMIN — CEFEPIME 2000 MG: 2 INJECTION, POWDER, FOR SOLUTION INTRAVENOUS at 22:45

## 2021-01-01 RX ADMIN — LORAZEPAM 2 MG: 2 INJECTION INTRAMUSCULAR; INTRAVENOUS at 01:30

## 2021-01-01 RX ADMIN — Medication: at 11:10

## 2021-01-01 RX ADMIN — CARBIDOPA AND LEVODOPA 1 TABLET: 25; 100 TABLET ORAL at 18:08

## 2021-01-01 RX ADMIN — IPRATROPIUM BROMIDE AND ALBUTEROL SULFATE 3 ML: 2.5; .5 SOLUTION RESPIRATORY (INHALATION) at 16:01

## 2021-01-01 RX ADMIN — CARBIDOPA AND LEVODOPA 1 TABLET: 25; 100 TABLET ORAL at 13:00

## 2021-01-01 RX ADMIN — Medication 10 ML: at 20:15

## 2021-01-01 RX ADMIN — CARBIDOPA AND LEVODOPA 1 TABLET: 25; 100 TABLET ORAL at 09:06

## 2021-01-01 RX ADMIN — SODIUM CHLORIDE: 4.5 INJECTION, SOLUTION INTRAVENOUS at 12:43

## 2021-01-01 RX ADMIN — CARBAMAZEPINE 200 MG: 200 TABLET ORAL at 22:59

## 2021-01-01 RX ADMIN — CARBAMAZEPINE 200 MG: 200 TABLET ORAL at 22:14

## 2021-01-01 RX ADMIN — LORAZEPAM 1 MG: 2 INJECTION INTRAMUSCULAR; INTRAVENOUS at 01:40

## 2021-01-01 RX ADMIN — CARBIDOPA AND LEVODOPA 1 TABLET: 25; 100 TABLET ORAL at 08:59

## 2021-01-01 RX ADMIN — LEVETIRACETAM 1000 MG: 10 INJECTION INTRAVENOUS at 03:31

## 2021-01-01 RX ADMIN — Medication 10 ML: at 21:43

## 2021-01-01 RX ADMIN — POTASSIUM CHLORIDE 10 MEQ: 10 INJECTION, SOLUTION INTRAVENOUS at 08:09

## 2021-01-01 RX ADMIN — CARBIDOPA AND LEVODOPA 2 TABLET: 25; 100 TABLET ORAL at 22:14

## 2021-01-01 RX ADMIN — BENZTROPINE MESYLATE 1 MG: 0.5 TABLET ORAL at 09:19

## 2021-01-01 RX ADMIN — CARBAMAZEPINE 200 MG: 200 TABLET ORAL at 08:42

## 2021-01-01 RX ADMIN — CARBIDOPA AND LEVODOPA 1 TABLET: 25; 100 TABLET ORAL at 16:35

## 2021-01-01 RX ADMIN — CARBIDOPA AND LEVODOPA 1 TABLET: 25; 100 TABLET ORAL at 13:28

## 2021-01-01 RX ADMIN — SODIUM CHLORIDE: 9 INJECTION, SOLUTION INTRAVENOUS at 20:56

## 2021-01-01 RX ADMIN — CALCIUM CARBONATE-VITAMIN D TAB 500 MG-200 UNIT 1 TABLET: 500-200 TAB at 08:53

## 2021-01-01 RX ADMIN — LIDOCAINE HYDROCHLORIDE 10 ML: 40 SOLUTION TOPICAL at 13:38

## 2021-01-01 RX ADMIN — SODIUM CHLORIDE: 9 INJECTION, SOLUTION INTRAVENOUS at 04:12

## 2021-01-01 RX ADMIN — IPRATROPIUM BROMIDE AND ALBUTEROL SULFATE 3 ML: 2.5; .5 SOLUTION RESPIRATORY (INHALATION) at 17:09

## 2021-01-01 RX ADMIN — Medication 1 CAPSULE: at 20:54

## 2021-01-01 RX ADMIN — ACETAMINOPHEN 650 MG: 325 TABLET ORAL at 14:01

## 2021-01-01 RX ADMIN — CALCIUM CARBONATE-VITAMIN D TAB 500 MG-200 UNIT 1 TABLET: 500-200 TAB at 19:55

## 2021-01-01 RX ADMIN — LEVETIRACETAM 500 MG: 500 TABLET ORAL at 08:50

## 2021-01-01 RX ADMIN — LORAZEPAM 1 MG: 2 INJECTION INTRAMUSCULAR; INTRAVENOUS at 20:16

## 2021-01-01 RX ADMIN — CARBAMAZEPINE 200 MG: 200 TABLET ORAL at 20:21

## 2021-01-01 RX ADMIN — OLANZAPINE 7.5 MG: 5 TABLET, FILM COATED ORAL at 22:13

## 2021-01-01 RX ADMIN — SODIUM CHLORIDE, POTASSIUM CHLORIDE, SODIUM LACTATE AND CALCIUM CHLORIDE: 600; 310; 30; 20 INJECTION, SOLUTION INTRAVENOUS at 07:49

## 2021-01-01 RX ADMIN — LEVETIRACETAM 1000 MG: 10 INJECTION INTRAVENOUS at 22:39

## 2021-01-01 RX ADMIN — PIPERACILLIN AND TAZOBACTAM 3375 MG: 3; .375 INJECTION, POWDER, LYOPHILIZED, FOR SOLUTION INTRAVENOUS at 09:13

## 2021-01-01 RX ADMIN — OLANZAPINE 7.5 MG: 5 TABLET, FILM COATED ORAL at 19:55

## 2021-01-01 RX ADMIN — CARBAMAZEPINE 200 MG: 200 TABLET ORAL at 10:19

## 2021-01-01 RX ADMIN — MIRTAZAPINE 30 MG: 15 TABLET, FILM COATED ORAL at 23:04

## 2021-01-01 RX ADMIN — Medication 10 ML: at 08:08

## 2021-01-01 RX ADMIN — CARBIDOPA AND LEVODOPA 1 TABLET: 25; 100 TABLET ORAL at 16:00

## 2021-01-01 RX ADMIN — PIPERACILLIN AND TAZOBACTAM 3375 MG: 3; .375 INJECTION, POWDER, LYOPHILIZED, FOR SOLUTION INTRAVENOUS at 00:52

## 2021-01-01 RX ADMIN — MUPIROCIN: 20 OINTMENT TOPICAL at 20:54

## 2021-01-01 RX ADMIN — POLYETHYLENE GLYCOL 3350 17 G: 17 POWDER, FOR SOLUTION ORAL at 12:47

## 2021-01-01 RX ADMIN — CARBIDOPA AND LEVODOPA 2 TABLET: 25; 100 TABLET ORAL at 22:57

## 2021-01-01 RX ADMIN — CARBIDOPA AND LEVODOPA 2 TABLET: 25; 100 TABLET ORAL at 08:43

## 2021-01-01 RX ADMIN — DEXTROSE MONOHYDRATE: 50 INJECTION, SOLUTION INTRAVENOUS at 08:52

## 2021-01-01 RX ADMIN — POLYETHYLENE GLYCOL 3350 17 G: 17 POWDER, FOR SOLUTION ORAL at 08:50

## 2021-01-01 RX ADMIN — CARBIDOPA AND LEVODOPA 1 TABLET: 25; 100 TABLET ORAL at 17:30

## 2021-01-01 RX ADMIN — ENOXAPARIN SODIUM 40 MG: 40 INJECTION SUBCUTANEOUS at 10:20

## 2021-01-01 RX ADMIN — CARBIDOPA AND LEVODOPA 1 TABLET: 25; 100 TABLET ORAL at 21:45

## 2021-01-01 RX ADMIN — SODIUM CHLORIDE: 9 INJECTION, SOLUTION INTRAVENOUS at 16:48

## 2021-01-01 RX ADMIN — PIPERACILLIN AND TAZOBACTAM 3375 MG: 3; .375 INJECTION, POWDER, LYOPHILIZED, FOR SOLUTION INTRAVENOUS at 00:50

## 2021-01-01 RX ADMIN — IPRATROPIUM BROMIDE AND ALBUTEROL SULFATE 3 ML: 2.5; .5 SOLUTION RESPIRATORY (INHALATION) at 12:12

## 2021-01-01 RX ADMIN — ENOXAPARIN SODIUM 40 MG: 40 INJECTION SUBCUTANEOUS at 08:49

## 2021-01-01 RX ADMIN — POTASSIUM CHLORIDE 10 MEQ: 10 INJECTION, SOLUTION INTRAVENOUS at 15:58

## 2021-01-01 RX ADMIN — PIPERACILLIN AND TAZOBACTAM 3375 MG: 3; .375 INJECTION, POWDER, LYOPHILIZED, FOR SOLUTION INTRAVENOUS at 16:28

## 2021-01-01 RX ADMIN — Medication 10 ML: at 09:54

## 2021-01-01 RX ADMIN — LEVETIRACETAM 1000 MG: 10 INJECTION INTRAVENOUS at 00:42

## 2021-01-01 RX ADMIN — ENOXAPARIN SODIUM 40 MG: 40 INJECTION SUBCUTANEOUS at 09:19

## 2021-01-01 RX ADMIN — ACETAMINOPHEN 650 MG: 650 SUPPOSITORY RECTAL at 16:35

## 2021-01-01 RX ADMIN — Medication 10 ML: at 22:12

## 2021-01-01 RX ADMIN — LEVETIRACETAM 500 MG: 500 SOLUTION ORAL at 21:42

## 2021-01-01 RX ADMIN — IPRATROPIUM BROMIDE AND ALBUTEROL SULFATE 3 ML: 2.5; .5 SOLUTION RESPIRATORY (INHALATION) at 19:08

## 2021-01-01 RX ADMIN — SODIUM CHLORIDE 500 ML: 9 INJECTION, SOLUTION INTRAVENOUS at 22:25

## 2021-01-01 RX ADMIN — CARBIDOPA AND LEVODOPA 1 TABLET: 25; 100 TABLET ORAL at 21:46

## 2021-01-01 RX ADMIN — ACETAMINOPHEN 650 MG: 650 SUPPOSITORY RECTAL at 21:05

## 2021-01-01 RX ADMIN — CARBIDOPA AND LEVODOPA 2 TABLET: 25; 100 TABLET ORAL at 21:55

## 2021-01-01 RX ADMIN — CARBAMAZEPINE 200 MG: 200 SUSPENSION ORAL at 11:09

## 2021-01-01 RX ADMIN — PIPERACILLIN AND TAZOBACTAM 3375 MG: 3; .375 INJECTION, POWDER, LYOPHILIZED, FOR SOLUTION INTRAVENOUS at 16:16

## 2021-01-01 RX ADMIN — PANTOPRAZOLE SODIUM 40 MG: 40 TABLET, DELAYED RELEASE ORAL at 06:25

## 2021-01-01 RX ADMIN — CARBIDOPA AND LEVODOPA 1 TABLET: 25; 100 TABLET ORAL at 14:45

## 2021-01-01 RX ADMIN — CEFDINIR 300 MG: 300 CAPSULE ORAL at 21:03

## 2021-01-01 RX ADMIN — ACETAMINOPHEN 650 MG: 650 SUPPOSITORY RECTAL at 23:43

## 2021-01-01 RX ADMIN — BENZTROPINE MESYLATE 1 MG: 0.5 TABLET ORAL at 08:54

## 2021-01-01 RX ADMIN — ENOXAPARIN SODIUM 40 MG: 40 INJECTION SUBCUTANEOUS at 09:38

## 2021-01-01 RX ADMIN — PANTOPRAZOLE SODIUM 40 MG: 40 TABLET, DELAYED RELEASE ORAL at 09:34

## 2021-01-01 RX ADMIN — LEVETIRACETAM 500 MG: 500 TABLET ORAL at 07:55

## 2021-01-01 RX ADMIN — CARBIDOPA AND LEVODOPA 1 TABLET: 25; 100 TABLET ORAL at 09:20

## 2021-01-01 RX ADMIN — PETROLATUM: 420 OINTMENT TOPICAL at 10:01

## 2021-01-01 RX ADMIN — ACETAMINOPHEN 650 MG: 325 TABLET ORAL at 19:01

## 2021-01-01 RX ADMIN — CARBIDOPA AND LEVODOPA 1 TABLET: 25; 100 TABLET ORAL at 20:53

## 2021-01-01 RX ADMIN — LORAZEPAM 1 MG: 2 INJECTION INTRAMUSCULAR; INTRAVENOUS at 01:57

## 2021-01-01 RX ADMIN — IPRATROPIUM BROMIDE AND ALBUTEROL SULFATE 3 ML: 2.5; .5 SOLUTION RESPIRATORY (INHALATION) at 12:25

## 2021-01-01 RX ADMIN — DEXTROSE AND SODIUM CHLORIDE: 5; 200 INJECTION, SOLUTION INTRAVENOUS at 00:53

## 2021-01-01 RX ADMIN — LEVETIRACETAM 1000 MG: 10 INJECTION INTRAVENOUS at 11:55

## 2021-01-01 RX ADMIN — Medication: at 20:15

## 2021-01-01 RX ADMIN — POTASSIUM BICARBONATE 40 MEQ: 782 TABLET, EFFERVESCENT ORAL at 17:17

## 2021-01-01 RX ADMIN — ACETAMINOPHEN 650 MG: 325 TABLET ORAL at 03:30

## 2021-01-01 RX ADMIN — ACETAMINOPHEN 650 MG: 650 SUPPOSITORY RECTAL at 20:46

## 2021-01-01 RX ADMIN — CARBAMAZEPINE 200 MG: 200 SUSPENSION ORAL at 09:34

## 2021-01-01 RX ADMIN — PIPERACILLIN AND TAZOBACTAM 3375 MG: 3; .375 INJECTION, POWDER, LYOPHILIZED, FOR SOLUTION INTRAVENOUS at 08:08

## 2021-01-01 RX ADMIN — ENOXAPARIN SODIUM 40 MG: 40 INJECTION SUBCUTANEOUS at 09:06

## 2021-01-01 RX ADMIN — Medication 10 ML: at 21:21

## 2021-01-01 RX ADMIN — ENOXAPARIN SODIUM 40 MG: 40 INJECTION SUBCUTANEOUS at 08:50

## 2021-01-01 RX ADMIN — CARBAMAZEPINE 200 MG: 200 SUSPENSION ORAL at 20:38

## 2021-01-01 RX ADMIN — ACETAMINOPHEN 650 MG: 650 SUPPOSITORY RECTAL at 17:17

## 2021-01-01 RX ADMIN — POTASSIUM CHLORIDE 10 MEQ: 10 INJECTION, SOLUTION INTRAVENOUS at 20:14

## 2021-01-01 RX ADMIN — VANCOMYCIN HYDROCHLORIDE 1000 MG: 1 INJECTION, POWDER, LYOPHILIZED, FOR SOLUTION INTRAVENOUS at 02:25

## 2021-01-01 RX ADMIN — LORAZEPAM 1 MG: 2 INJECTION INTRAMUSCULAR; INTRAVENOUS at 18:03

## 2021-01-01 RX ADMIN — SODIUM CHLORIDE 500 ML: 9 INJECTION, SOLUTION INTRAVENOUS at 18:12

## 2021-01-01 RX ADMIN — LORAZEPAM 1 MG: 2 INJECTION INTRAMUSCULAR; INTRAVENOUS at 14:08

## 2021-01-01 RX ADMIN — ENOXAPARIN SODIUM 40 MG: 40 INJECTION SUBCUTANEOUS at 08:59

## 2021-01-01 RX ADMIN — BENZTROPINE MESYLATE 1 MG: 0.5 TABLET ORAL at 21:54

## 2021-01-01 RX ADMIN — LEVETIRACETAM 500 MG: 500 TABLET ORAL at 22:14

## 2021-01-01 RX ADMIN — ENOXAPARIN SODIUM 40 MG: 40 INJECTION SUBCUTANEOUS at 07:56

## 2021-01-01 RX ADMIN — LORAZEPAM 2 MG: 2 INJECTION INTRAMUSCULAR; INTRAVENOUS at 09:03

## 2021-01-01 RX ADMIN — CARBIDOPA AND LEVODOPA 1 TABLET: 25; 100 TABLET ORAL at 11:28

## 2021-01-01 RX ADMIN — LEVETIRACETAM 1000 MG: 10 INJECTION INTRAVENOUS at 11:31

## 2021-01-01 RX ADMIN — BENZTROPINE MESYLATE 1 MG: 0.5 TABLET ORAL at 09:10

## 2021-01-01 RX ADMIN — BENZTROPINE MESYLATE 1 MG: 0.5 TABLET ORAL at 09:38

## 2021-01-01 RX ADMIN — ACETAMINOPHEN 650 MG: 650 SUPPOSITORY RECTAL at 07:21

## 2021-01-01 RX ADMIN — BENZTROPINE MESYLATE 1 MG: 0.5 TABLET ORAL at 07:55

## 2021-01-01 RX ADMIN — ACETAMINOPHEN 650 MG: 650 SUPPOSITORY RECTAL at 02:09

## 2021-01-01 RX ADMIN — POLYETHYLENE GLYCOL 3350 17 G: 17 POWDER, FOR SOLUTION ORAL at 09:35

## 2021-01-01 RX ADMIN — LEVETIRACETAM 500 MG: 500 SOLUTION ORAL at 21:46

## 2021-01-01 RX ADMIN — LEVETIRACETAM 500 MG: 500 SOLUTION ORAL at 10:20

## 2021-01-01 RX ADMIN — SODIUM CHLORIDE 1000 ML: 9 INJECTION, SOLUTION INTRAVENOUS at 16:10

## 2021-01-01 RX ADMIN — CARBIDOPA AND LEVODOPA 1 TABLET: 25; 100 TABLET ORAL at 16:04

## 2021-01-01 RX ADMIN — IPRATROPIUM BROMIDE AND ALBUTEROL SULFATE 3 ML: 2.5; .5 SOLUTION RESPIRATORY (INHALATION) at 19:27

## 2021-01-01 RX ADMIN — LEVETIRACETAM 500 MG: 500 TABLET ORAL at 20:47

## 2021-01-01 RX ADMIN — SODIUM CHLORIDE: 9 INJECTION, SOLUTION INTRAVENOUS at 23:42

## 2021-01-01 RX ADMIN — Medication 10 ML: at 09:15

## 2021-01-01 RX ADMIN — SODIUM CHLORIDE 1000 ML: 9 INJECTION, SOLUTION INTRAVENOUS at 18:01

## 2021-01-01 RX ADMIN — LORAZEPAM 1 MG: 2 INJECTION INTRAMUSCULAR; INTRAVENOUS at 20:02

## 2021-01-01 RX ADMIN — BENZTROPINE MESYLATE 1 MG: 0.5 TABLET ORAL at 23:30

## 2021-01-01 RX ADMIN — CARBIDOPA AND LEVODOPA 2 TABLET: 25; 100 TABLET ORAL at 10:03

## 2021-01-01 RX ADMIN — LORAZEPAM 1 MG: 2 INJECTION INTRAMUSCULAR; INTRAVENOUS at 14:09

## 2021-01-01 RX ADMIN — SODIUM CHLORIDE: 9 INJECTION, SOLUTION INTRAVENOUS at 11:57

## 2021-01-01 RX ADMIN — ENOXAPARIN SODIUM 40 MG: 40 INJECTION SUBCUTANEOUS at 08:53

## 2021-01-01 RX ADMIN — LEVETIRACETAM 500 MG: 500 TABLET ORAL at 09:10

## 2021-01-01 RX ADMIN — CALCIUM CARBONATE-VITAMIN D TAB 500 MG-200 UNIT 1 TABLET: 500-200 TAB at 08:49

## 2021-01-01 RX ADMIN — Medication 10 ML: at 23:43

## 2021-01-01 RX ADMIN — DEXTROSE AND SODIUM CHLORIDE: 5; 200 INJECTION, SOLUTION INTRAVENOUS at 22:20

## 2021-01-01 RX ADMIN — IOPAMIDOL 90 ML: 755 INJECTION, SOLUTION INTRAVENOUS at 10:48

## 2021-01-01 RX ADMIN — ENOXAPARIN SODIUM 40 MG: 40 INJECTION SUBCUTANEOUS at 09:10

## 2021-01-01 RX ADMIN — CARBAMAZEPINE 200 MG: 200 TABLET ORAL at 09:33

## 2021-01-01 RX ADMIN — PETROLATUM: 420 OINTMENT TOPICAL at 21:43

## 2021-01-01 RX ADMIN — LEVETIRACETAM 500 MG: 500 TABLET ORAL at 08:42

## 2021-01-01 RX ADMIN — PIPERACILLIN AND TAZOBACTAM 3375 MG: 3; .375 INJECTION, POWDER, LYOPHILIZED, FOR SOLUTION INTRAVENOUS at 23:39

## 2021-01-01 RX ADMIN — Medication 10 ML: at 10:20

## 2021-01-01 RX ADMIN — Medication 10 ML: at 15:42

## 2021-01-01 RX ADMIN — ACETAMINOPHEN 650 MG: 325 TABLET ORAL at 11:30

## 2021-01-01 RX ADMIN — DIPHENHYDRAMINE HYDROCHLORIDE 25 MG: 50 INJECTION, SOLUTION INTRAMUSCULAR; INTRAVENOUS at 16:29

## 2021-01-01 RX ADMIN — BENZTROPINE MESYLATE 1 MG: 0.5 TABLET ORAL at 08:43

## 2021-01-01 RX ADMIN — Medication 10 ML: at 08:49

## 2021-01-01 RX ADMIN — CALCIUM CARBONATE-VITAMIN D TAB 500 MG-200 UNIT 1 TABLET: 500-200 TAB at 20:47

## 2021-01-01 RX ADMIN — CARBAMAZEPINE 200 MG: 200 TABLET ORAL at 09:20

## 2021-01-01 RX ADMIN — ACETAMINOPHEN 650 MG: 650 SUPPOSITORY RECTAL at 13:09

## 2021-01-01 RX ADMIN — IPRATROPIUM BROMIDE AND ALBUTEROL SULFATE 3 ML: 2.5; .5 SOLUTION RESPIRATORY (INHALATION) at 21:29

## 2021-01-01 RX ADMIN — LEVETIRACETAM 500 MG: 500 TABLET ORAL at 11:12

## 2021-01-01 RX ADMIN — PETROLATUM: 420 OINTMENT TOPICAL at 14:01

## 2021-01-01 RX ADMIN — CARBIDOPA AND LEVODOPA 2 TABLET: 25; 100 TABLET ORAL at 11:33

## 2021-01-01 RX ADMIN — IPRATROPIUM BROMIDE AND ALBUTEROL SULFATE 3 ML: 2.5; .5 SOLUTION RESPIRATORY (INHALATION) at 19:45

## 2021-01-01 RX ADMIN — ACETAMINOPHEN 650 MG: 325 TABLET ORAL at 13:26

## 2021-01-01 RX ADMIN — BENZTROPINE MESYLATE 1 MG: 1 TABLET ORAL at 09:06

## 2021-01-01 RX ADMIN — SODIUM CHLORIDE: 9 INJECTION, SOLUTION INTRAVENOUS at 21:27

## 2021-01-01 RX ADMIN — PANTOPRAZOLE SODIUM 40 MG: 40 TABLET, DELAYED RELEASE ORAL at 09:06

## 2021-01-01 RX ADMIN — CARBAMAZEPINE 200 MG: 200 TABLET ORAL at 21:43

## 2021-01-01 RX ADMIN — Medication 10 ML: at 22:46

## 2021-01-01 RX ADMIN — CARBAMAZEPINE 200 MG: 200 TABLET ORAL at 09:06

## 2021-01-01 RX ADMIN — CARBIDOPA AND LEVODOPA 2 TABLET: 25; 100 TABLET ORAL at 22:11

## 2021-01-01 RX ADMIN — IPRATROPIUM BROMIDE AND ALBUTEROL SULFATE 3 ML: 2.5; .5 SOLUTION RESPIRATORY (INHALATION) at 16:30

## 2021-01-01 RX ADMIN — PIPERACILLIN AND TAZOBACTAM 3375 MG: 3; .375 INJECTION, POWDER, LYOPHILIZED, FOR SOLUTION INTRAVENOUS at 09:31

## 2021-01-01 RX ADMIN — MAGNESIUM SULFATE HEPTAHYDRATE 2000 MG: 40 INJECTION, SOLUTION INTRAVENOUS at 14:38

## 2021-01-01 RX ADMIN — SODIUM CHLORIDE: 9 INJECTION, SOLUTION INTRAVENOUS at 04:53

## 2021-01-01 RX ADMIN — Medication 10 ML: at 08:51

## 2021-01-01 RX ADMIN — PANTOPRAZOLE SODIUM 40 MG: 40 TABLET, DELAYED RELEASE ORAL at 09:28

## 2021-01-01 RX ADMIN — CARBAMAZEPINE 200 MG: 200 SUSPENSION ORAL at 20:14

## 2021-01-01 RX ADMIN — IPRATROPIUM BROMIDE AND ALBUTEROL SULFATE 3 ML: 2.5; .5 SOLUTION RESPIRATORY (INHALATION) at 07:58

## 2021-01-01 RX ADMIN — Medication 1 CAPSULE: at 09:19

## 2021-01-01 RX ADMIN — ACETAMINOPHEN 650 MG: 650 SUPPOSITORY RECTAL at 09:42

## 2021-01-01 RX ADMIN — AMOXICILLIN AND CLAVULANATE POTASSIUM 1200 MG: 600; 42.9 POWDER, FOR SUSPENSION ORAL at 22:02

## 2021-01-01 RX ADMIN — DEXTROSE MONOHYDRATE: 50 INJECTION, SOLUTION INTRAVENOUS at 16:24

## 2021-01-01 RX ADMIN — DEXTROSE MONOHYDRATE: 50 INJECTION, SOLUTION INTRAVENOUS at 01:02

## 2021-01-01 RX ADMIN — CARBAMAZEPINE 200 MG: 200 SUSPENSION ORAL at 21:21

## 2021-01-01 RX ADMIN — BISACODYL 10 MG: 10 SUPPOSITORY RECTAL at 16:24

## 2021-01-01 RX ADMIN — LEVOFLOXACIN 500 MG: 500 TABLET, FILM COATED ORAL at 09:19

## 2021-01-01 RX ADMIN — PIPERACILLIN AND TAZOBACTAM 3375 MG: 3; .375 INJECTION, POWDER, LYOPHILIZED, FOR SOLUTION INTRAVENOUS at 01:08

## 2021-01-01 RX ADMIN — SODIUM CHLORIDE, POTASSIUM CHLORIDE, SODIUM LACTATE AND CALCIUM CHLORIDE: 600; 310; 30; 20 INJECTION, SOLUTION INTRAVENOUS at 08:18

## 2021-01-01 RX ADMIN — SODIUM CHLORIDE 600 ML: 9 INJECTION, SOLUTION INTRAVENOUS at 19:35

## 2021-01-01 RX ADMIN — ENOXAPARIN SODIUM 40 MG: 40 INJECTION SUBCUTANEOUS at 10:01

## 2021-01-01 RX ADMIN — LEVETIRACETAM 1000 MG: 10 INJECTION INTRAVENOUS at 11:22

## 2021-01-01 RX ADMIN — LORAZEPAM 1 MG: 2 INJECTION INTRAMUSCULAR; INTRAVENOUS at 02:56

## 2021-01-01 RX ADMIN — CARBIDOPA AND LEVODOPA 2 TABLET: 25; 100 TABLET ORAL at 09:37

## 2021-01-01 RX ADMIN — CARBAMAZEPINE 200 MG: 200 TABLET ORAL at 21:46

## 2021-01-01 RX ADMIN — BENZTROPINE MESYLATE 1 MG: 0.5 TABLET ORAL at 19:55

## 2021-01-01 RX ADMIN — CALCIUM CARBONATE-VITAMIN D TAB 500 MG-200 UNIT 1 TABLET: 500-200 TAB at 22:57

## 2021-01-01 RX ADMIN — PIPERACILLIN AND TAZOBACTAM 3375 MG: 3; .375 INJECTION, POWDER, LYOPHILIZED, FOR SOLUTION INTRAVENOUS at 16:49

## 2021-01-01 RX ADMIN — PETROLATUM: 420 OINTMENT TOPICAL at 21:46

## 2021-01-01 RX ADMIN — LEVETIRACETAM 1000 MG: 10 INJECTION INTRAVENOUS at 14:30

## 2021-01-01 RX ADMIN — DEXTROSE AND SODIUM CHLORIDE: 5; 450 INJECTION, SOLUTION INTRAVENOUS at 23:49

## 2021-01-01 RX ADMIN — LORAZEPAM 2 MG: 2 INJECTION INTRAMUSCULAR; INTRAVENOUS at 20:51

## 2021-01-01 RX ADMIN — CALCIUM CARBONATE-VITAMIN D TAB 500 MG-200 UNIT 1 TABLET: 500-200 TAB at 20:21

## 2021-01-01 RX ADMIN — LEVETIRACETAM 500 MG: 500 TABLET ORAL at 08:49

## 2021-01-01 RX ADMIN — LORAZEPAM 1 MG: 2 INJECTION INTRAMUSCULAR; INTRAVENOUS at 02:46

## 2021-01-01 RX ADMIN — POLYETHYLENE GLYCOL 3350 17 G: 17 POWDER, FOR SOLUTION ORAL at 08:08

## 2021-01-01 RX ADMIN — LEVETIRACETAM 1000 MG: 10 INJECTION INTRAVENOUS at 00:52

## 2021-01-01 RX ADMIN — VANCOMYCIN HYDROCHLORIDE 1000 MG: 1 INJECTION, POWDER, LYOPHILIZED, FOR SOLUTION INTRAVENOUS at 01:49

## 2021-01-01 RX ADMIN — CARBAMAZEPINE 200 MG: 200 TABLET ORAL at 10:03

## 2021-01-01 RX ADMIN — BENZTROPINE MESYLATE 1 MG: 0.5 TABLET ORAL at 20:21

## 2021-01-01 RX ADMIN — KETOROLAC TROMETHAMINE 30 MG: 30 INJECTION, SOLUTION INTRAMUSCULAR at 06:20

## 2021-01-01 RX ADMIN — CALCIUM CARBONATE-VITAMIN D TAB 500 MG-200 UNIT 1 TABLET: 500-200 TAB at 08:50

## 2021-01-01 RX ADMIN — LORAZEPAM 1 MG: 2 INJECTION INTRAMUSCULAR; INTRAVENOUS at 09:27

## 2021-01-01 RX ADMIN — CARBIDOPA AND LEVODOPA 1 TABLET: 25; 100 TABLET ORAL at 10:19

## 2021-01-01 RX ADMIN — CARBIDOPA AND LEVODOPA 1 TABLET: 25; 100 TABLET ORAL at 12:57

## 2021-01-01 RX ADMIN — DEXTROSE AND SODIUM CHLORIDE: 5; 200 INJECTION, SOLUTION INTRAVENOUS at 22:42

## 2021-01-01 RX ADMIN — PANTOPRAZOLE SODIUM 20 MG: 20 TABLET, DELAYED RELEASE ORAL at 11:14

## 2021-01-01 RX ADMIN — ACETAMINOPHEN 650 MG: 650 SUPPOSITORY RECTAL at 17:54

## 2021-01-01 RX ADMIN — POLYETHYLENE GLYCOL 3350 17 G: 17 POWDER, FOR SOLUTION ORAL at 14:01

## 2021-01-01 RX ADMIN — CARBAMAZEPINE 200 MG: 200 SUSPENSION ORAL at 08:20

## 2021-01-01 RX ADMIN — SODIUM CHLORIDE: 9 INJECTION, SOLUTION INTRAVENOUS at 13:40

## 2021-01-01 RX ADMIN — Medication 10 ML: at 11:14

## 2021-01-01 RX ADMIN — IPRATROPIUM BROMIDE AND ALBUTEROL SULFATE 3 ML: 2.5; .5 SOLUTION RESPIRATORY (INHALATION) at 17:50

## 2021-01-01 RX ADMIN — SODIUM CHLORIDE: 9 INJECTION, SOLUTION INTRAVENOUS at 10:48

## 2021-01-01 RX ADMIN — CARBIDOPA AND LEVODOPA 1 TABLET: 25; 100 TABLET ORAL at 16:48

## 2021-01-01 RX ADMIN — CEFEPIME 2000 MG: 2 INJECTION, POWDER, FOR SOLUTION INTRAVENOUS at 10:11

## 2021-01-01 RX ADMIN — IPRATROPIUM BROMIDE AND ALBUTEROL SULFATE 3 ML: 2.5; .5 SOLUTION RESPIRATORY (INHALATION) at 09:29

## 2021-01-01 RX ADMIN — KETAMINE HYDROCHLORIDE 20 MG: 10 INJECTION INTRAMUSCULAR; INTRAVENOUS at 02:35

## 2021-01-01 RX ADMIN — PIPERACILLIN AND TAZOBACTAM 3375 MG: 3; .375 INJECTION, POWDER, LYOPHILIZED, FOR SOLUTION INTRAVENOUS at 00:42

## 2021-01-01 RX ADMIN — IPRATROPIUM BROMIDE AND ALBUTEROL SULFATE 3 ML: 2.5; .5 SOLUTION RESPIRATORY (INHALATION) at 19:34

## 2021-01-01 RX ADMIN — LEVETIRACETAM 1000 MG: 10 INJECTION INTRAVENOUS at 00:50

## 2021-01-01 RX ADMIN — Medication 10 ML: at 08:42

## 2021-01-01 RX ADMIN — SODIUM PHOSPHATE, DIBASIC AND SODIUM PHOSPHATE, MONOBASIC 1 ENEMA: 3.5; 9.5 ENEMA RECTAL at 13:09

## 2021-01-01 RX ADMIN — CARBAMAZEPINE 200 MG: 200 TABLET ORAL at 19:54

## 2021-01-01 RX ADMIN — ACETAMINOPHEN 650 MG: 650 SUPPOSITORY RECTAL at 08:54

## 2021-01-01 RX ADMIN — CARBAMAZEPINE 200 MG: 200 TABLET ORAL at 23:30

## 2021-01-01 RX ADMIN — SODIUM CHLORIDE: 4.5 INJECTION, SOLUTION INTRAVENOUS at 17:30

## 2021-01-01 RX ADMIN — ENOXAPARIN SODIUM 40 MG: 40 INJECTION SUBCUTANEOUS at 09:20

## 2021-01-01 RX ADMIN — CARBAMAZEPINE 200 MG: 200 SUSPENSION ORAL at 20:59

## 2021-01-01 RX ADMIN — LORAZEPAM 1 MG: 2 INJECTION INTRAMUSCULAR; INTRAVENOUS at 09:20

## 2021-01-01 RX ADMIN — ENOXAPARIN SODIUM 40 MG: 40 INJECTION SUBCUTANEOUS at 08:20

## 2021-01-01 RX ADMIN — LEVETIRACETAM 500 MG: 500 SOLUTION ORAL at 08:34

## 2021-01-01 RX ADMIN — ENOXAPARIN SODIUM 40 MG: 40 INJECTION SUBCUTANEOUS at 11:13

## 2021-01-01 RX ADMIN — DEXTROSE AND SODIUM CHLORIDE: 5; 200 INJECTION, SOLUTION INTRAVENOUS at 10:12

## 2021-01-01 RX ADMIN — LEVETIRACETAM 500 MG: 500 SOLUTION ORAL at 10:03

## 2021-01-01 RX ADMIN — CARBIDOPA AND LEVODOPA 1 TABLET: 25; 100 TABLET ORAL at 08:42

## 2021-01-01 RX ADMIN — Medication 10 ML: at 03:53

## 2021-01-01 RX ADMIN — IPRATROPIUM BROMIDE AND ALBUTEROL SULFATE 3 ML: 2.5; .5 SOLUTION RESPIRATORY (INHALATION) at 08:16

## 2021-01-01 RX ADMIN — CARBIDOPA AND LEVODOPA 1 TABLET: 25; 100 TABLET ORAL at 09:33

## 2021-01-01 RX ADMIN — LORAZEPAM 1 MG: 2 INJECTION INTRAMUSCULAR; INTRAVENOUS at 08:08

## 2021-01-01 RX ADMIN — SODIUM CHLORIDE: 9 INJECTION, SOLUTION INTRAVENOUS at 06:53

## 2021-01-01 RX ADMIN — CALCIUM CARBONATE-VITAMIN D TAB 500 MG-200 UNIT 1 TABLET: 500-200 TAB at 11:13

## 2021-01-01 RX ADMIN — IPRATROPIUM BROMIDE AND ALBUTEROL SULFATE 3 ML: 2.5; .5 SOLUTION RESPIRATORY (INHALATION) at 07:57

## 2021-01-01 RX ADMIN — Medication 10 ML: at 22:53

## 2021-01-01 RX ADMIN — AMOXICILLIN AND CLAVULANATE POTASSIUM 1200 MG: 600; 42.9 POWDER, FOR SUSPENSION ORAL at 20:52

## 2021-01-01 RX ADMIN — MIRTAZAPINE 30 MG: 15 TABLET, FILM COATED ORAL at 20:21

## 2021-01-01 RX ADMIN — ACETAMINOPHEN 650 MG: 650 SUPPOSITORY RECTAL at 02:48

## 2021-01-01 RX ADMIN — CARBIDOPA AND LEVODOPA 1 TABLET: 25; 100 TABLET ORAL at 08:53

## 2021-01-01 RX ADMIN — CALCIUM CARBONATE-VITAMIN D TAB 500 MG-200 UNIT 1 TABLET: 500-200 TAB at 22:26

## 2021-01-01 RX ADMIN — CARBAMAZEPINE 200 MG: 200 TABLET ORAL at 22:13

## 2021-01-01 RX ADMIN — Medication 10 ML: at 21:00

## 2021-01-01 RX ADMIN — CARBIDOPA AND LEVODOPA 1 TABLET: 25; 100 TABLET ORAL at 21:42

## 2021-01-01 RX ADMIN — BENZTROPINE MESYLATE 1 MG: 0.5 TABLET ORAL at 22:47

## 2021-01-01 RX ADMIN — SODIUM CHLORIDE, POTASSIUM CHLORIDE, SODIUM LACTATE AND CALCIUM CHLORIDE: 600; 310; 30; 20 INJECTION, SOLUTION INTRAVENOUS at 18:25

## 2021-01-01 RX ADMIN — PETROLATUM: 420 OINTMENT TOPICAL at 20:54

## 2021-01-01 RX ADMIN — LEVETIRACETAM 500 MG: 500 TABLET ORAL at 21:54

## 2021-01-01 RX ADMIN — DEXTROSE AND SODIUM CHLORIDE: 5; 200 INJECTION, SOLUTION INTRAVENOUS at 14:07

## 2021-01-01 RX ADMIN — LEVETIRACETAM 500 MG: 500 SOLUTION ORAL at 08:59

## 2021-01-01 RX ADMIN — POTASSIUM CHLORIDE 10 MEQ: 10 INJECTION, SOLUTION INTRAVENOUS at 12:37

## 2021-01-01 RX ADMIN — LEVETIRACETAM 500 MG: 500 TABLET ORAL at 08:54

## 2021-01-01 RX ADMIN — DIPHENHYDRAMINE HYDROCHLORIDE 50 MG: 50 INJECTION, SOLUTION INTRAMUSCULAR; INTRAVENOUS at 13:18

## 2021-01-01 RX ADMIN — CARBAMAZEPINE 200 MG: 200 SUSPENSION ORAL at 20:54

## 2021-01-01 RX ADMIN — Medication 10 ML: at 23:07

## 2021-01-01 RX ADMIN — CARBIDOPA AND LEVODOPA 2 TABLET: 25; 100 TABLET ORAL at 15:55

## 2021-01-01 RX ADMIN — Medication 10 ML: at 21:47

## 2021-01-01 RX ADMIN — LORAZEPAM 2 MG: 2 INJECTION INTRAMUSCULAR; INTRAVENOUS at 13:02

## 2021-01-01 RX ADMIN — LORAZEPAM 0.5 MG: 2 INJECTION INTRAMUSCULAR; INTRAVENOUS at 04:38

## 2021-01-01 RX ADMIN — LEVETIRACETAM 500 MG: 500 SOLUTION ORAL at 20:53

## 2021-01-01 RX ADMIN — ACETAMINOPHEN 650 MG: 650 SUPPOSITORY RECTAL at 22:46

## 2021-01-01 RX ADMIN — Medication 10 ML: at 09:21

## 2021-01-01 RX ADMIN — DIPHENHYDRAMINE HYDROCHLORIDE 25 MG: 50 INJECTION, SOLUTION INTRAMUSCULAR; INTRAVENOUS at 06:34

## 2021-01-01 RX ADMIN — DEXTROSE MONOHYDRATE: 50 INJECTION, SOLUTION INTRAVENOUS at 11:14

## 2021-01-01 RX ADMIN — CARBAMAZEPINE 200 MG: 200 SUSPENSION ORAL at 09:23

## 2021-01-01 RX ADMIN — SODIUM CHLORIDE: 9 INJECTION, SOLUTION INTRAVENOUS at 20:11

## 2021-01-01 RX ADMIN — LEVETIRACETAM 500 MG: 500 SOLUTION ORAL at 22:11

## 2021-01-01 RX ADMIN — DEXTROSE AND SODIUM CHLORIDE: 5; 450 INJECTION, SOLUTION INTRAVENOUS at 16:00

## 2021-01-01 RX ADMIN — LORAZEPAM 1 MG: 2 INJECTION INTRAMUSCULAR; INTRAVENOUS at 21:21

## 2021-01-01 RX ADMIN — CARBIDOPA AND LEVODOPA 2 TABLET: 25; 100 TABLET ORAL at 12:18

## 2021-01-01 RX ADMIN — MIRTAZAPINE 30 MG: 15 TABLET, FILM COATED ORAL at 19:55

## 2021-01-01 RX ADMIN — DEXTROSE AND SODIUM CHLORIDE: 5; 450 INJECTION, SOLUTION INTRAVENOUS at 13:37

## 2021-01-01 RX ADMIN — IPRATROPIUM BROMIDE AND ALBUTEROL SULFATE 3 ML: 2.5; .5 SOLUTION RESPIRATORY (INHALATION) at 12:14

## 2021-01-01 RX ADMIN — ENOXAPARIN SODIUM 40 MG: 40 INJECTION SUBCUTANEOUS at 10:00

## 2021-01-01 RX ADMIN — SODIUM CHLORIDE: 4.5 INJECTION, SOLUTION INTRAVENOUS at 12:53

## 2021-01-01 RX ADMIN — LORAZEPAM 2 MG: 2 INJECTION INTRAMUSCULAR; INTRAVENOUS at 17:36

## 2021-01-01 RX ADMIN — PROPOFOL 50 MG: 10 INJECTION, EMULSION INTRAVENOUS at 13:41

## 2021-01-01 RX ADMIN — IPRATROPIUM BROMIDE AND ALBUTEROL SULFATE 3 ML: 2.5; .5 SOLUTION RESPIRATORY (INHALATION) at 13:21

## 2021-01-01 RX ADMIN — Medication 10 ML: at 20:12

## 2021-01-01 RX ADMIN — KETOROLAC TROMETHAMINE 15 MG: 30 INJECTION, SOLUTION INTRAMUSCULAR at 02:05

## 2021-01-01 RX ADMIN — LORAZEPAM 1 MG: 2 INJECTION INTRAMUSCULAR; INTRAVENOUS at 21:14

## 2021-01-01 RX ADMIN — PIPERACILLIN AND TAZOBACTAM 3375 MG: 3; .375 INJECTION, POWDER, LYOPHILIZED, FOR SOLUTION INTRAVENOUS at 08:20

## 2021-01-01 RX ADMIN — PETROLATUM: 420 OINTMENT TOPICAL at 10:19

## 2021-01-01 RX ADMIN — IPRATROPIUM BROMIDE AND ALBUTEROL SULFATE 3 ML: 2.5; .5 SOLUTION RESPIRATORY (INHALATION) at 11:55

## 2021-01-01 RX ADMIN — DEXTROSE AND SODIUM CHLORIDE: 5; 200 INJECTION, SOLUTION INTRAVENOUS at 08:09

## 2021-01-01 RX ADMIN — IPRATROPIUM BROMIDE AND ALBUTEROL SULFATE 3 ML: 2.5; .5 SOLUTION RESPIRATORY (INHALATION) at 20:55

## 2021-01-01 RX ADMIN — LORAZEPAM 1 MG: 2 INJECTION INTRAMUSCULAR; INTRAVENOUS at 09:15

## 2021-01-01 RX ADMIN — SODIUM CHLORIDE: 9 INJECTION, SOLUTION INTRAVENOUS at 14:10

## 2021-01-01 RX ADMIN — LEVETIRACETAM 500 MG: 500 SOLUTION ORAL at 22:59

## 2021-01-01 RX ADMIN — SODIUM CHLORIDE: 9 INJECTION, SOLUTION INTRAVENOUS at 23:02

## 2021-01-01 RX ADMIN — LORAZEPAM 0.5 MG: 2 INJECTION INTRAMUSCULAR at 04:38

## 2021-01-01 RX ADMIN — IPRATROPIUM BROMIDE AND ALBUTEROL SULFATE 3 ML: 2.5; .5 SOLUTION RESPIRATORY (INHALATION) at 11:38

## 2021-01-01 RX ADMIN — BISACODYL 10 MG: 10 SUPPOSITORY RECTAL at 14:01

## 2021-01-01 RX ADMIN — BENZTROPINE MESYLATE 1 MG: 0.5 TABLET ORAL at 22:57

## 2021-01-01 RX ADMIN — LEVETIRACETAM 500 MG: 500 SOLUTION ORAL at 09:33

## 2021-01-01 RX ADMIN — Medication 10 ML: at 20:39

## 2021-01-01 RX ADMIN — PANTOPRAZOLE SODIUM 40 MG: 40 TABLET, DELAYED RELEASE ORAL at 06:09

## 2021-01-01 RX ADMIN — DEXTROSE MONOHYDRATE: 50 INJECTION, SOLUTION INTRAVENOUS at 03:11

## 2021-01-01 RX ADMIN — LEVETIRACETAM 1000 MG: 10 INJECTION INTRAVENOUS at 01:06

## 2021-01-01 RX ADMIN — POLYETHYLENE GLYCOL 3350 17 G: 17 POWDER, FOR SOLUTION ORAL at 09:39

## 2021-01-01 RX ADMIN — DEXTROSE AND SODIUM CHLORIDE: 5; 450 INJECTION, SOLUTION INTRAVENOUS at 07:39

## 2021-01-01 RX ADMIN — CARBAMAZEPINE 200 MG: 200 SUSPENSION ORAL at 09:27

## 2021-01-01 RX ADMIN — LORAZEPAM 1 MG: 2 INJECTION INTRAMUSCULAR; INTRAVENOUS at 02:38

## 2021-01-01 RX ADMIN — LEVETIRACETAM 500 MG: 500 SOLUTION ORAL at 19:33

## 2021-01-01 RX ADMIN — CARBAMAZEPINE 200 MG: 200 TABLET ORAL at 22:52

## 2021-01-01 RX ADMIN — CARBIDOPA AND LEVODOPA 1 TABLET: 25; 100 TABLET ORAL at 16:15

## 2021-01-01 RX ADMIN — IPRATROPIUM BROMIDE AND ALBUTEROL SULFATE 3 ML: 2.5; .5 SOLUTION RESPIRATORY (INHALATION) at 12:13

## 2021-01-01 RX ADMIN — DEXTROSE AND SODIUM CHLORIDE: 5; 450 INJECTION, SOLUTION INTRAVENOUS at 20:46

## 2021-01-01 RX ADMIN — SODIUM CHLORIDE: 4.5 INJECTION, SOLUTION INTRAVENOUS at 08:48

## 2021-01-01 RX ADMIN — CARBIDOPA AND LEVODOPA 1 TABLET: 25; 100 TABLET ORAL at 20:47

## 2021-01-01 RX ADMIN — LIDOCAINE HYDROCHLORIDE 5 ML: 40 SOLUTION TOPICAL at 15:00

## 2021-01-01 RX ADMIN — ACETAMINOPHEN 650 MG: 650 SUPPOSITORY RECTAL at 06:26

## 2021-01-01 RX ADMIN — ACETAMINOPHEN 650 MG: 325 TABLET ORAL at 21:46

## 2021-01-01 RX ADMIN — ENOXAPARIN SODIUM 40 MG: 40 INJECTION SUBCUTANEOUS at 08:34

## 2021-01-01 RX ADMIN — Medication 10 ML: at 08:21

## 2021-01-01 RX ADMIN — POLYETHYLENE GLYCOL 3350 17 G: 17 POWDER, FOR SOLUTION ORAL at 08:53

## 2021-01-01 RX ADMIN — Medication 10 ML: at 07:55

## 2021-01-01 ASSESSMENT — PAIN SCALES - PAIN ASSESSMENT IN ADVANCED DEMENTIA (PAINAD)
FACIALEXPRESSION: 0
BODYLANGUAGE: 0
NEGVOCALIZATION: 0
CONSOLABILITY: 0
TOTALSCORE: 0
BREATHING: 0
NEGVOCALIZATION: 0
BREATHING: 0
FACIALEXPRESSION: 0
BODYLANGUAGE: 0
TOTALSCORE: 0
CONSOLABILITY: 0

## 2021-01-01 ASSESSMENT — PAIN SCALES - GENERAL
PAINLEVEL_OUTOF10: 0
PAINLEVEL_OUTOF10: 4
PAINLEVEL_OUTOF10: 0
PAINLEVEL_OUTOF10: 3
PAINLEVEL_OUTOF10: 0
PAINLEVEL_OUTOF10: 0

## 2021-01-05 ENCOUNTER — OFFICE VISIT (OUTPATIENT)
Dept: NEUROLOGY | Age: 71
End: 2021-01-05
Payer: MEDICARE

## 2021-01-05 VITALS
WEIGHT: 125 LBS | TEMPERATURE: 97.4 F | HEART RATE: 66 BPM | RESPIRATION RATE: 18 BRPM | BODY MASS INDEX: 20.8 KG/M2 | OXYGEN SATURATION: 92 %

## 2021-01-05 DIAGNOSIS — G20 PARKINSON DISEASE (HCC): ICD-10-CM

## 2021-01-05 DIAGNOSIS — G40.209 PARTIAL SYMPTOMATIC EPILEPSY WITH COMPLEX PARTIAL SEIZURES, NOT INTRACTABLE, WITHOUT STATUS EPILEPTICUS (HCC): Primary | ICD-10-CM

## 2021-01-05 PROCEDURE — 4040F PNEUMOC VAC/ADMIN/RCVD: CPT | Performed by: CLINICAL NURSE SPECIALIST

## 2021-01-05 PROCEDURE — 1123F ACP DISCUSS/DSCN MKR DOCD: CPT | Performed by: CLINICAL NURSE SPECIALIST

## 2021-01-05 PROCEDURE — 1036F TOBACCO NON-USER: CPT | Performed by: CLINICAL NURSE SPECIALIST

## 2021-01-05 PROCEDURE — G8420 CALC BMI NORM PARAMETERS: HCPCS | Performed by: CLINICAL NURSE SPECIALIST

## 2021-01-05 PROCEDURE — 3017F COLORECTAL CA SCREEN DOC REV: CPT | Performed by: CLINICAL NURSE SPECIALIST

## 2021-01-05 PROCEDURE — G8427 DOCREV CUR MEDS BY ELIG CLIN: HCPCS | Performed by: CLINICAL NURSE SPECIALIST

## 2021-01-05 PROCEDURE — 99214 OFFICE O/P EST MOD 30 MIN: CPT | Performed by: CLINICAL NURSE SPECIALIST

## 2021-01-05 PROCEDURE — G8484 FLU IMMUNIZE NO ADMIN: HCPCS | Performed by: CLINICAL NURSE SPECIALIST

## 2021-01-05 NOTE — PROGRESS NOTES
Ozzy Paz is a 78 y.o.  male     From [facility]    Long history of epilepsy but no seizures reported in years   Maintained on Tegretol 300BID   no reported seizure since last appt and remains on Keppra/Tegretol     His Depakote was suspected to have been started for behavioral issues rather than his epilepsy - this was discontinued d/t tremors     We tried Neupro patches for PD vs parkinsonisms    His tremors have not improved but his walking did on low dose   We continued at 4mg and staff has noted no change in tremor but he has become more agitated and screaming out    Neupro was discontinued and we started Sinemet twice a day    Staff has noted marked improvement -- not shaking as much -- able to hold juice cup -- he was doing well but unfortunately this had to be discontinued because of Zyprexa interaction     ROS unobtainable due to patient condition    Prior to Visit Medications    Medication Sig Taking? Authorizing Provider   Respiratory Therapy Supplies (FULL KIT NEBULIZER SET) MISC Use as directed with nebulized medication.  Yes Mary Gill MD   levETIRAcetam (KEPPRA) 500 MG tablet Take 2 tablets by mouth 2 times daily Yes MARIJA Pena - CNS   Calcium Carb-Cholecalciferol (CALCIUM-VITAMIN D) 500-200 MG-UNIT per tablet Take 1 tablet by mouth 2 times daily Yes Azalia Lyles PA-C   fluvoxaMINE (LUVOX CR) 150 MG CP24 extended release capsule Take 150 mg by mouth daily  Yes Historical Provider, MD   mirtazapine (REMERON) 30 MG tablet Take 30 mg by mouth nightly  Yes Historical Provider, MD   polyethylene glycol (MIRALAX) powder Take 17 g by mouth daily as needed  Yes Historical Provider, MD   OLANZapine (ZYPREXA) 7.5 MG tablet Take 7.5 mg by mouth nightly  Yes Historical Provider, MD   omeprazole (PRILOSEC) 20 MG capsule Take 20 mg by mouth Daily  Yes Historical Provider, MD   carBAMazepine (TEGRETOL) 200 MG tablet Take 200 mg by mouth 2 times daily  Yes Historical Provider, MD acetaminophen (TYLENOL) 325 MG tablet Take 650 mg by mouth every 4 hours as needed for Pain. Yes Historical Provider, MD   benztropine (COGENTIN) 1 MG tablet Take 1 mg by mouth 2 times daily.  TAKE AM OF OR WITH A LITTLE WATER 04/24/2015 Yes Historical Provider, MD   Respiratory Therapy Supplies MISC 3 mLs by Does not apply route 4 times daily  Vincent Reynolds MD   ipratropium-albuterol (DUONEB) 0.5-2.5 (3) MG/3ML SOLN nebulizer solution Inhale 3 mLs into the lungs every 4 hours (while awake)  Vincent Reynolds MD   vitamin D (ERGOCALCIFEROL) 1.25 MG (17553 UT) CAPS capsule Take 50,000 Units by mouth every 30 days On the 14th of each month  Historical Provider, MD   carbidopa-levodopa (SINEMET)  MG per tablet Twice a day at 0800 and 1600  Patient not taking: Reported on 1/5/2021  MARIJA Neumann - CNS     Allergies as of 01/05/2021    (No Known Allergies)     Objective:     Pulse 66   Temp 97.4 °F (36.3 °C)   Resp 18   Wt 125 lb (56.7 kg)   SpO2 92%   BMI 20.80 kg/m²  - unable to obtain BP      General appearance: alert and minimally cooperative  Head: Normocephalic, without obvious abnormality, atraumatic  Extremities: boots on bilaterally - no edema  Pulses: 2+ and symmetric   Skin: no rashes or lesions     Mental Status: awake - looking around room     Nonverbal  Not following commands    No Myerson's sign     Cranial Nerves:  I: smell    II: visual acuity     II: visual fields Bilateral threat responses   II: pupils NOMI   III,VII: ptosis    III,IV,VI: extraocular muscles  Looks around the room   V: mastication    V: facial light touch sensation     V,VII: corneal reflex  Present   VII: facial muscle function - upper     VII: facial muscle function - lower Normal   VIII: hearing Normal   IX: soft palate elevation  Normal   IX,X: gag reflex Present   XI: trapezius strength     XI: sternocleidomastoid strength    XI: neck extension strength     XII: tongue strength  Normal     Motor:  Moving all limbs sporadically and symmetrically - though minimally  Normal bulk    No right hand resting tremor appreciated today   cogwheel rigidity noted in wrists today     Sensory:  Withdraws to PP throughout    Gait:  In wheelchair today     DTR:   Right Brachioradialis reflex 0  Left Brachioradialis reflex 0  Right Biceps reflex 1+  Left Biceps reflex 1+  Right Quadriceps reflex 1+  Left Quadriceps reflex 1+  Right Achilles reflex 0  Left Achilles reflex 0    No Grey's     Laboratory/Radiology:     CBC:   Lab Results   Component Value Date    WBC 5.6 10/28/2020    RBC 4.46 10/28/2020    HGB 14.1 10/28/2020    HCT 43.6 10/28/2020    MCV 97.8 10/28/2020    MCH 31.6 10/28/2020    MCHC 32.3 10/28/2020    RDW 12.5 10/28/2020     10/28/2020    MPV 9.8 10/28/2020     BMP:    Lab Results   Component Value Date     10/29/2020    K 4.2 10/29/2020     10/29/2020    CO2 25 10/29/2020    BUN 32 10/29/2020    LABALBU 4.1 10/28/2020    LABALBU 4.5 05/18/2012    CREATININE 0.8 10/29/2020    CALCIUM 8.7 10/29/2020    GFRAA >60 10/29/2020    LABGLOM >60 10/29/2020    GLUCOSE 82 10/29/2020    GLUCOSE 71 05/18/2012     Tegretol level 13.3    Labs were personally reviewed by myself today     Assessment:     Seizure disorder   One reported seizure -- now on Keppra 1000mg daily     Increasing tremors - unchanged with lower doses of Depakote - unchanged with Neupro at 4mg   Agitation improved with Neupro discontinuation    Tremors improved with Sinemet therefore I suspect Parkinson's disease     Now held      Plan:     Keppra to 500mg BID     RTO 12 weeks     [place]  11:45 AM  1/5/2021

## 2021-01-26 NOTE — ED PROVIDER NOTES
Height Weight   -- 97.7 °F (36.5 °C) -- 102 18 98 % -- --         · General Appearance/Constitutional:  Alert. Contracted, tremors in all 4 extremities, nonverbal at baseline  · HEENT:  NC/NT. PERRLA. Airway patent. · Neck:  Normal ROM. Supple. · Respiratoty:  Breath sounds: equal bilaterally. Lung sounds: normal.   · CV:  Regular rate and rhythm, normal heart sounds, without pathological murmurs, ectopy, gallops, or rubs. .  · GI:  Soft, nontender, good bowel sounds. No firm or pulsatile mass. · Integument:  Normal turgor. Warm, dry, without visible rash. · Extremities/Lymphatics:  Edema:  none Bilateral lower extremity(s). No evidence of DVT seen on physical exam..    Lab / Imaging Results   (All laboratory and radiology results have been personally reviewed by myself)  Labs:  Results for orders placed or performed during the hospital encounter of 01/25/21   Blood Gas, Arterial   Result Value Ref Range    Date Analyzed 20210125     Time Analyzed 1941     Source: Blood Arterial     pH, Blood Gas 7.418 7.350 - 7.450    PCO2 35.9 35.0 - 45.0 mmHg    PO2 52.9 (L) 75.0 - 100.0 mmHg    HCO3 22.7 22.0 - 26.0 mmol/L    B.E. -1.3 -3.0 - 3.0 mmol/L    O2 Sat 88.9 (L) 92.0 - 98.5 %    O2Hb 88.1 (L) 94.0 - 97.0 %    COHb 0.6 0.0 - 1.5 %    MetHb 0.3 0.0 - 1.5 %    O2 Content 18.4 mL/dL    HHb 11.0 (H) 0.0 - 5.0 %    tHb (est) 14.9 11.5 - 16.5 g/dL    Mode RA     Date Of Collection      Time Collected      Pt Temp 37.0 C     ID [id number]     Lab [id number]     Critical(s) Notified .  No Critical Values    CBC Auto Differential   Result Value Ref Range    WBC 7.0 4.5 - 11.5 E9/L    RBC 4.82 3.80 - 5.80 E12/L    Hemoglobin 14.7 12.5 - 16.5 g/dL    Hematocrit 46.4 37.0 - 54.0 %    MCV 96.3 80.0 - 99.9 fL    MCH 30.5 26.0 - 35.0 pg    MCHC 31.7 (L) 32.0 - 34.5 %    RDW 13.6 11.5 - 15.0 fL    Platelets 472 329 - 196 E9/L    MPV 9.4 7.0 - 12.0 fL    Neutrophils % 64.3 43.0 - 80.0 %    Immature Granulocytes % 0.3 0.0 - Consultations:             None    Procedures:   none    MDM: Patient presents from [address] [place] [address] for evaluation of hypoxia. He has had similar visits previously and the hypoxia was thought to be secondary to atelectasis due to his body habitus. He sits kind of hunched over in a fetal position with his chin down. Initially, we are having difficulty picking up patient's pulse ox, therefore ABG was obtained. I am not convinced that this was arterial as the PO2 was 52.9. Patient given a DuoNeb and we attempted to try different pulse ox set ups. Several pulse ox readings here have been normal on room air. Patient is well-appearing. He is not coughing and does not appear short of breath. He has normal coloring and does not appear cyanotic. COVID-19 swab was negative. Nursing was unable to obtain an adequate EKG due to tremors secondary to history of Parkinson's disease. Chest x-ray showed no pneumonia, troponin was negative. Patient hydrated with IV fluids. Patient will be discharged back to the nursing home. Plan of Care/Counseling:  I reviewed today's visit with the patient in addition to providing specific details for the plan of care and counseling regarding the diagnosis and prognosis. Questions are answered at this time and are agreeable with the plan. Assessment      1. Feared complaint without diagnosis      This patient's ED course included: a personal history and physicial examination  This patient has remained hemodynamically stable during their ED course. Plan   Discharge to nursing home. Patient condition is stable. New Medications     Discharge Medication List as of 1/25/2021 11:56 PM        Electronically signed by Giovana Wang DO   DD: 1/25/21  **This report was transcribed using voice recognition software. Every effort was made to ensure accuracy; however, inadvertent computerized transcription errors may be present.   END OF PROVIDER NOTE        Giovana Wang DO  01/28/21 0103

## 2021-01-28 PROBLEM — A41.9 SEPSIS (HCC): Status: ACTIVE | Noted: 2021-01-01

## 2021-01-28 NOTE — CARE COORDINATION
Social Work [address] [place] [address] Planning:    Pt presents to the ED secondary to shortness of breath and a fever. Pt is from a [facility] facility, is a full code, and has a legal guardian Yovani Flores [phone]). Pt has hx of intellectual disability, seizures and parkinsonism. Pt is currently on 3L oxygen. SW spoke to pt's legal guardian who report pt is not normally docile and will usually not keep IV's in his arm if he is at his baseline. Pt's legal guardian reports pt was recently taken off of Sinemet by the neurologist due to interaction pt was having with Zyprexa. Pt has a scheduled appt with neurologist this coming Monday 2/1. SW updated attending physician. Plan will be for pt to return to [facility] located at [facility], upon discharge. Assigned SW/CM will need to follow up with pt's legal guardian as well as Lennie Abraham ([phone]), , for further discharge needs.

## 2021-01-28 NOTE — ED NOTES
Spoke with Dr Ash Cole about patient condition, vitals and mews discussed. New orders obtained for consult to ID and he states patient is stable to transfer to floor with current vital signs.       Marlon Harrington RN  01/28/21 5326

## 2021-01-28 NOTE — ED NOTES
Pt resting comfortably at this time, eyes closed, respirations easy and non labored.       González Venegas RN  01/28/21 1438

## 2021-01-28 NOTE — PROGRESS NOTES
Pharmacy Consultation Note  (Antibiotic Dosing and Monitoring)    Initial consult date: 1/28/21  Consulting physician: Gelacio Garcia  Drug: Vancomycin  Indication: sepsis      Age/  Gender Height Weight IBW Dosing weight  kg  Allergy Information   70 y.o./male 5' 5\" (165.1 cm) 125 lb (56.7 kg)     Ideal body weight: 61.5 kg (135 lb 9.3 oz)  56.7  Patient has no known allergies. Other anti-infectives Start date Stop date   Cefepime                      Date  Tmax WBC BUN/CR UOP CrCL  (mL/min) Drug/Dose Time   Given Level(s)   (Time) Comments   1/28 103  8.3 55/1.5  38 Vancomycin 1000 mg x1 0149     1/29      Vancomycin 1000 mg <0200>                                 No intake or output data in the 24 hours ending 01/28/21 0823    Average urine output:    Cultures:    Site Date Result   Blood 1 1/27 Gram + cocci clusters               No results for input(s): Chip Denise in the last 72 hours. Historical Cultures:  Organism   Date Value Ref Range Status   06/12/2020 Proteus mirabilis (A)  Final     No results for input(s): BC in the last 72 hours. Radiology:      Assessment:  · Pharmacy consulted to dose vancomycin for this 78 y.o. male for sepsis. · Goal trough = 15-20 mcg/mL. Goal AUC:JENNIFER = 400-600 mcg*hr/mL. · Day 1;     Plan:  · Initiate Vancomycin 1000 mg q24. · Pharmacist will follow and monitor/adjust dosing as necessary if vancomycin is to continue.       Gerardo Gunn, PharmD,  1/28/2021 8:23 AM

## 2021-01-28 NOTE — ED PROVIDER NOTES
Respiratory Syncytial Virus by PCR Not Detected Not Detected   CBC auto differential   Result Value Ref Range    WBC 8.3 4.5 - 11.5 E9/L    RBC 4.97 3.80 - 5.80 E12/L    Hemoglobin 15.8 12.5 - 16.5 g/dL    Hematocrit 49.2 37.0 - 54.0 %    MCV 99.0 80.0 - 99.9 fL    MCH 31.8 26.0 - 35.0 pg    MCHC 32.1 32.0 - 34.5 %    RDW 14.5 11.5 - 15.0 fL    Platelets 967 010 - 732 E9/L    MPV 10.8 7.0 - 12.0 fL    Neutrophils % 69.5 43.0 - 80.0 %    Immature Granulocytes % 0.4 0.0 - 5.0 %    Lymphocytes % 16.5 (L) 20.0 - 42.0 %    Monocytes % 12.9 (H) 2.0 - 12.0 %    Eosinophils % 0.0 0.0 - 6.0 %    Basophils % 0.7 0.0 - 2.0 %    Neutrophils Absolute 5.77 1.80 - 7.30 E9/L    Immature Granulocytes # 0.03 E9/L    Lymphocytes Absolute 1.37 (L) 1.50 - 4.00 E9/L    Monocytes Absolute 1.07 (H) 0.10 - 0.95 E9/L    Eosinophils Absolute 0.00 (L) 0.05 - 0.50 E9/L    Basophils Absolute 0.06 0.00 - 0.20 E9/L   Lactic Acid, Plasma   Result Value Ref Range    Lactic Acid 1.8 0.5 - 2.2 mmol/L   Urinalysis   Result Value Ref Range    Color, UA Yellow Straw/Yellow    Clarity, UA Clear Clear    Glucose, Ur Negative Negative mg/dL    Bilirubin Urine Negative Negative    Ketones, Urine TRACE (A) Negative mg/dL    Specific Gravity, UA >=1.030 1.005 - 1.030    Blood, Urine Negative Negative    pH, UA 5.5 5.0 - 9.0    Protein,  (A) Negative mg/dL    Urobilinogen, Urine 0.2 <2.0 E.U./dL    Nitrite, Urine Negative Negative    Leukocyte Esterase, Urine Negative Negative   SPECIMEN REJECTION   Result Value Ref Range    Rejected Test CMP/TROP/BNP     Reason for Rejection see below    Troponin   Result Value Ref Range    Troponin <0.01 0.00 - 0.03 ng/mL   Comprehensive Metabolic Panel w/ Reflex to MG   Result Value Ref Range    Sodium 168 (HH) 132 - 146 mmol/L    Potassium reflex Magnesium 4.4 3.5 - 5.0 mmol/L    Chloride 128 (HH) 98 - 107 mmol/L    CO2 25 22 - 29 mmol/L    Anion Gap 15 7 - 16 mmol/L    Glucose 99 74 - 99 mg/dL    BUN 55 (H) 8 - 23 mg/dL    CREATININE 1.5 (H) 0.7 - 1.2 mg/dL    GFR Non-African American 46 >=60 mL/min/1.73    GFR African American 56     Calcium 9.8 8.6 - 10.2 mg/dL    Total Protein 8.1 6.4 - 8.3 g/dL    Albumin 4.7 3.5 - 5.2 g/dL    Total Bilirubin 0.2 0.0 - 1.2 mg/dL    Alkaline Phosphatase 130 (H) 40 - 129 U/L    ALT 26 0 - 40 U/L    AST 37 0 - 39 U/L   Brain Natriuretic Peptide   Result Value Ref Range    Pro- (H) 0 - 125 pg/mL   Microscopic Urinalysis   Result Value Ref Range    Hyaline Casts, UA 0-2 0 - 2 /LPF    WBC, UA 0-1 0 - 5 /HPF    RBC, UA 2-5 0 - 2 /HPF    Epithelial Cells, UA RARE /HPF    Bacteria, UA FEW (A) None Seen /HPF   Arterial Blood Gas, Respiratory Only   Result Value Ref Range    Source: Arterial     pH, Blood Gas 7.431 7.350 - 7.450    pCO2, Arterial 41.6 35.0 - 45.0 mmHg    pO2, Arterial 66.2 60.0 - 80.0 mmHg    HCO3, Arterial 27.7 (H) 22.0 - 26.0 mmol/L    B.E. 3.0 (H) -3.0 - 0.0 mmol/L    O2 Sat 93.3 92.0 - 98.5 %    HGB, (EST) 11.2 (L) 12.5 - 16.5 g/dL    HCT (EST) 33.0 (L) 37.0 - 54.0 %     ID 1,556     DEVICE 15,[id number]     Delivery Systems [employer]    EKG 12 Lead   Result Value Ref Range    Ventricular Rate 129 BPM    Atrial Rate 129 BPM    P-R Interval 120 ms    QRS Duration 64 ms    Q-T Interval 268 ms    QTc Calculation (Bazett) 392 ms    P Axis 27 degrees    R Axis 8 degrees    T Axis 12 degrees       RADIOLOGY:  Interpreted by Radiologist.  CT CHEST WO CONTRAST   Final Result   1. Moderate hiatal hernia. This now contains a portion of transverse colon,   not seen previously. 2. Elevated right hemidiaphragm with right basilar atelectasis. 3. Markedly distended gallbladder with large gallstone. CT ABDOMEN PELVIS WO CONTRAST Additional Contrast? None   Final Result   1. Large amount of stool markedly distending the sigmoid colon to diameter of   up to 10 cm. Appearance is concerning for fecal impaction. 2. Moderate hiatal hernia.   Hernia also contains a portion of transverse   colon. 3. Markedly distended gallbladder with 3.5 cm gallstone. This appearance is   similar to previous. 4. Elevated right hemidiaphragm with right basilar atelectasis. XR CHEST PORTABLE   Final Result   No active cardiopulmonary process. Cardiomegaly. EKG:  This EKG is signed and interpreted by me. Rate: 129  Rhythm: Sinus tachycardia  Interpretation: non-specific EKG  Comparison: Compared to previous        ------------------------- NURSING NOTES AND VITALS REVIEWED ---------------------------   The nursing notes within the ED encounter and vital signs as below have been reviewed by myself. /75   Pulse 143   Temp 103.2 °F (39.6 °C) (Rectal)   Resp 22   Ht 5' 5\" (1.651 m)   Wt 125 lb (56.7 kg)   SpO2 (!) 87%   BMI 20.80 kg/m²   Oxygen Saturation Interpretation: Normal    The patients available past medical records and past encounters were reviewed. ------------------------------ ED COURSE/MEDICAL DECISION MAKING----------------------  Medications   0.9 % sodium chloride bolus (has no administration in time range)   cefepime (MAXIPIME) 2000 mg IVPB extended (mini-bag) (2,000 mg Intravenous New Bag 1/28/21 0356)   acetaminophen (TYLENOL) suppository 650 mg (650 mg Rectal Given 1/27/21 2105)   vancomycin 1000 mg IVPB in 250 mL D5W addavial (0 mg Intravenous Stopped 1/28/21 0412)   ketorolac (TORADOL) injection 15 mg (15 mg Intravenous Given 1/28/21 0205)   acetaminophen (TYLENOL) suppository 650 mg (650 mg Rectal Given 1/28/21 0209)   ketamine (KETALAR) injection 20 mg (20 mg Intravenous Given 1/28/21 0235)   LORazepam (ATIVAN) injection 0.5 mg (0.5 mg Intravenous Given 1/28/21 2188)             Medical Decision Making:    Patient presenting here because of fever. Patient also was noted be hypoxic at facility. Patient unable to give history. Patient noted to be febrile here tachycardic. He was also noted be hypoxic.   Patient's pulse ox does drop down into the lower 80s. Patient lungs are clear abdomen is soft patient noted to be tachycardic here patient also febrile given Tylenol blood cultures obtained. Labs noted and chest x-ray noted. Urinalysis is negative. CT chest as well as abdomen done due to fever and finding etiology for the fever. He does have gallstones. There is no signs of cholecystitis though. Patient was given IV antibiotics and will be admitted to intermediate bed. Patients caregiver made aware of findings and plan for admission. Re-Evaluations:             Re-evaluation. Patients symptoms show no change  Patient rechecked several times here in the emergency department. Patient noted to be febrile. He was given Tylenol rectally. He was also remedicated again. Patient was tachycardic and rechecked again heart rate has improved. His blood pressure is stable. Pulse ox on supplemental oxygen is 98. Patient does have history Parkinson's he is tremulous I suspect from the Parkinson's as well as from fever. Patient having no active seizures here. Abdomen is soft. I do not appreciate any response from patient as far as any pain from his abdomen. Patient had blood cultures obtained he was given IV antibiotics. Viral panel is negative. Urinalysis was negative. Consultations:           I did speak to [facility] and he was made aware of patient's findings. Patient will be admitted to monitored bed. Critical Care:   Please note that the withdrawal or failure to initiate urgent interventions for this patient would likely result in a life threatening deterioration or permanent disability. Accordingly this patient received 30 minutes of critical care time, excluding separately billable procedures. This patient's ED course included: a personal history and physicial eaxmination    This patient has been closely monitored during their ED course. Counseling:    The emergency provider has spoken with the  and discussed todays results, in addition to providing specific details for the plan of care and counseling regarding the diagnosis and prognosis. Questions are answered at this time and they are agreeable with the plan.       --------------------------------- IMPRESSION AND DISPOSITION ---------------------------------    IMPRESSION  1. Hypoxia    2. Fever, unspecified    3. Gallstones    4. Hypernatremia        DISPOSITION  Disposition: Admit to telemetry  Patient condition is fair        NOTE: This report was transcribed using voice recognition software.  Every effort was made to ensure accuracy; however, inadvertent computerized transcription errors may be present          Marilee Oleary MD  01/28/21 6486

## 2021-01-28 NOTE — ED NOTES
Pt guardian updated regarding lab results. Informed guardian that pt is currently in CT at this time and that CT results take approx 1 hour to result once test is completed.   Also informed that pt is admitted and will most get a bed assignment once CT is resulted     Bryan Valle RN  01/28/21 2134

## 2021-01-28 NOTE — PROGRESS NOTES
Emma Chang was ordered fluvoxamine (Luvox CR) which is a nonformulary medication. Nurse is going to check with patient to see if home supply of this medication will be brought in to the hospital for inpatient use. A pharmacist will follow-up with the nurse of the patient to assess the capability of the patient to bring in the medication. If it is determined that the patient cannot supply the medication and it is not available to be dispensed from the pharmacy, a call will be placed to the ordering provider to discuss alternative options.      Edvin Johnson, PharmD  01/28/21 6:41 PM

## 2021-01-28 NOTE — H&P
Admission History and Physical                                                                                    Neri Ramos MD, FACP                   Patient Name: Rohini Leija                   Age:  78 y.o. Gender:   male    CC: Fever    HPI: This patient with severe mental retardation and developmental disabilities presents from a group home with fever  Initial evaluation demonstrates that he is profoundly dehydrated  He has been seen multiple times for the same issue  At presentation his sodium was 168 BUN 55 creatinine 1.5 CBC was normal  He was borderline hypotensive    He was started on hydration and he was given empiric antibiotics  Procalcitonin was obtained and demonstrates no evidence of sepsis or infection  Urine was clear  There is no leukocytosis    Upon evaluation he is not interactive he is in the decubitus position with multiple contractures  There are no reported skin lesions      Past Medical History:   Diagnosis Date    Acquired deformity of neck     Autism spectrum     Bilateral cataracts     Blepharochalasis     Cardiomegaly     Cataract of both eyes     Developmental delay     Gastritis     Hyperlipidemia     Kyphosis of thoracic region     Mental retardation     SEVERE MR  NON VERBAL, NEEDS WHEELCHAIR FOR LONG DISTANCE    Obsessive compulsive disorder     Parkinsonism ([address]     Seizures ([address]     Tourette disorder        Past Surgical History:   Procedure Laterality Date    TOE AMPUTATION Left     /       Family Status   Relation Name Status    Mother      Father          Prior to Admission medications    Medication Sig Start Date End Date Taking?  Authorizing Provider   ipratropium-albuterol (DUONEB) 0.5-2.5 (3) MG/3ML SOLN nebulizer solution Inhale 1 vial into the lungs every 4 hours as needed for Shortness of Breath   Yes Historical Provider, MD   levETIRAcetam (KEPPRA) 500 file     Minutes per session: Not on file    Stress: Not on file   Relationships    Social connections     Talks on phone: Not on file     Gets together: Not on file     Attends Sikh service: Not on file     Active member of club or organization: Not on file     Attends meetings of clubs or organizations: Not on file     Relationship status: Not on file    Intimate partner violence     Fear of current or ex partner: Not on file     Emotionally abused: Not on file     Physically abused: Not on file     Forced sexual activity: Not on file   Other Topics Concern    Not on file   Social History Narrative    Not on file       No Known Allergies    The patient's medical records have been reviewed contingent on availability        Review of Systems:   · General: Except as provided in the HPI no other information is noted      Physical Examination:      Wt Readings from Last 3 Encounters:   01/27/21 125 lb (56.7 kg)   01/05/21 125 lb (56.7 kg)   10/28/20 125 lb (56.7 kg)     Temp Readings from Last 3 Encounters:   01/28/21 99.2 °F (37.3 °C)   01/25/21 97.7 °F (36.5 °C)   01/05/21 97.4 °F (36.3 °C)     BP Readings from Last 3 Encounters:   01/28/21 (!) 118/90   01/25/21 (!) 156/92   10/29/20 134/74     Pulse Readings from Last 3 Encounters:   01/28/21 86   01/25/21 102   01/05/21 66       General appearance: He is somnolent with eyes closed but awakens he has no meaningful responses he demonstrates chewing movements of his jaw his tongue protrudes  Skin: Color, texture normal, turgor reduced normal. No rashes or lesions. Head: Normocephalic. No masses, lesions, tenderness or abnormalities   Face: Symmetric no visible lesions  Eyes: Conjunctivae/cornea clear. Eyes closed coloration normal pupils are symmetric  Ears: External appearance normal.   Mouth: Lips pink and thickened tongue protrudes chewing movements of the jaw  Neck:  Symmetric. No adenopathy. Short and stout flexed  Lungs: Clear to auscultation.  No rhonchi, crackles or rales. Due to the kyphosis and his position in the decubitus respiratory movements are poor  Heart: S1 > S2. Rhythm is regular and rate is normal. No gallop rub or murmur. Abdomen: Soft, mildly protuberant, non-tender. BS normal.  Limited exam  Extremities: Contractures but no edema or lesions  Neuro:   · Cannot do a detailed assessment neurologically however he appears to be at baseline with no focal neurologic deficit  Mental status: Profound mental retardation and developmental development no meaningful interaction  Gait & balance: Nonambulatory     Labs     CBC:   Lab Results   Component Value Date    WBC 8.3 01/27/2021    RBC 4.97 01/27/2021    HGB 15.8 01/27/2021    HCT 49.2 01/27/2021     01/27/2021    MCV 99.0 01/27/2021     BMP:    Lab Results   Component Value Date     01/27/2021    K 4.4 01/27/2021     01/27/2021    CO2 25 01/27/2021    BUN 55 01/27/2021    CREATININE 1.5 01/27/2021    GLUCOSE 99 01/27/2021    GLUCOSE 71 05/18/2012    CALCIUM 9.8 01/27/2021     Hepatic Function Panel:    Lab Results   Component Value Date    ALKPHOS 130 01/27/2021    AST 37 01/27/2021    ALT 26 01/27/2021    PROT 8.1 01/27/2021    LABALBU 4.7 01/27/2021    LABALBU 4.5 05/18/2012    BILITOT 0.2 01/27/2021     Magnesium:    Lab Results   Component Value Date    MG 2.2 06/13/2020     Cardiac Enzymes:   Lab Results   Component Value Date    CKTOTAL 799 (H) 10/27/2017    TROPONINI <0.01 01/27/2021    TROPONINI <0.01 01/25/2021    TROPONINI <0.01 10/28/2020     LDH:  No results found for: LDH  PT/INR:    Lab Results   Component Value Date    PROTIME 10.9 10/28/2020    INR 1.0 10/28/2020     BNP: No results for input(s): BNP in the last 72 hours.    TSH:   Lab Results   Component Value Date    TSH 1.700 02/04/2020      Cardiac Injury Profile:   Recent Labs     01/27/21  2128   TROPONINI <0.01      Lipid Profile:   Lab Results   Component Value Date    TRIG 146 03/18/2016    HDL 44 03/18/2016    LDLCALC 93 03/18/2016    CHOL 166 03/18/2016      Hemoglobin A1C: No components found for: HGBA1C   U/A:   Lab Results   Component Value Date    LEUKOCYTESUR Negative 01/27/2021    PHUR 5.5 01/27/2021    WBCUA 0-1 01/27/2021    RBCUA 2-5 01/27/2021    RBCUA NONE 10/08/2012    BACTERIA FEW 01/27/2021    SPECGRAV >=1.030 01/27/2021    BLOODU Negative 01/27/2021    GLUCOSEU Negative 01/27/2021    GLUCOSEU NEGATIVE 09/18/2011         ADMISSION SCHEDULED MEDS:   Current Facility-Administered Medications   Medication Dose Route Frequency Provider Last Rate Last Admin    benztropine (COGENTIN) tablet 1 mg  1 mg Oral BID Ginger Copeland MD   1 mg at 01/28/21 0919    carBAMazepine (TEGRETOL) tablet 200 mg  200 mg Oral BID Ginger Copeland MD   200 mg at 01/28/21 0920    carbidopa-levodopa (SINEMET)  MG per tablet 1 tablet  1 tablet Oral BID Ginger Copeland MD   1 tablet at 01/28/21 0920    fluvoxaMINE (LUVOX CR) extended release capsule 150 mg  150 mg Oral Daily Ginger Copeland MD        ipratropium-albuterol (DUONEB) nebulizer solution 3 mL  3 mL Inhalation Q4H WA Ginger Copeland MD   3 mL at 01/28/21 0757    levETIRAcetam (KEPPRA) tablet 1,000 mg  1,000 mg Oral BID Ginger Copeland MD        mirtazapine (REMERON) tablet 30 mg  30 mg Oral Nightly Ginger Copeland MD        OLANZapine [facility]) tablet 7.5 mg  7.5 mg Oral Nightly Ginger Copeland MD        pantoprazole (PROTONIX) tablet 40 mg  40 mg Oral QAM AC Ginger Copeland MD   40 mg at 01/28/21 0919    polyethylene glycol (GLYCOLAX) packet 17 g  17 g Oral Daily Ginger Copeland MD        sodium chloride flush 0.9 % injection 10 mL  10 mL Intravenous 2 times per day Ginger Copeland MD        sodium chloride flush 0.9 % injection 10 mL  10 mL Intravenous PRN Ginger Copeland MD        enoxaparin (LOVENOX) injection 40 mg  40 mg Subcutaneous Daily Ginger Copeland MD   40 mg at 01/28/21 0920    promethazine (PHENERGAN) tablet 12.5 mg  12.5 mg Oral Q6H PRN Ben Parsons MD        Or    ondansetron [facility]) injection 4 mg  4 mg Intravenous Q6H PRN Ben Parsons MD        polyethylene glycol [facility]) packet 17 g  17 g Oral Daily PRN Ben Parsons MD        acetaminophen (TYLENOL) tablet 650 mg  650 mg Oral Q6H PRN Ben Parsons MD        Or    acetaminophen (TYLENOL) suppository 650 mg  650 mg Rectal Q6H PRN Ben Parsons MD        cefepime (MAXIPIME) 2000 mg IVPB extended (mini-bag)  2,000 mg Intravenous Q12H Ben Parsons MD        lactated ringers infusion   Intravenous Continuous Ben Parsons  mL/hr at 01/28/21 0749 New Bag at 01/28/21 0749    CEFEPIME FLUSH - 0.9 % sodium chloride infusion admixture   Intravenous Q12H Ben Parsons MD        [START ON 1/29/2021] vancomycin 1000 mg IVPB in 250 mL D5W addavial  1,000 mg Intravenous Q24H Ben Parsons MD        LORazepam (ATIVAN) injection 1 mg  1 mg Intravenous Once Asaf Valle MD         Current Outpatient Medications   Medication Sig Dispense Refill    ipratropium-albuterol (DUONEB) 0.5-2.5 (3) MG/3ML SOLN nebulizer solution Inhale 1 vial into the lungs every 4 hours as needed for Shortness of Breath      levETIRAcetam (KEPPRA) 500 MG tablet Take 500 mg by mouth 2 times daily      Calcium Carb-Cholecalciferol (CALCIUM-VITAMIN D) 500-200 MG-UNIT per tablet Take 1 tablet by mouth 2 times daily 60 tablet 0    vitamin D (ERGOCALCIFEROL) 1.25 MG (76714 UT) CAPS capsule Take 50,000 Units by mouth every 30 days Given on the 14 th      fluvoxaMINE (LUVOX CR) 150 MG CP24 extended release capsule Take 150 mg by mouth daily       mirtazapine (REMERON) 30 MG tablet Take 30 mg by mouth nightly       polyethylene glycol (MIRALAX) 17 g packet Take 17 g by mouth daily as needed       OLANZapine (ZYPREXA) 7.5 MG tablet Take 7.5 mg by mouth nightly       omeprazole (PRILOSEC) 20 MG capsule Take 20 mg by mouth Daily       carBAMazepine (TEGRETOL) 200 MG tablet Take 200 mg by mouth 2 times daily       acetaminophen (TYLENOL) 325 MG tablet Take 650 mg by mouth every 4 hours as needed for Pain.  benztropine (COGENTIN) 1 MG tablet Take 1 mg by mouth 2 times daily          Current  Infusions   lactated ringers 125 mL/hr at 01/28/21 0749       Prn Meds  sodium chloride flush, promethazine **OR** ondansetron, polyethylene glycol, acetaminophen **OR** acetaminophen    Radiology Review:  CT CHEST WO CONTRAST   Final Result   1. Moderate hiatal hernia. This now contains a portion of transverse colon,   not seen previously. 2. Elevated right hemidiaphragm with right basilar atelectasis. 3. Markedly distended gallbladder with large gallstone. CT ABDOMEN PELVIS WO CONTRAST Additional Contrast? None   Final Result   1. Large amount of stool markedly distending the sigmoid colon to diameter of   up to 10 cm. Appearance is concerning for fecal impaction. 2. Moderate hiatal hernia. Hernia also contains a portion of transverse   colon. 3. Markedly distended gallbladder with 3.5 cm gallstone. This appearance is   similar to previous. 4. Elevated right hemidiaphragm with right basilar atelectasis. XR CHEST PORTABLE   Final Result   No active cardiopulmonary process. Cardiomegaly.             ASSESSMENT:  Active diagnoses treated at this admission:  · Profound dehydration  · Fever likely secondary to dehydration  · Hypernatremia due to the above  · Profound mental retardation    Problem list:  Patient Active Problem List   Diagnosis    Seizure ([address]    Pressure ulcer of toe of right foot, stage 3 (HCC)    Atherosclerosis of native artery of both lower extremities (HCC)    Hypernatremia    Status epilepticus (HCC)    Seizures ([address]    Seizure disorder, status epilepticus, convulsive ([address]    Severe protein-calorie malnutrition ([address]    Acute renal failure (HCC)    Thrombocytopenia (HCC)    Fever of unknown origin  Encephalopathy    Urinary tract infection without hematuria    Respiratory failure, acute (HCC)    Hypoventilation syndrome    Atelectasis    Sepsis (HCC)       PLAN:  Intravenous fluid hydration  Empiric antibiotics  Maintenance of his baseline medications  DVT prophylaxis      See  Orders  Shyam Taylor MD, Jan Christensen, American Board of Internal Medicine  Diplomate, [address] [place] [address] Rd., [address] of Internal Medicine  10:40 AM  1/28/2021

## 2021-01-29 PROBLEM — E86.0 DEHYDRATION WITH HYPERNATREMIA: Status: ACTIVE | Noted: 2020-01-29

## 2021-01-29 PROBLEM — A41.89 GRAM POSITIVE SEPTICEMIA (HCC): Status: ACTIVE | Noted: 2021-01-01

## 2021-01-29 NOTE — SIGNIFICANT EVENT
Rapid Response Team Note  Date of event: 1/29/2021   Time of event: 2:44 AM  Katina Lord 78y.o. year old male   YOB: 1950   Admit date:  1/27/2021   Location: [address] [place] [address]-   Witnessed? : [x]Yes  [] No  Monitored? : [x]Yes  [] No  Code status: [x] Full  [] DNR-CCA  []DNR-CC  ______________________________________________________________________  Reason for RRT:     RRT called for fever and tachycardia   Subjective:   Upon arrival the pt was febrile with rectal temp of 103.3, tachycardia . His eye were closed. He could not able to follow verbal commands. He was also having shaking movement of whole body, especially bilateral upper extremities. He is on 4L NC oxygen with saturation low 90s. He was from a group home, baseline is non-verbal. He did have a history of seizure on Keppra. According to staff, the tachycardia and shaking movement is getting worse overnight. His oxygen requirement is at baseline. Briefly, patient with history of developmental delay, mental retardation, parkinsonsim, tourette disorder was initially admitted on 1/28 for fever. Received IVF and antibiotics cefepime and vanc. Also getting Keppra 1g BID. Blood culture was positive for Gram positive cocci in clusters.      Objective:   Vital signs: BP:  134/ 97 /RR:  22  /HR: 134    /Temp:  103.3   / O2 Sat:  91    · General Appearance: non-verbal. Shaking movement   · Lung: clear to auscultation bilaterally  · Heart: regular rate and rhythm, S1, S2 normal, no murmur, click, rub or gallop  · Abdomen: soft, non-tender; bowel sounds normal; no masses,  no organomegaly  · Extremities:  extremities normal, atraumatic, no cyanosis or edema    Neurologic: Mental status: non-verbal    NIHSS Score:       Response to pain:   [] Yes  [] No  Follow commands:  [] Yes  [x] No  Facial asymmetry:  [] Yes  [x] No  Motor strength:  [] Equal  [] Focal deficit:  Not able to follow    Diagnostic Test:  Blood Sugar:  123 mg/dL  EKG:  reviewed ABG:      Lab Results   Component Value Date    PH 7.431 01/28/2021    PCO2 35.9 01/25/2021    PO2 52.9 01/25/2021    HCO3 22.7 01/25/2021    O2SAT 93.3 01/28/2021       Infusion(s):   [x] D5W:  Bolus:       L, Rate:  100 cc/hr      Imaging:   CXR:     CT: reviewed            Laboratory Tests:     CBC with Differential:    Lab Results   Component Value Date    WBC 8.3 01/27/2021    RBC 4.97 01/27/2021    HGB 15.8 01/27/2021    HCT 49.2 01/27/2021     01/27/2021    MCV 99.0 01/27/2021    MCH 31.8 01/27/2021    MCHC 32.1 01/27/2021    RDW 14.5 01/27/2021    METASPCT 0.9 01/31/2020    LYMPHOPCT 16.5 01/27/2021    MONOPCT 12.9 01/27/2021    BASOPCT 0.7 01/27/2021    MONOSABS 1.07 01/27/2021    LYMPHSABS 1.37 01/27/2021    EOSABS 0.00 01/27/2021    BASOSABS 0.06 01/27/2021     CMP:    Lab Results   Component Value Date     01/27/2021    K 4.4 01/27/2021     01/27/2021    CO2 25 01/27/2021    BUN 55 01/27/2021    CREATININE 1.5 01/27/2021    GFRAA 56 01/27/2021    LABGLOM 46 01/27/2021    GLUCOSE 99 01/27/2021    GLUCOSE 71 05/18/2012    PROT 8.1 01/27/2021    LABALBU 4.7 01/27/2021    LABALBU 4.5 05/18/2012    CALCIUM 9.8 01/27/2021    BILITOT 0.2 01/27/2021    ALKPHOS 130 01/27/2021    AST 37 01/27/2021    ALT 26 01/27/2021         Teams Assessment and Plan:  ?Fever      Sinus tachycardia       Shaking movement       Hx of Seizure       Hypernatremia       CRIS  ? Acute on Chronic hypoxic respiratory failure       - Continue IVF D5W @ 100 cc/h  - repeat blood culture  - give keppra 1 g now, follow EEG in AM  - follow CMP, CBC. Monitor Na and Renal function  - Cooling techniques including pharmacological and physical   - Consider neurology evaluation        ?   Disposition:  [x] No transfer   [] Transfer to monitor floor  [] Transfer to: [] MICU [] NICU [] CCU [] SICU        Patients family updated: [] Yes  [] No   Discussed with:  [] Critical Care Intensivist:         [x] Primary Care Provider: Dane Viveros MD      [] Other: ?    Kaylah Hess MD   PGY-3 Internal Medicine   3:19 AM  Attending Physician: Dr. Aida Muller

## 2021-01-29 NOTE — CONSULTS
[place]  Department of Internal Medicine  Division of Pulmonary, Critical Care and Sleep Medicine  Consult Note  Ana M Boss MD, [facility]    Patient: Jv Davison  MRN: [id number]  : 1950    Encounter Time: 11:00 AM     Date of Admission: 2021  8:08 PM    Primary Care Physician: Toni Eagle MD    Reason for Consultation: Shock, mixed hypovolemic with possible infection     HISTORY OF PRESENT ILLNESS : Jv Davsion 78 y.o. male was seen in consultation regarding the above chief compliant. Jv Davison is a 78 y.o. old male who presents to the emergency department again for evaluation of hypoxia from [facility]. Apparently, they were doing a routine pulse ox check and he was found to be 78% on room air. He has had several recent admissions for similar symptoms and has been diagnosed with atelectasis as well as positional hypoxia due to his Parkinson's and contracted state. Is extremely difficult to obtain a pulse ox on him as he has tremor and also pulls off all of his monitoring equipment. He is not coughing and has no peripheral edema. Patient is nonverbal at baseline.             PAST MEDICAL HISTORY:  has a past medical history of Acquired deformity of neck, Autism spectrum, Bilateral cataracts, Blepharochalasis, Cardiomegaly, Cataract of both eyes, Developmental delay, Gastritis, Hyperlipidemia, Kyphosis of thoracic region, Mental retardation, Obsessive compulsive disorder, Parkinsonism ([address] Seizures ([address] and Tourette disorder. SURGICAL HISTORY:  has a past surgical history that includes Toe amputation (Left). SOCIAL HISTORY:  reports that he has never smoked. He has never used smokeless tobacco. He reports that he does not drink alcohol or use drugs. FAMILY  HISTORY: family history is not on file.      MEDICATIONS:    Prior to Admission medications    Medication Sig Start Date End Date Taking? Authorizing Provider   ipratropium-albuterol (DUONEB) 0.5-2.5 (3) MG/3ML SOLN nebulizer solution Inhale 1 vial into the lungs every 4 hours as needed for Shortness of Breath   Yes Historical Provider, MD   levETIRAcetam (KEPPRA) 500 MG tablet Take 500 mg by mouth 2 times daily   Yes Historical Provider, MD   Calcium Carb-Cholecalciferol (CALCIUM-VITAMIN D) 500-200 MG-UNIT per tablet Take 1 tablet by mouth 2 times daily 6/23/20  Yes Azalia Lyles PA-C   vitamin D (ERGOCALCIFEROL) 1.25 MG (65186 UT) CAPS capsule Take 50,000 Units by mouth every 30 days Given on the 14 th   Yes Historical Provider, MD   fluvoxaMINE (LUVOX CR) 150 MG CP24 extended release capsule Take 150 mg by mouth daily    Yes Historical Provider, MD   mirtazapine (REMERON) 30 MG tablet Take 30 mg by mouth nightly  2/12/20  Yes Historical Provider, MD   polyethylene glycol (MIRALAX) 17 g packet Take 17 g by mouth daily as needed    Yes Historical Provider, MD   OLANZapine (ZYPREXA) 7.5 MG tablet Take 7.5 mg by mouth nightly    Yes Historical Provider, MD   omeprazole (PRILOSEC) 20 MG capsule Take 20 mg by mouth Daily    Yes Historical Provider, MD   carBAMazepine (TEGRETOL) 200 MG tablet Take 200 mg by mouth 2 times daily  6/5/15  Yes Historical Provider, MD   acetaminophen (TYLENOL) 325 MG tablet Take 650 mg by mouth every 4 hours as needed for Pain. Yes Historical Provider, MD   benztropine (COGENTIN) 1 MG tablet Take 1 mg by mouth 2 times daily    Yes Historical Provider, MD       ALLERGIES: Patient has no known allergies. REVIEW OF SYSTEMS:  Otherwise negative if not reported or listed below  Constitutional:  No unanticipated weight loss. No change in sleep pattern or activity. No fevers, chills or rigors. Eyes:    No visual changes or diplopia. No scleral icterus. ENT:    No Headaches, hearing loss or vertigo. No mouth sores or sore throat.         Cardiovascular:  + chest discomfort, palpitations. Respiratory:  + cough, No wheezing      No sputum production. No hemoptysis, pleuritic pain. Gastrointestinal:  No abdominal pain, appetite loss, blood in stools. No hematemesis  Genitourinary:  No dysuria, trouble voiding or hematuria. No nocturia. Musculoskeletal:   No weakness or joint complaints. Integumentary: No rashes or pruritis. Neurological:  No headache, numbness or tingling. Psychiatric:   No effect on mood, memory, mentation, or behavior. No anxiety or depression. Endocrine:   No excessive thirst, fluid intake, or urination. No tremor. Hematologic: No abnormal bruising or bleeding. Lymphatic:  No swollen lymph nodes. Immunologic:  No hives or hx of anaphaxsis. OBJECTIVE:     PHYSICAL EXAM:   VITALS:   Vitals:    01/29/21 0815 01/29/21 0910 01/29/21 0929 01/29/21 1000   BP: 139/65 (!) 128/50  133/87   Pulse: 132 126  125   Resp: 29 (!) 32 30 (!) 35   Temp: 101.4 °F (38.6 °C) 104.5 °F (40.3 °C)  103.8 °F (39.9 °C)   TempSrc: Temporal Bladder  Bladder   SpO2:  90% (!) 88% 95%   Weight:       Height:            Intake/Output Summary (Last 24 hours) at 1/29/2021 1100  Last data filed at 1/29/2021 0346  Gross per 24 hour   Intake 450 ml   Output    Net 450 ml        CONSTITUTIONAL:   A&O x 3, NAD  SKIN:     No rash, No suspicious lesions or skin discoloration  HEENT:     EOMI, MMM, No thrush  NECK:    No bruits, No JVP apprechiated  CV:      Tachy Sinus,  No murmur, No rubs, No gallops  PULMONARY:   Couse BS,  Some scattered rhonchi      No noted egophony  ABDOMEN:     Scaphoid, Soft, non-tender. BS normal. No R/R/G  EXT:    No deformities . No clubbing. Contracted with no lower extremity edema, No venous stasis  PULSE:   Palpable.   PSYCHIATRIC:  Unable, grunts   MS:    Bedfast state, No fractures, Gross weakness  NEUROLOGIC:   Difficult assessment but looks non-focal     DATA: IMAGING & TESTING:     LABORATORY TESTS:    CBC:   Lab Results Component Value Date    WBC 10.3 01/29/2021    RBC 4.71 01/29/2021    HGB 14.8 01/29/2021    HCT 48.7 01/29/2021    .4 01/29/2021    MCH 31.4 01/29/2021    MCHC 30.4 01/29/2021    RDW 13.9 01/29/2021     01/29/2021    MPV 10.0 01/29/2021     CMP:    Lab Results   Component Value Date     01/29/2021    K 4.1 01/29/2021     01/29/2021    CO2 29 01/29/2021    BUN 61 01/29/2021    CREATININE 1.5 01/29/2021    GFRAA 56 01/29/2021    LABGLOM 46 01/29/2021    GLUCOSE 109 01/29/2021    GLUCOSE 71 05/18/2012    PROT 7.4 01/29/2021    LABALBU 4.0 01/29/2021    LABALBU 4.5 05/18/2012    CALCIUM 8.4 01/29/2021    BILITOT 0.4 01/29/2021    ALKPHOS 107 01/29/2021     01/29/2021    ALT 60 01/29/2021     Magnesium:    Lab Results   Component Value Date    MG 2.8 01/29/2021     Phosphorus:    Lab Results   Component Value Date    PHOS 6.4 01/29/2021     PT/INR:    Lab Results   Component Value Date    PROTIME 10.9 10/28/2020    INR 1.0 10/28/2020     ABG:    Lab Results   Component Value Date    PH 7.431 01/28/2021    PCO2 35.9 01/25/2021    PO2 52.9 01/25/2021    HCO3 22.7 01/25/2021    BE 3.0 01/28/2021    O2SAT 93.3 01/28/2021     TSH:    Lab Results   Component Value Date    TSH 1.700 02/04/2020        PRO-BNP:   Lab Results   Component Value Date    PROBNP 264 (H) 01/27/2021    PROBNP 41 10/19/2020      ABGs:   Lab Results   Component Value Date    PH 7.431 01/28/2021    PO2 52.9 01/25/2021    PCO2 35.9 01/25/2021     Hemoglobin A1C: No components found for: HGBA1C    IMAGING:  Imaging tests were completed and reviewed and discussed radiology and care team involved and reveals   Ct Abdomen Pelvis Wo Contrast Additional Contrast? None    Result Date: 1/28/2021  EXAMINATION: CT OF THE ABDOMEN AND PELVIS WITHOUT CONTRAST 1/28/2021   1. Large amount of stool markedly distending the sigmoid colon to diameter of up to 10 cm. Appearance is concerning for fecal impaction. 2. Moderate hiatal hernia. Hernia also contains a portion of transverse colon. 3. Markedly distended gallbladder with 3.5 cm gallstone. This appearance is similar to previous. 4. Elevated right hemidiaphragm with right basilar atelectasis. Ct Chest Wo Contrast  Result Date: 1/28/2021  FINDINGS: Mediastinum: There is a moderate hiatal hernia. This also now contains a portion of the transverse colon, not seen previously. Lungs/pleura: There is elevation of the right hemidiaphragm. Airspace disease at the right base is likely atelectasis. There is no pleural effusion. There is no pneumothorax seen. Upper Abdomen: The gallbladder is markedly distended. There is a 3.5 cm lamellated gallstone. Soft Tissues/Bones: There is no acute osseous or soft tissue abnormality seen. 1. Moderate hiatal hernia. This now contains a portion of transverse colon, not seen previously. 2. Elevated right hemidiaphragm with right basilar atelectasis. 3. Markedly distended gallbladder with large gallstone. Assessment:  Patient presents from [address] [place] [address] for evaluation of hypoxia. He has had similar visits previously and the hypoxia was thought to be secondary to atelectasis due to his body habitus. He sits kind of hunched over in a fetal position with his chin down. Initially, we are having difficulty picking up patient's pulse ox, therefore ABG was obtained. The arterial as the PO2 was 52.9. Patient given a DuoNeb and we attempted to try different pulse ox set ups. His labs revealed severe electrolyte changes and he was admitted. On admission to the floor RRT was transfer to ICU with fevers 104 and hypotension  1. Sepsis  2. Hypovolemic shock  3. Hypernatemia  4. Hypercholemic metabolic acidosis  5. Elevated hemidiaphragm  6. + blood cultures  7. Parkinsonism vs disease  8. Seizure disorder  9. Aspiration chronic   10. Distended gallbladder with stone  11. Moderate HH with aspiration  12. Pressure ulcer of toe of right foot, stage 3    13. Atherosclerosis of native artery of both lower extremities   14. Status epilepticus   15. Seizures   16. Severe protein-calorie malnutrition ([address]  17. Thrombocytopenia ([address]    Plan:   1. PICC line  2. Antibiotics per ID  3. DRW the D5.2 NS to lower chloride   4. Keppra per seizures  5. Frequent lytes  6. Cooling blanket   7. Tylenol rectal supp Q4   8. Needs a feeding tube placed.    9. Review medications  10. + BC but still concerned about malignant hypertension with psy meds -pharm to investigate         Prema Alvarado, MPH, Shahab Macario  Professor of Internal Medicine  Pulmonary, Critical Care and Sleep Medicine

## 2021-01-29 NOTE — PROGRESS NOTES
Pharmacy Consultation Note  (Antibiotic Dosing and Monitoring)    Initial consult date: 1/28/21  Consulting physician: Yuni Dietrich  Drug: Vancomycin  Indication: sepsis     Vancomycin has been discontinued by ID  [address] Physician to re-consult pharmacy if future dosing is needed    Thank you for the consult,    Ever Vaughan, PharmD, BCPS 1/29/2021 3:54 PM

## 2021-01-29 NOTE — PROGRESS NOTES
Hailee Santiago is a 78 y.o. man    Past Medical History:     Past Medical History:   Diagnosis Date    Acquired deformity of neck     Autism spectrum     Bilateral cataracts     Blepharochalasis     Cardiomegaly     Cataract of both eyes     Developmental delay     Gastritis     Hyperlipidemia     Kyphosis of thoracic region     Mental retardation     SEVERE MR  NON VERBAL, NEEDS WHEELCHAIR FOR LONG DISTANCE    Obsessive compulsive disorder     Parkinsonism (HCC)     Seizures (HCC)     Tourette disorder      Past Surgical History:     Past Surgical History:   Procedure Laterality Date    TOE AMPUTATION Left     2nd/3rd       Allergies:     Patient has no known allergies. Medications:     Prior to Admission medications    Medication Sig Start Date End Date Taking?  Authorizing Provider   ipratropium-albuterol (DUONEB) 0.5-2.5 (3) MG/3ML SOLN nebulizer solution Inhale 1 vial into the lungs every 4 hours as needed for Shortness of Breath   Yes Historical Provider, MD   levETIRAcetam (KEPPRA) 500 MG tablet Take 500 mg by mouth 2 times daily   Yes Historical Provider, MD   Calcium Carb-Cholecalciferol (CALCIUM-VITAMIN D) 500-200 MG-UNIT per tablet Take 1 tablet by mouth 2 times daily 6/23/20  Yes Azalia Lyles PA-C   vitamin D (ERGOCALCIFEROL) 1.25 MG (82367 UT) CAPS capsule Take 50,000 Units by mouth every 30 days Given on the 14 th   Yes Historical Provider, MD   fluvoxaMINE (LUVOX CR) 150 MG CP24 extended release capsule Take 150 mg by mouth daily    Yes Historical Provider, MD   mirtazapine (REMERON) 30 MG tablet Take 30 mg by mouth nightly  2/12/20  Yes Historical Provider, MD   polyethylene glycol (MIRALAX) 17 g packet Take 17 g by mouth daily as needed    Yes Historical Provider, MD   OLANZapine (ZYPREXA) 7.5 MG tablet Take 7.5 mg by mouth nightly    Yes Historical Provider, MD   omeprazole (PRILOSEC) 20 MG capsule Take 20 mg by mouth Daily    Yes Historical Provider, MD   carBAMazepine (TEGRETOL) 200 MG tablet Take 200 mg by mouth 2 times daily  6/5/15  Yes Historical Provider, MD   acetaminophen (TYLENOL) 325 MG tablet Take 650 mg by mouth every 4 hours as needed for Pain. Yes Historical Provider, MD   benztropine (COGENTIN) 1 MG tablet Take 1 mg by mouth 2 times daily    Yes Historical Provider, MD       Social History:     Social History     Tobacco Use    Smoking status: Never Smoker    Smokeless tobacco: Never Used   Substance Use Topics    Alcohol use: No    Drug use: No       Review of Systems:     ROS unobtainable      Family History:     No family history on file. History of Present Illness:     Patient well known to me for developmental disabilities, seizure disorder and recently diagnosed with Parkinson's disease    His seizures have been well controlled on Keppra and Tegretol having no seizures in a number of years until this past spring (Keppra added and they again retooled)     Previously used Depakote for behavior difficulties rather than seizures    Over the last 1-2 years he began to develop a tremor which subsequently changed his Depakote dosing without resolution in said tremor   Neurology questioned Parkinson's disease versus parkinsonisms with his underlying developmental disabilities    He was tried on Neupro which improved his walking but caused increased agitation. Neupro was stopped and Sinemet was started which markedly improved his walking, his tremor -- staff reported he was able to hold his own juice cup. However psychiatry wanted to stop his Sinemet in December because of potential interactions with Zyprexa.      Admitted yesterday with increasing SOB -- fever -- temp as high as 104.5   WBC and ESR appear normal  Blood cx with gram +     No seizures have been reported     Objective:   /87   Pulse 125   Temp 103.8 °F (39.9 °C) (Bladder)   Resp (!) 35   Ht 5' 5\" (1.651 m)   Wt 119 lb 14.9 oz (54.4 kg)   SpO2 95%   BMI 19.96 kg/m²      General appearance: awake - looking around room -- rigors noted   Head: Normocephalic, without obvious abnormality, atraumatic  Extremities: no cyanosis or edema  Pulses: 2+ and symmetric  Skin: no rashes or lesions     Mental Status: awake -- following commands      Cranial Nerves:  I: smell    II: visual acuity     II: visual fields Bilateral threat response    II: pupils NOMI   III,VII: ptosis    III,IV,VI: extraocular muscles  Follows examiner around room    V: mastication    V: facial light touch sensation  Grimaces to PP bilaterally    V,VII: corneal reflex  Present   VII: facial muscle function - upper     VII: facial muscle function - lower Normal   VIII: hearing    IX: soft palate elevation     IX,X: gag reflex    XI: trapezius strength     XI: sternocleidomastoid strength    XI: neck extension strength     XII: tongue strength       Motor:  Spastic quadriparetic    Resting tremor in left arm and mouth     Sensory:  Grimaces to noxious stim in both hands      DTR:   No reflexes    No Babinski  No Grey's     Laboratory/Radiology:     CBC with Differential:    Lab Results   Component Value Date    WBC 10.3 01/29/2021    RBC 4.71 01/29/2021    HGB 14.8 01/29/2021    HCT 48.7 01/29/2021     01/29/2021    .4 01/29/2021    MCH 31.4 01/29/2021    MCHC 30.4 01/29/2021    RDW 13.9 01/29/2021    METASPCT 0.9 01/31/2020    LYMPHOPCT 27.1 01/29/2021    MONOPCT 15.4 01/29/2021    BASOPCT 0.5 01/29/2021    MONOSABS 1.58 01/29/2021    LYMPHSABS 2.78 01/29/2021    EOSABS 0.00 01/29/2021    BASOSABS 0.05 01/29/2021     CMP:    Lab Results   Component Value Date     01/29/2021    K 4.1 01/29/2021     01/29/2021    CO2 29 01/29/2021    BUN 61 01/29/2021    CREATININE 1.5 01/29/2021    GFRAA 56 01/29/2021    LABGLOM 46 01/29/2021    GLUCOSE 109 01/29/2021    GLUCOSE 71 05/18/2012    PROT 7.4 01/29/2021    LABALBU 4.0 01/29/2021    LABALBU 4.5 05/18/2012    CALCIUM 8.4 01/29/2021    BILITOT 0.4 01/29/2021 ALKPHOS 107 01/29/2021     01/29/2021    ALT 60 01/29/2021     I independently reviewed the lab studies today.      Assessment:     History of developmental disabilities with seizure disorder   Stable on Keppra 1000mg BID and Tegretol 200mg BID    Suspected Parkinson's disease   Previously responded to Sinemet well but d/t medication interaction, psychiatry requested to stop in December 2020    Now admitted with SOB, fever and positive blood cx       Plan:     Continue Keppra and Tegretol if able (will need NG)    Consider restarting Sinemet when stable     Will follow peripherally     Raynelle Lundborg  10:46 AM  1/29/2021

## 2021-01-29 NOTE — PROGRESS NOTES
Dr. David Forbes notified patient MEWS score 7. Will be transferred to ICU 4403.    RN to RN report given to Laura

## 2021-01-29 NOTE — PROGRESS NOTES
Due to temp, had placed a cooling blanket over patient, however he kept pulling it off. Packed in ice. Had given him tylenol suppository at 2246. Dr. Estrellita Cook via perfect serve at this time (0104) regarding patients temp, hr, and rr.

## 2021-01-29 NOTE — PROGRESS NOTES
RRT called around 0240 due to increasing temps, HR in the 130s, RR in the high 20s-30s. It also appears that patient is having seizures, but RN cannot be certain since he has a hx of parkinsonism and tourettes and has been with tremors all night, except now the tremors are constant. When it appears he is having a seizure,all limbs jerk around harshly. Perfect serve message sent to Dr. Roger Hopkins with updates. No replies.

## 2021-01-29 NOTE — PROCEDURES
EEG Report  Mauricio Shell is a 78 y.o. male      Appointment Date 1/29/21 Appointment Time 11:30   [place] [facility] [id number]   Type of Study Portable Routine Floor 4403     Technical Specifications  Technician KINGSTON/Nino Berumen of consciousness Awake, nonverbal   Sleep deprived? No   Hyperventilation tested? No   Photic stim tested? No   EEG recording Standard 10-20 electrode placement    Duration of recording 20:00   EEG complete?  Yes       Clinical History  Sepsis, PD, seizure    Medications    Current Facility-Administered Medications:     dextrose 5 % and 0.45 % sodium chloride infusion, , Intravenous, Continuous, Linford Glassing, DO, Last Rate: 75 mL/hr at 01/29/21 1009, New Bag at 01/29/21 1009    levetiracetam (KEPPRA) 1000 mg/100 mL IVPB, 1,000 mg, Intravenous, Q12H, Linford Glassing, DO, Last Rate: 400 mL/hr at 01/29/21 1131, 1,000 mg at 01/29/21 1131    acetaminophen (TYLENOL) tablet 650 mg, 650 mg, Oral, Q4H PRN **OR** acetaminophen (TYLENOL) suppository 650 mg, 650 mg, Rectal, Q6H PRN, Linford Glassing, DO    carBAMazepine (TEGRETOL) 100 MG/5ML suspension 200 mg, 200 mg, Oral, BID, Matt Nevarez DO    benztropine (COGENTIN) tablet 1 mg, 1 mg, Oral, BID, Anamaria Hurtado MD, 1 mg at 01/28/21 2247    fluvoxaMINE (LUVOX CR) extended release capsule 150 mg, 150 mg, Oral, Daily, Anamaria Hurtado MD    ipratropium-albuterol (DUONEB) nebulizer solution 3 mL, 3 mL, Inhalation, Q4H WA, Anamaria Hurtado MD, 3 mL at 01/29/21 0929    mirtazapine (REMERON) tablet 30 mg, 30 mg, Oral, Nightly, Anamaria Hurtado MD    OLANZapine [facility]) tablet 7.5 mg, 7.5 mg, Oral, Nightly, Anamaria Hurtado MD, 7.5 mg at 01/28/21 2247    pantoprazole (PROTONIX) tablet 40 mg, 40 mg, Oral, QAM AC, Anamaria Hurtado MD, 40 mg at 01/28/21 0919    polyethylene glycol (GLYCOLAX) packet 17 g, 17 g, Oral, Daily, Anamaria Hurtado MD    sodium chloride flush 0.9 % injection 10 mL, 10 mL,

## 2021-01-29 NOTE — CONSULTS
[address] [place] [address] [facility]    Consultation Note     Admit Date: 1/27/2021  8:08 PM    Reason for Consult:   Radha    Attending Physician:  Mani Gibson MD     Chief Complaint: Fever    HISTORY OF PRESENT ILLNESS:   The patient is a 78 y.o.  male known to the Infectious Diseases service. The patient has a PMHX of seizures, mental retardation, Parkinsonism, Tourette disorder,  sacral decubitus ulcer, presented from group home with fever on 1/28/2021. Also noted on review of EMR patient was found to be hypoxic ( 78% on room air) on a routine pulse check at his nursing home. Patient has similar visits at the ED for evaluation of hypoxia thought to be secondary to atelectasis from his body habitus. At the ED, VS showed temp of 102.5 °F, tachycardic at 144, BP stable, on room air. Labs were remarkable for sodium of 168, BUN of 55, creatinine of 1.5. No leukocytosis was noted. Procalcitonin 0.14. Covid test for both PCR and NAAT done on 126 were both negative. CT chest was done which showed right basilar atelectasis while CT abdomen showed large amount of stool markedly distending the sigmoid colon, moderate hiatal hernia, markedly distended gallbladder with a 3.5 cm gallstone. Patient was empirically started on vancomycin and cefepime and was admitted to the floor. RRT was called last night for fever and chills and was then subsequently transferred to the ICU. His blood cultures grew GPC in clusters reason for this consult. Patient was also seen by Dr. Jb Salazar on 6/2020 [facility], diagnosed to have complicated UTI, growing Proteus treated with Cefdinir.      Past Medical History:        Diagnosis Date    Acquired deformity of neck     Autism spectrum     Bilateral cataracts     Blepharochalasis     Cardiomegaly     Cataract of both eyes     Developmental delay     Gastritis     Hyperlipidemia     Kyphosis of thoracic region     Mental retardation     SEVERE MR  NON VERBAL, NEEDS WHEELCHAIR FOR LONG DISTANCE    Obsessive compulsive disorder     Parkinsonism ([facility] [address]     Seizures (HCC)     Tourette disorder      Past Surgical History:        Procedure Laterality Date    TOE AMPUTATION Left     2nd/3rd     Current Medications:   Scheduled Meds:   levetiracetam  1,000 mg Intravenous Q12H    benztropine  1 mg Oral BID    carBAMazepine  200 mg Oral BID    fluvoxaMINE  150 mg Oral Daily    ipratropium-albuterol  3 mL Inhalation Q4H WA    levETIRAcetam  1,000 mg Oral BID    mirtazapine  30 mg Oral Nightly    OLANZapine  7.5 mg Oral Nightly    pantoprazole  40 mg Oral QAM AC    polyethylene glycol  17 g Oral Daily    sodium chloride flush  10 mL Intravenous 2 times per day    enoxaparin  40 mg Subcutaneous Daily    cefepime  2,000 mg Intravenous Q12H    sodium chloride   Intravenous Q12H    vancomycin  1,000 mg Intravenous Q24H    LORazepam  1 mg Intravenous Once     Continuous Infusions:   dextrose 5 % and 0.45 % NaCl 75 mL/hr at 01/29/21 1009    [Held by provider] lactated ringers 125 mL/hr at 01/29/21 0818     PRN Meds:acetaminophen **OR** acetaminophen, sodium chloride flush, promethazine **OR** ondansetron, polyethylene glycol    Allergies:  Patient has no known allergies.     Social History:   Social History     Socioeconomic History    Marital status: Single     Spouse name: Not on file    Number of children: Not on file    Years of education: Not on file    Highest education level: Not on file   Occupational History    Not on file   Social Needs    Financial resource strain: Not on file    Food insecurity     Worry: Not on file     Inability: Not on file    Transportation needs     Medical: Not on file     Non-medical: Not on file   Tobacco Use    Smoking status: Never Smoker    Smokeless tobacco: Never Used   Substance and Sexual Activity    Alcohol use: No    Drug use: No    Sexual activity: Not Currently   Lifestyle    Physical activity Days per week: Not on file     Minutes per session: Not on file    Stress: Not on file   Relationships    Social connections     Talks on phone: Not on file     Gets together: Not on file     Attends Mandaeism service: Not on file     Active member of club or organization: Not on file     Attends meetings of clubs or organizations: Not on file     Relationship status: Not on file    Intimate partner violence     Fear of current or ex partner: Not on file     Emotionally abused: Not on file     Physically abused: Not on file     Forced sexual activity: Not on file   Other Topics Concern    Not on file   Social History Narrative    Not on file     Tobacco: No  Alcohol: No  Pets: No  Travel: No    Family History:   No family history on file. . Otherwise non-pertinent to the chief complaint. REVIEW OF SYSTEMS: Unable to assess due to patient's condition    PHYSICAL EXAM:    Vitals:    /84   Pulse 115   Temp 102.5 °F (39.2 °C) (Bladder)   Resp 29   Ht 5' 5\" (1.651 m)   Wt 119 lb 14.9 oz (54.4 kg)   SpO2 95%   BMI 19.96 kg/m²   Constitutional: The patient is awake, opens eyes to stimuli, cachectic  Skin: Warm and dry. No rashes were noted. No jaundice. HEENT: Eyes show round, and reactive pupils. Extremely dry dry mucous membranes  Neck: Supple to movements. No lymphadenopathy. Chest: Decreased bilateral breath sounds, difficulty to auscultate due to tremors  Cardiovascular: S1 and S2 are rhythmic and regular. No murmurs appreciated. Abdomen: Positive bowel sounds to auscultation. Benign to palpation. No masses felt. No hepatosplenomegaly. Genitourinary: male   Extremities: No clubbing, no cyanosis, no edema.   Musculoskeletal: Severely contracted, generalized whole body tremors  Neurological: nonverbal at baseline, opens eyes, unable to follow commands  Lines: peripheral      CBC+dif:  Recent Labs     01/27/21 2027 01/29/21  0300   WBC 8.3 10.3   HGB 15.8 14.8   HCT 49.2 48.7   MCV 99.0 103.4*  164   NEUTROABS 5.77 5.81     Lab Results   Component Value Date    CRP 0.3 01/29/2021    CRP 4.2 (H) 06/14/2020    CRP 3.6 (H) 06/12/2020     No results found for: CRPHS  Lab Results   Component Value Date    SEDRATE 6 01/29/2021    SEDRATE 43 (H) 06/14/2020    SEDRATE 14 06/12/2020     Lab Results   Component Value Date    ALT 60 (H) 01/29/2021     (H) 01/29/2021    ALKPHOS 107 01/29/2021    BILITOT 0.4 01/29/2021     Lab Results   Component Value Date     01/29/2021    K 4.1 01/29/2021     01/29/2021    CO2 29 01/29/2021    BUN 61 01/29/2021    CREATININE 1.5 01/29/2021    GFRAA 56 01/29/2021    LABGLOM 46 01/29/2021    GLUCOSE 109 01/29/2021    GLUCOSE 71 05/18/2012    PROT 7.4 01/29/2021    LABALBU 4.0 01/29/2021    LABALBU 4.5 05/18/2012    CALCIUM 8.4 01/29/2021    BILITOT 0.4 01/29/2021    ALKPHOS 107 01/29/2021     01/29/2021    ALT 60 01/29/2021       Lab Results   Component Value Date    PROTIME 10.9 10/28/2020    INR 1.0 10/28/2020       Lab Results   Component Value Date    TSH 1.700 02/04/2020       Lab Results   Component Value Date    COLORU Yellow 01/27/2021    PHUR 5.5 01/27/2021    WBCUA 0-1 01/27/2021    RBCUA 2-5 01/27/2021    RBCUA NONE 10/08/2012    BACTERIA FEW 01/27/2021    CLARITYU Clear 01/27/2021    SPECGRAV >=1.030 01/27/2021    LEUKOCYTESUR Negative 01/27/2021    UROBILINOGEN 0.2 01/27/2021    BILIRUBINUR Negative 01/27/2021    BILIRUBINUR NEGATIVE 09/18/2011    BLOODU Negative 01/27/2021    GLUCOSEU Negative 01/27/2021    GLUCOSEU NEGATIVE 09/18/2011    AMORPHOUS FEW 06/08/2020       Lab Results   Component Value Date    SVL3TDE 27.7 01/28/2021    FNS3CPD 41.6 01/28/2021    PO2ART 66.2 01/28/2021     Radiology:  CT CHEST WO CONTRAST   Final Result   1. Moderate hiatal hernia. This now contains a portion of transverse colon,   not seen previously. 2. Elevated right hemidiaphragm with right basilar atelectasis.    3. Markedly distended gallbladder with large gallstone. CT ABDOMEN PELVIS WO CONTRAST Additional Contrast? None   Final Result   1. Large amount of stool markedly distending the sigmoid colon to diameter of   up to 10 cm. Appearance is concerning for fecal impaction. 2. Moderate hiatal hernia. Hernia also contains a portion of transverse   colon. 3. Markedly distended gallbladder with 3.5 cm gallstone. This appearance is   similar to previous. 4. Elevated right hemidiaphragm with right basilar atelectasis. XR CHEST PORTABLE   Final Result   No active cardiopulmonary process. Cardiomegaly. Microbiology:  Pending  Recent Labs     01/27/21 2027   BC Gram stain performed from blood culture bottle media  Gram positive cocci in clusters  *     No results for input(s): ORG in the last 72 hours. Recent Labs     01/27/21 2027   BLOODCULT2 Gram stain performed from blood culture bottle media  Gram positive cocci in clusters  Previously positive blood culture called  *     No results for input(s): STREPNEUMAGU in the last 72 hours. No results for input(s): LP1UAG in the last 72 hours. No results for input(s): ASO in the last 72 hours. No results for input(s): CULTRESP in the last 72 hours. Assessment:  · Fever likely multifactorialright lung atelectasis VS pneumonia, dehydration, fecal impaction,   · RLL PNA/ atelectasis, 2/2 body habitus,  procal 0.14    · Hypernatremia  · Bacteremia, 4/4 staphylococcal species, likely contaminant  · [facility] 1/27/2020: Staph species both drawn in ED at same time  · [facility] 1/29/2020: Awaiting results  Plan:    · Discontinue Vanco and cefepime   · Start Zosyn  · Need aggressive pulmonary toilet  · On review of her CAT scan shows severe fecal impaction, will try suppository but might need manual fecal impaction  · Hydration per ICU team   · Fu  cultures  · Monitor labs  · Will follow with you    Thank you for having us see this patient in consultation.  I will be discussing this case with the treating physicians.       Electronically signed by Heidi Wise MD on 1/29/2021 at 11:31 AM

## 2021-01-29 NOTE — PROGRESS NOTES
Estuardo Cuello MD, FACP                   Patient Name: Vamsi Lazaro                   Age:  78 y.o. Gender:   male    CC: Fever    HPI: This patient with severe mental retardation and developmental disabilities presents from a group home with fever  Initial evaluation demonstrates that he is profoundly dehydrated  He has been seen multiple times for the same issue  At presentation his sodium was 168 BUN 55 creatinine 1.5 CBC was normal  He was borderline hypotensive    He was started on hydration and he was given empiric antibiotics  Procalcitonin was obtained and demonstrates no evidence of sepsis or infection  Urine was clear  There is no leukocytosis    Upon evaluation he is not interactive he is in the decubitus position with multiple contractures  There are no reported skin lesions    1/29  Change in status over the past 24 hours  This patient was originally admitted with severe dehydration, hypernatremia, fever.   He was treated initially with broad-spectrum antibiotics  Fluid support  He was not hypotensive but he was tachycardic  As defined above this patient is a resident of a group home  He was sent in because of fever  He does not communicate due to severe mental retardation and developmental disabilities  Later early this morning RRT was called  He continues have episodes of fever  Oxygen saturation had dropped  He was shaking  It was difficult to determine whether or not these were seizures  Note that he has had these shaking episodes in the past and has had a comprehensive evaluation by neurology for this  He had been intermittently on Sinemet  He was to follow-up with neurology today  Subsequent labs demonstrated a significant rise in his sodium  Transfer was requested to MICU  Discussed with intensivist    CT of his abdomen now demonstrates markedly 6/5/15  Yes Historical Provider, MD   acetaminophen (TYLENOL) 325 MG tablet Take 650 mg by mouth every 4 hours as needed for Pain. Yes Historical Provider, MD   benztropine (COGENTIN) 1 MG tablet Take 1 mg by mouth 2 times daily    Yes Historical Provider, MD        No Known Allergies    The patient's medical records have been reviewed contingent on availability        Review of Systems:   · General: Except as provided in the HPI no other information is noted      Physical Examination:      Wt Readings from Last 3 Encounters:   01/28/21 119 lb 14.9 oz (54.4 kg)   01/05/21 125 lb (56.7 kg)   10/28/20 125 lb (56.7 kg)     Temp Readings from Last 3 Encounters:   01/29/21 103.8 °F (39.9 °C) (Bladder)   01/25/21 97.7 °F (36.5 °C)   01/05/21 97.4 °F (36.3 °C)     BP Readings from Last 3 Encounters:   01/29/21 133/87   01/25/21 (!) 156/92   10/29/20 134/74     Pulse Readings from Last 3 Encounters:   01/29/21 125   01/25/21 102   01/05/21 66       General appearance: He remains nonresponsive eyes closed  He demonstrates chewing movements of his jaw  Skin: Color, texture normal, turgor reduced normal. No rashes or lesions. Head: Normocephalic. No masses, lesions, tenderness or abnormalities   Face: Symmetric no visible lesions  Eyes: Conjunctivae/cornea clear. Eyes closed coloration normal pupils are symmetric  Ears: External appearance normal.   Mouth: Lips pink and thickened tongue protrudes chewing movements of the jaw  Neck:  Symmetric. No adenopathy. Short and stout flexed  Lungs: Clear to auscultation. No rhonchi, crackles or rales. Due to the kyphosis and his position in the decubitus respiratory movements are poor  Heart: S1 > S2. Rhythm is regular and rate is tachycardic no gallop rub or murmur. Abdomen: Soft, mildly protuberant, non-tender.  BS normal.  Limited exam cannot elicit pain  Extremities: Contractures but no edema or lesions  Neuro:   · Cannot do a detailed assessment neurologically however he appears to be at baseline with no focal neurologic deficit  Mental status: Profound mental retardation and developmental development no meaningful interaction  Gait & balance: Nonambulatory     Labs     CBC:   Lab Results   Component Value Date    WBC 10.3 01/29/2021    RBC 4.71 01/29/2021    HGB 14.8 01/29/2021    HCT 48.7 01/29/2021     01/29/2021    .4 01/29/2021     BMP:    Lab Results   Component Value Date     01/29/2021    K 4.1 01/29/2021     01/29/2021    CO2 29 01/29/2021    BUN 61 01/29/2021    CREATININE 1.5 01/29/2021    GLUCOSE 109 01/29/2021    GLUCOSE 71 05/18/2012    CALCIUM 8.4 01/29/2021     Hepatic Function Panel:    Lab Results   Component Value Date    ALKPHOS 107 01/29/2021     01/29/2021    ALT 60 01/29/2021    PROT 7.4 01/29/2021    LABALBU 4.0 01/29/2021    LABALBU 4.5 05/18/2012    BILITOT 0.4 01/29/2021     Magnesium:    Lab Results   Component Value Date    MG 2.8 01/29/2021     Cardiac Enzymes:   Lab Results   Component Value Date    CKTOTAL 799 (H) 10/27/2017    TROPONINI <0.01 01/27/2021    TROPONINI <0.01 01/25/2021    TROPONINI <0.01 10/28/2020     LDH:  No results found for: LDH  PT/INR:    Lab Results   Component Value Date    PROTIME 10.9 10/28/2020    INR 1.0 10/28/2020     BNP: No results for input(s): BNP in the last 72 hours.    TSH:   Lab Results   Component Value Date    TSH 1.700 02/04/2020      Cardiac Injury Profile:   Recent Labs     01/27/21 2128   TROPONINI <0.01      Lipid Profile:   Lab Results   Component Value Date    TRIG 146 03/18/2016    HDL 44 03/18/2016    LDLCALC 93 03/18/2016    CHOL 166 03/18/2016      Hemoglobin A1C: No components found for: HGBA1C   U/A:   Lab Results   Component Value Date    LEUKOCYTESUR Negative 01/27/2021    PHUR 5.5 01/27/2021    WBCUA 0-1 01/27/2021    RBCUA 2-5 01/27/2021    RBCUA NONE 10/08/2012    BACTERIA FEW 01/27/2021    SPECGRAV >=1.030 01/27/2021    BLOODU Negative 01/27/2021    GLUCOSEU Negative 01/27/2021    GLUCOSEU NEGATIVE 09/18/2011         ADMISSION SCHEDULED MEDS:   Current Facility-Administered Medications   Medication Dose Route Frequency Provider Last Rate Last Admin    dextrose 5 % and 0.45 % sodium chloride infusion   Intravenous Continuous Kwesi Nevarez, DO 75 mL/hr at 01/29/21 1009 New Bag at 01/29/21 1009    benztropine (COGENTIN) tablet 1 mg  1 mg Oral BID Anamaria Hurtado MD   1 mg at 01/28/21 2247    carBAMazepine (TEGRETOL) tablet 200 mg  200 mg Oral BID Anamaria Hurtado MD   200 mg at 01/28/21 0920    fluvoxaMINE (LUVOX CR) extended release capsule 150 mg  150 mg Oral Daily Anamaria Hurtado MD        ipratropium-albuterol (DUONEB) nebulizer solution 3 mL  3 mL Inhalation Q4H WA Anamaria Hurtado MD   3 mL at 01/29/21 9649    levETIRAcetam (KEPPRA) tablet 1,000 mg  1,000 mg Oral BID Anamaria Hurtado MD   1,000 mg at 01/28/21 2246    mirtazapine (REMERON) tablet 30 mg  30 mg Oral Nightly Anamaria Hurtado MD        OLANZapine [facility]) tablet 7.5 mg  7.5 mg Oral Nightly Anamaria Hurtado MD   7.5 mg at 01/28/21 2247    pantoprazole (PROTONIX) tablet 40 mg  40 mg Oral QAM AC Anamaria Hurtado MD   40 mg at 01/28/21 0919    polyethylene glycol (GLYCOLAX) packet 17 g  17 g Oral Daily Anamaria Hurtado MD        sodium chloride flush 0.9 % injection 10 mL  10 mL Intravenous 2 times per day Anamaria Hurtado MD   10 mL at 01/29/21 0954    sodium chloride flush 0.9 % injection 10 mL  10 mL Intravenous PRN Anamaria Hurtado MD        enoxaparin (LOVENOX) injection 40 mg  40 mg Subcutaneous Daily Anamaria Hurtado MD   40 mg at 01/28/21 0920    promethazine (PHENERGAN) tablet 12.5 mg  12.5 mg Oral Q6H PRN Anamaria Hurtado MD        Or    ondansetron [facility]) injection 4 mg  4 mg Intravenous Q6H PRN Anamaria Hurtado MD        polyethylene glycol [facility]) packet 17 g  17 g Oral Daily PRN Anamaria Hurtado MD        acetaminophen (TYLENOL) tablet 650 mg  650 mg Oral Q6H PRN Anamaria Hurtado MD   650 mg at 01/28/21 1705    Or    acetaminophen (TYLENOL) suppository 650 mg  650 mg Rectal Q6H PRN Anamaria Hurtado MD   650 mg at 01/29/21 0942    cefepime (MAXIPIME) 2000 mg IVPB extended (mini-bag)  2,000 mg Intravenous Q12H Anamaria Hurtado MD 12.5 mL/hr at 01/29/21 1011 2,000 mg at 01/29/21 1011    lactated ringers infusion   Intravenous Continuous Anamaria Hurtado  mL/hr at 01/29/21 0818 New Bag at 01/29/21 0818    CEFEPIME FLUSH - 0.9 % sodium chloride infusion admixture   Intravenous Q12H Anamaria Hurtado MD        vancomycin 1000 mg IVPB in 250 mL D5W addavial  1,000 mg Intravenous Q24H Anamaria Hurtado MD   Stopped at 01/29/21 2679    LORazepam (ATIVAN) injection 1 mg  1 mg Intravenous Once Mat Del Castillo MD           Current  Infusions   dextrose 5 % and 0.45 % NaCl 75 mL/hr at 01/29/21 1009    lactated ringers 125 mL/hr at 01/29/21 0818       Prn Meds  sodium chloride flush, promethazine **OR** ondansetron, polyethylene glycol, acetaminophen **OR** acetaminophen    Radiology Review:  CT CHEST WO CONTRAST   Final Result   1. Moderate hiatal hernia. This now contains a portion of transverse colon,   not seen previously. 2. Elevated right hemidiaphragm with right basilar atelectasis. 3. Markedly distended gallbladder with large gallstone. CT ABDOMEN PELVIS WO CONTRAST Additional Contrast? None   Final Result   1. Large amount of stool markedly distending the sigmoid colon to diameter of   up to 10 cm. Appearance is concerning for fecal impaction. 2. Moderate hiatal hernia. Hernia also contains a portion of transverse   colon. 3. Markedly distended gallbladder with 3.5 cm gallstone. This appearance is   similar to previous. 4. Elevated right hemidiaphragm with right basilar atelectasis. XR CHEST PORTABLE   Final Result   No active cardiopulmonary process. Cardiomegaly.             ASSESSMENT:  Active diagnoses treated at this admission:  · Profound dehydration  · Fever -now likely to sepsis  · Hypernatremia due to the above  · Gram-positive septicemia-staph?   · Origin unknown  · Distended gallbladder with cholelithiasis    Problem list:  Patient Active Problem List   Diagnosis    Se[facility]ure ([facility] [address]    Pressure ulcer of toe of right foot, stage 3 ([facility] [address]    Atherosclerosis of native artery of both lower extremities (HCC)    Hypernatremia    Status epilepticus (HCC)    Seizures (HCC)    Seizure disorder, status epilepticus, convulsive ([facility] [address]    Severe protein-calorie malnutrition (HCC)    Acute renal failure (HCC)    Thrombocytopenia (HCC)    Fever of unknown origin    Encephalopathy    Urinary tract infection without hematuria    Severe dehydration    Respiratory failure, acute (HCC)    Hypoventilation syndrome    Atelectasis    Sepsis (HCC)       PLAN:  Intravenous fluid hydration  Empiric antibiotics to continue  Infectious disease consulted  Neurology evaluation  Transition to ICU  DVT prophylaxis      See  Orders  Jie Infante MD, Paul Daniel, American Board of Internal Medicine  Diplomate, [address] [place] [address] Rd., [address] of Internal Medicine  10:50 AM  1/29/2021

## 2021-01-29 NOTE — PLAN OF CARE
Current issues with his ongoing temperature sometimes in the high range of 103 control with a cooling blanket and Tylenol was in someone like him on chronic psych medications of whether or not he had malignant hyperthermia so we checked a CK and it was 15,000 and he has no muscle mass however he is not acidotic and has does not have other features of malignant hyperthermia he is also has positive blood cultures for which infectious disease is following and he has been on antibiotics. So the probability of malignant hyperthermia is still present my plan at present is just to stop the psych medications and use benzodiazepines as needed but if the temperature continues to spike I have a low threshold to try dantrolene sodium. In addition to the fluid administration I will keep to quarter normal saline and add D5 and a second IV medication to help bring down the sodium. Yolanda Canas DO, MPH, Shelbi Carbajal, FACP  Director, [address] [facility]  Professor of [address] [facility]  [address] [place] [address]

## 2021-01-29 NOTE — PROGRESS NOTES
Messaged Dr. Kaylan Fong regarding patient today elevated sodium and Chloride. Patient remain fever 103.7 via rectal, suppository tylenol given. Also clarified the IV fluid orders.

## 2021-01-30 NOTE — FLOWSHEET NOTE
This note also relates to the following rows which could not be included:  Pulse - Cannot attach notes to unvalidated device data  Resp - Cannot attach notes to unvalidated device data  BP - Cannot attach notes to unvalidated device data  MAP (mmHg) - Cannot attach notes to unvalidated device data    When restraints removed for care pt removes monitoring equipment.  Unable to redirect

## 2021-01-30 NOTE — PROGRESS NOTES
Neri Ramos MD, FACP                   Patient Name: Rohini Leija                   Age:  78 y.o. Gender:   male    CC: Fever    HPI: This patient with severe mental retardation and developmental disabilities presents from a group home with fever  Initial evaluation demonstrates that he is profoundly dehydrated  He has been seen multiple times for the same issue  At presentation his sodium was 168 BUN 55 creatinine 1.5 CBC was normal  He was borderline hypotensive    He was started on hydration and he was given empiric antibiotics  Procalcitonin was obtained and demonstrates no evidence of sepsis or infection  Urine was clear  There is no leukocytosis    Upon evaluation he is not interactive he is in the decubitus position with multiple contractures  There are no reported skin lesions    1/29  Change in status over the past 24 hours  This patient was originally admitted with severe dehydration, hypernatremia, fever.   He was treated initially with broad-spectrum antibiotics  Fluid support  He was not hypotensive but he was tachycardic  As defined above this patient is a resident of a group home  He was sent in because of fever  He does not communicate due to severe mental retardation and developmental disabilities  Later early this morning RRT was called  He continues have episodes of fever  Oxygen saturation had dropped  He was shaking  It was difficult to determine whether or not these were seizures  Note that he has had these shaking episodes in the past and has had a comprehensive evaluation by neurology for this  He had been intermittently on Sinemet  He was to follow-up with neurology today  Subsequent labs demonstrated a significant rise in his sodium  Transfer was requested to MICU  Discussed with intensivist    CT of his abdomen now demonstrates markedly distended gallbladder with large gallstones  Blood cultures demonstrating gram-positive cocci    1/30  Updating the patient evaluation, there are multiple possibilities concerning his fever and current presentation:  His fever considered multifactorial including: Pneumonia, severe dehydration, fecal impaction  Substantial elevation of the CK  Consideration for malignant hyperthermia  However he has bacteremia possibly considered contaminant  Reviewed infectious disease plan discontinued vancomycin and cefepime and started Zosyn  Intensive care medicine continuing to consider malignant hyperthermia  Neurology note reviewed    Past Medical History:   Diagnosis Date    Acquired deformity of neck     Autism spectrum     Bilateral cataracts     Blepharochalasis     Cardiomegaly     Cataract of both eyes     Developmental delay     Gastritis     Hyperlipidemia     Kyphosis of thoracic region     Mental retardation     SEVERE MR  NON VERBAL, NEEDS WHEELCHAIR FOR LONG DISTANCE    Obsessive compulsive disorder     [facility] [address]     Seizures ([facility] [address]     Tourette disorder        Past Surgical History:   Procedure Laterality Date    TOE AMPUTATION Left     2nd/3rd       Review of Systems:   · General: Except as provided in the HPI no other information is noted      Physical Examination:      Wt Readings from Last 3 Encounters:   01/30/21 114 lb 11.2 oz (52 kg)   01/05/21 125 lb (56.7 kg)   10/28/20 125 lb (56.7 kg)     Temp Readings from Last 3 Encounters:   01/30/21 99.7 °F (37.6 °C) (Bladder)   01/25/21 97.7 °F (36.5 °C)   01/05/21 97.4 °F (36.3 °C)     BP Readings from Last 3 Encounters:   01/30/21 90/60   01/25/21 (!) 156/92   10/29/20 134/74     Pulse Readings from Last 3 Encounters:   01/30/21 98   01/25/21 102   01/05/21 66       General appearance: He remains nonresponsive eyes closed  He demonstrates chewing movements of his jaw  Skin: Color, texture normal, turgor reduced normal. No rashes or 01/27/2021    TROPONINI <0.01 01/25/2021    TROPONINI <0.01 10/28/2020     LDH:  No results found for: LDH  PT/INR:    Lab Results   Component Value Date    PROTIME 10.9 10/28/2020    INR 1.0 10/28/2020     BNP: No results for input(s): BNP in the last 72 hours. TSH:   Lab Results   Component Value Date    TSH 1.700 02/04/2020      Cardiac Injury Profile:   Recent Labs     01/27/21 2128 01/27/21 2128 01/30/21  0836   CKTOTAL  --    < > 8,912*   TROPONINI <0.01  --   --     < > = values in this interval not displayed.       Lipid Profile:   Lab Results   Component Value Date    TRIG 146 03/18/2016    HDL 44 03/18/2016    LDLCALC 93 03/18/2016    CHOL 166 03/18/2016      Hemoglobin A1C: No components found for: HGBA1C   U/A:   Lab Results   Component Value Date    LEUKOCYTESUR Negative 01/27/2021    PHUR 5.5 01/27/2021    WBCUA 0-1 01/27/2021    RBCUA 2-5 01/27/2021    RBCUA NONE 10/08/2012    BACTERIA FEW 01/27/2021    SPECGRAV >=1.030 01/27/2021    BLOODU Negative 01/27/2021    GLUCOSEU Negative 01/27/2021    GLUCOSEU NEGATIVE 09/18/2011         ADMISSION SCHEDULED MEDS:   Current Facility-Administered Medications   Medication Dose Route Frequency Provider Last Rate Last Admin    medicated lip balm (BLISTEX/CARMEX) 2-2.5-6.6 % stick             [Held by provider] dextrose 5 % and 0.45 % sodium chloride infusion   Intravenous Continuous Natalee Frames, DO 75 mL/hr at 01/29/21 1009 New Bag at 01/29/21 1009    levetiracetam (KEPPRA) 1000 mg/100 mL IVPB  1,000 mg Intravenous Q12H Carleen Nevarez DO   Stopped at 01/30/21 0100    acetaminophen (TYLENOL) tablet 650 mg  650 mg Oral Q4H PRN Natalee Frames, DO        Or    acetaminophen (TYLENOL) suppository 650 mg  650 mg Rectal Q6H PRN Natalee Frames, DO   650 mg at 01/29/21 1309    carBAMazepine (TEGRETOL) 100 MG/5ML suspension 200 mg  200 mg Oral BID Carleen Nevarez DO   200 mg at 01/29/21 2038    dextrose 5 % and 0.2 % NaCl infusion Intravenous Continuous Radha Kawasaki,  mL/hr at 01/30/21 0053 New Bag at 01/30/21 0053    bisacodyl (DULCOLAX) suppository 10 mg  10 mg Rectal Daily Radha Aguero MD   10 mg at 01/29/21 1624    piperacillin-tazobactam (ZOSYN) 3,375 mg in dextrose 5 % 100 mL IVPB extended infusion (mini-bag)  3,375 mg Intravenous Q8H Radha Aguero MD 25 mL/hr at 01/30/21 0902 3,375 mg at 01/30/21 0902    ZOSYN FLUSH - 0.9 % sodium chloride infusion admixture   Intravenous Q8H Radha Aguero MD   Stopped at 01/30/21 0503    diphenhydrAMINE (BENADRYL) injection 25 mg  25 mg Intravenous Q4H PRN [facility], DO   25 mg at 01/29/21 1629    dextrose 5 % solution   Intravenous Continuous [facility], DO 50 mL/hr at 01/29/21 1624 New Bag at 01/29/21 1624    LORazepam (ATIVAN) injection 2 mg  2 mg Intravenous Q4H [facility], DO   2 mg at 01/30/21 2676    [Held by provider] benztropine (COGENTIN) tablet 1 mg  1 mg Oral BID Karlo Narayan MD   1 mg at 01/28/21 2247    [Held by provider] fluvoxaMINE (LUVOX CR) extended release capsule 150 mg  150 mg Oral Daily Karlo Narayan MD        ipratropium-albuterol (DUONEB) nebulizer solution 3 mL  3 mL Inhalation Q4H WA Karlo Narayan MD   3 mL at 01/30/21 9868    [Held by provider] mirtazapine (REMERON) tablet 30 mg  30 mg Oral Nightly Karlo Narayan MD        [Held by provider] OLANZapine [facility]) tablet 7.5 mg  7.5 mg Oral Nightly Karlo Narayan MD   7.5 mg at 01/28/21 2247    pantoprazole (PROTONIX) tablet 40 mg  40 mg Oral QAM AC Karlo Narayan MD   Stopped at 01/30/21 0528    polyethylene glycol (GLYCOLAX) packet 17 g  17 g Oral Daily Karlo Narayan MD        sodium chloride flush 0.9 % injection 10 mL  10 mL Intravenous 2 times per day Karlo Narayan MD   10 mL at 01/29/21 2039    sodium chloride flush 0.9 % injection 10 mL  10 mL Intravenous PRN Karlo Narayan MD        enoxaparin (LOVENOX) injection 40 mg 40 mg Subcutaneous Daily Brooks Castellano MD   40 mg at 01/30/21 6978    promethazine (PHENERGAN) tablet 12.5 mg  12.5 mg Oral Q6H PRN Brooks Csatellano MD        Or    [facility]ron [facility] injection 4 mg  4 mg Intravenous Q6H PRN Brooks Castellano MD        polyethylene glycol [facility]) packet 17 g  17 g Oral Daily PRN Brooks Castellano MD        LORazepam (ATIVAN) injection 1 mg  1 mg Intravenous Once Orlin Grider MD           Current  Infusions   [Held by provider] dextrose 5 % and 0.45 % NaCl 75 mL/hr at 01/29/21 1009    dextrose 5 % and 0.2 % NaCl 100 mL/hr at 01/30/21 0053    dextrose 50 mL/hr at 01/29/21 1624       Prn Meds  acetaminophen **OR** acetaminophen, diphenhydrAMINE, sodium chloride flush, promethazine **OR** ondansetron, polyethylene glycol    Radiology Review:  CT CHEST WO CONTRAST   Final Result   1. Moderate hiatal hernia. This now contains a portion of transverse colon,   not seen previously. 2. Elevated right hemidiaphragm with right basilar atelectasis. 3. Markedly distended gallbladder with large gallstone. CT ABDOMEN PELVIS WO CONTRAST Additional Contrast? None   Final Result   1. Large amount of stool markedly distending the sigmoid colon to diameter of   up to 10 cm. Appearance is concerning for fecal impaction. 2. Moderate hiatal hernia. Hernia also contains a portion of transverse   colon. 3. Markedly distended gallbladder with 3.5 cm gallstone. This appearance is   similar to previous. 4. Elevated right hemidiaphragm with right basilar atelectasis. XR CHEST PORTABLE   Final Result   No active cardiopulmonary process. Cardiomegaly. ASSESSMENT:  Active diagnoses treated at this admission:  · Profound dehydration  · Fever -now likely to sepsis  · Hypernatremia due to the above  · Gram-positive septicemia-staph?   · Origin unknown  · Distended gallbladder with cholelithiasis  · Rhabdomyolysis 1/30  · Possible malignant hyperthermia 1/30 Problem list:  Patient Active Problem List   Diagnosis    [facility]ure ([facility] [address]    Pressure ulcer of toe of right foot, stage 3 (HCC)    Atherosclerosis of native artery of both lower extremities (HCC)    Dehydration with hypernatremia    Status epilepticus (HCC)    Seizures (HCC)    Seizure disorder, status epilepticus, convulsive ([facility] [address]    Severe protein-calorie malnutrition (HCC)    Acute renal failure (HCC)    Thrombocytopenia (HCC)    Fever, unspecified    Encephalopathy    Urinary tract infection without hematuria    Severe dehydration    Respiratory failure, acute (HCC)    Hypoventilation syndrome    Atelectasis    Sepsis (HCC)    Gram positive septicemia (HCC)    Parkinson's disease ([facility] [address]       PLAN:  Intravenous fluid hydration  Empiric antibiotics to continue now changed to Zosyn  Infectious disease consulted  Neurology evaluation reviewed  Since yesterday at 1 PM his fever has subsided  DVT prophylaxis      See  Orders  Catalina Coto MD, Holli Rowell, American Board of Internal Medicine  Diplomate, Geriatric Medicine, [address] [place] [address] Rd., [address] of Internal Medicine  10:53 AM  1/30/2021

## 2021-01-30 NOTE — PROGRESS NOTES
[facility]  Department of Internal Medicine  Division of Pulmonary, Critical Care and Sleep Medicine  Progress  Note  Verona Angulo MD, [facility]    Patient: Nella Chandler  MRN: [id number]  : 1950    Encounter Time: 2:07 PM     Date of Admission: 2021  8:08 PM    Primary Care Physician: Betty Briones MD    Reason for Consultation: Shock, mixed hypovolemic with possible infection    SUBJECTIVE:    Temp improved  + BC noted  ID following  Na improved  Poor PO     OBJECTIVE:     PHYSICAL EXAM:   VITALS:   Vitals:    21 1000 21 1100 21 1200 21 1225   BP:  93/66 (!) 94/57    Pulse: 98 89 83    Resp: (!) 33 19 16 18   Temp:       TempSrc:   Bladder    SpO2: 91% 97% 97% 97%   Weight:       Height:            Intake/Output Summary (Last 24 hours) at 2021 1407  Last data filed at 2021 1000  Gross per 24 hour   Intake 4013 ml   Output 1045 ml   Net 2968 ml        CONSTITUTIONAL:   Alert,   SKIN:     Pale skin discoloration  HEENT:     No thrush  NECK:    No bruits, No JVP apprechiated  CV:      Tachy Sinus,  No murmur, No rubs, No gallops  PULMONARY:   Couse BS,  Some scattered rhonchi      No noted egophony  ABDOMEN:     Scaphoid, Soft, non-tender. BS normal. No R/R/G  EXT:    No deformities . No clubbing. Contracted with no lower extremity edema, No venous stasis  PULSE:   Palpable.   PSYCHIATRIC:  Unable, grunts   MS:    Bedfast state, No fractures, Gross weakness  NEUROLOGIC:   Difficult assessment but looks non-focal     DATA: IMAGING & TESTING:     LABORATORY TESTS:    CBC:   Lab Results   Component Value Date    WBC 10.3 2021    RBC 4.71 2021    HGB 14.8 2021    HCT 48.7 2021    .4 2021    MCH 31.4 2021    MCHC 30.4 2021    RDW 13.9 2021     2021    MPV 10.0 2021     CMP:    Lab Results   Component Value Date    NA 149 01/30/2021    K 4.1 01/30/2021     01/30/2021    CO2 25 01/30/2021    BUN 33 01/30/2021    CREATININE 0.9 01/30/2021    GFRAA >60 01/30/2021    LABGLOM >60 01/30/2021    GLUCOSE 135 01/30/2021    GLUCOSE 71 05/18/2012    PROT 5.5 01/30/2021    LABALBU 2.8 01/30/2021    LABALBU 4.5 05/18/2012    CALCIUM 7.7 01/30/2021    BILITOT 0.6 01/30/2021    ALKPHOS 88 01/30/2021     01/30/2021     01/30/2021     Magnesium:    Lab Results   Component Value Date    MG 2.8 01/30/2021     Phosphorus:    Lab Results   Component Value Date    PHOS 6.4 01/29/2021     PT/INR:    Lab Results   Component Value Date    PROTIME 10.9 10/28/2020    INR 1.0 10/28/2020     ABG:    Lab Results   Component Value Date    PH 7.312 01/30/2021    PCO2 51.2 01/30/2021    PO2 77.6 01/30/2021    HCO3 25.3 01/30/2021    BE -1.5 01/30/2021    O2SAT 95.2 01/30/2021     TSH:    Lab Results   Component Value Date    TSH 1.700 02/04/2020        PRO-BNP:   Lab Results   Component Value Date    PROBNP 264 (H) 01/27/2021    PROBNP 41 10/19/2020      ABGs:   Lab Results   Component Value Date    PH 7.312 01/30/2021    PO2 77.6 01/30/2021    PCO2 51.2 01/30/2021     Hemoglobin A1C: No components found for: HGBA1C    IMAGING:  Imaging tests were completed and reviewed and discussed radiology and care team involved and reveals   Ct Abdomen Pelvis Wo Contrast Additional Contrast? None    Result Date: 1/28/2021  EXAMINATION: CT OF THE ABDOMEN AND PELVIS WITHOUT CONTRAST 1/28/2021   1. Large amount of stool markedly distending the sigmoid colon to diameter of up to 10 cm. Appearance is concerning for fecal impaction. 2. Moderate hiatal hernia. Hernia also contains a portion of transverse colon. 3. Markedly distended gallbladder with 3.5 cm gallstone. This appearance is similar to previous. 4. Elevated right hemidiaphragm with right basilar atelectasis.     Ct Chest Wo Contrast  Result Date: 1/28/2021  FINDINGS: Mediastinum: There is a moderate hiatal hernia. This also now contains a portion of the transverse colon, not seen previously. Lungs/pleura: There is elevation of the right hemidiaphragm. Airspace disease at the right base is likely atelectasis. There is no pleural effusion. There is no pneumothorax seen. Upper Abdomen: The gallbladder is markedly distended. There is a 3.5 cm lamellated gallstone. Soft Tissues/Bones: There is no acute osseous or soft tissue abnormality seen. 1. Moderate hiatal hernia. This now contains a portion of transverse colon, not seen previously. 2. Elevated right hemidiaphragm with right basilar atelectasis. 3. Markedly distended gallbladder with large gallstone. Assessment:  Patient presents from [address] [address] for evaluation of hypoxia. He has had similar visits previously and the hypoxia was thought to be secondary to atelectasis due to his body habitus. He sits kind of hunched over in a fetal position with his chin down. Initially, we are having difficulty picking up patient's pulse ox, therefore ABG was obtained. The arterial as the PO2 was 52.9. Patient given a DuoNeb and we attempted to try different pulse ox set ups. His labs revealed severe electrolyte changes and he was admitted. On admission to the floor RRT was transfer to ICU with fevers 104 and hypotension  1. Sepsis  2. Hypovolemic shock  3. Hypernatemia  4. Hypercholemic metabolic acidosis  5. Elevated hemidiaphragm  6. + blood cultures  7. Parkinsonism vs disease  8. Seizure disorder  9. Aspiration chronic   10. Distended gallbladder with stone  11. Moderate HH with aspiration  12. Pressure ulcer of toe of right foot, stage 3    13. Atherosclerosis of native artery of both lower extremities   14. Status epilepticus   15. Seizures   16. Severe protein-calorie malnutrition ([address]  17. Thrombocytopenia ([address]    Plan:   1. PICC line  2. Antibiotics per ID  3. Sto D5W  4. Continue with the D5.2 NS to lower chloride   5.  Keppra per seizures  6. Frequent lytes  7. Adjust Cooling blanket   8. Tylenol rectal supp Q4 prn  9. Needs a feeding tube placed. 10. Review medications  11. Benzo as needed  12. CK improved   13.  Electrylotes adjusted        Corwin Fernandez, MPH, [facility]  Professor of Internal Medicine  Pulmonary, Critical Care and Sleep Medicine  [id number]

## 2021-01-30 NOTE — PROGRESS NOTES
Mauricio Shell is a 78 y.o. man    Patient well known to me for developmental disabilities, seizure disorder and recently diagnosed with Parkinson's disease    His seizures have been well controlled on Keppra and Tegretol having no seizures in a number of years until this past spring (Keppra added and they again retooled)     Previously used Depakote for behavior difficulties rather than seizures    Over the last 1-2 years he began to develop a tremor which subsequently changed his Depakote dosing without resolution in said tremor   Neurology questioned Parkinson's disease versus parkinsonisms with his underlying developmental disabilities    He was tried on Neupro which improved his walking but caused increased agitation. Neupro was stopped and Sinemet was started which markedly improved his walking, his tremor -- staff reported he was able to hold his own juice cup. However psychiatry wanted to stop his Sinemet in December because of potential interactions with Zyprexa.      Admitted yesterday with increasing SOB -- fever -- temp as high as 104.5   WBC and ESR appear normal  Blood cx with gram +  ID following -- blood cx repeated      ICU questioned possible malignant hyperthermia and held psychiatric medications   CKs were >15,000 -- now just [address] of 10,000   Temp now below 100     No seizures have been reported     Objective:   BP 86/68   Pulse 98   Temp 99.5 °F (37.5 °C)   Resp 18   Ht 5' 5\" (1.651 m)   Wt 114 lb 11.2 oz (52 kg)   SpO2 96%   BMI 19.09 kg/m²      General appearance: sleeping comfortably - looking around room when awakened   Head: Normocephalic, without obvious abnormality, atraumatic  Extremities: no cyanosis or edema  Pulses: 2+ and symmetric  Skin: no rashes or lesions     Mental Status: not awake - but arouses easily  -- not following commands      Cranial Nerves:  I: smell    II: visual acuity     II: visual fields Bilateral threat response    II: pupils NOMI   III,VII: ptosis III,IV,VI: extraocular muscles  Follows examiner around room    V: mastication    V: facial light touch sensation  Grimaces to PP bilaterally    V,VII: corneal reflex  Present   VII: facial muscle function - upper     VII: facial muscle function - lower Normal   VIII: hearing    IX: soft palate elevation     IX,X: gag reflex    XI: trapezius strength     XI: sternocleidomastoid strength    XI: neck extension strength     XII: tongue strength       Motor:  Spastic quadriparetic    Limited resting tremor in left arm this am but resting quietly     Sensory:  Grimaces to noxious stim in both hands      DTR:   No reflexes    No Babinski  No Grey's     Laboratory/Radiology:     CBC with Differential:    Lab Results   Component Value Date    WBC 10.3 01/29/2021    RBC 4.71 01/29/2021    HGB 14.8 01/29/2021    HCT 48.7 01/29/2021     01/29/2021    .4 01/29/2021    MCH 31.4 01/29/2021    MCHC 30.4 01/29/2021    RDW 13.9 01/29/2021    METASPCT 0.9 01/31/2020    LYMPHOPCT 27.1 01/29/2021    MONOPCT 15.4 01/29/2021    BASOPCT 0.5 01/29/2021    MONOSABS 1.58 01/29/2021    LYMPHSABS 2.78 01/29/2021    EOSABS 0.00 01/29/2021    BASOSABS 0.05 01/29/2021     CMP:    Lab Results   Component Value Date     01/30/2021    K 4.2 01/30/2021     01/30/2021    CO2 26 01/30/2021    BUN 41 01/30/2021    CREATININE 1.2 01/30/2021    GFRAA >60 01/30/2021    LABGLOM >60 01/30/2021    GLUCOSE 154 01/30/2021    GLUCOSE 71 05/18/2012    PROT 5.9 01/30/2021    LABALBU 3.3 01/30/2021    LABALBU 4.5 05/18/2012    CALCIUM 7.8 01/30/2021    BILITOT 0.6 01/30/2021    ALKPHOS 85 01/30/2021     01/30/2021     01/30/2021     I independently reviewed the lab studies today.      Assessment:     History of developmental disabilities with seizure disorder   Stable on Keppra 1000mg BID and Tegretol 200mg BID    Suspected Parkinson's disease   Previously responded to Sinemet well but d/t medication interaction, psychiatry requested to stop in December 2020    Now admitted with SOB, fever and positive blood cx    ?  Temp/CKs from malignant hyperthermia   Temp/Cks improving with psych medication held vs burst of broad spectrums received       Plan:     Continue Keppra and Tegretol if able (will need NG)    Consider restarting Sinemet if/when stable     Will follow peripherally     Nicole Melvin  9:34 AM  1/30/2021

## 2021-01-30 NOTE — PROGRESS NOTES
Resp 18   Ht 5' 5\" (1.651 m)   Wt 114 lb 11.2 oz (52 kg)   SpO2 97%   BMI 19.09 kg/m²   Temp  Av °F (37.8 °C)  Min: 98.9 °F (37.2 °C)  Max: 101.6 °F (38.7 °C)  Constitutional: The patient is awake, more animated but demented  Skin: Warm and dry. No rashes were noted. HEENT: Round and reactive pupils. Moist mucous membranes. No ulcerations or thrush. Neck: Supple to movements. Chest: No use of accessory muscles to breathe. Symmetrical expansion. No wheezing, crackles or rhonchi. Decreased breath sounds on the right  Cardiovascular: S1 and S2 are rhythmic and regular. No murmurs appreciated. Abdomen: Positive bowel sounds to auscultation. Benign to palpation. No masses felt. No hepatosplenomegaly. Genitourinary: William  Extremities: No clubbing, no cyanosis, no edema.   Lines: peripheral    Laboratory and Tests Review:  Lab Results   Component Value Date    WBC 10.3 2021    WBC 8.3 2021    WBC 7.0 2021    HGB 14.8 2021    HCT 48.7 2021    .4 (H) 2021     2021     Lab Results   Component Value Date    NEUTROABS 5.81 2021    NEUTROABS 5.77 2021    NEUTROABS 4.47 2021     No results found for: [facility]  Lab Results   Component Value Date     (H) 2021     (H) 2021    ALKPHOS 84 2021    BILITOT 0.6 2021     Lab Results   Component Value Date     2021    K 3.8 2021     2021    CO2 27 2021    BUN 37 2021    CREATININE 1.0 2021    CREATININE 1.2 2021    CREATININE 1.3 2021    GFRAA >60 2021    LABGLOM >60 2021    GLUCOSE 119 2021    GLUCOSE 71 2012    PROT 5.7 2021    LABALBU 3.2 2021    LABALBU 4.5 2012    CALCIUM 7.8 2021    BILITOT 0.6 2021    ALKPHOS 84 2021     2021     2021     Lab Results   Component Value Date    CRP 0.3 2021    CRP 4.2 (H) 06/14/2020    CRP 3.6 (H) 06/12/2020     Lab Results   Component Value Date    SEDRATE 6 01/29/2021    SEDRATE 43 (H) 06/14/2020    SEDRATE 14 06/12/2020     Radiology:    Reviewed  Microbiology:   Lab Results   Component Value Date    BC 24 Hours no growth 01/29/2021    BC  01/27/2021     Gram stain performed from blood culture bottle media  Gram positive cocci in clusters      [facility] Identification and sensitivity to follow 01/27/2021    [facility]  01/27/2021     Identification and sensitivity to follow  This isolate is presumed to be resistant based on the  detection of inducible Clindamycin resistance. Clindamycin  may still be effective in some patients.       ORG Staphylococcus species 01/27/2021    ORG Staphylococcus epidermidis 01/27/2021    ORG Staphylococcus haemolyticus 01/27/2021    ORG Staphylococcus species 01/27/2021     Lab Results   Component Value Date    BLOODCULT2  01/27/2021     Gram stain performed from blood culture bottle media  Gram positive cocci in clusters  Previously positive blood culture called      BLOODCULT2 Identification and sensitivity to follow 01/27/2021    BLOODCULT2 Identification and sensitivity to follow 01/27/2021    ORG Staphylococcus species 01/27/2021    ORG Staphylococcus epidermidis 01/27/2021    ORG Staphylococcus haemolyticus 01/27/2021    ORG Staphylococcus species 01/27/2021     WOUND/ABSCESS   Date Value Ref Range Status   03/09/2015 Rare growth  Final   02/02/2015 Light growth  Final   02/02/2015 Light growth  Final     No results found for: RESPSMEAR      Component Value Date/Time    LABFUNG 4 Weeks- no fungus isolated 04/24/2015 1030     No results found for: CULTRESP  No results found for: CXCATHTIP  No results found for: [facility]  Culture Surgical   Date Value Ref Range Status   04/24/2015 Heavy growth  Final   04/24/2015 Heavy growth  Final   04/24/2015 Light growth  Final     Urine Culture, Routine   Date Value Ref Range Status   06/12/2020 >100,000 CFU/ml  Final 03/28/2020 Growth not present  Final   01/29/2020 Growth not present  Final     MRSA Culture Only   Date Value Ref Range Status   01/29/2020 Methicillin resistant Staph aureus not isolated  Final       ASSESSMENT:  · Severe dehydration and hypernatremia  · Right lung pneumonia  · Coag negative staph with multiple species and this is considered a contaminant    PLAN:  · Continue Zosyn  · Continue hydration  · Continue disimpaction  · Check final cultures  · Monitor labs    Eugene Tamayo  1:23 PM  1/30/2021

## 2021-01-31 NOTE — PROGRESS NOTES
[place]  Department of Internal Medicine  Division of Pulmonary, Critical Care and Sleep Medicine  Progress  Note  Renita Lerma MD, [facility]    Patient: Elizabeth Mohan  MRN: [id number]  : 1950    Encounter Time: 1:40 PM     Date of Admission: 2021  8:08 PM    Primary Care Physician: Oliverio Lee MD    Reason for Consultation: Shock, mixed hypovolemic with possible infection    SUBJECTIVE:    Patient is improved  His sodium is near normal  He no longer has the chewing movements and the tremors  BUN and creatinine are normal  Neurology indicates that Sinemet had remarkably improved his tremor  But there was some disagreement due to the interaction with Zyprexa  There has been no further fever  Cogentin Luvox mirtazapine and olanzapine have been held  Prealbumin only 13 = consider PEG    OBJECTIVE:     PHYSICAL EXAM:   VITALS:   Vitals:    21 1000 21 1100 21 1200 21 1300   BP: (!) 93/59 96/73 96/65    Pulse: 86 100 93 96   Resp: 17 25 20 18   Temp:       TempSrc:       SpO2: 100% (!) 77% 99% 94%   Weight:       Height:            Intake/Output Summary (Last 24 hours) at 2021 1340  Last data filed at 2021 0900  Gross per 24 hour   Intake 3147 ml   Output 560 ml   Net 2587 ml        CONSTITUTIONAL:   Alert,   SKIN:     Pale skin discoloration  HEENT:     No thrush  NECK:    No bruits, No JVP apprechiated  CV:      Tachy Sinus,  No murmur, No rubs, No gallops  PULMONARY:   Couse BS,  Some scattered rhonchi      No noted egophony  ABDOMEN:     Scaphoid, Soft, non-tender. BS normal. No R/R/G  EXT:    No deformities . No clubbing. Contracted with no lower extremity edema, No venous stasis  PULSE:   Palpable.   PSYCHIATRIC:  Unable, grunts   MS:    Bedfast state, No fractures, Gross weakness  NEUROLOGIC:   Difficult assessment but looks non-focal     DATA: IMAGING & TESTING: LABORATORY TESTS:    CBC:   Lab Results   Component Value Date    WBC 6.1 01/31/2021    RBC 3.86 01/31/2021    HGB 11.9 01/31/2021    HCT 37.6 01/31/2021    MCV 97.4 01/31/2021    MCH 30.8 01/31/2021    MCHC 31.6 01/31/2021    RDW 12.8 01/31/2021    PLT 92 01/31/2021    MPV 10.6 01/31/2021     CMP:    Lab Results   Component Value Date     01/31/2021    K 3.0 01/31/2021     01/31/2021    CO2 28 01/31/2021    BUN 19 01/31/2021    CREATININE 0.8 01/31/2021    GFRAA >60 01/31/2021    LABGLOM >60 01/31/2021    GLUCOSE 124 01/31/2021    GLUCOSE 71 05/18/2012    PROT 5.6 01/31/2021    LABALBU 3.3 01/31/2021    LABALBU 4.5 05/18/2012    CALCIUM 7.8 01/31/2021    BILITOT 0.6 01/31/2021    ALKPHOS 99 01/31/2021     01/31/2021     01/31/2021     Magnesium:    Lab Results   Component Value Date    MG 2.0 01/31/2021     Phosphorus:    Lab Results   Component Value Date    PHOS 6.4 01/29/2021     PT/INR:    Lab Results   Component Value Date    PROTIME 10.9 10/28/2020    INR 1.0 10/28/2020     ABG:    Lab Results   Component Value Date    PH 7.312 01/30/2021    PCO2 51.2 01/30/2021    PO2 77.6 01/30/2021    HCO3 25.3 01/30/2021    BE -1.5 01/30/2021    O2SAT 95.2 01/30/2021     TSH:    Lab Results   Component Value Date    TSH 1.700 02/04/2020        PRO-BNP:   Lab Results   Component Value Date    PROBNP 264 (H) 01/27/2021    PROBNP 41 10/19/2020      ABGs:   Lab Results   Component Value Date    PH 7.312 01/30/2021    PO2 77.6 01/30/2021    PCO2 51.2 01/30/2021     Hemoglobin A1C: No components found for: HGBA1C    IMAGING:  Imaging tests were completed and reviewed and discussed radiology and care team involved and reveals   Ct Abdomen Pelvis Wo Contrast Additional Contrast? None    Result Date: 1/28/2021  EXAMINATION: CT OF THE ABDOMEN AND PELVIS WITHOUT CONTRAST 1/28/2021 1. Large amount of stool markedly distending the sigmoid colon to diameter of up to 10 cm. Appearance is concerning for fecal impaction. 2. Moderate hiatal hernia. Hernia also contains a portion of transverse colon. 3. Markedly distended gallbladder with 3.5 cm gallstone. This appearance is similar to previous. 4. Elevated right hemidiaphragm with right basilar atelectasis. Ct Chest Wo Contrast  Result Date: 1/28/2021  FINDINGS: Mediastinum: There is a moderate hiatal hernia. This also now contains a portion of the transverse colon, not seen previously. Lungs/pleura: There is elevation of the right hemidiaphragm. Airspace disease at the right base is likely atelectasis. There is no pleural effusion. There is no pneumothorax seen. Upper Abdomen: The gallbladder is markedly distended. There is a 3.5 cm lamellated gallstone. Soft Tissues/Bones: There is no acute osseous or soft tissue abnormality seen. 1. Moderate hiatal hernia. This now contains a portion of transverse colon, not seen previously. 2. Elevated right hemidiaphragm with right basilar atelectasis. 3. Markedly distended gallbladder with large gallstone. Assessment:  Patient presents from [address] [place] [address] for evaluation of hypoxia. He has had similar visits previously and the hypoxia was thought to be secondary to atelectasis due to his body habitus. He sits kind of hunched over in a fetal position with his chin down. Initially, we are having difficulty picking up patient's pulse ox, therefore ABG was obtained. The arterial as the PO2 was 52.9. Patient given a DuoNeb and we attempted to try different pulse ox set ups. His labs revealed severe electrolyte changes and he was admitted. On admission to the floor RRT was transfer to ICU with fevers 104 and hypotension  1. Sepsis with shock  2. Hypovolemic shock  3. Hypernatemia improved   4. Hypercholemic metabolic acidosis improving  5.  Elevated hemidiaphragm  6. + blood cultures 7. Parkinsonism vs disease  8. Seizure disorder  9. Aspiration chronic   10. Distended gallbladder with stone  11. Moderate HH with aspiration  12. Pressure ulcer of toe of right foot, stage 3    13. Atherosclerosis of native artery of both lower extremities   14. Status epilepticus   15. Seizures   16. Severe protein-calorie malnutrition ([facility] [address]  17. Thrombocytopenia ([facility] [address]    Plan:   1. PICC line needed  2. Antibiotics per ID  3. Adjust IVF   4. Continue with the D5.2 NS to lower chloride   5. Keppra per seizures IV  6. Holding antipsychatics  7. Frequent lytes  8. Adjust Cooling blanket  resolved  9. Tylenol rectal supp Q4 prn  10. Needs a feeding tube placed. 11. Review medications  12. Benzo as needed  13. CK improved   14.  Electrylotes adjusted    Maryellen Covington, MPH, Natalia Euceda  Professor of Internal Medicine  Pulmonary, Critical Care and Sleep Medicine  [id number]

## 2021-01-31 NOTE — PROGRESS NOTES
[address] [place] [address] [facility]  [facility]  Progress Note      Chief Complaint   Patient presents with    Shortness of Breath     Patient [facility] with [facility] and [facility]. Patient seen here 2 days ago for same complaints. SUBJECTIVE:  Patient is tolerating medications. No reported adverse drug reactions. No nausea, vomiting, glasses starting to work and patient is having a lot of bowel movements to help clear the impaction  Blood cultures are contaminant with multiple different types of coag negative staph  Temperatures  are trending down currently is 99.1. Is on 4 L  Patient is still getting hydrated aggressively and the BUN and sodium are improving     more alert    Review of systems:  As stated above in the chief complaint, otherwise negative.     Medications:  Scheduled Meds:   [START ON 2/1/2021] bisacodyl  10 mg Rectal Once per day on Mon Wed Fri    magnesium sulfate  2,000 mg Intravenous Once    potassium chloride  10 mEq Intravenous 4x Daily    LORazepam  1 mg Intravenous Q6H    levetiracetam  1,000 mg Intravenous Q12H    carBAMazepine  200 mg Oral BID    piperacillin-tazobactam  3,375 mg Intravenous Q8H    sodium chloride   Intravenous Q8H    [Held by provider] benztropine  1 mg Oral BID    [Held by provider] fluvoxaMINE  150 mg Oral Daily    ipratropium-albuterol  3 mL Inhalation Q4H WA    [Held by provider] mirtazapine  30 mg Oral Nightly    [Held by provider] OLANZapine  7.5 mg Oral Nightly    pantoprazole  40 mg Oral QAM AC    polyethylene glycol  17 g Oral Daily    sodium chloride flush  10 mL Intravenous 2 times per day    enoxaparin  40 mg Subcutaneous Daily    LORazepam  1 mg Intravenous Once     Continuous Infusions:   [Held by provider] dextrose 5 % and 0.45 % NaCl 75 mL/hr at 01/29/21 1009    dextrose 5 % and 0.2 % NaCl 100 mL/hr at 01/31/21 1012     PRN Meds:Mineral Oil-Hydrophil Petrolat, acetaminophen **OR** acetaminophen, diphenhydrAMINE, sodium chloride flush, promethazine **OR** ondansetron, polyethylene glycol    OBJECTIVE:  BP 96/65   Pulse 96   Temp 99.3 °F (37.4 °C) (Bladder)   Resp 18   Ht 5' 5\" (1.651 m)   Wt 122 lb 12.7 oz (55.7 kg)   SpO2 94%   BMI 20.43 kg/m²   Temp  Av.2 °F (37.3 °C)  Min: 98.7 °F (37.1 °C)  Max: 99.6 °F (37.6 °C)  Constitutional: The patient is awake, more animated but demented  Skin: Warm and dry. No rashes were noted. HEENT: Round and reactive pupils. Moist mucous membranes. No ulcerations or thrush. Neck: Supple to movements. Chest: No use of accessory muscles to breathe. Symmetrical expansion. No wheezing, crackles or rhonchi. Decreased breath sounds on the right  Cardiovascular: S1 and S2 are rhythmic and regular. No murmurs appreciated. Abdomen: Positive bowel sounds to auscultation. Benign to palpation. No masses felt. No hepatosplenomegaly. Genitourinary: William  Extremities: No clubbing, no cyanosis, no edema.   Lines: peripheral    Laboratory and Tests Review:  Lab Results   Component Value Date    WBC 6.1 2021    WBC 10.3 2021    WBC 8.3 2021    HGB 11.9 (L) 2021    HCT 37.6 2021    MCV 97.4 2021    PLT 92 (L) 2021     Lab Results   Component Value Date    NEUTROABS 3.75 2021    NEUTROABS 5.81 2021    NEUTROABS 5.77 2021     No results found for: CRP  Lab Results   Component Value Date     (H) 2021     (H) 2021    ALKPHOS 99 2021    BILITOT 0.6 2021     Lab Results   Component Value Date     2021    K 3.0 2021     2021    CO2 28 2021    BUN 19 2021    CREATININE 0.8 2021    CREATININE 0.8 2021    CREATININE 0.9 2021    GFRAA >60 2021    LABGLOM >60 2021    GLUCOSE 124 2021    GLUCOSE 71 2012    PROT 5.6 2021    LABALBU 3.3 2021    LABALBU 4.5 2012    CALCIUM 7.8 2021    BILITOT 0.6 01/31/2021    ALKPHOS 99 01/31/2021     01/31/2021     01/31/2021     Lab Results   Component Value Date    CRP 0.3 01/29/2021    CRP 4.2 (H) 06/14/2020    CRP 3.6 (H) 06/12/2020     Lab Results   Component Value Date    SEDRATE 6 01/29/2021    SEDRATE 43 (H) 06/14/2020    SEDRATE 14 06/12/2020     Radiology:    Reviewed  Microbiology:   Lab Results   Component Value Date    BC 24 Hours no growth 01/29/2021    BC  01/27/2021     Gram stain performed from blood culture bottle media  Gram positive cocci in clusters      BC Identification and sensitivity to follow 01/27/2021    [facility]  01/27/2021     Identification and sensitivity to follow  This isolate is presumed to be resistant based on the  detection of inducible Clindamycin resistance. Clindamycin  may still be effective in some patients. BC Identification and sensitivity to follow 01/27/2021    ORG Staphylococcus species 01/27/2021    ORG Staphylococcus epidermidis 01/27/2021    ORG Staphylococcus haemolyticus 01/27/2021    ORG Staphylococcus haemolyticus 01/27/2021    ORG Staphylococcus epidermidis 01/27/2021     Lab Results   Component Value Date    BLOODCULT2 Previously positive blood culture called 01/27/2021    BLOODCULT2  01/27/2021     This isolate is presumed to be resistant based on the  detection of inducible Clindamycin resistance. Clindamycin  may still be effective in some patients.       BLOODCULT2 5 Days no growth 10/28/2020    ORG Staphylococcus species 01/27/2021    ORG Staphylococcus epidermidis 01/27/2021    ORG Staphylococcus haemolyticus 01/27/2021    ORG Staphylococcus haemolyticus 01/27/2021    ORG Staphylococcus epidermidis 01/27/2021     WOUND/ABSCESS   Date Value Ref Range Status   03/09/2015 Rare growth  Final   02/02/2015 Light growth  Final   02/02/2015 Light growth  Final     No results found for: RESPSMEAR      Component Value Date/Time    LABFUNG 4 Weeks- no fungus isolated 04/24/2015 1030     No results found for: CULTRESP  No results found for: CXCATHTIP  No results found for: [facility]  Culture Surgical   Date Value Ref Range Status   04/24/2015 Heavy growth  Final   04/24/2015 Heavy growth  Final   04/24/2015 Light growth  Final     Urine Culture, Routine   Date Value Ref Range Status   06/12/2020 >100,000 CFU/ml  Final   03/28/2020 Growth not present  Final   01/29/2020 Growth not present  Final     MRSA Culture Only   Date Value Ref Range Status   01/29/2020 Methicillin resistant Staph aureus not isolated  Final       ASSESSMENT:  · Severe dehydration and hypernatremia  · Right lung pneumonia  · Coag negative staph with multiple species and this is considered a contaminant    PLAN:  · Continue Zosyn  · Continue hydration  · Continue disimpaction  · Check final cultures  · Monitor labs    German Cool  3:10 PM  1/31/2021

## 2021-01-31 NOTE — PROGRESS NOTES
Rohini Leija is a 78 y.o. man    Patient well known to me for developmental disabilities, seizure disorder and recently diagnosed with Parkinson's disease    His seizures have been well controlled on Keppra and Tegretol having no seizures in a number of years until this past spring (Keppra added and they again retooled)     Previously used Depakote for behavior difficulties rather than seizures    Over the last 1-2 years he began to develop a tremor which subsequently changed his Depakote dosing without resolution in said tremor   Neurology questioned Parkinson's disease versus parkinsonisms with his underlying developmental disabilities    He was tried on Neupro which improved his walking but caused increased agitation. Neupro was stopped and Sinemet was started which markedly improved his walking, his tremor -- staff reported he was able to hold his own juice cup. However psychiatry wanted to stop his Sinemet in December because of potential interactions with Zyprexa.      Admitted yesterday with SOB -- fever -- temp as high as 104.5   WBC and ESR appear normal  Blood cx with gram +  ID following -- blood cx felt to be contaminated  Right lung pneumonia identified as well as severe hypernatremia and dehydration     CKs now below 4000  Recent temp 99.3     No seizures have been reported     Objective:   /65   Pulse 94   Temp 99.3 °F (37.4 °C) (Bladder)   Resp 27   Ht 5' 5\" (1.651 m)   Wt 122 lb 12.7 oz (55.7 kg)   SpO2 98%   BMI 20.43 kg/m²      General appearance: awake - looking around room -- marked head titubation and tremors    Head: Normocephalic, without obvious abnormality, atraumatic  Extremities: no cyanosis or edema  Pulses: 2+ and symmetric  Skin: no rashes or lesions     Mental Status: awake -- looking around the room     Cranial Nerves:  I: smell    II: visual acuity     II: visual fields Bilateral threat response    II: pupils NOMI   III,VII: ptosis    III,IV,VI: extraocular muscles Follows examiner around room    V: mastication    V: facial light touch sensation  Grimaces to PP bilaterally    V,VII: corneal reflex  Present   VII: facial muscle function - upper     VII: facial muscle function - lower Normal   VIII: hearing    IX: soft palate elevation     IX,X: gag reflex    XI: trapezius strength     XI: sternocleidomastoid strength    XI: neck extension strength     XII: tongue strength       Motor:  Spastic quadriparetic  Marked tremor in both arms - at rest   Cogwheel rigidity also appreciated     Sensory:  Grimaces to noxious stim in both hands      DTR:   No reflexes    No Babinski  No Grey's     Laboratory/Radiology:     CBC with Differential:    Lab Results   Component Value Date    WBC 6.1 01/31/2021    RBC 3.86 01/31/2021    HGB 11.9 01/31/2021    HCT 37.6 01/31/2021    PLT 92 01/31/2021    MCV 97.4 01/31/2021    MCH 30.8 01/31/2021    MCHC 31.6 01/31/2021    RDW 12.8 01/31/2021    METASPCT 0.9 01/31/2020    LYMPHOPCT 25.5 01/31/2021    MONOPCT 7.4 01/31/2021    BASOPCT 0.3 01/31/2021    MONOSABS 0.45 01/31/2021    LYMPHSABS 1.55 01/31/2021    EOSABS 0.30 01/31/2021    BASOSABS 0.02 01/31/2021     CMP:    Lab Results   Component Value Date     01/31/2021    K 3.7 01/31/2021     01/31/2021    CO2 27 01/31/2021    BUN 20 01/31/2021    CREATININE 0.8 01/31/2021    GFRAA >60 01/31/2021    LABGLOM >60 01/31/2021    GLUCOSE 92 01/31/2021    GLUCOSE 71 05/18/2012    PROT 5.7 01/31/2021    LABALBU 3.2 01/31/2021    LABALBU 4.5 05/18/2012    CALCIUM 7.9 01/31/2021    BILITOT 0.7 01/31/2021    ALKPHOS 103 01/31/2021     01/31/2021     01/31/2021     I independently reviewed the lab studies today.      Assessment:     History of developmental disabilities with seizure disorder   Stable on Keppra 1000mg BID and Tegretol 200mg BID    Suspected Parkinson's disease   Previously responded to Sinemet well but d/t medication interaction, psychiatry requested to stop in December 2020    Now admitted with SOB, fever and right lung pneumonia      Plan:     Continue Keppra and Tegretol if able (will need NG)    Consider restarting Sinemet if/when stable     Will follow peripherally     Finn Corrales  9:33 AM  1/31/2021

## 2021-01-31 NOTE — PROGRESS NOTES
Pedro Flynn MD, FACP                   Patient Name: Nella Chandler                   Age:  78 y.o. Gender:   male    CC: Fever    HPI: This patient with severe mental retardation and developmental disabilities presents from a group home with fever  Initial evaluation demonstrates that he is profoundly dehydrated  He has been seen multiple times for the same issue  At presentation his sodium was 168 BUN 55 creatinine 1.5 CBC was normal  He was borderline hypotensive    He was started on hydration and he was given empiric antibiotics  Procalcitonin was obtained and demonstrates no evidence of sepsis or infection  Urine was clear  There is no leukocytosis    Upon evaluation he is not interactive he is in the decubitus position with multiple contractures  There are no reported skin lesions    1/29  Change in status over the past 24 hours  This patient was originally admitted with severe dehydration, hypernatremia, fever.   He was treated initially with broad-spectrum antibiotics  Fluid support  He was not hypotensive but he was tachycardic  As defined above this patient is a resident of a group home  He was sent in because of fever  He does not communicate due to severe mental retardation and developmental disabilities  Later early this morning RRT was called  He continues have episodes of fever  Oxygen saturation had dropped  He was shaking  It was difficult to determine whether or not these were seizures  Note that he has had these shaking episodes in the past and has had a comprehensive evaluation by neurology for this  He had been intermittently on Sinemet  He was to follow-up with neurology today  Subsequent labs demonstrated a significant rise in his sodium  Transfer was requested to MICU  Discussed with intensivist    CT of his abdomen now demonstrates markedly distended gallbladder with large gallstones  Blood cultures demonstrating gram-positive cocci    1/30  Updating the patient evaluation, there are multiple possibilities concerning his fever and current presentation:  His fever considered multifactorial including: Pneumonia, severe dehydration, fecal impaction  Substantial elevation of the CK  Consideration for malignant hyperthermia  However he has bacteremia possibly considered contaminant  Reviewed infectious disease plan discontinued vancomycin and cefepime and started Zosyn  Intensive care medicine continuing to consider malignant hyperthermia  Neurology note reviewed    1/31  Patient is remarkably improved  His sodium is near normal  He no longer has the chewing movements and the tremors  BUN and creatinine are normal  Neurology indicates that Sinemet had remarkably improved his tremor  But there was some disagreement due to the interaction with Zyprexa  There has been no further fever  Cogentin Luvox mirtazapine and olanzapine have been held      Past Medical History:   Diagnosis Date    Acquired deformity of neck     Autism spectrum     Bilateral cataracts     Blepharochalasis     Cardiomegaly     Cataract of both eyes     Developmental delay     Gastritis     Hyperlipidemia     Kyphosis of thoracic region     Mental retardation     SEVERE MR  NON VERBAL, NEEDS WHEELCHAIR FOR LONG DISTANCE    Obsessive compulsive disorder     Parkinsonism ([address]     Seizures ([address]     Tourette disorder        Past Surgical History:   Procedure Laterality Date    TOE AMPUTATION Left     2nd/3rd       Review of Systems:   · General: Except as provided in the HPI no other information is noted      Physical Examination:      Wt Readings from Last 3 Encounters:   01/31/21 122 lb 12.7 oz (55.7 kg)   01/05/21 125 lb (56.7 kg)   10/28/20 125 lb (56.7 kg)     Temp Readings from Last 3 Encounters:   01/31/21 99.3 °F (37.4 °C) (Bladder)   01/25/21 97.7 °F (36.5 °C) 01/05/21 97.4 °F (36.3 °C)     BP Readings from Last 3 Encounters:   01/31/21 (!) 93/56   01/25/21 (!) 156/92   10/29/20 134/74     Pulse Readings from Last 3 Encounters:   01/31/21 90   01/25/21 102   01/05/21 66       General appearance: He remains nonresponsive eyes closed  Tremors and chewing movements have stopped  Skin: Color, texture normal, turgor reduced normal. No rashes or lesions. Eyes: Conjunctivae/cornea clear. Eyes closed coloration normal pupils are symmetric  Ears: External appearance normal.   Mouth: Lips pink and thickened tongue protrudes chewing movements of the jaw  Neck:  Symmetric. No adenopathy. Short and stout flexed  Lungs: Clear to auscultation. No rhonchi, crackles or rales. Due to the kyphosis and his position in the decubitus respiratory movements are poor  Heart: S1 > S2. Rhythm is regular and rate is tachycardic no gallop rub or murmur. Abdomen: Soft, mildly protuberant, non-tender.  BS normal.  Limited exam cannot elicit pain  Extremities: Contractures but no edema or lesions  Neuro:   · Cannot do a detailed assessment neurologically however he appears to be at baseline with no focal neurologic deficit  Mental status: Profound mental retardation and developmental development no meaningful interaction  Gait & balance: Nonambulatory     Labs     CBC:   Lab Results   Component Value Date    WBC 6.1 01/31/2021    RBC 3.86 01/31/2021    HGB 11.9 01/31/2021    HCT 37.6 01/31/2021    PLT 92 01/31/2021    MCV 97.4 01/31/2021     BMP:    Lab Results   Component Value Date     01/31/2021    K 3.7 01/31/2021     01/31/2021    CO2 27 01/31/2021    BUN 20 01/31/2021    CREATININE 0.8 01/31/2021    GLUCOSE 92 01/31/2021    GLUCOSE 71 05/18/2012    CALCIUM 7.9 01/31/2021     Hepatic Function Panel:    Lab Results   Component Value Date    ALKPHOS 103 01/31/2021     01/31/2021     01/31/2021    PROT 5.7 01/31/2021    LABALBU 3.2 01/31/2021    LABALBU 4.5 05/18/2012 BILITOT 0.7 01/31/2021     Magnesium:    Lab Results   Component Value Date    MG 2.8 01/30/2021     Cardiac Enzymes:   Lab Results   Component Value Date    CKTOTAL 3,788 (H) 01/31/2021    CKTOTAL 7,410 (H) 01/30/2021    CKTOTAL 8,912 (H) 01/30/2021    TROPONINI <0.01 01/27/2021    TROPONINI <0.01 01/25/2021    TROPONINI <0.01 10/28/2020     LDH:  No results found for: LDH  PT/INR:    Lab Results   Component Value Date    PROTIME 10.9 10/28/2020    INR 1.0 10/28/2020     BNP: No results for input(s): BNP in the last 72 hours.    TSH:   Lab Results   Component Value Date    TSH 1.700 02/04/2020      Cardiac Injury Profile:   Recent Labs     01/31/21  0444   CKTOTAL 3,788*      Lipid Profile:   Lab Results   Component Value Date    TRIG 146 03/18/2016    HDL 44 03/18/2016    LDLCALC 93 03/18/2016    CHOL 166 03/18/2016      Hemoglobin A1C: No components found for: HGBA1C   U/A:   Lab Results   Component Value Date    LEUKOCYTESUR Negative 01/27/2021    PHUR 5.5 01/27/2021    WBCUA 0-1 01/27/2021    RBCUA 2-5 01/27/2021    RBCUA NONE 10/08/2012    BACTERIA FEW 01/27/2021    SPECGRAV >=1.030 01/27/2021    BLOODU Negative 01/27/2021    GLUCOSEU Negative 01/27/2021    GLUCOSEU NEGATIVE 09/18/2011         ADMISSION SCHEDULED MEDS:   Current Facility-Administered Medications   Medication Dose Route Frequency Provider Last Rate Last Admin    LORazepam (ATIVAN) injection 1 mg  1 mg Intravenous Q6H Matt Nevarez DO   1 mg at 01/31/21 0820    [Held by provider] dextrose 5 % and 0.45 % sodium chloride infusion   Intravenous Continuous Nakul Fields DO 75 mL/hr at 01/29/21 1009 New Bag at 01/29/21 1009    levetiracetam (KEPPRA) 1000 mg/100 mL IVPB  1,000 mg Intravenous Q12H John Nevarez DO   Stopped at 01/31/21 0100    acetaminophen (TYLENOL) tablet 650 mg  650 mg Oral Q4H PRN Nakul Fields,         Or    acetaminophen (TYLENOL) suppository 650 mg  650 mg Rectal Q6H PRN Nakul Fields,    650 mg at 01/29/21 1309    carBAMazepine (TEGRETOL) 100 MG/5ML suspension 200 mg  200 mg Oral BID Duane Lundborg Barreiro, DO   200 mg at 01/31/21 0820    dextrose 5 % and 0.2 % NaCl infusion   Intravenous Continuous Duane Lundborg Barreiro,  mL/hr at 01/31/21 1012 New Bag at 01/31/21 1012    bisacodyl (DULCOLAX) suppository 10 mg  10 mg Rectal Daily Kesha Anthony MD   Stopped at 01/31/21 0821    piperacillin-tazobactam (ZOSYN) 3,375 mg in dextrose 5 % 100 mL IVPB extended infusion (mini-bag)  3,375 mg Intravenous Q8H Kesha Anthony MD 25 mL/hr at 01/31/21 0820 3,375 mg at 01/31/21 0820    ZOSYN FLUSH - 0.9 % sodium chloride infusion admixture   Intravenous Q8H Kesha Anthony MD   Stopped at 01/30/21 0503    diphenhydrAMINE (BENADRYL) injection 25 mg  25 mg Intravenous Q4H PRN Duane Lundborg Barreiro, DO   25 mg at 01/29/21 1629    [Held by provider] benztropine (COGENTIN) tablet 1 mg  1 mg Oral BID Mani Gibson MD   1 mg at 01/28/21 2247    [Held by provider] fluvoxaMINE (LUVOX CR) extended release capsule 150 mg  150 mg Oral Daily Mani Gibson MD        ipratropium-albuterol (DUONEB) nebulizer solution 3 mL  3 mL Inhalation Q4H WA Mani Gibson MD   3 mL at 01/31/21 0810    [Held by provider] mirtazapine (REMERON) tablet 30 mg  30 mg Oral Nightly Mani Gibson MD        [Held by provider] OLANZapine [facility]) tablet 7.5 mg  7.5 mg Oral Nightly Mani Gibson MD   7.5 mg at 01/28/21 2247    pantoprazole (PROTONIX) tablet 40 mg  40 mg Oral QAM AC Mani Gibson MD   40 mg at 01/31/21 8673    polyethylene glycol (GLYCOLAX) packet 17 g  17 g Oral Daily Mani Gibson MD   Stopped at 01/31/21 2437    sodium chloride flush 0.9 % injection 10 mL  10 mL Intravenous 2 times per day Mani Gibson MD   10 mL at 01/31/21 0821    sodium chloride flush 0.9 % injection 10 mL  10 mL Intravenous PRN Mani Gibson MD        enoxaparin (LOVENOX) injection 40 mg  40 mg Subcutaneous

## 2021-02-01 NOTE — PLAN OF CARE
Notes reviewed. No additional seizures reported on current regimen. Continue AEDs as ordered. If there are any additional seizures please contact neurology. Patient can follow-up with [facility] as planned. Neuro will sign off.

## 2021-02-01 NOTE — PROGRESS NOTES
Occupational Therapy  Occupational Therapy Initial Evaluation   Date:2021  Patient Name: Bridget Leyva  MRN: [id number]  : 1950  Room: [address] [place] [address]-A    Referring Provider: Gisell Julien MD    Evaluating OT: Jose Dye OTR/L #[id number]    AM-PAC Daily Activity Raw Score:     Recommended Adaptive Equipment: TBD     Diagnosis: Sepsis ([facility] [A41.9]    Reason for admission: SOB    Surgery/Procedure: none    Pertinent Medical History: acquired deformity of neck, autism spectrum, cataracts, blepharochalasis, cardiomegaly, developmental delay, gastritis, HLD, kyphosis, MR, OCD, seizures, parkinson's, tourette disorder     Precautions:  Falls, development delay, 2 pt restraints, non verbal     Home Living: Pt is a poor historian and unable to provide information. Per chart pt is from [facility]. Prior Level of Function: ? with ADLs;  ? with IADLs. W/c for longer distances per chart. Driving: No    Pain Level: No indications of pain noted      Cognition: A&O: 0-1/4 (eyes open to verbal stimuli). Limited command following. Non verbal.    Memory: poor   Comprehension poor   Problem solving: poor   Judgement/safety: poor               Communication skills: impaired. Non verbal           Vision: decreased visual tracking               Glasses:?                                                   Hearing: appears WFL     RASS: 0  CAM-ICU: (NT) Delirium    UE Assessment:  Hand Dominance: Right []  Left []?      ROM Strength STM goal: PRN   RUE  Proximally: limited shoulder flexion  Distally: limited elbow flexion    Ulnar drift noted    Contracted vs resistive  Unable to accurately assess d/t poor command following    No  Good tolerance to BUE exercises to increase ROM/strength for ADLs     LUE Proximally: limited shoulder flexion  Distally: limited elbow flexion    Ulnar drift noted    Contracted vs resistive  Unable to accurately assess d/t poor command following    No  Good tolerance to BUE exercises to increase ROM/strength for ADLs     Sensation: No c/o numbness or tingling in extremities   Tone: impaired (flexor tone vs contracted)  Edema: none noted     Functional Assessment   Initial Eval Status  Date: 2/1/21 Treatment Status  Date: STG=LTG  5-14  days    Feeding Dep (dysphagia pureed)                        Max A  while seated suppported      Grooming Dep                        Max A   while seated supported     UB dressing Dep                        Max A       LB dressing Dep      Bathing Dep                              Toileting Dep                             Bed Mobility  Supine to sit: Dep x2    Sit to supine: Dep x2                        Max A  in prep of ADL tasks & transfers   Functional Transfers Sit to stand: NT    Stand to sit: NT                        Max A  sit<>stand/functional bathroom transfers using AD/DME as needed for balance and safety   Functional Mobility NT                       Max A   functional/bathroom mobility using AD as needed & demonstrating fair safety     Balance Sitting:  (kyphotic posture noted)    Static: Dep    Dynamic:Dep  Standing: NT  Max A dynamic sitting balance; Max A dynamic standing balance  during ADL tasks & transfers   Endurance/Activity Tolerance   poor tolerance with light activity. fair   tolerance with light/moderate activity/self care routine   Visual/  Perceptual Impaired: decreased visual tracking                       Vitals:   HR at rest: 76 bpm HR at end of session: 83 bpm   Spo2 at rest:100% Spo2 at end of session 92%   BP at rest:86/59 mmHg BP at end of session 103/53 mmHg       Treatment:   · Bed mobility: Facilitated bed mobility with cues for proper body mechanics and sequencing to prepare for ADL completion. Assist of 2 for safety d/t pt's medical complexity. · Therapeutic Exercises: B UE PROM completed within available range to maintain strength/flexibility and to decrease edema/contractures to enhance ADL completion.   · Postural Balance: Sitting balance retraining to improve righting reactions with postural changes during ADLS. R lateral lean noted w/ increased cueing/assistance for upright posture. · Cognitive/Perceptual training: retraining exercises to improve attention, mentation, and spatial awareness for ADLs & transfers. · Skilled positioning: Proper positioning to improve interaction with environment, overall functioning and decrease/prevent edema and contractures. · Delirium Prevention/Management: Implementation of non-pharmacologic interventions for delirium prevention incorporated throughout session to patient. Including environmental and sensory modifications to decrease patient's internal distraction caused by delirium, and improve overall mentation. Comments: OK from RN to see patient. Upon arrival, patient lying in bed. Pt demo poor tolerance with poor understanding of education/techniques. At end of session, patient lying in bed(re-positioned for comfort). Call light within reach, all lines and tubes intact. Pt instructed on use of call light for assistance and fall prevention. Line management and environmental modifications made prior to and end of session to ensure patient safety and to increase efficiency of session. Skilled monitoring of HR, O2 saturation, blood pressure and patient's response to activity performed throughout session. Overall, pt presents with decreased activity tolerance, dynamic balance, functional mobility limiting completion of ADLs and safe return home. Pt can benefit from continued skilled OT to increase safety and functional independence. Evaluation Complexity: Mod    · History: Expanded chart review of consults, imaging, and psychosocial history related to current functional performance. · Exam: 5+ performance deficits identified limiting functional independence and safe return home   · Assistance/Modification: Min/mod assistance or modifications required to perform tasks.  May have comorbidities that affect occupational performance. Assessment of current deficits   Functional mobility [x]  ADLs [x] Strength [x]  Cognition [x]  Functional transfers  [x] IADLs [x] Safety Awareness [x]  Endurance [x]  Fine Motor Coordination [x] Balance [x] Vision/perception [x] Sensation [x]   Gross Motor Coordination [x] ROM [x] Delirium [x]                  Communication [x]    Plan of Care: 1-3 days/week for 1-2 weeks PRN   [x]ADL retraining/adapted techniques and AE recommendations to increase functional independence within precautions                    [x]Energy conservation techniques to improve tolerance for selfcare routine   [x]Functional transfer/mobility training/DME recommendations for increased independence, safety and fall prevention         [x]Patient/family education to increase safety and functional independence             [x]Environmental modifications for safe mobility and completion of ADLs                             [x]Cognitive retraining ex's to improve problem solving skills & safe participation in ADLs/IADLs     [x]Sensory re-education techniques to improve extremity awareness, maintain skin integrity and improve hand function                             [x]Visual/Perceptual retraining  to improve body awareness and safety during transfers and ADLs  [x]Splinting/positioning needs to maintain joint/skin integrity and prevent contractures  [x]Therapeutic activity to improve functional performance during ADLs. [x]Therapeutic exercise to improve tolerance and functional strength for ADLs   [x]Balance retraining/tolerance tasks for facilitation of postural control with dynamic challenges during ADLs .   [x]Neuromuscular re-education: facilitation of righting/equilibrium reactions, midline orientation, scapular stability/mobility, Normalization muscle     tone and facilitation active functional movement/Attention [x]Delirium prevention/treatment    [x]Positioning to improve functional independence  []Other:       Rehab Potential:  Good for established goals     Patient / Family Goal: not stated      Patient and/or family were instructed on functional diagnosis, prognosis/goals and OT plan of care. Demonstrated poor understanding. Time In: 10:40  Time Out: 10:55  Total Treatment Time: 8 minutes    Min Units   OT Eval Low 66061       OT Eval Medium 97166  x 1   OT Eval High 57175       OT Re-Eval [id number]       Therapeutic Ex 78814       Therapeutic Activities 37151  8  1   ADL/Self Care 14339       Orthotic Management 09917       Neuro Re-Ed 36450       Non-Billable Time          Evaluation Time includes thorough review of current medical information, gathering information on past medical history/social history and prior level of function, completion of standardized testing/informal observation of tasks, assessment of data and education on plan of care and goals.     Eloise Mejia OTR/L #[id number]

## 2021-02-01 NOTE — PROGRESS NOTES
Physical Therapy    Physical Therapy Initial Assessment     Name: Dejah Pickering  : 1950  MRN: [id number]    Referring Provider:  Saad Cesar MD    Date of Service: 2021    Evaluating PT:  Rachellegwendolyn Rajput PT, DPT [id number]     Room #:  5635/8093-W  Diagnosis:  Sepsis   PMHx/PSHx:  Acquired deformity of neck, autism spectrum, B cataract, Blepharochalasis, cardiomegaly, developmental delay, gastritis, HLD, kyphosis of thoracic, severe MR -- non verbal, OCD, Parkinsonism, seizures, tourette's disorder   Precautions:  Falls, Nonverbal, hx IDDD, O2, 2 pt restraint   Equipment Needs:  NA    SUBJECTIVE:    Per chart review: Pt lives at a [facility]. Pt had been ambulating with HHA at facility and sits in wheelchair with seatbelt for safety. Unclear if pt was still ambulating PTA and he is unable to provide PLOF.     OBJECTIVE:   Initial Evaluation  Date: 21 Treatment Short Term/ Long Term   Goals   AM-PAC 6 Clicks      Was pt agreeable to Eval/treatment? Yes     Does pt have pain? No c/o pain      Bed Mobility  Rolling: Dep   Supine to sit: Dep   Sit to supine: Dep   Scooting: Dep   Rolling: Mod A  Supine to sit: Mod A  Sit to supine: Mod A  Scooting: Mod A   Transfers Sit to stand: NT  Stand to sit: NT  Stand pivot: NT  Sit to stand: Max A  Stand to sit: Max A  Stand pivot: Max A   Ambulation    NT  >5 feet with HHA Max A   Stair negotiation: ascended and descended  NT  NA   ROM BUE:  Per OT eval  BLE:  Grossly limited in all planes      Strength BUE:  Per OT eval   BLE:  Unable to assess     Balance Sitting EOB:  Max<>dep  Dynamic Standing:  NT  Sitting EOB:  SBA  Dynamic Standing:   Mod A     Pt is A & O x ?  RASS:  0  CAM-ICU:  NT  Sensation:  Pt does not report  numbness and tingling to extremities  Edema:  Unremarkable     Vitals:  Blood Pressure at rest 86/59 Blood Pressure post session 103/53   Heart Rate at rest 76 bpm Heart Rate post session 83 bpm   SPO2 at rest 100%  SPO2 post session 95%       Functional Status Score-Intensive Care Unit (FSS-ICU)   Rolling 1/7   Supine to sit transfer 1/7   Unsupported sitting  1/7   Sit to stand transfers 0/7   Ambulation 0/7   Total  3/35     Therapeutic Exercises:     BLE ROM in supine   B hip/knee bends x10 reps PROM   B SLR, hip abduction/adduction, ankle pumps x5 reps PROM    Patient education  Pt educated on safety during functional mobility    Patient response to education:   Pt verbalized understanding Pt demonstrated skill Pt requires further education in this area   no no yes     ASSESSMENT:    Comments:  Pt received supine and agreeable to PT evaluation with OT collaboration. Pt cleared for participation by RN prior to session. Vitals monitored during session. Requires total assistance for mobility. Demonstrates kyphotic posture and contracted extremities. Requires max verbal and tactile cues for bed mobility. Unable to hold himself up at EOB. Instructed on posture and stretching to loosen up in unsupported sitting. Pt unable to participate so assisted back to bed. Pt left with call button in reach, lines attached, and needs met. Treatment:  Patient practiced and was instructed in the following treatment:     Bed mobility training - pt given verbal and tactile cues to facilitate proper sequencing and safety during rolling and supine>sit as well as provided with physical assistance to complete task    LE PROM, passive stretching to prevent joint contracture and muscle stiffness   Skilled positioning - Pt placed in the semi chair position with pillows utilized to facilitate upright posture, joint and skin integrity, and interaction with environment. Pt's/ family goals   1. None stated     Patient and or family understand(s) diagnosis, prognosis, and plan of care. ?     PLAN:    Current Treatment Recommendations     [x] Strengthening     [x] ROM   [x] Balance Training   [x] Endurance Training   [x] Transfer Training   [x] Gait Training   [] Stair Training   [x] Positioning   [x] Safety and Education Training   [x] Patient/Caregiver Education   [x] HEP  [] Other     Frequency of treatments: 2-5x/week x 1-2 weeks. Time in  1040  Time out  1055    Total Treatment Time  8 minutes     Evaluation Time includes thorough review of current medical information, gathering information on past medical history/social history and prior level of function, completion of standardized testing/informal observation of tasks, assessment of data and education on plan of care and goals.     CPT codes:  [] Low Complexity PT evaluation 18762  [x] Moderate Complexity PT evaluation 73927  [] High Complexity PT evaluation 33321  [] PT Re-evaluation 13940  [] Gait training 20307 -- minutes  [] Manual therapy 47093 -- minutes  [x] Therapeutic activities 68856 8 minutes  [] Therapeutic exercises 47324 -- minutes  [] Neuromuscular reeducation 57758 -- minutes     Magdi Ellis, PT, DPT  [id number]

## 2021-02-01 NOTE — PROGRESS NOTES
[address] [place] [address] [facility]  Progress Note      Chief Complaint   Patient presents with    Shortness of Breath     Patient arrives with SOB and [facility]. Patient seen here 2 days ago for same complaints. SUBJECTIVE:  Patient is tolerating medications. No reported adverse drug reactions. No nausea, vomiting, glasses starting to work and patient is having a lot of bowel movements to help clear the impaction  Blood cultures are contaminant with multiple different types of coag negative staph  Afebrile on 4 L  Patient is still getting hydrated and the BUN and sodium are improving     more alert    Review of systems:  As stated above in the chief complaint, otherwise negative.     Medications:  Scheduled Meds:   bisacodyl  10 mg Rectal Once per day on Mon Wed Fri    potassium chloride  10 mEq Intravenous 4x Daily    LORazepam  1 mg Intravenous Q6H    levetiracetam  1,000 mg Intravenous Q12H    carBAMazepine  200 mg Oral BID    piperacillin-tazobactam  3,375 mg Intravenous Q8H    sodium chloride   Intravenous Q8H    [Held by provider] benztropine  1 mg Oral BID    [Held by provider] fluvoxaMINE  150 mg Oral Daily    ipratropium-albuterol  3 mL Inhalation Q4H WA    [Held by provider] mirtazapine  30 mg Oral Nightly    [Held by provider] OLANZapine  7.5 mg Oral Nightly    pantoprazole  40 mg Oral QAM AC    polyethylene glycol  17 g Oral Daily    sodium chloride flush  10 mL Intravenous 2 times per day    enoxaparin  40 mg Subcutaneous Daily    LORazepam  1 mg Intravenous Once     Continuous Infusions:   sodium chloride 100 mL/hr at 02/01/21 1048    [Held by provider] dextrose 5 % and 0.45 % NaCl 75 mL/hr at 01/29/21 1009     PRN Meds:Mineral Oil-Hydrophil Petrolat, acetaminophen **OR** acetaminophen, diphenhydrAMINE, sodium chloride flush, promethazine **OR** ondansetron, polyethylene glycol    OBJECTIVE: BP 81/77   Pulse 96   Temp 98.3 °F (36.8 °C) (Temporal)   Resp 21   Ht 5' 5\" (1.651 m)   Wt 121 lb 0.5 oz (54.9 kg)   SpO2 94%   BMI 20.14 kg/m²   Temp  Av.6 °F (37 °C)  Min: 98 °F (36.7 °C)  Max: 99.2 °F (37.3 °C)  Constitutional: The patient is awake, more animated but demented  Skin: Warm and dry. No rashes were noted. HEENT: Round and reactive pupils. Moist mucous membranes. No ulcerations or thrush. Neck: Supple to movements. Chest: No use of accessory muscles to breathe. Symmetrical expansion. No wheezing, crackles or rhonchi. Decreased breath sounds on the right  Cardiovascular: S1 and S2 are rhythmic and regular. No murmurs appreciated. Abdomen: Positive bowel sounds to auscultation. Benign to palpation. No masses felt. No hepatosplenomegaly. Genitourinary: William  Extremities: No clubbing, no cyanosis, no edema.   Lines: peripheral    Laboratory and Tests Review:  Lab Results   Component Value Date    WBC 4.5 2021    WBC 6.1 2021    WBC 10.3 2021    HGB 10.9 (L) 2021    HCT 32.5 (L) 2021    MCV 94.5 2021    PLT 84 (L) 2021     Lab Results   Component Value Date    NEUTROABS 2.64 2021    NEUTROABS 3.75 2021    NEUTROABS 5.81 2021     No results found for: [facility]  Lab Results   Component Value Date    ALT 94 (H) 2021    AST 82 (H) 2021    ALKPHOS 96 2021    BILITOT 0.5 2021     Lab Results   Component Value Date     2021    K 3.6 2021     2021    CO2 26 2021    BUN 14 2021    CREATININE 0.7 2021    CREATININE 0.8 2021    CREATININE 0.8 2021    GFRAA >60 2021    LABGLOM >60 2021    GLUCOSE 95 2021    GLUCOSE 71 2012    PROT 5.4 2021    LABALBU 2.9 2021    LABALBU 4.5 2012    CALCIUM 7.9 2021    BILITOT 0.5 2021    ALKPHOS 96 2021    AST 82 2021    ALT 94 2021     Lab Results Component Value Date    CRP 0.3 01/29/2021    CRP 4.2 (H) 06/14/2020    CRP 3.6 (H) 06/12/2020     Lab Results   Component Value Date    SEDRATE 6 01/29/2021    SEDRATE 43 (H) 06/14/2020    SEDRATE 14 06/12/2020     Radiology:    Reviewed  Microbiology:   Lab Results   Component Value Date    BC 24 Hours no growth 01/29/2021    BC  01/27/2021     This isolate is presumed to be resistant based on the  detection of inducible Clindamycin resistance. Clindamycin  may still be effective in some patients. BC Second morphology 01/27/2021    BC Second morphology 01/27/2021    ORG Staphylococcus epidermidis 01/27/2021    ORG Staphylococcus haemolyticus 01/27/2021    ORG Staphylococcus hominis ssp hominis 01/27/2021    ORG Staphylococcus hominis ssp hominis 01/27/2021    ORG Staphylococcus haemolyticus 01/27/2021    ORG Staphylococcus haemolyticus 01/27/2021    ORG Staphylococcus epidermidis 01/27/2021     Lab Results   Component Value Date    BLOODCULT2 Previously positive blood culture called 01/27/2021    BLOODCULT2  01/27/2021     This isolate is presumed to be resistant based on the  detection of inducible Clindamycin resistance. Clindamycin  may still be effective in some patients.       BLOODCULT2 5 Days no growth 10/28/2020    ORG Staphylococcus epidermidis 01/27/2021    ORG Staphylococcus haemolyticus 01/27/2021    ORG Staphylococcus hominis ssp hominis 01/27/2021    ORG Staphylococcus hominis ssp hominis 01/27/2021    ORG Staphylococcus haemolyticus 01/27/2021    ORG Staphylococcus haemolyticus 01/27/2021    ORG Staphylococcus epidermidis 01/27/2021     WOUND/ABSCESS   Date Value Ref Range Status   03/09/2015 Rare growth  Final   02/02/2015 Light growth  Final   02/02/2015 Light growth  Final     No results found for: RESPSMEAR      Component Value Date/Time    LABFUNG 4 Weeks- no fungus isolated 04/24/2015 1030     No results found for: CULTRESP  No results found for: CXCATHTIP  No results found for: BFCS Culture Surgical   Date Value Ref Range Status   04/24/2015 Heavy growth  Final   04/24/2015 Heavy growth  Final   04/24/2015 Light growth  Final     Urine Culture, Routine   Date Value Ref Range Status   06/12/2020 >100,000 CFU/ml  Final   03/28/2020 Growth not present  Final   01/29/2020 Growth not present  Final     MRSA Culture Only   Date Value Ref Range Status   01/29/2020 Methicillin resistant Staph aureus not isolated  Final       ASSESSMENT:  · Severe dehydration and hypernatremia  · Right lung pneumonia  · Coag negative staph with multiple species and this is considered a contaminant    PLAN:  · Continue Zosyn day 4  · Continue hydration  · Continue disimpaction  · Check final cultures  · Monitor labs    Bella Ruelas  10:59 AM  2/1/2021

## 2021-02-01 NOTE — PROGRESS NOTES
Physician Progress Note      PATIENTKenneth Phalen  CSN #:                  [id number]  :                       1950  ADMIT DATE:       2021 8:08 PM  [address] [place] DATE:  RESPONDING  PROVIDER #:        Maia Carey MD          QUERY TEXT:    Pt admitted with dehydration and fever. Pt noted to have Sepsis documented   21. If possible, please document in progress notes and discharge summary   the present on admission of Sepsis:    The medical record reflects the following:  Risk Factors: PNA  Clinical Indicators: Patient admitted with fever and \"borderline hypotension\". 21 \"Fever -now likely to sepsis . Gram-positive septicemia-staph? origin   unknown. Empiric antibiotics to continue\". Bacteremia, 4/4 staphylococcal   species, likely contaminant Procal 0.14. WBC 8.3, 10.3, 6.1, 4.5. Temp 102.5   on admission, -140s. /40.   Treatment: ID consult, ICU, lab, vitals, stop Vanc and Cefepime and start   Zosyn, fluids      Thank you,  Gaudencio Hood, RN, BSN, Clinical Documentation Improvement  Options provided:  -- Yes, Sepsis was present at the time of the order to admit to the hospital  -- No, Sepsis was not present on admission and developed during the inpatient   stay  -- Other - I will add my own diagnosis  -- Disagree - Not applicable / Not valid  -- Disagree - Clinically unable to determine / Unknown  -- Refer to Clinical Documentation Reviewer    PROVIDER RESPONSE TEXT:    Yes, Sepsis was present at the time of the order to admit to the hospital.    Query created by: Stanley James on 2021 2:27 PM      Electronically signed by:  Maia Carey MD 2021 3:01 PM

## 2021-02-01 NOTE — CONSULTS
GENERAL SURGERY  CONSULT NOTE  2/1/2021    Physician Consulted: Dr. Omar Ross  Reason for Consult: PEG  Referring Physician: Dr. Mary Kay WAGNER  Jie Jimenez is a 78 y.o. male with past medical history of severe mental retardation, seizures, Parkinson's, and failure to thrive who presents for evaluation of poor PO intake. He is unable to provide history so most of the history is taken from chart review. He was admitted 1/28 from his group home with fever and severe dehydration. He was admitted to the ICU due to sepsis and has been resuscitated since then. He is able to have p.o. intake and I spoke with patient's legal guardian, Sheri Meter, who states that he has been having adequate PO intake and gaining weight, about 25 pounds recently. He has been eating triple portions and having adequate PO intake. He is not on any anticoagulation and has had no prior abdominal surgeries. Past Medical History:   Diagnosis Date    Acquired deformity of neck     Autism spectrum     Bilateral cataracts     Blepharochalasis     Cardiomegaly     Cataract of both eyes     Developmental delay     Gastritis     Hyperlipidemia     Kyphosis of thoracic region     Mental retardation     SEVERE MR  NON VERBAL, NEEDS WHEELCHAIR FOR LONG DISTANCE    Obsessive compulsive disorder     Parkinsonism (HCC)     Seizures (HCC)     Tourette disorder        Past Surgical History:   Procedure Laterality Date    TOE AMPUTATION Left     2nd/3rd       Medications Prior to Admission:    Prior to Admission medications    Medication Sig Start Date End Date Taking?  Authorizing Provider   ipratropium-albuterol (DUONEB) 0.5-2.5 (3) MG/3ML SOLN nebulizer solution Inhale 1 vial into the lungs every 4 hours as needed for Shortness of Breath   Yes Historical Provider, MD   levETIRAcetam (KEPPRA) 500 MG tablet Take 500 mg by mouth 2 times daily   Yes Historical Provider, MD   Calcium Carb-Cholecalciferol (CALCIUM-VITAMIN D) 500-200 MG-UNIT per tablet Take 1 tablet by mouth 2 times daily 6/23/20  Yes Azalia Lyles PA-C   vitamin D (ERGOCALCIFEROL) 1.25 MG (04555 UT) CAPS capsule Take 50,000 Units by mouth every 30 days Given on the 14 th   Yes Historical Provider, MD   fluvoxaMINE (LUVOX CR) 150 MG CP24 extended release capsule Take 150 mg by mouth daily    Yes Historical Provider, MD   mirtazapine (REMERON) 30 MG tablet Take 30 mg by mouth nightly  2/12/20  Yes Historical Provider, MD   polyethylene glycol (MIRALAX) 17 g packet Take 17 g by mouth daily as needed    Yes Historical Provider, MD   OLANZapine (ZYPREXA) 7.5 MG tablet Take 7.5 mg by mouth nightly    Yes Historical Provider, MD   omeprazole (PRILOSEC) 20 MG capsule Take 20 mg by mouth Daily    Yes Historical Provider, MD   carBAMazepine (TEGRETOL) 200 MG tablet Take 200 mg by mouth 2 times daily  6/5/15  Yes Historical Provider, MD   acetaminophen (TYLENOL) 325 MG tablet Take 650 mg by mouth every 4 hours as needed for Pain. Yes Historical Provider, MD   benztropine (COGENTIN) 1 MG tablet Take 1 mg by mouth 2 times daily    Yes Historical Provider, MD       No Known Allergies    No family history on file. Social History     Tobacco Use    Smoking status: Never Smoker    Smokeless tobacco: Never Used   Substance Use Topics    Alcohol use: No    Drug use: No         Review of Systems   Unable to obtain due to mental status      PHYSICAL EXAM:    Vitals:    02/01/21 1100   BP: (!) 91/55   Pulse: 78   Resp: 22   Temp:    SpO2: 93%       General Appearance:  Awake, nonverbal  Skin:  Skin color, texture, turgor normal. No rashes or lesions. Head/face:  Temporal wasting  Eyes:  No gross abnormalities. Lungs:  4L NC  Heart:  RR and slightly hypotensive  Abdomen:  Soft, nontender, nondistended.  No prior abdominal surgery scars  Extremities: Extremities warm to touch    LABS:    CBC  Recent Labs     02/01/21  0436   WBC 4.5   HGB 10.9*   HCT 32.5*   PLT 84*     BMP  Recent Labs     01/31/21 2323   *   K 3.6   *   CO2 26   BUN 14   CREATININE 0.7   CALCIUM 7.9*     Liver Function  Recent Labs     01/31/21  2323   BILITOT 0.5   AST 82*   ALT 94*   ALKPHOS 96   PROT 5.4*   LABALBU 2.9*     No results for input(s): LACTATE in the last 72 hours. No results for input(s): INR, PTT in the last 72 hours. Invalid input(s): PT    RADIOLOGY    Ct Abdomen Pelvis Wo Contrast Additional Contrast? None    Result Date: 1/28/2021  EXAMINATION: CT OF THE ABDOMEN AND PELVIS WITHOUT CONTRAST 1/28/2021 2:41 am TECHNIQUE: CT of the abdomen and pelvis was performed without the administration of intravenous contrast. Multiplanar reformatted images are provided for review. Dose modulation, iterative reconstruction, and/or weight based adjustment of the mA/kV was utilized to reduce the radiation dose to as low as reasonably achievable. COMPARISON: CT chest of 08/28/2020 and CT abdomen pelvis of 06/12/2020 HISTORY: ORDERING SYSTEM PROVIDED HISTORY: fever TECHNOLOGIST PROVIDED HISTORY: Reason for exam:->fever Additional Contrast?->None Decision Support Exception->Emergency Medical Condition (MA) What reading provider will be dictating this exam?->CRC FINDINGS: Lower Chest: There is a moderate hiatal hernia. There is also some transverse colon within the hiatal hernia. There is elevation of the right hemidiaphragm. Airspace disease in the right lower lobe is likely atelectasis. Organs: The gallbladder is markedly distended. This is similar to prior studies. There is a large gallstone measuring 3.5 cm. Within the limitations of a noncontrast exam, the visualized liver, spleen, pancreas, adrenal glands and kidneys demonstrate no significant abnormality. GI/Bowel: There is a large amount of stool markedly and distending sigmoid colon. Appearance could reflect fecal impaction. There is no evidence for small bowel obstruction. The appendix is normal. Pelvis:  Bladder is unremarkable in appearance.   There is no abnormal pelvic mass or fluid collection seen. Peritoneum/Retroperitoneum: There is no abdominal aortic aneurysm. There is no free intraperitoneal air. There is no abnormal fluid collection. Bones/Soft Tissues: There is no acute osseous or soft tissue abnormality seen. 1. Large amount of stool markedly distending the sigmoid colon to diameter of up to 10 cm. Appearance is concerning for fecal impaction. 2. Moderate hiatal hernia. Hernia also contains a portion of transverse colon. 3. Markedly distended gallbladder with 3.5 cm gallstone. This appearance is similar to previous. 4. Elevated right hemidiaphragm with right basilar atelectasis. Ct Chest Wo Contrast    Result Date: 1/28/2021  EXAMINATION: CT OF THE CHEST WITHOUT CONTRAST 1/28/2021 2:41 am TECHNIQUE: CT of the chest was performed without the administration of intravenous contrast. Multiplanar reformatted images are provided for review. Dose modulation, iterative reconstruction, and/or weight based adjustment of the mA/kV was utilized to reduce the radiation dose to as low as reasonably achievable. COMPARISON: 08/28/2020 HISTORY: ORDERING SYSTEM PROVIDED HISTORY: sob fever TECHNOLOGIST PROVIDED HISTORY: Reason for exam:->sob fever What reading provider will be dictating this exam?->CRC FINDINGS: Mediastinum: There is a moderate hiatal hernia. This also now contains a portion of the transverse colon, not seen previously. Lungs/pleura: There is elevation of the right hemidiaphragm. Airspace disease at the right base is likely atelectasis. There is no pleural effusion. There is no pneumothorax seen. Upper Abdomen: The gallbladder is markedly distended. There is a 3.5 cm lamellated gallstone. Soft Tissues/Bones: There is no acute osseous or soft tissue abnormality seen. 1. Moderate hiatal hernia. This now contains a portion of transverse colon, not seen previously. 2. Elevated right hemidiaphragm with right basilar atelectasis.  3. Markedly distended gallbladder with large gallstone. Xr Chest Portable    Result Date: 1/27/2021  EXAMINATION: ONE XRAY VIEW OF THE CHEST 1/27/2021 9:15 pm COMPARISON: January 25, 2021 HISTORY: ORDERING SYSTEM PROVIDED HISTORY: sob TECHNOLOGIST PROVIDED HISTORY: Reason for exam:->sob What reading provider will be dictating this exam?->CRC FINDINGS: The heart is enlarged. No gross consolidation. No pneumothorax. The costophrenic angles are clear. There is elevation of the right hemidiaphragm. No active cardiopulmonary process. Cardiomegaly.       ASSESSMENT:  78 y.o. male with sepsis and inadequate nutrition, failure to thrive    PLAN:  - Spoke with patient's legal guardian, Danie Reyes, who states that she is open to the idea of a feeding tube, but does not want it at this time as he has been having PO intake  - She says that if things worsen, then she will revisit the idea of a feeding tube  - Of note, he does have a hiatal hernia with transverse colon involvement, so he would likely need to have an open procedure with reduction of hiatal hernia and transverse colon and G-tube placement  - Would recommend having a bedside sitter to help feed him and encourage PO intake  - Continue abx per primary  - Please call if patient's legal guardian changes her mind or patient worsens significantly  - Surgery will sign off  - Discussed with Dr. Galdamez Jose Alejandro    Electronically signed by Krista Bundy MD on 2/1/21 at 12:07 PM EST

## 2021-02-01 NOTE — PROGRESS NOTES
Comprehensive Nutrition Assessment    Type and Reason for Visit:  Initial, RD Nutrition Re-Screen/LOS    Nutrition Recommendations/Plan: Pt would benefit from seeing SLP. Recs per SLP. Recommend addition of additional ONS for optimal po intake. (Magic cup BID)    -Note pt w/ chronic inadequate oral intake 2/2 cognitive impairment's w/ need of feeding assistance. Noted chronic issue of dehydration. Suggest PEG placement prior to dc, & initiating supplemental EN to aid in energy intake. Nutrition Assessment:  Noted LOS. Pt w/ pmh mental retardation, parkinsons, dysphagia, admit from group home for fever w/ dehydration w/ admit to ICU dt RRT called 2/2 fever w/ elevated HR. Note 1/27 CT abd pelvis noted large amount of stool question feculent reaction. Noted sepsis. Pt w/ poor po intake at 1-25%. No clinically significant wt loss. Recs above. Malnutrition Assessment:  Malnutrition Status: At risk for malnutrition (Comment)    Context:  Chronic Illness     Findings of the 6 clinical characteristics of malnutrition:  Energy Intake:  7 - 75% or less estimated energy requirements for 1 month or longer  Weight Loss:  No significant weight loss     Body Fat Loss:  Unable to assess     Muscle Mass Loss:  Unable to assess    Fluid Accumulation:  No significant fluid accumulation     Strength:  Not Performed    Estimated Daily Nutrient Needs:  Energy (kcal):  MSJ;1.4x1241=1737; kcal; Weight Used for Energy Requirements:  Current     Protein (g):  65-80 g; Weight Used for Protein Requirements:  Current(1.2-1.5 g/kg CBW)        Fluid (ml/day):  1800 ml; Method Used for Fluid Requirements:  1 ml/kcal      Nutrition Related Findings:  Pt nonverbal, dysphagia on purees currently, constipation(on dulcolax), +BS, no noted edema, +I/O's, hypernatremic 147 mmol/L.   MAP 67 2/1    Wounds:  None       Current Nutrition Therapies:    DIET GENERAL; Dysphagia Pureed  Dietary Nutrition Supplements: Standard High Calorie Oral Supplement    Anthropometric Measures:  · Height: 5' 5\" (165.1 cm)  · Current Body Weight: 121 lb 0.5 oz (54.9 kg)(2/1)   · Admission Body Weight: 119 lb 14.9 oz (54.4 kg)(1/28 BS)    · Usual Body Weight: 110 lb 4 oz (50 kg)(3/28/20 Per EMR)     · Ideal Body Weight: 136 lbs; % Ideal Body Weight 89 %   · BMI: 20.1  · BMI Categories: Underweight (BMI less than 22) age over 72       Nutrition Diagnosis:   · Inadequate oral intake related to cognitive or neurological impairment as evidenced by intake 0-25%      Nutrition Interventions:   Food and/or Nutrient Delivery:  Continue Current Diet, Modify Oral Nutrition Supplement  Nutrition Education/Counseling:  Education not indicated   Coordination of Nutrition Care:  Continue to monitor while inpatient    Goals:  Pt to consume > 50% of all meals/ONS       Nutrition Monitoring and Evaluation:   Behavioral-Environmental Outcomes:  None Identified   Food/Nutrient Intake Outcomes:  Food and Nutrient Intake, Supplement Intake  Physical Signs/Symptoms Outcomes:  Nutrition Focused Physical Findings, Biochemical Data, Chewing or Swallowing, Skin, Weight, GI Status, Fluid Status or Edema, Hemodynamic Status, Constipation     Discharge Planning:     Too soon to determine, Continue Oral Nutrition Supplement     Electronically signed by Patricio Escobar RD, LD on 2/1/21 at 11:43 AM EST    Contact: [phone]

## 2021-02-01 NOTE — PROGRESS NOTES
Critical Care Team - Daily Progress Note         Date and time: 2021 8:57 AM  Patient's name:  Kraig Rivera  Medical Record Number: [id number]  Patient's account/billing number: [de-identified]  Patient's YOB: 1950  Age: 78 y.o.   Date of Admission: 2021  8:08 PM  Length of stay during current admission: 4    Primary Care Physician: Luis Alberto Duron MD  ICU Attending Physician: Dr. Chambers Comes     Code Status: Full Code    Reason for ICU admission: Sepsis    SUBJECTIVE:     OVERNIGHT EVENTS:      No events overnight  CURRENT VENTILATION STATUS:   [] Ventilator  [] BIPAP  [x] Nasal Cannula 4 L [] Room Air    IF INTUBATED, ET TUBE MARKING AT LOWER LIP:       cms  SEDATION:  RAAS Score:  [] Propofol gtt  [] Versed gtt  [] Ativan gtt   [x] No Sedation  PARALYZED:  [x] No    [] Yes  VASOPRESSORS:  [x] No    [] Yes    If yes - [] Levophed       [] Dopamine     [] Vasopressin       [] Dobutamine  [] Phenylephrine         [] Epinephrine  CENTRAL LINES:     [] No   [] Yes   (Date of Insertion:   )       If yes -     [] Right IJ     [] Left IJ [] Right Femoral [] Left Femoral                   [] Right Subclavian [] Left Subclavian   [x] PICC Line  BRUNNER'S CATHETER:   [] No   [x] Yes  (Date of Insertion:   )   URINE OUTPUT:            [x] Good   [] Low              [] Anuric      OBJECTIVE:     VITAL SIGNS:  BP (!) 93/58   Pulse 83   Temp 98 °F (36.7 °C)   Resp 22   Ht 5' 5\" (1.651 m)   Wt 121 lb 0.5 oz (54.9 kg)   SpO2 93%   BMI 20.14 kg/m²   Tmax over 24 hours:  Temp (24hrs), Av.7 °F (37.1 °C), Min:98 °F (36.7 °C), Max:99.2 °F (37.3 °C)      Patient Vitals for the past 6 hrs:   BP Temp Pulse Resp SpO2 Weight   21 0817    22 93 %    21 0600 (!) 93/58  83 21 93 %    21 0500 (!) 71/44  76 26 94 %    21 0448      121 lb 0.5 oz (54.9 kg)   21 0400 103/62 98 °F (36.7 °C) 89 25 95 %    21 0300 (!) 85/51  83 29 94 %  Intake/Output Summary (Last 24 hours) at 2/1/2021 0857  Last data filed at 2/1/2021 0600  Gross per 24 hour   Intake 2847 ml   Output 1875 ml   Net 972 ml     Wt Readings from Last 2 Encounters:   02/01/21 121 lb 0.5 oz (54.9 kg)   01/05/21 125 lb (56.7 kg)     Body mass index is 20.14 kg/m². PHYSICAL EXAMINATION:    General appearance - alert, small built with contractures, and in no distress.   Laying on the left side  Mental status - nonverbal at baseline  Eyes - pupils equal and reactive, extraocular eye movements intact  Ears - external ear canals normal  Nose - normal and patent,, discharge   Mouth - mucous membranes moist, pharynx normal without lesions  Neck - supple, no significant adenopathy, JVD  Chest - clear to auscultation, no wheezes, rales or rhonchi, symmetric air entry  Heart - normal rate, regular rhythm, normal S1, S2, no murmurs, rubs, clicks or gallops  Abdomen - soft, nontender, nondistended, no masses or organomegaly  Neurological - alert, oriented, normal speech, no focal findings or movement disorder noted  Extremities - peripheral pulses normal, no pedal edema, no clubbing or cyanosis with contractures  Skin -scratch marks on buttocks no open wounds          MEDICATIONS:    Scheduled Meds:   bisacodyl  10 mg Rectal Once per day on Mon Wed Fri    potassium chloride  10 mEq Intravenous 4x Daily    LORazepam  1 mg Intravenous Q6H    levetiracetam  1,000 mg Intravenous Q12H    carBAMazepine  200 mg Oral BID    piperacillin-tazobactam  3,375 mg Intravenous Q8H    sodium chloride   Intravenous Q8H    [Held by provider] benztropine  1 mg Oral BID    [Held by provider] fluvoxaMINE  150 mg Oral Daily    ipratropium-albuterol  3 mL Inhalation Q4H WA    [Held by provider] mirtazapine  30 mg Oral Nightly    [Held by provider] OLANZapine  7.5 mg Oral Nightly    pantoprazole  40 mg Oral QAM AC    polyethylene glycol  17 g Oral Daily  sodium chloride flush  10 mL Intravenous 2 times per day    enoxaparin  40 mg Subcutaneous Daily    LORazepam  1 mg Intravenous Once     Continuous Infusions:   [Held by provider] dextrose 5 % and 0.45 % NaCl 75 mL/hr at 01/29/21 1009    dextrose 5 % and 0.2 % NaCl 100 mL/hr at 02/01/21 0809     PRN Meds:       Mineral Oil-Hydrophil Petrolat, , BID PRN      acetaminophen, 650 mg, Q4H PRN    Or      acetaminophen, 650 mg, Q6H PRN      diphenhydrAMINE, 25 mg, Q4H PRN      sodium chloride flush, 10 mL, PRN      promethazine, 12.5 mg, Q6H PRN    Or      ondansetron, 4 mg, Q6H PRN      polyethylene glycol, 17 g, Daily PRN          VENT SETTINGS (Comprehensive) (if applicable):  Vent Information  SpO2: 93 %  Additional Respiratory  Assessments  Pulse: 83  Resp: 22  SpO2: 93 %  Oral Care: Mouth swabbed, Mouth moisturizer, Mouth suctioned  ABGs:   Recent Labs     01/30/21  0524   PH 7.312*   PCO2 51.2*   PO2 77.6   HCO3 25.3   BE -1.5   O2SAT 95.2       Laboratory findings:  Complete Blood Count:   Recent Labs     01/31/21  0444 02/01/21  0436   WBC 6.1 4.5   HGB 11.9* 10.9*   HCT 37.6 32.5*   PLT 92* 84*        Last 3 Blood Glucose:   Recent Labs     01/31/21  0750 01/31/21  1247 01/31/21  2323   GLUCOSE 92 124* 95        PT/INR:    Lab Results   Component Value Date    PROTIME 10.9 10/28/2020    INR 1.0 10/28/2020     PTT:    Lab Results   Component Value Date    APTT 29.7 10/28/2020       Comprehensive Metabolic Profile:   Recent Labs     01/31/21  0750 01/31/21  1247 01/31/21  2323   * 145 147*   K 3.7 3.0* 3.6   * 111* 113*   CO2 27 28 26   BUN 20 19 14   CREATININE 0.8 0.8 0.7   GLUCOSE 92 124* 95   CALCIUM 7.9* 7.8* 7.9*   PROT 5.7* 5.6* 5.4*   LABALBU 3.2* 3.3* 2.9*   BILITOT 0.7 0.6 0.5   ALKPHOS 103 99 96   * 109* 82*   * 105* 94*      Magnesium:   Lab Results   Component Value Date    MG 2.0 01/31/2021     Phosphorus:   Lab Results   Component Value Date PHOS 6.4 01/29/2021     Ionized Calcium: No results found for: CAION     Urinalysis:     Troponin: No results for input(s): TROPONINI in the last 72 hours. Microbiology:  Culture, Blood 1 [[id number]] (Abnormal)  Collected: 01/27/21 2027   Order Status: Completed Specimen: Blood Updated: 02/01/21 0648    Organism Staphylococcus epidermidisAbnormal     Blood Culture, Routine --    This isolate is presumed to be resistant based on the   detection of inducible Clindamycin resistance.  Clindamycin   may still be effective in some patients. Organism Staphylococcus haemolyticusAbnormal      Staphylococcus hominis ssp hominisAbnormal      Staphylococcus hominis ssp hominisAbnormal     Blood Culture, Routine Second morphology    Organism Staphylococcus haemolyticusAbnormal     Blood Culture, Routine Second morphology   Narrative:     Source: BLOOD       Site: Antecubital-Rig          Microbiology results called to and read   back by Harvey Singh RN,   01/28/2021 10:41, by [facility]   Culture, Blood 2 [[id number]] (Abnormal)  Collected: 01/27/21 2027   Order Status: Completed Specimen: Blood Updated: 01/31/21 0748    Culture, Blood 2 Previously positive blood culture calledAbnormal     Organism Staphylococcus haemolyticusAbnormal      Staphylococcus epidermidisAbnormal     Culture, Blood 2 --    This isolate is presumed to be resistant based on the   detection of inducible Clindamycin resistance.  Clindamycin   may still be effective in some patients.     Narrative:         Cultures during this admission:       Blood cultures:                  [] None drawn      [] Negative     [x]  Positive     [] Pending   Urine Culture:                    [] None drawn      [x] Negative     []  Positive     [] Pending  Sputum Culture:                [] None drawn      [] Negative     []  Positive     [] Pending  Endotracheal aspirate:      [] None drawn      [] Negative     []  Positive     [] Pending Stool:    [] None Sent        [] Negative     []  Positive     [] Pending   Other Micro:   [] None Sent        [] Negative     []  Positive     [] Pending     Other pertinent Labs:       Radiology/Imaging:       ASSESSMENT:       80-year-old male with severe MR, epilepsy on Tegretol of Sinemet Parkinson's with contractures presents from [facility]    1/25 seen in ER found to have saturations of 78%. Covid negative. Chest x-ray negative. Patient got IV fluids and was DC'd back  1/27 in ER. With fever and hypoxemia. Electrolyte abnormalities  Covid negative  CT chest showing hiatal hernia with elevated right diaphragm bibasilar atelectasis  CT abdomen pelvis large amount of stool question feculent reaction. Distended gallbladder with 3.5 cm gallstone unchanged  1/29 RRT called for temperature 103.3 heart rate 134. Patient having shaking movements of his upper extremities. Patient transferred to ICU  Received D5.2 L normal saline    1. Right lower lobe aspiration pneumonia with large hiatal hernia on Zosyn  2. Hypernatremia/dehydration  3. Question malignant hyperthermia with elevated CPKs and fevers. 4. Blood pressure labile  5. Rhabdomyolysis improving  6. Anemia from rehydration  7. Blood cultures positive question contaminant  8. Distended gallbladder with stone  9. Small pressure ulcer right foot  10. Developmental delay/MR nonverbal  6. History of seizures on Keppra and Tegretol  12. Parkinson's  13. Kyphosis  14. Malnutrition    PLAN:     Psychiatric medications on hold. ( Remeron Zyprexa and fluvoxamine)  IV hydration continue   Repeat blood cultures  Antibiotics as per ID  Keppra and Tegretol restarted  Change to normal saline  Consult surgery for PEG tube   WEAN PER PROTOCOL:  [x] No   [] Yes  [] N/A  DISCONTINUE ANY LABS:   [x] No   [] Yes  ICU PROPHYLAXIS:  Stress ulcer:  [x] PPI Agent  [] C7Vdezc [] Sucralfate  [] Other:  VTE:   [x] Enoxaparin  [] Unfract.  Heparin Subcut  [] EPC Cuffs NUTRITION:  [] NPO [] Tube Feeding (Specify: ) [] TPN  [x] PO (Diet: DIET GENERAL; Dysphagia Pureed  Dietary Nutrition Supplements: Standard High Calorie Oral Supplement)  HOME MEDICATIONS RECONCILED: [x] No   [] Yes  CONSULTATION NEEDED:    [] No   [x] Yes surgery  FAMILY UPDATED:     [x] No   [] Yes  TRANSFER OUT OF ICU:    [x] No   [] Yes      Gianluca Ca M.D. Jade Flood. HTEAL LLOYD                     I personally saw, examined and provided care for the patient. Radiographs, labs and medication list were reviewed by me independently. I spoke with bedside nursing, therapists and consultants. The case was discussed in detail and plans for care were established. 2/1/2021, 8:57 AM  > 30  Min CCT  Able to wean to 2 L.   For PEG tube tomorrow  Continue to follow sodium  Okay for transfer

## 2021-02-01 NOTE — PROGRESS NOTES
flush, 10 mL, PRN      promethazine, 12.5 mg, Q6H PRN    Or      ondansetron, 4 mg, Q6H PRN      polyethylene glycol, 17 g, Daily PRN        Data/Labs:     Recent Labs     01/31/21  0444 02/01/21  0436   WBC 6.1 4.5   HGB 11.9* 10.9*   HCT 37.6 32.5*   PLT 92* 84*      Recent Labs     01/31/21  1247 01/31/21  2323 02/01/21  1203    147* 150*   K 3.0* 3.6 3.5   * 113* 114*   CO2 28 26 30*   BUN 19 14 11   CREATININE 0.8 0.7 0.8     Recent Labs     01/31/21  1247 01/31/21  2323 02/01/21  1203   * 82* 60*   * 94* 78*   BILITOT 0.6 0.5 0.4   ALKPHOS 99 96 90     No results for input(s): INR in the last 72 hours. Recent Labs     01/30/21  1257 01/31/21  0444 02/01/21  0436   CKTOTAL 7,410* 3,788* 1,523*       I/O last 3 completed shifts: In: 2692 [P.O.:120; I.V.:2572]  Out: 2150 [Urine:2150]    Intake/Output Summary (Last 24 hours) at 2/1/2021 1639  Last data filed at 2/1/2021 1600  Gross per 24 hour   Intake 2692 ml   Output 2165 ml   Net 527 ml        Assessment/Plan:   1. Sepsis probably due to fever and possible gram-positive from a pressure ulcer on his right foot stage IIIIV fluids IV medications IV antibiotics continue to monitor  2. Seizure Disorder- Keppra, seizure precautions  3. Chronic Depression- remeron  4.  GERD- protonix    DVT Prophylaxis: lovenox  Diet: DIET GENERAL; Dysphagia Pureed  Dietary Nutrition Supplements: Standard High Calorie Oral Supplement  Dietary Nutrition Supplements: Frozen Oral Supplement  Code Status: Full Code    Dispo: when stable     Electronically signed by Sanket Abraham MD on 2/1/2021 at 4:39 PM  [facility] Hospitalist

## 2021-02-01 NOTE — CARE COORDINATION
2/1 Care Coordination: Pt remains in [facility] Pt is from a [facility] to [facility] facility, is a full code, and has a legal guardian Siobhan Walkerisak [phone]). Pt has hx of intellectual disability, seizures and parkinsonism. Plan will be for pt to return to [facility] facility located at [facility], upon discharge. Will need to follow up with pt's legal guardian as well as Nori Sharpe ([phone]), , for further discharge needs. CM left message for Ledy Rodriguez to discuss possibly needing O2 when discharged. Await call back. CM/SW will continue to follow for discharge planning.    Jacquie JOSEN,RN-BC  [phone]

## 2021-02-01 NOTE — PLAN OF CARE
Problem: Skin Integrity:  Goal: Will show no infection signs and symptoms  Description: Will show no infection signs and symptoms  Outcome: Met This Shift  Goal: Absence of new skin breakdown  Description: Absence of new skin breakdown  Outcome: Met This Shift     Problem: Falls - Risk of:  Goal: Will remain free from falls  Description: Will remain free from falls  Outcome: Met This Shift  Goal: Absence of physical injury  Description: Absence of physical injury  Outcome: Met This Shift     Problem: Restraint Use - Nonviolent/Non-Self-Destructive Behavior:  Goal: Absence of restraint-related injury  Description: Absence of restraint-related injury  Outcome: Met This Shift

## 2021-02-02 NOTE — PROGRESS NOTES
Psych consult called to [phone].  Dr. Ester Khan added to care team.    Michaelle Godfrey RN

## 2021-02-02 NOTE — PROGRESS NOTES
(36.1 °C)  Max: 98.2 °F (36.8 °C)  Constitutional: The patient is awake, animated but demented  Skin: Warm and dry. No rashes were noted. HEENT:  Moist mucous membranes. No ulcerations or thrush. Neck: Supple to movements. Chest: No use of accessory muscles to breathe. Symmetrical expansion. No wheezing, crackles or rhonchi. Decreased breath sounds on the right  Cardiovascular: S1 and S2 are rhythmic and regular. No murmurs appreciated. Abdomen: Positive bowel sounds to auscultation. Benign to palpation. No masses felt. No hepatosplenomegaly. Genitourinary: William  Extremities: No clubbing, no cyanosis, no edema.   Lines: peripheral    Laboratory and Tests Review:  Lab Results   Component Value Date    WBC 5.0 02/02/2021    WBC 4.5 02/01/2021    WBC 6.1 01/31/2021    HGB 11.0 (L) 02/02/2021    HCT 35.8 (L) 02/02/2021    .4 (H) 02/02/2021     (L) 02/02/2021     Lab Results   Component Value Date    NEUTROABS 3.18 02/02/2021    NEUTROABS 2.64 02/01/2021    NEUTROABS 3.75 01/31/2021     No results found for: [facility]  Lab Results   Component Value Date    ALT 81 (H) 02/02/2021    AST 67 (H) 02/02/2021    ALKPHOS 96 02/02/2021    BILITOT <0.2 02/02/2021     Lab Results   Component Value Date     02/02/2021    K 3.8 02/02/2021     02/02/2021    CO2 23 02/02/2021    BUN 14 02/02/2021    CREATININE 0.7 02/02/2021    CREATININE 0.8 02/01/2021    CREATININE 0.7 01/31/2021    GFRAA >60 02/02/2021    LABGLOM >60 02/02/2021    GLUCOSE 89 02/02/2021    GLUCOSE 71 05/18/2012    PROT 5.5 02/02/2021    LABALBU 3.1 02/02/2021    LABALBU 4.5 05/18/2012    CALCIUM 8.2 02/02/2021    BILITOT <0.2 02/02/2021    ALKPHOS 96 02/02/2021    AST 67 02/02/2021    ALT 81 02/02/2021     Lab Results   Component Value Date    CRP 0.3 01/29/2021    CRP 4.2 (H) 06/14/2020    CRP 3.6 (H) 06/12/2020     Lab Results   Component Value Date    SEDRATE 6 01/29/2021    SEDRATE 43 (H) 06/14/2020    SEDRATE 14 06/12/2020 Value Ref Range Status   06/12/2020 >100,000 CFU/ml  Final   03/28/2020 Growth not present  Final   01/29/2020 Growth not present  Final     MRSA Culture Only   Date Value Ref Range Status   01/29/2020 Methicillin resistant Staph aureus not isolated  Final       ASSESSMENT:  · Severe dehydration and hypernatremia  · Right lung pneumonia  · Coag negative staph with multiple species and this is considered a contaminant    PLAN:  · Continue Zosyn day 5/7- can switch to Augmentin and Levaquin for discharge  · Continue hydration  · Continue disimpaction  · Check final cultures  · Monitor labs    Electronically signed by MARIJA Sierra CNP on 2/2/2021 at 12:44 PM  Pt seen and examined. Above discussed agree with advanced practice nurse. Labs, cultures, and radiographs reviewed. Face to Face encounter occurred. Changes made as necessary.      Kentrell Tomas MD

## 2021-02-02 NOTE — CARE COORDINATION
2/2/21 Update CM Note: Patient now on stepdown telemetry from intensive care. He remains on oxygen at 2lnc. He continues with indwelling owens. He is on iv /hr, iv zosyn q12, iv keppra q12. He is now in restraints x2. Patient ate breakfast without difficulty. Surgery has signed off. He will need pulse oximetry readings. Per JAZLYN Mendez at [facility] the owens needs to be out, no ivs, no restraints. He can return on oxygen if needed. If oxygen is needed then the hospital will need to transport by ambulance at discharge. Will keep Jaylyn posted and updated at [phone].  Cristina Ruiz RN CM  [phone]

## 2021-02-02 NOTE — PROGRESS NOTES
Pt transferred from [facility].

## 2021-02-02 NOTE — PROGRESS NOTES
Dr. Patricia Swain notified via perfect serve that patient needs new restraint order.     Diana Batista RN

## 2021-02-02 NOTE — PROGRESS NOTES
Hospitalist Progress Note      SYNOPSIS: Patient admitted on  71-year-old male with severe MR, epilepsy on Tegretol of Sinemet Parkinson's with contractures presents from [facility]     seen in ER found to have saturations of 78%. Covid negative. Chest x-ray negative. Patient got IV fluids and was DC'd back  in ER. With fever and hypoxemia. Electrolyte abnormalities  CT chest showing hiatal hernia with elevated right diaphragm bibasilar atelectasis  CT abdomen pelvis large amount of stool question feculent reaction. Distended gallbladder with 3.5 cm gallstone unchanged   RRT called for temperature 103.3 heart rate 134. Patient having shaking movements of his upper extremities. Patient transferred to ICU      SUBJECTIVE:  Stable overnight. No other overnight issues reported. Patient seen and examined  Records reviewed. Now out of ICU   Patient lethargic  On room air  Psych eval for meds ? prior NMS      Temp (24hrs), Av.9 °F (36.6 °C), Min:97.2 °F (36.2 °C), Max:98.2 °F (36.8 °C)    DIET: DIET GENERAL; Dysphagia Pureed  Dietary Nutrition Supplements: Standard High Calorie Oral Supplement  Dietary Nutrition Supplements: Frozen Oral Supplement  CODE: Full Code    Intake/Output Summary (Last 24 hours) at 2021 1004  Last data filed at 2021 0747  Gross per 24 hour   Intake 1763 ml   Output 1250 ml   Net 513 ml       Review of Systems  Unable to be obtained due to patient MR      OBJECTIVE:    BP (!) 115/90   Pulse 81   Temp 97.2 °F (36.2 °C) (Temporal)   Resp 20   Ht 5' 5\" (1.651 m)   Wt 121 lb 0.5 oz (54.9 kg)   SpO2 93%   BMI 20.14 kg/m²     General appearance:  Lethargic this am but does awaken for nursing requiring restraints pulling at monitor, IVs  HEENT:  Conjunctivae/corneas clear. Neck: Supple. No jugular venous distention.    Respiratory: symmetrical; clear to auscultation bilaterally; no wheezes; no rhonchi; no rales  Cardiovascular: rhythm regular; rate controlled; no murmurs  Abdomen: Soft, nontender, nondistended  Extremities:  peripheral pulses present; no peripheral edema; no ulcers  Musculoskeletal: No clubbing, cyanosis, no bilateral lower extremity edema. Brisk capillary refill. Skin:  No rashes  on visible skin  Neurologic:  , moves all extremities    ASSESSMENT and PLAN:  · Right lower lobe aspiration pneumonia with large hiatal hernia-  on Zosyn  · Sepsis, POA  · Hypernatremia/dehydration- IV fluids  · Question malignant hyperthermia with elevated CPKs and fevers- psych meds on hold (remeron, zyprexa, fluvoxamine). 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  She says that if things worsen, then she will revisit the idea of a feeding tube         DISPOSITION: Continue current plan of care    Medications:  REVIEWED DAILY    Infusion Medications    sodium chloride 100 mL/hr at 02/02/21 0645    [Held by provider] dextrose 5 % and 0.45 % NaCl 75 mL/hr at 01/29/21 1009     Scheduled Medications    bisacodyl  10 mg Rectal Once per day on Mon Wed Fri    LORazepam  1 mg Intravenous Q6H    levetiracetam  1,000 mg Intravenous Q12H    carBAMazepine  200 mg Oral BID    piperacillin-tazobactam  3,375 mg Intravenous Q8H    sodium chloride   Intravenous Q8H    [Held by provider] benztropine  1 mg Oral BID    [Held by provider] fluvoxaMINE  150 mg Oral Daily    ipratropium-albuterol  3 mL Inhalation Q4H WA    [Held by provider] mirtazapine  30 mg Oral Nightly    [Held by provider] OLANZapine  7.5 mg Oral Nightly    pantoprazole  40 mg Oral QAM AC    polyethylene glycol  17 g Oral Daily    sodium chloride flush  10 mL Intravenous 2 times per day    enoxaparin  40 mg Subcutaneous Daily    LORazepam  1 mg Intravenous Once     PRN Meds: Mineral Oil-Hydrophil Petrolat, acetaminophen **OR** acetaminophen, diphenhydrAMINE, sodium chloride flush, promethazine **OR** ondansetron, polyethylene glycol    Labs:     Recent Labs     01/31/21  0444 02/01/21  0436 02/02/21  0658   WBC 6.1 4.5 5.0   HGB 11.9* 10.9* 11.0*   HCT 37.6 32.5* 35.8*   PLT 92* 84* 100*       Recent Labs     01/31/21  2323 02/01/21  1203 02/02/21  0007   * 150* 148*   K 3.6 3.5 3.8   * 114* 115*   CO2 26 30* 23   BUN 14 11 14   CREATININE 0.7 0.8 0.7   CALCIUM 7.9* 7.7* 8.2*       Recent Labs     01/31/21  2323 02/01/21  1203 02/02/21  0007   PROT 5.4* 5.2* 5.5*   ALKPHOS 96 90 96   ALT 94* 78* 81*   AST 82* 60* 67*   BILITOT 0.5 0.4 <0.2       No results for input(s): INR in the last 72 hours. Recent Labs     01/30/21  1257 01/31/21 0444 02/01/21  0436   CKTOTAL 7,410* 3,788* 1,523*       Chronic labs:    Lab Results   Component Value Date    CHOL 166 03/18/2016    TRIG 146 03/18/2016    HDL 44 03/18/2016    LDLCALC 93 03/18/2016    TSH 1.700 02/04/2020    PSA 0.41 09/04/2015    INR 1.0 10/28/2020    LABA1C 5.4 03/18/2016       Radiology: REVIEWED DAILY    +++++++++++++++++++++++++++++++++++++++++++++++++  Venice Portillo Physician - [address] [facility], [place]  +++++++++++++++++++++++++++++++++++++++++++++++++  NOTE: This report was transcribed using voice recognition software. Every effort was made to ensure accuracy; however, inadvertent computerized transcription errors may be present.

## 2021-02-03 NOTE — CARE COORDINATION
2/3/21 Update CM Note: Patient is continued on telemetry. He is day 6/7 of iv zosyn and then will transition to oral antibiotics. He remains on iv keppra q12 which will need changed to oral for discharge. He also has LR 100hr. He continues with a owens. This will need stopped before discharge. He remains in two point restraints currently. Plan is to return to [facility] at discharge. He will need transition to oral medications, no owens and no oxygen(he is on room air). Will follow.  Henrik Mendez RN CM

## 2021-02-03 NOTE — PLAN OF CARE
Problem: Restraint Use - Nonviolent/Non-Self-Destructive Behavior:  Goal: Absence of restraint-related injury  Description: Absence of restraint-related injury  Outcome: Met This Shift     Problem: Falls - Risk of:  Goal: Will remain free from falls  Description: Will remain free from falls  Outcome: Met This Shift  Goal: Absence of physical injury  Description: Absence of physical injury  Outcome: Met This Shift     Problem: Skin Integrity:  Goal: Will show no infection signs and symptoms  Description: Will show no infection signs and symptoms  Outcome: Met This Shift  Goal: Absence of new skin breakdown  Description: Absence of new skin breakdown  Outcome: Met This Shift

## 2021-02-03 NOTE — PROGRESS NOTES
no rales  Cardiovascular: rhythm regular; rate controlled; no murmurs  Abdomen: Soft, nontender, nondistended  Extremities:  peripheral pulses present; no peripheral edema; no ulcers  Musculoskeletal: No clubbing, cyanosis, no bilateral lower extremity edema. Brisk capillary refill. Skin:  No rashes  on visible skin  Neurologic:  , moves all extremities longstanding intermittent shaking    ASSESSMENT and PLAN:  · Right lower lobe aspiration pneumonia with large hiatal hernia-  on Zosyn okay to switch to Augmentin and Levaquin for discharge per infectious disease  · Sepsis, POA  · Hypernatremia/dehydration- IV fluids  · Question malignant hyperthermia with elevated CPKs and fevers- psych meds on hold (remeron, zyprexa, fluvoxamine). 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  She says that if things worsen, then she will revisit the idea of a feeding tube     Labs have been reviewed electrolytes are stabilized hemoglobin is 12.4 white cell count is normal no recent CK check    DISPOSITION: Continue current plan of care    Medications:  REVIEWED DAILY    Infusion Medications    lactated ringers 100 mL/hr at 02/02/21 6222     Scheduled Medications    bisacodyl  10 mg Rectal Once per day on Mon Wed Fri    LORazepam  1 mg Intravenous Q6H    levetiracetam  1,000 mg Intravenous Q12H    carBAMazepine  200 mg Oral BID    piperacillin-tazobactam  3,375 mg Intravenous Q8H    sodium chloride   Intravenous Q8H    [Held by provider] benztropine  1 mg Oral BID    [Held by provider] fluvoxaMINE  150 mg Oral Daily    ipratropium-albuterol  3 mL Inhalation Q4H WA    [Held by provider] mirtazapine  30 mg Oral Nightly    [Held by provider] OLANZapine  7.5 mg Oral Nightly    pantoprazole  40 mg Oral QAM AC    polyethylene glycol  17 g Oral Daily    sodium chloride flush  10 mL Intravenous 2 times per day    enoxaparin  40 mg Subcutaneous Daily    LORazepam  1 mg Intravenous Once     PRN Meds: Mineral Oil-Hydrophil Petrolat, acetaminophen **OR** acetaminophen, diphenhydrAMINE, sodium chloride flush, promethazine **OR** ondansetron, polyethylene glycol    Labs:     Recent Labs     02/01/21  0436 02/02/21  0658 02/03/21  0653   WBC 4.5 5.0 6.3   HGB 10.9* 11.0* 12.4*   HCT 32.5* 35.8* 35.8*   PLT 84* 100* 111*       Recent Labs     02/01/21  1203 02/02/21  0007 02/03/21  0653   * 148* 141   K 3.5 3.8 3.9   * 115* 107   CO2 30* 23 24   BUN 11 14 10   CREATININE 0.8 0.7 0.6*   CALCIUM 7.7* 8.2* 9.2       Recent Labs     01/31/21  2323 02/01/21  1203 02/02/21  0007   PROT 5.4* 5.2* 5.5*   ALKPHOS 96 90 96   ALT 94* 78* 81*   AST 82* 60* 67*   BILITOT 0.5 0.4 <0.2       No results for input(s): INR in the last 72 hours. Recent Labs     02/01/21 0436   CKTOTAL 1,523*       Chronic labs:    Lab Results   Component Value Date    CHOL 166 03/18/2016    TRIG 146 03/18/2016    HDL 44 03/18/2016    LDLCALC 93 03/18/2016    TSH 1.700 02/04/2020    PSA 0.41 09/04/2015    INR 1.0 10/28/2020    LABA1C 5.4 03/18/2016       Radiology: REVIEWED DAILY    +++++++++++++++++++++++++++++++++++++++++++++++++  Mani Gibson MD  [facility] - [address] [place] [address]  +++++++++++++++++++++++++++++++++++++++++++++++++  NOTE: This report was transcribed using voice recognition software. Every effort was made to ensure accuracy; however, inadvertent computerized transcription errors may be present.

## 2021-02-03 NOTE — CONSULTS
I went to see the patient along with practitioner Radha Solis. Patient is completely nonverbal does not answer any questions unable to be assessed unable to complete psychiatric consult. I reviewed the chart and discussed with Dr. Shun Hsieh. Patient is a history of mental retardation and likely has agitation related to the mental retardation. Due to the concern for malignant hypertension and psychiatry would not recommend continuing psychotropic medication. In reviewing the chart patient is not suicidal homicidal psychotic or manic does not meet criteria for inpatient level psychiatric treatment. Patient can follow-up outpatient after discharge.

## 2021-02-03 NOTE — PROGRESS NOTES
Patient continues to try to pull at lines when restraints are removed. He is not redirectable and does not follow commands. Bilateral soft wrist restraints continued for patient safety at this time.

## 2021-02-03 NOTE — PROGRESS NOTES
Dr. Geraldo Mcclellan notified via perfect serve regarding new restraint order.     Magdalena Simmons RN

## 2021-02-03 NOTE — PROGRESS NOTES
[address] [place] [address] [facility]  [facility]  Progress Note      Chief Complaint   Patient presents with    Shortness of Breath     Patient arrives with SOB and [facility]. Patient seen here 2 days ago for same complaints. SUBJECTIVE:  Patient is tolerating medications. No reported adverse drug reactions. No nausea, vomiting,   Blood cultures are contaminant with multiple different types of coag negative staph  Afebrile. 96 on room air. Patient is still getting hydrated and the BUN and sodium are improving     Out of icu    Review of systems:  As stated above in the chief complaint, otherwise negative.     Medications:  Scheduled Meds:   lactobacillus acidophilus  4 tablet Oral TID    amoxicillin-clavulanate  1,200 mg Oral 2 times per day    levoFLOXacin  500 mg Oral Daily    bisacodyl  10 mg Rectal Once per day on     LORazepam  1 mg Intravenous Q6H    levetiracetam  1,000 mg Intravenous Q12H    carBAMazepine  200 mg Oral BID    sodium chloride   Intravenous Q8H    [Held by provider] benztropine  1 mg Oral BID    [Held by provider] fluvoxaMINE  150 mg Oral Daily    ipratropium-albuterol  3 mL Inhalation Q4H WA    [Held by provider] mirtazapine  30 mg Oral Nightly    [Held by provider] OLANZapine  7.5 mg Oral Nightly    pantoprazole  40 mg Oral QAM AC    polyethylene glycol  17 g Oral Daily    sodium chloride flush  10 mL Intravenous 2 times per day    enoxaparin  40 mg Subcutaneous Daily    LORazepam  1 mg Intravenous Once     Continuous Infusions:   lactated ringers 100 mL/hr at 21 0926     PRN Meds:Mineral Oil-Hydrophil Petrolat, acetaminophen **OR** acetaminophen, diphenhydrAMINE, sodium chloride flush, promethazine **OR** ondansetron, polyethylene glycol    OBJECTIVE:  /78   Pulse 102   Temp 97.7 °F (36.5 °C) (Temporal)   Resp 18   Ht 5' 5\" (1.651 m)   Wt 121 lb 0.5 oz (54.9 kg)   SpO2 94%   BMI 20.14 kg/m²   Temp  Av.9 °F (36.6 °C)  Min: 97.2 °F (36.2 °C)  Max: 98.9 °F (37.2 °C)  Constitutional: The patient is awake, animated but demented  Skin: Warm and dry. No rashes were noted. HEENT:  Moist mucous membranes. No ulcerations or thrush. Neck: Supple to movements. Chest: No use of accessory muscles to breathe. Symmetrical expansion. No wheezing, crackles or rhonchi. Decreased breath sounds on the right  Cardiovascular: S1 and S2 are rhythmic and regular. No murmurs appreciated. Abdomen: Positive bowel sounds to auscultation. Benign to palpation. No masses felt. No hepatosplenomegaly. Genitourinary: William  Extremities: No clubbing, no cyanosis, no edema.   Lines: peripheral    Laboratory and Tests Review:  Lab Results   Component Value Date    WBC 6.3 02/03/2021    WBC 5.0 02/02/2021    WBC 4.5 02/01/2021    HGB 12.4 (L) 02/03/2021    HCT 35.8 (L) 02/03/2021    MCV 92.7 02/03/2021     (L) 02/03/2021     Lab Results   Component Value Date    NEUTROABS 4.21 02/03/2021    NEUTROABS 3.18 02/02/2021    NEUTROABS 2.64 02/01/2021     No results found for: [facility]  Lab Results   Component Value Date    ALT 81 (H) 02/02/2021    AST 67 (H) 02/02/2021    ALKPHOS 96 02/02/2021    BILITOT <0.2 02/02/2021     Lab Results   Component Value Date     02/03/2021    K 3.9 02/03/2021    K 3.8 02/02/2021     02/03/2021    CO2 24 02/03/2021    BUN 10 02/03/2021    CREATININE 0.6 02/03/2021    CREATININE 0.7 02/02/2021    CREATININE 0.8 02/01/2021    GFRAA >60 02/03/2021    LABGLOM >60 02/03/2021    GLUCOSE 87 02/03/2021    GLUCOSE 71 05/18/2012    PROT 5.5 02/02/2021    LABALBU 3.1 02/02/2021    LABALBU 4.5 05/18/2012    CALCIUM 9.2 02/03/2021    BILITOT <0.2 02/02/2021    ALKPHOS 96 02/02/2021    AST 67 02/02/2021    ALT 81 02/02/2021     Lab Results   Component Value Date    CRP 0.3 01/29/2021    CRP 4.2 (H) 06/14/2020    CRP 3.6 (H) 06/12/2020     Lab Results   Component Value Date    SEDRATE 6 01/29/2021    SEDRATE 43 (H) 06/14/2020    SEDRATE 14 06/12/2020     Radiology:    Reviewed  Microbiology:   Lab Results   Component Value Date    BC 24 Hours no growth 02/01/2021    BC 5 Days no growth 01/29/2021    BC  01/27/2021     This isolate is presumed to be resistant based on the  detection of inducible Clindamycin resistance. Clindamycin  may still be effective in some patients. BC Second morphology 01/27/2021    BC Second morphology 01/27/2021    ORG Staphylococcus epidermidis 01/27/2021    ORG Staphylococcus haemolyticus 01/27/2021    ORG Staphylococcus hominis ssp hominis 01/27/2021    ORG Staphylococcus hominis ssp hominis 01/27/2021    ORG Staphylococcus haemolyticus 01/27/2021    ORG Staphylococcus haemolyticus 01/27/2021    ORG Staphylococcus epidermidis 01/27/2021     Lab Results   Component Value Date    BLOODCULT2 24 Hours no growth 02/01/2021    BLOODCULT2 Previously positive blood culture called 01/27/2021    BLOODCULT2  01/27/2021     This isolate is presumed to be resistant based on the  detection of inducible Clindamycin resistance. Clindamycin  may still be effective in some patients.       ORG Staphylococcus epidermidis 01/27/2021    ORG Staphylococcus haemolyticus 01/27/2021    ORG Staphylococcus hominis ssp hominis 01/27/2021    ORG Staphylococcus hominis ssp hominis 01/27/2021    ORG Staphylococcus haemolyticus 01/27/2021    ORG Staphylococcus haemolyticus 01/27/2021    ORG Staphylococcus epidermidis 01/27/2021     WOUND/ABSCESS   Date Value Ref Range Status   03/09/2015 Rare growth  Final   02/02/2015 Light growth  Final   02/02/2015 Light growth  Final     No results found for: RESPSMEAR      Component Value Date/Time    LABFUNG 4 Weeks- no fungus isolated 04/24/2015 1030     No results found for: CULTRESP  No results found for: CXCATHTIP  No results found for: BFCS  Culture Surgical   Date Value Ref Range Status   04/24/2015 Heavy growth  Final   04/24/2015 Heavy growth  Final   04/24/2015 Light growth Final     Urine Culture, Routine   Date Value Ref Range Status   06/12/2020 >100,000 CFU/ml  Final   03/28/2020 Growth not present  Final   01/29/2020 Growth not present  Final     MRSA Culture Only   Date Value Ref Range Status   01/29/2020 Methicillin resistant Staph aureus not isolated  Final       ASSESSMENT:  · Severe dehydration and hypernatremia  · Right lung pneumonia  · Coag negative staph with multiple species and this is considered a contaminant    PLAN:  · Continue Zosyn day 6/7- can switch to Augmentin and Levaquin for discharge after tomorrow c no further atb  · Continue hydration  · Continue disimpaction  · Check final cultures  · Monitor labs    Electronically signed by Renetta Del Real MD on 2/3/2021 at 5:34 PM

## 2021-02-03 NOTE — PROGRESS NOTES
Supple to movements. Chest: No use of accessory muscles to breathe. Symmetrical expansion. No wheezing, crackles or rhonchi. Decreased breath sounds on the right  Cardiovascular: S1 and S2 are rhythmic and regular. No murmurs appreciated. Abdomen: Positive bowel sounds to auscultation. Benign to palpation. No masses felt. No hepatosplenomegaly. Genitourinary: William  Extremities: No clubbing, no cyanosis, no edema.   Lines: peripheral    Laboratory and Tests Review:  Lab Results   Component Value Date    WBC 6.3 02/03/2021    WBC 5.0 02/02/2021    WBC 4.5 02/01/2021    HGB 12.4 (L) 02/03/2021    HCT 35.8 (L) 02/03/2021    MCV 92.7 02/03/2021     (L) 02/03/2021     Lab Results   Component Value Date    NEUTROABS 4.21 02/03/2021    NEUTROABS 3.18 02/02/2021    NEUTROABS 2.64 02/01/2021     No results found for: [facility]  Lab Results   Component Value Date    ALT 81 (H) 02/02/2021    AST 67 (H) 02/02/2021    ALKPHOS 96 02/02/2021    BILITOT <0.2 02/02/2021     Lab Results   Component Value Date     02/03/2021    K 3.9 02/03/2021    K 3.8 02/02/2021     02/03/2021    CO2 24 02/03/2021    BUN 10 02/03/2021    CREATININE 0.6 02/03/2021    CREATININE 0.7 02/02/2021    CREATININE 0.8 02/01/2021    GFRAA >60 02/03/2021    LABGLOM >60 02/03/2021    GLUCOSE 87 02/03/2021    GLUCOSE 71 05/18/2012    PROT 5.5 02/02/2021    LABALBU 3.1 02/02/2021    LABALBU 4.5 05/18/2012    CALCIUM 9.2 02/03/2021    BILITOT <0.2 02/02/2021    ALKPHOS 96 02/02/2021    AST 67 02/02/2021    ALT 81 02/02/2021     Lab Results   Component Value Date    CRP 0.3 01/29/2021    CRP 4.2 (H) 06/14/2020    CRP 3.6 (H) 06/12/2020     Lab Results   Component Value Date    SEDRATE 6 01/29/2021    SEDRATE 43 (H) 06/14/2020    SEDRATE 14 06/12/2020     Radiology:    Reviewed  Microbiology:   Lab Results   Component Value Date    BC 24 Hours no growth 02/01/2021    BC 5 Days no growth 01/29/2021    BC  01/27/2021     This isolate is presumed to be resistant based on the  detection of inducible Clindamycin resistance. Clindamycin  may still be effective in some patients. BC Second morphology 01/27/2021    BC Second morphology 01/27/2021    ORG Staphylococcus epidermidis 01/27/2021    ORG Staphylococcus haemolyticus 01/27/2021    ORG Staphylococcus hominis ssp hominis 01/27/2021    ORG Staphylococcus hominis ssp hominis 01/27/2021    ORG Staphylococcus haemolyticus 01/27/2021    ORG Staphylococcus haemolyticus 01/27/2021    ORG Staphylococcus epidermidis 01/27/2021     Lab Results   Component Value Date    BLOODCULT2 24 Hours no growth 02/01/2021    BLOODCULT2 Previously positive blood culture called 01/27/2021    BLOODCULT2  01/27/2021     This isolate is presumed to be resistant based on the  detection of inducible Clindamycin resistance. Clindamycin  may still be effective in some patients.       ORG Staphylococcus epidermidis 01/27/2021    ORG Staphylococcus haemolyticus 01/27/2021    ORG Staphylococcus hominis ssp hominis 01/27/2021    ORG Staphylococcus hominis ssp hominis 01/27/2021    ORG Staphylococcus haemolyticus 01/27/2021    ORG Staphylococcus haemolyticus 01/27/2021    ORG Staphylococcus epidermidis 01/27/2021     WOUND/ABSCESS   Date Value Ref Range Status   03/09/2015 Rare growth  Final   02/02/2015 Light growth  Final   02/02/2015 Light growth  Final     No results found for: RESPSMEAR      Component Value Date/Time    LABFUNG 4 Weeks- no fungus isolated 04/24/2015 1030     No results found for: CULTRESP  No results found for: CXCATHTIP  No results found for: [facility]CS  Culture Surgical   Date Value Ref Range Status   04/24/2015 Heavy growth  Final   04/24/2015 Heavy growth  Final   04/24/2015 Light growth  Final     Urine Culture, Routine   Date Value Ref Range Status   06/12/2020 >100,000 CFU/ml  Final   03/28/2020 Growth not present  Final   01/29/2020 Growth not present  Final     MRSA Culture Only   Date Value Ref Range Status 01/29/2020 Methicillin resistant Staph aureus not isolated  Final       ASSESSMENT:  · Severe dehydration and hypernatremia, resolved, still with hydration   · Right lung pneumonia  · Coag negative staph with multiple species and this is considered a contaminant  · ? Malignant Hyperthermia, with elevated CK + fever, multiple psych meds    PLAN:  · Continue Zosyn day /7- can switch to Augmentin and Levaquin for discharge  · Continue hydration  · Continue disimpaction  · Check final cultures  · Monitor labs    Electronically signed by Glenna Guajardo MD on 2/3/2021 at 2:53 PM  Pt seen and examined. Above discussed agree with advanced practice nurse. Labs, cultures, and radiographs reviewed. Face to Face encounter occurred. Changes made as necessary.      Julio Latif MD

## 2021-02-04 NOTE — PROGRESS NOTES
[address] [place] [address] [facility]  Progress Note      Chief Complaint   Patient presents with    Shortness of Breath     [facility] with [facility]. Patient seen here 2 days ago for same complaints. SUBJECTIVE:  Patient is tolerating medications. No reported adverse drug reactions. No nausea, vomiting,   Blood cultures are contaminant with multiple different types of coag negative staph  Afebrile. 95 on room air. P      Review of systems:  As stated above in the chief complaint, otherwise negative.     Medications:  Scheduled Meds:   lactobacillus  1 capsule Oral Daily    amoxicillin-clavulanate  1,200 mg Oral 2 times per day    levoFLOXacin  500 mg Oral Daily    bisacodyl  10 mg Rectal Once per day on     LORazepam  1 mg Intravenous Q6H    levetiracetam  1,000 mg Intravenous Q12H    carBAMazepine  200 mg Oral BID    sodium chloride   Intravenous Q8H    [Held by provider] benztropine  1 mg Oral BID    [Held by provider] fluvoxaMINE  150 mg Oral Daily    ipratropium-albuterol  3 mL Inhalation Q4H WA    [Held by provider] mirtazapine  30 mg Oral Nightly    [Held by provider] OLANZapine  7.5 mg Oral Nightly    pantoprazole  40 mg Oral QAM AC    polyethylene glycol  17 g Oral Daily    sodium chloride flush  10 mL Intravenous 2 times per day    enoxaparin  40 mg Subcutaneous Daily    LORazepam  1 mg Intravenous Once     Continuous Infusions:   lactated ringers 100 mL/hr at 21 0926     PRN Meds:methyl salicylate, Mineral Oil-Hydrophil Petrolat, acetaminophen **OR** acetaminophen, diphenhydrAMINE, sodium chloride flush, promethazine **OR** ondansetron, polyethylene glycol    OBJECTIVE:  /74   Pulse 100   Temp 98.7 °F (37.1 °C) (Temporal)   Resp 15   Ht 5' 5\" (1.651 m)   Wt 121 lb 0.5 oz (54.9 kg)   SpO2 95%   BMI 20.14 kg/m²   Temp  Av.9 °F (36.6 °C)  Min: 97.5 °F (36.4 °C)  Max: 98.7 °F (37.1 °C)  Constitutional: The patient is awake, animated but demented  Skin: Warm and dry. No rashes were noted. HEENT:  Moist mucous membranes. No ulcerations or thrush. Neck: Supple to movements. Chest: No use of accessory muscles to breathe. Symmetrical expansion. No wheezing, crackles or rhonchi. Decreased breath sounds improved  Cardiovascular: S1 and S2 are rhythmic and regular. No murmurs appreciated. Abdomen: Positive bowel sounds to auscultation. Benign to palpation. No masses felt. No hepatosplenomegaly. Genitourinary: William  Extremities: No clubbing, no cyanosis, no edema.   Lines: peripheral    Laboratory and Tests Review:  Lab Results   Component Value Date    WBC 7.2 02/04/2021    WBC 6.3 02/03/2021    WBC 5.0 02/02/2021    HGB 12.9 02/04/2021    HCT 38.0 02/04/2021    MCV 93.6 02/04/2021     02/04/2021     Lab Results   Component Value Date    NEUTROABS 5.91 02/04/2021    NEUTROABS 4.21 02/03/2021    NEUTROABS 3.18 02/02/2021     No results found for: [facility]  Lab Results   Component Value Date    ALT 81 (H) 02/02/2021    AST 67 (H) 02/02/2021    ALKPHOS 96 02/02/2021    BILITOT <0.2 02/02/2021     Lab Results   Component Value Date     02/04/2021    K 3.6 02/04/2021    K 3.8 02/02/2021     02/04/2021    CO2 25 02/04/2021    BUN 12 02/04/2021    CREATININE 0.7 02/04/2021    CREATININE 0.6 02/03/2021    CREATININE 0.7 02/02/2021    GFRAA >60 02/04/2021    LABGLOM >60 02/04/2021    GLUCOSE 86 02/04/2021    GLUCOSE 71 05/18/2012    PROT 5.5 02/02/2021    LABALBU 3.1 02/02/2021    LABALBU 4.5 05/18/2012    CALCIUM 8.8 02/04/2021    BILITOT <0.2 02/02/2021    ALKPHOS 96 02/02/2021    AST 67 02/02/2021    ALT 81 02/02/2021     Lab Results   Component Value Date    CRP 0.3 01/29/2021    CRP 4.2 (H) 06/14/2020    CRP 3.6 (H) 06/12/2020     Lab Results   Component Value Date    SEDRATE 6 01/29/2021    SEDRATE 43 (H) 06/14/2020    SEDRATE 14 06/12/2020     Radiology:    Reviewed  Microbiology:   Lab Results Component Value Date    BC 24 Hours no growth 02/01/2021    BC 5 Days no growth 01/29/2021    BC  01/27/2021     This isolate is presumed to be resistant based on the  detection of inducible Clindamycin resistance. Clindamycin  may still be effective in some patients. BC Second morphology 01/27/2021    BC Second morphology 01/27/2021    ORG Staphylococcus epidermidis 01/27/2021    ORG Staphylococcus haemolyticus 01/27/2021    ORG Staphylococcus hominis ssp hominis 01/27/2021    ORG Staphylococcus hominis ssp hominis 01/27/2021    ORG Staphylococcus haemolyticus 01/27/2021    ORG Staphylococcus haemolyticus 01/27/2021    ORG Staphylococcus epidermidis 01/27/2021     Lab Results   Component Value Date    BLOODCULT2 24 Hours no growth 02/01/2021    BLOODCULT2 Previously positive blood culture called 01/27/2021    BLOODCULT2  01/27/2021     This isolate is presumed to be resistant based on the  detection of inducible Clindamycin resistance. Clindamycin  may still be effective in some patients.       ORG Staphylococcus epidermidis 01/27/2021    ORG Staphylococcus haemolyticus 01/27/2021    ORG Staphylococcus hominis ssp hominis 01/27/2021    ORG Staphylococcus hominis ssp hominis 01/27/2021    ORG Staphylococcus haemolyticus 01/27/2021    ORG Staphylococcus haemolyticus 01/27/2021    ORG Staphylococcus epidermidis 01/27/2021     WOUND/ABSCESS   Date Value Ref Range Status   03/09/2015 Rare growth  Final   02/02/2015 Light growth  Final   02/02/2015 Light growth  Final     No results found for: RESPSMEAR      Component Value Date/Time    LABFUNG 4 Weeks- no fungus isolated 04/24/2015 1030     No results found for: CULTRESP  No results found for: CXCATHTIP  No results found for: BFCS  Culture Surgical   Date Value Ref Range Status   04/24/2015 Heavy growth  Final   04/24/2015 Heavy growth  Final   04/24/2015 Light growth  Final     Urine Culture, Routine   Date Value Ref Range Status   06/12/2020 >100,000 CFU/ml Final   03/28/2020 Growth not present  Final   01/29/2020 Growth not present  Final     MRSA Culture Only   Date Value Ref Range Status   01/29/2020 Methicillin resistant Staph aureus not isolated  Final       ASSESSMENT:  · Severe dehydration and hypernatremia  · Right lung pneumonia  · Coag negative staph with multiple species and this is considered a contaminant    PLAN:  · Continue Zosyn switched to Augmentin and Levaquin day 7/7  · Ok to discharge c no further atb  · Continue hydration  · Check final cultures  · Monitor labs    Electronically signed by MARIJA Beckman CNP on 2/4/2021 at 10:55 AM  Pt seen and examined. Above discussed agree with advanced practice nurse. Labs, cultures, and radiographs reviewed. Face to Face encounter occurred. Changes made as necessary.      Nimesh Huffman MD

## 2021-02-04 NOTE — DISCHARGE INSTR - COC
Continuity of Care Form    Patient Name: Josef Murphy   :  1950  MRN:  [id number]    Pati Dear date:  2021  Discharge date:  21    Code Status Order: Full Code   Advance Directives:     Admitting Physician:  India Crawley MD  PCP: Jacquie Guallpa MD    Discharging Nurse: SUSANA MI [facility] Unit/Room#: 8202/8202-B  Discharging Unit Phone Number: [phone]    Emergency Contact:   Extended Emergency Contact Information  Primary Emergency Contact: Bella Rosas   [address] Phone: [phone]  Relation: Legal Guardian    Past Surgical History:  Past Surgical History:   Procedure Laterality Date    TOE AMPUTATION Left            Immunization History:   Immunization History   Administered Date(s) Administered    Influenza, High Dose (Fluzone 65 yrs and older) 10/17/2018    Tdap (Boostrix, Adacel) 2016       Active Problems:  Patient Active Problem List   Diagnosis Code    Seizure ([address] R56.9    Pressure ulcer of toe of right foot, stage 3 ([address] U97.313    Atherosclerosis of native artery of both lower extremities ([address] I70.203    Dehydration with hypernatremia E87.0    Status epilepticus ([address] G40.901    Seizures ([address] R56.9    Seizure disorder, status epilepticus, convulsive ([address] G40.901    Severe protein-calorie malnutrition ([address] E43    Acute renal failure ([address] N17.9    Thrombocytopenia ([address] D69.6    Fever, unspecified R50.9    Encephalopathy G93.40    Urinary tract infection without hematuria N39.0    Severe dehydration E86.0    Respiratory failure, acute ([address] J96.00    Hypoventilation syndrome R06.89    Atelectasis J98.11    Sepsis ([address] A41.9    Gram positive septicemia (HCC) A41.89    Parkinson's disease ([address] G20       Isolation/Infection:   Isolation          No Isolation        Patient Infection Status     Infection Onset Added Last Indicated Last Indicated By Review Planned Expiration Resolved Resolved By    None active Resolved    COVID-19 Rule Out 21 Respiratory Panel, Molecular, with COVID-19 (Restricted: peds pts or suitable admitted adults) (Ordered)   21 Rule-Out Test Resulted    COVID-19 Rule Out 21 COVID-19 (Ordered)   21 Rule-Out Test Resulted    COVID-19 Rule Out 10/28/20 10/28/20 10/28/20 COVID-19 (Ordered)   10/28/20 Rule-Out Test Resulted    COVID-19 Rule Out 20 COVID-19 (Ordered)   20 Rule-Out Test Resulted    COVID-19 Rule Out 20 COVID-19 (Ordered)   20 Rule-Out Test Resulted    COVID-19 Rule Out 20 COVID-19 (Ordered)   20 Rule-Out Test Resulted    Not detected 2020    C-diff Rule Out 20 Clostridium difficile EIA (Ordered)   20 Lorenza Anne RN    INFLUENZA 20 Respiratory Panel, Molecular   20           Nurse Assessment:  Last Vital Signs: /74   Pulse 100   Temp 98.7 °F (37.1 °C) (Temporal)   Resp 15   Ht 5' 5\" (1.651 m)   Wt 121 lb 0.5 oz (54.9 kg)   SpO2 95%   BMI 20.14 kg/m²     Last documented pain score (0-10 scale): Pain Level: 0  Last Weight:   Wt Readings from Last 1 Encounters:   21 121 lb 0.5 oz (54.9 kg)     Mental Status:  alert    IV Access:  - None    Nursing Mobility/ADLs:  Walking   Dependent  Transfer  Dependent  Bathing  Dependent  Dressing  Dependent  Toileting  Dependent  Feeding  Dependent  Med Admin  Dependent  Med Delivery   prefers mixed with pudding or applesauce    Wound Care Documentation and Therapy:  Wound (Active)   Number of days:        Wound 20 Foot Left bunion area (Active)   Number of days: 240       Wound 20 Sacrum Left (Active)   Number of days: 239       Wound 20 Coccyx (Active)   Number of days: 239        Elimination:  Continence:   · Bowel: No  · Bladder: No  Urinary Catheter: None Colostomy/Ileostomy/Ileal Conduit: No       Date of Last BM: 02/04/2021    Intake/Output Summary (Last 24 hours) at 2/4/2021 1459  Last data filed at 2/4/2021 1233  Gross per 24 hour   Intake 1206.67 ml   Output 1250 ml   Net -43.33 ml     I/O last 3 completed shifts: In: 1206.7 [I.V.:1206.7]  Out: 950 [Urine:950]    Safety Concerns: At Risk for Falls and Aspiration Risk    Impairments/Disabilities:      Speech and Contractures - BUE/BLE contracture    Nutrition Therapy:  Current Nutrition Therapy:   - Oral Diet:  Dysphagia 1 pureed    Routes of Feeding: Oral  Liquids: No Restrictions  Daily Fluid Restriction: no  Last Modified Barium Swallow with Video (Video Swallowing Test): not done    Treatments at the Time of Hospital Discharge:   Respiratory Treatments: Duoneb Q4H  Oxygen Therapy:  is not on home oxygen therapy.   Ventilator:    - No ventilator support    Rehab Therapies: Physical Therapy and Occupational Therapy  Weight Bearing Status/Restrictions: No weight bearing restirctions  Other Medical Equipment (for information only, NOT a DME order):  wheelchair  Other Treatments: N/A    Patient's personal belongings (please select all that are sent with patient):  None    RN SIGNATURE:  Electronically signed by Haja Avila RN on 2/4/21 at 3:02 PM EST    CASE MANAGEMENT/SOCIAL WORK SECTION    Inpatient Status Date: ***    Readmission Risk Assessment Score:  Readmission Risk              Risk of Unplanned Readmission:        37           Discharging to Facility/ Agency   · Name:   · Address:  · Phone:  · Fax:    Dialysis Facility (if applicable)   · Name:  · Address:  · Dialysis Schedule:  · Phone:  · Fax:    / signature: {Esignature:[id number]}    PHYSICIAN SECTION    Prognosis: {Prognosis:9841508958}    Condition at Discharge: 50Buck Leyva Patient Condition:758364277}    Rehab Potential (if transferring to Rehab): {Prognosis:7939040884} Recommended Labs or Other Treatments After Discharge: ***    Physician Certification: I certify the above information and transfer of Peri Cleaning  is necessary for the continuing treatment of the diagnosis listed and that he requires {Admit to Appropriate Level of Care:03174} for {GREATER/LESS:195691649} 30 days.      Update Admission H&P: {CHP DME Changes in NGW:305471451}    PHYSICIAN SIGNATURE:  {Esignature:901239369}

## 2021-02-04 NOTE — PROGRESS NOTES
Occupational Therapy  Treatment Session  Date:2021  Patient Name: Ladan Bhandari  MRN: [id number]  : 1950  Room: [address] [place], [place] [address]    Referring Provider: Javier Rebollar MD    Evaluating OT: Tracy Paulino OTR/L #[id number]    AM-PAC Daily Activity Raw Score:     Recommended Adaptive Equipment: TBD     Diagnosis: Sepsis ([address] [A41.9]    Reason for admission: SOB    Surgery/Procedure: none    Pertinent Medical History: acquired deformity of neck, autism spectrum, cataracts, blepharochalasis, cardiomegaly, developmental delay, gastritis, HLD, kyphosis, MR, OCD, seizures, parkinson's, tourette disorder     Precautions:  Falls, development delay, , non verbal     Home Living: Pt is a poor historian and unable to provide information. Per chart pt is from [facility]. Prior Level of Function: Assist w/ all ADLs;  W/c for longer distances per chart. Driving: No    Pain Level: No indications of pain noted      Cognition: A&O: 0-1/4 (eyes open to verbal stimuli). Limited command following. Non verbal.  No agitation     Memory: poor   Comprehension poor   Problem solving: poor   Judgement/safety: poor               Communication skills: impaired. Non verbal           Vision: decreased visual tracking               Glasses:?                                                   Hearing: appears WFL     RASS: 0  CAM-ICU: (NT) Delirium    UE Assessment:  Hand Dominance: Right []  Left []?      ROM Strength STM goal: PRN   RUE  Proximally: limited shoulder flexion  Distally: limited elbow flexion    Ulnar drift noted    Contracted vs resistive  Unable to accurately assess d/t poor command following    No  Good tolerance to BUE exercises to increase ROM/strength for ADLs     LUE Proximally: limited shoulder flexion  Distally: limited elbow flexion    Ulnar drift noted    Contracted vs resistive  Unable to accurately assess d/t poor command following    No  Good tolerance to BUE exercises to increase ROM/strength for ADLs     Sensation: No c/o numbness or tingling in extremities   Tone: impaired (flexor tone vs contracted)  Edema: none noted     Functional Assessment   Initial Eval Status  Date: 2/1/21 Treatment Status  Date:  2-4-21   STG=LTG  5-14  days    Feeding Dep (dysphagia pureed) Max A    Hand-over-Hand assist, Max A, VCs for functional reaching/manipulating utensils - in high seaman position                          Max A  while seated suppported      Grooming Dep Max A    Hand-over-Hand assist, Max A, VCs for functional reaching/manipulating items for functional tasks - wash hands/face/comb hair - in semi-supine                          Max A   while seated supported     UB dressing Dep Max A    Max A, VCs to thread UEs into gown/functional reaching - in semi-supine                         Max A       LB dressing Dep NT     Bathing Dep NT                             Toileting Dep Dep    Incontinent of Bowel - Max A of 2 for bed mobility, hygiene                            Bed Mobility  Supine to sit: Dep x2    Sit to supine: Dep x2 Rolling/Repositioning:  Max A of 2     Placed pillow b/t knees for comfort, heels elevated for skin integrity                       Max A  in prep of ADL tasks & transfers   Functional Transfers Sit to stand: NT    Stand to sit: NT NT                       Max A  sit<>stand/functional bathroom transfers using AD/DME as needed for balance and safety   Functional Mobility NT NT                      Max A   functional/bathroom mobility using AD as needed & demonstrating fair safety     Balance Sitting:  (kyphotic posture noted)    Static: Dep    Dynamic:Dep  Standing: NT  Max A dynamic sitting balance; Max A dynamic standing balance  during ADL tasks & transfers   Endurance/Activity Tolerance   poor tolerance with light activity.    fair   tolerance with light/moderate activity/self care routine   Visual/  Perceptual Impaired: decreased visual tracking                         Treatment:   · Bed mobility: Facilitated bed mobility with cues for proper body mechanics and sequencing to prepare for ADL completion. Assist of 2 for safety d/t pt's medical complexity. · Therapeutic Exercises: B UE PROM completed within available range to maintain strength/flexibility and to decrease edema/contractures to enhance ADL completion. · Cognitive/Perceptual training: retraining exercises to improve attention, mentation, and spatial awareness for ADLs & transfers. · Skilled positioning: Proper positioning to improve interaction with environment, overall functioning and decrease/prevent edema and contractures. · Delirium Prevention/Management: Implementation of non-pharmacologic interventions for delirium prevention incorporated throughout session to patient. Including environmental and sensory modifications to decrease patient's internal distraction caused by delirium, and improve overall mentation. Comments: OK from RN to see patient. Upon arrival, patient found in side-lying in bed. Pt demo poor(+) tolerance with poor understanding of education/techniques. At end of session, patient was properly positioned in side-lying in bed as noted above. Call light within reach, all lines and tubes intact. Pt instructed on use of call light for assistance and fall prevention. Line management and environmental modifications made prior to and end of session to ensure patient safety and to increase efficiency of session. Skilled monitoring of HR, O2 saturation, blood pressure and patient's response to activity performed throughout session. Overall, pt presents with decreased activity tolerance, dynamic balance, functional mobility limiting completion of ADLs and safe return home. Pt can benefit from continued skilled OT to increase safety and functional independence.      Rehab Potential:  Limited for established goals     Patient / Family Goal: not stated      Patient and/or family were instructed on functional diagnosis, prognosis/goals and OT plan of care. Demonstrated poor understanding.       Time In: 1202  Time Out: 1212  Total Treatment Time: 10 minutes    Min Units   OT Eval Low 94283       OT Eval Medium 61337      OT Eval High [id number]       OT Re-Eval [id number]       Therapeutic Ex [id number]       Therapeutic Activities 91610      ADL/Self Care 72780  10  1   Orthotic Management 52175       Neuro Re-Ed 62994       Non-Billable Time              AMRITA Baxter, OTR/L  # [id number]

## 2021-02-04 NOTE — CARE COORDINATION
2/4/21 Update CM Note: Per id patient iv antibiotics stopped and oral started. [address] Kelsy Flynn for discharge back to [facility]. Will have owens removed and oxygen is off. Restraints to be removed once owens is out. Will follow for readiness to discharge possible today.  Skylar Velez RN CM

## 2021-02-04 NOTE — DISCHARGE SUMMARY
Hospital Medicine Discharge Summary    Patient ID: Aylin Hackett      Patient's PCP: Nicole Chavarria MD    Admit Date: 1/27/2021     Discharge Date:  2/4/21    Admitting Physician: Jodie Danielson MD     Discharge Physician: Ruby Gunderson MD       Active Hospital Problems    Diagnosis Date Noted    Parkinson's disease ([facility] [G20]     Gram positive septicemia ([facility] [A41.89] 01/29/2021    Sepsis ([address] [A41.9] 01/28/2021    Severe dehydration [E86.0] 10/28/2020    Fever, unspecified [R50.9]     Dehydration with hypernatremia [E87.0] 01/29/2020       The patient was seen and examined on day of discharge and this discharge summary is in conjunction with any daily progress note from day of discharge. Hospital Course: 79 yo patient with severe mental retardation and developmental disabilities presents from a group home with fever  Initial evaluation demonstrates that he is profoundly dehydrated  He has been seen multiple times for the same issue  At presentation his sodium was 168 BUN 55 creatinine 1.5 CBC was normal  He was borderline hypotensive   Patient is completely nonverbal does not answer any questions unable to be assessed unable to complete  He was started on hydration and he was given empiric antibiotics  Procalcitonin was obtained and demonstrates no evidence of sepsis or infection  Urine was clear There is no leukocytosis     Subjective: Patient seen and examined by me personally. Upon evaluation he is not interactive he is in the decubitus position with multiple contractures  There are no reported skin lesions    Discharge Diagnoses:  Right lung pneumonia  Secondary to aspiration  Continue current antibiotic management  Cultures positive for-Coag negative staph with multiple species and this is considered a contaminant  ID consulted continue follow recommendation patient is on antibiotics  Zosyn switched to Augmentin and Levaquin day 7/7  After completion of antibiotics patient can be (TEGRETOL) 200 MG tablet Take 200 mg by mouth 2 times daily       acetaminophen (TYLENOL) 325 MG tablet Take 650 mg by mouth every 4 hours as needed for Pain. benztropine (COGENTIN) 1 MG tablet Take 1 mg by mouth 2 times daily              Time Spent on discharge is more than 45 minutes in the examination, evaluation, counseling and review of medications and discharge plan. Patient is stable at discharge  Signed:    Ghada Salazar MD   2/4/2021      Thank you Samson Sandoval MD for the opportunity to be involved in this patient's care. If you have any questions or concerns please feel free to contact me at [phone].

## 2021-02-04 NOTE — PROGRESS NOTES
Nurse to nurse report given to [facility] from [facility] [place]. All questions answered.     Patricia Gore RN

## 2021-02-04 NOTE — CARE COORDINATION
2/4/21 Update CM Note: Call placed to Elodia Palmer at [facility] [place]. Notified her patient will discharge today. [facility] ambulance to  at 8pm via stretcher for transport to [facility] to [employer]. Nurse to Nurse to be called to [phone]. Dr Miranda Castellanos notified to call scripts to [address] [place] [address] at [phone]. Bedside nurse notified of time of pickup and nurse to nurse.  Low Garcia RN Cm

## 2021-02-04 NOTE — PROGRESS NOTES
Patient's Augmentin called in to [facility] in [place] per Dr Marcus Carolina order.     Dustin Brewer RN

## 2021-02-04 NOTE — PLAN OF CARE
Problem: Skin Integrity:  Goal: Will show no infection signs and symptoms  Description: Will show no infection signs and symptoms  Outcome: Met This Shift  Goal: Absence of new skin breakdown  Description: Absence of new skin breakdown  Outcome: Met This Shift     Problem: Falls - Risk of:  Goal: Will remain free from falls  Description: Will remain free from falls  Outcome: Met This Shift  Goal: Absence of physical injury  Description: Absence of physical injury  Outcome: Met This Shift     Problem: Restraint Use - Nonviolent/Non-Self-Destructive Behavior:  Goal: Absence of restraint indications  Description: Absence of restraint indications  Outcome: Met This Shift  Goal: Absence of restraint-related injury  Description: Absence of restraint-related injury  Outcome: Met This Shift

## 2021-02-04 NOTE — PROGRESS NOTES
mg Intravenous Once     PRN Meds: methyl salicylate, Mineral Oil-Hydrophil Petrolat, acetaminophen **OR** acetaminophen, diphenhydrAMINE, sodium chloride flush, promethazine **OR** ondansetron, polyethylene glycol      Intake/Output Summary (Last 24 hours) at 2/4/2021 1413  Last data filed at 2/4/2021 1233  Gross per 24 hour   Intake 1206.67 ml   Output 1250 ml   Net -43.33 ml       Exam:    /74   Pulse 100   Temp 98.7 °F (37.1 °C) (Temporal)   Resp 15   Ht 5' 5\" (1.651 m)   Wt 121 lb 0.5 oz (54.9 kg)   SpO2 95%   BMI 20.14 kg/m²     General appearance: No apparent distress,  HEENT: Pupils equal, round, and reactive to light. Conjunctivae/corneas clear. Neck: Supple, with full range of motion. No jugular venous distention. Trachea midline. Respiratory:  Normal respiratory effort. Decreased breath sound bilaterally cardiovascular: Regular rate and rhythm with normal S1/S2 without murmurs, rubs or gallops. Abdomen: Soft, non-tender, non-distended with normal bowel sounds. Musculoskeletal: All extremities are contracted  Skin: Decubitus  Neurologic:  Neurovascularly at baseline     Labs:   Recent Labs     02/02/21 0658 02/03/21  0653 02/04/21  0439   WBC 5.0 6.3 7.2   HGB 11.0* 12.4* 12.9   HCT 35.8* 35.8* 38.0   * 111* 146     Recent Labs     02/02/21  0007 02/03/21  0653 02/04/21  0439   * 141 144   K 3.8 3.9 3.6   * 107 107   CO2 23 24 25   BUN 14 10 12   CREATININE 0.7 0.6* 0.7   CALCIUM 8.2* 9.2 8.8     Recent Labs     02/02/21  0007   AST 67*   ALT 81*   BILITOT <0.2   ALKPHOS 96     No results for input(s): INR in the last 72 hours.   Recent Labs     02/03/21  0653   CKTOTAL 1,177*       Assessment/Plan:    Active Hospital Problems    Diagnosis Date Noted    Parkinson's disease ([facility] [G20]     Gram positive septicemia ([facility] [A41.89] 01/29/2021    Sepsis ([facility] [A41.9] 01/28/2021    Severe dehydration [E86.0] 10/28/2020    Fever, unspecified [R50.9]     Dehydration with hypernatremia [E87.0] 01/29/2020     Right lung pneumonia  Secondary to aspiration  Continue current antibiotic management  Cultures positive for-Coag negative staph with multiple species and this is considered a contaminant  ID consulted continue follow recommendation patient is on antibiotics  Zosyn switched to Augmentin and Levaquin day 7/7  After completion of antibiotics patient can be discharged    Severe dehydration and hypernatremia  Resolved status post IV fluid resuscitation  Chemistry within normal limits    Mental retardation  Continue supportive care    DVT Prophylaxis:   Diet: DIET GENERAL; Dysphagia Pureed  Dietary Nutrition Supplements: Standard High Calorie Oral Supplement  Dietary Nutrition Supplements: Frozen Oral Supplement  Code Status: Full Code    PT/OT Eval Status: NA bedridden     Dispo - Legrand Sandhoff, MD

## 2021-02-05 NOTE — CARE COORDINATION
Azael [facility] Transitions Initial Follow Up Call    Call within 2 business days of discharge: Yes    Patient: Nella Chandler Patient : 1950   MRN: [id number]  Reason for Admission: hypoxia  Discharge Date: 21 RARS: Readmission Risk Score: 37      Last Discharge [facility]       Complaint Diagnosis Description Type Department Provider    21 Shortness of Breath Hypoxia . .. ED to Hosp-Admission (Discharged) (ADMITTED) [facility] 8S CDU Suzanne Zavaleta MD; Vilma Griffith... Challenges to be reviewed by the provider   Additional needs identified to be addressed with provider No  none    Discussed COVID-19 related testing which was available at this time. Test results were negative. Jailene jackson [facility] informed of results, if available? Already aware         Method of communication with provider : none    Medication reconciliation was not performed with Jailene at [address] [place] [address]. CTN able to confirm with Jailene new medications of augmentin and lactobacillus as per AVS.    Non-face-to-face services provided:  Scheduled appointment with PCP-tentative virtual visit to be completed on 21 per Jailene at [address] [place] [address]. Staff is always able to contact PCP fif needed. Obtained and reviewed discharge summary and/or continuity of care documents    Care Transitions 24 Hour Call    Do you have a copy of your discharge instructions?: Yes  Do you have all of your prescriptions and are they filled?: Yes  Have you been contacted by a [facility]?: No  Have you scheduled your follow up appointment?: Yes  How are you going to get to your appointment?: Other (Comment: 21-tentative virtual visit on 21)  Were you discharged with any Home Care or Post Acute Services: No  Do you feel like you have everything you need to keep you well at home?: Yes  Care Transitions Interventions  No Identified Needs       -Spoke with Jailene jackson [facility] for initial BPCI care transition call post hospital discharge.  Explained the role of Care Transition Nurse and the BPCI-A program.  -Saskia Johns states patient is \"doing fine\" in that his vital signs have been good and he is back to his baseline for LOC. Saskia Johns states patient has been drinking his nectar thick liquids well but has been coughing with his pureed diet so he will be having a speech evaluation this weekend. -CTN explained to Saskia Johns that a member of the Care Transition Central Team will be contacting the group home for further follow up calls, Jailene verbalized understanding. -CTN will hand off to the Care Transition Central team to follow.            Follow Up  Future Appointments   Date Time Provider Ricco Hart   2/23/2021 10:20 AM MARIJA Queen [facility]   5/4/2021 11:00 AM MARIJA Queen [facility]       Jacquelin Johnson RN

## 2021-02-05 NOTE — TELEPHONE ENCOUNTER
Patient saw Yolanda Suero in hospital, just released. Rosendo Deshaun mentioned that he may start him back on Sinemet. Please advise.   Electronically signed by Nathalia Hadley MA on 2/5/21 at 10:33 AM EST

## 2021-02-12 NOTE — CARE COORDINATION
A[facility] 45 Transitions Follow Up Call    2021    Patient: Joy Gasca  Patient : 1950   MRN: <[id number]>  Reason for Admission: Hypoxia  Discharge Date: 21 RARS: Readmission Risk Score: 37       Attempted to contact LTC facility  for BPCI-A call. Contact information left to  requesting call back at the earliest convenience.     Follow Up  Future Appointments   Date Time Provider Ricco Hart   2021 10:20 AM MARIJA Whelan [facility]   2021 11:00 AM MARIJA Whelan [facility]       Carlos Hollis RN

## 2021-02-19 NOTE — CARE COORDINATION
[facility] Transitions Follow Up Call    2021    Patient: Brittney Ram  Patient : 1950   MRN: [id number]  Reason for Admission:   Discharge Date: 21 RARS: Readmission Risk Score: 40         Spoke with: Glenny, nurse caring for the patient. Glenny provides patient update.   -denies patient with fever, chills, or shortness of breath   -reports SpO2 97% RA  -continues with nectar thick liquids and pureed diet  -continues to cough at times with meals; no speech evaluation to date    CTN thanked nurse Glenny for her time regarding this patient. Will continue to follow.       Rommel Domingo RN

## 2021-02-23 PROBLEM — R09.02 HYPOXIA: Status: ACTIVE | Noted: 2021-01-01

## 2021-02-23 NOTE — PROGRESS NOTES
Kraig Rivera is a 78 y.o. man    Patient well known to me for developmental disabilities, seizure disorder and recently diagnosed with Parkinson's disease   Evaluated in the hospital with pneumonia but at the time his Zyprexa was stopped because of questionable malignant hyperthermia (elevated CKs and temp on admission)    His seizures have been well controlled on Keppra and Tegretol having no seizures in a number of years until this past spring (Keppra added and they again retooled)     Previously used Depakote for behavior difficulties rather than seizures    Over the last 2 years he began to develop a tremor which subsequently changed his Depakote dosing without resolution in said tremor   Neurology questioned Parkinson's disease versus parkinsonisms with his underlying developmental disabilities    He was tried on Neupro which improved his walking but caused increased agitation. Neupro was stopped and Sinemet was started which markedly improved his walking, his tremor -- staff reported he was able to hold his own juice cup. However psychiatry wanted to stop his Sinemet in December because of potential interactions with Zyprexa.      Now back on Zyprexa since discharge but is weaning off and facility will notify me when that happens     Admitted with SOB -- fever -- temp as high as 104.5   WBC and ESR appeared normal  Blood cx with gram +  ID following -- blood cx felt to be contaminated  Right lung pneumonia identified as well as severe hypernatremia and dehydration     CKs improved as did temp     No seizures have been reported     Objective:   Temp 97.1 °F (36.2 °C)    Not cooperative with BP      General appearance: awake - looking around room -- marked head titubation and tremors    Head: Normocephalic, without obvious abnormality, atraumatic  Extremities: no cyanosis or edema  Pulses: 2+ and symmetric  Skin: no rashes or lesions     Mental Status: awake -- looking around the room     Cranial Nerves:  I: smell    II: visual acuity     II: visual fields Bilateral threat response    II: pupils NOMI   III,VII: ptosis    III,IV,VI: extraocular muscles  Follows examiner around room    V: mastication    V: facial light touch sensation  Grimaces to PP bilaterally    V,VII: corneal reflex  Present   VII: facial muscle function - upper     VII: facial muscle function - lower Normal   VIII: hearing    IX: soft palate elevation     IX,X: gag reflex    XI: trapezius strength     XI: sternocleidomastoid strength    XI: neck extension strength     XII: tongue strength       Motor:  Spastic quadriparetic  Marked tremor in both arms - at rest   Cogwheel rigidity also appreciated     Sensory:  Grimaces to noxious stim in both hands      DTR:   No reflexes    No Babinski  No Grey's     Laboratory/Radiology:     CBC with Differential:    Lab Results   Component Value Date    WBC 7.2 02/04/2021    RBC 4.06 02/04/2021    HGB 12.9 02/04/2021    HCT 38.0 02/04/2021     02/04/2021    MCV 93.6 02/04/2021    MCH 31.8 02/04/2021    MCHC 33.9 02/04/2021    RDW 12.6 02/04/2021    METASPCT 0.9 01/31/2020    LYMPHOPCT 5.3 02/04/2021    MONOPCT 10.2 02/04/2021    BASOPCT 0.0 02/04/2021    MONOSABS 0.73 02/04/2021    LYMPHSABS 0.38 02/04/2021    EOSABS 0.15 02/04/2021    BASOSABS 0.00 02/04/2021     CMP:    Lab Results   Component Value Date     02/04/2021    K 3.6 02/04/2021    K 3.8 02/02/2021     02/04/2021    CO2 25 02/04/2021    BUN 12 02/04/2021    CREATININE 0.7 02/04/2021    GFRAA >60 02/04/2021    LABGLOM >60 02/04/2021    GLUCOSE 86 02/04/2021    GLUCOSE 71 05/18/2012    PROT 5.5 02/02/2021    LABALBU 3.1 02/02/2021    LABALBU 4.5 05/18/2012    CALCIUM 8.8 02/04/2021    BILITOT <0.2 02/02/2021    ALKPHOS 96 02/02/2021    AST 67 02/02/2021    ALT 81 02/02/2021     I independently reviewed the lab studies today.      Assessment:     History of developmental disabilities with seizure disorder   Stable on Keppra 1000mg BID and Tegretol 200mg BID    Suspected Parkinson's disease   Previously responded to Sinemet well but d/t medication interaction, psychiatry requested to stop in December 2020    Now admitted with SOB, fever and right lung pneumonia      Plan:     Continue Keppra and Tegretol     Consider restarting Sinemet if/when stable     Mirna Eckert  10:50 AM  2/23/2021

## 2021-02-23 NOTE — ED PROVIDER NOTES
I, Dr. Genna Riojas, am the primary provider of record. ATTENDING PROVIDER ATTESTATION:     Aylin Hackett presented to the emergency department for evaluation of Shortness of Breath (per [facility] pulse ox 72% on room air)   and was initially evaluated by the Medical Resident. See Original ED Note for H&P and ED course above. I have reviewed and discussed the case, including pertinent history (medical, surgical, family and social) and exam findings with the Medical Resident assigned to Aylin Hackett. I have personally performed and/or participated in the history, exam, medical decision making, and procedures and agree with all pertinent clinical information. I have reviewed my findings and recommendations with the assigned Medical Resident, Aylin Hackett and members of family present at the time of disposition. My findings/plan: The encounter diagnosis was Acute respiratory failure with hypoxia ([address] Current Discharge Medication List        DO Eden Carty is a 74-year-old male who is nonverbal who presented from [facility] to [facility] for hypoxia. Patient was 72% on room air and brought to emergency department for evaluation. Patient was placed on oxygen with improvement of hypoxia. Patient is nonverbal and does not have any complaints. NH staff reported the patient's ED breathing heavy today and was found to be hypoxic. Patient was brought into emergency department for evaluation. Patient has similar symptoms previously and was found to have positive blood cultures at that time. Patient is not had any fevers at nursing home. Patient does have a history of chronic constipation is unchanged. Patient has appeared comfortable to nursing home staff. Patient symptoms improve with oxygen. Nothing makes symptoms worse. Patient symptoms are moderate in severity and constant. Patient symptoms has been present for about 1 day.      The history is provided by the patient and medical records. Review of Systems   Unable to perform ROS: Patient nonverbal        Physical Exam  Vitals signs and nursing note reviewed. Constitutional:       Appearance: He is ill-appearing. He is not toxic-appearing or diaphoretic. HENT:      Head: Normocephalic and atraumatic. Eyes:      Pupils: Pupils are equal, round, and reactive to light. Neck:      Musculoskeletal: Normal range of motion and neck supple. No neck rigidity or muscular tenderness. Cardiovascular:      Rate and Rhythm: Regular rhythm. Tachycardia present. Pulmonary:      Effort: Pulmonary effort is normal.      Breath sounds: Normal breath sounds. No stridor. No wheezing, rhonchi or rales. Chest:      Chest wall: No tenderness. Abdominal:      General: Bowel sounds are normal. There is no distension. Palpations: Abdomen is soft. Tenderness: There is no abdominal tenderness. There is no guarding or rebound. Musculoskeletal:      Right lower leg: No edema. Left lower leg: No edema. Skin:     General: Skin is warm and dry. Capillary Refill: Capillary refill takes less than 2 seconds. Coloration: Skin is not pale. Findings: No erythema or rash. Neurological:      Mental Status: He is alert. Comments: Patient is contracted and nonverbal.   Psychiatric:         Mood and Affect: Mood normal.          Procedures     MDM  Number of Diagnoses or Management Options  Acute respiratory failure with hypoxia ([facility] [address]  Diagnosis management comments: Samantha eKller is a 30-year-old male present emergency department concern for shortness of breath. Patient was hypoxic at nursing home. Patient was placed on nasal cannula at 4 to 6 L in emergency department with resolution of hypoxia. Patient's pulse ox measured with a forehead pulse oximeter with a good waveform. Patient did become hypoxic and when he pulled off his oxygen. Patient is a poor historian and does not provide any history.   Chest x-ray showed dilated loops of small bowel. Patient's abdomen was soft and nontender patient does not have any vomiting at all unlikely patient symptoms are due to a bowel obstruction at this time. KUB was done as patient cannot hold still for a CT scan as he has a diffuse tremor. Patient's D-dimer was normal when corrected for age. Patient did not have any findings of acute infectious process. Patient was not started on antibiotics. Patient's respiratory panel was negative. Patient will be admitted for acute hypoxic respiratory failure. Patient is at his baseline mental status per caregiver who is at bedside. Discussed results and plan with caregiver she is agreeable to admission. Patient was given 1600cc IVF while in ED, patient did have elevated lactic, repeat lactic pending. Tachycardia improved with IVF              ED Course as of Feb 24 1538 Tue Feb 23, 2021 2005 Patient accepted for admission by Dr. Dc Reyes    [SS]   Wed Feb 24, 2021   0215 EKG: This EKG is signed and interpreted by me. Rate: 113  Rhythm: Sinus  Interpretation: non-specific EKG, No ST elevations or depressions  Comparison: changes compared to previous EKG      [SS]      ED Course User Index  [SS] Roberto Sandhu MD       --------------------------------------------- PAST HISTORY ---------------------------------------------  Past Medical History:  has a past medical history of Acquired deformity of neck, Autism spectrum, Bilateral cataracts, Blepharochalasis, Cardiomegaly, Cataract of both eyes, Developmental delay, Gastritis, Hyperlipidemia, Kyphosis of thoracic region, Mental retardation, Obsessive compulsive disorder, Parkinsonism ([address] Seizures ([address] and Tourette disorder. Past Surgical History:  has a past surgical history that includes Toe amputation (Left). Social History:  reports that he has never smoked. He has never used smokeless tobacco. He reports that he does not drink alcohol or use drugs.     Family History: family history is not on file. The patients home medications have been reviewed. Allergies: Patient has no known allergies.     -------------------------------------------------- RESULTS -------------------------------------------------    LABS:  Results for orders placed or performed during the hospital encounter of 02/23/21   Culture, Blood 2    Specimen: Blood   Result Value Ref Range    Culture, Blood 2 (A)      Gram stain performed from blood culture bottle media  Gram positive cocci in clusters     Respiratory Panel, Molecular, with COVID-19 (Restricted: peds pts or suitable admitted adults)    Specimen: Nasopharyngeal   Result Value Ref Range    Adenovirus by PCR Not Detected Not Detected    Bordetella parapertussis by PCR Not Detected Not Detected    Bordetella pertussis by PCR Not Detected Not Detected    Chlamydophilia pneumoniae by PCR Not Detected Not Detected    Coronavirus 229E by PCR Not Detected Not Detected    Coronavirus HKU1 by PCR Not Detected Not Detected    Coronavirus NL63 by PCR Not Detected Not Detected    Coronavirus OC43 by PCR Not Detected Not Detected    SARS-CoV-2, PCR Not Detected Not Detected    Human Metapneumovirus by PCR Not Detected Not Detected    Human Rhinovirus/Enterovirus by PCR Not Detected Not Detected    Influenza A by PCR Not Detected Not Detected    Influenza B by PCR Not Detected Not Detected    Mycoplasma pneumoniae by PCR Not Detected Not Detected    Parainfluenza Virus 1 by PCR Not Detected Not Detected    Parainfluenza Virus 2 by PCR Not Detected Not Detected    Parainfluenza Virus 3 by PCR Not Detected Not Detected    Parainfluenza Virus 4 by PCR Not Detected Not Detected    Respiratory Syncytial Virus by PCR Not Detected Not Detected   Lactate, Sepsis   Result Value Ref Range    Lactic Acid, Sepsis 5.8 (HH) 0.5 - 1.9 mmol/L   Lactate, Sepsis   Result Value Ref Range    Lactic Acid, Sepsis 0.7 0.5 - 1.9 mmol/L   Troponin   Result Value Ref Range    Troponin 0. 01 0.00 - 0.03 ng/mL   CBC auto differential   Result Value Ref Range    WBC 4.6 4.5 - 11.5 E9/L    RBC 4.22 3.80 - 5.80 E12/L    Hemoglobin 13.1 12.5 - 16.5 g/dL    Hematocrit 41.5 37.0 - 54.0 %    MCV 98.3 80.0 - 99.9 fL    MCH 31.0 26.0 - 35.0 pg    MCHC 31.6 (L) 32.0 - 34.5 %    RDW 13.4 11.5 - 15.0 fL    Platelets 576 614 - 788 E9/L    MPV 9.6 7.0 - 12.0 fL    Neutrophils % 45.5 43.0 - 80.0 %    Immature Granulocytes % 0.2 0.0 - 5.0 %    Lymphocytes % 34.6 20.0 - 42.0 %    Monocytes % 15.7 (H) 2.0 - 12.0 %    Eosinophils % 3.3 0.0 - 6.0 %    Basophils % 0.7 0.0 - 2.0 %    Neutrophils Absolute 2.09 1.80 - 7.30 E9/L    Immature Granulocytes # 0.01 E9/L    Lymphocytes Absolute 1.59 1.50 - 4.00 E9/L    Monocytes Absolute 0.72 0.10 - 0.95 E9/L    Eosinophils Absolute 0.15 0.05 - 0.50 E9/L    Basophils Absolute 0.03 0.00 - 0.20 E9/L   Comprehensive Metabolic Panel w/ Reflex to MG   Result Value Ref Range    Sodium 146 132 - 146 mmol/L    Potassium reflex Magnesium 4.5 3.5 - 5.0 mmol/L    Chloride 106 98 - 107 mmol/L    CO2 23 22 - 29 mmol/L    Anion Gap 17 (H) 7 - 16 mmol/L    Glucose 136 (H) 74 - 99 mg/dL    BUN 37 (H) 8 - 23 mg/dL    CREATININE 1.0 0.7 - 1.2 mg/dL    GFR Non-African American >60 >=60 mL/min/1.73    GFR African American >60     Calcium 9.3 8.6 - 10.2 mg/dL    Total Protein 7.7 6.4 - 8.3 g/dL    Albumin 4.4 3.5 - 5.2 g/dL    Total Bilirubin <0.2 0.0 - 1.2 mg/dL    Alkaline Phosphatase 108 40 - 129 U/L    ALT 22 0 - 40 U/L    AST 32 0 - 39 U/L   Urinalysis with Microscopic   Result Value Ref Range    Color, UA Yellow Straw/Yellow    Clarity, UA Clear Clear    Glucose, Ur Negative Negative mg/dL    Bilirubin Urine Negative Negative    Ketones, Urine TRACE (A) Negative mg/dL    Specific Gravity, UA 1.025 1.005 - 1.030    Blood, Urine Negative Negative    pH, UA 6.5 5.0 - 9.0    Protein, UA Negative Negative mg/dL    Urobilinogen, Urine 0.2 <2.0 E.U./dL    Nitrite, Urine Negative Negative    Leukocyte Ref Range    Troponin 0.01 0.00 - 0.03 ng/mL   Lipase   Result Value Ref Range    Lipase 43 13 - 60 U/L   Procalcitonin   Result Value Ref Range    Procalcitonin 0.10 (H) 0.00 - 0.08 ng/mL   Carbamazepine Level, Total   Result Value Ref Range    Carbamazepine Lvl 9.9 4.0 - 10.0 mcg/mL    Carbamazepine Dose Unknown    Arterial Blood Gas, Respiratory Only   Result Value Ref Range    Source: Arterial     FIO2 Arterial 4.0     Pt Temp 37.0     PH (TEMPERATURE CORRECTED) 7.442 7.350 - 7.450    PCO2 (TEMP CORRECTED) 36.1 35.0 - 45.0 mmHg    PO2 (TEMP CORRECTED) 63.2 60.0 - 80.0 mmHg    HCO3, Arterial 24.7 22.0 - 26.0 mmol/L    B.E. 0.7 (H) -3.0 - 0.0 mmol/L    O2 Sat 92.8 92.0 - 98.5 %     ID 1,874     DEVICE [id number]     [employer] Systems Cannula    POCT Glucose   Result Value Ref Range    Glucose 136 mg/dL    QC OK? ok    EKG 12 Lead   Result Value Ref Range    Ventricular Rate 133 BPM    Atrial Rate 133 BPM    P-R Interval 132 ms    QRS Duration 78 ms    Q-T Interval 300 ms    QTc Calculation (Bazett) 446 ms    P Axis 30 degrees    R Axis 49 degrees    T Axis -16 degrees       RADIOLOGY:  CT CHEST WO CONTRAST   Final Result   Bibasilar atelectasis. No acute infiltrates. CT ABDOMEN PELVIS W IV CONTRAST   Final Result   There is stable marked gallbladder distension and cholelithiasis. There is again seen marked dilatation of the rectosigmoid colon, with large   volume of stool. Correlate for fecal impaction. No acute inflammation is   identified. Hiatal hernia. XR ABDOMEN (2 VIEWS)   Final Result   Prominent gas, stool, and caliber from cecum to rectum may reflect a   combination of constipation with paralytic ileus   Persisting large hiatal hernia   Stable nonspecific elevation of right diaphragm      XR CHEST PORTABLE   Final Result   1. No pneumonia or pleural effusion. 2.  Multiple gas-filled distended loops of bowel in visualized upper abdomen. ------------------------- NURSING NOTES AND VITALS REVIEWED ---------------------------  Date / Time Roomed:  2/23/2021  3:47 PM  ED Bed Assignment:  [id number]    The nursing notes within the ED encounter and vital signs as below have been reviewed. Patient Vitals for the past 24 hrs:   BP Temp Temp src Pulse Resp SpO2 Height Weight   02/24/21 0935      97 %     02/24/21 0930 122/68 98.9 °F (37.2 °C) Temporal 100 16 (!) 86 %     02/24/21 0400 (!) 154/80 98.8 °F (37.1 °C) Temporal 90 20 94 %     02/24/21 0220 (!) 160/63 98.6 °F (37 °C) Temporal 110 24 93 %     02/24/21 0035 123/70 100 °F (37.8 °C)  104 18 95 %     02/23/21 2352 123/70 100 °F (37.8 °C) Oral 108 24 96 %     02/23/21 2246 113/67   104 24 94 %     02/23/21 2027 98/68   96 22 94 %     02/23/21 1622     28 93 %     02/23/21 1554 108/72 97.5 °F (36.4 °C)  133 20 (!) 89 % 5' 5\" (1.651 m) 121 lb (54.9 kg)       Oxygen Saturation Interpretation: Normal    ------------------------------------------ PROGRESS NOTES ------------------------------------------  Re-evaluation(s):  Time: 7pm  Patients symptoms show no change  Repeat physical examination is not changed    Counseling:  I have spoken with the caregiver at bedside and discussed todays results, in addition to providing specific details for the plan of care and counseling regarding the diagnosis and prognosis. Their questions are answered at this time and they are agreeable with the plan of admission.    --------------------------------- ADDITIONAL PROVIDER NOTES ---------------------------------  Consultations:  Spoke with [facility]. Discussed case. They will admit the patient. This patient's ED course included: a personal history and physicial examination, re-evaluation prior to disposition, multiple bedside re-evaluations, IV medications, cardiac monitoring and continuous pulse oximetry    This patient has remained hemodynamically stable during their ED course.

## 2021-02-23 NOTE — ED NOTES
Pt repeatedly removing O2, monitor leads, and gown. Caregiver staff from [facility] at bedside.       Mary Norman RN  02/23/21 [id number]

## 2021-02-23 NOTE — ED NOTES
Bed: 08  Expected date: 2/23/21  Expected time:   Means of arrival: [facility]  Comments:  EYAL Stephen RN  02/23/21 1543

## 2021-02-24 PROBLEM — Z87.898 HISTORY OF SEIZURE: Status: ACTIVE | Noted: 2021-01-01

## 2021-02-24 PROBLEM — J96.01 ACUTE RESPIRATORY FAILURE WITH HYPOXIA (HCC): Status: ACTIVE | Noted: 2020-10-29

## 2021-02-24 PROBLEM — K59.00 CONSTIPATION: Status: ACTIVE | Noted: 2021-01-01

## 2021-02-24 PROBLEM — R82.90 ABNORMAL URINALYSIS: Status: ACTIVE | Noted: 2021-01-01

## 2021-02-24 PROBLEM — E87.20 LACTIC ACIDOSIS: Status: ACTIVE | Noted: 2021-01-01

## 2021-02-24 PROBLEM — F84.0 AUTISM SPECTRUM DISORDER ASSOCIATED WITH NEURODEVELOPMENTAL, MENTAL OR BEHAVIORAL DISORDER, REQUIRING VERY SUBSTANTIAL SUPPORT (LEVEL 3): Status: ACTIVE | Noted: 2021-01-01

## 2021-02-24 NOTE — PROGRESS NOTES
[facility] was ordered Fluvoxamine ER 150mg which is a nonformulary medication. The patient's home supply of this medication should be brought in to the hospital for inpatient use. If the medication has not been administered by 1400 on the following day from the time the order was placed, a pharmacist will follow-up with the nurse of the patient to assess the capability of the patient to bring in the medication. If it is determined that the patient cannot supply the medication and it is not available to be dispensed from the pharmacy, a call will be placed to the ordering provider to discuss alternative options.

## 2021-02-24 NOTE — CONSULTS
INFECTIOUS DISEASE CONSULT NOTE  [facility]    Patient:  Luiz Maria 78 y.o. male   MRN: [id number]       Date of Service: 2/24/2021      Chief Complaint: lactic acidosis    History of Present Illness   The patient is a 78 y.o. male whom is knonw to the Infectious disease service with past medical history of intellectual disability, parkinson's disease, severe protein malnutrition, and seizures presents to the hospital for hypoxia. Infectious disease consulted for infectious etiology of hypoxia. Patient nonverbal and tremulous while in the bed. Per notes patient was short of breath and hypoxic saturating in  78%. Placed on nasal canula and given fluids. Vitals stabilized and patient overall status improved. Patient recently discharged from [facility] where he was treated for aspiration PNA. CT chest abdomen and pelvis shows no acute pathology of lungs but does show copious fecal impaction. Infectious disease history  -Aspiration PNA: augmentin and levaquin for total of 7 days; CONS blood culture considered contaminant         Past Medical History:      Diagnosis Date    Acquired deformity of neck     Autism spectrum     Bilateral cataracts     Blepharochalasis     Cardiomegaly     Cataract of both eyes     Developmental delay     Gastritis     Hyperlipidemia     Kyphosis of thoracic region     Mental retardation     SEVERE MR  NON VERBAL, NEEDS WHEELCHAIR FOR LONG DISTANCE    Obsessive compulsive disorder     Parkinsonism ([address]     Seizures (HCC)     Tourette disorder        Past Surgical History:        Procedure Laterality Date    TOE AMPUTATION Left     2nd/3rd       Medications Prior to Admission:    Prior to Admission medications    Medication Sig Start Date End Date Taking?  Authorizing Provider   ipratropium-albuterol (DUONEB) 0.5-2.5 (3) MG/3ML SOLN nebulizer solution Inhale 1 vial into the lungs every 4 hours as needed for Shortness of Breath   Yes Historical Provider, MD levETIRAcetam (KEPPRA) 500 MG tablet Take 500 mg by mouth 2 times daily   Yes Historical Provider, MD   Calcium Carb-Cholecalciferol (CALCIUM-VITAMIN D) 500-200 MG-UNIT per tablet Take 1 tablet by mouth 2 times daily 6/23/20  Yes Azalia Lyles PA-C   vitamin D (ERGOCALCIFEROL) 1.25 MG (07369 UT) CAPS capsule Take 50,000 Units by mouth every 30 days Given on the 14 th   Yes Historical Provider, MD   fluvoxaMINE (LUVOX CR) 150 MG CP24 extended release capsule Take 150 mg by mouth daily    Yes Historical Provider, MD   mirtazapine (REMERON) 30 MG tablet Take 30 mg by mouth nightly  2/12/20  Yes Historical Provider, MD   polyethylene glycol (MIRALAX) 17 g packet Take 17 g by mouth daily as needed    Yes Historical Provider, MD   OLANZapine (ZYPREXA) 7.5 MG tablet Take 7.5 mg by mouth nightly    Yes Historical Provider, MD   omeprazole (PRILOSEC) 20 MG capsule Take 20 mg by mouth Daily    Yes Historical Provider, MD   carBAMazepine (TEGRETOL) 200 MG tablet Take 200 mg by mouth 2 times daily  6/5/15  Yes Historical Provider, MD   acetaminophen (TYLENOL) 325 MG tablet Take 650 mg by mouth every 4 hours as needed for Pain Patient takes 2 tablets every 4 hours PRN. Yes Historical Provider, MD   benztropine (COGENTIN) 1 MG tablet Take 1 mg by mouth 2 times daily    Yes Historical Provider, MD   lactobacillus (CULTURELLE) CAPS capsule Take 1 capsule by mouth daily  Patient not taking: Reported on 2/23/2021 2/5/21   Rohini Benavides MD       Allergies:  Patient has no known allergies. Social History:   TOBACCO:   reports that he has never smoked. He has never used smokeless tobacco.  ETOH:   reports no history of alcohol use. OCCUPATION:      Family History:   History reviewed. No pertinent family history. REVIEW OF SYSTEMS:  Constitutional: No fever, no chill or change in weight; good appetite  HEENT: No blurred vision, no ear problems, no sore throat, no running nose.   Respiratory: No cough, no sputum, no pleuritic organomegaly; fecal matter palpated    Genitalia:    Deferred.    Rectal:    Deferred   Extremities:   Extremities normal, atraumatic, no cyanosis or edema   Pulses:   2+ and symmetric all extremities   Skin:   Skin color, texture, turgor normal, no rashes or lesions   Lymph nodes:   Cervical, supraclavicular, and axillary nodes normal   Neurologic:   Alert but not oriented; does not follow commands,         Labs     CBC:   Lab Results   Component Value Date    WBC 6.6 02/24/2021    RBC 3.75 02/24/2021    HGB 11.9 02/24/2021    HCT 36.4 02/24/2021     02/24/2021    MCV 97.1 02/24/2021     BMP:    Lab Results   Component Value Date     02/24/2021    K 3.9 02/24/2021     02/24/2021    CO2 27 02/24/2021    BUN 31 02/24/2021    CREATININE 0.8 02/24/2021    GLUCOSE 62 02/24/2021    GLUCOSE 71 05/18/2012    CALCIUM 8.8 02/24/2021     Hepatic Function Panel:    Lab Results   Component Value Date    ALKPHOS 92 02/24/2021    AST 21 02/24/2021    ALT 17 02/24/2021    PROT 6.6 02/24/2021    LABALBU 3.9 02/24/2021    LABALBU 4.5 05/18/2012    BILITOT 0.3 02/24/2021     Magnesium:    Lab Results   Component Value Date    MG 2.1 02/01/2021     Cardiac Enzymes:   Lab Results   Component Value Date    CKTOTAL 1,177 (H) 02/03/2021    CKTOTAL 1,523 (H) 02/01/2021    CKTOTAL 3,788 (H) 01/31/2021    TROPONINI 0.01 02/24/2021    TROPONINI 0.01 02/23/2021    TROPONINI 0.01 02/23/2021     LDH:  No results found for: LDH  PT/INR:    Lab Results   Component Value Date    PROTIME 10.9 10/28/2020    INR 1.0 10/28/2020     U/A:   Lab Results   Component Value Date    LEUKOCYTESUR TRACE 02/23/2021    PHUR 6.5 02/23/2021    WBCUA 1-3 02/23/2021    RBCUA 0-1 02/23/2021    RBCUA NONE 10/08/2012    BACTERIA FEW 02/23/2021    SPECGRAV 1.025 02/23/2021    BLOODU Negative 02/23/2021    GLUCOSEU Negative 02/23/2021    GLUCOSEU NEGATIVE 09/18/2011     ABG:    Lab Results   Component Value Date    SXE0TOL 41.6 01/28/2021    PO2ART 66.2 01/28/2021    HVO2YYL 24.7 02/24/2021     TSH:    Lab Results   Component Value Date    TSH 1.700 02/04/2020     Cardiac Enzymes:   Lab Results   Component Value Date    CKTOTAL 1,177 (H) 02/03/2021    CKTOTAL 1,523 (H) 02/01/2021    CKTOTAL 3,788 (H) 01/31/2021    TROPONINI 0.01 02/24/2021    TROPONINI 0.01 02/23/2021    TROPONINI 0.01 02/23/2021       Notable Cultures:    Urine Culture:  No components found for: CURINE  Blood Culture:  No components found for: CBLOOD, CFUNGUSBL  Sputum Culture:  No components found for: CSPUTUM  Wound Culture:  none  Vancomycin:  Peak:  No components found for: VANCOPOST   Trough:  No components found for: VANCOPRE  Random:  No components found for: VANCORND  Gentamycin:  Peak:  No components found for: GENTPOST Trough:  No components found for: GENTPRE  Random:  No components found for: GENTRND    Imaging Studies:  Radiology:   CT CHEST WO CONTRAST   Final Result   Bibasilar atelectasis. No acute infiltrates. CT ABDOMEN PELVIS W IV CONTRAST   Final Result   There is stable marked gallbladder distension and cholelithiasis. There is again seen marked dilatation of the rectosigmoid colon, with large   volume of stool. Correlate for fecal impaction. No acute inflammation is   identified. Hiatal hernia. XR ABDOMEN (2 VIEWS)   Final Result   Prominent gas, stool, and caliber from cecum to rectum may reflect a   combination of constipation with paralytic ileus   Persisting large hiatal hernia   Stable nonspecific elevation of right diaphragm      XR CHEST PORTABLE   Final Result   1. No pneumonia or pleural effusion. 2.  Multiple gas-filled distended loops of bowel in visualized upper abdomen.           Assessment and PLan   Principal Problem:    Acute respiratory failure with hypoxia (HCC)  Active Problems:    Atherosclerosis of native artery of both lower extremities (HCC)    Severe protein-calorie malnutrition (HCC)    Severe dehydration    Sepsis ([address] Parkinson's disease ([facility] [address]    Lactic acidosis    Abnormal urinalysis    Constipation    History of seizure    Autism spectrum disorder associated with neurodevelopmental, mental or behavioral disorder, requiring very substantial support (level 3)  Resolved Problems:    * No resolved hospital problems. *    Assessment  -Parkinson Disease with severe tremors   -Severe Protein Malnutrition  -Hx of Aspiration PNA  -Lactic Acidosis  -Intellectual Disability   -Dehydration and malnutrition   -blood culture likely contaminent    Plan:   -continue to watch off antibiotics, hydrate patient;   -evacuate bowels of stool burden   -baseline ESR, CRP, procal (0.10)  -watch cultures  -will continue to follow       Electronically signed by Jessica Keating DO on 2/24/2021 at 5:58 PM  Pt seen and examined. Above discussed agree with internal medicine resident. Labs, cultures, and radiographs reviewed. Face to Face encounter occurred. Changes made as necessary.      Belinda Lopez MD

## 2021-02-24 NOTE — ED NOTES
[facility] staff to stay until 1130 pm. Pt alert to baseline, no distress noted. Caregiver at bedside.       Sylvia Hernandes RN  02/23/21 8011

## 2021-02-24 NOTE — PLAN OF CARE
Problem: Skin Integrity:  Goal: Absence of new skin breakdown  Description: Absence of new skin breakdown  Outcome: Met This Shift     Problem: Falls - Risk of:  Goal: Will remain free from falls  Description: Will remain free from falls  Outcome: Met This Shift     Problem: Skin Integrity:  Goal: Will show no infection signs and symptoms  Description: Will show no infection signs and symptoms  Outcome: Met This Shift

## 2021-02-24 NOTE — PROGRESS NOTES
Hospitalist Progress Note      SYNOPSIS: Patient admitted on 2021 from nursing facility because of shortness of breath, since today, episode was moderate to severe in intensity associated with hypoxemia, saturation of 72% on room air. Patient was just discharged from the hospital on 2021 after hospitalization for aspiration pneumonia treated with antibiotics, he had positive blood culture with coagulase-negative staph that was thought to be a contaminant, he was discharged on Augmentin for 10 days. Patient is being seen with one of the staff at the nursing facility present in the emergency department, he is having severe tremors, which is not new according to the nursing staff facility present. Patient is not able to answer any questions      SUBJECTIVE:    Patient seen and examined  Records reviewed. Lip smacking  Contracted      Stable overnight. No other overnight issues reported. Temp (24hrs), Av.7 °F (37.1 °C), Min:97.1 °F (36.2 °C), Max:100 °F (37.8 °C)    DIET: Diet NPO Effective Now  CODE: Full Code    Intake/Output Summary (Last 24 hours) at 2021 1018  Last data filed at 2021  Gross per 24 hour   Intake 1600 ml   Output    Net 1600 ml       OBJECTIVE:    /68   Pulse 100   Temp 98.9 °F (37.2 °C) (Temporal)   Resp 16   Ht 5' 5\" (1.651 m)   Wt 121 lb (54.9 kg)   SpO2 97%   BMI 20.14 kg/m²     General appearance: Elderly male, tremoring, moderate respiratory distress, appears stated age and warm to touch. Does not follow command. HEENT:  Normal cephalic, atraumatic without obvious deformity. Pupils equal, round, and reactive to light. Conjunctivae/corneas clear. Neck: Contracted, with limited range of motion. No jugular venous distention. Trachea midline. Respiratory: Mild respiratory distress. Clear to auscultation, bilaterally without Rales/Wheezes/Rhonchi.   Cardiovascular: Tachycardic, regular rate and rhythm with normal S1/S2 without murmurs, rubs or gallops. Abdomen: Soft, non-tender, distended  Musculoskeletal:  No clubbing, cyanosis or edema bilaterally. ASSESSMENT:    Principal Problem:    Acute respiratory failure with hypoxia (HCC)  Active Problems:    Atherosclerosis of native artery of both lower extremities (HCC)    Severe protein-calorie malnutrition (HCC)    Severe dehydration    Sepsis (HCC)    Parkinson's disease (HCC)    Lactic acidosis    Abnormal urinalysis    Constipation    History of seizure    Autism spectrum disorder associated with neurodevelopmental, mental or behavioral disorder, requiring very substantial support (level 3)  Resolved Problems:    * No resolved hospital problems. *         PLAN:    1. Acute respiratory failure with hypoxia. Unclear etiology, chest x-ray is negative. low PCT and d dimer; CT chest evaluation pending  2. Lactic acidosis, give fluid and recheck. 3.  SIRS/sepsis. Unclear source of infection at this time. Patient was just treated for aspiration pneumonia. follow CT chest / abdo pending  4. Constipation/paralytic ileus, pending CT of the abdomen. Bowel regimen. Order enema  5. Abnormal urinalysis, follow urine culture. 6.  Parkinson's disease with severe tremors  7. Severe dehydration, conitnue IV fluids  8. Severe protein energy malnutrition, patient on modified diet. 9.  Peripheral vascular disease  10. History of seizure. Continue current medications Tegretol / Keppra  11. Severe mental retardation.  on Remerom, Zyprexa,       DISPOSITION: c/w intermediate    Medications:  REVIEWED DAILY    Infusion Medications    sodium chloride 100 mL/hr at 02/23/21 2302     Scheduled Medications    benztropine  1 mg Oral BID    calcium-vitamin D  1 tablet Oral BID    carBAMazepine  200 mg Oral BID    fluvoxaMINE  150 mg Oral Daily    levETIRAcetam  500 mg Oral BID    mirtazapine  30 mg Oral Nightly    OLANZapine  7.5 mg Oral Nightly    pantoprazole  20 mg Oral QAM AC    polyethylene glycol 17 g Oral Daily    sodium chloride flush  10 mL Intravenous 2 times per day    enoxaparin  40 mg Subcutaneous Daily     PRN Meds: sodium chloride flush, iopamidol, ipratropium-albuterol, sodium chloride flush, acetaminophen **OR** acetaminophen    Labs:     Recent Labs     02/23/21 1611 02/24/21  0401   WBC 4.6 6.6   HGB 13.1 11.9*   HCT 41.5 36.4*    119*       Recent Labs     02/23/21 1611 02/24/21  0401    147*   K 4.5 3.9    111*   CO2 23 27   BUN 37* 31*   CREATININE 1.0 0.8   CALCIUM 9.3 8.8       Recent Labs     02/23/21 1611 02/23/21 2149 02/24/21  0401   PROT 7.7  --  6.6   ALKPHOS 108  --  92   ALT 22  --  17   AST 32  --  21   BILITOT <0.2  --  0.3   LIPASE  --  43  --        No results for input(s): INR in the last 72 hours. Recent Labs     02/23/21 1611 02/23/21 2149 02/24/21  0401   TROPONINI 0.01 0.01 0.01       Chronic labs:    Lab Results   Component Value Date    CHOL 166 03/18/2016    TRIG 146 03/18/2016    HDL 44 03/18/2016    LDLCALC 93 03/18/2016    TSH 1.700 02/04/2020    PSA 0.41 09/04/2015    INR 1.0 10/28/2020    LABA1C 5.4 03/18/2016       Radiology: REVIEWED DAILY    +++++++++++++++++++++++++++++++++++++++++++++++++  Joselo Vora  +++++++++++++++++++++++++++++++++++++++++++++++++  NOTE: This report was transcribed using voice recognition software. Every effort was made to ensure accuracy; however, inadvertent computerized transcription errors may be present.

## 2021-02-24 NOTE — H&P
Hospital Medicine History & Physical      PCP: Elza Prado MD    Date of Admission: 2/23/2021    Date of Service: Pt seen/examined on 2/23/2021 and Admitted to Inpatient with expected LOS greater than two midnights due to medical therapy. Chief Complaint: Shortness of breath and hypoxia      History Of Present Illness:      78 y.o. male who presented to [facility] with from nursing facility with medical history of severe mental retardation, Tourette's syndrome, Parkinson's disease, cardiomegaly, severe malnutrition, thrombocytopenia, OCD, hyperlipidemia, recurrent UTIs, pressure ulcer and hypoventilation syndrome. Patient was sent in from nursing facility because of shortness of breath, since today, episode was moderate to severe in intensity associated with hypoxemia, saturation of 72% on room air. Patient was just discharged from the hospital on 2/4/2021 after hospitalization for aspiration pneumonia treated with antibiotics, he had positive blood culture with coagulase-negative staph that was thought to be a contaminant, he was discharged on Augmentin for 10 days. Patient is being seen with one of the staff at the nursing facility present in the emergency department, he is having severe tremors, which is not new according to the nursing staff facility present. Patient is not able to answer any questions. Vital signs notable for heart rate of 133, respiratory rate 28, saturation was 89% on arrival here, labs showed normal CBC, lactic acid level 5.8, D-dimer 243, negative SARS-CoV-2, glucose 136 and negative troponin. Urinalysis showed 1-3 white cells, positive for leukocyte esterase and bacteria. EKG shows sinus tachycardia rate of 133 with nonspecific ST-T wave changes. Chest x-ray is negative for any pulmonary findings, did show distended bowel loops with gas in the upper abdomen. X-ray of the abdomen shows findings of constipation/ileus. He is being admitted for further management. Past Medical History:          Diagnosis Date    Acquired deformity of neck     Autism spectrum     Bilateral cataracts     Blepharochalasis     Cardiomegaly     Cataract of both eyes     Developmental delay     Gastritis     Hyperlipidemia     Kyphosis of thoracic region     Mental retardation     SEVERE MR  NON VERBAL, NEEDS WHEELCHAIR FOR LONG DISTANCE    Obsessive compulsive disorder     Parkinsonism (HCC)     Seizures (HCC)     Tourette disorder        Past Surgical History:          Procedure Laterality Date    TOE AMPUTATION Left     2nd/3rd       Medications Prior to Admission:      Prior to Admission medications    Medication Sig Start Date End Date Taking?  Authorizing Provider   ipratropium-albuterol (DUONEB) 0.5-2.5 (3) MG/3ML SOLN nebulizer solution Inhale 1 vial into the lungs every 4 hours as needed for Shortness of Breath   Yes Historical Provider, MD   levETIRAcetam (KEPPRA) 500 MG tablet Take 500 mg by mouth 2 times daily   Yes Historical Provider, MD   Calcium Carb-Cholecalciferol (CALCIUM-VITAMIN D) 500-200 MG-UNIT per tablet Take 1 tablet by mouth 2 times daily 6/23/20  Yes Azalia Lyles PA-C   vitamin D (ERGOCALCIFEROL) 1.25 MG (81893 UT) CAPS capsule Take 50,000 Units by mouth every 30 days Given on the 14 th   Yes Historical Provider, MD   fluvoxaMINE (LUVOX CR) 150 MG CP24 extended release capsule Take 150 mg by mouth daily    Yes Historical Provider, MD   mirtazapine (REMERON) 30 MG tablet Take 30 mg by mouth nightly  2/12/20  Yes Historical Provider, MD   polyethylene glycol (MIRALAX) 17 g packet Take 17 g by mouth daily as needed    Yes Historical Provider, MD   OLANZapine (ZYPREXA) 7.5 MG tablet Take 7.5 mg by mouth nightly    Yes Historical Provider, MD   omeprazole (PRILOSEC) 20 MG capsule Take 20 mg by mouth Daily    Yes Historical Provider, MD   carBAMazepine (TEGRETOL) 200 MG tablet Take 200 mg by mouth 2 times daily  6/5/15  Yes Historical Provider, MD acetaminophen (TYLENOL) 325 MG tablet Take 650 mg by mouth every 4 hours as needed for Pain Patient takes 2 tablets every 4 hours PRN. Yes Historical Provider, MD   benztropine (COGENTIN) 1 MG tablet Take 1 mg by mouth 2 times daily    Yes Historical Provider, MD   lactobacillus (CULTURELLE) CAPS capsule Take 1 capsule by mouth daily  Patient not taking: Reported on 2/23/2021 2/5/21   Bubba Whittington MD       Allergies:  Patient has no known allergies. Social History:      The patient currently lives at a nursing facility. TOBACCO:   reports that he has never smoked. He has never used smokeless tobacco.  ETOH:   reports no history of alcohol use. Family History:     Reviewed in detail Positive as follows:    History reviewed. No pertinent family history. REVIEW OF SYSTEMS:   Pertinent positives as noted in the HPI. All other systems reviewed and negative. PHYSICAL EXAM:    /70   Pulse 104   Temp 100 °F (37.8 °C)   Resp 18   Ht 5' 5\" (1.651 m)   Wt 121 lb (54.9 kg)   SpO2 95%   BMI 20.14 kg/m²     General appearance: Elderly male, tremoring, moderate respiratory distress, appears stated age and warm to touch. Does not follow command. HEENT:  Normal cephalic, atraumatic without obvious deformity. Pupils equal, round, and reactive to light. Conjunctivae/corneas clear. Neck: Contracted, with limited range of motion. No jugular venous distention. Trachea midline. Respiratory: Increased work of breathing, normal respiratory effort. Clear to auscultation, bilaterally without Rales/Wheezes/Rhonchi. Cardiovascular: Tachycardic, regular rate and rhythm with normal S1/S2 without murmurs, rubs or gallops. Abdomen: Soft, non-tender, mildly distended with diminished bowel sounds. Musculoskeletal:  No clubbing, cyanosis or edema bilaterally. Limited range of motion with toe amputations  Skin: Skin color, texture, turgor normal.  No rashes or lesions.   Neurologic: Uncontrollable tremors, [E43] 06/10/2020    Atherosclerosis of native artery of both lower extremities ([facility] [address] [I70.203] 07/10/2018         PLAN:  1. Acute respiratory failure with hypoxia. Unclear etiology, chest x-ray is negative. Check D-dimer, if elevated, obtain CTA of the chest to rule out PE. Oxygen to keep sat above 92%. Follow ABGs. 2.  Lactic acidosis, give fluid and recheck. 3.  SIRS/sepsis. Unclear source of infection at this time. Patient was just treated for aspiration pneumonia. Would obtain CT scan of the chest abdomen and pelvis. 4.  Constipation/paralytic ileus, obtain CT of the abdomen. Bowel regimen. 5.  Abnormal urinalysis, send urine culture. 6.  Parkinson's disease with severe tremors  7. Severe dehydration, IV fluids  8. Severe protein energy malnutrition, patient on modified diet. 9.  Peripheral vascular disease  10. History of seizure. Continue current medications  11. Severe mental retardation. DVT Prophylaxis: Lovenox  Diet: DIET GENERAL;  Code Status: Full Code    PT/OT Eval Status: As needed    Dispo -inpatient/telemetry       Jeff Ro MD    Thank you Claudell Coke, MD for the opportunity to be involved in this patient's care.

## 2021-02-24 NOTE — ED NOTES
PT INCONTINENT OF STOOL, PERICARE GIVEN AND LINENS CHANGED, NEW BRIEF ON. Pt continues to pull at wires, iv etc removes clothes, sitter brought in room for pt safety. sr x 2 up, cardiac monitor and new pulse ox applied, vital signs taken.      Jacob Clement LPN  31/66/07 2276

## 2021-02-24 NOTE — CARE COORDINATION
Pt admitted for hypoxia/SOB. Pt lives at [facility] group home at [facility] [place] [address] [place] [address]. Spoke with legal guardian Pao Dietrich [phone], plan is to return to group home at discharge, updated her on pt's status. Gail Morgan has been legal guardian for 20 years, will need called for any procedures and at discharge. Called group home [phone]([facility] is also a fax, keep calling until someone answers) spoke with Jaylyn() pt is wheelchair bound, he has a customized tilt/space wheelchair, he is a itzel lift. Pt does have O2 at the group home as PRN, through [employer]. Will need to arrange transportation via ambulance at discharge. Per Ana Miles pt is a pured diet with nectar thick, double entry, and container of resource 2.0 with all meals. Envelope and ambulance form in soft chart. Aiden QUINONES

## 2021-02-25 NOTE — PROGRESS NOTES
[address] [place] [address] [facility]  Progress Note      Chief Complaint   Patient presents with    Shortness of Breath     per [facility] pulse ox 72% on room air       SUBJECTIVE:  Patient is tolerating medications. No reported adverse drug reactions. No nausea, vomiting, diarrhea. Per staff in room. He ate breakfast this am.     Review of systems:  As stated above in the chief complaint, otherwise negative. Medications:  Scheduled Meds:   benztropine  1 mg Oral BID    calcium-vitamin D  1 tablet Oral BID    carBAMazepine  200 mg Oral BID    fluvoxaMINE  150 mg Oral Daily    levETIRAcetam  500 mg Oral BID    mirtazapine  30 mg Oral Nightly    OLANZapine  7.5 mg Oral Nightly    pantoprazole  20 mg Oral QAM AC    polyethylene glycol  17 g Oral Daily    sodium chloride flush  10 mL Intravenous 2 times per day    enoxaparin  40 mg Subcutaneous Daily     Continuous Infusions:   sodium chloride 100 mL/hr at 21 0653     PRN Meds:ipratropium-albuterol, sodium chloride flush, acetaminophen **OR** acetaminophen    OBJECTIVE:  /61   Pulse 100   Temp 98.3 °F (36.8 °C) (Temporal)   Resp 21   Ht 5' 5\" (1.651 m)   Wt 123 lb 9.6 oz (56.1 kg)   SpO2 92%   BMI 20.57 kg/m²   Temp  Av.2 °F (36.8 °C)  Min: 98.1 °F (36.7 °C)  Max: 98.3 °F (36.8 °C)  Constitutional: The patient is awake and nonverbal  Skin: Warm and dry. No rashes were noted. HEENT: Round and reactive pupils. Moist mucous membranes. No ulcerations or thrush. Neck: Supple to movements. Chest: No use of accessory muscles to breathe. Symmetrical expansion. No wheezing, crackles or rhonchi. Cardiovascular: S1 and S2 are rhythmic and regular. No murmurs appreciated. Abdomen: Positive bowel sounds to auscultation. Benign to palpation. No masses felt. No hepatosplenomegaly. Genitourinary: male  Extremities: No clubbing, no cyanosis, no edema.  tremors  Lines: peripheral    Laboratory and Tests Review:  Lab Results   Component Value Date    WBC 4.6 02/25/2021    WBC 6.6 02/24/2021    WBC 4.6 02/23/2021    HGB 12.4 (L) 02/25/2021    HCT 38.8 02/25/2021    MCV 98.2 02/25/2021     (L) 02/25/2021     Lab Results   Component Value Date    NEUTROABS 1.66 (L) 02/25/2021    NEUTROABS 3.33 02/24/2021    NEUTROABS 2.09 02/23/2021     No results found for: CRP  Lab Results   Component Value Date    ALT 15 02/25/2021    AST 18 02/25/2021    ALKPHOS 95 02/25/2021    BILITOT 0.3 02/25/2021     Lab Results   Component Value Date     02/25/2021    K 3.7 02/25/2021     02/25/2021    CO2 27 02/25/2021    BUN 24 02/25/2021    CREATININE 0.8 02/25/2021    CREATININE 0.8 02/24/2021    CREATININE 1.0 02/23/2021    GFRAA >60 02/25/2021    LABGLOM >60 02/25/2021    GLUCOSE 75 02/25/2021    GLUCOSE 71 05/18/2012    PROT 6.7 02/25/2021    LABALBU 3.8 02/25/2021    LABALBU 4.5 05/18/2012    CALCIUM 9.2 02/25/2021    BILITOT 0.3 02/25/2021    ALKPHOS 95 02/25/2021    AST 18 02/25/2021    ALT 15 02/25/2021     Lab Results   Component Value Date    CRP 0.3 01/29/2021    CRP 4.2 (H) 06/14/2020    CRP 3.6 (H) 06/12/2020     Lab Results   Component Value Date    SEDRATE 6 01/29/2021    SEDRATE 43 (H) 06/14/2020    SEDRATE 14 06/12/2020     Radiology:      Microbiology:   Lab Results   Component Value Date    BC 24 Hours no growth 02/23/2021    BC 5 Days no growth 02/01/2021    BC 5 Days no growth 01/29/2021    ORG Staphylococcus epidermidis 01/27/2021    ORG Staphylococcus haemolyticus 01/27/2021    ORG Staphylococcus hominis ssp hominis 01/27/2021    ORG Staphylococcus hominis ssp hominis 01/27/2021    ORG Staphylococcus haemolyticus 01/27/2021    ORG Staphylococcus haemolyticus 01/27/2021    ORG Staphylococcus epidermidis 01/27/2021     Lab Results   Component Value Date    BLOODCULT2  02/23/2021     Gram stain performed from blood culture bottle media  Gram positive cocci in clusters      BLOODCULT2 5 Days no growth 02/01/2021 [address] Previously positive blood culture called 01/27/2021    BLOODCULT2  01/27/2021     This isolate is presumed to be resistant based on the  detection of inducible Clindamycin resistance. Clindamycin  may still be effective in some patients. ORG Staphylococcus epidermidis 01/27/2021    ORG Staphylococcus haemolyticus 01/27/2021    ORG Staphylococcus hominis ssp hominis 01/27/2021    ORG Staphylococcus hominis ssp hominis 01/27/2021    ORG Staphylococcus haemolyticus 01/27/2021    ORG Staphylococcus haemolyticus 01/27/2021    ORG Staphylococcus epidermidis 01/27/2021     WOUND/ABSCESS   Date Value Ref Range Status   03/09/2015 Rare growth  Final   02/02/2015 Light growth  Final   02/02/2015 Light growth  Final     No results found for: RESPSMEAR      Component Value Date/Time    LABFUNG 4 Weeks- no fungus isolated 04/24/2015 1030     No results found for: CULTRESP  No results found for: CXCATHTIP  No results found for: BFCS  Culture Surgical   Date Value Ref Range Status   04/24/2015 Heavy growth  Final   04/24/2015 Heavy growth  Final   04/24/2015 Light growth  Final     Urine Culture, Routine   Date Value Ref Range Status   06/12/2020 >100,000 CFU/ml  Final   03/28/2020 Growth not present  Final   01/29/2020 Growth not present  Final     MRSA Culture Only   Date Value Ref Range Status   01/29/2020 Methicillin resistant Staph aureus not isolated  Final        Assessment  -Parkinson Disease with severe tremors   -Severe Protein Malnutrition  -Hx of Aspiration PNA  -Lactic Acidosis  -Intellectual Disability   -Dehydration and malnutrition   -blood culture likely contaminent     Plan:   -continue to watch off antibiotics, hydrate patient;   -had bm today  -watch cultures  -will continue to follow     Electronically signed by MARIJA Sierra CNP on 2/25/2021 at 10:19 AM     Pt seen and examined. Above discussed agree with advanced practice nurse. Labs, cultures, and radiographs reviewed.   Face to Face encounter occurred. Changes made as necessary.      Herberth Gandara MD

## 2021-02-25 NOTE — PROGRESS NOTES
Hospitalist Progress Note      SYNOPSIS: Patient admitted on 2021 for Acute respiratory failure with hypoxia ([address]  bc   reveal  GPC in clusters CoAg nS     Patient is currently not on antibx  Iv access has been compromised    SUBJECTIVE:  Medical problem list  Parkinson's  Hx of seizures  Pad  Autism spectrum disorder  On supplement nc 02 chronically        Patient seen and examined  Records reviewed. Discussed with nursing staff this am    .   Temp (24hrs), Av.2 °F (36.8 °C), Min:98.1 °F (36.7 °C), Max:98.3 °F (36.8 °C)    DIET: DIET GENERAL;  CODE: Full Code    Intake/Output Summary (Last 24 hours) at 2021 1139  Last data filed at 2021 0736  Gross per 24 hour   Intake 1197.83 ml   Output    Net 1197.83 ml       OBJECTIVE:    /61   Pulse 100   Temp 98.3 °F (36.8 °C) (Temporal)   Resp 21   Ht 5' 5\" (1.651 m)   Wt 123 lb 9.6 oz (56.1 kg)   SpO2 92%   BMI 20.57 kg/m²     General appearance: elderly does not appear jaundiced  HEENT:  No lesions on ears externally no conjuctivits  Neck: trachea in the midline no crepitance in neck on palpation  Respiratory: clear sounds in upper lobes no wheeze  Cardiovascular:S1s2 systol eject murmur grade III/VI LLSB  Abdomen: active bowel sounds  Musculoskeletal: no clubbing no almar erythema  Skin:  Some areas of excoriation L UE forearm  Neurologic: patient awake with tremors in upper extremity and periodic whole body trembling   ? rigors?     ASSESSMENT:    Temp elevation  [facility]Staph in [facility]  from   Parkinson's  Hx of seizures  Pad  Autism spectrum disorder  hypernatremia       PLAN:    Will obtain 2 d echo  Tylenol prn for tempt elevation and discomfort  Repeat serum sodium level as well as cbc and order procalcilton      DISPOSITION: tbd    Medications:  REVIEWED DAILY    Infusion Medications    sodium chloride 100 mL/hr at 21 3004     Scheduled Medications    benztropine  1 mg Oral BID    calcium-vitamin D  1 tablet

## 2021-02-25 NOTE — PLAN OF CARE
Problem: Skin Integrity:  Goal: Will show no infection signs and symptoms  Description: Will show no infection signs and symptoms  2/25/2021 0219 by Alexandre Trotter  Outcome: Met This Shift  2/24/2021 1426 by Madi Gomez RN  Outcome: Met This Shift  Goal: Absence of new skin breakdown  Description: Absence of new skin breakdown  2/25/2021 0219 by Alexandre Trotter  Outcome: Met This Shift  2/24/2021 1426 by Madi Gomez RN  Outcome: Met This Shift     Problem: Falls - Risk of:  Goal: Will remain free from falls  Description: Will remain free from falls  2/25/2021 0219 by Alexandre Trotter  Outcome: Met This Shift  2/24/2021 1426 by Madi Gomez RN  Outcome: Met This Shift  Goal: Absence of physical injury  Description: Absence of physical injury  2/24/2021 1426 by Madi Gomez RN  Outcome: Met This Shift

## 2021-02-25 NOTE — PROGRESS NOTES
Follow up  Progress note  To the day been alerted of patient's experiencing fluctuating temperature elevations and elevated heart rate  Repeat labs revealed hypernatremia  Blood pressure has been stable according to  nursing staff  At [id number] blood pressure reading 137/78    Due to nursing staff reports that patient's heart rate has been ranging between 120 and 190  I did order a 12-lead EKG which according to nursing staff revealed sinus rhythm    Serum sodium level was noted to be elevated  IV fluid solution changed to D5W  Hypernatremia suggest free water deficit of about 2.8 L     We will order follow-up electrolytes this evening along with troponin level and magnesium level  Tylenol has been ordered for temp  elevation  Patient is not antibiotics

## 2021-02-26 NOTE — PROGRESS NOTES
7.8 02/26/2021    WBC 4.6 02/25/2021    WBC 6.6 02/24/2021    HGB 12.9 02/26/2021    HCT 42.0 02/26/2021    .5 (H) 02/26/2021     (L) 02/26/2021     Lab Results   Component Value Date    NEUTROABS 3.49 02/26/2021    NEUTROABS 1.66 (L) 02/25/2021    NEUTROABS 3.33 02/24/2021     No results found for: CRPHS  Lab Results   Component Value Date    ALT 15 02/26/2021    AST 21 02/26/2021    ALKPHOS 98 02/26/2021    BILITOT 0.4 02/26/2021     Lab Results   Component Value Date     02/26/2021    K 4.1 02/26/2021     02/26/2021    CO2 31 02/26/2021    BUN 20 02/26/2021    CREATININE 0.8 02/26/2021    CREATININE 0.8 02/25/2021    CREATININE 0.8 02/24/2021    GFRAA >60 02/26/2021    LABGLOM >60 02/26/2021    GLUCOSE 74 02/26/2021    GLUCOSE 71 05/18/2012    PROT 7.2 02/26/2021    LABALBU 4.0 02/26/2021    LABALBU 4.5 05/18/2012    CALCIUM 9.8 02/26/2021    BILITOT 0.4 02/26/2021    ALKPHOS 98 02/26/2021    AST 21 02/26/2021    ALT 15 02/26/2021     Lab Results   Component Value Date    CRP 0.3 01/29/2021    CRP 4.2 (H) 06/14/2020    CRP 3.6 (H) 06/12/2020     Lab Results   Component Value Date    SEDRATE 6 01/29/2021    SEDRATE 43 (H) 06/14/2020    SEDRATE 14 06/12/2020     Radiology:      Microbiology:   Lab Results   Component Value Date    BC 24 Hours no growth 02/23/2021    BC 5 Days no growth 02/01/2021    BC 5 Days no growth 01/29/2021    ORG Staphylococcus coagulase-negative 02/23/2021    ORG Staphylococcus species 02/23/2021    ORG Staphylococcus epidermidis 01/27/2021    ORG Staphylococcus haemolyticus 01/27/2021    ORG Staphylococcus hominis ssp hominis 01/27/2021    ORG Staphylococcus hominis ssp hominis 01/27/2021    ORG Staphylococcus haemolyticus 01/27/2021    ORG Staphylococcus haemolyticus 01/27/2021    ORG Staphylococcus epidermidis 01/27/2021     Lab Results   Component Value Date    BLOODCULT2  02/23/2021     Gram stain performed from blood culture bottle media  Gram positive cocci in clusters      BLOODCULT2  02/23/2021     This organism was isolated in one set. Susceptibility testing is not routinely done as this  organism frequently represents skin contamination. Additional testing can be ordered by calling the  Microbiology Department.       BLOODCULT2 Identification and sensitivity to follow 02/23/2021    ORG Staphylococcus coagulase-negative 02/23/2021    ORG Staphylococcus species 02/23/2021    ORG Staphylococcus epidermidis 01/27/2021    ORG Staphylococcus haemolyticus 01/27/2021    ORG Staphylococcus hominis ssp hominis 01/27/2021    ORG Staphylococcus hominis ssp hominis 01/27/2021    ORG Staphylococcus haemolyticus 01/27/2021    ORG Staphylococcus haemolyticus 01/27/2021    ORG Staphylococcus epidermidis 01/27/2021     WOUND/ABSCESS   Date Value Ref Range Status   03/09/2015 Rare growth  Final   02/02/2015 Light growth  Final   02/02/2015 Light growth  Final     No results found for: RESPSMEAR      Component Value Date/Time    LABFUNG 4 Weeks- no fungus isolated 04/24/2015 1030     No results found for: CULTRESP  No results found for: CXCATHTIP  No results found for: BFCS  Culture Surgical   Date Value Ref Range Status   04/24/2015 Heavy growth  Final   04/24/2015 Heavy growth  Final   04/24/2015 Light growth  Final     Urine Culture, Routine   Date Value Ref Range Status   06/12/2020 >100,000 CFU/ml  Final   03/28/2020 Growth not present  Final   01/29/2020 Growth not present  Final     MRSA Culture Only   Date Value Ref Range Status   01/29/2020 Methicillin resistant Staph aureus not isolated  Final        Assessment  -Parkinson Disease with severe tremors   -Severe Protein Malnutrition  -Hx of Aspiration PNA  -Lactic Acidosis  -Intellectual Disability   -Dehydration and malnutrition   -blood culture likely contaminent     Plan:   -continue to watch off antibiotics, continue to hydrate patient;   -had bm   -watch cultures  -will continue to follow     Electronically signed by MARIJA Marcial - CNP on 2/26/2021 at 10:30 AM     Pt seen and examined. Above discussed agree with advanced practice nurse. Labs, cultures, and radiographs reviewed. Face to Face encounter occurred. Changes made as necessary.      Compa Fraire MD

## 2021-02-26 NOTE — PROGRESS NOTES
Spoke to Language [employer] from [employer] to request update.  All information given

## 2021-02-26 NOTE — PLAN OF CARE
Problem: Skin Integrity:  Goal: Will show no infection signs and symptoms  Description: Will show no infection signs and symptoms  2/26/2021 0055 by Humza Elam RN  Outcome: Met This Shift  2/25/2021 1847 by Maria Esther Vela RN  Outcome: Met This Shift  Goal: Absence of new skin breakdown  Description: Absence of new skin breakdown  2/26/2021 0055 by Humza Elam RN  Outcome: Met This Shift  2/25/2021 1847 by Maria Esther Vela RN  Outcome: Met This Shift

## 2021-02-26 NOTE — CARE COORDINATION
Transition of Care-Discharge plan remains [facility] medically [facility]-patient requiring up to 4l NC, wears PRN at [facility], oxygen supplied by [facility]. Discharge pending resolution of hypernatremia, heart rate control ( tachycardic 120's) and no temps. ID monitoring off of antibiotics. Ambulance form as well as envelope on soft chart, transportation via [facility] ([phone]) will need set up for transfer back -[facility] number is [phone], Legal Guardian is [phone], will need called at discharge to update.      Grecia JOSEN, RN  [facility]   [phone]

## 2021-02-27 NOTE — CONSULTS
Brittney Ram is a 78 y.o. male       Chief Complaint   Patient presents with    Shortness of Breath     per [facility] pulse ox 72% on room air       HPI:  62-year-old man with history of intellectual disability, possible Parkinson's disease presents with hypoxia confusional state tremor excessive tremor. No reported recent seizures. He follows with Siobhan Aj further seizures have been well controlled. Tremors have been progressive around the last 2 years. He was trialed on Neupro which caused increased agitation. Sinemet did improve his tremors in the past however he was concerned for interactions with Zyprexa. Plan was to trial restarting Sinemet in the future. Patient has extensive tremor upper extremities. HPI  Prior to Visit Medications    Medication Sig Taking?  Authorizing Provider   ipratropium-albuterol (DUONEB) 0.5-2.5 (3) MG/3ML SOLN nebulizer solution Inhale 1 vial into the lungs every 4 hours as needed for Shortness of Breath Yes Historical Provider, MD   levETIRAcetam (KEPPRA) 500 MG tablet Take 500 mg by mouth 2 times daily Yes Historical Provider, MD   Calcium Carb-Cholecalciferol (CALCIUM-VITAMIN D) 500-200 MG-UNIT per tablet Take 1 tablet by mouth 2 times daily Yes Azalia Lyles PA-C   vitamin D (ERGOCALCIFEROL) 1.25 MG (99687 UT) CAPS capsule Take 50,000 Units by mouth every 30 days Given on the 14 th Yes Historical Provider, MD   fluvoxaMINE (LUVOX CR) 150 MG CP24 extended release capsule Take 150 mg by mouth daily  Yes Historical Provider, MD   mirtazapine (REMERON) 30 MG tablet Take 30 mg by mouth nightly  Yes Historical Provider, MD   polyethylene glycol (MIRALAX) 17 g packet Take 17 g by mouth daily as needed  Yes Historical Provider, MD   OLANZapine (ZYPREXA) 7.5 MG tablet Take 7.5 mg by mouth nightly  Yes Historical Provider, MD   omeprazole (PRILOSEC) 20 MG capsule Take 20 mg by mouth Daily  Yes Historical Provider, MD   carBAMazepine (TEGRETOL) 200 MG tablet Take 200 mg by mouth 2 times daily  Yes Historical Provider, MD   acetaminophen (TYLENOL) 325 MG tablet Take 650 mg by mouth every 4 hours as needed for Pain Patient takes 2 tablets every 4 hours PRN. Yes Historical Provider, MD   benztropine (COGENTIN) 1 MG tablet Take 1 mg by mouth 2 times daily  Yes Historical Provider, MD   lactobacillus (CULTURELLE) CAPS capsule Take 1 capsule by mouth daily  Patient not taking: Reported on 2/23/2021  Jaymie Morales MD     Social History     Tobacco Use    Smoking status: Never Smoker    Smokeless tobacco: Never Used   Substance Use Topics    Alcohol use: No    Drug use: No     History reviewed. No pertinent family history. Past Surgical History:   Procedure Laterality Date    TOE AMPUTATION Left     2nd/3rd     Past Medical History:   Diagnosis Date    Acquired deformity of neck     Autism spectrum     Bilateral cataracts     Blepharochalasis     Cardiomegaly     Cataract of both eyes     Developmental delay     Gastritis     Hyperlipidemia     Kyphosis of thoracic region     Mental retardation     SEVERE MR  NON VERBAL, NEEDS WHEELCHAIR FOR LONG DISTANCE    Obsessive compulsive disorder     Parkinsonism (HCC)     Seizures (HCC)     Tourette disorder      Review of Systems    Unable to obtain secondary to altered mental status        Objective:   /76   Pulse 100   Temp 99.8 °F (37.7 °C) (Axillary)   Resp 20   Ht 5' 5\" (1.651 m)   Wt 115 lb 3.2 oz (52.3 kg)   SpO2 93%   BMI 19.17 kg/m²     Physical Exam  Constitutional:       General: He is awake. Comments: Ill-appearing   HENT:      Head: Normocephalic and atraumatic. Mouth/Throat:      Mouth: Mucous membranes are moist.      Pharynx: Oropharynx is clear. Eyes:      Conjunctiva/sclera: Conjunctivae normal.   Cardiovascular:      Rate and Rhythm: Regular rhythm. Musculoskeletal:      Comments: Contractures of the legs.   Bilateral feet are bandaged                  Neurological Exam  Mental Status  Awake. Intermittently attends examiner. No comprehensible speech output. Will squeeze hands. Not able to follow any further commands. Cranial Nerves  Extensive face  Tremor    Eyes open with conjugate gaze pupils equal and reactive to light  Blink to threat bilaterally no nystagmus. Motor    Increased tone throughout. Marked tremor in upper extremities spasticity in lower extremities greater than upper extremities. Sensory  Stimulation bilaterally. Laboratory/Radiology:     CBC with Differential:    Lab Results   Component Value Date    WBC 8.7 02/27/2021    RBC 4.64 02/27/2021    HGB 14.4 02/27/2021    HCT 46.5 02/27/2021     02/27/2021    .2 02/27/2021    MCH 31.0 02/27/2021    MCHC 31.0 02/27/2021    RDW 13.4 02/27/2021    METASPCT 0.9 01/31/2020    LYMPHOPCT 29.4 02/27/2021    MONOPCT 15.1 02/27/2021    BASOPCT 0.5 02/27/2021    MONOSABS 1.32 02/27/2021    LYMPHSABS 2.56 02/27/2021    EOSABS 0.04 02/27/2021    BASOSABS 0.04 02/27/2021     CMP:    Lab Results   Component Value Date     02/27/2021    K 3.9 02/27/2021    K 4.1 02/27/2021     02/27/2021    CO2 28 02/27/2021    BUN 37 02/27/2021    CREATININE 1.2 02/27/2021    GFRAA >60 02/27/2021    LABGLOM >60 02/27/2021    GLUCOSE 88 02/27/2021    GLUCOSE 71 05/18/2012    PROT 7.8 02/27/2021    LABALBU 4.3 02/27/2021    LABALBU 4.5 05/18/2012    CALCIUM 9.6 02/27/2021    BILITOT 0.4 02/27/2021    ALKPHOS 104 02/27/2021    AST 27 02/27/2021    ALT 17 02/27/2021       I independently reviewed the labs and imaging studies at today's appointment. Assessment:     Marked tremor is consistent with parkinsonism.   From my standpoint he seems to have greatest effect on his overall quality of life    Plan:     Stop Zyprexa  Start Sinemet  Continue supportive care    Marbella Limon  12:55 PM  2/27/2021

## 2021-02-27 NOTE — PROGRESS NOTES
[address] [place] [address] [facility]  Progress Note      Chief Complaint   Patient presents with    Shortness of Breath     per [facility] pulse ox 72% on room air     Face to face encounter   SUBJECTIVE:  Patient is tolerating medications. No reported adverse drug reactions. No nausea, vomiting, diarrhea. Tmax 103.2 this am  On 4 liters nasal canula  Has a sitter- good appetite     Review of systems:  As stated above in the chief complaint, otherwise negative. Medications:  Scheduled Meds:   carbidopa-levodopa  1 tablet Oral Q4H WA    benztropine  1 mg Oral BID    calcium-vitamin D  1 tablet Oral BID    carBAMazepine  200 mg Oral BID    fluvoxaMINE  150 mg Oral Daily    levETIRAcetam  500 mg Oral BID    mirtazapine  30 mg Oral Nightly    pantoprazole  20 mg Oral QAM AC    polyethylene glycol  17 g Oral Daily    sodium chloride flush  10 mL Intravenous 2 times per day    enoxaparin  40 mg Subcutaneous Daily     Continuous Infusions:   dextrose 100 mL/hr at 21 1114     PRN Meds:ipratropium-albuterol, sodium chloride flush, acetaminophen **OR** acetaminophen    OBJECTIVE:  /76   Pulse 100   Temp 99.8 °F (37.7 °C) (Axillary)   Resp 20   Ht 5' 5\" (1.651 m)   Wt 115 lb 3.2 oz (52.3 kg)   SpO2 93%   BMI 19.17 kg/m²   Temp  Av.4 °F (38 °C)  Min: 97.6 °F (36.4 °C)  Max: 103.2 °F (39.6 °C)  Constitutional: The patient is resting in bed. No distress. thin  Skin: Warm and dry. No rashes were noted. HEENT: Round and reactive pupils. Moist mucous membranes. Neck: Supple to movements. Chest: No use of accessory muscles to breathe. Symmetrical expansion. No wheezing, crackles or rhonchi. On nasal canula    Cardiovascular: RRR. murmur   Abdomen: Positive bowel sounds to auscultation. Benign to palpation. Genitourinary: male  Extremities: No clubbing, no cyanosis, no edema. ++ contraction ++ tremors  Lines: peripheral    Laboratory and Tests Review:  Lab Results Component Value Date    WBC 8.7 02/27/2021    WBC 7.8 02/26/2021    WBC 4.6 02/25/2021    HGB 14.4 02/27/2021    HCT 46.5 02/27/2021    .2 (H) 02/27/2021     02/27/2021     Lab Results   Component Value Date    NEUTROABS 4.74 02/27/2021    NEUTROABS 3.49 02/26/2021    NEUTROABS 1.66 (L) 02/25/2021     No results found for: CRPHS  Lab Results   Component Value Date    ALT 17 02/27/2021    AST 27 02/27/2021    ALKPHOS 104 02/27/2021    BILITOT 0.4 02/27/2021     Lab Results   Component Value Date     02/27/2021    K 3.9 02/27/2021    K 4.1 02/27/2021     02/27/2021    CO2 28 02/27/2021    BUN 37 02/27/2021    CREATININE 1.2 02/27/2021    CREATININE 0.8 02/26/2021    CREATININE 0.8 02/25/2021    GFRAA >60 02/27/2021    LABGLOM >60 02/27/2021    GLUCOSE 88 02/27/2021    GLUCOSE 71 05/18/2012    PROT 7.8 02/27/2021    LABALBU 4.3 02/27/2021    LABALBU 4.5 05/18/2012    CALCIUM 9.6 02/27/2021    BILITOT 0.4 02/27/2021    ALKPHOS 104 02/27/2021    AST 27 02/27/2021    ALT 17 02/27/2021     Lab Results   Component Value Date    CRP 0.3 01/29/2021    CRP 4.2 (H) 06/14/2020    CRP 3.6 (H) 06/12/2020     Lab Results   Component Value Date    SEDRATE 6 01/29/2021    SEDRATE 43 (H) 06/14/2020    SEDRATE 14 06/12/2020     Radiology:  Reviewed    Microbiology:   Lab Results   Component Value Date    BC 24 Hours no growth 02/23/2021    BC 5 Days no growth 02/01/2021    BC 5 Days no growth 01/29/2021    ORG Staphylococcus hominis ssp hominis 02/23/2021    ORG Staphylococcus epidermidis 01/27/2021    ORG Staphylococcus haemolyticus 01/27/2021    ORG Staphylococcus hominis ssp hominis 01/27/2021    ORG Staphylococcus hominis ssp hominis 01/27/2021    ORG Staphylococcus haemolyticus 01/27/2021    ORG Staphylococcus haemolyticus 01/27/2021    ORG Staphylococcus epidermidis 01/27/2021     Lab Results   Component Value Date    BLOODCULT2  02/23/2021     Gram stain performed from blood culture bottle media  Gram positive cocci in clusters      BLOODCULT2 Identification and sensitivity to follow 02/23/2021    BLOODCULT2 5 Days no growth 02/01/2021    ORG Staphylococcus hominis ssp hominis 02/23/2021    ORG Staphylococcus epidermidis 01/27/2021    ORG Staphylococcus haemolyticus 01/27/2021    ORG Staphylococcus hominis ssp hominis 01/27/2021    ORG Staphylococcus hominis ssp hominis 01/27/2021    ORG Staphylococcus haemolyticus 01/27/2021    ORG Staphylococcus haemolyticus 01/27/2021    ORG Staphylococcus epidermidis 01/27/2021     WOUND/ABSCESS   Date Value Ref Range Status   03/09/2015 Rare growth  Final   02/02/2015 Light growth  Final   02/02/2015 Light growth  Final     No results found for: RESPSMEAR      Component Value Date/Time    LABFUNG 4 Weeks- no fungus isolated 04/24/2015 1030     No results found for: CULTRESP  No results found for: CXCATHTIP  No results found for: BFCS  Culture Surgical   Date Value Ref Range Status   04/24/2015 Heavy growth  Final   04/24/2015 Heavy growth  Final   04/24/2015 Light growth  Final     Urine Culture, Routine   Date Value Ref Range Status   06/12/2020 >100,000 CFU/ml  Final   03/28/2020 Growth not present  Final   01/29/2020 Growth not present  Final     MRSA Culture Only   Date Value Ref Range Status   01/29/2020 Methicillin resistant Staph aureus not isolated  Final     Assessment  · Fevers THIS PT IS CHRONICALLY VOL CONTRACTED WHICH HAS NOT IMPROVE DURING THIS ADMISSSION  · Had atelectasis   · Parkinson Disease with severe tremors   · Severe Protein Malnutrition  · Hx of Aspiration PNA  · Hypernatremia   · Lactic Acidosis  · Dehydration and malnutrition   · Bacteremia with staph hominis- contaminant      Plan:   · Repeat blood cultures pending   · Currently not on any atbs -  WILL CHANGE FLUIDS TO D 5 AND HALF NS  · Check chest x-ray   · Follow-up cultures   · Check labs- repeat procal, check serum osmo   · Follow temps     Electronically signed by MARIJA Ramirez - CNS on 2/27/2021 at 2:11 PM     Pt seen and examined. Above discussed agree with advanced practice nurse. Labs, cultures, and radiographs reviewed. Face to Face encounter occurred. Changes made as necessary.      Marlene Cabrera MD

## 2021-02-27 NOTE — PROGRESS NOTES
Hospitalist Progress Note      SYNOPSIS: Patient admitted on 2021 for Acute respiratory failure with hypoxia [facility])      SUBJECTIVE:    Patient seen and examined  Records reviewed. Tempt recorded at 103.2 earlier this am    Med problem list  Parkinson's/parkinson like disorder-parkinsonism  Tardive dyskinesia rx in past with cogentin    Hx of seizures  Hx of mrdd/  Hx of obsess compulsive disorder  Pad  Autism spectrum disorder  On supplement nc 02 chronically       I spoke to his poa Agata Sotelo [phone]     By phone this am      In past patient has been evaluated by neurologist and psychiatrist    rx with [facility] was discontinued earlier this year due to concerns of potential interaction with olanzap        Temp (24hrs), Av.4 °F (38 °C), Min:97.6 °F (36.4 °C), Max:103.2 °F (39.6 °C)    DIET: DIET DYSPHAGIA PUREED; Mildly Thick (Nectar)  CODE: Full Code    Intake/Output Summary (Last 24 hours) at 2021 1234  Last data filed at 2021 1229  Gross per 24 hour   Intake 1530 ml   Output 0 ml   Net 1530 ml       OBJECTIVE:    /76   Pulse 100   Temp 99.8 °F (37.7 °C) (Axillary)   Resp 20   Ht 5' 5\" (1.651 m)   Wt 115 lb 3.2 oz (52.3 kg)   SpO2 93%   BMI 19.17 kg/m²      General appearance: elderly does not appear jaundiced  Respiratory: clear sounds in upper lobes no wheeze  Cardiovascular:S1s2 systol eject murmur grade III/VI LLSB  Abdomen: active bowel sounds without rigidity     Skin:  Some areas of excoriation L UE forearm  Neurologic: patient awake with tremors in upper extremity and periodic whole body trembling               Repeated tongue protruding rythmic with the tremors       ASSESSMENT:  Hypernatremia -likeley from free water deficit it is getting worse despite IVF  D5w     Tempt elevations possibly from repeated mm contractions                Unclear if this is due to malign hyperhtermia?               Current no evid of bacter infection  Tremors  Parkinsonism  Hx of tardive dyskinesia    Hx of seizures  Hx of mrdd/  Hx of obsess compulsive disorder  Pad  Autism spectrum disorder       PLAN:    Increase iv f rate d5w to provide more free water   Follow up electroly  Check cpk  Consult neurology for tremors    DISPOSITION:     Medications:  REVIEWED DAILY    Infusion Medications    dextrose 100 mL/hr at 02/27/21 1114     Scheduled Medications    carbidopa-levodopa  1 tablet Oral Q4H WA    benztropine  1 mg Oral BID    calcium-vitamin D  1 tablet Oral BID    carBAMazepine  200 mg Oral BID    fluvoxaMINE  150 mg Oral Daily    levETIRAcetam  500 mg Oral BID    mirtazapine  30 mg Oral Nightly    pantoprazole  20 mg Oral QAM AC    polyethylene glycol  17 g Oral Daily    sodium chloride flush  10 mL Intravenous 2 times per day    enoxaparin  40 mg Subcutaneous Daily     PRN Meds: ipratropium-albuterol, sodium chloride flush, acetaminophen **OR** acetaminophen    Labs:     Recent Labs     02/25/21  0614 02/26/21  0556 02/27/21  0556   WBC 4.6 7.8 8.7   HGB 12.4* 12.9 14.4   HCT 38.8 42.0 46.5   * 120* 138       Recent Labs     02/25/21  0614 02/25/21  2136 02/26/21  0556 02/27/21  0556   *  151* 152* 151* 157*   K 3.9  3.7 3.9 4.1 4.1   *  116* 115* 111* 116*   CO2 20*  27 30* 31* 29   BUN 24*  --  20 37*   CREATININE 0.8  --  0.8 1.2   CALCIUM 9.2  --  9.8 9.6       Recent Labs     02/25/21  0614 02/26/21  0556 02/27/21  0556   PROT 6.7 7.2 7.8   ALKPHOS 95 98 104   ALT 15 15 17   AST 18 21 27   BILITOT 0.3 0.4 0.4           Recent Labs     02/25/21 2136   TROPONINI 0.02       Chronic labs:        Radiology:     +++++++++++++++++++++++++++++++++++++++++++++++++  [address], [place]  +++++++++++++++++++++++++++++++++++++++++++++++++  NOTE: This report was transcribed using voice recognition software.  Every effort was made to ensure accuracy; however, inadvertent computerized transcription errors may be present.

## 2021-02-27 NOTE — PROGRESS NOTES
Hospitalist Progress Note      SYNOPSIS: Patient admitted on 2021 for Acute respiratory failure with hypoxia [facility])      SUBJECTIVE:    Patient seen and examined  Records reviewed. ptnt continues to display tremors   Per nursing staff he is eating   Serum sodium level has been elevated but patient is receiving d5w iv and serum sodium level is slowly turning around    Med problem list  Parkinson's  Hx of seizures  Pad  Autism spectrum disorder  On supplement nc 02 chronically          Temp (24hrs), Av.8 °F (37.7 °C), Min:98.9 °F (37.2 °C), Max:100.9 °F (38.3 °C)    DIET: DIET GENERAL;  CODE: Full Code    Intake/Output Summary (Last 24 hours) at 2021  Last data filed at 2021 1713  Gross per 24 hour   Intake 1291.25 ml   Output 0 ml   Net 1291.25 ml       OBJECTIVE:    BP (!) 134/90   Pulse 92   Temp 99.5 °F (37.5 °C) (Temporal)   Resp 20   Ht 5' 5\" (1.651 m)   Wt 119 lb 9 oz (54.2 kg)   SpO2 94%   BMI 19.90 kg/m²      General appearance: elderly does not appear jaundiced  HEENT:  No lesions on ears externally no conjuctivits  Neck: trachea in the midline no crepitance in neck on palpation  Respiratory: clear sounds in upper lobes no wheeze  Cardiovascular:S1s2 systol eject murmur grade III/VI LLSB  Abdomen: active bowel sounds without rigidity    Skin:  Some areas of excoriation L UE forearm  Neurologic: patient awake with tremors in upper extremity and periodic whole body trembling   ? rigors    ASSESSMENT:    Parkinson's  Hx of seizures  Pad  Autism spectrum disorder  hypernatremia     PLAN:    Continue iv fluid d5w  monit serum sodium level  Follow up 2 d echo  Check cpk/ckmb/  Consult neurology for assisstance with tremor management    DISPOSITION: [facility]    Medications:  REVIEWED DAILY    Infusion Medications    dextrose 75 mL/hr at 21     Scheduled Medications    benztropine  1 mg Oral BID    calcium-vitamin D  1 tablet Oral BID    carBAMazepine  200 mg Oral BID    fluvoxaMINE  150 mg Oral Daily    levETIRAcetam  500 mg Oral BID    mirtazapine  30 mg Oral Nightly    OLANZapine  7.5 mg Oral Nightly    pantoprazole  20 mg Oral QAM AC    polyethylene glycol  17 g Oral Daily    sodium chloride flush  10 mL Intravenous 2 times per day    enoxaparin  40 mg Subcutaneous Daily     PRN Meds: ipratropium-albuterol, sodium chloride flush, acetaminophen **OR** acetaminophen    Labs:     Recent Labs     02/24/21 0401 02/25/21 0614 02/26/21  0556   WBC 6.6 4.6 7.8   HGB 11.9* 12.4* 12.9   HCT 36.4* 38.8 42.0   * 120* 120*       Recent Labs     02/24/21 0401 02/25/21 0614 02/25/21 2136 02/26/21  0556   * 154*  151* 152* 151*   K 3.9 3.9  3.7 3.9 4.1   * 117*  116* 115* 111*   CO2 27 20*  27 30* 31*   BUN 31* 24*  --  20   CREATININE 0.8 0.8  --  0.8   CALCIUM 8.8 9.2  --  9.8       Recent Labs     02/23/21 2149 02/24/21 0401 02/25/21 0614 02/26/21  0556   PROT  --  6.6 6.7 7.2   ALKPHOS  --  92 95 98   ALT  --  17 15 15   AST  --  21 18 21   BILITOT  --  0.3 0.3 0.4   LIPASE 43  --   --   --            Recent Labs     02/23/21 2149 02/24/21 0401 02/25/21 2136   TROPONINI 0.01 0.01 0.02       Chronic labs:        Radiology:   +++++++++++++++++++++++++++++++++++++++++++++++++  [address], [place]  +++++++++++++++++++++++++++++++++++++++++++++++++  NOTE: This report was transcribed using voice recognition software. Every effort was made to ensure accuracy; however, inadvertent computerized transcription errors may be present.

## 2021-02-28 PROBLEM — Z87.898 HISTORY OF SEIZURE: Status: RESOLVED | Noted: 2021-01-01 | Resolved: 2021-01-01

## 2021-02-28 NOTE — PROGRESS NOTES
Mauricio Shell is a 78 y.o.male     Neurology is following for tremors    PMH: Cognitive developmental delay, seizures, Tourette's, parkinsonism, HLD    He presented on 2/23 with shortness of breath and hypoxia from [facility]. SpO2 reportedly in the 70s. He was acutely confused and tremulous. He follows with Rohit Sheridan in the office and seizures are controlled on Keppra 500 mg twice daily and Tegretol 200 mg twice daily. Previously tried Neupro for tremors which increased agitation. Sinemet was helpful in the past but stopped due to interaction with Zyprexa    He was RRT'd overnight for temperature 106 and hypernatremia--NA in the 160s. Zyprexa was stopped, and Sinemet 25/100 every 4 hours while awake was started yesterday evening. RN states the patient did have some improvement in tremors this morning after dose of Sinemet but this has been variable. He has been taking oral medications and eating. Patient is having significant tremors at present time and a sitter at the bedside. He remains a full code. May get an NGT    Still febrile with most recent temp 102.4. ID is following but is monitoring off antibiotics--they feel temp is due to dehydration.      Current Facility-Administered Medications   Medication Dose Route Frequency Provider Last Rate Last Admin    carbidopa-levodopa (SINEMET)  MG per tablet 1 tablet  1 tablet Oral Q4H WA Laci Solis MD   1 tablet at 02/28/21 1128    dextrose 5 % and 0.45 % sodium chloride infusion   Intravenous Continuous Kimberley Bryan  mL/hr at 02/28/21 1337 New Bag at 02/28/21 1337    benztropine (COGENTIN) tablet 1 mg  1 mg Oral BID Samantha Keller MD   1 mg at 02/28/21 0854    calcium-vitamin D (OSCAL-500) 500-200 MG-UNIT per tablet 1 tablet  1 tablet Oral BID Samantha Keller MD   1 tablet at 02/28/21 0853    carBAMazepine (TEGRETOL) tablet 200 mg  200 mg Oral BID Samantha Keller MD   200 mg at 02/28/21 0853    [Held by provider] fluvoxaMINE (LUVOX CR) extended release capsule 150 mg  150 mg Oral Daily Kay Raya MD   Stopped at 02/23/21 2301    ipratropium-albuterol (DUONEB) nebulizer solution 3 mL  1 vial Inhalation Q4H PRN Kay Raya MD        levETIRAcetam (KEPPRA) tablet 500 mg  500 mg Oral BID Kay Raya MD   500 mg at 02/28/21 0854    [Held by provider] mirtazapine (REMERON) tablet 30 mg  30 mg Oral Nightly Kay Raya MD   30 mg at 02/27/21 2304    pantoprazole (PROTONIX) tablet 20 mg  20 mg Oral QAM AC Kay Raya MD   20 mg at 02/25/21 0646    polyethylene glycol (GLYCOLAX) packet 17 g  17 g Oral Daily Kay Raya MD   17 g at 02/28/21 0853    sodium chloride flush 0.9 % injection 10 mL  10 mL Intravenous 2 times per day Kay Raya MD   10 mL at 02/27/21 0851    sodium chloride flush 0.9 % injection 10 mL  10 mL Intravenous PRN Kay Raya MD        enoxaparin (LOVENOX) injection 40 mg  40 mg Subcutaneous Daily Kay Raya MD   40 mg at 02/28/21 0853    acetaminophen (TYLENOL) tablet 650 mg  650 mg Oral Q6H PRN Kay Raya MD   650 mg at 02/25/21 1410    Or    acetaminophen (TYLENOL) suppository 650 mg  650 mg Rectal Q6H PRN Kay Raya MD   650 mg at 02/28/21 0854     Objective:     BP (!) 94/59   Pulse 80   Temp 102.3 °F (39.1 °C) (Rectal)   Resp 18   Ht 5' 5\" (1.651 m)   Wt 109 lb 11.2 oz (49.8 kg)   SpO2 100%   BMI 18.26 kg/m²     General appearance: eyes closed, appears older than than age--appears chronically ill--shaking  Head: normocephalic, without obvious abnormality, atraumatic  Eyes: conjunctivae/corneas clear. .  Neck: rigidity with no cogwheeling  Lungs: scattered rhonchi; resps nonlabored  Heart: regular rate and rhythm  Extremities: thin, atrophied calves  Pulses: 1+ and symmetric  Skin: pale, scabs and abrasions to knees      Mental Status: eyes closed rouses to noxious stim but does not open eyes or follow commands. , Nonverbal    Cranial Nerves:  I: smell NA   II: visual acuity  NA   II: visual fields Resists forced eye opening   II: pupils    III,VII: ptosis    III,IV,VI: extraocular muscles     V: mastication    V: facial light touch sensation  Appreciates noxious stim b/l   V,VII: corneal reflex     VII: facial muscle function - upper     VII: facial muscle function - lower Appears symmetric   VIII: hearing Normal   IX: soft palate elevation     IX,X: gag reflex    XI: trapezius strength     XI: sternocleidomastoid strength    XI: neck extension strength     XII: tongue strength       Motor:  Purposefully moving BUE; legs bent and rigid  Marked generalized tremors of arms and trunk  Cogwheeling at both elbows and wrists  Lead pipe rigidity in legs    Sensory:  Withdraws to noxious stim throughout    DTR:   b/l [place]    Laboratory/Radiology:     CBC with Differential:    Lab Results   Component Value Date    WBC 7.8 02/28/2021    RBC 4.52 02/28/2021    HGB 14.3 02/28/2021    HCT 46.8 02/28/2021    PLT 88 02/28/2021    .5 02/28/2021    MCH 31.6 02/28/2021    MCHC 30.6 02/28/2021    RDW 13.6 02/28/2021    METASPCT 0.9 02/28/2021    LYMPHOPCT 17.4 02/28/2021    MONOPCT 13.0 02/28/2021    MYELOPCT 0.9 02/28/2021    BASOPCT 0.9 02/28/2021    MONOSABS 1.01 02/28/2021    LYMPHSABS 1.40 02/28/2021    EOSABS 0.00 02/28/2021    BASOSABS 0.00 02/28/2021     CMP:    Lab Results   Component Value Date     02/28/2021    K 3.0 02/28/2021    K 3.7 02/28/2021     02/28/2021    CO2 31 02/28/2021    BUN 44 02/28/2021    CREATININE 1.4 02/28/2021    GFRAA >60 02/28/2021    LABGLOM 50 02/28/2021    GLUCOSE 122 02/28/2021    GLUCOSE 71 05/18/2012    PROT 7.0 02/28/2021    LABALBU 4.0 02/28/2021    LABALBU 4.5 05/18/2012    CALCIUM 8.4 02/28/2021    BILITOT 0.4 02/28/2021    ALKPHOS 80 02/28/2021    AST 32 02/28/2021    ALT 13 02/28/2021     Hepatic Function Panel:    Lab Results   Component Value Date    ALKPHOS 80 02/28/2021    ALT 13 02/28/2021 AST 32 02/28/2021    PROT 7.0 02/28/2021    BILITOT 0.4 02/28/2021    BILIDIR <0.2 06/12/2020    IBILI see below 06/12/2020    LABALBU 4.0 02/28/2021    LABALBU 4.5 05/18/2012     No current neuro imaging to review    All labs and images personally reviewed today    Assessment:     Severe Parkinson's disease: exacerbation of tremors in the setting of acute medical issues. Difficulty with dopaminergics in the past due psych side effects and medication interactions, but reportedly had a positive response to Sinemet this morning. Will up-titrate to maximize motor movements and quality of life.  He appears debilitated overall and final goals of care should be established    Seizures: controlled on Keppra and Tegretol     Cog-developmental disability    Dehydration/hypernatremia/fever    Plan:     Increase Sinemet to 50/200 mg every 4 hours WA--with caution    Tx underlying medical issues    May need NGT for meds/fluids    Recommend palliative consult for goals of care    Discussed with primary and ID    Will follow closely    MARLEE Knowles  2:43 PM  2/28/2021

## 2021-02-28 NOTE — PROGRESS NOTES
Hospitalist Progress Note      SYNOPSIS: Patient admitted on 2021 for Acute respiratory failure with hypoxia (HCC)   Stabilized 4l nc    SUBJECTIVE: fevers continue/                         Urine output has not been recorded                      Hypernatremia  Na continues to rise                           Serum cr has been rising                           Now bp is low    Patient seen and examined  Records reviewed. Parkinsonism  Tremors  Fevers  Autism  mrdd  obsev compuls disord  PAD  Hx of seizures    Sinemet has been initiated      Temp (24hrs), Av.6 °F (39.2 °C), Min:98.9 °F (37.2 °C), Max:106.3 °F (41.3 °C)    DIET: DIET DYSPHAGIA PUREED; Mildly Thick (Nectar)  CODE: Full Code    Intake/Output Summary (Last 24 hours) at 2021 1521  Last data filed at 2021 1500  Gross per 24 hour   Intake 3249.58 ml   Output 400 ml   Net 2849.58 ml       OBJECTIVE:    /67   Pulse 119   Temp 104 °F (40 °C) (Rectal)   Resp 22   Ht 5' 5\" (1.651 m)   Wt 109 lb 11.2 oz (49.8 kg)   SpO2 100%   BMI 18.26 kg/m²        General appearance: elderly does not appear jaundiced  Respiratory: clear sounds in upper lobes no wheeze  Cardiovascular:S1s2 systol eject murmur grade III/VI LLSB  Abdomen: active bowel sounds without rigidity     Skin:  Some areas of excoriation L UE forearm  Neurologic: patient awake with tremors in upper extremity and periodic whole body trembling               Repeated tongue protruding rythmic with the tremors      ASSESSMENT:    Hypernatremia -likeley from free water deficit it is getting worse despite IVF      Patient is not able to drink much    Volume depletion with cristina  Dehydration       Tempt elevations possibly from repeated mm contractions                Unclear if this is due to malign hyperhtermia?               Current no evid of bacter infection  Dysphagia    Thickened liquids     Pureed diets  Tremors  Parkinsonism  Hx of tardive dyskinesia     Hx of seizures  Hx 40 mg Subcutaneous Daily     PRN Meds: ipratropium-albuterol, sodium chloride flush, acetaminophen **OR** acetaminophen    Labs:     Recent Labs     02/27/21  0556 02/28/21  0347 02/28/21  0616   WBC 8.7 6.4 7.8   HGB 14.4 14.9 14.3   HCT 46.5 46.3 46.8    120* 88*       Recent Labs     02/28/21  0347 02/28/21  0614 02/28/21  1033   * 165* 161*   K 3.9 3.7 3.0*   * 124* 124*   CO2 27 27 31*   BUN 48* 46* 44*   CREATININE 1.5* 1.5* 1.4*   CALCIUM 9.6 9.2 8.4*       Recent Labs     02/27/21  0556 02/28/21  0347 02/28/21  0614   PROT 7.8 7.7 7.0   ALKPHOS 104 91 80   ALT 17 8 13   AST 27 29 32   BILITOT 0.4 0.4 0.4       No results for input(s): INR in the last 72 hours. Recent Labs     02/25/21  2136 02/27/21  1632 02/28/21 0347   CKTOTAL  --  507* 516*   TROPONINI 0.02  --   --          Radiology:     +++++++++++++++++++++++++++++++++++++++++++++++++  [address], [place]  +++++++++++++++++++++++++++++++++++++++++++++++++  NOTE: This report was transcribed using voice recognition software. Every effort was made to ensure accuracy; however, inadvertent computerized transcription errors may be present.

## 2021-02-28 NOTE — PROGRESS NOTES
[address] [place] [address] [facility]  Progress Note      Chief Complaint   Patient presents with    Shortness of Breath     per gateway pulse ox 72% on room air     Face to face encounter   SUBJECTIVE:  Patient is tolerating medications. No reported adverse drug reactions. Has had fevers all night- tmax documented at 105.9  Currently 98.9     Patient had also been tachycardiac through the night. He is having low urine output also   On 4 liters nasal canula  Has a sitter- in room     Review of systems:  As stated above in the chief complaint, otherwise negative. Medications:  Scheduled Meds:   carbidopa-levodopa  1 tablet Oral Q4H WA    benztropine  1 mg Oral BID    calcium-vitamin D  1 tablet Oral BID    carBAMazepine  200 mg Oral BID    [Held by provider] fluvoxaMINE  150 mg Oral Daily    levETIRAcetam  500 mg Oral BID    [Held by provider] mirtazapine  30 mg Oral Nightly    pantoprazole  20 mg Oral QAM AC    polyethylene glycol  17 g Oral Daily    sodium chloride flush  10 mL Intravenous 2 times per day    enoxaparin  40 mg Subcutaneous Daily     Continuous Infusions:   dextrose 5 % and 0.45 % NaCl 150 mL/hr at 21 0739     PRN Meds:ipratropium-albuterol, sodium chloride flush, acetaminophen **OR** acetaminophen    OBJECTIVE:  BP (!) 94/59   Pulse 80   Temp 98.9 °F (37.2 °C) (Rectal)   Resp 18   Ht 5' 5\" (1.651 m)   Wt 109 lb 11.2 oz (49.8 kg)   SpO2 100%   BMI 18.26 kg/m²   Temp  Av.5 °F (39.2 °C)  Min: 98.9 °F (37.2 °C)  Max: 106.3 °F (41.3 °C)  Constitutional: The patient is resting in bed. No acute distress. thin  Skin: Warm and dry. No rashes were noted. HEENT: Round and reactive pupils. Moist mucous membranes. Neck: Supple to movements. Chest: No use of accessory muscles to breathe. Symmetrical expansion. No wheezing, crackles or rhonchi. On nasal canula. Poor effort     Cardiovascular: RRR.   murmur   Abdomen: Positive bowel sounds to haemolyticus 01/27/2021    ORG Staphylococcus epidermidis 01/27/2021     Lab Results   Component Value Date    BLOODCULT2  02/23/2021     Gram stain performed from blood culture bottle media  Gram positive cocci in clusters      BLOODCULT2 Identification and sensitivity to follow 02/23/2021    BLOODCULT2 5 Days no growth 02/01/2021    ORG Staphylococcus hominis ssp hominis 02/23/2021    ORG Staphylococcus epidermidis 01/27/2021    ORG Staphylococcus haemolyticus 01/27/2021    ORG Staphylococcus hominis ssp hominis 01/27/2021    ORG Staphylococcus hominis ssp hominis 01/27/2021    ORG Staphylococcus haemolyticus 01/27/2021    ORG Staphylococcus haemolyticus 01/27/2021    ORG Staphylococcus epidermidis 01/27/2021     WOUND/ABSCESS   Date Value Ref Range Status   03/09/2015 Rare growth  Final   02/02/2015 Light growth  Final   02/02/2015 Light growth  Final     No results found for: RESPSMEAR      Component Value Date/Time    LABFUNG 4 Weeks- no fungus isolated 04/24/2015 1030     No results found for: CULTRESP  No results found for: CXCATHTIP  No results found for: BFCS  Culture Surgical   Date Value Ref Range Status   04/24/2015 Heavy growth  Final   04/24/2015 Heavy growth  Final   04/24/2015 Light growth  Final     Urine Culture, Routine   Date Value Ref Range Status   06/12/2020 >100,000 CFU/ml  Final   03/28/2020 Growth not present  Final   01/29/2020 Growth not present  Final     MRSA Culture Only   Date Value Ref Range Status   01/29/2020 Methicillin resistant Staph aureus not isolated  Final     Assessment  · Fevers THIS PT IS CHRONICALLY VOL CONTRACTED WHICH HAS NOT IMPROVE DURING THIS ADMISSSION0 osmolarity is 350  · Had atelectasis   · Parkinson Disease with severe tremors   · Severe Protein Malnutrition  · Hx of Aspiration PNA  · Hypernatremia   · Lactic Acidosis  · Dehydration and malnutrition   · Bacteremia with staph hominis- contaminant      Plan:   · Repeat blood cultures pending   · Continue fluids- D5 with . 39 NS- increased to 150cc/hr  · Chest x-ray reviewed   · meds changed to Sinemet   · Follow-up cultures - repeat blood cx- no growth   · Watch temps     Electronically signed by MARIJA Thomas - CNS on 2/28/2021 at 12:22 PM     Pt seen and examined. Above discussed agree with advanced practice nurse. Labs, cultures, and radiographs reviewed. Face to Face encounter occurred. Changes made as necessary.      Rakel Currie MD

## 2021-02-28 NOTE — PLAN OF CARE
Problem: Skin Integrity:  Goal: Will show no infection signs and symptoms  Description: Will show no infection signs and symptoms  2/27/2021 2326 by Jean Marie Osullivan RN  Outcome: Met This Shift  2/27/2021 1647 by Tyler Mcmillan RN  Outcome: Met This Shift  Goal: Absence of new skin breakdown  Description: Absence of new skin breakdown  Outcome: Met This Shift

## 2021-03-01 NOTE — PROGRESS NOTES
Abdomen: Positive bowel sounds to auscultation. Benign to palpation. Genitourinary: male  Extremities: No clubbing, no cyanosis, no edema. ++ contraction ++ tremors  Lines: peripheral    Laboratory and Tests Review:  Lab Results   Component Value Date    WBC 7.8 02/28/2021    WBC 6.4 02/28/2021    WBC 8.7 02/27/2021    HGB 14.3 02/28/2021    HCT 46.8 02/28/2021    .5 (H) 02/28/2021    PLT 88 (L) 02/28/2021     Lab Results   Component Value Date    NEUTROABS 5.38 02/28/2021    NEUTROABS 3.75 02/28/2021    NEUTROABS 4.74 02/27/2021     No results found for: [facility]  Lab Results   Component Value Date    ALT 13 03/01/2021    AST 52 (H) 03/01/2021    ALKPHOS 63 03/01/2021    BILITOT 0.5 03/01/2021     Lab Results   Component Value Date     03/01/2021    K 3.4 03/01/2021     03/01/2021    CO2 31 03/01/2021    BUN 36 03/01/2021    CREATININE 1.0 03/01/2021    CREATININE 1.2 02/28/2021    CREATININE 1.4 02/28/2021    GFRAA >60 03/01/2021    LABGLOM >60 03/01/2021    GLUCOSE 98 03/01/2021    GLUCOSE 71 05/18/2012    PROT 6.0 03/01/2021    LABALBU 3.5 03/01/2021    LABALBU 4.5 05/18/2012    CALCIUM 8.0 03/01/2021    BILITOT 0.5 03/01/2021    ALKPHOS 63 03/01/2021    AST 52 03/01/2021    ALT 13 03/01/2021     Lab Results   Component Value Date    CRP 0.3 01/29/2021    CRP 4.2 (H) 06/14/2020    CRP 3.6 (H) 06/12/2020     Lab Results   Component Value Date    SEDRATE 6 01/29/2021    SEDRATE 43 (H) 06/14/2020    SEDRATE 14 06/12/2020     Radiology:  Reviewed    Microbiology:   Lab Results   Component Value Date    BC 24 Hours no growth 02/28/2021    BC 24 Hours no growth 02/26/2021    BC 5 Days no growth 02/23/2021    ORG Staphylococcus hominis ssp hominis 02/23/2021    ORG Staphylococcus epidermidis 01/27/2021    ORG Staphylococcus haemolyticus 01/27/2021    ORG Staphylococcus hominis ssp hominis 01/27/2021    ORG Staphylococcus hominis ssp hominis 01/27/2021    ORG Staphylococcus haemolyticus 01/27/2021 ORG Staphylococcus haemolyticus 01/27/2021    ORG Staphylococcus epidermidis 01/27/2021     Lab Results   Component Value Date    BLOODCULT2 24 Hours no growth 02/28/2021    BLOODCULT2 5 Days no growth 02/01/2021    BLOODCULT2 Previously positive blood culture called 01/27/2021    BLOODCULT2  01/27/2021     This isolate is presumed to be resistant based on the  detection of inducible Clindamycin resistance. Clindamycin  may still be effective in some patients.       ORG Staphylococcus hominis ssp hominis 02/23/2021    ORG Staphylococcus epidermidis 01/27/2021    ORG Staphylococcus haemolyticus 01/27/2021    ORG Staphylococcus hominis ssp hominis 01/27/2021    ORG Staphylococcus hominis ssp hominis 01/27/2021    ORG Staphylococcus haemolyticus 01/27/2021    ORG Staphylococcus haemolyticus 01/27/2021    ORG Staphylococcus epidermidis 01/27/2021     WOUND/ABSCESS   Date Value Ref Range Status   03/09/2015 Rare growth  Final   02/02/2015 Light growth  Final   02/02/2015 Light growth  Final     No results found for: RESPSMEAR      Component Value Date/Time    LABFUNG 4 Weeks- no fungus isolated 04/24/2015 1030     No results found for: CULTRESP  No results found for: CXCATHTIP  No results found for: BFCS  Culture Surgical   Date Value Ref Range Status   04/24/2015 Heavy growth  Final   04/24/2015 Heavy growth  Final   04/24/2015 Light growth  Final     Urine Culture, Routine   Date Value Ref Range Status   06/12/2020 >100,000 CFU/ml  Final   03/28/2020 Growth not present  Final   01/29/2020 Growth not present  Final     MRSA Culture Only   Date Value Ref Range Status   01/29/2020 Methicillin resistant Staph aureus not isolated  Final     Assessment  · Fevers THIS PT IS CHRONICALLY VOL CONTRACTED WHICH HAS NOT SIGNIFICANTLY IMPROVE DURING THIS ADMISSSION0 osmolarity is 350  · Had atelectasis   · Parkinson Disease with severe tremors -exacerbated by volume contraction  · Severe Protein Malnutrition · Hx of Aspiration PNA  · Hypernatremia   · Lactic Acidosis  · Dehydration and malnutrition   · Bacteremia with staph hominis- contaminant      Plan:   · Repeat blood cultures pending   · Continue fluids-   · Chest x-ray reviewed   · meds changed to Sinemet -discussed with the neurology service  · Follow-up cultures - repeat blood cx- no growth   · Watch temps     Electronically signed by Gris Phillips MD on 3/1/2021 at 11:47 AM

## 2021-03-01 NOTE — CARE COORDINATION
Transition of care-Discharge held this weekend d/t fever, , Palliative consulted, code status changed, General Surgery consulted for Peg Tube, patient currently NPO, NGT in place. Discharge plan remains [facility] when medically stable-discharge pending PEG tube placement and resolution of fevers. Ambulance form/envelope in soft chart. Legal guardian is Carolyngunner Dominga ([phone]).      Chioma Hodgson BSN, RN  [facility] – [place], [place]   [phone]

## 2021-03-01 NOTE — CONSULTS
GENERAL SURGERY  CONSULT NOTE  3/1/2021    Physician Consulted: Dr. Saturnino Means  Reason for Consult: PEG tube    CC: SOB    HPI  Bridget Leyva is a 78 y.o. male who presented from SNF for increased shortness of breath and hypoxia. Patient has a significant history of seizure disorder, Tourette's, parkinsonism with severe tremors. Patient is non-verbal.  Patient was found to be severely volume contracted on admission. Patient does tolerate puréed dysphagia diet however is unable to take in enough oral fluids. Nursing staff and surgical resident have been in unable to place NG tube. Reviewing previous abdominal imaging it is noted that patient has a very large hiatal hernia containing the majority of his stomach and part of transverse colon. No previous history of abdominal surgery      Past Medical History:   Diagnosis Date    Acquired deformity of neck     Autism spectrum     Bilateral cataracts     Blepharochalasis     Cardiomegaly     Cataract of both eyes     Developmental delay     Gastritis     Hyperlipidemia     Kyphosis of thoracic region     Mental retardation     SEVERE MR  NON VERBAL, NEEDS WHEELCHAIR FOR LONG DISTANCE    Obsessive compulsive disorder     Parkinsonism (HCC)     Seizures (HCC)     Tourette disorder        Past Surgical History:   Procedure Laterality Date    TOE AMPUTATION Left     2nd/3rd       Medications Prior to Admission:    Prior to Admission medications    Medication Sig Start Date End Date Taking?  Authorizing Provider   ipratropium-albuterol (DUONEB) 0.5-2.5 (3) MG/3ML SOLN nebulizer solution Inhale 1 vial into the lungs every 4 hours as needed for Shortness of Breath   Yes Historical Provider, MD   levETIRAcetam (KEPPRA) 500 MG tablet Take 500 mg by mouth 2 times daily   Yes Historical Provider, MD   Calcium Carb-Cholecalciferol (CALCIUM-VITAMIN D) 500-200 MG-UNIT per tablet Take 1 tablet by mouth 2 times daily 6/23/20  Yes Azalia Lyles PA-C   vitamin D (ERGOCALCIFEROL) 1.25 MG (20454 UT) CAPS capsule Take 50,000 Units by mouth every 30 days Given on the 14 th   Yes Historical Provider, MD   fluvoxaMINE (LUVOX CR) 150 MG CP24 extended release capsule Take 150 mg by mouth daily    Yes Historical Provider, MD   mirtazapine (REMERON) 30 MG tablet Take 30 mg by mouth nightly  2/12/20  Yes Historical Provider, MD   polyethylene glycol (MIRALAX) 17 g packet Take 17 g by mouth daily as needed    Yes Historical Provider, MD   OLANZapine (ZYPREXA) 7.5 MG tablet Take 7.5 mg by mouth nightly    Yes Historical Provider, MD   omeprazole (PRILOSEC) 20 MG capsule Take 20 mg by mouth Daily    Yes Historical Provider, MD   carBAMazepine (TEGRETOL) 200 MG tablet Take 200 mg by mouth 2 times daily  6/5/15  Yes Historical Provider, MD   acetaminophen (TYLENOL) 325 MG tablet Take 650 mg by mouth every 4 hours as needed for Pain Patient takes 2 tablets every 4 hours PRN. Yes Historical Provider, MD   benztropine (COGENTIN) 1 MG tablet Take 1 mg by mouth 2 times daily    Yes Historical Provider, MD   lactobacillus (CULTURELLE) CAPS capsule Take 1 capsule by mouth daily  Patient not taking: Reported on 2/23/2021 2/5/21   Hank Beth MD       No Known Allergies    History reviewed. No pertinent family history. Social History     Tobacco Use    Smoking status: Never Smoker    Smokeless tobacco: Never Used   Substance Use Topics    Alcohol use: No    Drug use: No         Review of Systems   Unable to perform due to patient's mental status      PHYSICAL EXAM:    Vitals:    03/01/21 1115   BP: 113/67   Pulse: 89   Resp: 19   Temp: 98.9 °F (37.2 °C)   SpO2: 98%       General Appearance:  awake, alert, resting tremors present greater in upper extremities. Cachectic. in no acute distress  Skin:  Skin color, texture normal  Head/face:  NCAT  Eyes:  No gross abnormalities.   Lungs:  Breathing Pattern: regular, no distress  Heart:  Heart regular rate   Abdomen: Scaphoid, no incisional surgical scar present. Soft, nondistended, no grimace to palpation. No guarding or rigidity  Extremities: Superficial abrasions present over joints of all extremities due to severe tremors      LABS:    CBC  Recent Labs     02/28/21  0616   WBC 7.8   HGB 14.3   HCT 46.8   PLT 88*     BMP  Recent Labs     03/01/21  1059   *   K 3.8   *   CO2 25   BUN 34*   CREATININE 0.8   CALCIUM 8.4*     Liver Function  Recent Labs     03/01/21  0639   BILITOT 0.5   AST 52*   ALT 13   ALKPHOS 63   PROT 6.0*   LABALBU 3.5     No results for input(s): LACTATE in the last 72 hours. No results for input(s): INR, PTT in the last 72 hours. Invalid input(s): PT    RADIOLOGY    Ct Chest Wo Contrast    Result Date: 2/24/2021  EXAMINATION: CT OF THE CHEST WITHOUT CONTRAST 2/24/2021 9:49 am TECHNIQUE: CT of the chest was performed without the administration of intravenous contrast. Multiplanar reformatted images are provided for review. Dose modulation, iterative reconstruction, and/or weight based adjustment of the mA/kV was utilized to reduce the radiation dose to as low as reasonably achievable. COMPARISON: 01/28/2021 HISTORY: ORDERING SYSTEM PROVIDED HISTORY: PNA TECHNOLOGIST PROVIDED HISTORY: Reason for exam:->PNA What reading provider will be dictating this exam?->CRC FINDINGS: Mediastinum: No pericardial effusion. Aorta is stable in caliber. No adenopathy. Moderate sized hiatal hernia is present. Lungs/pleura: There is platelike atelectasis in the right lower lobe, improved since the prior study. There is stable platelike opacity at the left lung base, abutting the hemidiaphragm, likely related to atelectasis. No pleural effusion or pneumothorax. Upper Abdomen: See separate CT report. Soft Tissues/Bones: Chronic appearing right clavicle fracture. There are multilevel thoracic spine degenerative changes. Bibasilar atelectasis. No acute infiltrates.     Ct Abdomen Pelvis W Iv Contrast    Result Date: 2/24/2021 EXAMINATION: CT OF THE ABDOMEN AND PELVIS WITH CONTRAST 2/24/2021 9:49 am TECHNIQUE: CT of the abdomen and pelvis was performed with the administration of intravenous contrast. Multiplanar reformatted images are provided for review. Dose modulation, iterative reconstruction, and/or weight based adjustment of the mA/kV was utilized to reduce the radiation dose to as low as reasonably achievable. COMPARISON: 01/28/2021, 06/12/2020 HISTORY: ORDERING SYSTEM PROVIDED HISTORY: Sepsis, unknown source TECHNOLOGIST PROVIDED HISTORY: Reason for exam:->Sepsis, unknown source What reading provider will be dictating this exam?->CRC FINDINGS: Lower Chest: See separate CT report. Organs: There is stable marked gallbladder distension with a large gallstone within the gallbladder lumen. No biliary ductal dilatation. The liver, spleen, pancreas, adrenal glands are unremarkable. Bilateral renal cysts. No hydronephrosis. GI/Bowel: There is stable rectosigmoid colon dilatation up to 10 cm, with stool and diffuse wall thickening. Moderate volume of stool is seen throughout the more proximal colon. The appendix is not diagnostically visualized. No acute inflammatory changes. There is a moderate to large hiatal hernia containing the proximal stomach and a segment of the transverse colon, similar to that seen previously. Pelvis: There is mild circumferential urinary bladder wall thickening Peritoneum/Retroperitoneum: No free fluid or free air. Aorta is normal in caliber. No adenopathy Bones/Soft Tissues: No acute abnormality. There is fluid in the right inguinal canal    There is stable marked gallbladder distension and cholelithiasis. There is again seen marked dilatation of the rectosigmoid colon, with large volume of stool. Correlate for fecal impaction. No acute inflammation is identified. Hiatal hernia.     Xr Chest Portable    Result Date: 2/23/2021  EXAMINATION: ONE XRAY VIEW OF THE CHEST 2/23/2021 4:29 pm COMPARISON: 01/27/2021 HISTORY: ORDERING SYSTEM PROVIDED HISTORY: shortness of breath TECHNOLOGIST PROVIDED HISTORY: Reason for exam:->shortness of breath What reading provider will be dictating this exam?->CRC FINDINGS: No airspace opacity or pleural effusion. The heart is normal size. No pneumothorax. No free air beneath the hemidiaphragms. Multiple gas-filled distended loops of bowel in visualized upper abdomen. 1.  No pneumonia or pleural effusion. 2.  Multiple gas-filled distended loops of bowel in visualized upper abdomen. Xr Abdomen (2 Views)    Result Date: 2/23/2021  EXAMINATION: TWO XRAY VIEWS OF THE ABDOMEN 2/23/2021 6:46 pm COMPARISON: AP CT 01/28/2021 HISTORY: ORDERING SYSTEM PROVIDED HISTORY: evaluate patient is nonverbal cxr had dilated loops small bowel TECHNOLOGIST PROVIDED HISTORY: Reason for exam:->evaluate patient is nonverbal cxr had dilated loops small bowel What reading provider will be dictating this exam?->CRC FINDINGS: Large hiatal hernia again noted in retrocardiac region. Stable nonspecific elevation of right diaphragm. No evidence of small bowel distention. Colonic fecal content is prominent from cecum to rectum which may reflect constipation. There is also prominence of gas and caliber from cecum to rectum. No radiographically visible pneumoperitoneum. No evidence of visceromegaly or mass. No radiographically visible calcification of urologic significance. Prominent gas, stool, and caliber from cecum to rectum may reflect a combination of constipation with paralytic ileus Persisting large hiatal hernia Stable nonspecific elevation of right diaphragm        ASSESSMENT:  78 y.o. male with severe volume contraction, dysphagia tolerating soft diet without adequate oral intake.   Large hiatal hernia involving majority of stomach and partial transverse colon    PLAN:    -PEG tube tomorrow, 3/2  -NPO after midnight  -Alright for diet today  -Overall care per primary    Plan will be discussed with Dr. Augustina Arreaga    Electronically signed by Gloria Huang DO on 3/1/21 at 1:31 PM EST

## 2021-03-01 NOTE — PLAN OF CARE
Problem: Skin Integrity:  Goal: Will show no infection signs and symptoms  Description: Will show no infection signs and symptoms  Outcome: Met This Shift     Problem: Falls - Risk of:  Goal: Will remain free from falls  Description: Will remain free from falls  Outcome: Met This Shift     Problem: Pain:  Description: Pain management should include both nonpharmacologic and pharmacologic interventions.   Goal: Pain level will decrease  Description: Pain level will decrease  Outcome: Met This Shift

## 2021-03-01 NOTE — PROGRESS NOTES
Jie Jimenez is a 78 y.o.male     Neurology is following for tremors    PMH: Cognitive developmental delay, seizures, Tourette's, parkinsonism, HLD    He presented on 2/23 with shortness of breath and hypoxia from [facility]. SpO2 reportedly in the 70s. He was acutely confused and tremulous. He follows with Cely Cole in the office and seizures are controlled on Keppra 500 mg twice daily and Tegretol 200 mg twice daily. Previously tried Neupro for tremors which increased agitation. Sinemet was helpful in the past but stopped due to interaction with Zyprexa -- now off Zyprexa and recently restarted on Sinmeet - which is now titrated to 50/200 every four hours while awake     Yesterday, the nurse reported the patient did have some improvement in tremors this morning after dose of Sinemet - in the outpatient setting, his staff also previously noted improvement wityh low dose Sinemet    He has been taking oral medications and eating.       Na remains elevated at 155  Temp appears improved     Sitter at bedside this am     Objective:     /67   Pulse 89   Temp 98.9 °F (37.2 °C) (Rectal)   Resp 19   Ht 5' 5\" (1.651 m)   Wt 118 lb (53.5 kg)   SpO2 98%   BMI 19.64 kg/m²     General appearance: eyes closed, appears older than than age--appears chronically ill--shaking  Head: normocephalic, without obvious abnormality, atraumatic  Extremities: thin, atrophied calves  Pulses: 1+ and symmetric  Skin: pale, scabs and abrasions to knees    Mental Status: eyes open -- looking around the room    Not following commands     Cranial Nerves:  I: smell    II: visual acuity     II: visual fields Looking around room - left and right    II: pupils NOMI   III,VII: ptosis    III,IV,VI: extraocular muscles  Looking around room    V: mastication    V: facial light touch sensation  Appreciates noxious stim b/l   V,VII: corneal reflex     VII: facial muscle function - upper     VII: facial muscle function - lower Appears symmetric   VIII: hearing Normal   IX: soft palate elevation     IX,X: gag reflex    XI: trapezius strength     XI: sternocleidomastoid strength    XI: neck extension strength     XII: tongue strength       Motor:  Moving all limbs -- more purposeful in arms to noxious stimulation   Spastic legs   Marked generalized tremors of arms   Cogwheeling at both elbows and wrists    Sensory:  Withdraws to noxious stim throughout    DTR:   b/l Babins[place]is    Laboratory/Radiology:     CBC with Differential:    Lab Results   Component Value Date    WBC 7.8 02/28/2021    RBC 4.52 02/28/2021    HGB 14.3 02/28/2021    HCT 46.8 02/28/2021    PLT 88 02/28/2021    .5 02/28/2021    MCH 31.6 02/28/2021    MCHC 30.6 02/28/2021    RDW 13.6 02/28/2021    METASPCT 0.9 02/28/2021    LYMPHOPCT 17.4 02/28/2021    MONOPCT 13.0 02/28/2021    MYELOPCT 0.9 02/28/2021    BASOPCT 0.9 02/28/2021    MONOSABS 1.01 02/28/2021    LYMPHSABS 1.40 02/28/2021    EOSABS 0.00 02/28/2021    BASOSABS 0.00 02/28/2021     CMP:    Lab Results   Component Value Date     03/01/2021    K 3.4 03/01/2021     03/01/2021    CO2 31 03/01/2021    BUN 36 03/01/2021    CREATININE 1.0 03/01/2021    GFRAA >60 03/01/2021    LABGLOM >60 03/01/2021    GLUCOSE 98 03/01/2021    GLUCOSE 71 05/18/2012    PROT 6.0 03/01/2021    LABALBU 3.5 03/01/2021    LABALBU 4.5 05/18/2012    CALCIUM 8.0 03/01/2021    BILITOT 0.5 03/01/2021    ALKPHOS 63 03/01/2021    AST 52 03/01/2021    ALT 13 03/01/2021     Hepatic Function Panel:    Lab Results   Component Value Date    ALKPHOS 63 03/01/2021    ALT 13 03/01/2021    AST 52 03/01/2021    PROT 6.0 03/01/2021    BILITOT 0.5 03/01/2021    BILIDIR <0.2 06/12/2020    IBILI see below 06/12/2020    LABALBU 3.5 03/01/2021    LABALBU 4.5 05/18/2012     No current neuro imaging to review    All labs personally reviewed today    Assessment:     Suspected, severe Parkinson's disease   Reportedly responded to Sinemet in past but held d/t medication interactions -- now off Zyprexa and restarted on Sinemet   Previous tolerability issues with Neupro patch     Seizures   Well controlled on Keppra and Tegretol     Cognitive developmental disability    Dehydration/hypernatremia/fever    Plan:     Sinemet to 50/200 mg every 4 hours while awake     Tx underlying medical issues   Possibly needs PEG tube for fluid/med compliance     Recommend palliative consult for goals of care    Will follow closely    Juancarlos Bay DNP, APRN  11:21 AM  3/1/2021

## 2021-03-01 NOTE — PROGRESS NOTES
Hospitalist Progress Note      SYNOPSIS: Patient admitted on 2021 for Acute respiratory failure with hypoxia ([address]   Stabilized 4l nc  Patient's past history reviewed:  He is well-known to the service and has had multiple admissions for essentially the same issues  He has been thoroughly evaluated and continuously followed for his tremors by neurology  He is repeatedly admitted with hypernatremia and severe dehydration  Infectious disease has determined that the report of bacteremia is a contaminant    SUBJECTIVE:  Sodium is now at 155  There is no leukocytosis  His baseline condition is actually stable    Patient seen and examined  Records reviewed.      Parkinsonism  Tremors  Fevers  Autism  mrdd  obsev compuls disord  PAD  Hx of seizures    Sinemet has been initiated      Temp (24hrs), Av.4 °F (38.6 °C), Min:98.9 °F (37.2 °C), Max:104 °F (40 °C)    DIET: DIET DYSPHAGIA PUREED; Mildly Thick (Nectar)  CODE: DNR-CCA    Intake/Output Summary (Last 24 hours) at 3/1/2021 0919  Last data filed at 3/1/2021 0754  Gross per 24 hour   Intake 1685 ml   Output 850 ml   Net 835 ml       OBJECTIVE:    /68   Pulse 91   Temp 99.9 °F (37.7 °C) (Rectal)   Resp 21   Ht 5' 5\" (1.651 m)   Wt 118 lb (53.5 kg)   SpO2 99%   BMI 19.64 kg/m²        General appearance: elderly does not appear jaundiced  Severely contracted  No meaningful interaction  Respiratory: clear sounds in upper lobes no wheeze  Cardiovascular:S1s2 systol eject murmur grade III/VI LLSB  Abdomen: active bowel sounds without rigidity     Skin:  Some areas of excoriation L UE forearm  Neurologic: patient awake with tremors in upper extremity and periodic whole body trembling               Repeated tongue protruding rythmic with the tremors      ASSESSMENT:  Hypernatremia -likeley from free water deficit it is getting worse despite IVF      Patient is not able to drink much  This patient has had repeated multiple admissions for volume depletion, dehydration severe hypernatremia and CRIS  There have been discussions with the guardian  There have been discussions regarding G-tube placement  There have been discussions with palliative care  Recommendations for hospice have been made  The patient remains a DNR CCA      Volume depletion with cris  Dehydration       Tempt elevations possibly from repeated mm contractions                Unclear if this is due to malign hyperhtermia?               Current no evid of bacter infection  Dysphagia    Thickened liquids     Pureed diets  Tremors  Parkinsonism  Hx of tardive dyskinesia     Hx of seizures  Hx of mrdd/  Hx of obsess compulsive disorder  Pad  Autism spectrum disorder      PLAN:  I agree that patient requires volume expansion as serum cr has elevated above baseline     And bp is now low    Continue attempts at saline expansion          Once bp stabilizes/urine output improves/ and serum cr improves ( cr may lag behind urine output)    Patient will need some form of free water maintenance-it might be reasonable to insert flexiflo     Will need electrolytes monitored  I have spoken with guardian by phone   [address] [employer] [place] [facility]    [phone]  She is a very helpful resource   POA will reach out to his family       She does endorse flexi em      At this time     Her goal is to finalize goals with family -it is a courtesy to family                      Issues to discuss code status                      Placement of PEG   for now she requests I hold off on consult to palliative care and to hold off on consult to gen surg     POA does feel that it would be unreasonable to have him undergo CPR and /or                             ventilat    Replace potass    Carefully     He needs good urine output verified as we replace potasium    Neurology has initiated sinemet for tremors    DISPOSITION: tbd    Medications:  REVIEWED DAILY    Infusion Medications    dextrose 5 % and 0.45 % NaCl 150 mL/hr at 02/28/21 2046     Scheduled Medications    carbidopa-levodopa  2 tablet Oral Q4H WA    benztropine  1 mg Oral BID    calcium-vitamin D  1 tablet Oral BID    carBAMazepine  200 mg Oral BID    [Held by provider] fluvoxaMINE  150 mg Oral Daily    levETIRAcetam  500 mg Oral BID    [Held by provider] mirtazapine  30 mg Oral Nightly    pantoprazole  20 mg Oral QAM AC    polyethylene glycol  17 g Oral Daily    sodium chloride flush  10 mL Intravenous 2 times per day    enoxaparin  40 mg Subcutaneous Daily     PRN Meds: ipratropium-albuterol, sodium chloride flush, acetaminophen **OR** acetaminophen    Labs:     Recent Labs     02/27/21  0556 02/28/21  0347 02/28/21  0616   WBC 8.7 6.4 7.8   HGB 14.4 14.9 14.3   HCT 46.5 46.3 46.8    120* 88*       Recent Labs     02/28/21  1033 02/28/21  2134 03/01/21  0639   * 159* 155*   K 3.0* 4.3 3.4*   * 124* 117*   CO2 31* 24 31*   BUN 44* 43* 36*   CREATININE 1.4* 1.2 1.0   CALCIUM 8.4* 8.6 8.0*       Recent Labs     02/28/21  0347 02/28/21  0614 03/01/21  0639   PROT 7.7 7.0 6.0*   ALKPHOS 91 80 63   ALT 8 13 13   AST 29 32 52*   BILITOT 0.4 0.4 0.5       No results for input(s): INR in the last 72 hours. Recent Labs     02/27/21  1632 02/28/21 0347   CKTOTAL 507* 516*         Radiology:     +++++++++++++++++++++++++++++++++++++++++++++++++  [address] [place], [place]  +++++++++++++++++++++++++++++++++++++++++++++++++  NOTE: This report was transcribed using voice recognition software. Every effort was made to ensure accuracy; however, inadvertent computerized transcription errors may be present.

## 2021-03-01 NOTE — CONSULTS
Palliative Care Department  [phone]  Palliative Care Initial Consult  Provider Macy DUTTON CNP    Luis A Gómez  [id number]  Hospital Day: 7  Date of Initial Consult: 3/1/2021  Referring Provider: Sharon Dean DO  Palliative Medicine was consulted for assistance with: Goals of Care, Family Support    HPI:   Luis A Gómez is a 78 y.o. with a medical history of severe mental retardation, Tourette's syndrome, Parkinson's disease, cardiomegaly, severe malnutrition, thrombocytopenia. OCD, HLD, recurrent UTIs, pressure ulcer who was admitted on 2/23/2021 from nursing facility with a CHIEF COMPLAINT of shortness of breath and hypoxia. Per chart review, patient's oxygen saturation at nursing facility was 72% on room air. Patient recently discharged from hospital for aspiration pneumonia. Patient admitted to telemetry for further work-up and management. Palliative medicine consulted for goals of care and family support. ASSESSMENT/PLAN:     Pertinent Hospital Diagnoses      Acute respiratory failure with hypoxia   Lactic acidosis   Sepsis   Dehydration   Malnutrition      Palliative Care Encounter / Counseling Regarding Goals of Care  Please see detailed goals of care discussion as below   At this time, Luis A Gómez, Does Not have capacity for medical decision-making.   Capacity is time limited and situation/question specific   During encounter,   [address], legal guardian, was surrogate medical decision-maker   Outcome of goals of care meeting: Continue current management and DNR-CCA, PEG planned for tomorrow   Code status DNR-CCA   Advanced Directives:  Legal guardian paperwork in [address] [place] [address]:  lars Enriquez, legal guardian, [phone]      Spiritual assessment: no spiritual distress identified  Bereavement and grief: to be determined  Referrals to: none today  SUBJECTIVE:     Current medical issues leading to Palliative Medicine involvement include   [place] Problems    Diagnosis Date Noted    Lactic acidosis [E87.2] 02/24/2021    Abnormal urinalysis [R82.90] 02/24/2021    Constipation [K59.00] 02/24/2021    Autism spectrum disorder associated with neurodevelopmental, mental or behavioral disorder, requiring very substantial support (level 3) [F84.0] 02/24/2021    Parkinson disease ([address] [G20]     Sepsis ([address] [A41.9] 01/28/2021    Acute respiratory failure with hypoxia ([address] [J96.01] 10/29/2020    Severe dehydration [E86.0] 10/28/2020    Severe protein-calorie malnutrition ([address] [E43] 06/10/2020    Atherosclerosis of native artery of both lower extremities ([address] [I70.203] 07/10/2018       Details of Conversation: Chart reviewed and patient seen. Patient resting in bed, nonverbal.  Spoke with patient's legal guardian, Yimi Merino. Role of palliative medicine explained. Yimi Merino wishes to continue DNR-CCA at this time. Yimi Merino explains that plan is for PEG placement tomorrow-she is hopeful patient can return to nursing facility at discharge. Yimi Merino identifies no needs from palliative medicine at this time. Continue current management. Support provided and questions answered. Will follow.       OBJECTIVE:   Prognosis: unknown    Physical Exam:  /67   Pulse 89   Temp 98.9 °F (37.2 °C) (Rectal)   Resp 19   Ht 5' 5\" (1.651 m)   Wt 118 lb (53.5 kg)   SpO2 98%   BMI 19.64 kg/m²   Constitutional:  Elderly, thin, awake, not interactive  Eyes: No scleral icterus, normal lids, no discharge  ENMT:  Normocephalic, atraumatic, mucosa moist, EOMI  Neck:  Trachea midline, no JVD  Lungs:  Room air, easy and unlabored respirations  Heart:  Irregular  Abd:  Soft, non distended, bowel sounds present  Ext:  No edema, pulses present  Skin:  Warm and dry, no rashes on visible skin  Neuro:   Alert, does not follow commands    Objective data reviewed: labs, images, records, medication use, vitals and chart    Discussed patient and the plan of care with the other IDT members:

## 2021-03-02 PROBLEM — E43 SEVERE PROTEIN-CALORIE MALNUTRITION (HCC): Chronic | Status: ACTIVE | Noted: 2021-01-01

## 2021-03-02 NOTE — ANESTHESIA POSTPROCEDURE EVALUATION
Department of Anesthesiology  Postprocedure Note    Patient: Holli Judge  MRN: [id number]  YOB: 1950  Date of evaluation: 3/3/2021  Time:  7:57 AM     Procedure Summary     Date: 03/02/21 Room / Location: [facility]    Anesthesia Start:  Anesthesia Stop:     Procedure: PEG TUBE INSERTION (N/A ) Diagnosis: (.)    Surgeons: Sandra Floyd MD Responsible Provider:     Anesthesia Type: MAC ASA Status: 4          Anesthesia Type: MAC    Deb Phase I: Deb Score: 10    Deb Phase II:      Last vitals: Reviewed and per EMR flowsheets.        Anesthesia Post Evaluation    Patient location during evaluation: PACU  Patient participation: complete - patient participated  Level of consciousness: awake  Pain score: 3  Airway patency: patent  Nausea & Vomiting: no nausea and no vomiting  Complications: no  Cardiovascular status: blood pressure returned to baseline  Respiratory status: acceptable  Hydration status: euvolemic

## 2021-03-02 NOTE — PROGRESS NOTES
Palliative Care Department  [phone]  Palliative Care Progress Note  Provider [facility]  [id number]  Hospital Day: 8  Date of Initial Consult: 3/1/2021  Referring Provider: Tram Mckeon DO  Palliative Medicine was consulted for assistance with: Goals of Care, Family Support    HPI:   Holli Judge is a 78 y.o. with a medical history of severe mental retardation, Tourette's syndrome, Parkinson's disease, cardiomegaly, severe malnutrition, thrombocytopenia. OCD, HLD, recurrent UTIs, pressure ulcer, seizure disorder who was admitted on 2/23/2021 from nursing facility with a CHIEF COMPLAINT of shortness of breath and hypoxia. Per chart review, patient's oxygen saturation at nursing facility was 72% on room air. Patient recently discharged from hospital for aspiration pneumonia. Patient admitted to telemetry for further work-up and management. Palliative medicine consulted for goals of care and family support. ASSESSMENT/PLAN:     Pertinent Hospital Diagnoses      Acute respiratory failure with hypoxia   Lactic acidosis   Sepsis   Dehydration   Malnutrition      Palliative Care Encounter / Counseling Regarding Goals of Care  Please see detailed goals of care discussion as below   At this time, Holli Judge, Does Not have capacity for medical decision-making.   Capacity is time limited and situation/question specific   During encounter,   [address], legal guardian, was surrogate medical decision-maker   Outcome of goals of care meeting: PEG today   Code status DNR-CCA   Advanced Directives:  Legal guardian paperwork in [address] [place] [address] Street:  lars Garland, legal guardian, [phone]      Spiritual assessment: no spiritual distress identified  Bereavement and grief: to be determined  Referrals to: none today  SUBJECTIVE:     Current medical issues leading to Palliative Medicine involvement include   Active Hospital Problems    Diagnosis Date Noted    Lactic acidosis [E87.2] 02/24/2021    Abnormal urinalysis [R82.90] 02/24/2021    Constipation [K59.00] 02/24/2021    Autism spectrum disorder associated with neurodevelopmental, mental or behavioral disorder, requiring very substantial support (level 3) [F84.0] 02/24/2021    Parkinson disease ([facility] [G20]     Sepsis ([facility] [A41.9] 01/28/2021    Acute respiratory failure with hypoxia ([address] [J96.01] 10/29/2020    Severe dehydration [E86.0] 10/28/2020    Severe protein-calorie malnutrition ([address] [E43] 06/10/2020    Atherosclerosis of native artery of both lower extremities ([facility] [I70.203] 07/10/2018       Details of Conversation: Chart reviewed and patient seen. Patient resting in bed, nonverbal.  Plan for d/c back to group home after PEG tube when medically stable. No further PM needs identified at this time, will sign off. OBJECTIVE:   Prognosis: unknown    Physical Exam:  BP 90/60   Pulse 73   Temp 97 °F (36.1 °C) (Temporal)   Resp 23   Ht 5' 5\" (1.651 m)   Wt 118 lb (53.5 kg)   SpO2 99%   BMI 19.64 kg/m²   Constitutional:  Elderly, thin, awake, tremors noted, resistant to being touched  Eyes: No scleral icterus, normal lids, no discharge  ENMT:  Normocephalic, atraumatic, mucosa moist, EOMI  Neck:  Trachea midline, no JVD  Lungs:  Room air, easy and unlabored respirations  Heart:  Irregular  Abd:  Soft, non distended, bowel sounds present  Ext:  No edema, pulses present  Skin:  Warm and dry, no rashes on visible skin  Neuro:   Alert, does not follow commands    Objective data reviewed: labs, images, records, medication use, vitals and chart    Discussed patient and the plan of care with the other IDT members: Palliative Medicine IDT Team    Time/Communication  Greater than 50% of time spent, total 5 minutes in counseling and coordination of care at the bedside regarding goals of care, diagnosis and prognosis and see above.     Thank you for allowing Palliative Medicine to participate in the care of Skinny Lord.

## 2021-03-02 NOTE — CARE COORDINATION
Transition of Care-Discharge plan remains [place] [facility], will confirm with staff if able to accommodate Peg Tube feedings/meds. Delay in discharge d/t aspiration & fevers over the weekend- General Surgery consult for Peg Tube placement today. Ambulance form and envelope in soft chart.      Grecia JOSEN, RN  [facility]   [phone]

## 2021-03-02 NOTE — PROGRESS NOTES
Messaged patients guardian to inform that the patient was being transferred to [address] [place] [address]. Also gave nurse to nurse report to nurse at this time.

## 2021-03-02 NOTE — PROGRESS NOTES
[employer] message sent to Dr. Aida Muller regarding transfer to Med Surg.   Electronically signed by Mario Fong RN on 3/2/2021 at 7:46 AM

## 2021-03-02 NOTE — PROGRESS NOTES
Comprehensive Nutrition Assessment    Type and Reason for Visit:  Initial(LOS)    Nutrition Recommendations/Plan: Tube feeding recommendation following PEG placement. Recommend Standard with Fiber (Jevity 1.2) @55/hr to provide 1320ml, 1584 calories, 74g protein, 1065ml water. Monitor potassium levels as available and adjust TF product if needed. Nutrition Assessment:  Patient at nutritional risk d/t overall decreased po intake x 1 week since admission ; pt meets criteria for severe malnutrition AEB poor po intake >1 month and muscle/fat wasting ; adm w/ acute respiratory failure w/ hypoxia ; hx of MRDD and Parkinsons ; noted seizures ; planned PEG placement 3/2 ; will provide TF recommendations    Malnutrition Assessment:  Malnutrition Status:  Severe malnutrition    Context:  Chronic Illness     Findings of the 6 clinical characteristics of malnutrition:  Energy Intake:  7 - 75% or less estimated energy requirements for 1 month or longer  Weight Loss:  No significant weight loss     Body Fat Loss:  7 - Severe body fat loss Triceps, Orbital   Muscle Mass Loss:  7 - Severe muscle mass loss Temples (temporalis), Clavicles (pectoralis & deltoids)  Fluid Accumulation:  No significant fluid accumulation     Strength:  Not Performed    Estimated Daily Nutrient Needs:  Energy (kcal):  9363-7005 (REE 1233 x 1.3 SF); Weight Used for Energy Requirements:  Current     Protein (g):  70-80 (1.3-1.5g/kg CBW);  Weight Used for Protein Requirements:  Current        Fluid (ml/day):  3981-1734; Method Used for Fluid Requirements:  1 ml/kcal      Nutrition Related Findings:  +I&Os (+12.9 L), no edema, non-verbal, missing teeth, active BS, redness to buttocks, boggy heels, abrasions, hx of toe amputations, tremors, muscle and fat wasting, dysphagia, cachexia      Wounds:  Skin Tears(skin tear x 1 noted to R ankle)       Current Nutrition Therapies:    Diet NPO, After Midnight Exceptions are: Sips with Meds Anthropometric Measures:  · Height: 5' 5\" (165.1 cm)  · Current Body Weight: 118 lb (53.5 kg)(2/28, no method)   · Admission Body Weight: 121 lb (54.9 kg)(2/23, no method)    · Usual Body Weight: (EMR shows past weights of 121# actual on 1/27/21 and 110# bedscale on 3/28/20)     · Ideal Body Weight: 136 lbs; % Ideal Body Weight 86.8 %   · BMI: 19.6  · BMI Categories: Underweight (BMI less than 22) age over 72       Nutrition Diagnosis:   · Severe malnutrition, In context of chronic illness related to cognitive or neurological impairment(hx of MRDD) as evidenced by poor intake prior to admission, severe loss of subcutaneous fat, severe muscle loss      Nutrition Interventions:   Food and/or Nutrient Delivery:  Continue NPO, Start Tube Feeding  Nutrition Education/Counseling:  Education not indicated   Coordination of Nutrition Care:  Continue to monitor while inpatient, Speech Therapy, Swallow Evaluation    Goals:  Tube feeding will meet nutritional needs with good tolerance       Nutrition Monitoring and Evaluation:   Behavioral-Environmental Outcomes:  Beliefs and Attitutes   Food/Nutrient Intake Outcomes:  Enteral Nutrition Intake/Tolerance  Physical Signs/Symptoms Outcomes:  Biochemical Data, GI Status, Fluid Status or Edema, Hemodynamic Status, Nutrition Focused Physical Findings, Skin, Weight, Chewing or Swallowing     Discharge Planning:     Too soon to determine     Electronically signed by Carmel Robldeo RD, LD on 3/2/21 at 11:23 AM EST    Contact: [phone]

## 2021-03-02 NOTE — PROGRESS NOTES
Physician Progress Note      Chevy Bee  CSN #:                  [id number]  :                       1950  ADMIT DATE:       2021 3:47 PM  [address] [place] DATE:  RESPONDING  PROVIDER #:        Michael Benitez MD          QUERY TEXT:    Patient admitted with shortness of breath and hypoxia. Documentation reflects   Sepsis in the H&P without further documentation. If possible, please document   in the progress notes and discharge summary if Sepsis was: The medical record reflects the following:  Risk Factors:  Acute respiratory failure with hypoxia  Clinical Indicators: Per H&P SIRS/sepsis. Unclear source of infection at this   time; Per ID Consult blood culture likely contaminant, Hx of Aspiration PNA,   continue to watch off antibiotics, hydrate patient  Treatment: ID Consult, Cultures, IV fluid resuscitation, continued inpatient   monitoring    Thank you  Ely PERDUE, RN, CCDS  Clinical Documentation Improvement  Options provided:  -- Sepsis ruled out after study  -- Sepsis treated and resolved  -- Sepsis confirmed after study  -- Other - I will add my own diagnosis  -- Disagree - Not applicable / Not valid  -- Disagree - Clinically unable to determine / Unknown  -- Refer to Clinical Documentation Reviewer    PROVIDER RESPONSE TEXT:    Sepsis ruled out after study.     Query created by: Efrain Hernandez on 3/87/6554 97:84 PM      Electronically signed by:  Michael Benitez MD 3/2/2021 6:17 AM

## 2021-03-02 NOTE — PROGRESS NOTES
Hospitalist Progress Note      SYNOPSIS: Patient admitted on 2021 for Acute respiratory failure with hypoxia ([address]   Stabilized 4l nc  Patient's past history reviewed:  He is well-known to the service and has had multiple admissions for essentially the same issues  He has been thoroughly evaluated and continuously followed for his tremors by neurology  He is repeatedly admitted with hypernatremia and severe dehydration  Infectious disease has determined that the report of bacteremia is a contaminant    SUBJECTIVE:  Sodium is now normal  Hydration is satisfactory  There is no leukocytosis  His baseline condition is actually stable    Patient seen and examined  Records reviewed.      Parkinsonism  Tremors  Fevers  Autism  mrdd  obsev compuls disord  PAD  Hx of seizures    Sinemet has been initiated      Temp (24hrs), Av.6 °F (37 °C), Min:97 °F (36.1 °C), Max:100.4 °F (38 °C)    DIET: Diet NPO, After Midnight Exceptions are: Sips with Meds  CODE: [id number]    Intake/Output Summary (Last 24 hours) at 3/2/2021 0913  Last data filed at 3/2/2021 0357  Gross per 24 hour   Intake 4735 ml   Output 825 ml   Net 3910 ml       OBJECTIVE:    BP 90/60   Pulse 73   Temp 97 °F (36.1 °C) (Temporal)   Resp 23   Ht 5' 5\" (1.651 m)   Wt 118 lb (53.5 kg)   SpO2 99%   BMI 19.64 kg/m²        General appearance: elderly does not appear jaundiced  Severely contracted  No meaningful interaction  Respiratory: clear sounds in upper lobes no wheeze  Cardiovascular:S1s2 systol eject murmur grade III/VI LLSB  Abdomen: active bowel sounds without rigidity     Skin:  Some areas of excoriation L UE forearm  Neurologic: patient awake with tremors in upper extremity and periodic whole body trembling               Repeated tongue protruding rythmic with the tremors      ASSESSMENT:  Hypernatremia -likeley from free water deficit is resolved  Patient is not able to drink much so now he is getting a PEG tube  This patient has had repeated multiple admissions for volume depletion, dehydration severe hypernatremia and CRIS  There have been discussions with the guardian  There have been discussions regarding G-tube placement  There have been discussions with palliative care  Recommendations for hospice have been made  The patient remains a DNR [facility]      Volume depletion with cris  Dehydration       Tempt elevations possibly from repeated mm contractions                Unclear if this is due to malign hyperhtermia?               Current no evid of bacter infection  Dysphagia    Thickened liquids     Pureed diets  Tremors  Parkinsonism  Hx of tardive dyskinesia     Hx of seizures  Hx of mrdd/  Hx of obsess compulsive disorder  Pad  Autism spectrum disorder      PLAN:  I agree that patient requires volume expansion as serum cr has elevated above baseline     And bp is now low    Continue attempts at saline expansion          Once bp stabilizes/urine output improves/ and serum cr improves ( cr may lag behind urine output)    Patient will need some form of free water maintenance-it might be reasonable to insert flexiflo     Will need electrolytes monitored  I have spoken with guardian by phone   [address] [facility] appointed by [facility] court    [phone]  She is a very helpful resource      At this time     Her goal is to finalize goals with family -it is a courtesy to family                      Issues to discuss code status                      Placement of PEG   for now she requests I hold off on consult to palliative care and to hold off on consult to gen surg   POA does feel that it would be unreasonable to have him undergo CPR and /or                             ventilat however he is getting a PEG tube      Neurology has initiated sinemet for tremors he has been on this before and it has not really helped him    DISPOSITION: tbd    Medications:  REVIEWED DAILY    Infusion Medications     Scheduled Medications    ceFAZolin  2,000 mg Intravenous On Call to OR    carbidopa-levodopa  2 tablet Oral Q4H WA    benztropine  1 mg Oral BID    calcium-vitamin D  1 tablet Oral BID    carBAMazepine  200 mg Oral BID    [Held by provider] fluvoxaMINE  150 mg Oral Daily    levETIRAcetam  500 mg Oral BID    [Held by provider] mirtazapine  30 mg Oral Nightly    pantoprazole  20 mg Oral QAM AC    polyethylene glycol  17 g Oral Daily    sodium chloride flush  10 mL Intravenous 2 times per day    enoxaparin  40 mg Subcutaneous Daily     PRN Meds: ipratropium-albuterol, sodium chloride flush, acetaminophen **OR** acetaminophen    Labs:     Recent Labs     02/28/21  0347 02/28/21  0616   WBC 6.4 7.8   HGB 14.9 14.3   HCT 46.3 46.8   * 88*       Recent Labs     03/01/21  2125 03/02/21  0347 03/02/21  0624    146 143   K 3.8 3.6 3.7   * 113* 114*   CO2 25 29 25   BUN 27* 23 22   CREATININE 0.7 0.7 0.6*   CALCIUM 8.0* 7.8* 7.6*       Recent Labs     02/28/21  0614 03/01/21  0639 03/02/21  0624   PROT 7.0 6.0* 5.2*   ALKPHOS 80 63 62   ALT 13 13 19   AST 32 52* 57*   BILITOT 0.4 0.5 0.6       No results for input(s): INR in the last 72 hours. Recent Labs     02/27/21  1632 02/28/21 0347   CKTOTAL 507* 516*         Radiology:     +++++++++++++++++++++++++++++++++++++++++++++++++  [address], [place]  +++++++++++++++++++++++++++++++++++++++++++++++++  NOTE: This report was transcribed using voice recognition software. Every effort was made to ensure accuracy; however, inadvertent computerized transcription errors may be present.

## 2021-03-02 NOTE — PROGRESS NOTES
Messaged G. Surgery to see if the patient can be started on tube feeding per dietician recommendations. Per Dr. Santos Zamora ok to start medications tonight but not to start feeding/ nutrition until tomorrow.

## 2021-03-02 NOTE — PLAN OF CARE
Problem: Skin Integrity:  Goal: Will show no infection signs and symptoms  Description: Will show no infection signs and symptoms  3/2/2021 0039 by Justino Pino RN  Outcome: Met This Shift     Problem: Skin Integrity:  Goal: Absence of new skin breakdown  Description: Absence of new skin breakdown  Outcome: Met This Shift     Problem: Falls - Risk of:  Goal: Will remain free from falls  Description: Will remain free from falls  3/2/2021 0039 by Chhaya Hensley RN  Outcome: Met This Shift     Problem: Falls - Risk of:  Goal: Absence of physical injury  Description: Absence of physical injury  Outcome: Met This Shift

## 2021-03-02 NOTE — PROGRESS NOTES
[address] [place] [address] [facility]  Progress Note      Chief Complaint   Patient presents with    Shortness of Breath     per gateway pulse ox 72% on room air     Face to face encounter   SUBJECTIVE:  Patient is tolerating medications. No reported adverse drug reactions. Has had fevers all night- tmax documented at 100.4. HR imporved  On room air  He put his hands up when I tried to listen to his heart/lungs  Has a telesitter- in room     Review of systems:  As stated above in the chief complaint, otherwise negative. Medications:  Scheduled Meds:   carbidopa-levodopa  2 tablet Oral Q4H WA    benztropine  1 mg Oral BID    calcium-vitamin D  1 tablet Oral BID    carBAMazepine  200 mg Oral BID    [Held by provider] fluvoxaMINE  150 mg Oral Daily    levETIRAcetam  500 mg Oral BID    [Held by provider] mirtazapine  30 mg Oral Nightly    pantoprazole  20 mg Oral QAM AC    polyethylene glycol  17 g Oral Daily    sodium chloride flush  10 mL Intravenous 2 times per day    enoxaparin  40 mg Subcutaneous Daily     Continuous Infusions:   sodium chloride       PRN Meds:sodium chloride flush, promethazine **OR** ondansetron, ipratropium-albuterol, acetaminophen **OR** acetaminophen    OBJECTIVE:  /66   Pulse 72   Temp 97.3 °F (36.3 °C)   Resp 20   Ht 5' 5\" (1.651 m)   Wt 118 lb (53.5 kg)   SpO2 98%   BMI 19.64 kg/m²   Temp  Av.7 °F (36.5 °C)  Min: 97 °F (36.1 °C)  Max: 99.5 °F (37.5 °C)  Constitutional: The patient is resting in bed. No acute distress. thin  Skin: Warm and dry. No rashes were noted. HEENT: Round and reactive pupils. Moist mucous membranes. Neck: Supple to movements. Chest: No use of accessory muscles to breathe. Symmetrical expansion. Cardiovascular: reg rate  Abdomen: Positive bowel sounds to auscultation. Benign to palpation. peg  Genitourinary: male  Extremities: No clubbing, no cyanosis, no edema. ++ contraction ++ tremors  Lines: peripheral Laboratory and Tests Review:  Lab Results   Component Value Date    WBC 7.8 02/28/2021    WBC 6.4 02/28/2021    WBC 8.7 02/27/2021    HGB 14.3 02/28/2021    HCT 46.8 02/28/2021    .5 (H) 02/28/2021    PLT 88 (L) 02/28/2021     Lab Results   Component Value Date    NEUTROABS 5.38 02/28/2021    NEUTROABS 3.75 02/28/2021    NEUTROABS 4.74 02/27/2021     No results found for: [facility]  Lab Results   Component Value Date    ALT 19 03/02/2021    AST 57 (H) 03/02/2021    ALKPHOS 62 03/02/2021    BILITOT 0.6 03/02/2021     Lab Results   Component Value Date     03/02/2021    K 3.9 03/02/2021    K 3.7 03/02/2021     03/02/2021    CO2 26 03/02/2021    BUN 22 03/02/2021    CREATININE 0.7 03/02/2021    CREATININE 0.6 03/02/2021    CREATININE 0.6 03/02/2021    GFRAA >60 03/02/2021    LABGLOM >60 03/02/2021    GLUCOSE 95 03/02/2021    GLUCOSE 71 05/18/2012    PROT 5.2 03/02/2021    LABALBU 2.9 03/02/2021    LABALBU 4.5 05/18/2012    CALCIUM 8.4 03/02/2021    BILITOT 0.6 03/02/2021    ALKPHOS 62 03/02/2021    AST 57 03/02/2021    ALT 19 03/02/2021     Lab Results   Component Value Date    CRP 0.3 01/29/2021    CRP 4.2 (H) 06/14/2020    CRP 3.6 (H) 06/12/2020     Lab Results   Component Value Date    SEDRATE 6 01/29/2021    SEDRATE 43 (H) 06/14/2020    SEDRATE 14 06/12/2020     Radiology:  Reviewed    Microbiology:   Lab Results   Component Value Date    BC 24 Hours no growth 02/28/2021    BC 24 Hours no growth 02/26/2021    BC 5 Days no growth 02/23/2021    ORG Staphylococcus hominis ssp hominis 02/23/2021    ORG Staphylococcus epidermidis 01/27/2021    ORG Staphylococcus haemolyticus 01/27/2021    ORG Staphylococcus hominis ssp hominis 01/27/2021    ORG Staphylococcus hominis ssp hominis 01/27/2021    ORG Staphylococcus haemolyticus 01/27/2021    ORG Staphylococcus haemolyticus 01/27/2021    ORG Staphylococcus epidermidis 01/27/2021     Lab Results   Component Value Date    BLOODCULT2 24 Hours no growth 02/28/2021 Jairo Bare 5 Days no growth 02/01/2021    BLOODCULT2 Previously positive blood culture called 01/27/2021    BLOODCULT2  01/27/2021     This isolate is presumed to be resistant based on the  detection of inducible Clindamycin resistance. Clindamycin  may still be effective in some patients.       ORG Staphylococcus hominis ssp hominis 02/23/2021    ORG Staphylococcus epidermidis 01/27/2021    ORG Staphylococcus haemolyticus 01/27/2021    ORG Staphylococcus hominis ssp hominis 01/27/2021    ORG Staphylococcus hominis ssp hominis 01/27/2021    ORG Staphylococcus haemolyticus 01/27/2021    ORG Staphylococcus haemolyticus 01/27/2021    ORG Staphylococcus epidermidis 01/27/2021     WOUND/ABSCESS   Date Value Ref Range Status   03/09/2015 Rare growth  Final   02/02/2015 Light growth  Final   02/02/2015 Light growth  Final     No results found for: RESPSMEAR      Component Value Date/Time    LABFUNG 4 Weeks- no fungus isolated 04/24/2015 1030     No results found for: CULTRESP  No results found for: CXCATHTIP  No results found for: BFCS  Culture Surgical   Date Value Ref Range Status   04/24/2015 Heavy growth  Final   04/24/2015 Heavy growth  Final   04/24/2015 Light growth  Final     Urine Culture, Routine   Date Value Ref Range Status   06/12/2020 >100,000 CFU/ml  Final   03/28/2020 Growth not present  Final   01/29/2020 Growth not present  Final     MRSA Culture Only   Date Value Ref Range Status   01/29/2020 Methicillin resistant Staph aureus not isolated  Final     Assessment  · Fevers THIS PT IS CHRONICALLY VOL CONTRACTED WHICH HAS NOT SIGNIFICANTLY IMPROVE DURING THIS ADMISSSION0 osmolarity is 350  · Had atelectasis   · Parkinson Disease with severe tremors -exacerbated by volume contraction  · Severe Protein Malnutrition  · Hx of Aspiration PNA  · Hypernatremia   · Lactic Acidosis  · Dehydration and malnutrition   · Bacteremia with staph hominis- contaminant      Plan:   · Repeat blood cultures NGTD  · Continue fluids-   · Peg placed   · meds changed to Sinemet -discussed with the neurology service  · Follow-up cultures -   · Watch temps -imporivng    Electronically signed by MARIJA Rico - CNP on 3/2/2021 at 4:24 PM     Pt seen and examined. Above discussed agree with advanced practice nurse. Labs, cultures, and radiographs reviewed. Face to Face encounter occurred. Changes made as necessary.      Belinda Lopez MD

## 2021-03-02 NOTE — ANESTHESIA PRE PROCEDURE
Department of Anesthesiology  Preprocedure Note       Name:  Bridget Leyva   Age:  78 y.o.  :  1950                                          MRN:  [id number]         Date:  3/2/2021      Surgeon: Frances Chaves):  Ekta Aguirre MD    Procedure: * No procedures listed *    Medications prior to admission:   Prior to Admission medications    Medication Sig Start Date End Date Taking? Authorizing Provider   lactobacillus (CULTURELLE) CAPS capsule Take 1 capsule by mouth daily  Patient not taking: Reported on 2021   Kenneth Hinton MD   ipratropium-albuterol (DUONEB) 0.5-2.5 (3) MG/3ML SOLN nebulizer solution Inhale 1 vial into the lungs every 4 hours as needed for Shortness of Breath    Historical Provider, MD   levETIRAcetam (KEPPRA) 500 MG tablet Take 500 mg by mouth 2 times daily    Historical Provider, MD   Calcium Carb-Cholecalciferol (CALCIUM-VITAMIN D) 500-200 MG-UNIT per tablet Take 1 tablet by mouth 2 times daily 20   Azalia Lyles PA-C   vitamin D (ERGOCALCIFEROL) 1.25 MG (44890 UT) CAPS capsule Take 50,000 Units by mouth every 30 days Given on the     Historical Provider, MD   fluvoxaMINE (LUVOX CR) 150 MG CP24 extended release capsule Take 150 mg by mouth daily     Historical Provider, MD   mirtazapine (REMERON) 30 MG tablet Take 30 mg by mouth nightly  20   Historical Provider, MD   polyethylene glycol (MIRALAX) 17 g packet Take 17 g by mouth daily as needed     Historical Provider, MD   OLANZapine (ZYPREXA) 7.5 MG tablet Take 7.5 mg by mouth nightly     Historical Provider, MD   omeprazole (PRILOSEC) 20 MG capsule Take 20 mg by mouth Daily     Historical Provider, MD   carBAMazepine (TEGRETOL) 200 MG tablet Take 200 mg by mouth 2 times daily  6/5/15   Historical Provider, MD   acetaminophen (TYLENOL) 325 MG tablet Take 650 mg by mouth every 4 hours as needed for Pain Patient takes 2 tablets every 4 hours PRN.     Historical Provider, MD   benztropine (COGENTIN) 1 MG tablet Take 1 mg by mouth 2 times daily     Historical Provider, MD       Current medications:    No current facility-administered medications for this visit. No current outpatient medications on file.      Facility-Administered Medications Ordered in Other Visits   Medication Dose Route Frequency Provider Last Rate Last Admin    carbidopa-levodopa (SINEMET)  MG per tablet 2 tablet  2 tablet Oral Q4H MARIJA Oshea - CNP   2 tablet at 03/02/21 0843    benztropine (COGENTIN) tablet 1 mg  1 mg Oral BID Liliya Cuadra MD   1 mg at 03/02/21 0843    calcium-vitamin D (OSCAL-500) 500-200 MG-UNIT per tablet 1 tablet  1 tablet Oral BID Liliya Cuadra MD   1 tablet at 03/02/21 0843    carBAMazepine (TEGRETOL) tablet 200 mg  200 mg Oral BID Liliya Cuadra MD   200 mg at 03/02/21 0843    [Held by provider] fluvoxaMINE (LUVOX CR) extended release capsule 150 mg  150 mg Oral Daily Liliya Cuadra MD   Stopped at 02/23/21 2301    ipratropium-albuterol (DUONEB) nebulizer solution 3 mL  1 vial Inhalation Q4H PRN Liliya Cuadra MD        levETIRAcetam (KEPPRA) tablet 500 mg  500 mg Oral BID Liliya Cuadra MD   500 mg at 03/02/21 0842    [Held by provider] mirtazapine (REMERON) tablet 30 mg  30 mg Oral Nightly Liliya Cuadra MD   30 mg at 02/27/21 2304    pantoprazole (PROTONIX) tablet 20 mg  20 mg Oral QAM AC Liliya Cuadra MD   20 mg at 02/25/21 0646    polyethylene glycol (GLYCOLAX) packet 17 g  17 g Oral Daily Liliya Cuadra MD   17 g at 03/01/21 0755    sodium chloride flush 0.9 % injection 10 mL  10 mL Intravenous 2 times per day Liliya Cuadra MD   10 mL at 03/02/21 0842    sodium chloride flush 0.9 % injection 10 mL  10 mL Intravenous PRN Liliya Cuadra MD        enoxaparin (LOVENOX) injection 40 mg  40 mg Subcutaneous Daily Liliya Cuadra MD   40 mg at 03/01/21 0756    acetaminophen (TYLENOL) tablet 650 mg  650 mg Oral Q6H PRN Liliya Cuadra MD   650 mg at 02/25/21 1410    Or    acetaminophen (TYLENOL) suppository 650 mg  650 mg Rectal Q6H PRN Shelly Styles MD   650 mg at 03/01/21 1754       Allergies:  No Known Allergies    Problem List:    Patient Active Problem List   Diagnosis Code    Seizure ([address] R56.9    Pressure ulcer of toe of right foot, stage 3 ([facility]) J25.975    Atherosclerosis of native artery of both lower extremities ([address] I70.203    Dehydration with hypernatremia E87.0    Status epilepticus ([address] G40.901    Seizures ([address] R56.9    Seizure disorder, status epilepticus, convulsive ([address] G40.901    Severe protein-calorie malnutrition ([address] E43    Acute renal failure (HCC) N17.9    Thrombocytopenia ([facility]) D69.6    Fever, unspecified R50.9    Encephalopathy G93.40    Urinary tract infection without hematuria N39.0    Severe dehydration E86.0    Acute respiratory failure with hypoxia ([facility]) J96.01    Hypoventilation syndrome R06.89    Atelectasis J98.11    Sepsis ([facility]) A41.9    Gram positive septicemia ([facility]) A41.89    Parkinson disease ([address] G20    Hypoxia R09.02    Lactic acidosis E87.2    Abnormal urinalysis R82.90    Constipation K59.00    Autism spectrum disorder associated with neurodevelopmental, mental or behavioral disorder, requiring very substantial support (level 3) F84.0       Past Medical History:        Diagnosis Date    Acquired deformity of neck     Autism spectrum     Bilateral cataracts     Blepharochalasis     Cardiomegaly     Cataract of both eyes     Developmental delay     Gastritis     Hyperlipidemia     Kyphosis of thoracic region     Mental retardation     SEVERE MR  NON VERBAL, NEEDS WHEELCHAIR FOR LONG DISTANCE    Obsessive compulsive disorder     Parkinsonism ([address]     Seizures ([address]     Tourette disorder        Past Surgical History:        Procedure Laterality Date    TOE AMPUTATION Left     2nd/3rd       Social History:    Social History     Tobacco Use    Smoking status: Never Smoker    Smokeless tobacco: Never Used   Substance Use Topics    Alcohol use: No                                Counseling given: Not Answered      Vital Signs (Current): There were no vitals filed for this visit. BP Readings from Last 3 Encounters:   03/02/21 90/60   02/04/21 111/82   01/25/21 (!) 156/92       NPO Status:                                                                                 BMI:   Wt Readings from Last 3 Encounters:   02/28/21 118 lb (53.5 kg)   02/01/21 121 lb 0.5 oz (54.9 kg)   01/05/21 125 lb (56.7 kg)     There is no height or weight on file to calculate BMI.    CBC:   Lab Results   Component Value Date    WBC 7.8 02/28/2021    RBC 4.52 02/28/2021    HGB 14.3 02/28/2021    HCT 46.8 02/28/2021    .5 02/28/2021    RDW 13.6 02/28/2021    PLT 88 02/28/2021       CMP:   Lab Results   Component Value Date     03/02/2021    K 5.2 03/02/2021    K 3.7 03/02/2021     03/02/2021    CO2 24 03/02/2021    BUN 23 03/02/2021    CREATININE 0.6 03/02/2021    GFRAA >60 03/02/2021    LABGLOM >60 03/02/2021    GLUCOSE 93 03/02/2021    GLUCOSE 71 05/18/2012    PROT 5.2 03/02/2021    CALCIUM 8.1 03/02/2021    BILITOT 0.6 03/02/2021    ALKPHOS 62 03/02/2021    AST 57 03/02/2021    ALT 19 03/02/2021       POC Tests: No results for input(s): POCGLU, POCNA, POCK, POCCL, POCBUN, POCHEMO, POCHCT in the last 72 hours.     Coags:   Lab Results   Component Value Date    PROTIME 10.9 10/28/2020    INR 1.0 10/28/2020    APTT 29.7 10/28/2020       HCG (If Applicable): No results found for: PREGTESTUR, PREGSERUM, HCG, HCGQUANT     ABGs:   Lab Results   Component Value Date    PO2ART 66.2 01/28/2021    ZBB9MIH 41.6 01/28/2021    PKZ6CPR 24.7 02/24/2021        Type & Screen (If Applicable):  No results found for: LABABO, LABRH    Drug/Infectious Status (If Applicable):  No results found for: HIV, HEPCAB    COVID-19 Screening (If Applicable):   Lab Results Component Value Date    COVID19 Not Detected 02/25/2021         Anesthesia Evaluation  Patient summary reviewed  Airway: Mallampati: Unable to assess / NA        Dental:          Pulmonary:Negative Pulmonary ROS and normal exam                               Cardiovascular:    (+) CAD:,          Beta Blocker:  Not on Beta Blocker         Neuro/Psych:   (+) seizures:, psychiatric history (OCD):             ROS comment: Non verbal pt d/t MR  AUTISM. TOURETTES  PARKINSON   GI/Hepatic/Renal:   (+) GERD:, renal disease:,          ROS comment: PI.   Endo/Other: Negative Endo/Other ROS             Pt had no PAT visit       Abdominal:           Vascular:                                          Anesthesia Plan      MAC     ASA 4       Induction: intravenous. Plan/risks discussed with: NO ONE AVAILABLE. Plan discussed with CRNA.                   Rosemary Garay MD   3/2/2021

## 2021-03-02 NOTE — PLAN OF CARE
Problem: Skin Integrity:  Goal: Will show no infection signs and symptoms  Description: Will show no infection signs and symptoms  3/2/2021 1818 by Celia Ivey RN  Outcome: Met This Shift  3/2/2021 1221 by Rosaura Monteiro RN  Outcome: Met This Shift  Goal: Absence of new skin breakdown  Description: Absence of new skin breakdown  3/2/2021 1818 by Celia Ivey RN  Outcome: Met This Shift  3/2/2021 1221 by Rosaura Monteiro RN  Outcome: Met This Shift

## 2021-03-02 NOTE — BRIEF OP NOTE
Brief Postoperative Note      Patient: Kraig Rivera  YOB: 1950  MRN: [id number]    Date of Procedure: 3/2/2021    Pre-Op Diagnosis: . Post-Op Diagnosis: Same       Procedure(s):  PEG TUBE INSERTION    Surgeon(s):  Noemi Hardy MD    Assistant:  * No surgical staff found *    Anesthesia: Monitor Anesthesia Care    Estimated Blood Loss (mL): Minimal    Complications: None    Specimens:   * No specimens in log *    Implants:  * No implants in log *      Drains:   Gastrostomy/Enterostomy/Jejunostomy Percutaneous Endoscopic Gastrostomy (PEG) LUQ 1 20 fr (Active)   Drainage Appearance Bloody 03/02/21 1345   Site Description Bleeding 03/02/21 1349   Drain Status Clamped 03/02/21 1349   Dressing Status Clean;Dry; Intact 03/02/21 1349   Dressing Type Split gauze 03/02/21 1349       Urethral Catheter (Active)   Catheter Indications Need for fluid management in critically ill patients in a critical care setting not able to be managed by other means such as BSC with hat, bedpan, urinal, condom catheter, or short term intermittent urethral catherization 03/02/21 0830   Site Assessment No urethral drainage 03/02/21 0830   Urine Color Deanna 03/02/21 0830   Urine Appearance Clear 03/02/21 0830   Output (mL) 350 mL 03/02/21 0357       [REMOVED] Urethral Catheter (Removed)   $ Urethral catheter insertion $ Not inserted for procedure 02/02/21 0910   Catheter Indications Need for fluid management in critically ill patients in a critical care setting not able to be managed by other means such as BSC with hat, bedpan, urinal, condom catheter, or short term intermittent urethral catherization 02/04/21 0000   Site Assessment No urethral drainage 02/04/21 0000   Urine Color Yellow 02/02/21 1827   Urine Appearance Clear 02/01/21 2200   Output (mL) 300 mL 02/04/21 1233       Findings:     Electronically signed by Noemi Hardy MD on 3/2/2021 at 1:58 PM

## 2021-03-03 NOTE — PLAN OF CARE
Problem: Skin Integrity:  Goal: Will show no infection signs and symptoms  Description: Will show no infection signs and symptoms  3/3/2021 0051 by Gerson Basilio RN  Outcome: Met This Shift  3/2/2021 1818 by Basia Moody RN  Outcome: Met This Shift  3/2/2021 1221 by Kristi Wooten RN  Outcome: Met This Shift  Goal: Absence of new skin breakdown  Description: Absence of new skin breakdown  3/3/2021 0051 by Gerson Basilio RN  Outcome: Met This Shift  3/2/2021 1818 by Basia Moody RN  Outcome: Met This Shift  3/2/2021 1221 by Kristi Wooten RN  Outcome: Met This Shift     Problem: Falls - Risk of:  Goal: Will remain free from falls  Description: Will remain free from falls  3/3/2021 0051 by Gerson Basilio RN  Outcome: Met This Shift  3/2/2021 1818 by Basia Moody RN  Outcome: Met This Shift  3/2/2021 1221 by Kristi Wooten RN  Outcome: Met This Shift     Problem: Falls - Risk of:  Goal: Absence of physical injury  Description: Absence of physical injury  3/2/2021 1818 by Basia Moody RN  Outcome: Met This Shift  3/2/2021 1221 by Kristi Wooten RN  Outcome: Met This Shift

## 2021-03-03 NOTE — PROGRESS NOTES
water deficit is resolved  Patient is not able to drink much so now he is getting a PEG tube  This patient has had repeated multiple admissions for volume depletion, dehydration severe hypernatremia and CRIS  There have been discussions with the guardian  There have been discussions regarding G-tube placement  There have been discussions with palliative care  Recommendations for hospice have been made  The patient remains a DNR [facility]    New fever recurrent      Volume depletion with cris  Dehydration       Tempt elevations possibly from repeated mm contractions                Unclear if this is due to malign hyperhtermia?               Current no evid of bacter infection  Dysphagia PEG tube now in place    Thickened liquids     Pureed diets  Tremors  Parkinsonism  Hx of tardive dyskinesia     Hx of seizures  Hx of mrdd/  Hx of obsess compulsive disorder  Pad  Autism spectrum disorder      PLAN:  Defer management of the fever to infectious disease  Possibilities include aspiration/bacteremia/concern for bacteremia in light of his murmur as well    Neurology has initiated sinemet for tremors he has been on this before and it has not really helped him    DISPOSITION: tbd    Medications:  REVIEWED DAILY    Infusion Medications    sodium chloride 125 mL/hr at 03/02/21 4073     Scheduled Medications    carbidopa-levodopa  2 tablet Oral Q4H WA    benztropine  1 mg Oral BID    calcium-vitamin D  1 tablet Oral BID    carBAMazepine  200 mg Oral BID    [Held by provider] fluvoxaMINE  150 mg Oral Daily    levETIRAcetam  500 mg Oral BID    [Held by provider] mirtazapine  30 mg Oral Nightly    pantoprazole  20 mg Oral QAM AC    polyethylene glycol  17 g Oral Daily    sodium chloride flush  10 mL Intravenous 2 times per day    enoxaparin  40 mg Subcutaneous Daily     PRN Meds: sodium chloride flush, promethazine **OR** ondansetron, ipratropium-albuterol, acetaminophen **OR** acetaminophen    Labs:     No results for input(s): WBC, HGB, HCT, PLT in the last 72 hours. Recent Labs     03/02/21  2104 03/03/21  0325 03/03/21  0804    144 140   K 3.8 3.6 4.7   * 110* 109*   CO2 23 27 20*   BUN 21 20 19   CREATININE 0.6* 0.7 0.6*   CALCIUM 8.4* 8.3* 8.3*       Recent Labs     03/01/21  0639 03/02/21  0624 03/03/21  0325   PROT 6.0* 5.2* 5.7*   ALKPHOS 63 62 78   ALT 13 19 11   AST 52* 57* 62*   BILITOT 0.5 0.6 0.6       No results for input(s): INR in the last 72 hours. No results for input(s): Neldon Docker in the last 72 hours. Radiology:     +++++++++++++++++++++++++++++++++++++++++++++++++  [address], [place]  +++++++++++++++++++++++++++++++++++++++++++++++++  NOTE: This report was transcribed using voice recognition software. Every effort was made to ensure accuracy; however, inadvertent computerized transcription errors may be present.

## 2021-03-03 NOTE — PLAN OF CARE
Problem: Skin Integrity:  Goal: Will show no infection signs and symptoms  Description: Will show no infection signs and symptoms  3/3/2021 1100 by Ester Coronado RN  Outcome: Met This Shift  3/3/2021 0051 by Sarita Gutierrez RN  Outcome: Met This Shift  Goal: Absence of new skin breakdown  Description: Absence of new skin breakdown  3/3/2021 1100 by Ester Coronado RN  Outcome: Met This Shift  3/3/2021 0051 by Sarita Gutierrez RN  Outcome: Met This Shift

## 2021-03-03 NOTE — OP NOTE
[address]                  [address] [facility], [address] [place] [address]                                OPERATIVE REPORT    PATIENT NAME: Ronka Booth                      :        1950  MED REC NO:   [id number]                            ROOM:       8414  ACCOUNT NO:   [de-identified]                           ADMIT DATE: 2021  PROVIDER:     Noemi Hardy MD    DATE OF PROCEDURE:  2021    PREOPERATIVE DIAGNOSES:  1.  Malnutrition. 2.  Dysphagia. POSTOPERATIVE DIAGNOSES:  1.  Malnutrition. 2.  Dysphagia. PROCEDURE PERFORMED:  Percutaneous endoscopic gastrostomy tube  insertion. SURGEON:  Noemi Hardy MD    OPERATIVE PROCEDURE:  With the patient in the supine position on the  operating table, after adequate sedation was administered by Anesthesia,  a standard Olympus gastroscope was introduced through the mouth and  advanced into the esophagus and further advanced into the stomach and  duodenum. There was no evidence of any acute upper GI pathology. The  stomach was inflated with air. The site of the gastrostomy in the left  upper quadrant was selected by shining the light from the scope through  the anterior abdominal wall. The area was marked. Skin was prepped  with Betadine. 1% lidocaine was used for local anesthesia. A 1 cm skin  incision was made over the selected site. The needle supplied with the  Bard PEG tube was inserted _____ the incision and tip was visualized  within the stomach lumen. Guidewire was inserted through the needle  sheath into the stomach, was grasped with a snare, was retrieved through  the mouth. A #20-Czech Bard PEG tube was secured to the portion of the  guidewire protruding through the mouth. Using the pull-through  technique, the PEG tube was fed through the mouth into the stomach and  the distal end was pulled out from the left upper quadrant incision.    Gastroscope was reintroduced through the mouth into the stomach and the  gastric portion of the PEG tube was found to be in adequate position. No bleeding was noted. Photos were taken. Scope was slowly withdrawn  and totally removed. PEG tube was secured over the abdominal wall and  abdominal binder was applied. The patient tolerated the procedure well.         Amaya Hall MD    D: 03/02/2021 13:57:15       T: 03/02/2021 22:56:27     [email or web address]  Job#: [id number]     Doc#: [id number]    CC:

## 2021-03-03 NOTE — PROGRESS NOTES
auscultation. Benign to palpation. peg  Genitourinary: male  Extremities: No clubbing, no cyanosis, no edema. ++ contraction ++ tremors  Lines: peripheral    Laboratory and Tests Review:  Lab Results   Component Value Date    WBC 6.6 03/03/2021    WBC 7.8 02/28/2021    WBC 6.4 02/28/2021    HGB 10.8 (L) 03/03/2021    HCT 32.3 (L) 03/03/2021    MCV 95.8 03/03/2021    PLT 77 (L) 03/03/2021     Lab Results   Component Value Date    NEUTROABS 5.38 02/28/2021    NEUTROABS 3.75 02/28/2021    NEUTROABS 4.74 02/27/2021     No results found for: [facility]  Lab Results   Component Value Date    ALT 11 03/03/2021    AST 62 (H) 03/03/2021    ALKPHOS 78 03/03/2021    BILITOT 0.6 03/03/2021     Lab Results   Component Value Date     03/03/2021    K 4.7 03/03/2021    K 3.6 03/03/2021     03/03/2021    CO2 20 03/03/2021    BUN 19 03/03/2021    CREATININE 0.6 03/03/2021    CREATININE 0.7 03/03/2021    CREATININE 0.6 03/02/2021    GFRAA >60 03/03/2021    LABGLOM >60 03/03/2021    GLUCOSE 86 03/03/2021    GLUCOSE 71 05/18/2012    PROT 5.7 03/03/2021    LABALBU 3.1 03/03/2021    LABALBU 4.5 05/18/2012    CALCIUM 8.3 03/03/2021    BILITOT 0.6 03/03/2021    ALKPHOS 78 03/03/2021    AST 62 03/03/2021    ALT 11 03/03/2021     Lab Results   Component Value Date    CRP 0.3 01/29/2021    CRP 4.2 (H) 06/14/2020    CRP 3.6 (H) 06/12/2020     Lab Results   Component Value Date    SEDRATE 6 01/29/2021    SEDRATE 43 (H) 06/14/2020    SEDRATE 14 06/12/2020     Radiology:  Reviewed today's chest x-ray minimal amount of left lung atelectasis    Microbiology:   Lab Results   Component Value Date    BC 24 Hours no growth 02/28/2021    BC 5 Days no growth 02/26/2021    BC 5 Days no growth 02/23/2021    ORG Staphylococcus hominis ssp hominis 02/23/2021    ORG Staphylococcus epidermidis 01/27/2021    ORG Staphylococcus haemolyticus 01/27/2021    ORG Staphylococcus hominis ssp hominis 01/27/2021    ORG Staphylococcus hominis ssp hominis 01/27/2021 -exacerbated by volume contraction  · Severe Protein Malnutrition  · Hx of Aspiration PNA-  · Hypernatremia -improved  · Lactic Acidosis  · Dehydration and malnutrition -still needs work  · Bacteremia with staph hominis- contaminant      Plan:   · Repeat blood cultures NGTD  · Continue fluids-patient should have a BUN of around 7 to be effectively hydrated with his body mass  · Peg placed   · meds changed to Sinemet -discussed with the neurology service  · Follow-up cultures -   · Watch temps which have been gradually improving with a hydration program    Electronically signed by Destinee Rivera MD on 3/3/2021 at 3:30 PM

## 2021-03-03 NOTE — PROGRESS NOTES
Patient seen and examined. Bumper 3 cm at the skin and freely rotates. Alright to use PEG tube for meds and tube feeds. General surgery signing off. Please feel free to call with questions or concerns.

## 2021-03-04 NOTE — PROGRESS NOTES
Hospitalist Progress Note      SYNOPSIS: Patient admitted on 2021 for Acute respiratory failure with hypoxia ([address]   Stabilized 4l nc  Patient's past history reviewed:  He is well-known to the service and has had multiple admissions for essentially the same issues  He has been thoroughly evaluated and continuously followed for his tremors by neurology  He is repeatedly admitted with hypernatremia and severe dehydration  Infectious disease has determined that the report of bacteremia is a contaminant    SUBJECTIVE:  Sodium is now normal  Hydration is satisfactory however infectious disease is recommending that the BUN gets down to 7 in order to resolve his fevers  This morning he is awake, alert but does not interact  Considerable tremor noted    Patient seen and examined  Records reviewed.      Parkinsonism  Tremors  Fevers  Autism  mrdd  obsev compuls disord  PAD  Hx of seizures  Sinemet has been initiated      Temp (24hrs), Av.1 °F (37.3 °C), Min:98.4 °F (36.9 °C), Max:100.4 °F (38 °C)    DIET: Diet NPO Effective Now Exceptions are: Sips with Meds  Diet Tube Feed Continuous/Cyclic w/ Diet  CODE: Full Code    Intake/Output Summary (Last 24 hours) at 3/4/2021 1024  Last data filed at 3/4/2021 0646  Gross per 24 hour   Intake 1969 ml   Output 775 ml   Net 1194 ml       OBJECTIVE:    /74   Pulse 111   Temp 98.4 °F (36.9 °C) (Temporal)   Resp 9   Ht 5' 5\" (1.651 m)   Wt 118 lb (53.5 kg)   SpO2 95%   BMI 19.64 kg/m²       General appearance: elderly does not appear jaundiced  Severely contracted  No meaningful interaction-active tremoring  He is tremoring as he frequently does  Respiratory: clear sounds in upper lobes no wheeze reduced basilar ventilation  Cardiovascular:S1s2 systol eject murmur grade III/VI LLSB  Abdomen: active bowel sounds without rigidity  Extremities are contracted  Skin:  Some areas of excoriation L UE forearm  Neurologic: patient awake with tremors in upper extremity and periodic whole body trembling               Repeated tongue protruding rythmic with the tremors      ASSESSMENT:  Severe dehydration  Hypernatremia  History of aspiration pneumonia  Left lower lobe atelectasis  Chronic tremors  Lactic acidosis resolved      PLAN:  Since there has been no fever patient may likely be discharged shortly  PEG tube is functioning  Free water may be administered via PEG tube    Neurology has initiated sinemet for tremors he has been on this before and it has not really helped him    DISPOSITION: tbd    Medications:  REVIEWED DAILY    Infusion Medications    sodium chloride 125 mL/hr at 03/02/21 2142     Scheduled Medications    lansoprazole  15 mg Per G Tube QAM AC    levETIRAcetam  500 mg Oral BID    carbidopa-levodopa  2 tablet Oral Q4H WA    benztropine  1 mg Oral BID    calcium-vitamin D  1 tablet Oral BID    carBAMazepine  200 mg Oral BID    [Held by provider] fluvoxaMINE  150 mg Oral Daily    [Held by provider] mirtazapine  30 mg Oral Nightly    polyethylene glycol  17 g Oral Daily    sodium chloride flush  10 mL Intravenous 2 times per day    enoxaparin  40 mg Subcutaneous Daily     PRN Meds: sodium chloride flush, promethazine **OR** ondansetron, ipratropium-albuterol, acetaminophen **OR** acetaminophen    Labs:     Recent Labs     03/03/21  1042   WBC 6.6   HGB 10.8*   HCT 32.3*   PLT 77*       Recent Labs     03/03/21  1521 03/03/21  2148 03/04/21  0321    143 138   K 3.4* 3.5 3.6   * 110* 105   CO2 25 25 28   BUN 17 16 16   CREATININE 0.6* 0.6* 0.6*   CALCIUM 8.3* 8.5* 8.5*       Recent Labs     03/02/21  0624 03/03/21  0325 03/04/21  0321   PROT 5.2* 5.7* 6.2*   ALKPHOS 62 78 80   ALT 19 11 8   AST 57* 62* 100*   BILITOT 0.6 0.6 0.3       No results for input(s): INR in the last 72 hours. No results for input(s): Coralyn Mariscal in the last 72 hours.       Radiology:     +++++++++++++++++++++++++++++++++++++++++++++++++  [address] [facility] [employer] Phys[facility] - [facility], [place]  +++++++++++++++++++++++++++++++++++++++++++++++++  NOTE: This report was transcribed using voice recognition software. Every effort was made to ensure accuracy; however, inadvertent computerized transcription errors may be present.

## 2021-03-04 NOTE — DISCHARGE INSTR - COC
Continuity of Care Form    Patient Name: Velma Gutierrez   :  1950  MRN:  [id number]    Admit date:  2021  Discharge date:  2021    Code Status Order: Full Code   Advance Directives:   [facility] Documentation       Date/Time Healthcare Directive Type of Healthcare Directive Copy in [address] Agent's Name Healthcare Agent's Phone Number    21 102  Unknown, patient unable to respond due to medical condition -- -- -- -- --    21 0301  No, patient does not have an advance directive for healthcare treatment -- -- -- -- --            Admitting Physician:  Cameron Silver MD  PCP: Donnie Celeste MD    Discharging Nurse:   [address] Unit/Room#: 5225/3949-I  Discharging Unit Phone Number: [phone]    Emergency Contact:   Extended Emergency Contact Information  Primary Emergency Contact: Bella Rosas   [address] Phone: [phone]  Relation: Legal Guardian    Past Surgical History:  Past Surgical History:   Procedure Laterality Date    TOE AMPUTATION Left     2nd/3rd    UPPER GASTROINTESTINAL ENDOSCOPY N/A 3/2/2021    PEG TUBE INSERTION performed by Allen Dowling MD at [address] [place] [address]       Immunization History:   Immunization History   Administered Date(s) Administered    Influenza, High Dose (Fluzone 65 yrs and older) 10/17/2018    Tdap (Boostrix, Adacel) 2016       Active Problems:  Patient Active Problem List   Diagnosis Code    Seizure ([address] R56.9    Pressure ulcer of toe of right foot, stage 3 ([address] O91.665    Atherosclerosis of native artery of both lower extremities ([address] I70.203    Dehydration with hypernatremia E87.0    Status epilepticus ([address] G40.901    Seizures ([address] R56.9    Seizure disorder, status epilepticus, convulsive ([address] G40.901    Severe protein-calorie malnutrition ([address] E43    Acute renal failure ([address] N17.9    Thrombocytopenia ([address] D69.6 COVID-19 Rule Out 20 COVID-19 (Ordered)   20 Rule-Out Test Resulted    Not detected 2020    C-diff Rule Out 20 Clostridium difficile EIA (Ordered)   20 Louis Bamberger, RN    INFLUENZA 20 Respiratory Panel, Molecular   20             Nurse Assessment:  Last Vital Signs: /74   Pulse 111   Temp 98.4 °F (36.9 °C) (Temporal)   Resp 9   Ht 5' 5\" (1.651 m)   Wt 118 lb (53.5 kg)   SpO2 95%   BMI 19.64 kg/m²     Last documented pain score (0-10 scale): Pain Level: 0  Last Weight:   Wt Readings from Last 1 Encounters:   21 118 lb (53.5 kg)     Mental Status:  {IP PT MENTAL STATUS::::0}    IV Access:  - None    Nursing Mobility/ADLs:  Walking   Dependent  Transfer  Dependent  Bathing  Dependent  Dressing  Dependent  Toileting  Dependent  Feeding  Dependent  Med Admin  Dependent  Med Delivery   crushed ---PEG tube    Wound Care Documentation and Therapy:  Wound (Active)   Number of days:        Wound 20 Foot Left bunion area (Active)   Number of days: 268       Wound 20 Sacrum Left (Active)   Number of days: 267       Wound 20 Coccyx (Active)   Number of days: 267       Wound 21 Ankle Right;Other (Comment); Lateral skin tear measuring 1 cm X 2.3 cm X 0.1 cm (Active)   Dressing Status New dressing applied;Clean;Dry; Intact 21   Wound Cleansed Cleansed with saline 21   Dressing/Treatment Other (comment) 21   Drainage Amount Scant 21   Odor None 21   Number of days: 8        Elimination:  Continence:   · Bowel: {YES / ZI:29441}  · Bladder: {YES / PC:45250}  Urinary Catheter: Removal Date 2021   Colostomy/Ileostomy/Ileal Conduit: {YES / UQ:79421}       Date of Last BM:     Intake/Output Summary (Last 24 hours) at 3/4/2021 1032  Last data filed at 3/4/2021 0646  Gross per 24 hour   Intake 1969 ml   Output 775 ml   Net 1194 ml I/O last 3 completed shifts: In: 1969 [I.V.:1375; NG/GT:594]  Out: 775 [Urine:775]    Safety Concerns: At Risk for Falls and Aspiration Risk    Impairments/Disabilities:      Nonverbal    Nutrition Therapy:  Current Nutrition Therapy:   - Tube Feedings:  Standard with fiber    Routes of Feeding: Gastrostomy Tube  Liquids: {Slp liquid thickness:09656}  Daily Fluid Restriction: no  Last Modified Barium Swallow with Video (Video Swallowing Test): not done    Treatments at the Time of Hospital Discharge:   Respiratory Treatments: ***  Oxygen Therapy:  is not on home oxygen therapy.   Ventilator:    - No ventilator support    Rehab Therapies: {THERAPEUTIC INTERVENTION:3017851525}  Weight Bearing Status/Restrictions: No weight bearing restirctions  Other Medical Equipment (for information only, NOT a DME order):  {EQUIPMENT:554878824}  Other Treatments: ***    Patient's personal belongings (please select all that are sent with patient):  None    RN SIGNATURE:  Electronically signed by Jamal Brand RN on 3/4/21 at 1:30 PM EST    CASE MANAGEMENT/SOCIAL WORK SECTION    Inpatient Status Date: ***    Readmission Risk Assessment Score:  Readmission Risk              Risk of Unplanned Readmission:        40           Discharging to Facility/ Agency   · Name:   · Address:  · Phone:  · Fax:    Dialysis Facility (if applicable)   · Name:  · Address:  · Dialysis Schedule:  · Phone:  · Fax:    / signature: {Esignature:[id number]:::0}    PHYSICIAN SECTION    Prognosis: {Prognosis:4759434737:::0}    Condition at Discharge: [address] [place] [address] Patient Condition:752183244:::0}    Rehab Potential (if transferring to Rehab): {Prognosis:9318601440:::0}    Recommended Labs or Other Treatments After Discharge: *** Physician Certification: I certify the above information and transfer of Jv Davison  is necessary for the continuing treatment of the diagnosis listed and that he requires {Admit to Appropriate Level of Care:66278:::0} for {GREATER/LESS:334505446} 30 days.      Update Admission H&P: {CHP DME Changes in HandP:847380166:::0}    PHYSICIAN SIGNATURE:  Electronically signed by Maurizio Kolb MD on 3/4/21 at 10:32 AM EST

## 2021-03-04 NOTE — CARE COORDINATION
Discharge order noted. Patient is POD#2 PEG TUBE INSERTION d/t Malnutrition and Dysphagia. ID is following. Patient's tube feed is at goal today. Per Ezequiel Mendez from [facility] phone# [phone], patient is able to return there today and she will require a nurse to nurse at the above number. Transportation set up via [employer] for 2:30 pm today. Charge nurse, RN, and Ezequiel Mendez from [facility] all notified. Voicemail message left for patient's legal guardian Mardee Record regarding discharge and transport time. He will need a negative Covid-19 test prior to discharge. Rapid test kit given to RN to do today. Ambulance form in envelope in soft chart.   Hollie Flower RN CM

## 2021-03-04 NOTE — DISCHARGE SUMMARY
Discharge Summary    Velma Gutierrez  :  1950  MRN:  [id number]    Admit date:  2021  Discharge date:  3/4/2021 10:32 AM    Admitting Physician:  Cameron Silver MD    Discharge Diagnoses:    Patient Active Problem List    Diagnosis Date Noted    Severe protein-calorie malnutrition ([address] 2021    Lactic acidosis 2021    Abnormal urinalysis 2021    Constipation 2021    Autism spectrum disorder associated with neurodevelopmental, mental or behavioral disorder, requiring very substantial support (level 3) 2021    Hypoxia 2021    Parkinson disease ([address]     Gram positive septicemia ([address] 2021    Sepsis ([address] 2021    Acute respiratory failure with hypoxia ([address] 10/29/2020    Hypoventilation syndrome 10/29/2020    Atelectasis 10/29/2020    Severe dehydration 10/28/2020    Encephalopathy     Urinary tract infection without hematuria     Fever, unspecified     Acute renal failure (HCC)     Thrombocytopenia (HCC)     Seizure disorder, status epilepticus, convulsive ([address] 2020    Seizures ([address] 2020    Status epilepticus ([address] 2020    Dehydration with hypernatremia 2020    Atherosclerosis of native artery of both lower extremities ([address] 07/10/2018    Pressure ulcer of toe of right foot, stage 3 ([address] 2016    Seizure ([address] 06/10/2013       Past Medical Hx :   Past Medical History:   Diagnosis Date    Acquired deformity of neck     Autism spectrum     Bilateral cataracts     Blepharochalasis     Cardiomegaly     Cataract of both eyes     Developmental delay     Gastritis     Hyperlipidemia     Kyphosis of thoracic region     Mental retardation     SEVERE MR  NON VERBAL, NEEDS WHEELCHAIR FOR LONG DISTANCE    Obsessive compulsive disorder     Parkinsonism ([address]     Seizures ([address]     Tourette disorder        Past Surgical Hx :   Past Surgical History:   Procedure Laterality Date    TOE AMPUTATION Left 2nd/3rd    UPPER GASTROINTESTINAL ENDOSCOPY N/A 3/2/2021    PEG TUBE INSERTION performed by Tania Bah MD at [facility]       Admission Condition:  poor    Discharged Condition:  fair    Labs:  CBC:   Lab Results   Component Value Date    WBC 6.6 03/03/2021    RBC 3.37 03/03/2021    HGB 10.8 03/03/2021    HCT 32.3 03/03/2021    MCV 95.8 03/03/2021    MCH 32.0 03/03/2021    MCHC 33.4 03/03/2021    RDW 13.1 03/03/2021    PLT 77 03/03/2021    MPV 11.7 03/03/2021     CMP:    Lab Results   Component Value Date     03/04/2021    K 3.6 03/04/2021     03/04/2021    CO2 28 03/04/2021    BUN 16 03/04/2021    CREATININE 0.6 03/04/2021    GFRAA >60 03/04/2021    LABGLOM >60 03/04/2021    GLUCOSE 96 03/04/2021    GLUCOSE 71 05/18/2012    PROT 6.2 03/04/2021    LABALBU 3.0 03/04/2021    LABALBU 4.5 05/18/2012    CALCIUM 8.5 03/04/2021    BILITOT 0.3 03/04/2021    ALKPHOS 80 03/04/2021     03/04/2021    ALT 8 03/04/2021        Radiology Results: Ct Chest Wo Contrast    Result Date: 2/24/2021  EXAMINATION: CT OF THE CHEST WITHOUT CONTRAST 2/24/2021 9:49 am TECHNIQUE: CT of the chest was performed without the administration of intravenous contrast. Multiplanar reformatted images are provided for review. Dose modulation, iterative reconstruction, and/or weight based adjustment of the mA/kV was utilized to reduce the radiation dose to as low as reasonably achievable. COMPARISON: 01/28/2021 HISTORY: ORDERING SYSTEM PROVIDED HISTORY: PNA TECHNOLOGIST PROVIDED HISTORY: Reason for exam:->PNA What reading provider will be dictating this exam?->CRC FINDINGS: Mediastinum: No pericardial effusion. Aorta is stable in caliber. No adenopathy. Moderate sized hiatal hernia is present. Lungs/pleura: There is platelike atelectasis in the right lower lobe, improved since the prior study. There is stable platelike opacity at the left lung base, abutting the hemidiaphragm, likely related to atelectasis.  No pleural effusion or pneumothorax. Upper Abdomen: See separate CT report. Soft Tissues/Bones: Chronic appearing right clavicle fracture. There are multilevel thoracic spine degenerative changes. Bibasilar atelectasis. No acute infiltrates. Ct Abdomen Pelvis W Iv Contrast    Result Date: 2/24/2021  EXAMINATION: CT OF THE ABDOMEN AND PELVIS WITH CONTRAST 2/24/2021 9:49 am TECHNIQUE: CT of the abdomen and pelvis was performed with the administration of intravenous contrast. Multiplanar reformatted images are provided for review. Dose modulation, iterative reconstruction, and/or weight based adjustment of the mA/kV was utilized to reduce the radiation dose to as low as reasonably achievable. COMPARISON: 01/28/2021, 06/12/2020 HISTORY: ORDERING SYSTEM PROVIDED HISTORY: Sepsis, unknown source TECHNOLOGIST PROVIDED HISTORY: Reason for exam:->Sepsis, unknown source What reading provider will be dictating this exam?->CRC FINDINGS: Lower Chest: See separate CT report. Organs: There is stable marked gallbladder distension with a large gallstone within the gallbladder lumen. No biliary ductal dilatation. The liver, spleen, pancreas, adrenal glands are unremarkable. Bilateral renal cysts. No hydronephrosis. GI/Bowel: There is stable rectosigmoid colon dilatation up to 10 cm, with stool and diffuse wall thickening. Moderate volume of stool is seen throughout the more proximal colon. The appendix is not diagnostically visualized. No acute inflammatory changes. There is a moderate to large hiatal hernia containing the proximal stomach and a segment of the transverse colon, similar to that seen previously. Pelvis: There is mild circumferential urinary bladder wall thickening Peritoneum/Retroperitoneum: No free fluid or free air. Aorta is normal in caliber. No adenopathy Bones/Soft Tissues: No acute abnormality. There is fluid in the right inguinal canal    There is stable marked gallbladder distension and cholelithiasis. There is again seen marked dilatation of the rectosigmoid colon, with large volume of stool. Correlate for fecal impaction. No acute inflammation is identified. Hiatal hernia. Xr Chest Portable    Result Date: 2/23/2021  EXAMINATION: ONE XRAY VIEW OF THE CHEST 2/23/2021 4:29 pm COMPARISON: 01/27/2021 HISTORY: ORDERING SYSTEM PROVIDED HISTORY: shortness of breath TECHNOLOGIST PROVIDED HISTORY: Reason for exam:->shortness of breath What reading provider will be dictating this exam?->CRC FINDINGS: No airspace opacity or pleural effusion. The heart is normal size. No pneumothorax. No free air beneath the hemidiaphragms. Multiple gas-filled distended loops of bowel in visualized upper abdomen. 1.  No pneumonia or pleural effusion. 2.  Multiple gas-filled distended loops of bowel in visualized upper abdomen. Xr Abdomen (2 Views)    Result Date: 2/23/2021  EXAMINATION: TWO XRAY VIEWS OF THE ABDOMEN 2/23/2021 6:46 pm COMPARISON: AP CT 01/28/2021 HISTORY: ORDERING SYSTEM PROVIDED HISTORY: evaluate patient is nonverbal cxr had dilated loops small bowel TECHNOLOGIST PROVIDED HISTORY: Reason for exam:->evaluate patient is nonverbal cxr had dilated loops small bowel What reading provider will be dictating this exam?->CRC FINDINGS: Large hiatal hernia again noted in retrocardiac region. Stable nonspecific elevation of right diaphragm. No evidence of small bowel distention. Colonic fecal content is prominent from cecum to rectum which may reflect constipation. There is also prominence of gas and caliber from cecum to rectum. No radiographically visible pneumoperitoneum. No evidence of visceromegaly or mass. No radiographically visible calcification of urologic significance.     Prominent gas, stool, and caliber from cecum to rectum may reflect a combination of constipation with paralytic ileus Persisting large hiatal hernia Stable nonspecific elevation of right diaphragm      Hospital Course:  SYNOPSIS: Patient admitted on 2/23/2021 for Acute respiratory failure with hypoxia ([facility]   Stabilized 4l nc  Patient's past history reviewed:  He is well-known to the service and has had multiple admissions for essentially the same issues  He has been thoroughly evaluated and continuously followed for his tremors by neurology  He is repeatedly admitted with hypernatremia and severe dehydration  Infectious disease has determined that the report of bacteremia is a contaminant     After initial evaluation: Patient found to be severely dehydrated with hypernatremia and lactic acid elevation  He was determined to have a large volume deficit again this is a recurrent issue  He was aggressively hydrated  Infectious disease determined that there was no infection and that the bacteria noted in blood cultures is a contaminant  Because of the recurrent admissions with dehydration it was decided after discussion with the DPOA/guardian a PEG tube would be placed  PEG tube placement accomplished  Hydration will occur via the PEG tube  Medications have been adjusted accordingly      Discharge Medications:    Kerri Soler   Home Medication Instructions [id number]    Printed on:03/04/21 1032   Medication Information                      acetaminophen (TYLENOL) 325 MG tablet  Take 650 mg by mouth every 4 hours as needed for Pain Patient takes 2 tablets every 4 hours PRN.              benztropine (COGENTIN) 1 MG tablet  Take 1 mg by mouth 2 times daily              Calcium Carb-Cholecalciferol (CALCIUM-VITAMIN D) 500-200 MG-UNIT per tablet  Take 1 tablet by mouth 2 times daily             carBAMazepine (TEGRETOL) 200 MG tablet  Take 200 mg by mouth 2 times daily              carbidopa-levodopa (SINEMET)  MG per tablet  Take 2 tablets by mouth every 4 hours (while awake)             ipratropium-albuterol (DUONEB) 0.5-2.5 (3) MG/3ML SOLN nebulizer solution  Inhale 1 vial into the lungs every 4 hours as needed for Shortness of Breath lansoprazole 3 MG/ML SUSP  5 mLs by Per G Tube route every morning (before breakfast)             levETIRAcetam (KEPPRA) 100 MG/ML solution  Take 5 mLs by mouth 2 times daily             vitamin D (ERGOCALCIFEROL) 1.25 MG (29586 UT) CAPS capsule  Take 50,000 Units by mouth every 30 days Given on the 14 th                 Discharge Exam:  OBJECTIVE:     /74   Pulse 111   Temp 98.4 °F (36.9 °C) (Temporal)   Resp 9   Ht 5' 5\" (1.651 m)   Wt 118 lb (53.5 kg)   SpO2 95%   BMI 19.64 kg/m²         General appearance: elderly does not appear jaundiced  Severely contracted  No meaningful interaction-active tremoring  He is tremoring as he frequently does  Respiratory: clear sounds in upper lobes no wheeze reduced basilar ventilation  Cardiovascular:S1s2 systol eject murmur grade III/VI LLSB  Abdomen: active bowel sounds without rigidity  Extremities are contracted  Skin:  Some areas of excoriation L UE forearm  Neurologic: patient awake with tremors in upper extremity and periodic whole body trembling               Repeated tongue protruding rythmic with the tremors         Disposition: SNF    Patient Instructions:   REFER TO AVR or NICOLASA document    Signed:  Yaron Wright  [facility]  [employer] Board [employer] Geriatric [employer]  3/4/2021, 10:32 AM

## 2021-03-04 NOTE — PROGRESS NOTES
[address] [place] [address] [facility]  Progress Note      Chief Complaint   Patient presents with    Shortness of Breath     per [facility] pulse ox 72% on room air     Face to face encounter   SUBJECTIVE:  Patient is tolerating medications. No reported adverse drug reactions. tmax documented at 98.4 overnight . HR elevated. On room air  Patient is on room air and awake  PEG tube in place  For the most part since a massive hydration program was begun his temperatures have been trending down    Review of systems:  As stated above in the chief complaint, otherwise negative. Medications:  Scheduled Meds:   lansoprazole  15 mg Per G Tube QAM AC    levETIRAcetam  500 mg Oral BID    carbidopa-levodopa  2 tablet Oral Q4H WA    benztropine  1 mg Oral BID    calcium-vitamin D  1 tablet Oral BID    carBAMazepine  200 mg Oral BID    polyethylene glycol  17 g Oral Daily    sodium chloride flush  10 mL Intravenous 2 times per day    enoxaparin  40 mg Subcutaneous Daily     Continuous Infusions:    PRN Meds:sodium chloride flush, promethazine **OR** ondansetron, ipratropium-albuterol, acetaminophen **OR** acetaminophen    OBJECTIVE:  /74   Pulse 111   Temp 98.4 °F (36.9 °C) (Temporal)   Resp 9   Ht 5' 5\" (1.651 m)   Wt 118 lb (53.5 kg)   SpO2 95%   BMI 19.64 kg/m²   Temp  Av.1 °F (37.3 °C)  Min: 98.4 °F (36.9 °C)  Max: 100.4 °F (38 °C)  Constitutional: The patient is resting in bed. No acute distress. Tracks  Skin: Warm and dry. No rashes were noted. HEENT: Round and reactive pupils. Moist mucous membranes. Neck: Supple to movements. Chest: No use of accessory muscles to breathe. Symmetrical expansion. Cardiovascular: reg rate  Abdomen: Positive bowel sounds to auscultation. Benign to palpation. peg  Genitourinary: male  Extremities: No clubbing, no cyanosis, no edema. ++ contraction ++ tremors  Lines: peripheral    Laboratory and Tests Review:  Lab Results   Component Value Date    WBC 6.6 03/03/2021    WBC 7.8 02/28/2021    WBC 6.4 02/28/2021    HGB 10.8 (L) 03/03/2021    HCT 32.3 (L) 03/03/2021    MCV 95.8 03/03/2021    PLT 77 (L) 03/03/2021     Lab Results   Component Value Date    NEUTROABS 5.38 02/28/2021    NEUTROABS 3.75 02/28/2021    NEUTROABS 4.74 02/27/2021     No results found for: [facility]  Lab Results   Component Value Date    ALT 8 03/04/2021     (H) 03/04/2021    ALKPHOS 80 03/04/2021    BILITOT 0.3 03/04/2021     Lab Results   Component Value Date     03/04/2021    K 3.6 03/04/2021     03/04/2021    CO2 28 03/04/2021    BUN 16 03/04/2021    CREATININE 0.6 03/04/2021    CREATININE 0.6 03/03/2021    CREATININE 0.6 03/03/2021    GFRAA >60 03/04/2021    LABGLOM >60 03/04/2021    GLUCOSE 96 03/04/2021    GLUCOSE 71 05/18/2012    PROT 6.2 03/04/2021    LABALBU 3.0 03/04/2021    LABALBU 4.5 05/18/2012    CALCIUM 8.5 03/04/2021    BILITOT 0.3 03/04/2021    ALKPHOS 80 03/04/2021     03/04/2021    ALT 8 03/04/2021     Lab Results   Component Value Date    CRP 0.3 01/29/2021    CRP 4.2 (H) 06/14/2020    CRP 3.6 (H) 06/12/2020     Lab Results   Component Value Date    SEDRATE 6 01/29/2021    SEDRATE 43 (H) 06/14/2020    SEDRATE 14 06/12/2020     Radiology:  Reviewed today's chest x-ray minimal amount of left lung atelectasis    Microbiology:   Lab Results   Component Value Date    BC 24 Hours no growth 02/28/2021    BC 5 Days no growth 02/26/2021    BC 5 Days no growth 02/23/2021    ORG Staphylococcus hominis ssp hominis 02/23/2021    ORG Staphylococcus epidermidis 01/27/2021    ORG Staphylococcus haemolyticus 01/27/2021    ORG Staphylococcus hominis ssp hominis 01/27/2021    ORG Staphylococcus hominis ssp hominis 01/27/2021    ORG Staphylococcus haemolyticus 01/27/2021    ORG Staphylococcus haemolyticus 01/27/2021    ORG Staphylococcus epidermidis 01/27/2021     Lab Results   Component Value Date    BLOODCULT2 24 Hours no growth 02/28/2021    BLOODCULT2 5 Days no growth 02/01/2021    BLOODCULT2 Previously positive blood culture called 01/27/2021    BLOODCULT2  01/27/2021     This isolate is presumed to be resistant based on the  detection of inducible Clindamycin resistance. Clindamycin  may still be effective in some patients.       ORG Staphylococcus hominis ssp hominis 02/23/2021    ORG Staphylococcus epidermidis 01/27/2021    ORG Staphylococcus haemolyticus 01/27/2021    ORG Staphylococcus hominis ssp hominis 01/27/2021    ORG Staphylococcus hominis ssp hominis 01/27/2021    ORG Staphylococcus haemolyticus 01/27/2021    ORG Staphylococcus haemolyticus 01/27/2021    ORG Staphylococcus epidermidis 01/27/2021     WOUND/ABSCESS   Date Value Ref Range Status   03/09/2015 Rare growth  Final   02/02/2015 Light growth  Final   02/02/2015 Light growth  Final     No results found for: RESPSMEAR      Component Value Date/Time    LABFUNG 4 Weeks- no fungus isolated 04/24/2015 1030     No results found for: CULTRESP  No results found for: CXCATHTIP  No results found for: BFCS  Culture Surgical   Date Value Ref Range Status   04/24/2015 Heavy growth  Final   04/24/2015 Heavy growth  Final   04/24/2015 Light growth  Final     Urine Culture, Routine   Date Value Ref Range Status   06/12/2020 >100,000 CFU/ml  Final   03/28/2020 Growth not present  Final   01/29/2020 Growth not present  Final     MRSA Culture Only   Date Value Ref Range Status   01/29/2020 Methicillin resistant Staph aureus not isolated  Final     Assessment  · Fevers -less pronounced since he has been hydrated but he still behind in fluids; if you can get his BUN down to 7 I am sure there will be no fever  · Had atelectasis on the left  · Parkinson Disease with severe tremors -exacerbated by volume contraction  · Severe Protein Malnutrition  · Hx of Aspiration PNA-  · Hypernatremia -improved  · Lactic Acidosis  · Dehydration and malnutrition -still needs work  · Bacteremia with staph hominis- contaminant     Plan:   · Repeat blood cultures NGTD  · Continue fluids-patient should have a BUN of around 7 to be effectively hydrated with his body mass  · Peg placed   · meds changed to Sinemet -discussed with the neurology service  · Follow-up cultures   · Watch temps which have been gradually improving with a hydration program  · Plan d/c today    Electronically signed by MARIJA Rogers CNP on 3/4/2021 at 11:06 AM   Pt seen and examined. Above discussed agree with advanced practice nurse. Labs, cultures, and radiographs reviewed. Face to Face encounter occurred. Changes made as necessary.      Andres Espinal MD

## 2021-03-05 NOTE — CARE COORDINATION
[facility] Transitions Initial Follow Up Call    Call within 2 business days of discharge: Yes    Patient: Bridget Leyva Patient : 1950   MRN: [id number]  Reason for Admission: Acute respiratory failure with hypoxia  Discharge Date: 3/4/21 RARS: Readmission Risk Score: 34      Last Discharge [facility]       Complaint Diagnosis Description Type Department Provider    21 Shortness of Breath Acute respiratory failure with hypoxia [facility]) ED to Hosp-Admission (Discharged) (ADMITTED) KALE Narayan MD; Zechariah Gold. .. Spoke with: Alvaro Caro lpn at [address] [place] [address]: RAQUEL    Challenges to be reviewed by the provider   Additional needs identified to be addressed with provider No  none             Method of communication with provider : none    Was this a readmission? Yes  Patient stated reason for admission: sob  Patients top risk factors for readmission: functional cognitive ability, functional physical ability and medical condition    Care Transition Nurse (CTN) contacted the Alvaro Crao lpn at [address] [place] [address] by telephone to perform post hospital discharge assessment. Verified name and  with  Alvaro Caro lpn at [facility] as identifiers. Provided introduction to self, and explanation of the CTN role. CTN reviewed discharge instructions, medical action plan and red flags with  Alvaro Caro lpn at [facility] who verbalized understanding. Alvaro Caro lpn at [facility] given an opportunity to ask questions and does not have any further questions or concerns at this time. Were discharge instructions available to patient? Yes. Reviewed appropriate site of care based on symptoms and resources available to patient including: PCP and Specialist. Lucia Santos lpn at [address] [place] [address] agrees to contact the PCP office for questions related to their healthcare. Discussed follow-up appointments.  If no appointment was previously scheduled, appointment scheduling offered: Patient has f/u with Dr. Elba Gee scheduled on 3/18/21. Is follow up appointment scheduled within 7 days of discharge? No  Non-[facility] follow up appointment(s): Dr. Maryjane Levy on 3/18/21    Plan for follow-up call in 7-10 days based on severity of symptoms and risk factors. Plan for next call: symptom management-sob, fever, diarrhea and medication management-Med changes? CTN provided contact information for future needs. Non-face-to-face services provided:  Scheduled appointment with PCP-Per  Ezequiel Mendez lpn at [address] [place] [address], patient has f/u with Dr. Maryjane Levy on 3/18/21. Scheduled appointment with Specialist-Patient scheduled to follow up with Kristal Green on 5/4/21 after discontinuing Zyprexa and starting on Sinemet. Obtained and reviewed discharge summary and/or continuity of care documents  Reviewed and followed up on pending diagnostic tests and treatments-Per  francesca De La O at [facility] patient is scheduled for modified barius with video on 3/15/21. Care Transitions 24 Hour Call    Do you have any ongoing symptoms?: No  Do you have a copy of your discharge instructions?: Yes  Do you have all of your prescriptions and are they filled?: Yes  Have you been contacted by a [employer] [address]?: No  Have you scheduled your follow up appointment?: Yes  How are you going to get to your appointment?: Other (Comment: [facility] transports)  Were you discharged with any Home Care or Post Acute Services: No  Do you feel like you have everything you need to keep you well at home?: Yes  Care Transitions Interventions  No Identified Needs       Spoke with patient's nurse for initial BPCI care transition call post hospital discharge. Patient's nurse is agreeable to follow up post discharge from the hospital. Med review not completed. 1111f not entered. Reviewed new, changed and discontinued meds. Ezequiel Mendez reports she is awaiting delivery of Prevacid. Ezequiel Mendez does have liquid Keppra in home. Amadou Lerma was discontinuing Zyprexa in order to restart Sinement.       Patient presented to the emergency department on 2/23/21 for sob. Per Jeremy Alexander, patient is quiet today and has been declining x 2 weeks. Per Jaylyn, SpO2 96% on room air. Patient's respirations are 24, bpm, shallow. Lungs are CTA and diminished per Jaylyn. Patient had low grade fever overnight and was administered Tylenol. Temp today 99.7. Per Jeremy Alexander, tube feed is being administered and patient has multiple bouts of diarrhea today. Patient is still in bed. [facility] staff is independent with wound care. Patient has not needed oxygen or nebulizer today per Jaylyn. CTN explained that a member of the Care Transition Central Team will be contacting patient's nurse for further follow up calls. Patient's nurse is in agreement and denies any further needs or concerns at this time.      Follow Up  Future Appointments   Date Time Provider Ricco Hart   3/15/2021 10:30 AM SEB XRAY RM 2 [facility] RAD [facility] Radiolog   5/4/2021 11:00 AM MARIJA Briggs - CNS [facility]       Sreekanth Núñez RN

## 2021-03-05 NOTE — ED NOTES
Bed: 03  Expected date:   Expected time:   Means of arrival:   Comments:  ems     Tj Carrasco RN  03/05/21 3447

## 2021-03-05 NOTE — ED PROVIDER NOTES
ATTENDING PHYSICIAN ATTESTATION:     I have personally performed and/or participated in the history, exam, medical decision making, and procedures and agree with all pertinent clinical information. I have also reviewed and agree with the past medical, family and social history unless otherwise noted. I have discussed this patient in detail with the resident, and provided the instruction and education regarding UTI. My findings/Plan: 51-year-old nonverbal male from his group home after being found febrile of 102. Per the facility he had been hypoxic but he is in normal room air here. He is found to have UTI patient started on antibiotics return to his facility with antibiotics and return precautions        Patient presents from [facility]. Per their staff patient was hypoxic on room air and was satting in the 76s. They also state that he had a fever of 102. They gave him Tylenol. Patient is satting at the mid to high 90s on room air here. His temperature is 98.5 currently. Patient is nonverbal.    The history is provided by the patient and the nursing home. The history is limited by the condition of the patient. No  was used. Review of Systems   Unable to perform ROS: Patient nonverbal        Physical Exam  Vitals signs and nursing note reviewed. Constitutional:       General: He is not in acute distress. Appearance: He is well-developed. He is not diaphoretic. HENT:      Head: Normocephalic and atraumatic. Eyes:      Conjunctiva/sclera: Conjunctivae normal.   Neck:      Musculoskeletal: Normal range of motion and neck supple. Cardiovascular:      Rate and Rhythm: Normal rate and regular rhythm. Heart sounds: Normal heart sounds. No murmur. Pulmonary:      Effort: Pulmonary effort is normal. No tachypnea, accessory muscle usage or respiratory distress. Breath sounds: Normal breath sounds. No wheezing or rales.    Abdominal:      General: Bowel sounds are normal.      Palpations: Abdomen is soft. Tenderness: There is no abdominal tenderness. There is no guarding or rebound. Musculoskeletal:         General: No tenderness or deformity. Right lower leg: No edema. Left lower leg: No edema. Skin:     General: Skin is warm and dry. Coloration: Skin is not cyanotic or pale. Neurological:      Mental Status: He is alert and oriented to person, place, and time. Cranial Nerves: No cranial nerve deficit. Coordination: Coordination normal.          Procedures     MDM  Number of Diagnoses or Management Options  Dyspnea, unspecified type  Diagnosis management comments: Patient presents with shortness of breath according to facility. Patient is not requiring oxygen at this time and is satting appropriately on room air. He does not have a fever he is currently at 98.5 patient's vitals are stable and have remained so throughout his stay. CBC does not show any of elevated white counts. He does have an anemia which has been stable since his previous discharge and appears to be at his baseline when he is hydrated. Troponin is negative. Chest x-ray did not show any new processes. His EKG was unremarkable. Urinalysis shows signs of infection. Omnicef prescribed and given. At this point there does not appear to be any emergent processes ongoing. Patient will be discharged back to his facility. Patient was informed but is nonverbal and unable to express understanding. Amount and/or Complexity of Data Reviewed  Clinical lab tests: reviewed  Tests in the radiology section of CPT®: reviewed  Decide to obtain previous medical records or to obtain history from someone other than the patient: yes         ED Course as of Mar 06 2224   Fri Mar 05, 2021   1648 Stable from discharge   Hemoglobin Quant(!): 10.2 [MF]   1754 EKG shows normal sinus rhythm without any ST elevations or T wave inversions.   Comparable with previous EKG on 2/25/2021 [BB]      ED Course User Index  [BB] Ayo Means DO  [MF] Brian Hong DO        ED Course as of Mar 06 2224   Fri Mar 05, 2021   1648 Stable from discharge   Hemoglobin Quant(!): 10.2 [MF]   1754 EKG shows normal sinus rhythm without any ST elevations or T wave inversions. Comparable with previous EKG on 2/25/2021    [BB]      ED Course User Index  [BB] Ayo Means DO  [MF] Brian Hong DO       --------------------------------------------- PAST HISTORY ---------------------------------------------  Past Medical History:  has a past medical history of Acquired deformity of neck, Autism spectrum, Bilateral cataracts, Blepharochalasis, Cardiomegaly, Cataract of both eyes, Developmental delay, Gastritis, Hyperlipidemia, Kyphosis of thoracic region, Mental retardation, Obsessive compulsive disorder, Parkinsonism ([address] Seizures ([address] and Tourette disorder. Past Surgical History:  has a past surgical history that includes Toe amputation (Left) and Upper gastrointestinal endoscopy (N/A, 3/2/2021). Social History:  reports that he has never smoked. He has never used smokeless tobacco. He reports that he does not drink alcohol or use drugs. Family History: family history is not on file. The patients home medications have been reviewed. Allergies: Patient has no known allergies.     -------------------------------------------------- RESULTS -------------------------------------------------  Labs:  Results for orders placed or performed during the hospital encounter of 03/05/21   COVID-19, Rapid    Specimen: Nasopharyngeal Swab   Result Value Ref Range    SARS-CoV-2, NAAT Not Detected Not Detected   CBC Auto Differential   Result Value Ref Range    WBC 7.0 4.5 - 11.5 E9/L    RBC 3.18 (L) 3.80 - 5.80 E12/L    Hemoglobin 10.2 (L) 12.5 - 16.5 g/dL    Hematocrit 31.3 (L) 37.0 - 54.0 %    MCV 98.4 80.0 - 99.9 fL    MCH 32.1 26.0 - 35.0 pg    MCHC 32.6 32.0 - 34.5 %    RDW 13.3 11.5 - 15.0 fL Platelets 348 (L) 125 - 450 E9/L    MPV 11.7 7.0 - 12.0 fL    Neutrophils % 80.3 (H) 43.0 - 80.0 %    Immature Granulocytes % 0.4 0.0 - 5.0 %    Lymphocytes % 8.8 (L) 20.0 - 42.0 %    Monocytes % 8.1 2.0 - 12.0 %    Eosinophils % 2.3 0.0 - 6.0 %    Basophils % 0.1 0.0 - 2.0 %    Neutrophils Absolute 5.58 1.80 - 7.30 E9/L    Immature Granulocytes # 0.03 E9/L    Lymphocytes Absolute 0.61 (L) 1.50 - 4.00 E9/L    Monocytes Absolute 0.56 0.10 - 0.95 E9/L    Eosinophils Absolute 0.16 0.05 - 0.50 E9/L    Basophils Absolute 0.01 0.00 - 0.20 C3/P   Basic Metabolic Panel w/ Reflex to MG   Result Value Ref Range    Sodium 138 132 - 146 mmol/L    Potassium reflex Magnesium 4.1 3.5 - 5.0 mmol/L    Chloride 104 98 - 107 mmol/L    CO2 26 22 - 29 mmol/L    Anion Gap 8 7 - 16 mmol/L    Glucose 112 (H) 74 - 99 mg/dL    BUN 17 8 - 23 mg/dL    CREATININE 0.5 (L) 0.7 - 1.2 mg/dL    GFR Non-African American >60 >=60 mL/min/1.73    GFR African American >60     Calcium 8.5 (L) 8.6 - 10.2 mg/dL   Troponin   Result Value Ref Range    Troponin <0.01 0.00 - 0.03 ng/mL   Urinalysis, reflex to microscopic   Result Value Ref Range    Color, UA DARK YELLOW (A) Straw/Yellow    Clarity, UA Clear Clear    Glucose, Ur Negative Negative mg/dL    Bilirubin Urine Negative Negative    Ketones, Urine Negative Negative mg/dL    Specific Gravity, UA >=1.030 1.005 - 1.030    Blood, Urine LARGE (A) Negative    pH, UA 5.5 5.0 - 9.0    Protein,  (A) Negative mg/dL    Urobilinogen, Urine 1.0 <2.0 E.U./dL    Nitrite, Urine POSITIVE (A) Negative    Leukocyte Esterase, Urine Negative Negative   Microscopic Urinalysis   Result Value Ref Range    WBC, UA >20 (A) 0 - 5 /HPF    RBC, UA 1-3 0 - 2 /HPF    Bacteria, UA MANY (A) None Seen /HPF   EKG 12 Lead   Result Value Ref Range    Ventricular Rate 87 BPM    Atrial Rate 87 BPM    P-R Interval 138 ms    QRS Duration 76 ms    Q-T Interval 364 ms    QTc Calculation (Bazett) 438 ms    P Axis 40 degrees    R Axis 36 degrees    T [place] 12 degrees       Radiology:  XR CHEST PORTABLE   Final Result   1. Limited exam due to significant patient rotation. There is elevation of   the right hemidiaphragm with atelectasis in the right base. 2. Suspected pneumoperitoneum. Pneumoperitoneum was present on the   comparison exam, and the patient has reportedly undergone recent PEG tube   placement             ------------------------- NURSING NOTES AND VITALS REVIEWED ---------------------------  Date / Time Roomed:  3/5/2021  2:55 PM  ED Bed Assignment:  03/03    The nursing notes within the ED encounter and vital signs as below have been reviewed. /61   Pulse 78   Temp 98.4 °F (36.9 °C) (Axillary)   Resp 16   Ht 5' 4\" (1.626 m)   Wt 118 lb (53.5 kg)   SpO2 97%   BMI 20.25 kg/m²   Oxygen Saturation Interpretation: Normal      ------------------------------------------ PROGRESS NOTES ------------------------------------------  5:20 PM EST  I have spoken with the patient and discussed todays results, in addition to providing specific details for the plan of care and counseling regarding the diagnosis and prognosis. Their questions are answered at this time and they are agreeable with the plan. I discussed at length with them reasons for immediate return here for re evaluation. They will followup with their primary care physician by calling their office on Monday.      --------------------------------- ADDITIONAL PROVIDER NOTES ---------------------------------  At this time the patient is without objective evidence of an acute process requiring hospitalization or inpatient management. They have remained hemodynamically stable throughout their entire ED visit and are stable for discharge with outpatient follow-up. The plan has been discussed in detail and they are aware of the specific conditions for emergent return, as well as the importance of follow-up.       Discharge Medication List as of 3/5/2021  6:28 PM START taking these medications    Details   cefdinir (OMNICEF) 300 MG capsule Take 1 capsule by mouth 2 times daily for 7 days, Disp-14 capsule, R-0Print             Diagnosis:  1. Urinary tract infection without hematuria, site unspecified        Disposition:  Patient's disposition: Discharge to group home  Patient's condition is stable. Patient was seen and evaluated by both myself and No att. providers found.          Dana Jose Alejandro, DO  Resident  03/05/21 9467       Grecia Patient, DO  03/06/21 3403

## 2021-03-12 NOTE — CARE COORDINATION
[facility] Transitions Follow Up Call    3/12/2021    Patient: Martínez Miramontes  Patient : 1950   MRN: <[id number]>  Reason for Admission: Sepsis  Discharge Date: 3/5/21 RARS: Readmission Risk Score: 34    Spoke with Mj muro care of Ladonna Torrez. She states she is doing much better. Pt is having good pulse ox readings He is on and off O2 and is staying in mid 90s. His other VS are good and his PEG site looks good and the wounds on his knees are still being treated and healing. Dr Lopez Harp apt next week and plan to check urine next week. Told her aboutt he modified barium swallow. Spoke with: [facility] Transitions Subsequent and Final Call    Subsequent and Final Calls  Care Transitions Interventions  Other Interventions:            Follow Up  Future Appointments   Date Time Provider Ricco Hart   3/15/2021 10:30 AM SEB XRAY RM 2 SEBZ RAD SEB Radiolog   2021 11:00 AM Raynelle Lundborg, APRN - CNS [facility]       NETTE Lucas, RN   [address] [place] [address] Transition Nurse  [phone]

## 2021-03-15 NOTE — PROGRESS NOTES
SPEECH/LANGUAGE PATHOLOGY  VIDEOFLUOROSCOPIC STUDY OF SWALLOWING (MBS)      PATIENT NAME:  Kriss Bolaños      :  1950      TODAY'S DATE:  3/15/2021   ROOM:  Room/bed info not found    SUMMARY OF EVALUATION  Chart reviewed, including most recent video swallow study from 6/15/2020, which recommended puree solids and NTL, as well as non oral feedings to maintain adequate nutrition/hyradation. Facility reports that pt has been receiving all nutrition/hydration through PEG. Limited view during today's study due to pt's forward head positioning. Study conducted with swallow chair reclined at 45 degrees. DYSPHAGIA DIAGNOSIS:  Moderate oropharyngeal dysphagia for consistencies presented; no penetration or aspiration observed during today's study, however limited view due to pt positioning.      Only puree and NTL administered during today's video swallow study due to swallowing history      DIET RECOMMENDATIONS:  Recommend pleasure feeds of dysphagia 1, Pureed solids with nectar consistency (mildly thick) liquids as tolerated    Recommend continue to receive nutrition/hydration through PEG at this time      FEEDING RECOMMENDATIONS:     Assistance level:  Full assistance is needed during all oral intake      Compensatory strategies recommended: Small bites/sips, Alternate solids and liquids, Check for oral pocketing, Recline chair as needed to improve head positioning for oral intake     THERAPY RECOMMENDATIONS:      Therapy at the discretion of facility/treating Speech Pathologist                   PROCEDURE     Consistencies Administered During the Evaluation   Liquids: nectar thick liquid   Solids:  pureed foods      Method of Intake:   cup, spoon  Fed by clinician      Position:   Seated, reclined , Lateral plane    Current Respiratory Status   room air                RESULTS     ORAL STAGE       Delayed A-P transit due to: cognitive function and lingual strength    Oral residuals were noted : throughout the oral cavity   Disorganized bolus formation noted         PHARYNGEAL STAGE           ONSET TIME     Delayed initiation of the pharyngeal swallow was noted with swallow reflex triggered at the level of the vallecula       PHARYNGEAL RESIDUALS          Vallecula/Pharyngeal Wall           Reduced tongue base retraction resulting in residuals in vallecula and/or posterior pharyngeal wall for nectar consistency liquid and pudding consistency liquid which mostly cleared with spontaneous double swallow. Pyriform Sinuses      No significant residuals were noted in the pyriform sinuses, however view was limited. LARYNGEAL PENETRATION     Laryngeal penetration was not present during this evaluation                 ASPIRATION    Aspiration  was not present during this evaluation however, aspiration cannot be ruled out due to limited view of pyriform sinuses during this video swallow study. COMPENSATORY STRATEGIES       Compensatory strategies were not attempted      STRUCTURAL/FUNCTIONAL ANOMALIES       No structural/functional anomalies were noted      CERVICAL ESOPHAGEAL STAGE :        Was not assessed                            The Speech Language Pathologist (SLP) completed education with the patient regarding results of evaluation. INTERVENTION/EDUCATION    Pt/cargiver educated on above results and plan of care. Pt/caregiver trained on compensatory strategies for safe swallow with fair outcome. No questions asked. CPT code:  98978  dysphagia study, 30376 dysphagia therapy 15 mins        [x]The admitting diagnosis and active problem list, as listed below have been reviewed prior to initiation of this evaluation.      ADMITTING DIAGNOSIS: Dysphagia, unspecified type [R13.10]     ACTIVE PROBLEM LIST:   Patient Active Problem List   Diagnosis    Seizure ([address]    Pressure ulcer of toe of right foot, stage 3 ([address]    Atherosclerosis of native artery of both lower extremities ([address]  Dehydration with hypernatremia    Status epilepticus (HCC)    Seizures (HCC)    Seizure disorder, status epilepticus, convulsive [facility]    Severe protein-calorie malnutrition (HCC)    Acute renal failure (HCC)    Thrombocytopenia (HCC)    Fever, unspecified    Encephalopathy    Urinary tract infection without hematuria    Severe dehydration    Acute respiratory failure with hypoxia (HCC)    Hypoventilation syndrome    Atelectasis    Sepsis (HCC)    Gram positive septicemia (HCC)    Parkinson disease (HCC)    Hypoxia    Lactic acidosis    Abnormal urinalysis    Constipation    Autism spectrum disorder associated with neurodevelopmental, mental or behavioral disorder, requiring very substantial support (level 3)     BALA Montesinos. Speech-Language Pathology Student    Terrell FREITAS CCC/SLP [id number]  Speech-Language Pathologist

## 2021-03-19 NOTE — CARE COORDINATION
[facility] Transitions Follow Up Call    3/19/2021    Patient: Lolly Lopes  Patient : 1950   MRN: [id number]  Reason for Admission:   Discharge Date: 3/5/21 RARS: Readmission Risk Score: 29         Spoke with: Patient's caregiver, Vik Odom. Vik Odom provides patient update.   -reports patient condition stable   -wounds to patient's knees have healed   -patient is tolerating continuous tube feeding   -tremors have improved   -reports patient seems to be comfortable and resting at this time    Vik Odom denies any patient issues, needs, or concerns at this time. CTN thanked Vik Odom for her time regarding this patient.        Landon Jackson RN

## 2021-03-26 NOTE — CARE COORDINATION
[facility]  Transitions Follow Up Call    3/26/2021    Patient: Velma Gutierrez  Patient : 1950   MRN: <[id number]>  Reason for Admission:   Discharge Date: 3/5/21 RARS: Readmission Risk Score: 29         Spoke with: Vi Callejas, nurse at [facility] patient for BPCI-A follow up. Spoke with Vi Callejas, nurse at [facility]. She reports that Blanquita Moise is doing good so far. She denies Blanquita Moise having any increased shortness of breath. She stated patient had a UTI which was treated. Reports no signs/symptoms of UTI currently but they sent in another urine culture yesterday. Blanquita Moise continues to tolerate tube feedings. Vi Callejas stated that the dietician increased the flow of his tube feeding recently. No immediate needs or concerns. Will continue to follow.       Follow Up  Future Appointments   Date Time Provider Ricco Hart   2021 11:00 AM Earnesteen Canavan, APRN - CNS [facility]       Gege Yoder RN

## 2021-03-29 NOTE — CONSULTS
GENERAL SURGERY  CONSULT NOTE  3/29/2021    Physician Consulted: Dr. Laine Ortiz  Reason for Consult: PEG dislodged  Referring Physician: Dr. Chay Mehta    [facility]  Mery Wheeler is a 73 y.o. male with PMHx severe mental retardations, seizures, Parkinson's, and FTT who presents to the ED for PEG tube dislodgement. The PEG was placed on 3/2/2021 by Dr. Mally Faustin for malnutrition and dysphagia. He is nonverbal at baseline so most of the history is obtained from chart review and the ED. He was sent in from is facility when they noticed the PEG was pulled out today for an unknown amount of time. The patient does not acknowledge any abdominal pain. Past Medical History:   Diagnosis Date    Acquired deformity of neck     Autism spectrum     Bilateral cataracts     Blepharochalasis     Cardiomegaly     Cataract of both eyes     Developmental delay     Gastritis     Hyperlipidemia     Kyphosis of thoracic region     Mental retardation     SEVERE MR  NON VERBAL, NEEDS WHEELCHAIR FOR LONG DISTANCE    Obsessive compulsive disorder     Parkinsonism (HCC)     Seizures (HCC)     Tourette disorder        Past Surgical History:   Procedure Laterality Date    TOE AMPUTATION Left     2nd/3rd    UPPER GASTROINTESTINAL ENDOSCOPY N/A 3/2/2021    PEG TUBE INSERTION performed by Amaya Yancey MD at [address] [place] [address]       Medications Prior to Admission:    Prior to Admission medications    Medication Sig Start Date End Date Taking?  Authorizing Provider   carbidopa-levodopa (SINEMET)  MG per tablet 2 tablets by PEG Tube route every 4 hours (while awake)    Historical Provider, MD   omeprazole (PRILOSEC) 20 MG delayed release capsule 20 mg daily *PER [facility] NURSE(JOSE CRUZ) VIA PEG TUBE*    Historical Provider, MD   levETIRAcetam (KEPPRA) 100 MG/ML solution Take 5 mLs by mouth 2 times daily 3/4/21   Shailesh Leyva MD   ipratropium-albuterol (DUONEB) 0.5-2.5 (3) MG/3ML SOLN nebulizer solution Inhale 1 vial into the lungs every 4 hours as needed for Shortness of Breath    Historical Provider, MD   Calcium Carb-Cholecalciferol (CALCIUM-VITAMIN D) 500-200 MG-UNIT per tablet Take 1 tablet by mouth 2 times daily 6/23/20   Azalia Lyles PA-C   vitamin D (ERGOCALCIFEROL) 1.25 MG (93856 UT) CAPS capsule Take 50,000 Units by mouth every 30 days Given on the 14th    Historical Provider, MD   carBAMazepine (TEGRETOL) 200 MG tablet Take 200 mg by mouth 2 times daily  6/5/15   Historical Provider, MD   acetaminophen (TYLENOL) 325 MG tablet Take 650 mg by mouth every 4 hours as needed for Pain or Fever     Historical Provider, MD   benztropine (COGENTIN) 1 MG tablet Take 1 mg by mouth 2 times daily     Historical Provider, MD       No Known Allergies    History reviewed. No pertinent family history. Social History     Tobacco Use    Smoking status: Never Smoker    Smokeless tobacco: Never Used   Substance Use Topics    Alcohol use: No    Drug use: No         Review of Systems   Unable to obtain due to mental status      PHYSICAL EXAM:    Vitals:    03/29/21 0354   BP: (!) 105/41   Pulse: 80   Resp: 16   Temp: 97.1 °F (36.2 °C)   SpO2: 93%       General Appearance:  awake, in no acute distress  Skin:  Skin color, texture, turgor normal. No rashes or lesions. Head/face:  Temporal wasting  Eyes:  No gross abnormalities. Lungs:  No increased work of breathing on room air  Heart:  RR and normotensive  Abdomen:  Soft, nontender, nondistended. PEG tube site patent  Extremities: Extremities warm to touch, pink, with no edema. LABS:    CBC  No results for input(s): WBC, HGB, HCT, PLT in the last 72 hours. BMP  No results for input(s): NA, K, CL, CO2, BUN, CREATININE, CALCIUM in the last 72 hours. Invalid input(s): GLU  Liver Function  No results for input(s): AMYLASE, LIPASE, BILITOT, BILIDIR, AST, ALT, ALKPHOS, PROT, LABALBU in the last 72 hours. No results for input(s): LACTATE in the last 72 hours.   No results for input(s): INR, PTT in the last 72 hours. Invalid input(s): PT    RADIOLOGY    No results found.       ASSESSMENT:  73 y.o. male with PEG tube dislodgement    PLAN:  - Replaced Gtube at bedside, 4cm at the skin  - G-tube study shows PEG in adequate position  - Okay to DC from surgical POV  - Will discuss with Dr. Al Morales    Electronically signed by Sav Guadarrama MD on 3/29/21 at 4:27 AM EDT

## 2021-03-29 NOTE — ED PROVIDER NOTES
[facility]  Department of Emergency Medicine     Written by: Lamar Harden MD  Patient Name: Jamey Aburto  Attending Provider: Lamar Harden MD  Admit Date: 3/29/2021  4:05 AM  MRN: [id number]                   : 1950        Chief Complaint   Patient presents with   [facility] G Tube Complications     Pulled out G-tube    - Chief complaint    Patient is a 72-year-old male past medical history of autism, hyperlipidemia Parkinson's, seizures and dysphagia status post PEG tube placement. Patient presents from nursing facility PEG tube removal.  Per facility patient pulled out PEG tube just prior to arrival.  On initial presentation patient is alert not oriented person place or time. This appears to be patient's baseline. On review of medical records PEG tube appears to be adjustment placed on 3/2/2021. Further history and review of systems limited secondary to patient's baseline mental status. The history is provided by the patient. No  was used. Review of Systems   Unable to perform ROS: Dementia        Physical Exam  Vitals signs and nursing note reviewed. Constitutional:       Appearance: He is well-developed. HENT:      Head: Normocephalic and atraumatic. Eyes:      Conjunctiva/sclera: Conjunctivae normal.   Neck:      Musculoskeletal: Neck supple. Cardiovascular:      Rate and Rhythm: Normal rate and regular rhythm. Heart sounds: Normal heart sounds. No murmur. Pulmonary:      Effort: Pulmonary effort is normal. No respiratory distress. Breath sounds: Normal breath sounds. No wheezing or rales. Abdominal:      General: Bowel sounds are normal.      Palpations: Abdomen is soft. Tenderness: There is no guarding or rebound. Comments: PEG tube site with minimal bloody drainage, abdomen soft nontender no rigidity   Musculoskeletal:         General: No tenderness or deformity. Skin:     General: Skin is warm and dry. Patient was admitted for ucaf w/ rvr and Heroin overdose.Patient has history of alcohol abuse and substance abuse. Patient came up to 11 LM with all personal belonging from the ER. ER nurse came up with the patient and he was monitored via tele. Patient came up with a Cardizem drip running at 5 mg/hr. Writer contacted MD about order parameters of Cardizem drip. MD consulted EP to manage drip. Patient was cuffed to the cart with police escort. Patient was orientated to call light, room and unit. See epic for further information.    Neurological:      Mental Status: He is alert. Mental status is at baseline. He is disoriented. Cranial Nerves: No cranial nerve deficit. Coordination: Coordination normal.          Procedures       MDM  Number of Diagnoses or Management Options  Attention to G-tube [facility])  Diagnosis management comments: Patient is a 82-year-old male past medical history of autism, hyperlipidemia, Parkinson's, seizures and dysphagia with PEG tube placement. Patient presents from nursing facility due to PEG tube removal.  Chart review indicates that patient was placed on 3/2/2021. Vital signs stable presentation. On physical exam patient is alert but not oriented to person place or time this appears to be patient's baseline. Heart regular rate and rhythm, lungs clear to auscultation bilaterally, abdomen soft, PEG tube site with minimal bloody drainage no rigidity rebound or guarding. Due to recent nature of PEG tube placement, decision made to consult general surgery. General surgery resident evaluated patient at bedside PEG tube replaced per general surgery resident. Follow-up KUB with Gastrografin demonstrated proper placement of PEG tube. Findings consistent with PEG tube dislodgment. Decision made to discharge patient, instructions given for patient to follow-up with nursing home doctor next nursing home rounds in addition patient was instructed to follow-up general surgery soon as possible. Patient was discharged to nursing facility in stable condition. Amount and/or Complexity of Data Reviewed  Tests in the radiology section of CPT®: reviewed and ordered    Risk of Complications, Morbidity, and/or Mortality  Presenting problems: low  Diagnostic procedures: low  Management options: low         ED Course as of Mar 29 0503   Mon Mar 29, 2021   0420 Due to recent nature of PEG tube placement, decision made to consult general surgery.   Call placed to general surgery general surgery resident evaluated patient PEG tube replaced at bedside. Per general surgery okay to discharge if KUB demonstrates PEG tube in proper position.    [BP]      ED Course User Index  [BP] Rich Damon, DO      --------------------------------------------- PAST HISTORY ---------------------------------------------  Past Medical History:  has a past medical history of Acquired deformity of neck, Autism spectrum, Bilateral cataracts, Blepharochalasis, Cardiomegaly, Cataract of both eyes, Developmental delay, Gastritis, Hyperlipidemia, Kyphosis of thoracic region, Mental retardation, Obsessive compulsive disorder, Parkinsonism ([address] Seizures ([address] and Tourette disorder. Past Surgical History:  has a past surgical history that includes Toe amputation (Left) and Upper gastrointestinal endoscopy (N/A, 3/2/2021). Social History:  reports that he has never smoked. He has never used smokeless tobacco. He reports that he does not drink alcohol or use drugs. Family History: family history is not on file. The patients home medications have been reviewed. Allergies: Patient has no known allergies. -------------------------------------------------- RESULTS -------------------------------------------------  Labs:  No results found for this visit on 03/29/21. Radiology:  XR ABDOMEN FOR NG/OG/NE TUBE PLACEMENT   Final Result   With in the limitation of a single projection exam, tube tip appears to be in   the stomach. There is no contrast extravasation.             ------------------------- NURSING NOTES AND VITALS REVIEWED ---------------------------  Date / Time Roomed:  3/29/2021  4:05 AM  ED Bed Assignment:  23/23    The nursing notes within the ED encounter and vital signs as below have been reviewed.    BP (!) 105/41   Pulse 80   Temp 97.1 °F (36.2 °C) (Temporal)   Resp 16   SpO2 93%   Oxygen Saturation Interpretation: Normal      ------------------------------------------ PROGRESS NOTES ------------------------------------------  4:41 AM EDT  I have spoken with the patient and discussed todays results, in addition to providing specific details for the plan of care and counseling regarding the diagnosis and prognosis. Their questions are answered at this time and they are agreeable with the plan. I discussed at length with them reasons for immediate return here for re evaluation. They will followup with their primary care physician on next nursing home rounds. Vonzella Goodpasture --------------------------------- ADDITIONAL PROVIDER NOTES ---------------------------------  At this time the patient is without objective evidence of an acute process requiring hospitalization or inpatient management. They have remained hemodynamically stable throughout their entire ED visit and are stable for discharge with outpatient follow-up. The plan has been discussed in detail and they are aware of the specific conditions for emergent return, as well as the importance of follow-up. New Prescriptions    No medications on file       Diagnosis:  1. Attention to G-tube [facility])        Disposition:  Patient's disposition: Discharge to nursing home  Patient's condition is stable. Patient was seen and evaluated by myself and my attending Kade Fowler MD. Assessment and Plan discussed with attending provider, please see attestation for final plan of care. MD Corwin Saldana,   Resident  03/29/21 [id number]  ATTENDING PROVIDER ATTESTATION:     I have personally performed and/or participated in the history, exam, medical decision making, and procedures and agree with all pertinent clinical information. I have also reviewed and agree with the past medical, family and social history unless otherwise noted. I have discussed this patient in detail with the resident, and provided the instruction and education regarding dislodged g tube.     My findings/Plan: I was the primary provider for patient. Patient presenting here because the PEG tube was dislodged. Patient had PEG tube placed March 2, 2021. Patient unable to give complete history. Patient has had no vomiting. .  Patient awake and alert. Unable to assess orientation heart and lung exam normal abdomen soft PEG tube site noted there is no redness or swelling. We did consult general surgery patient had recent G-tube placed and attending wanted resident notified we did speak to resident resident did evaluate patient G-tube was placed by general surgery. Gastrografin study done and noted. Patient will be discharged back to nursing home.        Tawana Munoz MD  03/29/21 [id number]       Tawana Munoz MD  03/29/21 [id number]

## 2021-05-04 NOTE — PROGRESS NOTES
Eden Blackman is a 73 y.o. man    Patient well known to me for developmental disabilities, seizure disorder and recently diagnosed with Parkinson's disease   Evaluated in the hospital with pneumonia but at the time his Zyprexa was stopped because of questionable malignant hyperthermia (elevated CKs and temp on admission)    His seizures have been well controlled on Keppra and Tegretol having no seizures in a number of years until this past spring (Keppra added and they again retooled)     Previously used Depakote for behavior difficulties rather than seizures    Over the last 2 years he began to develop a tremor which subsequently changed his Depakote dosing without resolution in said tremor   Neurology questioned Parkinson's disease versus parkinsonisms with his underlying developmental disabilities    He was tried on Neupro which improved his walking but caused increased agitation. Neupro was stopped and Sinemet was started which markedly improved his walking, his tremor -- staff reported he was able to hold his own juice cup. However psychiatry wanted to stop his Sinemet in December because of potential interactions with Zyprexa.    Now off Zyprexa nad on Sinemet with marked improvement per staff    Still shaking a lot but more awake   Notices he seems to be worse at night when he goes at 0200 without dose   He uses a PEG for meds so they asked if he can get it around clock    Still with marked diaphoresis   No recent thyroid studies or BS studies     No seizures have been reported     Objective:   Temp 98.2 °F (36.8 °C)   Resp 17    Not cooperative with BP      General appearance: awake - looking around room -- marked head titubation and tremors    Head: Normocephalic, without obvious abnormality, atraumatic  Extremities: no cyanosis or edema  Pulses: 2+ and symmetric  Skin: no rashes or lesions     Mental Status: awake -- looking around the room     Cranial Nerves:  I: smell    II: visual acuity     II: visual fields Bilateral threat response    II: pupils NOMI   III,VII: ptosis    III,IV,VI: extraocular muscles  Follows examiner around room    V: mastication    V: facial light touch sensation  Grimaces to PP bilaterally    V,VII: corneal reflex  Present   VII: facial muscle function - upper     VII: facial muscle function - lower Normal   VIII: hearing    IX: soft palate elevation     IX,X: gag reflex    XI: trapezius strength     XI: sternocleidomastoid strength    XI: neck extension strength     XII: tongue strength       Motor:  Spastic quadriparetic  Marked tremor in both arms - at rest   Cogwheel rigidity also appreciated again today     Sensory:  Grimaces to noxious stim in both hands      DTR:   No reflexes    No Babinski  No Grye's     Laboratory/Radiology:     CBC with Differential:    Lab Results   Component Value Date    WBC 7.0 03/05/2021    RBC 3.18 03/05/2021    HGB 10.2 03/05/2021    HCT 31.3 03/05/2021     03/05/2021    MCV 98.4 03/05/2021    MCH 32.1 03/05/2021    MCHC 32.6 03/05/2021    RDW 13.3 03/05/2021    METASPCT 0.9 02/28/2021    LYMPHOPCT 8.8 03/05/2021    MONOPCT 8.1 03/05/2021    MYELOPCT 0.9 02/28/2021    BASOPCT 0.1 03/05/2021    MONOSABS 0.56 03/05/2021    LYMPHSABS 0.61 03/05/2021    EOSABS 0.16 03/05/2021    BASOSABS 0.01 03/05/2021     CMP:    Lab Results   Component Value Date     03/05/2021    K 4.1 03/05/2021     03/05/2021    CO2 26 03/05/2021    BUN 17 03/05/2021    CREATININE 0.5 03/05/2021    GFRAA >60 03/05/2021    LABGLOM >60 03/05/2021    GLUCOSE 112 03/05/2021    GLUCOSE 71 05/18/2012    PROT 6.2 03/04/2021    LABALBU 3.0 03/04/2021    LABALBU 4.5 05/18/2012    CALCIUM 8.5 03/05/2021    BILITOT 0.3 03/04/2021    ALKPHOS 80 03/04/2021     03/04/2021    ALT 8 03/04/2021     I independently reviewed the lab studies today.      Assessment:     History of developmental disabilities with seizure disorder   Stable on Keppra 1000mg BID and Tegretol 200mg BID    Suspected Parkinson's disease   responded well to Sinemet but continued tremors (now more awake with lessened tremors)    Now admitted with SOB, fever and right lung pneumonia      Plan:     Continue Keppra and Tegretol     Labs to be obtained    If unrevealing, consider endocrin eval     Make Sinemet via peg around the clock     RTO 6-8 weeks    Cassidy Smith  11:25 AM  5/4/2021

## 2021-05-13 NOTE — TELEPHONE ENCOUNTER
Stephani Montes from [employer] called in to make sure we got his most recent labs and wants to know if there are any changes that need to be made for him. Please advise. Stephani Montes will be there today until 2:30 and gets in at 6 tomorrow.

## 2021-05-14 NOTE — TELEPHONE ENCOUNTER
Called Jaylyn with instructions from ARNALDO. She will send an order for that for ARNALDO to sign off on.

## 2021-06-18 NOTE — PROGRESS NOTES
Roger Landon is a 73 y.o. man    Patient well known to me for developmental disabilities, seizure disorder and recently diagnosed with Parkinson's disease   Evaluated in the hospital with pneumonia but at the time his Zyprexa was stopped because of questionable malignant hyperthermia (elevated CKs and temp on admission)    His seizures have been well controlled on Keppra and Tegretol having no seizures in a number of years until this past spring (Keppra added and they again retooled)     Previously used Depakote for behavior difficulties rather than seizures    Over the last 2 years he began to develop a tremor which subsequently changed his Depakote dosing without resolution in said tremor   Neurology questioned Parkinson's disease versus parkinsonisms with his underlying developmental disabilities    He was tried on Neupro which improved his walking but caused increased agitation. Neupro was stopped and Sinemet was started which markedly improved his walking, his tremor -- staff reported he was able to hold his own juice cup. However psychiatry wanted to stop his Sinemet in December because of potential interactions with Zyprexa.    Now off Zyprexa and on Sinemet     Staff noted marked improvement however issues at night but he was not getting his dose  So we increase his Sinemet to be every 4 hours all day given that he has a PEG tube  I again have continue the low-dose improvement    He still has reports of marked diaphoresis  TSH B12 and SARA unrevealing  Homocystine was slightly elevated at 16.1  Previously discussed having endocrinology evaluate  I question some autonomic recordings given his Parkinson     No seizures have been reported     Objective:   /73 (Site: Right Upper Arm)   Pulse 102   Temp 97.9 °F (36.6 °C)   SpO2 95%    Not cooperative with BP      General appearance: awake - looking around room -- marked head titubation and tremors    Head: Normocephalic, without obvious abnormality, atraumatic  Extremities: no cyanosis or edema  Pulses: 2+ and symmetric  Skin: no rashes or lesions     Mental Status: awake -- looking around the room     Cranial Nerves:  I: smell    II: visual acuity     II: visual fields Bilateral threat response    II: pupils NOMI   III,VII: ptosis    III,IV,VI: extraocular muscles  Follows examiner around room    V: mastication    V: facial light touch sensation  Grimaces to PP bilaterally    V,VII: corneal reflex  Present   VII: facial muscle function - upper     VII: facial muscle function - lower Normal   VIII: hearing    IX: soft palate elevation     IX,X: gag reflex    XI: trapezius strength     XI: sternocleidomastoid strength    XI: neck extension strength     XII: tongue strength       Motor:  Spastic quadriparetic    No resting tremor appreciated today  Still with moderate cogwheel rigidity at the elbows    Sensory:  Grimaces to noxious stim in both hands      DTR:   No reflexes    No Babinski  No Grey's     Laboratory/Radiology:     CBC with Differential:    Lab Results   Component Value Date    WBC 7.0 03/05/2021    RBC 3.18 03/05/2021    HGB 10.2 03/05/2021    HCT 31.3 03/05/2021     03/05/2021    MCV 98.4 03/05/2021    MCH 32.1 03/05/2021    MCHC 32.6 03/05/2021    RDW 13.3 03/05/2021    METASPCT 0.9 02/28/2021    LYMPHOPCT 8.8 03/05/2021    MONOPCT 8.1 03/05/2021    MYELOPCT 0.9 02/28/2021    BASOPCT 0.1 03/05/2021    MONOSABS 0.56 03/05/2021    LYMPHSABS 0.61 03/05/2021    EOSABS 0.16 03/05/2021    BASOSABS 0.01 03/05/2021     CMP:    Lab Results   Component Value Date     03/05/2021    K 4.1 03/05/2021     03/05/2021    CO2 26 03/05/2021    BUN 17 03/05/2021    CREATININE 0.5 03/05/2021    GFRAA >60 03/05/2021    LABGLOM >60 03/05/2021    GLUCOSE 112 03/05/2021    GLUCOSE 71 05/18/2012    PROT 6.2 03/04/2021    LABALBU 3.0 03/04/2021    LABALBU 4.5 05/18/2012    CALCIUM 8.5 03/05/2021    BILITOT 0.3 03/04/2021    ALKPHOS 80 03/04/2021

## 2021-09-13 PROBLEM — L89.893 PRESSURE INJURY OF DORSUM OF RIGHT FOOT, STAGE 3 (HCC): Status: ACTIVE | Noted: 2021-01-01

## 2021-09-14 NOTE — PROGRESS NOTES
[facility]  History and Physical/Consultation  Podiatry    Referring Physician : Jagruti Causey MD  Pat Newman  MEDICAL RECORD NUMBER:  [id number]  AGE: 73 y.o. GENDER: male  : 1950  EPISODE DATE:  2021  Subjective:     Chief Complaint   Patient presents with    Wound Check     right 2nd toe /right plantar foot / left pretib         HISTORY of PRESENT ILLNESS HPI     Pat Newman is a 73 y.o. male who presents today for wound/ulcer evaluation. History of Wound Context: Patient with history of developmental disabilities, seizure disorder as well as recently diagnosed Parkinson's resides at [facility] living who presents with his caregiver for evaluation of a wound pressure type on his right foot. Duration unknown, caregiver relates that he was transferred from one facility to another. He does have history of previous pressure ulcerations as well.    21 due to overall status comfortable medical problems treat very conservatively. We will initiate local care, right foot as well as left lower leg. Due to deformities it is important that they alleviate any type of pressure from the top of the foot as well as bottom. Offload at all times.         Wound/Ulcer Pain Timing/Severity: none  Quality of pain:     Ulcer Identification:  Ulcer Type: pressure  Contributing Factors: chronic pressure and decreased mobility    Diabetic/Pressure/Non Pressure Ulcers onl y:  Ulcer: Pressure ulcer, Stage 3    If patient has diabetic lower extremity wounds  Castillo Classification of diabetic lower extremity wounds:    Grade Description   []  0 No open wound   []  1 Superficial ulcer involving the full skin thickness   []  2 Deep ulcer involves ligament, tendon, joint capsule, or fascia  No bone involvement or abscess presence   []  3 Deep Ulcer with abcess formation and/or osteomyelitis   []  4 Localized gangrene   []  5 Extensive gangrene of the foot     Wound: N/A        PAST MEDICAL HISTORY      Diagnosis Date    Acquired deformity of neck     Autism spectrum     Bilateral cataracts     Blepharochalasis     Cardiomegaly     Cataract of both eyes     Developmental delay     Gastritis     Hyperlipidemia     Kyphosis of thoracic region     Mental retardation     SEVERE MR  NON VERBAL, NEEDS WHEELCHAIR FOR LONG DISTANCE    Obsessive compulsive disorder     Parkinsonism (HCC)     Seizures (HCC)     Tourette disorder      Past Surgical History:   Procedure Laterality Date    TOE AMPUTATION Left     2nd/3rd    UPPER GASTROINTESTINAL ENDOSCOPY N/A 3/2/2021    PEG TUBE INSERTION performed by Yoly Bruner MD at [address] [place] [address]     History reviewed. No pertinent family history.   Social History     Tobacco Use    Smoking status: Never Smoker    Smokeless tobacco: Never Used   Vaping Use    Vaping Use: Never used   Substance Use Topics    Alcohol use: No    Drug use: No     No Known Allergies  Current Outpatient Medications on File Prior to Encounter   Medication Sig Dispense Refill    Ergocalciferol (DRISDOL) 200 MCG/ML drops Take 8,000 Units by mouth daily 6.25 ml 50,000 IU on the 14th of every LNYXT(8933XD/YN)      folic acid (FOLVITE) 1 MG tablet Take 1 mg by mouth daily      Multiple Vitamin (MULTIVITAMIN ADULT PO) Take by mouth daily      calcium carbonate (TUMS) 500 MG chewable tablet Take 1 tablet by mouth 2 times daily      Saccharomyces boulardii (PROBIOTIC) 250 MG CAPS Take by mouth 2 times daily      carbidopa-levodopa (SINEMET)  MG per tablet 2 tablets by PEG Tube route every 4 hours Ok for 2am dose 360 tablet 1    omeprazole (PRILOSEC) 20 MG delayed release capsule 20 mg daily *PER [facility](JOSE CRUZ) VIA PEG TUBE*      levETIRAcetam (KEPPRA) 100 MG/ML solution Take 5 mLs by mouth 2 times daily  3    ipratropium-albuterol (DUONEB) 0.5-2.5 (3) MG/3ML SOLN nebulizer solution Inhale 1 vial into the lungs every 4 hours as needed for Shortness of Breath      carBAMazepine (TEGRETOL) 200 MG tablet Take 200 mg by mouth 2 times daily       acetaminophen (TYLENOL) 325 MG tablet Take 650 mg by mouth every 4 hours as needed for Pain or Fever       benztropine (COGENTIN) 1 MG tablet Take 1 mg by mouth 2 times daily        No current facility-administered medications on file prior to encounter. REVIEW OF SYSTEMS   ROS : All others Negative if blank [], Positive if [x]  General Vascular   [] Fevers [] Claudication   [] Chills [] Rest Pain   Skin Neurologic   [] Tissue Loss [] Lower extremity neuropathy     Objective:    /64   Pulse 92   Temp 98.3 °F (36.8 °C) (Temporal)   Resp 20   Wt 110 lb (49.9 kg)   BMI 18.88 kg/m²   Wt Readings from Last 3 Encounters:   09/13/21 110 lb (49.9 kg)   03/05/21 118 lb (53.5 kg)   02/28/21 118 lb (53.5 kg)       PHYSICAL EXAM   CONSTITUTIONAL:   Awake, alert, cooperative   PSYCHIATRIC :      Bilateral lower extremity exam demonstrates audible dorsalis pedis and posterior tibial pulses. Lower extremity contractures. Digits contracted with prominent medial bone first metatarsal head. Wounds noted dorsal right second toe as well as plantar distal foot with some devitalized nonviable tissue. No purulence or odor. No surrounding erythema, fluctuance, crepitus or associated pain. With debridement through subcutaneous tissue. Assessment:     Problem List Items Addressed This Visit     Pressure injury of dorsum of right foot, stage 3 (HCC)      Abrasion/wound LLE. HAV, [place]omal[place].     Pre Debridement Measurements:  Are located in the [facility]  Documentation Flow Sheet  Post Debridement Measurements:  Wound/Ulcer Descriptions are Pre Debridement except measurements:     Wound (Active)   Number of days:        Wound 06/09/20 Foot Left bunion area (Active)   Number of days: 461       Wound 06/09/20 Sacrum Left (Active)   Number of days: 461       Wound 06/09/20 Coccyx (Active)   Number of days: 461       Wound 02/24/21 Ankle Right;Other (Comment); Lateral skin tear measuring 1 cm X 2.3 cm X 0.1 cm (Active)   Number of days: 201       Wound 09/13/21 Toe (Comment  which one) Right;Dorsal #1 (Active)   Wound Image   09/13/21 1459   Wound Etiology Pressure Stage  3 09/13/21 1459   Dressing Status New dressing applied 09/13/21 1546   Wound Cleansed Cleansed with saline 09/13/21 1546   Dressing/Treatment Alginate with Ag;Dry dressing 09/13/21 1546   Offloading for Diabetic Foot Ulcers Other (comment) 09/13/21 1546   Wound Length (cm) 0.5 cm 09/13/21 1459   Wound Width (cm) 0.4 cm 09/13/21 1459   Wound Depth (cm) 0.2 cm 09/13/21 1459   Wound Surface Area (cm^2) 0.2 cm^2 09/13/21 1459   Wound Volume (cm^3) 0.04 cm^3 09/13/21 1459   Post-Procedure Length (cm) 0.5 cm 09/13/21 1519   Post-Procedure Width (cm) 0.5 cm 09/13/21 1519   Post-Procedure Depth (cm) 0.2 cm 09/13/21 1519   Post-Procedure Surface Area (cm^2) 0.25 cm^2 09/13/21 1519   Post-Procedure Volume (cm^3) 0.05 cm^3 09/13/21 1519   Wound Assessment Fibrin;Pale granulation tissue;Eschar dry 09/13/21 1459   Drainage Amount Small 09/13/21 1459   Drainage Description Brown 09/13/21 1459   Odor None 09/13/21 1459   Fabby-wound Assessment Non-blanchable erythema 09/13/21 1459   Number of days: 0       Wound 09/13/21 Foot Right;Plantar #2 (Active)   Wound Image   09/13/21 1459   Wound Etiology Pressure Stage  3 09/13/21 1459   Dressing Status New dressing applied 09/13/21 1546   Wound Cleansed Cleansed with saline 09/13/21 1546   Dressing/Treatment Alginate with Ag;Dry dressing 09/13/21 1546   Offloading for Diabetic Foot Ulcers Other (comment) 09/13/21 1546   Wound Length (cm) 1 cm 09/13/21 1459   Wound Width (cm) 0.6 cm 09/13/21 1459   Wound Depth (cm) 0.3 cm 09/13/21 1459   Wound Surface Area (cm^2) 0.6 cm^2 09/13/21 1459   Wound Volume (cm^3) 0.18 cm^3 09/13/21 1459   Post-Procedure Length (cm) 1 cm 09/13/21 1519   Post-Procedure Width (cm) 0.6 cm 09/13/21 1519   Post-Procedure Depth (cm) 0.3 cm 09/13/21 1519   Post-Procedure Surface Area (cm^2) 0.6 cm^2 09/13/21 1519   Post-Procedure Volume (cm^3) 0.18 cm^3 09/13/21 1519   Wound Assessment Granulation tissue;Fibrin 09/13/21 1459   Drainage Amount Small 09/13/21 1459   Drainage Description Serosanguinous; Serous 09/13/21 1459   Odor None 09/13/21 1459   Fabby-wound Assessment Intact 09/13/21 1459   Number of days: 0       Wound 09/13/21 Pretibial Left #3 (Active)   Wound Image   09/13/21 1459   Wound Etiology Traumatic 09/13/21 1459   Dressing Status New dressing applied 09/13/21 1546   Wound Cleansed Cleansed with saline 09/13/21 1546   Dressing/Treatment Alginate with Ag;Dry dressing 09/13/21 1546   Offloading for Diabetic Foot Ulcers Other (comment) 09/13/21 1546   Wound Length (cm) 0.8 cm 09/13/21 1459   Wound Width (cm) 0.6 cm 09/13/21 1459   Wound Depth (cm) 0.1 cm 09/13/21 1459   Wound Surface Area (cm^2) 0.48 cm^2 09/13/21 1459   Wound Volume (cm^3) 0.048 cm^3 09/13/21 1459   Wound Assessment Granulation tissue;Fibrin 09/13/21 1459   Drainage Amount Small 09/13/21 1459   Drainage Description Serosanguinous 09/13/21 1459   Odor None 09/13/21 1459   Fabby-wound Assessment Intact 09/13/21 1459   Number of days: 0          Procedure Note  Indications:  Based on my examination of this patient's wound(s)/ulcer(s) today, debridement is required to promote healing and evaluate the wound base. Performed by: Lucy Melvin DPM    Consent obtained:  Yes    Time out taken:  Yes    Pain Control: Anesthetic  Anesthetic: 4% Lidocaine Liquid Topical     Debridement:Excisional Debridement    Using curette the wound(s)/ulcer(s) was/were sharply debrided down through and including the removal of subcutaneous tissue.         Devitalized Tissue Debrided:  fibrin, biofilm, slough and necrotic/eschar to stimulate bleeding to promote healing, post debridement good bleeding base and wound edges noted    Wound/Ulcer #: 1 and 2    Percent of Wound/Ulcer Debrided: 100%    Total Surface Area Debrided:  0.8 sq cm     Estimated Blood Loss:  Minimal  Hemostasis Achieved:  by pressure    Procedural Pain:  0  / 10   Post Procedural Pain:  0 / 10     Response to treatment:  Well tolerated by patient. A culture was not done. Plan:     Treatment Note please see attached Discharge Instructions    Written patient dismissal instructions given to patient and signed by patient or POA. Discharge Instructions       Visit Discharge/Physician Orders    Discharge condition: Stable    Assessment of pain at discharge:minimal    Anesthetic used: 4% lidocaine    Discharge to: Home    Left via:Private automobile    Accompanied by: accompanied by caregiver    [facility]: [facility]    Dressing Orders:LEFT LEG AND RIGHT FOOT ULCERS CLEANSE WITH NORMAL SALINE APPLY ALGINATE AND DRY DRESSING DAILY. Treatment Orders:  KEEP PRESSURE OFF WOUNDS    EAT FOODS HIGH IN PROTEIN AND VITAMIN C    MULTIVITAMIN DAILY  [facility] followup visit _____1 WEEK________________________  (Please note your next appointment above and if you are unable to keep, kindly give a 24 hour notice. Thank you.)    Physician signature:__________________________      If you experience any of the following, please call the [address] [place] [address] Road during business hours:    * Increase in Pain  * Temperature over 101  * Increase in drainage from your wound  * Drainage with a foul odor  * Bleeding  * Increase in swelling  * Need for compression bandage changes due to slippage, breakthrough drainage. If you need medical attention outside of the business hours of the [address] [place] [address] Road please contact your PCP or go to the nearest emergency room.         Electronically signed by Tresa Durant DPM on 9/13/2021 at 8:05 PM

## 2021-09-27 NOTE — PROGRESS NOTES
[facility] Followup Visit Note    Referring Physician : Guillermo Cruz MD  Mike Liu  MEDICAL RECORD NUMBER:  [id number]  AGE: 73 y.o. GENDER: male  : 1950  EPISODE DATE:  2021    Subjective:     Chief Complaint   Patient presents with    Wound Check     LLE, right foot      HISTORY of PRESENT ILLNESS HPI   Mike Liu is a 73 y.o. male who presents today in regards to follow up evaluation and treatment of wound/ulcer. That patient's past medical, family and social hx were reviewed and changes were made if present. History of Wound Context: Patient with history of developmental disabilities, seizure disorder as well as recently diagnosed Parkinson's resides at [facility] living who presents with his caregiver for evaluation of a wound pressure type on his right foot. Duration unknown, caregiver relates that he was transferred from one facility to another. He does have history of previous pressure ulcerations as well.     21 due to overall status comfortable medical problems treat very conservatively. We will initiate local care, right foot as well as left lower leg. Due to deformities it is important that they alleviate any type of pressure from the top of the foot as well as bottom. Offload at all times. 21 Wounds noted dorsal right second toe as well as plantar distal foot with 25% devitalized nonviable tissue. No purulence or odor. No surrounding erythema, fluctuance, crepitus or associated pain. With debridement through subcutaneous tissue.       Wound/Ulcer Pain Timing/Severity: none  Quality of pain:      Ulcer Identification:  Ulcer Type: pressure  Contributing Factors: chronic pressure and decreased mobility     Diabetic/Pressure/Non Pressure Ulcers onl y:  Ulcer: Pressure ulcer, Stage 3        PAST MEDICAL HISTORY      Diagnosis Date    Acquired deformity of neck     Autism spectrum     Bilateral cataracts     Blepharochalasis     Cardiomegaly     Cataract of both eyes     Developmental delay     Gastritis     Hyperlipidemia     Kyphosis of thoracic region     Mental retardation     SEVERE MR  NON VERBAL, NEEDS WHEELCHAIR FOR LONG DISTANCE    Obsessive compulsive disorder     Parkinsonism (HCC)     Seizures (HCC)     Tourette disorder      Past Surgical History:   Procedure Laterality Date    TOE AMPUTATION Left     2nd/3rd    UPPER GASTROINTESTINAL ENDOSCOPY N/A 3/2/2021    PEG TUBE INSERTION performed by Willem Singh MD at [address] [place] [address]     History reviewed. No pertinent family history.   Social History     Tobacco Use    Smoking status: Never Smoker    Smokeless tobacco: Never Used   Vaping Use    Vaping Use: Never used   Substance Use Topics    Alcohol use: No    Drug use: No     No Known Allergies  Current Outpatient Medications on File Prior to Encounter   Medication Sig Dispense Refill    Ergocalciferol (DRISDOL) 200 MCG/ML drops Take 8,000 Units by mouth daily 6.25 ml 50,000 IU on the 14th of every EMILY(5547BN/XA)      folic acid (FOLVITE) 1 MG tablet Take 1 mg by mouth daily      Multiple Vitamin (MULTIVITAMIN ADULT PO) Take by mouth daily      Saccharomyces boulardii (PROBIOTIC) 250 MG CAPS Take by mouth 2 times daily      levETIRAcetam (KEPPRA) 100 MG/ML solution Take 5 mLs by mouth 2 times daily  3    ipratropium-albuterol (DUONEB) 0.5-2.5 (3) MG/3ML SOLN nebulizer solution Inhale 1 vial into the lungs every 4 hours as needed for Shortness of Breath      carBAMazepine (TEGRETOL) 200 MG tablet Take 200 mg by mouth 2 times daily       benztropine (COGENTIN) 1 MG tablet Take 1 mg by mouth 2 times daily       calcium carbonate (TUMS) 500 MG chewable tablet Take 1 tablet by mouth 2 times daily      carbidopa-levodopa (SINEMET)  MG per tablet 2 tablets by PEG Tube route every 4 hours Ok for 2am dose 360 tablet 1    omeprazole (PRILOSEC) 20 MG delayed release capsule 20 mg daily *PER [facility](JOSE CRUZ) VIA PEG TUBE*      acetaminophen (TYLENOL) 325 MG tablet Take 650 mg by mouth every 4 hours as needed for Pain or Fever        No current facility-administered medications on file prior to encounter. REVIEW OF SYSTEMS See HPI    Objective:    /66   Pulse 84   Temp 97.7 °F (36.5 °C) (Temporal)   Resp 20   Wt 110 lb (49.9 kg)   BMI 18.88 kg/m²   Wt Readings from Last 3 Encounters:   09/27/21 110 lb (49.9 kg)   09/13/21 110 lb (49.9 kg)   03/05/21 118 lb (53.5 kg)     PHYSICAL EXAM  CONSTITUTIONAL:   Awake, alert, cooperative   EYES:  lids and lashes normal   ENT: external ears and nose without lesions   NECK:  supple, symmetrical, trachea midline   SKIN:  Open wound     Assessment:     Problem List Items Addressed This Visit     Pressure injury of dorsum of right foot, stage 3 (HCC) - Primary    Relevant Orders    Initiate Outpatient Wound Care Protocol          Pre Debridement Measurements:  Are located in the [facility]  Documentation Flow Sheet  Post Debridement Measurements:  Wound/Ulcer Descriptions are Pre Debridement except measurements:     Wound (Active)   Number of days:        Wound 06/09/20 Foot Left bunion area (Active)   Number of days: 475       Wound 06/09/20 Sacrum Left (Active)   Number of days: 474       Wound 06/09/20 Coccyx (Active)   Number of days: 474       Wound 02/24/21 Ankle Right;Other (Comment); Lateral skin tear measuring 1 cm X 2.3 cm X 0.1 cm (Active)   Number of days: 215       Wound 09/13/21 Toe (Comment  which one) Right;Dorsal #1 (Active)   Wound Image   09/13/21 1459   Wound Etiology Pressure Stage  3 09/13/21 1459   Dressing Status New dressing applied 09/13/21 1546   Wound Cleansed Cleansed with saline 09/13/21 1546   Dressing/Treatment Alginate with Ag;Dry dressing 09/13/21 1546   Offloading for Diabetic Foot Ulcers Other (comment) 09/13/21 1546   Wound Length (cm) 0.5 cm 09/27/21 1451   Wound Width (cm) 0.2 cm 09/27/21 1451   Wound Depth (cm) 0.1 cm 09/27/21 1451   Wound Surface Area (cm^2) 0.1 cm^2 09/27/21 1451   Change in Wound Size % (l*w) 50 09/27/21 1451   Wound Volume (cm^3) 0.01 cm^3 09/27/21 1451   Wound Healing % 75 09/27/21 1451   Post-Procedure Length (cm) 0.5 cm 09/27/21 1506   Post-Procedure Width (cm) 0.3 cm 09/27/21 1506   Post-Procedure Depth (cm) 0.2 cm 09/27/21 1506   Post-Procedure Surface Area (cm^2) 0.15 cm^2 09/27/21 1506   Post-Procedure Volume (cm^3) 0.03 cm^3 09/27/21 1506   Wound Assessment Pink/red 09/27/21 1451   Drainage Amount Small 09/27/21 1451   Drainage Description Serosanguinous 09/27/21 1451   Odor None 09/27/21 1451   Fabby-wound Assessment Fragile 09/27/21 1451   Number of days: 14       Wound 09/13/21 Foot Right;Plantar #2 (Active)   Wound Image   09/13/21 1459   Wound Etiology Pressure Stage  3 09/13/21 1459   Dressing Status New dressing applied 09/13/21 1546   Wound Cleansed Cleansed with saline 09/13/21 1546   Dressing/Treatment Alginate with Ag;Dry dressing 09/13/21 1546   Offloading for Diabetic Foot Ulcers Other (comment) 09/13/21 1546   Wound Length (cm) 1 cm 09/27/21 1451   Wound Width (cm) 0.6 cm 09/27/21 1451   Wound Depth (cm) 0.3 cm 09/27/21 1451   Wound Surface Area (cm^2) 0.6 cm^2 09/27/21 1451   Change in Wound Size % (l*w) 0 09/27/21 1451   Wound Volume (cm^3) 0.18 cm^3 09/27/21 1451   Wound Healing % 0 09/27/21 1451   Post-Procedure Length (cm) 1 cm 09/27/21 1506   Post-Procedure Width (cm) 0.7 cm 09/27/21 1506   Post-Procedure Depth (cm) 0.3 cm 09/27/21 1506   Post-Procedure Surface Area (cm^2) 0.7 cm^2 09/27/21 1506   Post-Procedure Volume (cm^3) 0.21 cm^3 09/27/21 1506   Wound Assessment Fibrin;Pink/red 09/27/21 1451   Drainage Amount Small 09/27/21 1451   Drainage Description Yellow 09/27/21 1451   Odor None 09/27/21 1451   Fabby-wound Assessment Intact 09/27/21 1451   Number of days: 14       Wound 09/13/21 Pretibial Left #3 (Active)   Wound Image   09/27/21 1451   Wound Etiology Traumatic 09/13/21 1459   Dressing Status New dressing applied 09/13/21 1546   Wound Cleansed Cleansed with saline 09/13/21 1546   Dressing/Treatment Alginate with Ag;Dry dressing 09/13/21 1546   Offloading for Diabetic Foot Ulcers Other (comment) 09/13/21 1546   Wound Length (cm) 0 cm 09/27/21 1451   Wound Width (cm) 0 cm 09/27/21 1451   Wound Depth (cm) 0 cm 09/27/21 1451   Wound Surface Area (cm^2) 0 cm^2 09/27/21 1451   Change in Wound Size % (l*w) 100 09/27/21 1451   Wound Volume (cm^3) 0 cm^3 09/27/21 1451   Wound Healing % 100 09/27/21 1451   Wound Assessment Epithelialization 09/27/21 1451   Drainage Amount None 09/27/21 1451   Drainage Description Serosanguinous 09/13/21 1459   Odor None 09/27/21 1451   Fabby-wound Assessment Intact 09/27/21 1451   Number of days: 14       Procedure Note  Indications:  Based on my examination of this patient's wound(s)/ulcer(s) today, debridement is required to promote healing and evaluate the wound base.     Performed by: Shaquille Diallo DPM     Consent obtained: Yes     Time out taken: Yes     Pain Control: Anesthetic  Anesthetic: 4% Lidocaine Liquid Topical      Debridement:Excisional Debridement     Using curette the wound(s)/ulcer(s) was/were sharply debrided down through and including the removal of subcutaneous tissue.         Devitalized Tissue Debrided:  fibrin, biofilm, slough and necrotic/eschar to stimulate bleeding to promote healing, post debridement good bleeding base and wound edges noted     Wound/Ulcer #: 1 and 2     Percent of Wound/Ulcer Debrided: 100%     Total Surface Area Debrided:  0.7 sq cm      Estimated Blood Loss:  Minimal  Hemostasis Achieved:  by pressure     Procedural Pain:  0  / 10   Post Procedural Pain:  0 / 10      Response to treatment:  Well tolerated by patient. Plan:   Treatment Note please see attached Discharge Instructions    Written patient dismissal instructions given to patient and signed by patient or POA.          Discharge Instructions Visit Discharge/Physician Orders     Discharge condition: Stable     Assessment of pain at discharge:minimal     Anesthetic used: 4% lidocaine     Discharge to: Home     Left via:Private automobile     Accompanied by: accompanied by caregiver     ECF/[facility]HA: [facility]     Dressing Orders:LEFT LEG AND RIGHT FOOT ULCERS CLEANSE WITH NORMAL SALINE APPLY ALGINATE AND DRY DRESSING DAILY.     Treatment Orders:  KEEP PRESSURE OFF WOUNDS     EAT FOODS HIGH IN PROTEIN AND VITAMIN C     MULTIVITAMIN DAILY  [address] [place] [address],[address] followup visit _____1 WEEK________________________  (Please note your next appointment above and if you are unable to keep, kindly give a 24 hour notice.  Thank you.)     Physician signature:__________________________        If you experience any of the following, please call the [facility] during business hours:     * Increase in Pain  * Temperature over 101  * Increase in drainage from your wound  * Drainage with a foul odor  * Bleeding  * Increase in swelling  * Need for compression bandage changes due to slippage, breakthrough drainage.     If you need medical attention outside of the business hours of the [facility] please contact your PCP or go to the nearest emergency room.                                Electronically signed by Cherrie Nichols DPM on 9/27/2021 at 3:15 PM

## 2021-10-11 NOTE — PLAN OF CARE
Problem: Pressure Ulcer:  Goal: Signs of wound healing will improve  Description: Signs of wound healing will improve  Outcome: Met This Shift  Goal: Absence of new pressure ulcer  Description: Absence of new pressure ulcer  Outcome: Met This Shift     Problem: Wound:  Goal: Will show signs of wound healing; wound closure and no evidence of infection  Description: Will show signs of wound healing; wound closure and no evidence of infection  Outcome: Ongoing     Problem: Pressure Ulcer:  Goal: Will show no infection signs and symptoms  Description: Will show no infection signs and symptoms  Outcome: Ongoing

## 2021-10-11 NOTE — PROGRESS NOTES
[facility] Followup Visit Note    Referring Physician : Belkis Dumont MD  Benji Goel  MEDICAL RECORD NUMBER:  [id number]  AGE: 73 y.o. GENDER: male  : 1950  EPISODE DATE:  10/11/2021    Subjective:     Chief Complaint   Patient presents with    Wound Check     Right second toe      HISTORY of PRESENT ILLNESS HPI   Benji Goel is a 73 y.o. male who presents today in regards to follow up evaluation and treatment of wound/ulcer. That patient's past medical, family and social hx were reviewed and changes were made if present. History of Wound Context: Patient with history of developmental disabilities, seizure disorder as well as recently diagnosed Parkinson's resides at [facility] living who presents with his caregiver for evaluation of a wound pressure type on his right foot. Duration unknown, caregiver relates that he was transferred from one facility to another. He does have history of previous pressure ulcerations as well.     21 due to overall status comfortable medical problems treat very conservatively. We will initiate local care, right foot as well as left lower leg. Due to deformities it is important that they alleviate any type of pressure from the top of the foot as well as bottom. Offload at all times. 21 Wounds noted dorsal right second toe as well as plantar distal foot with 25% devitalized nonviable tissue. No purulence or odor. No surrounding erythema, fluctuance, crepitus or associated pain. With debridement through subcutaneous tissue. 10-11-21 Wounds noted dorsal right second toe as well as plantar distal foot with 50% devitalized nonviable tissue. No purulence or odor. No surrounding erythema, fluctuance, crepitus or associated pain. With debridement through subcutaneous tissue.         Wound/Ulcer Pain Timing/Severity: none  Quality of pain:      Ulcer Identification:  Ulcer Type: pressure  Contributing Factors: chronic pressure and decreased mobility     Diabetic/Pressure/Non Pressure Ulcers onl y:  Ulcer: Pressure ulcer, Stage 3        PAST MEDICAL HISTORY      Diagnosis Date    Acquired deformity of neck     Autism spectrum     Bilateral cataracts     Blepharochalasis     Cardiomegaly     Cataract of both eyes     Developmental delay     Gastritis     Hyperlipidemia     Kyphosis of thoracic region     Mental retardation     SEVERE MR  NON VERBAL, NEEDS WHEELCHAIR FOR LONG DISTANCE    Obsessive compulsive disorder     Parkinsonism (HCC)     Seizures (HCC)     Tourette disorder      Past Surgical History:   Procedure Laterality Date    TOE AMPUTATION Left     2nd/3rd    UPPER GASTROINTESTINAL ENDOSCOPY N/A 3/2/2021    PEG TUBE INSERTION performed by Shadi Escobedo MD at [address] [place] [address]     History reviewed. No pertinent family history.   Social History     Tobacco Use    Smoking status: Never Smoker    Smokeless tobacco: Never Used   Vaping Use    Vaping Use: Never used   Substance Use Topics    Alcohol use: No    Drug use: No     No Known Allergies  Current Outpatient Medications on File Prior to Encounter   Medication Sig Dispense Refill    Ergocalciferol (DRISDOL) 200 MCG/ML drops Take 8,000 Units by mouth daily 6.25 ml 50,000 IU on the 14th of every DPVVT(5534XY/HW)      folic acid (FOLVITE) 1 MG tablet Take 1 mg by mouth daily      Multiple Vitamin (MULTIVITAMIN ADULT PO) Take by mouth daily      calcium carbonate (TUMS) 500 MG chewable tablet Take 1 tablet by mouth 2 times daily      Saccharomyces boulardii (PROBIOTIC) 250 MG CAPS Take by mouth 2 times daily      carbidopa-levodopa (SINEMET)  MG per tablet 2 tablets by PEG Tube route every 4 hours Ok for 2am dose 360 tablet 1    omeprazole (PRILOSEC) 20 MG delayed release capsule 20 mg daily *PER [facility](JOSE CRUZ) VIA PEG TUBE*      levETIRAcetam (KEPPRA) 100 MG/ML solution Take 5 mLs by mouth 2 times daily  3    ipratropium-albuterol Cleansed with saline 09/13/21 1546   Dressing/Treatment Alginate with Ag;Dry dressing 09/13/21 1546   Offloading for Diabetic Foot Ulcers Other (comment) 09/13/21 1546   Wound Length (cm) 0.3 cm 10/11/21 1338   Wound Width (cm) 0.3 cm 10/11/21 1338   Wound Depth (cm) 0.1 cm 10/11/21 1338   Wound Surface Area (cm^2) 0.09 cm^2 10/11/21 1338   Change in Wound Size % (l*w) 55 10/11/21 1338   Wound Volume (cm^3) 0.009 cm^3 10/11/21 1338   Wound Healing % 78 10/11/21 1338   Post-Procedure Length (cm) 0.5 cm 09/27/21 1506   Post-Procedure Width (cm) 0.3 cm 09/27/21 1506   Post-Procedure Depth (cm) 0.2 cm 09/27/21 1506   Post-Procedure Surface Area (cm^2) 0.15 cm^2 09/27/21 1506   Post-Procedure Volume (cm^3) 0.03 cm^3 09/27/21 1506   Wound Assessment Fibrin;Pink/red 10/11/21 1338   Drainage Amount Small 10/11/21 1338   Drainage Description Serosanguinous 10/11/21 1338   Odor None 10/11/21 1338   Fabby-wound Assessment Blanchable erythema 10/11/21 1338   Number of days: 27       Wound 09/13/21 Foot Right;Plantar #2 (Active)   Wound Image   09/13/21 1459   Wound Etiology Pressure Stage  3 09/13/21 1459   Dressing Status New dressing applied 09/13/21 1546   Wound Cleansed Cleansed with saline 09/13/21 1546   Dressing/Treatment Alginate with Ag;Dry dressing 09/13/21 1546   Offloading for Diabetic Foot Ulcers Other (comment) 09/13/21 1546   Wound Length (cm) 0.1 cm 10/11/21 1338   Wound Width (cm) 0.1 cm 10/11/21 1338   Wound Depth (cm) 0.6 cm 10/11/21 1338   Wound Surface Area (cm^2) 0.01 cm^2 10/11/21 1338   Change in Wound Size % (l*w) 98.33 10/11/21 1338   Wound Volume (cm^3) 0.006 cm^3 10/11/21 1338   Wound Healing % 97 10/11/21 1338   Post-Procedure Length (cm) 1 cm 09/27/21 1506   Post-Procedure Width (cm) 0.7 cm 09/27/21 1506   Post-Procedure Depth (cm) 0.3 cm 09/27/21 1506   Post-Procedure Surface Area (cm^2) 0.7 cm^2 09/27/21 1506   Post-Procedure Volume (cm^3) 0.21 cm^3 09/27/21 1506   Wound Assessment Fibrin 10/11/21 1338   Drainage Amount Small 10/11/21 1338   Drainage Description Yellow 10/11/21 1338   Odor None 10/11/21 1338   Fabby-wound Assessment Maceration 10/11/21 1338   Number of days: 27       Wound 09/13/21 Pretibial Left #3 (Active)   Wound Image   09/27/21 1451   Wound Etiology Traumatic 09/13/21 1459   Dressing Status New dressing applied 09/13/21 1546   Wound Cleansed Cleansed with saline 09/13/21 1546   Dressing/Treatment Alginate with Ag;Dry dressing 09/13/21 1546   Offloading for Diabetic Foot Ulcers Other (comment) 09/13/21 1546   Wound Length (cm) 0 cm 09/27/21 1451   Wound Width (cm) 0 cm 09/27/21 1451   Wound Depth (cm) 0 cm 09/27/21 1451   Wound Surface Area (cm^2) 0 cm^2 09/27/21 1451   Change in Wound Size % (l*w) 100 09/27/21 1451   Wound Volume (cm^3) 0 cm^3 09/27/21 1451   Wound Healing % 100 09/27/21 1451   Wound Assessment Epithelialization 09/27/21 1451   Drainage Amount None 09/27/21 1451   Drainage Description Serosanguinous 09/13/21 1459   Odor None 09/27/21 1451   Fabby-wound Assessment Intact 09/27/21 1451   Number of days: 27       Procedure Note  Indications:  Based on my examination of this patient's wound(s)/ulcer(s) today, debridement is required to promote healing and evaluate the wound base.     Performed by: Kevin Amado DPM     Consent obtained: Yes     Time out taken:   Yes     Pain Control: Anesthetic  Anesthetic: 4% Lidocaine Liquid Topical      Debridement:Excisional Debridement     Using curette the wound(s)/ulcer(s) was/were sharply debrided down through and including the removal of subcutaneous tissue.         Devitalized Tissue Debrided:  fibrin, biofilm, slough and necrotic/eschar to stimulate bleeding to promote healing, post debridement good bleeding base and wound edges noted     Wound/Ulcer #: 1 and 2     Percent of Wound/Ulcer Debrided: 100%     Total Surface Area Debrided:  0.1 sq cm      Estimated Blood Loss:  Minimal  Hemostasis Achieved:  by pressure     Procedural Pain:  0  / 10   Post Procedural Pain:  0 / 10      Response to treatment:  Well tolerated by patient. Plan:   Treatment Note please see attached Discharge Instructions    Written patient dismissal instructions given to patient and signed by patient or POA. Discharge Instructions       Visit Discharge/Physician Orders     Discharge condition: Stable     Assessment of pain at discharge:minimal     Anesthetic used: 4% lidocaine     Discharge to: Home     Left via:Private automobile     Accompanied by: accompanied by caregiver     [facility]: [facility]     Dressing Orders:LEFT LEG AND RIGHT FOOT ULCERS CLEANSE WITH NORMAL SALINE APPLY ALGINATE AND DRY DRESSING DAILY.     Treatment Orders:  KEEP PRESSURE OFF WOUNDS     EAT FOODS HIGH IN PROTEIN AND VITAMIN C     MULTIVITAMIN DAILY  [facility] followup visit _____2 WEEK________________________  (Please note your next appointment above and if you are unable to keep, kindly give a 24 hour notice.  Thank you.)     Physician signature:__________________________        If you experience any of the following, please call the [facility] during business hours:     * Increase in Pain  * Temperature over 101  * Increase in drainage from your wound  * Drainage with a foul odor  * Bleeding  * Increase in swelling  * Need for compression bandage changes due to slippage, breakthrough drainage.     If you need medical attention outside of the business hours of the [facility] please contact your PCP or go to the nearest emergency room.                                                     Electronically signed by Belen Johns DPM on 10/11/2021 at 2:02 PM

## 2021-10-22 NOTE — ED PROVIDER NOTES
[facility] ENCOUNTER      Pt Name: Juan Alberto Piper  MRN: [id number]  [place] 1950  Date of evaluation: 10/22/2021      CHIEF COMPLAINT       Chief Complaint   Patient presents with    Fever        HPI  Juan Alberto Piper is a 73 y.o. male with history of MR, Parkinson's, who presents to the emergency department with apparent fever from nursing facility. Per paperwork patient with fever of 102 °F at nursing facility. Parkinson's nonverbal at baseline secondary to MR. He has chronic feeding tube. Patient did secondary to patient nonverbal.        Review of Systems   Unable to perform ROS: Patient nonverbal        Physical Exam  Vitals and nursing note reviewed. Constitutional:       General: He is not in acute distress. Appearance: He is well-developed. Comments: Chronically ill-appearing. HENT:      Head: Normocephalic and atraumatic. Mouth/Throat:      Mouth: Mucous membranes are dry. Pharynx: No oropharyngeal exudate. Eyes:      General: No scleral icterus. Pupils: Pupils are equal, round, and reactive to light. Cardiovascular:      Rate and Rhythm: Regular rhythm. Tachycardia present. Heart sounds: Normal heart sounds. No murmur heard. Comments: 2+ radial and dorsal pedis pulses bilaterally  Pulmonary:      Effort: Pulmonary effort is normal. No respiratory distress. Breath sounds: Normal breath sounds. No wheezing. Abdominal:      Palpations: Abdomen is soft. Tenderness: There is no abdominal tenderness. There is no guarding or rebound. Musculoskeletal:         General: No tenderness or deformity. Cervical back: Normal range of motion and neck supple. Right lower leg: No edema. Left lower leg: No edema. Comments: Chronic contractures to upper and lower extremities. Skin:     General: Skin is warm and dry.       Capillary Refill: Capillary refill takes less than 2 seconds. Findings: No rash. Neurological:      Mental Status: He is alert. Motor: No abnormal muscle tone. Comments: Patient moving all extremities. Eyes open. Nonverbal at baseline. Pupils equal round reactive. Psychiatric:         Mood and Affect: Mood normal.         Behavior: Behavior normal.          Procedures     MDM     This is a 80-year-old male with a history of severe autism nonverbal at baseline who presents from facility with fever. In the emergency department patient mildly tachycardic. At baseline per staff in the room. Nonverbal.  Moving all extremities. Patient febrile in the ED. Ministered Tylenol. Tachycardia improved after normal saline IV. Metabolic panel notable for mild hyponatremia. Renal function at baseline. Concern for sepsis. Extensive work-up by waiting for possible source of sepsis. Covid was negative. CT imaging of the chest abdomen pelvis showed no acute intrathoracic pathology no signs of pneumonia. CT of the abdomen shows mildly distended gallbladder similar to previous CT imaging. Ultrasound was obtained that showed mildly dilated CBD though cannot for age likely normal.  RFA sent. Blood cultures x2 obtained. Given sepsis unknown etiology Zosyn started. Patient discussed with Dr. Liza Thomas who accepts patient for further evaluation.              --------------------------------------------- PAST HISTORY ---------------------------------------------  Past Medical History:  has a past medical history of Acquired deformity of neck, Autism spectrum, Bilateral cataracts, Blepharochalasis, Cardiomegaly, Cataract of both eyes, Developmental delay, Gastritis, Hyperlipidemia, Kyphosis of thoracic region, Mental retardation, Obsessive compulsive disorder, Parkinsonism ([address] Seizures ([address] and Tourette disorder.     Past Surgical History:  has a past surgical history that includes Toe amputation (Left) and Upper gastrointestinal endoscopy (N/A, 3/2/2021). Social History:  reports that he has never smoked. He has never used smokeless tobacco. He reports that he does not drink alcohol and does not use drugs. Family History: family history is not on file. The patients home medications have been reviewed. Allergies: Patient has no known allergies.     -------------------------------------------------- RESULTS -------------------------------------------------    LABS:  Results for orders placed or performed during the hospital encounter of 10/22/21   COVID-19, Rapid    Specimen: Nasopharyngeal Swab   Result Value Ref Range    SARS-CoV-2, NAAT Not Detected Not Detected   RAPID INFLUENZA A/B ANTIGENS    Specimen: Nasopharyngeal   Result Value Ref Range    Influenza A by PCR Not Detected Not Detected    Influenza B by PCR Not Detected Not Detected   CBC Auto Differential   Result Value Ref Range    WBC 7.9 4.5 - 11.5 E9/L    RBC 4.81 3.80 - 5.80 E12/L    Hemoglobin 15.4 12.5 - 16.5 g/dL    Hematocrit 48.9 37.0 - 54.0 %    .7 (H) 80.0 - 99.9 fL    MCH 32.0 26.0 - 35.0 pg    MCHC 31.5 (L) 32.0 - 34.5 %    RDW 12.1 11.5 - 15.0 fL    Platelets 862 221 - 641 E9/L    MPV 9.8 7.0 - 12.0 fL    Neutrophils % 62.8 43.0 - 80.0 %    Immature Granulocytes % 0.1 0.0 - 5.0 %    Lymphocytes % 25.5 20.0 - 42.0 %    Monocytes % 11.0 2.0 - 12.0 %    Eosinophils % 0.1 0.0 - 6.0 %    Basophils % 0.5 0.0 - 2.0 %    Neutrophils Absolute 4.98 1.80 - 7.30 E9/L    Immature Granulocytes # 0.01 E9/L    Lymphocytes Absolute 2.02 1.50 - 4.00 E9/L    Monocytes Absolute 0.87 0.10 - 0.95 E9/L    Eosinophils Absolute 0.01 (L) 0.05 - 0.50 E9/L    Basophils Absolute 0.04 0.00 - 0.20 E9/L   Comprehensive Metabolic Panel w/ Reflex to MG   Result Value Ref Range    Sodium 153 (H) 132 - 146 mmol/L    Potassium reflex Magnesium 4.3 3.5 - 5.0 mmol/L    Chloride 113 (H) 98 - 107 mmol/L    CO2 29 22 - 29 mmol/L    Anion Gap 11 7 - 16 mmol/L    Glucose 103 (H) 74 - 99 mg/dL    BUN 44 (H) Collected      Pt Temp 37.0 C     ID 5100     Lab [id number]     Critical(s) Notified . No Critical Values    POCT Glucose   Result Value Ref Range    Meter Glucose 97 74 - 99 mg/dL   EKG 12 Lead   Result Value Ref Range    Ventricular Rate 84 BPM    Atrial Rate 84 BPM    P-R Interval 120 ms    QRS Duration 72 ms    Q-T Interval 358 ms    QTc Calculation (Bazett) 423 ms    P Axis 33 degrees    R Axis 54 degrees    T Axis 47 degrees       RADIOLOGY:  US GALLBLADDER RUQ   Final Result   Cholelithiasis. Mildly dilated CBD of 7.6 mm. CTA PULMONARY W CONTRAST   Final Result   No evidence of pulmonary embolism or acute pulmonary abnormality. Marked elevation of the right hemidiaphragm with compressive atelectasis at   the right lung base. Large hiatal hernia. CT ABDOMEN PELVIS W IV CONTRAST Additional Contrast? None   Final Result   1. Limited due to inability to optimally position the patient. 2. Distended gallbladder containing a large gallstone. 3. Large amount of stool throughout the colon with large amount of stool   within the rectum and sigmoid colon. 4. Generalized thickened appearance of urinary bladder wall may be related to   nondistention versus cystitis. XR CHEST PORTABLE   Final Result   1. Limited due to suboptimal positioning. 2. Opacities in right lower lung field could indicate atelectasis or   pneumonia. Short-term follow-up could be helpful for further evaluation. EKG:  This EKG is signed and interpreted by me. Rate: 84bpm  Rhythm: sinus  Interpretation: normal axis. No st segment elevation nor depression. Comparison: stable as compared to patient's most recent EKG      ------------------------- NURSING NOTES AND VITALS REVIEWED ---------------------------  Date / Time Roomed:  10/22/2021  3:35 PM  ED Bed Assignment:  25/25    The nursing notes within the ED encounter and vital signs as below have been reviewed.      Patient Vitals for the past 24 hrs:   BP Temp Temp src Pulse Resp SpO2 Height Weight   10/22/21 2038 102/66 -- -- 83 16 92 % -- --   10/22/21 1915 -- 100.8 °F (38.2 °C) Axillary -- -- -- -- --   10/22/21 1547 106/70 98.2 °F (36.8 °C) Axillary 130 16 92 % 5' 4\" (1.626 m) 110 lb (49.9 kg)   10/22/21 1546 106/70 98.2 °F (36.8 °C) Axillary 119 16 91 % -- --       Oxygen Saturation Interpretation: Normal    ------------------------------------------ PROGRESS NOTES ------------------------------------------    Counseling:  I have spoken with the patient and discussed todays results, in addition to providing specific details for the plan of care and counseling regarding the diagnosis and prognosis. Their questions are answered at this time and they are agreeable with the plan of admission.    --------------------------------- ADDITIONAL PROVIDER NOTES ---------------------------------  Consultations:  Spoke with Dr. Trixie Phan. Discussed case. They will admit the patient. This patient's ED course included: a personal history and physicial examination, re-evaluation prior to disposition, multiple bedside re-evaluations, IV medications, cardiac monitoring and continuous pulse oximetry    This patient has remained hemodynamically stable during their ED course. Diagnosis:  1. Sepsis, due to unspecified organism, unspecified whether acute organ dysfunction present ([facility] [address]    2. Hypernatremia        Disposition:  Patient's disposition: Admit to telemetry  Patient's condition is stable.          Sharri Allen DO  Resident  10/22/21 1955

## 2021-10-22 NOTE — ED NOTES
Patient's caregiver, Aliyah Alba, is here for patient [facility]  Alla Allencori, [facility]  10/22/21 5066

## 2021-10-22 NOTE — ED NOTES
Bed: 25  Expected date:   Expected time:   Means of arrival:   Comments:  EMS     Kelin Keller RN  10/22/21 7486

## 2021-10-23 NOTE — CONSULTS
Department of Internal Medicine  Nephrology Attending Consult Note      Reason for Consult:  Hypernatremia  Requesting Physician:  Dr. Jeremy Lugo:  Fever    History Obtained From:  electronic medical record    HISTORY OF PRESENT ILLNESS:  Briefly, Mr. Lorena Montes De Oca is a 73year old male with a PMH of developmental delay, MR, seizure disorder, tourette's disorder, Parkinson's, who was admitted on October 22, 2021 after presenting to the ER from nursing facility with a fever of 102. A chest x-ray was obtained which revealed opacities in right lower lung. He had a negative PCR and Covid-19 test. Upon arrival to ER, his labs were significant for sodium 153, which is the reason for this consultation.      Past Medical History:        Diagnosis Date    Acquired deformity of neck     Autism spectrum     Bilateral cataracts     Blepharochalasis     Cardiomegaly     Cataract of both eyes     Developmental delay     Gastritis     Hyperlipidemia     Kyphosis of thoracic region     Mental retardation     SEVERE MR  NON VERBAL, NEEDS WHEELCHAIR FOR LONG DISTANCE    Obsessive compulsive disorder     Parkinsonism (HCC)     Seizures (HCC)     Tourette disorder      Past Surgical History:        Procedure Laterality Date    TOE AMPUTATION Left     2nd/3rd    UPPER GASTROINTESTINAL ENDOSCOPY N/A 3/2/2021    PEG TUBE INSERTION performed by Ismael Gonzalez MD at [facility] ENDOSCOPY     Current Medications:    Current Facility-Administered Medications: 0.45 % sodium chloride infusion, , IntraVENous, Continuous  sodium chloride flush 0.9 % injection 10 mL, 10 mL, IntraVENous, 2 times per day  sodium chloride flush 0.9 % injection 10 mL, 10 mL, IntraVENous, PRN  0.9 % sodium chloride infusion, 25 mL, IntraVENous, PRN  potassium chloride (KLOR-CON M) extended release tablet 40 mEq, 40 mEq, Oral, PRN **OR** potassium bicarb-citric acid (EFFER-K) effervescent tablet 40 mEq, 40 mEq, Oral, PRN **OR** potassium chloride 10 mEq/100 mL IVPB (Peripheral Line), 10 mEq, IntraVENous, PRN  enoxaparin (LOVENOX) injection 40 mg, 40 mg, SubCUTAneous, Daily  ondansetron (ZOFRAN-ODT) disintegrating tablet 4 mg, 4 mg, Oral, Q8H PRN **OR** ondansetron (ZOFRAN) injection 4 mg, 4 mg, IntraVENous, Q6H PRN  senna (SENOKOT) tablet 8.6 mg, 1 tablet, Oral, Daily PRN  acetaminophen (TYLENOL) tablet 650 mg, 650 mg, Oral, Q6H PRN **OR** acetaminophen (TYLENOL) suppository 650 mg, 650 mg, Rectal, Q6H PRN  0.9 % sodium chloride infusion, , IntraVENous, Continuous  benztropine (COGENTIN) tablet 1 mg, 1 mg, Oral, BID  carBAMazepine (TEGRETOL) tablet 200 mg, 200 mg, Oral, BID  carbidopa-levodopa (SINEMET)  MG per tablet 1 tablet, 1 tablet, PEG Tube, 4x Daily  ipratropium-albuterol (DUONEB) nebulizer solution 3 mL, 1 vial, Inhalation, Q4H PRN  levETIRAcetam (KEPPRA) 100 MG/ML solution 500 mg, 500 mg, Oral, BID  pantoprazole (PROTONIX) tablet 40 mg, 40 mg, Oral, QAM AC  Allergies:  Patient has no known allergies. Social History:    TOBACCO:   reports that he has never smoked. He has never used smokeless tobacco.  ETOH:   reports no history of alcohol use. Family History:   History reviewed. No pertinent family history. REVIEW OF SYSTEMS:    Review of systems not obtained due to patient factors - mental status  PHYSICAL EXAM:      Vitals:    VITALS:  /62   Pulse 99   Temp 100.8 °F (38.2 °C) (Axillary)   Resp 15   Ht 5' 4\" (1.626 m)   Wt 110 lb (49.9 kg)   SpO2 98%   BMI 18.88 kg/m²   24HR INTAKE/OUTPUT:    Intake/Output Summary (Last 24 hours) at 10/23/2021 1157  Last data filed at 10/23/2021 0016  Gross per 24 hour   Intake 1321.67 ml   Output --   Net 1321.67 ml     I have seen and examined him in the ER  Constitutional:  Nonverbal  HEENT:  Normocephalic, PERRL  Respiratory:  CTA bilaterally  Cardiovascular/Edema:   Tachycardia, S1,S2  Gastrointestinal:  Soft, flat, nondistended  Neurologic:  Contracted BUE and BLE  Skin:  Warm, dry  Other:  No edema    DATA:    CBC:   Lab Results   Component Value Date    WBC 6.7 10/23/2021    RBC 4.50 10/23/2021    HGB 14.5 10/23/2021    HCT 46.3 10/23/2021    .9 10/23/2021    MCH 32.2 10/23/2021    MCHC 31.3 10/23/2021    RDW 11.9 10/23/2021     10/23/2021    MPV 10.0 10/23/2021     CMP:    Lab Results   Component Value Date     10/23/2021    K 4.1 10/23/2021     10/23/2021    CO2 28 10/23/2021    BUN 36 10/23/2021    CREATININE 0.9 10/23/2021    GFRAA >60 10/23/2021    LABGLOM >60 10/23/2021    GLUCOSE 91 10/23/2021    GLUCOSE 71 05/18/2012    PROT 7.5 10/23/2021    LABALBU 3.8 10/23/2021    LABALBU 4.5 05/18/2012    CALCIUM 9.1 10/23/2021    BILITOT 0.7 10/23/2021    ALKPHOS 93 10/23/2021    AST 29 10/23/2021    ALT 26 10/23/2021     Magnesium:    Lab Results   Component Value Date    MG 2.2 03/01/2021     Phosphorus:    Lab Results   Component Value Date    PHOS 6.4 01/29/2021     Radiology Review:      CTA of the chest 10/22/21   No evidence of pulmonary embolism or acute pulmonary abnormality.       Marked elevation of the right hemidiaphragm with compressive atelectasis at   the right lung base.       Large hiatal hernia. CT abdomen and pelvis with IV contrast 10/22/21   1. Limited due to inability to optimally position the patient. 2. Distended gallbladder containing a large gallstone. 3. Large amount of stool throughout the colon with large amount of stool   within the rectum and sigmoid colon. 4. Generalized thickened appearance of urinary bladder wall may be related to   nondistention versus cystitis. CXR 10/22/21   1. Limited due to suboptimal positioning. 2. Opacities in right lower lung field could indicate atelectasis or   pneumonia.  Short-term follow-up could be helpful for further evaluation. Results for Myla Aleena (MRN [id number]) as of 10/23/2021 15:29   Ref.  Range 10/22/2021 20:36   Osmolality, Ur Latest Ref Range: 300 - 900 mOsm/kg 1133 (H)   Sodium, Ur Latest Ref Range: Not Established mmol/L 43       IMPRESSION/RECOMMENDATIONS:      Briefly, Mr. Ralph Shultz is a 73year old male with a PMH of developmental delay, MR, seizure disorder, tourette's disorder, Parkinson's, who was admitted on October 22, 2021 after presenting to the ER from nursing facility with a fever of 102. A chest x-ray was obtained which revealed opacities in right lower lung. He had a negative PCR and Covid-19 test. Upon arrival to ER, his labs were significant for sodium 153, which is the reason for this consultation. 1. Hypernatremia, secondary to lack of free water administration. Free water deficit 3.8 L.   2. Seizure disorder, on carbamazepine and levetiracetam  3. Parkinson's, on carbidope-levodopa  4. Fever, received piperacllin-tazobactam in ER, ID consulted and are monitoring off of antibiotics   5. Fecal impaction  6. Cholelithiasis  7. Nutrition, NPO    Plan:    · Urine electrolytes noted, Urine osm is close to 1200 osm, urine is maximally concentrated ruling out DI  · Obtained urine electrolytes, urine creatinine and repeat osm in AM  · Start 1/2 NS at 125 ml/hr  · Obtain sodium q 6 hours, nurse to call if less than 148 or greater than 155  · Check serum phos      Thank you so much Dr. Lender Councilman for allowing us to participate in the care of Mr. Ralph Shultz.      Raquel Figueredo MD

## 2021-10-23 NOTE — H&P
Internal Medicine History & Physical     Name: Benji Goel  : 1950  Chief Complaint: Fever  Primary Care Physician: Belkis Dumont MD  Admission date: 10/22/2021  Date of service: 10/23/2021     History of Present Illness    Chuck Tellez is a 73y.o. year old male who presents with fever of 102F from nursing facility. He is seen laying in bed awake. There are no family or friends or caregiver at bedside. The history is provided by chart review. He is seen laying in bed awake. He has history of MR and Parkinson's. He is alert and does track me with his eyes but does not attempt to verbalize. He does have significant body tremors. He is currently on IV fluids and Zosyn. Nursing reports no other issues or concerns. ED course:   Initial blood work and imaging studies performed. Admission recommended by ED physician. Case was discussed with ED provider.  Meds in ED consisted of the following:  Medications   sodium chloride flush 0.9 % injection 10 mL (10 mLs IntraVENous Given 10/22/21 0516)   sodium chloride flush 0.9 % injection 10 mL (has no administration in time range)   0.9 % sodium chloride infusion (has no administration in time range)   potassium chloride (KLOR-CON M) extended release tablet 40 mEq (has no administration in time range)     Or   potassium bicarb-citric acid (EFFER-K) effervescent tablet 40 mEq (has no administration in time range)     Or   potassium chloride 10 mEq/100 mL IVPB (Peripheral Line) (has no administration in time range)   enoxaparin (LOVENOX) injection 40 mg (40 mg SubCUTAneous Given 10/23/21 0906)   ondansetron (ZOFRAN-ODT) disintegrating tablet 4 mg (has no administration in time range)     Or   ondansetron (ZOFRAN) injection 4 mg (has no administration in time range)   senna (SENOKOT) tablet 8.6 mg (has no administration in time range)   acetaminophen (TYLENOL) tablet 650 mg (has no administration in time range)     Or   acetaminophen (TYLENOL) suppository 650 mg (has no administration in time range)   0.9 % sodium chloride infusion ( IntraVENous New Bag 10/22/21 2342)   benztropine (COGENTIN) tablet 1 mg (1 mg Oral Given 10/23/21 0906)   carBAMazepine (TEGRETOL) tablet 200 mg (200 mg Oral Given 10/23/21 0906)   carbidopa-levodopa (SINEMET)  MG per tablet 1 tablet (1 tablet PEG Tube Given 10/23/21 0906)   ipratropium-albuterol (DUONEB) nebulizer solution 3 mL (has no administration in time range)   levETIRAcetam (KEPPRA) 100 MG/ML solution 500 mg (500 mg Oral Given 10/23/21 0906)   pantoprazole (PROTONIX) tablet 40 mg (40 mg Oral Given 10/23/21 0906)   0.9 % sodium chloride bolus (0 mLs IntraVENous Stopped 10/22/21 1725)   acetaminophen (TYLENOL) 160 MG/5ML solution 650 mg (650 mg Per G Tube Given 10/22/21 1835)   LORazepam (ATIVAN) injection 1 mg (1 mg IntraVENous Given 10/22/21 1803)   iopamidol (ISOVUE-370) 76 % injection 80 mL (80 mLs IntraVENous Given 10/22/21 1906)   piperacillin-tazobactam (ZOSYN) 3,375 mg in dextrose 5 % 50 mL IVPB (mini-bag) (0 mg IntraVENous Stopped 10/23/21 0016)   LORazepam (ATIVAN) injection 1 mg (1 mg IntraVENous Given 10/23/21 0140)       Past Medical History:   Diagnosis Date    Acquired deformity of neck     Autism spectrum     Bilateral cataracts     Blepharochalasis     Cardiomegaly     Cataract of both eyes     Developmental delay     Gastritis     Hyperlipidemia     Kyphosis of thoracic region     Mental retardation     SEVERE MR  NON VERBAL, NEEDS WHEELCHAIR FOR LONG DISTANCE    Obsessive compulsive disorder     Parkinsonism ([facility]     Seizures ([facility]     Tourette disorder        Past Surgical History:   Procedure Laterality Date    TOE AMPUTATION Left     2nd/3rd    UPPER GASTROINTESTINAL ENDOSCOPY N/A 3/2/2021    PEG TUBE INSERTION performed by Abbey Fraire MD at [address] [place] [address]       History reviewed. No pertinent family history.     Social History  Patient lives at nursing facility   Employment: does not work  Illicit drug use- denies  TOBACCO:   reports that he has never smoked. He has never used smokeless tobacco.  ETOH:   reports no history of alcohol use. Home Medications  Prior to Admission medications    Medication Sig Start Date End Date Taking? Authorizing Provider   Ergocalciferol (DRISDOL) 200 MCG/ML drops Take 8,000 Units by mouth daily 6.25 ml 50,000 IU on the 14th of every malka(8000IU/ml)    Historical Provider, MD   folic acid (FOLVITE) 1 MG tablet Take 1 mg by mouth daily    Historical Provider, MD   Multiple Vitamin (MULTIVITAMIN ADULT PO) Take by mouth daily    Historical Provider, MD   calcium carbonate (TUMS) 500 MG chewable tablet Take 1 tablet by mouth 2 times daily    Historical Provider, MD   Saccharomyces boulardii (PROBIOTIC) 250 MG CAPS Take by mouth 2 times daily    Historical Provider, MD   carbidopa-levodopa (SINEMET)  MG per tablet 2 tablets by PEG Tube route every 4 hours Ok for 2am dose 5/4/21   MARIJA Prince - CNS   omeprazole (PRILOSEC) 20 MG delayed release capsule 20 mg daily *PER GATEWAY NURSE(JOSE CRUZ) VIA PEG TUBE*    Historical Provider, MD   levETIRAcetam (KEPPRA) 100 MG/ML solution Take 5 mLs by mouth 2 times daily 3/4/21   Tyra Christian MD   ipratropium-albuterol (DUONEB) 0.5-2.5 (3) MG/3ML SOLN nebulizer solution Inhale 1 vial into the lungs every 4 hours as needed for Shortness of Breath    Historical Provider, MD   carBAMazepine (TEGRETOL) 200 MG tablet Take 200 mg by mouth 2 times daily  6/5/15   Historical Provider, MD   acetaminophen (TYLENOL) 325 MG tablet Take 650 mg by mouth every 4 hours as needed for Pain or Fever     Historical Provider, MD   benztropine (COGENTIN) 1 MG tablet Take 1 mg by mouth 2 times daily     Historical Provider, MD       Allergies  No Known Allergies    Review of Systems  Please see HPI above. All bolded are positive. All un-bolded are negative.   Constitutional Symptoms: fever, chills, fatigue, generalized weakness, diaphoresis, increase in thirst, loss of appetite  Eyes: vision change   Ears, Nose, Mouth, Throat: hearing loss, nasal congestion, sores in the mouth  Cardiovascular: chest pain, chest heaviness, palpitations  Respiratory: shortness of breath, wheezing, coughing  Gastrointestinal: abdominal pain, nausea, vomiting, diarrhea, constipation, melena, hematochezia, hematemesis  Genitourinary: dysuria, hematuria, increase in frequency  Musculoskeletal: lower extremity edema, myalgias, arthralgias, back pain  Integumentary: rashes, itching   Neurological: headache, lightheadedness, dizziness, confusion, syncope, numbness, tingling, focal weakness  Psychiatric: depression, suicidal ideation, anxiety  Endocrine: unintentional weight change  Hematologic/Lymphatic: lymphadenopathy, easy bruising, easy bleeding   Allergic/Immunologic: recurrent infections      Objective  VITALS:  /62   Pulse 99   Temp 100.8 °F (38.2 °C) (Axillary)   Resp 15   Ht 5' 4\" (1.626 m)   Wt 110 lb (49.9 kg)   SpO2 98%   BMI 18.88 kg/m²     Physical Exam:  General: awake, alert, does not attempt to verbalize, no acute distress, pleasant, appropriate mood  Eyes: conjunctivae/corneas clear, sclera non icteric, EOMI  Ears: no obvious scars, no lesions, no masses, hearing intact  Mouth: mucous membranes dry, no obvious oral sores  Head: normocephalic, atraumatic  Neck: no JVD, no adenopathy, no thyromegaly, neck is supple, trachea is midline  Back: ROM normal, no CVA tenderness.   Chest: no pain on palpation  Lungs: clear to auscultation bilaterally, without rhonchi, crackle, wheezing, or rale, no retractions or use of accessory muscles  Heart: regular rate and regular rhythm, no murmur, normal S1, S2  Abdomen: soft, non-tender, peg tube intact with tape at the end; bowel sounds normal; no masses, no organomegaly  Extremities: no lower extremity edema, legs contracted  Skin: normal color, normal texture, normal turgor, no rashes, no lesions  Neurologic: body tremors    Labs-   Lab Results   Component Value Date    WBC 6.7 10/23/2021    HGB 14.5 10/23/2021    HCT 46.3 10/23/2021     10/23/2021     (H) 10/23/2021    K 4.1 10/23/2021     (H) 10/23/2021    CREATININE 0.9 10/23/2021    BUN 36 (H) 10/23/2021    CO2 28 10/23/2021    GLUCOSE 91 10/23/2021    ALT 26 10/23/2021    AST 29 10/23/2021    INR 1.0 10/28/2020     Lab Results   Component Value Date    CKTOTAL 516 (H) 02/28/2021    CKMB 4.0 02/27/2021    TROPONINI <0.01 03/05/2021         Recent Radiological Studies:  US GALLBLADDER RUQ   Final Result   Cholelithiasis. Mildly dilated CBD of 7.6 mm. CTA PULMONARY W CONTRAST   Final Result   No evidence of pulmonary embolism or acute pulmonary abnormality. Marked elevation of the right hemidiaphragm with compressive atelectasis at   the right lung base. Large hiatal hernia. CT ABDOMEN PELVIS W IV CONTRAST Additional Contrast? None   Final Result   1. Limited due to inability to optimally position the patient. 2. Distended gallbladder containing a large gallstone. 3. Large amount of stool throughout the colon with large amount of stool   within the rectum and sigmoid colon. 4. Generalized thickened appearance of urinary bladder wall may be related to   nondistention versus cystitis. XR CHEST PORTABLE   Final Result   1. Limited due to suboptimal positioning. 2. Opacities in right lower lung field could indicate atelectasis or   pneumonia. Short-term follow-up could be helpful for further evaluation.              Assessment  Active Hospital Problems    Diagnosis     Sepsis [facility]) [A41.9]        Patient Active Problem List    Diagnosis Date Noted    Pressure injury of dorsum of right foot, stage 3 ([address] 09/13/2021    Severe protein-calorie malnutrition ([address] 03/02/2021    Lactic acidosis 02/24/2021    Abnormal urinalysis 02/24/2021    Constipation 02/24/2021    Autism spectrum disorder associated with neurodevelopmental, mental or behavioral disorder, requiring very substantial support (level 3) 02/24/2021    Hypoxia 02/23/2021    Parkinson disease (HCC)     Gram positive septicemia ([address] 01/29/2021    Sepsis ([address] 01/28/2021    Acute respiratory failure with hypoxia ([address] 10/29/2020    Hypoventilation syndrome 10/29/2020    Atelectasis 10/29/2020    Severe dehydration 10/28/2020    Encephalopathy     Urinary tract infection without hematuria     Fever, unspecified     Acute renal failure (HCC)     Thrombocytopenia (HCC)     Seizure disorder, status epilepticus, convulsive ([address] 03/31/2020    Seizures ([address] 03/30/2020    Status epilepticus ([address] 03/28/2020    Dehydration with hypernatremia 01/29/2020    Atherosclerosis of native artery of both lower extremities ([address] 07/10/2018    Pressure ulcer of toe of right foot, stage 3 ([address] 06/30/2016    Seizure ([address] 06/10/2013       Plan  · Sepsis of unknown origin: resp array negative. Blood cultures and urine culture pending. Continue IV fluids. ID consulted. · Hypernatremia: IV fluids. Check BMP in 4 hours. Urine sodium, osmolarity and Nephrology consult. · Right toe wounds: follows with podiatry outpt. · Continue home medications other then Cogentin. · PT/OT  · Follow labs  · DVT prophylaxis. · Please see orders for further management and care. ·  for discharge planning  · Discharge plan: MALI DUTTON-MOLLY 10/23/2021 10:13AM    Addendum: I have personally participated in a face-to-face history and physical exam on the date of service with the patient. I have discussed the case with the nurse practitioner. I also participated in medical decision making on the date of service and I agree with all of the pertinent clinical information unless indicated in my editing of the note. I have reviewed and edited the note above based on my findings during my history, exam, and decision making on the same day of service. My additional thoughts:    Check urine studies. Nephrology and ID to see. Kai vogel. John Hallman DO      Electronically signed by John Hallman DO on 10/23/2021 at 12:44 PM    I can be reached through Answering service.

## 2021-10-23 NOTE — CONSULTS
[address] [place] [address] [facility]  Consultation Note     Admit Date: 10/22/2021  3:35 PM    Reason for Consult: Fever of unknown origin. Attending Physician:  Lizzette Cordero DO    HISTORY OF PRESENT ILLNESS:             The history is obtained from extensive review of available past medical records. The patient is a 73 y.o. male who is known to the ID service. Patient has a history of MR, Flash. He has been seen by ID on a few occasions with dehydration. He presents to the ED had [facility] with fever. He was afebrile at presentation later had a low-grade fever 100.8 degrees F. He was normotensive but tachycardic. Sodium was 153. Creatinine was normal.  White count was normal.  Tested negative for rapid SARS-CoV-2 and influenza. He was treated with Zosyn. ID was asked to see him for fever of unknown origin. Respiratory panel was negative. Past Medical History:        Diagnosis Date    Acquired deformity of neck     Autism spectrum     Bilateral cataracts     Blepharochalasis     Cardiomegaly     Cataract of both eyes     Developmental delay     Gastritis     Hyperlipidemia     Kyphosis of thoracic region     Mental retardation     SEVERE MR  NON VERBAL, NEEDS WHEELCHAIR FOR LONG DISTANCE    Obsessive compulsive disorder     Parkinsonism (HCC)     Seizures (HCC)     Tourette disorder      February 2021. Admitted to [facility] with shortness of breath and oxygen desaturation. He was treated for aspiration pneumonia. Seen by Dr. Mackenzie Andrea. He was felt to be dehydrated and was observed off antibiotics. Blood cultures showed CoNS which were felt to be contaminants. IV fluids were managed by ID. He was discharged after 10 days. January 2021. Admitted to [facility] with hypoxia, fever, tachycardia. He had a high sodium and creatinine. White count was high. An RRT had been called. Treated with Vancomycin and Cefepime.   Seen by  [facility] for bacteremia with 3 different CoNS species. These were considered contaminant. Antibiotics were changed over to Zosyn. Zosyn was changed over to Augmentin and Levofloxacin upon discharge. June 2020. Admitted to [facility] because of unresponsiveness. He was found to have decubitus ulcers on coccyx and hip. Seen by ID. Noted to be dehydrated with hyponatremia. He was treated with Cefepime. Urine cultures grew Proteus mirabilis. Antibiotics were changed over to Ceftriaxone for UTI. This was changed over to Cefdinir. Treated treatment was completed during that stay. Past Surgical History:        Procedure Laterality Date    TOE AMPUTATION Left     2nd/3rd    UPPER GASTROINTESTINAL ENDOSCOPY N/A 3/2/2021    PEG TUBE INSERTION performed by Xi Finch MD at [facility] ENDOSCOPY     Current Medications:   Scheduled Meds:   sodium chloride flush  10 mL IntraVENous 2 times per day    enoxaparin  40 mg SubCUTAneous Daily    benztropine  1 mg Oral BID    carBAMazepine  200 mg Oral BID    carbidopa-levodopa  1 tablet PEG Tube 4x Daily    levETIRAcetam  500 mg Oral BID    pantoprazole  40 mg Oral QAM AC     Continuous Infusions:   sodium chloride      sodium chloride 100 mL/hr at 10/22/21 2342     PRN Meds:sodium chloride flush, sodium chloride, potassium chloride **OR** potassium alternative oral replacement **OR** potassium chloride, ondansetron **OR** ondansetron, senna, acetaminophen **OR** acetaminophen, ipratropium-albuterol    Allergies:  Patient has no known allergies.     Social History:   Social History     Socioeconomic History    Marital status: Single     Spouse name: None    Number of children: None    Years of education: None    Highest education level: None   Occupational History    None   Tobacco Use    Smoking status: Never Smoker    Smokeless tobacco: Never Used   Vaping Use    Vaping Use: Never used   Substance and Sexual Activity    Alcohol use: No    Drug use: No    Sexual activity: Not Currently   Other Topics Concern    None   Social History Narrative    None     Social Determinants of Health     Financial Resource Strain:     Difficulty of Paying Living Expenses:    Food Insecurity:     Worried About Running Out of Food in the Last Year:     [address] in the Last Year:    Transportation Needs:     Lack of Transportation (Medical):  Lack of Transportation (Non-Medical):    Physical Activity:     Days of Exercise per Week:     Minutes of Exercise per Session:    Stress:     Feeling of Stress :    Social Connections:     Frequency of Communication with Friends and Family:     Frequency of Social Gatherings with Friends and Family:     Attends Sabianist Services:     Active Member of Clubs or Organizations:     Attends Club or Organization Meetings:     Marital Status:    Intimate Partner Violence:     Fear of Current or Ex-Partner:     Emotionally Abused:     Physically Abused:     Sexually Abused:       Patient [facility] at [facility] [place] [facility] for better living    Family History:   History reviewed. No pertinent family history. . Otherwise non-pertinent to the chief complaint. REVIEW OF SYSTEMS:    A 10 point review of system could not be obtained from the patient. He is nonverbal and has MR.    PHYSICAL EXAM:    Vitals:   /62   Pulse 99   Temp 100.8 °F (38.2 °C) (Axillary)   Resp 15   Ht 5' 4\" (1.626 m)   Wt 110 lb (49.9 kg)   SpO2 98%   BMI 18.88 kg/m²   Constitutional: The patient is lying on a stretcher in the ED. He is emaciated and contracted. Lying on a stretcher full of urine. Skin: Warm and dry. No rashes were noted. HEENT: Eyes show round, and reactive pupils. No jaundice. Mouth could not be seen. Neck: He is rigid. Neck is not supple. Chest: No use of accessory muscles to breathe. Symmetrical expansion. Auscultation reveals no wheezing, crackles, or rhonchi. Cardiovascular: S1 and S2 are rhythmic and regular.  No murmurs appreciated. Abdomen: Positive bowel sounds to auscultation. Benign to palpation. PEG. Extremities: No edema. Muscle wasting. Contracted.   Lines: peripheral      CBC+dif:  Recent Labs     10/22/21  1612   WBC 7.9   HGB 15.4   HCT 48.9   .7*      NEUTROABS 4.98     Lab Results   Component Value Date    CRP 0.3 01/29/2021    CRP 4.2 (H) 06/14/2020    CRP 3.6 (H) 06/12/2020      No results found for: [facility]  Lab Results   Component Value Date    SEDRATE 6 01/29/2021    SEDRATE 43 (H) 06/14/2020    SEDRATE 14 06/12/2020     Lab Results   Component Value Date    ALT <5 10/22/2021    AST 41 (H) 10/22/2021    ALKPHOS 97 10/22/2021    BILITOT 0.6 10/22/2021     Lab Results   Component Value Date     10/22/2021    K 4.3 10/22/2021     10/22/2021    CO2 29 10/22/2021    BUN 44 10/22/2021    CREATININE 0.9 10/22/2021    GFRAA >60 10/22/2021    LABGLOM >60 10/22/2021    GLUCOSE 103 10/22/2021    GLUCOSE 71 05/18/2012    PROT 8.3 10/22/2021    LABALBU 3.9 10/22/2021    LABALBU 4.5 05/18/2012    CALCIUM 9.0 10/22/2021    BILITOT 0.6 10/22/2021    ALKPHOS 97 10/22/2021    AST 41 10/22/2021    ALT <5 10/22/2021       Lab Results   Component Value Date    PROTIME 10.9 10/28/2020    INR 1.0 10/28/2020       Lab Results   Component Value Date    TSH 2.91 05/05/2021       Lab Results   Component Value Date    COLORU Yellow 10/22/2021    PHUR 5.0 10/22/2021    WBCUA >20 03/05/2021    RBCUA 1-3 03/05/2021    RBCUA NONE 10/08/2012    MUCUS Present 02/23/2021    BACTERIA MANY 03/05/2021    CLARITYU Clear 10/22/2021    SPECGRAV 1.020 10/22/2021    LEUKOCYTESUR Negative 10/22/2021    UROBILINOGEN 0.2 10/22/2021    BILIRUBINUR Negative 10/22/2021    BILIRUBINUR NEGATIVE 09/18/2011    BLOODU Negative 10/22/2021    GLUCOSEU Negative 10/22/2021    GLUCOSEU NEGATIVE 09/18/2011    AMORPHOUS FEW 06/08/2020       Lab Results   Component Value Date    HCO3 27.5 10/22/2021    BE 2.6 10/22/2021    O2SAT 98.5 10/22/2021    PH 7.420 10/22/2021    PCO2 43.3 10/22/2021    PO2 124.8 10/22/2021     Radiology:  CT of chest, abdomen and pelvis and ultrasound reviewed    Microbiology:  Pending  No results for input(s): BC in the last 72 hours. No results for input(s): ORG in the last 72 hours. No results for input(s): [place] in the last 72 hours. No results for input(s): STREPNEUMAGU in the last 72 hours. No results for input(s): LP1UAG in the last 72 hours. No results for input(s): ASO in the last 72 hours. No results for input(s): CULTRESP in the last 72 hours. No results for input(s): PROCAL in the last 72 hours. Assessment:  · Hypernatremia  · Volume contraction  · Fever and obtundation secondary to the above  · Fecal impaction    Plan:    · I do not think antibiotics are warranted at this time  · Check cultures, baseline ESR, CRP  · Continue hydration  · Will follow with you    Thank you for having us see this patient in consultation. I will be discussing this case with the treating physicians.     Meghan Amaral MD  9:14 AM  10/23/2021

## 2021-10-23 NOTE — ED NOTES
1L soap suds enema given. Patient tolerated well. Small amount of watery BM returned. Bed sheets changed, patient repositioned with pillow support to left side, pericare provided. Will continue to monitor patient.      Enrique Eisenberg RN  10/23/21 5180

## 2021-10-23 NOTE — ED NOTES
Pts guardian present and states he still eats orally, pureed foods and nectar thick liquids.      Julio Stein RN  10/23/21 7959

## 2021-10-24 NOTE — PROGRESS NOTES
[address] [place] [address] [facility]  Progress Note    SUBJECTIVE:  Chief Complaint   Patient presents with    Fever     Patient is sitting up in bed, being fed lunch  Aid says that appetite is good & he is eating well  tmax 101.3 overnight  IV fluid infusing     Review of systems:  As stated above in the chief complaint, otherwise negative. Medications:  Scheduled Meds:   sodium chloride flush  10 mL IntraVENous 2 times per day    enoxaparin  40 mg SubCUTAneous Daily    [Held by provider] benztropine  1 mg Oral BID    carBAMazepine  200 mg Oral BID    carbidopa-levodopa  1 tablet PEG Tube 4x Daily    levETIRAcetam  500 mg Oral BID    pantoprazole  40 mg Oral QAM AC     Continuous Infusions:   sodium chloride 125 mL/hr at 10/23/21 193    sodium chloride       PRN Meds:sodium chloride flush, sodium chloride, potassium chloride **OR** potassium alternative oral replacement **OR** potassium chloride, ondansetron **OR** ondansetron, senna, acetaminophen **OR** acetaminophen, ipratropium-albuterol    OBJECTIVE:  BP (!) 143/84   Pulse 96   Temp 99.3 °F (37.4 °C) (Axillary)   Resp 18   Ht 5' 4\" (1.626 m)   Wt 111 lb 11.2 oz (50.7 kg)   SpO2 97%   BMI 19.17 kg/m²   Temp  Av.6 °F (38.1 °C)  Min: 99 °F (37.2 °C)  Max: 101.7 °F (38.7 °C)  Constitutional: The patient is awake, MR. Nonverbal. Contracted. Tremulous    Skin: Warm and dry. No rashes were noted. HEENT: Round and reactive pupils. Moist mucous membranes. No ulcerations or thrush. Neck: rigid. Chest: No respiratory distress. Symmetrical expansion. No wheezing, crackles or rhonchi. Cardiovascular: S1 and S2 are rhythmic and regular. No murmurs appreciated. Abdomen: Positive bowel sounds to auscultation. Benign to palpation. No masses felt. Extremities: No edema. Contracted.  Muscle wasting    Lines: peripheral    Laboratory and Tests Review:  Lab Results   Component Value Date    WBC 5.5 10/24/2021    WBC 6.7 10/23/2021    WBC 7.9 10/22/2021    HGB 12.9 10/24/2021    HCT 41.4 10/24/2021    .2 (H) 10/24/2021     10/24/2021     Lab Results   Component Value Date    NEUTROABS 3.25 10/24/2021    NEUTROABS 4.74 10/23/2021    NEUTROABS 4.98 10/22/2021     No results found for: [facility]  Lab Results   Component Value Date    ALT 5 10/24/2021    AST 28 10/24/2021    ALKPHOS 84 10/24/2021    BILITOT 0.7 10/24/2021     Lab Results   Component Value Date     10/24/2021    K 3.6 10/24/2021    K 3.9 10/24/2021     10/24/2021    CO2 25 10/24/2021    BUN 33 10/24/2021    CREATININE 0.7 10/24/2021    CREATININE 0.9 10/24/2021    CREATININE 0.8 10/23/2021    GFRAA >60 10/24/2021    LABGLOM >60 10/24/2021    GLUCOSE 96 10/24/2021    GLUCOSE 71 05/18/2012    PROT 6.5 10/24/2021    LABALBU 3.5 10/24/2021    LABALBU 4.5 05/18/2012    CALCIUM 8.2 10/24/2021    BILITOT 0.7 10/24/2021    ALKPHOS 84 10/24/2021    AST 28 10/24/2021    ALT 5 10/24/2021     Lab Results   Component Value Date    CRP 0.3 01/29/2021    CRP 4.2 (H) 06/14/2020    CRP 3.6 (H) 06/12/2020     Lab Results   Component Value Date    SEDRATE 6 01/29/2021    SEDRATE 43 (H) 06/14/2020    SEDRATE 14 06/12/2020     Radiology:  Noted     Microbiology:   Urine culture: negative so far  Blood cultures: negative so far  Respiratory Viral Panel: negative  Rapid COVID-19: negative  Rapid influenza: negative     ASSESSMENT:  · Hypernatremia  · Volume contraction  · Fever and obtundation secondary to the above  · Fecal impaction    PLAN:  · Continue to observe off antibiotics  · Monitor temps   · Cultures are negative so far   · Labs reviewed     MARIJA Boo CNP  12:18 PM  10/24/2021     Patient seen and examined. I had a face to face encounter with the patient. Agree with exam.  Assessment and plan as outlined above and directed by me. Addition and corrections were done as deemed appropriate. My exam and plan include: The patient is doing better.   He is improved with IV fluids. No ongoing infections.     Saadia Blanco MD  10/24/2021  1:58 PM

## 2021-10-24 NOTE — PROGRESS NOTES
Internal Medicine Progress Note    Patient's name: Junie Dupont  : 1950  Admission date: 10/22/2021  Date of service: 10/24/2021   Room: [facility]  Primary care physician: Javed Peres MD    Fina Carter was seen and examined at bedside. He is seen laying in bed awake. He does not appear to be in any distress or discomfort. He does start to shake more the longer I am with him. He is currently ion 5L O2 [place]. Nursing reports that he is on a pureed diet with nectar thick liquids at [facility] will resume per Dr. Izzy Figueroa. Review of Systems  There are no new complaints of chest pain, shortness of breath, abdominal pain, nausea, vomiting, diarrhea, constipation.     Hospital Medications  Current Facility-Administered Medications   Medication Dose Route Frequency Provider Last Rate Last Admin    0.45 % sodium chloride infusion   IntraVENous Continuous Leela Singh  mL/hr at 10/23/21 1931 Rate Verify at 10/23/21 1931    sodium chloride flush 0.9 % injection 10 mL  10 mL IntraVENous 2 times per day Amrcela E Durga, DO   10 mL at 10/24/21 0859    sodium chloride flush 0.9 % injection 10 mL  10 mL IntraVENous PRN Marcela E Durga, DO        0.9 % sodium chloride infusion  25 mL IntraVENous PRN Marcela E Durga, DO        potassium chloride (KLOR-CON M) extended release tablet 40 mEq  40 mEq Oral PRN Marcela E Durga, DO        Or    potassium bicarb-citric acid (EFFER-K) effervescent tablet 40 mEq  40 mEq Oral PRN Marcela E Durga, DO        Or    potassium chloride 10 mEq/100 mL IVPB (Peripheral Line)  10 mEq IntraVENous PRN Marcela E Durga, DO        enoxaparin (LOVENOX) injection 40 mg  40 mg SubCUTAneous Daily Marcela E Durga, DO   40 mg at 10/24/21 0859    ondansetron (ZOFRAN-ODT) disintegrating tablet 4 mg  4 mg Oral Q8H PRN Marcela E Durga, DO        Or    ondansetron (ZOFRAN) injection 4 mg  4 mg IntraVENous Q6H PRN Marcela E Durga, DO        senna (SENOKOT) tablet 8.6 mg  1 tablet Oral Daily PRN Marcela E Durga, DO        acetaminophen (TYLENOL) tablet 650 mg  650 mg Oral Q6H PRN Marcela E Durga, DO   650 mg at 10/24/21 0330    Or    acetaminophen (TYLENOL) suppository 650 mg  650 mg Rectal Q6H PRN Marcela E Durga, DO        [Held by provider] benztropine (COGENTIN) tablet 1 mg  1 mg Oral BID Marcela E Durga, DO   1 mg at 10/23/21 0906    carBAMazepine (TEGRETOL) tablet 200 mg  200 mg Oral BID Marcela E Durga, DO   200 mg at 10/24/21 0859    carbidopa-levodopa (SINEMET)  MG per tablet 1 tablet  1 tablet PEG Tube 4x Daily Marcela E Durga, DO   1 tablet at 10/24/21 0859    ipratropium-albuterol (DUONEB) nebulizer solution 3 mL  1 vial Inhalation Q4H PRN Marcela E Durga, DO        levETIRAcetam (KEPPRA) 100 MG/ML solution 500 mg  500 mg Oral BID Marcela E Durga, DO   500 mg at 10/24/21 0859    pantoprazole (PROTONIX) tablet 40 mg  40 mg Oral QAM AC Marcela E Durga, DO   40 mg at 10/24/21 0529       PRN Medications  sodium chloride flush, sodium chloride, potassium chloride **OR** potassium alternative oral replacement **OR** potassium chloride, ondansetron **OR** ondansetron, senna, acetaminophen **OR** acetaminophen, ipratropium-albuterol    Objective  Most Recent Recorded Vitals  BP (!) 143/84   Pulse 96   Temp 99.3 °F (37.4 °C) (Axillary)   Resp 18   Ht 5' 4\" (1.626 m)   Wt 111 lb 11.2 oz (50.7 kg)   SpO2 97%   BMI 19.17 kg/m²   No intake/output data recorded. No intake/output data recorded.     Physical Exam:  General: Awake and alert, does not attempt to verbalize, NAD, no labored breathing  Eyes: conjunctivae/corneas clear, sclera non icteric  Skin: color/texture/turgor normal, no rashes or lesions  Lungs: CTAB, no retractions/use of accessory muscles, no vocal fremitus, no rhonchi, no crackle, no rales  Heart: regular rate, regular rhythm, no murmur  Abdomen: soft, NT; peg tube intact, bowel sounds normal; no masses,  no organomegaly  Extremities: legs contracted, right foot dressing dry and intact, no cyanosis, no edema  Neurologic: cranial nerves 2-12 grossly intact, no slurred speech. Most Recent Labs  Lab Results   Component Value Date    WBC 5.5 10/24/2021    HGB 12.9 10/24/2021    HCT 41.4 10/24/2021     10/24/2021     (H) 10/24/2021    K 3.9 10/24/2021     (H) 10/24/2021    CREATININE 0.9 10/24/2021    BUN 35 (H) 10/24/2021    CO2 26 10/24/2021    GLUCOSE 82 10/24/2021    ALT 5 10/24/2021    AST 28 10/24/2021    INR 1.0 10/28/2020    TSH 2.91 05/05/2021    LABA1C 5.4 03/18/2016       US GALLBLADDER RUQ   Final Result   Cholelithiasis. Mildly dilated CBD of 7.6 mm. CTA PULMONARY W CONTRAST   Final Result   No evidence of pulmonary embolism or acute pulmonary abnormality. Marked elevation of the right hemidiaphragm with compressive atelectasis at   the right lung base. Large hiatal hernia. CT ABDOMEN PELVIS W IV CONTRAST Additional Contrast? None   Final Result   1. Limited due to inability to optimally position the patient. 2. Distended gallbladder containing a large gallstone. 3. Large amount of stool throughout the colon with large amount of stool   within the rectum and sigmoid colon. 4. Generalized thickened appearance of urinary bladder wall may be related to   nondistention versus cystitis. XR CHEST PORTABLE   Final Result   1. Limited due to suboptimal positioning. 2. Opacities in right lower lung field could indicate atelectasis or   pneumonia. Short-term follow-up could be helpful for further evaluation. BLOOD CX #1  Recent Labs     10/22/21  1612   BC 24 Hours no growth     BLOOD CX #2  Recent Labs     10/22/21  1612   BLOODCULT2 24 Hours no growth     TIP CULTURE  No results for input(s): CXCATHTIP in the last 72 hours. CULTURE, RESPIRATORY   No results for input(s): CULTRESP in the last 72 hours.   RESPIRATORY SMEAR  No results for input(s): RESPSMEAR in the last 72 hours. BODY FLUID CULTURE  No results for input(s): BFCS in the last 72 hours. WOUND ABSCESS  No results for input(s): WNDABS in the last 72 hours. Anaerobic cx  No results for input(s): LABANAE in the last 72 hours. CULTURE SURGICAL  No results for input(s): CXSURG in the last 72 hours. URINE CULTURE  No results for input(s): LABURIN in the last 72 hours. No results for input(s): ORG in the last 72 hours. MISC. SENDOUT  No results for input(s): MREF in the last 72 hours. STREP PNEUMONIA AG URINE  No results for input(s): STREPNEUMAGU in the last 72 hours. LEGIONELLA AG URINE  No results for input(s): LEGUR in the last 72 hours. No results for input(s): [address] in the last 72 hours. Assessment   Active Hospital Problems    Diagnosis     Sepsis ([facility].) [A41.9]          Plan  · Fever[de-identified] resp array negative. Blood cultures and urine culture pending. Continue IV fluids. Hold cogentin. ID following-no antibiotics warranted. Felt to be d/t hypernatremia and volume contraction. · Hypernatremia: secondary to lack of free water, deficit of 3.8L. IV fluids per nephrology 1/2NS. Restart diet pureed with nectar thick liquids. · Right toe wounds: follows with podiatry outpt. · Continue home medications other then Cogentin  · PT/OT  · Follow labs   · DVT prophylaxis  · Please see orders for further management and care. · Discharge plan: TBD, back to [facility] signed by MARIJA Babb on 10/24/2021 at 9:14 AM     Addendum: I have personally participated in a face-to-face history and physical exam on the date of service with the patient. I have discussed the case with the nurse practitioner. I also participated in medical decision making on the date of service and I agree with all of the pertinent clinical information unless indicated in my editing of the note.  I have reviewed and edited the note above based on my findings during my history, exam, and decision making on the same day of service. My additional thoughts:    Appreciate ID and nephrology input. Monitor sodium level closely. Electronically signed by Yanet Gabriel DO on 10/24/2021 at 9:51 AM    I can be reached through Answering service.

## 2021-10-24 NOTE — PROGRESS NOTES
Timed BMP drawn and sent to lab at 1619. Per lab, they cannot locate this bloodwork. Will redraw stat.

## 2021-10-24 NOTE — PROGRESS NOTES
Pt was on nectar thick fluids and pureed diet and was also on supplemental tube feeds over night, he was getting resource 2.0 per peg tube @60ml/hr starting at 7 pm and stopped at 6 am and was also getting free water flushes of 150 ml 6 times a day.

## 2021-10-24 NOTE — PROGRESS NOTES
Department of Internal Medicine  Nephrology Progress Note    Events reviewed. SUBJECTIVE:  We are following Mr. Dank Padron for hypernatremia. He is nonverbal.     PHYSICAL EXAM:      Vitals:    VITALS:  BP (!) 143/84   Pulse 96   Temp 99.3 °F (37.4 °C) (Axillary)   Resp 18   Ht 5' 4\" (1.626 m)   Wt 111 lb 11.2 oz (50.7 kg)   SpO2 97%   BMI 19.17 kg/m²   24HR INTAKE/OUTPUT:  No intake or output data in the 24 hours ending 10/24/21 1213    Constitutional:  Nonverbal  HEENT:  Normocephalic, PERRL  Respiratory:  CTA bilaterally  Cardiovascular/Edema:   Tachycardia, S1,S2  Gastrointestinal:  Soft, flat, nondistended  Neurologic:  Contracted BUE and BLE  Skin:  Warm, dry  Other:  No edema    Scheduled Meds:   sodium chloride flush  10 mL IntraVENous 2 times per day    enoxaparin  40 mg SubCUTAneous Daily    [Held by provider] benztropine  1 mg Oral BID    carBAMazepine  200 mg Oral BID    carbidopa-levodopa  1 tablet PEG Tube 4x Daily    levETIRAcetam  500 mg Oral BID    pantoprazole  40 mg Oral QAM AC     Continuous Infusions:   sodium chloride 125 mL/hr at 10/23/21 1931    sodium chloride       PRN Meds:.sodium chloride flush, sodium chloride, potassium chloride **OR** potassium alternative oral replacement **OR** potassium chloride, ondansetron **OR** ondansetron, senna, acetaminophen **OR** acetaminophen, ipratropium-albuterol    DATA:    CBC:   Lab Results   Component Value Date    WBC 5.5 10/24/2021    RBC 4.09 10/24/2021    HGB 12.9 10/24/2021    HCT 41.4 10/24/2021    .2 10/24/2021    MCH 31.5 10/24/2021    MCHC 31.2 10/24/2021    RDW 11.8 10/24/2021     10/24/2021    MPV 10.1 10/24/2021     CMP:    Lab Results   Component Value Date     10/24/2021    K 3.6 10/24/2021    K 3.9 10/24/2021     10/24/2021    CO2 25 10/24/2021    BUN 33 10/24/2021    CREATININE 0.7 10/24/2021    GFRAA >60 10/24/2021    LABGLOM >60 10/24/2021    GLUCOSE 96 10/24/2021    GLUCOSE 71 05/18/2012 PROT 6.5 10/24/2021    LABALBU 3.5 10/24/2021    LABALBU 4.5 05/18/2012    CALCIUM 8.2 10/24/2021    BILITOT 0.7 10/24/2021    ALKPHOS 84 10/24/2021    AST 28 10/24/2021    ALT 5 10/24/2021     Magnesium:    Lab Results   Component Value Date    MG 2.3 10/24/2021     Phosphorus:    Lab Results   Component Value Date    PHOS 3.4 10/24/2021     Radiology Review:      CTA of the chest 10/22/21   No evidence of pulmonary embolism or acute pulmonary abnormality.       Marked elevation of the right hemidiaphragm with compressive atelectasis at   the right lung base.       Large hiatal hernia. CT abdomen and pelvis with IV contrast 10/22/21   1. Limited due to inability to optimally position the patient. 2. Distended gallbladder containing a large gallstone. 3. Large amount of stool throughout the colon with large amount of stool   within the rectum and sigmoid colon. 4. Generalized thickened appearance of urinary bladder wall may be related to   nondistention versus cystitis. CXR 10/22/21   1. Limited due to suboptimal positioning. 2. Opacities in right lower lung field could indicate atelectasis or   pneumonia.  Short-term follow-up could be helpful for further evaluation. Results for Dary Lee (MRN [id number]) as of 10/23/2021 15:29   Ref. Range 10/22/2021 20:36   Osmolality, Ur Latest Ref Range: 300 - 900 mOsm/kg 1133 (H)   Sodium, Ur Latest Ref Range: Not Established mmol/L 43     BRIEF SUMMARY OF INITIAL CONSULT:    Briefly, Mr. Reece Huggins is a 73year old male with a PMH of developmental delay, MR, seizure disorder, tourette's disorder, Parkinson's, who was admitted on October 22, 2021 after presenting to the ER from nursing facility with a fever of 102. A chest x-ray was obtained which revealed opacities in right lower lung.  He had a negative PCR and Covid-19 test. Upon arrival to ER, his labs were significant for sodium 153, which is the reason for this consultation. IMPRESSION/RECOMMENDATIONS:      1. Hypernatremia, secondary to lack of free water administration. Free water deficit 3.8 L. Sodium level improving. 2. Seizure disorder, on carbamazepine and levetiracetam  3. Parkinson's, on carbidope-levodopa  4. Fever, received piperacllin-tazobactam in ER, ID consulted and are monitoring off of antibiotics   5. Fecal impaction  6. Cholelithiasis  7.  Nutrition, dysphagia diet    Plan:    · Decrease 1/2 NS to 100 ml/hr  · Continue to monitor sodium level, BMP 4 PM      Radha Castellon MD

## 2021-10-24 NOTE — PLAN OF CARE
Problem: Skin Integrity:  Goal: Absence of new skin breakdown  Description: Absence of new skin breakdown  Outcome: Met This Shift     Problem: Falls - Risk of:  Goal: Will remain free from falls  Description: Will remain free from falls  Outcome: Met This Shift  Goal: Absence of physical injury  Description: Absence of physical injury  Outcome: Met This Shift none

## 2021-10-25 PROBLEM — E44.0 MODERATE PROTEIN-CALORIE MALNUTRITION (HCC): Chronic | Status: ACTIVE | Noted: 2021-01-01

## 2021-10-25 NOTE — PROGRESS NOTES
[address] [place] [address] [facility]  Progress Note    SUBJECTIVE:  Chief Complaint   Patient presents with    Fever     Patient is lying in bed, contracted, nonverbal  No issues reported. tmax 100.9 overnight      Review of systems:  As stated above in the chief complaint, otherwise negative. Medications:  Scheduled Meds:   polyethylene glycol  17 g Per G Tube Daily    bisacodyl  10 mg Rectal Once    white petrolatum   Topical BID    sodium chloride flush  10 mL IntraVENous 2 times per day    enoxaparin  40 mg SubCUTAneous Daily    [Held by provider] benztropine  1 mg Oral BID    carBAMazepine  200 mg Oral BID    carbidopa-levodopa  1 tablet PEG Tube 4x Daily    levETIRAcetam  500 mg Oral BID    pantoprazole  40 mg Oral QAM AC     Continuous Infusions:   sodium chloride 100 mL/hr at 10/25/21 0848    sodium chloride       PRN Meds:sodium chloride flush, sodium chloride, potassium chloride **OR** potassium alternative oral replacement **OR** potassium chloride, ondansetron **OR** ondansetron, senna, acetaminophen **OR** acetaminophen, ipratropium-albuterol    OBJECTIVE:  /60   Pulse 123   Temp 100.9 °F (38.3 °C) (Axillary)   Resp 19   Ht 5' 4\" (1.626 m)   Wt 111 lb 11.2 oz (50.7 kg)   SpO2 97%   BMI 19.17 kg/m²   Temp  Av.9 °F (37.7 °C)  Min: 99.1 °F (37.3 °C)  Max: 100.9 °F (38.3 °C)  Constitutional: The patient is awake, MR. Nonverbal. Contracted. Tremulous    Skin: Warm and dry. No rashes were noted. HEENT: Round and reactive pupils. Moist mucous membranes. No ulcerations or thrush. Neck: rigid. Chest: No respiratory distress. Symmetrical expansion. No wheezing, crackles or rhonchi. Coarse upper airways   Cardiovascular: S1 and S2 are rhythmic and regular. No murmurs appreciated. Abdomen: Positive bowel sounds to auscultation. Benign to palpation. No masses felt. PEG  Extremities: No edema. Contracted.  Muscle wasting    Lines: peripheral    Laboratory and Tests Review:  Lab Results   Component Value Date    WBC 5.1 10/25/2021    WBC 5.5 10/24/2021    WBC 6.7 10/23/2021    HGB 11.7 (L) 10/25/2021    HCT 36.6 (L) 10/25/2021    MCV 99.2 10/25/2021     (L) 10/25/2021     Lab Results   Component Value Date    NEUTROABS 3.23 10/25/2021    NEUTROABS 3.25 10/24/2021    NEUTROABS 4.74 10/23/2021     No results found for: [facility]  Lab Results   Component Value Date    ALT 14 10/25/2021    AST 23 10/25/2021    ALKPHOS 72 10/25/2021    BILITOT 0.4 10/25/2021     Lab Results   Component Value Date     10/25/2021    K 3.7 10/25/2021    K 3.7 10/25/2021     10/25/2021    CO2 27 10/25/2021    BUN 26 10/25/2021    CREATININE 0.7 10/25/2021    CREATININE 0.7 10/24/2021    CREATININE 0.7 10/24/2021    GFRAA >60 10/25/2021    LABGLOM >60 10/25/2021    GLUCOSE 139 10/25/2021    GLUCOSE 71 05/18/2012    PROT 5.4 10/25/2021    LABALBU 3.0 10/25/2021    LABALBU 4.5 05/18/2012    CALCIUM 8.0 10/25/2021    BILITOT 0.4 10/25/2021    ALKPHOS 72 10/25/2021    AST 23 10/25/2021    ALT 14 10/25/2021     Lab Results   Component Value Date    CRP 0.3 01/29/2021    CRP 4.2 (H) 06/14/2020    CRP 3.6 (H) 06/12/2020     Lab Results   Component Value Date    SEDRATE 6 01/29/2021    SEDRATE 43 (H) 06/14/2020    SEDRATE 14 06/12/2020     Radiology:  Noted     Microbiology:   Urine culture: negative so far  Blood cultures: negative so far  Respiratory Viral Panel: negative  Rapid COVID-19: negative  Rapid influenza: negative     ASSESSMENT:  · Hypernatremia  · Volume contraction  · Fever and obtundation secondary to the above  · Fecal impaction    PLAN:  · Continue to observe off antibiotics  · Monitor temps - improving   · Cultures are negative so far   · Labs reviewed     MARIJA Dhaliwal CNP  11:44 AM  10/25/2021     Patient seen and examined. I had a face to face encounter with the patient. Agree with exam.  Assessment and plan as outlined above and directed by me.  Addition and corrections were done as deemed appropriate. My exam and plan include: Patient is doing about the same. He is still contracted. He is having low-grade fevers. He is still on IV fluids. Continue with supportive treatment. Antibiotics are not warranted.     Fernanda Piper MD  10/25/2021  1:56 PM

## 2021-10-25 NOTE — PROGRESS NOTES
Wound / ostomy dept consulted for this Pt admitted with sepsis. History includes: MR, Parkinson's. Pt is non-verbal. He has reddened celina prominences due to contractures. No wounds noted. The Pt is unable to make his needs known. Scar tissue noted on sacrum. Recommend a low air loss bed, Aquaphor topically and SOS precautions.

## 2021-10-25 NOTE — PROGRESS NOTES
Department of Internal Medicine  Nephrology Progress Note    Events reviewed. SUBJECTIVE:  We are following Mr. Dank Padron for hypernatremia. He is nonverbal.     PHYSICAL EXAM:      Vitals:    VITALS:  /60   Pulse 123   Temp 100.9 °F (38.3 °C) (Axillary)   Resp 19   Ht 5' 4\" (1.626 m)   Wt 111 lb 11.2 oz (50.7 kg)   SpO2 97%   BMI 19.17 kg/m²   24HR INTAKE/OUTPUT:  No intake or output data in the 24 hours ending 10/25/21 1312    Constitutional:  Nonverbal  HEENT:  Normocephalic, PERRL  Respiratory:  CTA bilaterally  Cardiovascular/Edema:   Tachycardia, S1,S2  Gastrointestinal:  Soft, flat, nondistended  Neurologic:  Contracted BUE and BLE  Skin:  Warm, dry  Other:  No edema    Scheduled Meds:   polyethylene glycol  17 g Per G Tube Daily    bisacodyl  10 mg Rectal Once    white petrolatum   Topical BID    sodium chloride flush  10 mL IntraVENous 2 times per day    enoxaparin  40 mg SubCUTAneous Daily    [Held by provider] benztropine  1 mg Oral BID    carBAMazepine  200 mg Oral BID    carbidopa-levodopa  1 tablet PEG Tube 4x Daily    levETIRAcetam  500 mg Oral BID    pantoprazole  40 mg Oral QAM AC     Continuous Infusions:   sodium chloride 100 mL/hr at 10/25/21 0848    sodium chloride       PRN Meds:.sodium chloride flush, sodium chloride, potassium chloride **OR** potassium alternative oral replacement **OR** potassium chloride, ondansetron **OR** ondansetron, senna, acetaminophen **OR** acetaminophen, ipratropium-albuterol    DATA:    CBC:   Lab Results   Component Value Date    WBC 5.1 10/25/2021    RBC 3.69 10/25/2021    HGB 11.7 10/25/2021    HCT 36.6 10/25/2021    MCV 99.2 10/25/2021    MCH 31.7 10/25/2021    MCHC 32.0 10/25/2021    RDW 11.6 10/25/2021     10/25/2021    MPV 10.6 10/25/2021     CMP:    Lab Results   Component Value Date     10/25/2021    K 3.7 10/25/2021    K 3.7 10/25/2021     10/25/2021    CO2 27 10/25/2021    BUN 26 10/25/2021    CREATININE 0.7 10/25/2021    GFRAA >60 10/25/2021    LABGLOM >60 10/25/2021    GLUCOSE 139 10/25/2021    GLUCOSE 71 05/18/2012    PROT 5.4 10/25/2021    LABALBU 3.0 10/25/2021    LABALBU 4.5 05/18/2012    CALCIUM 8.0 10/25/2021    BILITOT 0.4 10/25/2021    ALKPHOS 72 10/25/2021    AST 23 10/25/2021    ALT 14 10/25/2021     Magnesium:    Lab Results   Component Value Date    MG 2.3 10/24/2021     Phosphorus:    Lab Results   Component Value Date    PHOS 3.4 10/24/2021     Radiology Review:      CTA of the chest 10/22/21   No evidence of pulmonary embolism or acute pulmonary abnormality.       Marked elevation of the right hemidiaphragm with compressive atelectasis at   the right lung base.       Large hiatal hernia. CT abdomen and pelvis with IV contrast 10/22/21   1. Limited due to inability to optimally position the patient. 2. Distended gallbladder containing a large gallstone. 3. Large amount of stool throughout the colon with large amount of stool   within the rectum and sigmoid colon. 4. Generalized thickened appearance of urinary bladder wall may be related to   nondistention versus cystitis. CXR 10/22/21   1. Limited due to suboptimal positioning. 2. Opacities in right lower lung field could indicate atelectasis or   pneumonia.  Short-term follow-up could be helpful for further evaluation. Results for Niya Desai (MRN [id number]) as of 10/23/2021 15:29   Ref. Range 10/22/2021 20:36   Osmolality, Ur Latest Ref Range: 300 - 900 mOsm/kg 1133 (H)   Sodium, Ur Latest Ref Range: Not Established mmol/L 43     BRIEF SUMMARY OF INITIAL CONSULT:    Briefly, Mr. Yeyo Celsi is a 73year old male with a PMH of developmental delay, MR, seizure disorder, tourette's disorder, Parkinson's, who was admitted on October 22, 2021 after presenting to the ER from nursing facility with a fever of 102. A chest x-ray was obtained which revealed opacities in right lower lung.  He had a negative PCR and Covid-19 test. Upon arrival to ER, his labs were significant for sodium 153, which is the reason for this consultation. IMPRESSION/RECOMMENDATIONS:      1. Hypernatremia, secondary to lack of free water administration. Free water deficit 3.8 L. Sodium level improving. 2. Seizure disorder, on carbamazepine and levetiracetam  3. Parkinson's, on carbidope-levodopa  4. Fever, received piperacllin-tazobactam in ER, ID consulted and are monitoring off of antibiotics   5. Fecal impaction  6. Cholelithiasis  7. Nutrition, dysphagia diet    Plan:    · Stop IV fluid. · Increase water flush 200 ml every 4 hours  · Monitor electrolytes. · Discharge planning.

## 2021-10-25 NOTE — PROGRESS NOTES
Comprehensive Nutrition Assessment    Type and Reason for Visit:  Initial, Positive Nutrition Screen, Wound    Nutrition Recommendations/Plan: Continue Diet as tolerated. Recommend to Modify Current EN to meet current needs. TF Recommendations:  2.0 Calorie (Two Melvin HN) @ 60 ml/hr Nocturnal x 11 hrs/d (7p-6a) to provide 660 ml TV, 1320 kcals, 55 gm Pro, 462 ml water. 140 ml flush q 4 hrs= 1302 ml total water (TF+Flush). Nutrition Assessment:  Pt adm w/ fever x past ~1-2d. PMHx MRDD, Seizures, Parkinsons, Gastritis, PEG (3/2/21); Adm w/ sepsis. hypernatremia. Pt at risk d/t Moderate Malnutrition AEB moderate wasting, poor intake and ~9% wt loss x ~9 months d/t poor appetite/dysphagia 2/2 MRDD/Parkinsons. Will provide EN rec's and monitor. Malnutrition Assessment:  Malnutrition Status: Moderate malnutrition    Context:  Chronic Illness     Findings of the 6 clinical characteristics of malnutrition:  Energy Intake:  7 - 75% or less estimated energy requirements for 1 month or longer  Weight Loss:  1 - Mild weight loss (specify amount and time period) (~9% loss x ~9 months)     Body Fat Loss:  1 - Mild body fat loss (mod) Orbital, Triceps, Fat Overlying Ribs, Buccal region   Muscle Mass Loss:  1 - Mild muscle mass loss (mod) Temples (temporalis), Clavicles (pectoralis & deltoids), Hand (interosseous), Scapula (trapezius)  Fluid Accumulation:  No significant fluid accumulation     Strength:  Not Performed    Estimated Daily Nutrient Needs:  Energy (kcal):  MSJx1.2SF per CBW= 9081-2708; Weight Used for Energy Requirements:  Current     Protein (g):  1.3-1.5 gm/kg per CBW= 65-75; Weight Used for Protein Requirements:  Current        Fluid (ml/day):  8172-0480; Method Used for Fluid Requirements:  1 ml/kcal      Nutrition Related Findings: AMSx3, MRDD, non-verbal, edentulous, Abd/BS WDL, +PEG, no edema, +I/O's      Wounds:  Open Wounds       Current Nutrition Therapies:    ADULT DIET;  Dysphagia - Pureed; Mildly Thick (Nectar)  ADULT TUBE FEEDING; PEG; Standard with Fiber; Cyclic; 60; 5:71 PM; 6:27 AM; 150; Q 6 hours  Current Tube Feeding (TF) Orders:  · Feeding Route: PEG  · Formula: Standard with Fiber  · Schedule: Cyclic  · Additives/Modulars:    · Water Flushes: 150 ml q 6 hrs= 600 ml total flush/d  · Current TF & Flush Orders Provides: same as goal  · Goal TF & Flush Orders Provides: @ 60 ml/hr (Goal) x 11 hrs/d= 660 ml TV, 990 kcals, 42 gm Pro, 502 ml free water, 1102 ml total water (TF+Flush)      Anthropometric Measures:  · Height: 5' 4\" (162.6 cm)  · Current Body Weight: 111 lb (50.3 kg) (bed 10/24)   · Admission Body Weight: 110 lb (49.9 kg) (actual 10/22)    · Usual Body Weight: 121 lb (54.9 kg) (actual 1/27/21 per EMR, ~9% loss x ~9 months)     · Ideal Body Weight: 130 lbs; % Ideal Body Weight     · BMI: 19  · Adjusted Body Weight:  ; No Adjustment   · Adjusted BMI:      · BMI Categories: Underweight (BMI less than 22) age over 72       Nutrition Diagnosis:   · Moderate malnutrition, In context of chronic illness related to cognitive or neurological impairment (2/2 MRDD/Parkinsons) as evidenced by intake 51-75%, poor intake prior to admission, weight loss, moderate loss of subcutaneous fat, moderate muscle loss      Nutrition Interventions:   Food and/or Nutrient Delivery:  Continue Current Diet, Modify Tube Feeding (Continue Diet as tolerated. Recommend to Modify Current EN to meet current needs. TF Rec:  2.0 Calorie (Two Melvin HN) @ 60 ml/hr Nocturnal x 11 hrs/d (7p-6a) to provide 660 ml TV, 1320 kcals, 55 gm Pro, 462 ml water. 140 ml flush q 4 hrs= 1302 ml total)  Nutrition Education/Counseling:  Education not indicated   Coordination of Nutrition Care:  Continue to monitor while inpatient    Goals:  Pt to tolerate EN at goal; PO intake >75% of meals.        Nutrition Monitoring and Evaluation:   Behavioral-Environmental Outcomes:  None Identified   Food/Nutrient Intake Outcomes:  Diet Advancement/Tolerance, Food and Nutrient Intake, Enteral Nutrition Intake/Tolerance  Physical Signs/Symptoms Outcomes:  Biochemical Data, Chewing or Swallowing, Constipation, GI Status, Nausea or Vomiting, Fluid Status or Edema, Hemodynamic Status, Nutrition Focused Physical Findings, Skin, Weight     Discharge Planning:     Too soon to determine     Electronically signed by Mago Rueda RD, LD on 10/25/21 at 4:13 PM EDT    Contact: ext [phone]

## 2021-10-25 NOTE — PROGRESS NOTES
Physician Progress Note      Oscar Nevarez  [facility] #:                  [id number]  :                       1950  ADMIT DATE:       10/22/2021 3:35 PM  [address] [place] DATE:  RESPONDING  PROVIDER #:        Gena LONDONO DO          QUERY TEXT:    Dear Attending Physician,  Patient admitted with increased temp 102 at Group home. Noted documentation of   Sepsis in PCP notes. In order to support the diagnosis of Sepsis, please   include additional clinical indicators in your documentation. Or please   document if the diagnosis of Sepsis has been ruled out after further study. The medical record reflects the following:  Risk Factors: MR, volume contraction , hypernatremia  Clinical Indicators: Per H/P ,\". .Sepsis of unknown origin: resp array   negative. Barnetta De La Paz Barnetta De La Paz Hypernatremia: IV fluids. .\"  PCP 10/24,\". .C/[facility] [address]   ProblemsDiagnosis ? Sepsis Plan Fever[de-identified] resp array negative. Blood cultures and   urine culture pending. Continue IV fluids. .. ID following-no antibiotics   warranted. Felt to be d/t hypernatremia and volume contraction. Barnetta De La Paz .\"  Per ID   10/24 ,\". Barnetta De La Paz Barnetta De La Paz Hypernatremia Volume contraction  Fever and obtundation secondary   to the above Continue to observe off antibiotics  . Barnetta De La Paz \"  Per PCP   10/25,\". .Diagnosis ? Developmental delay Hypernatremia Sepsis   . Barnetta De La Paz \"  Per La  Treatment: IVF, No antibiotics    Thank you,  Sav Torres RN CCDS  Clinical Documentation Improvement Specialist  [phone]  Options provided:  -- Sepsis present as evidenced by, Please document evidence. -- Sepsis was ruled out after study  -- Other - I will add my own diagnosis  -- Disagree - Not applicable / Not valid  -- Disagree - Clinically unable to determine / Unknown  -- Refer to Clinical Documentation Reviewer    PROVIDER RESPONSE TEXT:    Sepsis was ruled out after study.     Query created by: Yovany Garcia on 10/25/2021 3:04 PM      Electronically signed by:  Harjit Lerner DO 10/25/2021 3:24 PM

## 2021-10-25 NOTE — CARE COORDINATION
Social work / Discharge planning:       Per staff at [facility] to [facility], the patient uses a specialized wheelchair and goes to [facility] during the week. He uses oxygen at night. If continuous oxygen is needed for discharge, the group home will make the arrangements. Patient will need ambulance transport back to group [place]. CORRECT ADDRESS IS:  [facility] TO [address] . CALL NURSING REPORT -[phone].    Electronically signed by STACIE Mondragon on 10/25/2021 at 2:37 PM

## 2021-10-25 NOTE — PROGRESS NOTES
Internal Medicine Progress Note    Patient's name: Lori Salazar  : 1950  Admission date: 10/22/2021  Date of service: 10/25/2021   Room: [facility] SURG  Primary care physician: Liv Jiménez MD    Fina Snow was seen and examined at bedside. He is seen laying in fetal position. He is diaphoretic. Nursing to check his temp which at this time it is 100.9 AX with a heart rate of 123 and blood glucose of 96. He does not verbalize at all. Urine culture demonstrates growth not present but incubation is continuing. Blood cultures at 24 hours also with no growth. Dressing is dry and intact to his right foot. Currently utilizing oxygen at 4 L via nasal cannula with an SPO2 of 97%. Patient does not verbalize. Review of Systems  Unable to be obtained at this time.     Hospital Medications  Current Facility-Administered Medications   Medication Dose Route Frequency Provider Last Rate Last Admin    polyethylene glycol (GLYCOLAX) packet 17 g  17 g Per G Tube Daily MARIJA Navarro CNP        bisacodyl (DULCOLAX) suppository 10 mg  10 mg Rectal Once MARIJA Navarro CNP        0.45 % sodium chloride infusion   IntraVENous Continuous MARIJA Finn -  mL/hr at 10/25/21 0848 New Bag at 10/25/21 0848    sodium chloride flush 0.9 % injection 10 mL  10 mL IntraVENous 2 times per day Marcela E Durga, DO   10 mL at 10/25/21 7146    sodium chloride flush 0.9 % injection 10 mL  10 mL IntraVENous PRN Marcela E Durga, DO        0.9 % sodium chloride infusion  25 mL IntraVENous PRN Marcela E Durga, DO        potassium chloride (KLOR-CON M) extended release tablet 40 mEq  40 mEq Oral PRN Marcela E Durga, DO        Or    potassium bicarb-citric acid (EFFER-K) effervescent tablet 40 mEq  40 mEq Oral PRN Marcela E Durga, DO        Or    potassium chloride 10 mEq/100 mL IVPB (Peripheral Line)  10 mEq IntraVENous PRN Marcela E Durga, DO        enoxaparin (LOVENOX) injection 40 mg  40 mg SubCUTAneous Daily Marcela E Durga, DO   40 mg at 10/25/21 0834    ondansetron (ZOFRAN-ODT) disintegrating tablet 4 mg  4 mg Oral Q8H PRN Marcela E Durga, DO        Or    ondansetron (ZOFRAN) injection 4 mg  4 mg IntraVENous Q6H PRN Marcela E Durga, DO        senna (SENOKOT) tablet 8.6 mg  1 tablet Oral Daily PRN Marcela PULIDO Durga, DO        acetaminophen (TYLENOL) tablet 650 mg  650 mg Oral Q6H PRN Marcela E Durga, DO   650 mg at 10/25/21 2130    Or    acetaminophen (TYLENOL) suppository 650 mg  650 mg Rectal Q6H PRN Marcela E Durga, DO        [Held by provider] benztropine (COGENTIN) tablet 1 mg  1 mg Oral BID Marcela PULIDO Durga, DO   1 mg at 10/23/21 0906    carBAMazepine (TEGRETOL) tablet 200 mg  200 mg Oral BID Marcela PULIDO Durga, DO   200 mg at 10/25/21 0842    carbidopa-levodopa (SINEMET)  MG per tablet 1 tablet  1 tablet PEG Tube 4x Daily Marcela E Durga, DO   1 tablet at 10/25/21 0842    ipratropium-albuterol (DUONEB) nebulizer solution 3 mL  1 vial Inhalation Q4H PRN Marcela E Durga, DO        levETIRAcetam (KEPPRA) 100 MG/ML solution 500 mg  500 mg Oral BID Marcela PULIDO Durga, DO   500 mg at 10/25/21 0834    pantoprazole (PROTONIX) tablet 40 mg  40 mg Oral QAM AC Marcela PULIDO Durga, DO   40 mg at 10/25/21 0520       PRN Medications  sodium chloride flush, sodium chloride, potassium chloride **OR** potassium alternative oral replacement **OR** potassium chloride, ondansetron **OR** ondansetron, senna, acetaminophen **OR** acetaminophen, ipratropium-albuterol    Objective  Most Recent Recorded Vitals  /60   Pulse 123   Temp 100.9 °F (38.3 °C) (Axillary)   Resp 19   Ht 5' 4\" (1.626 m)   Wt 111 lb 11.2 oz (50.7 kg)   SpO2 97%   BMI 19.17 kg/m²   I/O last 3 completed shifts: In: 40 [P.O.:40]  Out: -   No intake/output data recorded.     Physical Exam:  General: Awake and alert, does not attempt to verbalize, NAD, no labored breathing  Eyes: conjunctivae/corneas clear, sclera non icteric  Skin: color/texture/turgor normal, no rashes or lesions  Lungs: CTAB, no retractions/use of accessory muscles, no vocal fremitus, no rhonchi, no crackle, no rales  Heart: regular rate, regular rhythm, no murmur  Abdomen: soft, NT; peg tube intact, bowel sounds normal; no masses,  no organomegaly; condom cath in use with yellow colored urine  Extremities: legs contracted, right foot dressing dry and intact, no cyanosis, no edema  Neurologic: cranial nerves 2-12 grossly intact, no slurred speech. Most Recent Labs  Lab Results   Component Value Date    WBC 5.1 10/25/2021    HGB 11.7 (L) 10/25/2021    HCT 36.6 (L) 10/25/2021     (L) 10/25/2021     10/25/2021    K 3.7 10/25/2021    K 3.7 10/25/2021     (H) 10/25/2021    CREATININE 0.7 10/25/2021    BUN 26 (H) 10/25/2021    CO2 27 10/25/2021    GLUCOSE 139 (H) 10/25/2021    ALT 14 10/25/2021    AST 23 10/25/2021    INR 1.0 10/28/2020    TSH 2.91 05/05/2021    LABA1C 5.4 03/18/2016     Blood Culture, Routine 10/22/2021  4:12 PM MH - [id number] [address] [facility]   24 Hours no growth      Urine Culture, Routine 10/22/2021  7:27  [address] not present, incubation continues        US GALLBLADDER RUQ   Final Result   Cholelithiasis. Mildly dilated CBD of 7.6 mm. CTA PULMONARY W CONTRAST   Final Result   No evidence of pulmonary embolism or acute pulmonary abnormality. Marked elevation of the right hemidiaphragm with compressive atelectasis at   the right lung base. Large hiatal hernia. CT ABDOMEN PELVIS W IV CONTRAST Additional Contrast? None   Final Result   1. Limited due to inability to optimally position the patient. 2. Distended gallbladder containing a large gallstone. 3. Large amount of stool throughout the colon with large amount of stool   within the rectum and sigmoid colon.    4. Generalized thickened appearance of urinary bladder wall may be related to nondistention versus cystitis. XR CHEST PORTABLE   Final Result   1. Limited due to suboptimal positioning. 2. Opacities in right lower lung field could indicate atelectasis or   pneumonia. Short-term follow-up could be helpful for further evaluation. TIP CULTURE  No results for input(s): CXCATHTIP in the last 72 hours. CULTURE, RESPIRATORY   No results for input(s): CULTRESP in the last 72 hours. RESPIRATORY SMEAR  No results for input(s): RESPSMEAR in the last 72 hours. BODY FLUID CULTURE  No results for input(s): BFCS in the last 72 hours. WOUND ABSCESS  No results for input(s): WNDABS in the last 72 hours. Anaerobic cx  No results for input(s): LABANAE in the last 72 hours. CULTURE SURGICAL  No results for input(s): CXSURG in the last 72 hours. URINE CULTURE      No results for input(s): ORG in the last 72 hours. MISC. SENDOUT  No results for input(s): MREF in the last 72 hours. STREP PNEUMONIA AG URINE  No results for input(s): STREPNEUMAGU in the last 72 hours. LEGIONELLA AG URINE  No results for input(s): LEGUR in the last 72 hours. No results for input(s): [address] in the last 72 hours. Assessment   Active Hospital Problems    Diagnosis     Developmental delay [R62.50]     Hypernatremia [E87.0]     Sepsis ([address] [A41.9]          Plan  · Fever[de-identified] resp array negative. Blood cultures and urine culture pending. Continue IV fluids. Hold cogentin. ID following-no antibiotics warranted. Felt to be d/t hypernatremia and volume contraction. · Hypernatremia: Improved. Felt secondary to lack of free water, deficit of 3.8L. IV fluids per nephrology 1/2NS. Restart diet pureed with nectar thick liquids. · Right toe wounds: follows with podiatry outpt.   · Parkinson's  Disease: Continue Sinemet  · Constipation: Daily bowel regimen  · Continue home medications other then Cogentin  · PT/OT  · Follow labs   · DVT prophylaxis  · Please see orders for further management and care. · Discharge plan: TBD, back to [place]    Electronically signed by MARIJA Samuel CNP on 10/25/2021 at 8:50 AM      Addendum: I have personally participated in a face-to-face history and physical exam on the date of service with the patient. I have discussed the case with the nurse practitioner. I also participated in medical decision making on the date of service and I agree with all of the pertinent clinical information unless indicated in my editing of the note. I have reviewed and edited the note above based on my findings during my history, exam, and decision making on the same day of service. My additional thoughts:    Sodium level improving. Appreciate ID and nephrology input. Back to [facility] when ok with others. eGne Hewitt DO      Electronically signed by Gene Hewitt DO on 10/25/2021 at 1:49 PM    I can be reached through Answering service.

## 2021-10-25 NOTE — CARE COORDINATION
Social work / Discharge Planning:       Chart reviewed. Patient is from [address] [place] [address]. Social work left a message for the caregiver Dino Mckinley [phone] requesting return call to gather information about the patient's status at the [facility]. Awaiting return call.   Electronically signed by STACIE Pike on 10/25/2021 at 9:02 AM

## 2021-10-26 NOTE — PROGRESS NOTES
Internal Medicine Progress Note    Patient's name: Marta Manrique  : 1950  Admission date: 10/22/2021  Date of service: 10/26/2021   Room: [facility] SURG  Primary care physician: Kaylan Jerry MD    Fina Fagan was seen and examined at bedside. He is seen laying in bed, contracted into a fetal position. He is nonverbal and does not follow simple commands. Temps noted anywhere from 96.4-100.9 which was last night at 930. He continues to be observed off antibiotics. Slight redness of skin noted around PEG site. Na lev el at 149 this am (146 yesterday). IV fluids were discontinued per Nephrology and free H20n flush increased to 200 cc q4h yesterday. Review of Systems  Unable to be obtained at this time.     Hospital Medications  Current Facility-Administered Medications   Medication Dose Route Frequency Provider Last Rate Last Admin    dextrose 5 % solution   IntraVENous Continuous Bel Ores, APRN - CNP        mupirocin (BACTROBAN) 2 % ointment   Topical BID Radonna Prom, APRN - CNP        polyethylene glycol (GLYCOLAX) packet 17 g  17 g Per G Tube Daily Radonna Prom, APRN - CNP   17 g at 10/25/21 1401    white petrolatum ointment   Topical BID Esau Lnik, DO   Given at 10/26/21 1001    0.45 % sodium chloride infusion   IntraVENous Continuous Bel Ores, APRN - CNP   Stopped at 10/25/21 1402    sodium chloride flush 0.9 % injection 10 mL  10 mL IntraVENous 2 times per day Marcela E Durga, DO   10 mL at 10/26/21 1001    sodium chloride flush 0.9 % injection 10 mL  10 mL IntraVENous PRN Marcela E Durga, DO        0.9 % sodium chloride infusion  25 mL IntraVENous PRN Marcela E Durga, DO        potassium chloride (KLOR-CON M) extended release tablet 40 mEq  40 mEq Oral PRN Marcela E Durga, DO        Or    potassium bicarb-citric acid (EFFER-K) effervescent tablet 40 mEq  40 mEq Oral PRN Marcela E Durga, DO        Or    potassium chloride 10 mEq/100 mL IVPB (Peripheral Line)  10 mEq IntraVENous PRN Marcela E Durga, DO        enoxaparin (LOVENOX) injection 40 mg  40 mg SubCUTAneous Daily Marcela E Durga, DO   40 mg at 10/26/21 1000    ondansetron (ZOFRAN-ODT) disintegrating tablet 4 mg  4 mg Oral Q8H PRN Marcela E Durga, DO        Or    ondansetron (ZOFRAN) injection 4 mg  4 mg IntraVENous Q6H PRN Marcela E Durga, DO        senna (SENOKOT) tablet 8.6 mg  1 tablet Oral Daily PRN Marcela E Durga, DO        acetaminophen (TYLENOL) tablet 650 mg  650 mg Oral Q6H PRN Marcela E Durga, DO   650 mg at 10/25/21 2146    Or    acetaminophen (TYLENOL) suppository 650 mg  650 mg Rectal Q6H PRN Marcela E Durga, DO        [Held by provider] benztropine (COGENTIN) tablet 1 mg  1 mg Oral BID Marcela E Durga, DO   1 mg at 10/23/21 0906    carBAMazepine (TEGRETOL) tablet 200 mg  200 mg Oral BID Marcela E Durga, DO   200 mg at 10/26/21 0933    carbidopa-levodopa (SINEMET)  MG per tablet 1 tablet  1 tablet PEG Tube 4x Daily Marcela E Durga, DO   1 tablet at 10/26/21 0933    ipratropium-albuterol (DUONEB) nebulizer solution 3 mL  1 vial Inhalation Q4H PRN Marcela E Durga, DO        levETIRAcetam (KEPPRA) 100 MG/ML solution 500 mg  500 mg Oral BID Marcela E Durga, DO   500 mg at 10/26/21 0933    pantoprazole (PROTONIX) tablet 40 mg  40 mg Oral QAM AC Marcela E Durga, DO   40 mg at 10/25/21 0520       PRN Medications  sodium chloride flush, sodium chloride, potassium chloride **OR** potassium alternative oral replacement **OR** potassium chloride, ondansetron **OR** ondansetron, senna, acetaminophen **OR** acetaminophen, ipratropium-albuterol    Objective  Most Recent Recorded Vitals  /84   Pulse 95   Temp 99.2 °F (37.3 °C) (Axillary)   Resp 20   Ht 5' 4\" (1.626 m)   Wt 104 lb 4.8 oz (47.3 kg)   SpO2 100%   BMI 17.90 kg/m²   I/O last 3 completed shifts: In: 1089 [NG/GT:1089]  Out: -   No intake/output data recorded.     Physical Exam:  General: Awake and alert, does not attempt to verbalize or follow simple commands, NAD, no labored breathing  Eyes: conjunctivae/corneas clear, sclera non icteric  Skin: color/texture/turgor normal, no rashes or lesions  Lungs: CTAB, no retractions/use of accessory muscles, no vocal fremitus, no rhonchi, no crackle, no rales  Heart: regular rate, regular rhythm, no murmur  Abdomen: soft, NT; peg tube intact with slight erythema of surrounding skin, bowel sounds normal; no masses,  no organomegaly; condom cath in use with yellow colored urine  Extremities: legs contracted, right foot dressing dry and intact, no cyanosis, no edema  Neurologic: cranial nerves 2-12 grossly intact, no slurred speech. Most Recent Labs  Lab Results   Component Value Date    WBC 5.3 10/26/2021    HGB 13.4 10/26/2021    HCT 41.0 10/26/2021     10/26/2021     (H) 10/26/2021    K 3.7 10/26/2021    K 3.7 10/26/2021     (H) 10/26/2021    CREATININE 0.6 (L) 10/26/2021    BUN 22 10/26/2021    CO2 29 10/26/2021    GLUCOSE 134 (H) 10/26/2021    ALT 18 10/26/2021    AST 27 10/26/2021    INR 1.0 10/28/2020    TSH 2.91 05/05/2021    LABA1C 5.4 03/18/2016     Blood Culture, Routine 10/22/2021  4:12 PM MH - [id number]  [address] [facility]   24 Hours no growth      Urine Culture, Routine 10/22/2021  7:27  [address] not present, incubation continues        US GALLBLADDER RUQ   Final Result   Cholelithiasis. Mildly dilated CBD of 7.6 mm. CTA PULMONARY W CONTRAST   Final Result   No evidence of pulmonary embolism or acute pulmonary abnormality. Marked elevation of the right hemidiaphragm with compressive atelectasis at   the right lung base. Large hiatal hernia. CT ABDOMEN PELVIS W IV CONTRAST Additional Contrast? None   Final Result   1. Limited due to inability to optimally position the patient. 2. Distended gallbladder containing a large gallstone.    3. Large amount of stool throughout the colon with large amount of stool   within the rectum and sigmoid colon. 4. Generalized thickened appearance of urinary bladder wall may be related to   nondistention versus cystitis. XR CHEST PORTABLE   Final Result   1. Limited due to suboptimal positioning. 2. Opacities in right lower lung field could indicate atelectasis or   pneumonia. Short-term follow-up could be helpful for further evaluation. TIP CULTURE  No results for input(s): CXCATHTIP in the last 72 hours. CULTURE, RESPIRATORY   No results for input(s): CULTRESP in the last 72 hours. RESPIRATORY SMEAR  No results for input(s): RESPSMEAR in the last 72 hours. BODY FLUID CULTURE  No results for input(s): BFCS in the last 72 hours. WOUND ABSCESS  No results for input(s): WNDABS in the last 72 hours. Anaerobic cx  No results for input(s): LABANAE in the last 72 hours. CULTURE SURGICAL  No results for input(s): CXSURG in the last 72 hours. URINE CULTURE      No results for input(s): ORG in the last 72 hours. MISC. SENDOUT  No results for input(s): MREF in the last 72 hours. STREP PNEUMONIA AG URINE  No results for input(s): STREPNEUMAGU in the last 72 hours. LEGIONELLA AG URINE  No results for input(s): LEGUR in the last 72 hours. No results for input(s): [facility] in the last 72 hours. Assessment   Active Hospital Problems    Diagnosis     Moderate protein-calorie malnutrition ([facility] [address] [E44.0]     Developmental delay [R62.50]     Hypernatremia [E87.0]     Sepsis ([facility] [address] [A41.9]          Plan  · Fever[de-identified] resp array negative. Blood cultures and urine culture pending. Continue IV fluids. Hold cogentin. ID following-no antibiotics warranted. Felt to be d/t hypernatremia and volume contraction. · Hypernatremia: Improved. Felt secondary to lack of free water, deficit of 3.8L. IV fluids discontinued  per nephrology. Free Water increased.  Continue diet of  pureed with nectar thick liquids. · Right toe wounds: follows with podiatry outpt. · Parkinson's  Disease: Continue Sinemet  · Constipation: Daily bowel regimen  · Continue home medications other then Cogentin  · PT/OT  · Follow labs   · DVT prophylaxis  · Please see orders for further management and care. · Discharge plan: TBD, back to [place]    Electronically signed by MARIJA Mcgovern CNP on 10/26/2021 at 10:54 AM    I can be reached through Answering service. Addendum: I have personally participated in a face-to-face history and physical exam on the date of service with the patient. I have discussed the case with the nurse practitioner. I also participated in medical decision making on the date of service and I agree with all of the pertinent clinical information unless indicated in my editing of the note. I have reviewed and edited the note above based on my findings during my history, exam, and decision making on the same day of service. My additional thoughts:    Continue on free water per nephrology. Monitor temps. Continue off antibiotics per ID. Electronically signed by Nael Stokes DO on 10/26/2021 at 6:02 PM    I can be reached through Answering service.

## 2021-10-26 NOTE — PROGRESS NOTES
[address] [place] [address] [facility]  Progress Note    SUBJECTIVE:  Chief Complaint   Patient presents with    Fever     No issues reported from nursing  Low grade temps - 100.9 overnight   No diarrhea. Review of systems:  As stated above in the chief complaint, otherwise negative. Medications:  Scheduled Meds:   mupirocin   Topical BID    polyethylene glycol  17 g Per G Tube Daily    white petrolatum   Topical BID    sodium chloride flush  10 mL IntraVENous 2 times per day    enoxaparin  40 mg SubCUTAneous Daily    [Held by provider] benztropine  1 mg Oral BID    carBAMazepine  200 mg Oral BID    carbidopa-levodopa  1 tablet PEG Tube 4x Daily    levETIRAcetam  500 mg Oral BID    pantoprazole  40 mg Oral QAM AC     Continuous Infusions:   dextrose      sodium chloride Stopped (10/25/21 1402)    sodium chloride       PRN Meds:sodium chloride flush, sodium chloride, potassium chloride **OR** potassium alternative oral replacement **OR** potassium chloride, ondansetron **OR** ondansetron, senna, acetaminophen **OR** acetaminophen, ipratropium-albuterol    OBJECTIVE:  /84   Pulse 95   Temp 99.2 °F (37.3 °C) (Axillary)   Resp 20   Ht 5' 4\" (1.626 m)   Wt 104 lb 4.8 oz (47.3 kg)   SpO2 100%   BMI 17.90 kg/m²   Temp  Av.5 °F (37.5 °C)  Min: 96.4 °F (35.8 °C)  Max: 100.9 °F (38.3 °C)  Constitutional: The patient is awake, MR. Nonverbal. Contracted. Tremulous    Skin: Warm and dry. No rashes were noted. HEENT: Round and reactive pupils. Moist mucous membranes. No ulcerations or thrush. Neck: rigid. Chest: No respiratory distress. Symmetrical expansion. No wheezing, crackles or rhonchi. Cardiovascular: S1 and S2 are rhythmic and regular. No murmurs appreciated. Abdomen: Positive bowel sounds to auscultation. Benign to palpation. No masses felt. PEG  Extremities: No edema. Contracted.  Muscle wasting    Lines: peripheral    Laboratory and Tests Review:  Lab Results   Component Value Date    WBC 5.3 10/26/2021    WBC 5.1 10/25/2021    WBC 5.5 10/24/2021    HGB 13.4 10/26/2021    HCT 41.0 10/26/2021    MCV 97.6 10/26/2021     10/26/2021     Lab Results   Component Value Date    NEUTROABS 3.59 10/26/2021    NEUTROABS 3.23 10/25/2021    NEUTROABS 3.25 10/24/2021     No results found for: CRP  Lab Results   Component Value Date    ALT 18 10/26/2021    AST 27 10/26/2021    ALKPHOS 89 10/26/2021    BILITOT 0.3 10/26/2021     Lab Results   Component Value Date     10/26/2021    K 3.7 10/26/2021    K 3.7 10/26/2021     10/26/2021    CO2 29 10/26/2021    BUN 22 10/26/2021    CREATININE 0.6 10/26/2021    CREATININE 0.7 10/25/2021    CREATININE 0.7 10/24/2021    GFRAA >60 10/26/2021    LABGLOM >60 10/26/2021    GLUCOSE 134 10/26/2021    GLUCOSE 71 05/18/2012    PROT 6.2 10/26/2021    LABALBU 3.3 10/26/2021    LABALBU 4.5 05/18/2012    CALCIUM 8.5 10/26/2021    BILITOT 0.3 10/26/2021    ALKPHOS 89 10/26/2021    AST 27 10/26/2021    ALT 18 10/26/2021     Lab Results   Component Value Date    CRP 0.3 01/29/2021    CRP 4.2 (H) 06/14/2020    CRP 3.6 (H) 06/12/2020     Lab Results   Component Value Date    SEDRATE 6 01/29/2021    SEDRATE 43 (H) 06/14/2020    SEDRATE 14 06/12/2020     Radiology:  Noted     Microbiology:   Urine culture: negative so far  Blood cultures: negative so far  Respiratory Viral Panel: negative  Rapid COVID-19: negative  Rapid influenza: negative     ASSESSMENT:  · Hypernatremia  · Volume contraction  · Fever and obtundation secondary to the above  · Fecal impaction    PLAN:  · Continue to observe off antibiotics  · IVF per nephrology  · Monitor temps  · Cultures are negative so far   · Labs reviewed     MARIJA Flores CNP  11:23 AM  10/26/2021     Patient seen and examined. I had a face to face encounter with the patient. Agree with exam.  Assessment and plan as outlined above and directed by me.  Addition and corrections were done as deemed appropriate. My exam and plan include: The patient is doing about the same. Still having low-grade fevers, most likely related to volume contraction. Nephrology on the case. No evidence of ongoing infections.     Oz Ruiz MD  10/26/2021  2:08 PM

## 2021-10-26 NOTE — PLAN OF CARE
Problem: Skin Integrity:  Goal: Will show no infection signs and symptoms  Description: Will show no infection signs and symptoms  Outcome: Ongoing  Goal: Absence of new skin breakdown  Description: Absence of new skin breakdown  Outcome: Met This Shift     Problem: Falls - Risk of:  Goal: Will remain free from falls  Description: Will remain free from falls  Outcome: Met This Shift  Goal: Absence of physical injury  Description: Absence of physical injury  Outcome: Met This Shift

## 2021-10-26 NOTE — CARE COORDINATION
Social work / Discharge planning:         Discharge plan is to return to [address] [place] [address]. CORRECT ADDRESS IS [address] [place] [address]. Will need ambulance transport. Form completed. CALL NURSING REPORT [phone]. Social work continues to follow.   Electronically signed by STACIE Rizzo on 10/26/2021 at 9:11 AM

## 2021-10-26 NOTE — PROGRESS NOTES
Department of Internal Medicine  Nephrology Progress Note    Events reviewed. SUBJECTIVE:  We are following Mr. Darryl Nava for hypernatremia. He is nonverbal.     PHYSICAL EXAM:      Vitals:    VITALS:  /84   Pulse 95   Temp 99.2 °F (37.3 °C) (Axillary)   Resp 20   Ht 5' 4\" (1.626 m)   Wt 104 lb 4.8 oz (47.3 kg)   SpO2 100%   BMI 17.90 kg/m²   24HR INTAKE/OUTPUT:      Intake/Output Summary (Last 24 hours) at 10/26/2021 1009  Last data filed at 10/26/2021 0544  Gross per 24 hour   Intake 1089 ml   Output --   Net 1089 ml       Constitutional:  Nonverbal  HEENT:  Normocephalic, PERRL  Respiratory:  CTA bilaterally  Cardiovascular/Edema:   Tachycardia, S1,S2  Gastrointestinal:  Soft, flat, nondistended  Neurologic:  Contracted BUE and BLE  Skin:  Warm, dry  Other:  No edema    Scheduled Meds:   polyethylene glycol  17 g Per G Tube Daily    white petrolatum   Topical BID    sodium chloride flush  10 mL IntraVENous 2 times per day    enoxaparin  40 mg SubCUTAneous Daily    [Held by provider] benztropine  1 mg Oral BID    carBAMazepine  200 mg Oral BID    carbidopa-levodopa  1 tablet PEG Tube 4x Daily    levETIRAcetam  500 mg Oral BID    pantoprazole  40 mg Oral QAM AC     Continuous Infusions:   sodium chloride Stopped (10/25/21 1402)    sodium chloride       PRN Meds:.sodium chloride flush, sodium chloride, potassium chloride **OR** potassium alternative oral replacement **OR** potassium chloride, ondansetron **OR** ondansetron, senna, acetaminophen **OR** acetaminophen, ipratropium-albuterol    DATA:    CBC:   Lab Results   Component Value Date    WBC 5.3 10/26/2021    RBC 4.20 10/26/2021    HGB 13.4 10/26/2021    HCT 41.0 10/26/2021    MCV 97.6 10/26/2021    MCH 31.9 10/26/2021    MCHC 32.7 10/26/2021    RDW 11.7 10/26/2021     10/26/2021    MPV 10.9 10/26/2021     CMP:    Lab Results   Component Value Date     10/26/2021    K 3.7 10/26/2021    K 3.7 10/26/2021     112 10/26/2021    CO2 29 10/26/2021    BUN 22 10/26/2021    CREATININE 0.6 10/26/2021    GFRAA >60 10/26/2021    LABGLOM >60 10/26/2021    GLUCOSE 134 10/26/2021    GLUCOSE 71 05/18/2012    PROT 6.2 10/26/2021    LABALBU 3.3 10/26/2021    LABALBU 4.5 05/18/2012    CALCIUM 8.5 10/26/2021    BILITOT 0.3 10/26/2021    ALKPHOS 89 10/26/2021    AST 27 10/26/2021    ALT 18 10/26/2021     Magnesium:    Lab Results   Component Value Date    MG 2.3 10/24/2021     Phosphorus:    Lab Results   Component Value Date    PHOS 3.4 10/24/2021     Radiology Review:      CTA of the chest 10/22/21   No evidence of pulmonary embolism or acute pulmonary abnormality.       Marked elevation of the right hemidiaphragm with compressive atelectasis at   the right lung base.       Large hiatal hernia. CT abdomen and pelvis with IV contrast 10/22/21   1. Limited due to inability to optimally position the patient. 2. Distended gallbladder containing a large gallstone. 3. Large amount of stool throughout the colon with large amount of stool   within the rectum and sigmoid colon. 4. Generalized thickened appearance of urinary bladder wall may be related to   nondistention versus cystitis. CXR 10/22/21   1. Limited due to suboptimal positioning. 2. Opacities in right lower lung field could indicate atelectasis or   pneumonia.  Short-term follow-up could be helpful for further evaluation. Results for Dary Lee (MRN [id number]) as of 10/23/2021 15:29   Ref. Range 10/22/2021 20:36   Osmolality, Ur Latest Ref Range: 300 - 900 mOsm/kg 1133 (H)   Sodium, Ur Latest Ref Range: Not Established mmol/L 43     BRIEF SUMMARY OF INITIAL CONSULT:    Briefly, Mr. Reece Huggins is a 73year old male with a PMH of developmental delay, MR, seizure disorder, tourette's disorder, Parkinson's, who was admitted on October 22, 2021 after presenting to the ER from nursing facility with a fever of 102.  A chest x-ray was obtained which revealed opacities in right lower lung. He had a negative PCR and Covid-19 test. Upon arrival to ER, his labs were significant for sodium 153, which is the reason for this consultation. IMPRESSION/RECOMMENDATIONS:      1. Hypernatremia, secondary to lack of free water administration. To start on D5W.   2. Seizure disorder, on carbamazepine and levetiracetam  3. Parkinson's, on carbidope-levodopa  4. Fever, received piperacllin-tazobactam in ER, ID consulted and are monitoring off of antibiotics   5. Fecal impaction  6. Cholelithiasis  7. Nutrition, dysphagia diet, TF is currently turned off as he is on nocturnal feedings. Will start on D5W.      Plan:    · Start D5W at 100 cc/hr  · Continue to monitor sodium level, BMP 4 PM      Electronically signed by MARIJA Loza CNP on 10/26/2021 at 10:15 AM

## 2021-10-27 NOTE — CARE COORDINATION
Social work / Discharge Planning:       Discharge plan is to return to [address] [place] [address]. Will need ambulance transport. CORRECT ADDRESS IS [address] [place] [address]. CALL NURSING REPORT -[phone]. Ambulance form completed. Discussed in IDR that patient does not wear oxygen continuously at the [facility]. RN will work on weaning oxygen and will discuss during 2pm rounds.     Electronically signed by STACIE Valencia on 10/27/2021 at 10:25 AM

## 2021-10-27 NOTE — PROGRESS NOTES
Perfect Serve message sent to Dr. Michoacano Arellano re: discharge. Ok to discharge per nephrology. Call placed to Dr. Charlie Sawant office, unable to reach, will discuss when rounds.      Electronically signed by Celeste Chavarria RN on 10/27/2021 at 9:40 AM

## 2021-10-27 NOTE — PROGRESS NOTES
Internal Medicine Progress Note    Patient's name: Lori Salazar  : 1950  Admission date: 10/22/2021  Date of service: 10/27/2021   Room: [facility]  Primary care physician: Liv Jiménez MD    Fina Snow was seen and examined while lying in bed. He is non-verbal and shaking at the present. He is diaphoretic temp at 0244 of 99.7 and at 0654 was 98.1. He was started on IV fluids of D5 W at 100 cc/h per nephrology yesterday with improvement of sodium this morning at 144 (was 149 yesterday AM). There are no physical signs of pain or discomfort noted at this time. Respirations remain easy and unlabored. Review of Systems  Unable to be obtained at this time.     Hospital Medications  Current Facility-Administered Medications   Medication Dose Route Frequency Provider Last Rate Last Admin    dextrose 5 % solution   IntraVENous Continuous Monique Gil, APRN -  mL/hr at 10/26/21 1138 New Bag at 10/26/21 1138    mupirocin (BACTROBAN) 2 % ointment   Topical BID Amber Aguirre APRN - CNP   Given at 10/26/21 2054    polyethylene glycol (GLYCOLAX) packet 17 g  17 g Per G Tube Daily Amber Aguirre APRN - CNP   17 g at 10/26/21 1247    white petrolatum ointment   Topical BID Esau Link DO   Given at 10/26/21 2054    0.45 % sodium chloride infusion   IntraVENous Continuous Monique Gil APRN - CNP   Stopped at 10/25/21 1402    sodium chloride flush 0.9 % injection 10 mL  10 mL IntraVENous 2 times per day Linnea PULIDO Durga, DO   10 mL at 10/26/21 2054    sodium chloride flush 0.9 % injection 10 mL  10 mL IntraVENous PRN Marcela E Durga, DO        0.9 % sodium chloride infusion  25 mL IntraVENous PRN Marcela E Durga, DO        potassium chloride (KLOR-CON M) extended release tablet 40 mEq  40 mEq Oral PRN Marcela E Durga, DO        Or    potassium bicarb-citric acid (EFFER-K) effervescent tablet 40 mEq  40 mEq Oral PRN Marcela E Durga, DO        Or    potassium chloride 10 mEq/100 mL IVPB (Peripheral Line)  10 mEq IntraVENous PRN Marcela E Durga, DO        enoxaparin (LOVENOX) injection 40 mg  40 mg SubCUTAneous Daily Marcela E Durga, DO   40 mg at 10/26/21 1000    ondansetron (ZOFRAN-ODT) disintegrating tablet 4 mg  4 mg Oral Q8H PRN Marcela E Durga, DO        Or    ondansetron (ZOFRAN) injection 4 mg  4 mg IntraVENous Q6H PRN Marcela E Durga, DO        senna (SENOKOT) tablet 8.6 mg  1 tablet Oral Daily PRN Marcela E Durga, DO        acetaminophen (TYLENOL) tablet 650 mg  650 mg Oral Q6H PRN Marcela E Durga, DO   650 mg at 10/25/21 2146    Or    acetaminophen (TYLENOL) suppository 650 mg  650 mg Rectal Q6H PRN Marcela E Durga, DO        [Held by provider] benztropine (COGENTIN) tablet 1 mg  1 mg Oral BID Marcela PULIDO Durga, DO   1 mg at 10/23/21 0906    carBAMazepine (TEGRETOL) tablet 200 mg  200 mg Oral BID Marcela E Durga, DO   200 mg at 10/26/21 2053    carbidopa-levodopa (SINEMET)  MG per tablet 1 tablet  1 tablet PEG Tube 4x Daily Marcela PULIDO Durga, DO   1 tablet at 10/26/21 2053    ipratropium-albuterol (DUONEB) nebulizer solution 3 mL  1 vial Inhalation Q4H PRN Marcela PULIDO Durga, DO        levETIRAcetam (KEPPRA) 100 MG/ML solution 500 mg  500 mg Oral BID Marcela E Durga, DO   500 mg at 10/26/21 2053    pantoprazole (PROTONIX) tablet 40 mg  40 mg Oral QAM AC Marcela E Durga, DO   40 mg at 10/27/21 0600       PRN Medications  sodium chloride flush, sodium chloride, potassium chloride **OR** potassium alternative oral replacement **OR** potassium chloride, ondansetron **OR** ondansetron, senna, acetaminophen **OR** acetaminophen, ipratropium-albuterol    Objective  Most Recent Recorded Vitals  /72   Pulse 77   Temp 98.1 °F (36.7 °C) (Oral)   Resp 18   Ht 5' 4\" (1.626 m)   Wt 114 lb 6.4 oz (51.9 kg)   SpO2 100%   BMI 19.64 kg/m²   I/O last 3 completed shifts: In: 988 [NG/GT:988]  Out: -   No intake/output data recorded.     Physical stool throughout the colon with large amount of stool   within the rectum and sigmoid colon. 4. Generalized thickened appearance of urinary bladder wall may be related to   nondistention versus cystitis. XR CHEST PORTABLE   Final Result   1. Limited due to suboptimal positioning. 2. Opacities in right lower lung field could indicate atelectasis or   pneumonia. Short-term follow-up could be helpful for further evaluation. TIP CULTURE  No results for input(s): CXCATHTIP in the last 72 hours. CULTURE, RESPIRATORY   No results for input(s): CULTRESP in the last 72 hours. RESPIRATORY SMEAR  No results for input(s): RESPSMEAR in the last 72 hours. BODY FLUID CULTURE  No results for input(s): BFCS in the last 72 hours. WOUND ABSCESS  No results for input(s): WNDABS in the last 72 hours. Anaerobic cx  No results for input(s): LABANAE in the last 72 hours. CULTURE SURGICAL  No results for input(s): CXSURG in the last 72 hours. URINE CULTURE      No results for input(s): ORG in the last 72 hours. MISC. SENDOUT  No results for input(s): MREF in the last 72 hours. STREP PNEUMONIA AG URINE  No results for input(s): STREPNEUMAGU in the last 72 hours. LEGIONELLA AG URINE  No results for input(s): LEGUR in the last 72 hours. No results for input(s): [facility] in the last 72 hours. Assessment   Active Hospital Problems    Diagnosis     Moderate protein-calorie malnutrition ([facility] [E44.0]     Developmental delay [R62.50]     Hypernatremia [E87.0]     Sepsis ([facility] [address] [A41.9]          Plan  · Fever[de-identified] resp array negative. Blood cultures and urine culture pending. Continue IV fluids. Hold cogentin. ID following-no antibiotics warranted. Felt to be d/t hypernatremia and volume contraction. · Hypernatremia: Improved. Felt secondary to lack of free water, deficit of 3.8L. IV fluids restarted per nephrology. Free Water increased.  Continue diet of  pureed with nectar thick liquids. · Right toe wounds: follows with podiatry outpt. · Parkinson's  Disease: Continue Sinemet  · Constipation: Daily bowel regimen  · Continue home medications other then Cogentin  · PT/OT  · Follow labs   · DVT prophylaxis  · Please see orders for further management and care. · Discharge plan: TBD, back to [place]    Electronically signed by MARIJA Ayala CNP on 10/27/2021 at 7:55 AM    I can be reached through Answering service. Addendum: I have personally participated in a face-to-face history and physical exam on the date of service with the patient. I have discussed the case with the nurse practitioner. I also participated in medical decision making on the date of service and I agree with all of the pertinent clinical information unless indicated in my editing of the note. I have reviewed and edited the note above based on my findings during my history, exam, and decision making on the same day of service. My additional thoughts:   Ok to discharge back to SNF today. Close monitoring of kidney function and sodium at facility. Halle Pyle DO    Electronically signed by Halle Pyle DO on 10/27/2021 at 4:16 PM    I can be reached through Answering service.

## 2021-10-27 NOTE — DISCHARGE INSTR - COC
Continuity of Care Form    Patient Name: Tammy Batista   :  1950  MRN:  [id number]    Admit date:  10/22/2021  Discharge date:  ***    Code Status Order: DNR-CCA   Advance Directives:      Admitting Physician:  Tyler Bowers DO  PCP: Gamaliel Guallpa MD    Discharging Nurse: [facility] Unit/Room#: 7384/6284-C  Discharging Unit Phone Number: ***    Emergency Contact:   Extended Emergency Contact Information  Primary Emergency Contact: Bella Rosas   [address] Phone: [phone]  Relation: Legal Guardian    Past Surgical History:  Past Surgical History:   Procedure Laterality Date    TOE AMPUTATION Left         UPPER GASTROINTESTINAL ENDOSCOPY N/A 3/2/2021    PEG TUBE INSERTION performed by Dora Restrepo MD at [address] [place] [address]       Immunization History:   Immunization History   Administered Date(s) Administered    COVID-19, Berton Gals, Primary or Immunocompromised, PF, 100mcg/0.5mL 2020, 2021    Influenza, High Dose (Fluzone 65 yrs and older) 10/17/2018    Tdap (Boostrix, Adacel) 2016       Active Problems:  Patient Active Problem List   Diagnosis Code    Seizure ([address] R56.9    Pressure ulcer of toe of right foot, stage 3 ([address] M29.941    Atherosclerosis of native artery of both lower extremities ([address] I70.203    Dehydration with hypernatremia E86.0, E87.0    Status epilepticus ([address] G40.901    Seizures ([address] R56.9    Seizure disorder, status epilepticus, convulsive ([address] G40.901    Moderate protein-calorie malnutrition ([address] E44.0    Acute renal failure ([address] N17.9    Thrombocytopenia (HCC) D69.6    Fever, unspecified R50.9    Encephalopathy G93.40    Urinary tract infection without hematuria N39.0    Severe dehydration E86.0    Acute respiratory failure with hypoxia (HCC) J96.01    Hypoventilation syndrome R06.89    Atelectasis J98.11    Sepsis ([address] A41.9    Gram positive septicemia (HCC) A41.89    Parkinson disease ([facility] [address] G20    Hypoxia R09.02 Lactic acidosis E87.2    Abnormal urinalysis R82.90    Constipation K59.00    Autism spectrum disorder associated with neurodevelopmental, mental or behavioral disorder, requiring very substantial support (level 3) F84.0    Pressure injury of dorsum of right foot, stage 3 (HCC) V48.006    Developmental delay R62.50    Hypernatremia E87.0       Isolation/Infection:   Isolation            No Isolation          Patient Infection Status       Infection Onset Added Last Indicated Last Indicated By Review Planned Expiration Resolved Resolved By    None active    Resolved    COVID-19 Rule Out 10/22/21 10/22/21 10/22/21 Respiratory Panel, Molecular, with COVID-19 (Restricted: peds pts or suitable admitted adults) (Ordered)   10/23/21 Rule-Out Test Resulted    COVID-19 Rule Out 10/22/21 10/22/21 10/22/21 COVID-19, Rapid (Ordered)   10/22/21 Rule-Out Test Resulted    COVID-19 Rule Out 03/05/21 03/05/21 03/05/21 COVID-19, Rapid (Ordered)   03/05/21 Rule-Out Test Resulted    COVID-19 Rule Out 03/04/21 03/04/21 03/04/21 COVID-19, Rapid (Ordered)   03/04/21 Rule-Out Test Resulted    COVID-19 Rule Out 02/25/21 02/25/21 02/25/21 Respiratory Panel, Molecular, with COVID-19 (Restricted: peds pts or suitable admitted adults) (Ordered)   02/25/21 Rule-Out Test Resulted    COVID-19 Rule Out 02/23/21 02/23/21 02/23/21 Respiratory Panel, Molecular, with COVID-19 (Restricted: peds pts or suitable admitted adults) (Ordered)   02/23/21 Rule-Out Test Resulted    COVID-19 Rule Out 02/04/21 02/04/21 02/04/21 COVID-19 (Ordered)   02/04/21 Rule-Out Test Resulted    COVID-19 Rule Out 01/27/21 01/27/21 01/27/21 Respiratory Panel, Molecular, with COVID-19 (Restricted: peds pts or suitable admitted adults) (Ordered)   01/28/21 Rule-Out Test Resulted    COVID-19 Rule Out 01/25/21 01/25/21 01/25/21 COVID-19 (Ordered)   01/25/21 Rule-Out Test Resulted    COVID-19 Rule Out 10/28/20 10/28/20 10/28/20 COVID-19 (Ordered)   10/28/20 Rule-Out Test Resulted COVID-19 Rule Out 20 COVID-19 (Ordered)   20 Rule-Out Test Resulted    COVID-19 Rule Out 20 COVID-19 (Ordered)   20 Rule-Out Test Resulted    COVID-19 Rule Out 20 COVID-19 (Ordered)   20 Rule-Out Test Resulted    Not detected 2020    C-diff Rule Out 20 Clostridium difficile EIA (Ordered)   20 Martha Romy, RN    INFLUENZA 20 Respiratory Panel, Molecular   20             Nurse Assessment:  Last Vital Signs: /72   Pulse 77   Temp 98.1 °F (36.7 °C) (Oral)   Resp 18   Ht 5' 4\" (1.626 m)   Wt 114 lb 6.4 oz (51.9 kg)   SpO2 93%   BMI 19.64 kg/m²     Last documented pain score (0-10 scale): Pain Level: 0  Last Weight:   Wt Readings from Last 1 Encounters:   10/27/21 114 lb 6.4 oz (51.9 kg)     Mental Status:  {IP PT MENTAL STATUS:50658}    IV Access:  { NICOLASA IV ACCESS:084138276}    Nursing Mobility/ADLs:  Walking   Dependent  Transfer  Dependent  Bathing  Dependent  Dressing  Dependent  Toileting  Dependent  Feeding  Assisted  Med Admin  Dependent  Med Delivery   crushed - through Peg tube    Wound Care Documentation and Therapy:  Wound (Active)   Number of days:        Wound 20 Foot Left bunion area (Active)   Number of days: 505       Wound 20 Sacrum Left (Active)   Number of days: 504       Wound 20 Coccyx (Active)   Number of days: 504       Wound 21 Ankle Right;Other (Comment); Lateral skin tear measuring 1 cm X 2.3 cm X 0.1 cm (Active)   Number of days: 245       Wound 21 Toe (Comment  which one) Right;Dorsal #1 second toe (Active)   Dressing Status Other (Comment) 10/27/21 0700   Wound Cleansed Irrigated with saline 10/23/21 2056   Wound Length (cm) 0.2 cm 10/23/21 2056   Wound Width (cm) 0.2 cm 10/23/21 2056   Wound Depth (cm) 0.1 cm 10/23/21 2056   Wound Surface Area (cm^2) 0.04 cm^2 10/23/21 2056   Change in Wound Size % (l*w) 80 10/23/21 2056   Wound Volume (cm^3) 0.004 cm^3 10/23/21 2056   Wound Healing % 90 10/23/21 2056   Post-Procedure Length (cm) 0.4 cm 10/11/21 1402   Post-Procedure Width (cm) 0.4 cm 10/11/21 1402   Post-Procedure Depth (cm) 0.1 cm 10/11/21 1402   Post-Procedure Surface Area (cm^2) 0.16 cm^2 10/11/21 1402   Post-Procedure Volume (cm^3) 0.016 cm^3 10/11/21 1402   Wound Assessment Other (Comment) 10/25/21 0028   Drainage Amount Small 10/23/21 2105   Drainage Description Serosanguinous 10/23/21 2105   Odor None 10/23/21 2105   Fabby-wound Assessment Blanchable erythema 10/23/21 2105   Number of days: 44        Elimination:  Continence: Bowel: No  Bladder: No  Urinary Catheter: None   Colostomy/Ileostomy/Ileal Conduit: No       Date of Last BM: 10/27/21    Intake/Output Summary (Last 24 hours) at 10/27/2021 1504  Last data filed at 10/27/2021 0556  Gross per 24 hour   Intake 988 ml   Output --   Net 988 ml     I/O last 3 completed shifts: In: 988 [NG/GT:988]  Out: -     Safety Concerns: At Risk for Falls and History of Seizures    Impairments/Disabilities:      Speech and Contractures - ***  Nutrition Therapy:  Current Nutrition Therapy:   - Oral Diet:  Dysphagia 1 pureed  - Tube Feedings:  Standard with fiber    Routes of Feeding: Oral  Liquids: Nectar Thick Liquids  Daily Fluid Restriction: no  Last Modified Barium Swallow with Video (Video Swallowing Test): not done    Treatments at the Time of Hospital Discharge:   Respiratory Treatments: ***  Oxygen Therapy:  is not on home oxygen therapy.   Ventilator:    - No ventilator support    Rehab Therapies: {THERAPEUTIC INTERVENTION:7997014813}  Weight Bearing Status/Restrictions: Non-weight bearing on right leg and left leg  Other Medical Equipment (for information only, NOT a DME order):  wheelchair  Other Treatments: ***    Patient's personal belongings (please select all that are sent with patient):  {[facility] DME Belongings:125100109}    JAZLYN SIGNATURE:  {Esignature:670901344}    CASE MANAGEMENT/SOCIAL WORK SECTION    Inpatient Status Date: ***    Readmission Risk Assessment Score:  Readmission Risk              Risk of Unplanned Readmission:  15           Discharging to Facility/ Agency   Name:   Address:  Phone:  Fax:    Dialysis Facility (if applicable)   Name:  Address:  Dialysis Schedule:  Phone:  Fax:    / signature: {Esignature:922312505}    PHYSICIAN SECTION    Prognosis: {Prognosis:6618559615}    Condition at Discharge: [address] [place] [address] Patient Condition:870299255}    Rehab Potential (if transferring to Rehab): {Prognosis:6892964675}    Recommended Labs or Other Treatments After Discharge: ***    Physician Certification: I certify the above information and transfer of Tye Morales  is necessary for the continuing treatment of the diagnosis listed and that he requires {Admit to Appropriate Level of Care:78441} for {GREATER/LESS:138644730} 30 days.      Update Admission H&P: {CHP DME Changes in POCNM:490898015}    PHYSICIAN SIGNATURE:  Electronically signed by Dimitri Young DO on 10/27/21 at 3:56 PM EDT

## 2021-10-27 NOTE — PROGRESS NOTES
Nurse to nurse called to [facility]. RN spoke to Bandar Burorughs, [address] [place] [address] at [phone]. [facility] appx[facility] 2130. Patient assigned room. Discharge paperwork faxed per request. Patient vitals stable. No s/s of discomfort.  Electronically signed by Carrie Sapp RN on 10/27/2021 at 5:54 PM

## 2021-10-27 NOTE — PROGRESS NOTES
[facility] at 2130. Call placed to legal guardian Abram Rueda to update. RN to call nurse to nurse to [facility] -[phone].     Electronically signed by Bijan Kelly RN on 10/27/2021 at 5:02 PM

## 2021-10-27 NOTE — PROGRESS NOTES
Patient weaned off of O2. Sating at 93 % room air.  Electronically signed by Nomi Capps RN on 10/27/2021 at 1:30 PM

## 2021-10-27 NOTE — PROGRESS NOTES
Department of Internal Medicine  Nephrology Progress Note    Events reviewed. SUBJECTIVE:  We are following Mr. Dank Padron for hypernatremia. He is nonverbal.     PHYSICAL EXAM:      Vitals:    VITALS:  /72   Pulse 77   Temp 98.1 °F (36.7 °C) (Oral)   Resp 18   Ht 5' 4\" (1.626 m)   Wt 114 lb 6.4 oz (51.9 kg)   SpO2 100%   BMI 19.64 kg/m²   24HR INTAKE/OUTPUT:      Intake/Output Summary (Last 24 hours) at 10/27/2021 0848  Last data filed at 10/27/2021 0556  Gross per 24 hour   Intake 988 ml   Output --   Net 988 ml       Constitutional:  Nonverbal  HEENT:  Normocephalic, PERRL  Respiratory:  CTA bilaterally  Cardiovascular/Edema:   Tachycardia, S1,S2  Gastrointestinal:  Soft, flat, nondistended  Neurologic:  Contracted BUE and BLE  Skin:  Warm, dry  Other:  No edema    Scheduled Meds:   mupirocin   Topical BID    polyethylene glycol  17 g Per G Tube Daily    white petrolatum   Topical BID    sodium chloride flush  10 mL IntraVENous 2 times per day    enoxaparin  40 mg SubCUTAneous Daily    [Held by provider] benztropine  1 mg Oral BID    carBAMazepine  200 mg Oral BID    carbidopa-levodopa  1 tablet PEG Tube 4x Daily    levETIRAcetam  500 mg Oral BID    pantoprazole  40 mg Oral QAM AC     Continuous Infusions:   dextrose 100 mL/hr at 10/26/21 1138    sodium chloride       PRN Meds:.sodium chloride flush, sodium chloride, potassium chloride **OR** potassium alternative oral replacement **OR** potassium chloride, ondansetron **OR** ondansetron, senna, acetaminophen **OR** acetaminophen, ipratropium-albuterol    DATA:    CBC:   Lab Results   Component Value Date    WBC 5.6 10/27/2021    RBC 4.08 10/27/2021    HGB 13.2 10/27/2021    HCT 39.5 10/27/2021    MCV 96.8 10/27/2021    MCH 32.4 10/27/2021    MCHC 33.4 10/27/2021    RDW 11.8 10/27/2021     10/27/2021    MPV 11.5 10/27/2021     CMP:    Lab Results   Component Value Date     10/27/2021    K 3.6 10/27/2021    K 3.6 10/27/2021 opacities in right lower lung. He had a negative PCR and Covid-19 test. Upon arrival to ER, his labs were significant for sodium 153, which is the reason for this consultation. IMPRESSION/RECOMMENDATIONS:      1. Hypernatremia, secondary to lack of free water administration. Sodium levels improved. Stop IVF and give 400 cc free water bolus Q8 hrs via peg-tube.  ------------------------------------------------------------------  2. Seizure disorder, on carbamazepine and levetiracetam  3. Parkinson's, on carbidope-levodopa  4. Fever, received piperacllin-tazobactam in ER, ID consulted and are monitoring off of antibiotics   5. Fecal impaction  6. Cholelithiasis  7. Nutrition, dysphagia diet, TF is currently turned off as he is on nocturnal feedings, free water bolus 400 cc Q 8hrs.     Plan:    · Stop IVF   · Free water bolus via peg-tube 400 cc Q 8 hrs  · Continue to monitor sodium level  · Ok to discharge from renal standpoint    Electronically signed by MARIJA Paula CNP on 10/27/2021 at 8:48 AM

## 2021-10-28 NOTE — TELEPHONE ENCOUNTER
LM for pt with staff that Alvinojo Ordoñezjuan diego is aware that patients is not taking his Tegretol

## 2021-10-28 NOTE — CARE COORDINATION
[facility] Transitions Initial Follow Up Call    Call within 2 business days of discharge: Yes    Patient: Batsheva Manrique Patient : 1950   MRN: [id number]  Reason for Admission: Fever, sepsis  Discharge Date: 10/28/21 RARS: Readmission Risk Score: 15.2      Last Discharge  [place] [id number]       Complaint Diagnosis Description Type Department Provider    10/22/21 Fever Sepsis, due to unspecified organism, unspecified whether acute organ dysfunction present ([facility] [address] . .. ED to Hosp-Admission (Discharged) (ADMITTED) [facility] Babs Fleischer Reiter, DO; Dhaval Kash. .. Spoke with: MIRIAM RN, [facility] (HIPAA). Patient is nonverbal, MRDD. BODØ states he is doing good. He is no longer having fevers. States no bowel or bladder elimination concerns at this time. States his appetite is good. He does have a G-tube, but also take PO food. She states he did eat his breakfast this morning. Medications were reviewed. BODØ states she will be making his follow up appointments today for patient. BODØ states there are no other concerns or needs. Facility: [facility]    Non-face-to-face services provided:  Obtained and reviewed discharge summary and/or continuity of care documents. Transitions of Care Initial Call    Was this an external facility discharge? No Discharge Facility: [facility]    Challenges to be reviewed by the provider   Additional needs identified to be addressed with provider: No  none             Method of communication with provider : none      Advance Care Planning:   Does patient have an Advance Directive: reviewed and current. Was this a readmission? No  Patient stated reason for admission:   Patients top risk factors for readmission: medical condition-s/p sepsis    Care Transition Nurse (CTN) contacted the patient by telephone to perform post hospital discharge assessment. Verified name and  with patient as identifiers.  Provided introduction to self, and explanation of the CTN role. CTN reviewed discharge instructions, medical action plan and red flags with patient who verbalized understanding. Caregiver given an opportunity to ask questions and does not have any further questions or concerns at this time. Were discharge instructions available to patient? Yes. Reviewed appropriate site of care based on symptoms and resources available to patient including: PCP, Specialist and When to call 911. The caregiver agrees to contact the PCP office for questions related to their healthcare. Medication reconciliation was performed with caregiver, who verbalizes understanding of administration of home medications. Advised obtaining a 90-day supply of all daily and as-needed medications. Covid Risk Education     Educated patient about risk for severe COVID-19 due to risk factors according to CDC guidelines. CTN reviewed discharge instructions, medical action plan and red flag symptoms with the patient who verbalized understanding. Discussed COVID vaccination status: No. Education provided on COVID-19 vaccination as appropriate. Discussed exposure protocols and quarantine with CDC Guidelines. Patient was given an opportunity to verbalize any questions and concerns and agrees to contact CTN or health care provider for questions related to their healthcare. Reviewed and educated patient on any new and changed medications related to discharge diagnosis. Was patient discharged with a pulse oximeter? No Discussed and confirmed pulse oximeter discharge instructions and when to notify provider or seek emergency care. CTN provided contact information. No further follow-up call indicated based on severity of symptoms and risk factors.   Plan for next call: no further follow up          Care Transitions 24 Hour Call    Schedule Follow Up Appointment with PCP: Completed  Do you have any ongoing symptoms?: No  Do you have a copy of your discharge instructions?:

## 2021-10-30 NOTE — DISCHARGE SUMMARY
Discharge Summary     Patient ID:  Grupo Bonilla  [id number]  73 y.o. 1950 male  Brando Garces MD        Admit date: 10/22/2021    Discharge date and time:  10/27/2021    Activity level: As tolerated  Diet: Pureed with free water flushes   Labs:BMP in 2 days  Disposition:Skilled nursing facility  Condition on Discharge:Stable  DME: None     Admit Diagnoses:   Patient Active Problem List   Diagnosis    Seizure ([address]    Pressure ulcer of toe of right foot, stage 3 ([address]    Atherosclerosis of native artery of both lower extremities ([address]    Dehydration with hypernatremia    Status epilepticus ([address]    Seizures ([address]    Seizure disorder, status epilepticus, convulsive ([address]    Moderate protein-calorie malnutrition ([address]    Acute renal failure ([address]    Thrombocytopenia ([address]    Fever, unspecified    Encephalopathy    Urinary tract infection without hematuria    Severe dehydration    Acute respiratory failure with hypoxia (HCC)    Hypoventilation syndrome    Atelectasis    Sepsis (HCC)    Gram positive septicemia (HCC)    Parkinson disease ([address]    Hypoxia    Lactic acidosis    Abnormal urinalysis    Constipation    Autism spectrum disorder associated with neurodevelopmental, mental or behavioral disorder, requiring very substantial support (level 3)    Pressure injury of dorsum of right foot, stage 3 ([address]    Developmental delay    Hypernatremia       Discharge Diagnoses: Active Problems: Moderate protein-calorie malnutrition ([address]    Sepsis ([address]    Developmental delay    Hypernatremia  Resolved Problems:    * No resolved hospital problems. *  Hypernatremia  Volume contraction  Fever due to above. Consults:  IP CONSULT TO INTERNAL MEDICINE  IP CONSULT TO INFECTIOUS DISEASES  IP CONSULT TO NEPHROLOGY    Procedures: none     Hospital Course: Michelet Villa is a 73y.o. year old male who presents with fever of 102F from nursing facility. He is seen laying in bed awake.  There are no family or friends or caregiver at bedside. The history is provided by chart review. He is seen laying in bed awake. He has history of MR and Parkinson's. He is alert and does track me with his eyes but does not attempt to verbalize. He does have significant body tremors. He was given  IV fluids and Zosyn. ID and Nephrology were consulted. He was stopped off antibiotics. All cultures negative. He was given IVF and volume contraction resolved. Discharge Exam:    /72  Pulse 77  Temp 98.1 °F (36.7 °C) (Oral)  Resp 18  Ht 5' 4\" (1.626 m)  Wt 114 lb 6.4 oz (51.9 kg)  SpO2 100%  BMI 19.64 kg/m²   I/O last 3 completed shifts: In: 988 [NG/GT:988]   Out: -   No intake/output data recorded. Physical Exam:   General: Awake and alert, does not attempt to verbalize or follow simple commands, NAD, no labored breathing   Eyes: conjunctivae/corneas clear, sclera non icteric   Skin: color/texture/turgor normal, no rashes or lesions   Lungs: CTAB, no retractions/use of accessory muscles, no vocal fremitus, no rhonchi, no crackle, no rales   Heart: regular rate, regular rhythm, no murmur   Abdomen: soft, NT; peg tube intact with slight erythema of surrounding skin, bowel sounds normal; no masses, no organomegaly; condom cath in use with yellow colored urine   Extremities: legs contracted, right foot dressing dry and intact, no cyanosis, no edema   Neurologic: cranial nerves 2-12 grossly intact, no slurred speech. I/O last 3 completed shifts: In: 620 [P.O.:220; NG/GT:400]  Out: -   No intake/output data recorded. Imaging:  No results found.     Patient Instructions:   Discharge Medication List as of 10/28/2021  2:28 AM      CONTINUE these medications which have NOT CHANGED    Details   Ergocalciferol (DRISDOL) 200 MCG/ML drops Take 8,000 Units by mouth daily 6.25 ml 50,000 IU on the 14th of every OVSHA(4308TC/VN)EUFFNCCAPO Med      folic acid (FOLVITE) 1 MG tablet Take 1 mg by mouth dailyHistorical Med Multiple Vitamin (MULTIVITAMIN ADULT PO) Take by mouth dailyHistorical Med      calcium carbonate (TUMS) 500 MG chewable tablet Take 1 tablet by mouth 2 times dailyHistorical Med      Saccharomyces boulardii (PROBIOTIC) 250 MG CAPS Take by mouth 2 times dailyHistorical Med      carbidopa-levodopa (SINEMET)  MG per tablet 2 tablets by PEG Tube route every 4 hours Ok for 2am dose, Disp-360 tablet, R-1Normal      omeprazole (PRILOSEC) 20 MG delayed release capsule 20 mg daily *PER [facility](JOSE CRUZ) VIA PEG TUBE*Historical Med      levETIRAcetam (KEPPRA) 100 MG/ML solution Take 5 mLs by mouth 2 times daily, R-3DC to SNF      ipratropium-albuterol (DUONEB) 0.5-2.5 (3) MG/3ML SOLN nebulizer solution Inhale 1 vial into the lungs every 4 hours as needed for Shortness of BreathHistorical Med      carBAMazepine (TEGRETOL) 200 MG tablet Take 200 mg by mouth 2 times daily Historical Med      acetaminophen (TYLENOL) 325 MG tablet Take 650 mg by mouth every 4 hours as needed for Pain or Fever Historical Med      benztropine (COGENTIN) 1 MG tablet Take 1 mg by mouth 2 times daily Historical Med               Note that more than 30 minutes was spent in preparing discharge papers, discussing discharge with patient, medication review, etc.      Signed:    Kevon Matute DO    Electronically signed by Kevon Matute DO on 10/30/2021 at 8:15 AM

## 2021-11-08 NOTE — PROGRESS NOTES
[facility] Followup Visit Note    Referring Physician : Garth Barajas MD  Denver Ree  MEDICAL RECORD NUMBER:  [id number]  AGE: 73 y.o. GENDER: male  : 1950  EPISODE DATE:  2021    Subjective:     Chief Complaint   Patient presents with    Wound Check     RIGHT FOOT      HISTORY of PRESENT ILLNESS HPI   Denver Ree is a 73 y.o. male who presents today in regards to follow up evaluation and treatment of wound/ulcer. That patient's past medical, family and social hx were reviewed and changes were made if present. History of Wound Context: Patient with history of developmental disabilities, seizure disorder as well as recently diagnosed Parkinson's resides at [facility] who presents with his caregiver for evaluation of a wound pressure type on his right foot. Duration unknown, caregiver relates that he was transferred from one facility to another. He does have history of previous pressure ulcerations as well.     21 due to overall status comfortable medical problems treat very conservatively. We will initiate local care, right foot as well as left lower leg. Due to deformities it is important that they alleviate any type of pressure from the top of the foot as well as bottom. Offload at all times. 21 Wounds noted dorsal right second toe as well as plantar distal foot with 25% devitalized nonviable tissue. No purulence or odor. No surrounding erythema, fluctuance, crepitus or associated pain. With debridement through subcutaneous tissue. 10-11-21 Wounds noted dorsal right second toe as well as plantar distal foot with 50% devitalized nonviable tissue. No purulence or odor. No surrounding erythema, fluctuance, crepitus or associated pain. With debridement through subcutaneous tissue. 21 last seen on . In the interim he was in the hospital.  He presented to the ER with fever of 102.   He was given IVF for volume contraction. Physical exam demonstrates healed wounds dorsal as well as plantar aspect right foot second toe region. There is a superficial skin tear medially overlying a prominent bony deformity first metatarsal head. Patient is rubbing on his wheelchair. Recommend Xeroform until healed. Place a pillow to decrease any pressure or additional breakdown. Follow as needed      Wound/Ulcer Pain Timing/Severity: none  Quality of pain:      Ulcer Identification:  Ulcer Type: pressure  Contributing Factors: chronic pressure and decreased mobility     Diabetic/Pressure/Non Pressure Ulcers onl y:  Ulcer: Pressure ulcer, Stage 3        PAST MEDICAL HISTORY      Diagnosis Date    Acquired deformity of neck     Autism spectrum     Bilateral cataracts     Blepharochalasis     Cardiomegaly     Cataract of both eyes     Developmental delay     Gastritis     Hyperlipidemia     Hypernatremia     Kyphosis of thoracic region     Mental retardation     SEVERE MR  NON VERBAL, NEEDS WHEELCHAIR FOR LONG DISTANCE    Obsessive compulsive disorder     Parkinsonism (HCC)     Seizures (HCC)     Tourette disorder      Past Surgical History:   Procedure Laterality Date    TOE AMPUTATION Left     2nd/3rd    UPPER GASTROINTESTINAL ENDOSCOPY N/A 3/2/2021    PEG TUBE INSERTION performed by Jenifer Lucas MD at [address] [place] [address]     History reviewed. No pertinent family history.   Social History     Tobacco Use    Smoking status: Never Smoker    Smokeless tobacco: Never Used   Vaping Use    Vaping Use: Never used   Substance Use Topics    Alcohol use: No    Drug use: No     No Known Allergies  Current Outpatient Medications on File Prior to Encounter   Medication Sig Dispense Refill    folic acid (FOLVITE) 1 MG tablet Take 1 mg by mouth daily      Multiple Vitamin (MULTIVITAMIN ADULT PO) Take by mouth daily      calcium carbonate (TUMS) 500 MG chewable tablet Take 1 tablet by mouth 2 times daily      Saccharomyces boulardii (PROBIOTIC) 250 MG CAPS Take by mouth 2 times daily      carbidopa-levodopa (SINEMET)  MG per tablet 2 tablets by PEG Tube route every 4 hours Ok for 2am dose 360 tablet 1    omeprazole (PRILOSEC) 20 MG delayed release capsule 20 mg daily *PER [facility](JOSE CRZU) VIA PEG TUBE*      levETIRAcetam (KEPPRA) 100 MG/ML solution Take 5 mLs by mouth 2 times daily  3    carBAMazepine (TEGRETOL) 200 MG tablet Take 200 mg by mouth 2 times daily       benztropine (COGENTIN) 1 MG tablet Take 1 mg by mouth 2 times daily       Ergocalciferol (DRISDOL) 200 MCG/ML drops Take 8,000 Units by mouth daily 6.25 ml 50,000 IU on the 14th of every malka(8000IU/ml)      ipratropium-albuterol (DUONEB) 0.5-2.5 (3) MG/3ML SOLN nebulizer solution Inhale 1 vial into the lungs every 4 hours as needed for Shortness of Breath      acetaminophen (TYLENOL) 325 MG tablet Take 650 mg by mouth every 4 hours as needed for Pain or Fever        No current facility-administered medications on file prior to encounter.        REVIEW OF SYSTEMS See HPI    Objective:    BP (!) 118/56   Pulse 120   Temp 98.4 °F (36.9 °C) (Temporal)   Resp 18   Ht 5' 4\" (1.626 m)   Wt 114 lb 6 oz (51.9 kg)   BMI 19.63 kg/m²   Wt Readings from Last 3 Encounters:   11/08/21 114 lb 6 oz (51.9 kg)   10/27/21 114 lb 6.4 oz (51.9 kg)   10/11/21 110 lb (49.9 kg)     PHYSICAL EXAM  CONSTITUTIONAL:   Awake, alert, cooperative   EYES:  lids and lashes normal   ENT: external ears and nose without lesions   NECK:  supple, symmetrical, trachea midline   SKIN:      Assessment:     Problem List Items Addressed This Visit     Pressure injury of dorsum of right foot, stage 3 (HCC) - Primary               Wound (Active)   Number of days:        Wound 06/09/20 Foot Left bunion area (Active)   Number of days: 517       Wound 06/09/20 Sacrum Left (Active)   Number of days: 516       Wound 06/09/20 Coccyx (Active)   Number of days: 516       Wound 02/24/21 Ankle Right;Other (Comment); Lateral skin tear measuring 1 cm X 2.3 cm X 0.1 cm (Active)   Number of days: 257       Wound 09/13/21 Toe (Comment  which one) Right;Dorsal #1 second toe (Active)   Wound Image   11/08/21 1331   Wound Etiology Pressure Stage  3 09/13/21 1459   Dressing Status Other (Comment) 10/27/21 2000   Wound Cleansed Irrigated with saline 10/23/21 2056   Dressing/Treatment Alginate with Ag; Dry dressing 09/13/21 1546   Offloading for Diabetic Foot Ulcers Other (comment) 09/13/21 1546   Wound Length (cm) 0 cm 11/08/21 1331   Wound Width (cm) 0 cm 11/08/21 1331   Wound Depth (cm) 0 cm 11/08/21 1331   Wound Surface Area (cm^2) 0 cm^2 11/08/21 1331   Change in Wound Size % (l*w) 100 11/08/21 1331   Wound Volume (cm^3) 0 cm^3 11/08/21 1331   Wound Healing % 100 11/08/21 1331   Post-Procedure Length (cm) 0.4 cm 10/11/21 1402   Post-Procedure Width (cm) 0.4 cm 10/11/21 1402   Post-Procedure Depth (cm) 0.1 cm 10/11/21 1402   Post-Procedure Surface Area (cm^2) 0.16 cm^2 10/11/21 1402   Post-Procedure Volume (cm^3) 0.016 cm^3 10/11/21 1402   Wound Assessment Epithelialization 11/08/21 1331   Drainage Amount Small 10/23/21 2105   Drainage Description Serosanguinous 10/23/21 2105   Odor None 10/23/21 2105   Fabby-wound Assessment Blanchable erythema 10/23/21 2105   Number of days: 56                Plan:   Treatment Note please see attached Discharge Instructions    Written patient dismissal instructions given to patient and signed by patient or POA.          Discharge Instructions       Visit Discharge/Physician Orders     Discharge condition: Stable     Assessment of pain at discharge:minimal     Anesthetic used: 4% lidocaine     Discharge to: Home     Left via:Private automobile     Accompanied by: accompanied by caregiver     [facility]     Dressing Orders:right foot apply xeroform daily    please pad bar on wheelchair with lambswhool       Treatment Orders:  KEEP PRESSURE OFF WOUNDS     EAT FOODS HIGH IN PROTEIN AND VITAMIN C     MULTIVITAMIN DAILY  [address] [facility] followup visit ____as needed____________________  (Please note your next appointment above and if you are unable to keep, kindly give a 24 hour notice.  Thank you.)     Physician signature:__________________________        If you experience any of the following, please call the [facility] [email or web address] during business hours:     * Increase in Pain  * Temperature over 101  * Increase in drainage from your wound  * Drainage with a foul odor  * Bleeding  * Increase in swelling  * Need for compression bandage changes due to slippage, breakthrough drainage.     If you need medical attention outside of the business hours of the [facility] [email or web address] please contact your PCP or go to the nearest emergency room.                                                                          Electronically signed by Kelsie Hernandez DPM on 11/8/2021 at 2:05 PM

## 2021-11-11 NOTE — ED PROVIDER NOTES
signs as below have been reviewed. /81   Pulse 79   Temp 97.8 °F (36.6 °C) (Infrared)   Resp 20   SpO2 96%   Oxygen Saturation Interpretation: Normal      ---------------------------------------------------PHYSICAL EXAM--------------------------------------      Constitutional/General: Awake and Alert. Non toxic in NAD  Head: NC/AT  Eyes: PERRL, EOMI  Mouth: Oropharynx clear, handling secretions, no trismus  Neck: Supple, full ROM  Pulmonary: Lungs clear to auscultation bilaterally, no wheezes, rales, or rhonchi. Not in respiratory distress  Cardiovascular:  Regular rate and rhythm, no murmurs, gallops, or rubs. 2+ distal pulses  Extremities: 2+ radial pulse. Erythema and vesicles to the right forearm in the C7 distribution. Skin: warm. Erythema and vesicles to the right forearm in the C7 distribution. Neurologic: Patient at neurologic baseline. CN 2-12 intact. Psych: Normal Affect      ------------------------------ ED COURSE/MEDICAL DECISION MAKING----------------------  Medications - No data to display      Medical Decision Making: Will start patient on Valtrex. I instructed the caregiver with the patient to give the medication prescribed as directed, to follow-up with the primary care physician for reevaluation this week, and to return to the emergency department if the patient has worsening symptoms. She is agreeable with plan of care. Counseling: The emergency provider has spoken with the family member patient and guardian and discussed todays results, in addition to providing specific details for the plan of care and counseling regarding the diagnosis and prognosis. Questions are answered at this time and they are agreeable with the plan.      --------------------------------- IMPRESSION AND DISPOSITION ---------------------------------    IMPRESSION  1.  Herpes zoster without complication        DISPOSITION  Disposition: Discharge to nursing home  Patient condition is stable                  Carlos Juarez MD  11/10/21 2072

## 2021-11-11 NOTE — ED NOTES
Right hand edema with some redness to right forearm with some vesicles     Dari Braxton RN  11/10/21 2031

## 2021-11-24 NOTE — PROGRESS NOTES
Lina Beach is a 73 y.o. man    Patient well known to me for developmental disabilities, seizure disorder and recently diagnosed with Parkinson's disease   Evaluated in the hospital with pneumonia but at the time his Zyprexa was stopped because of questionable malignant hyperthermia (elevated CKs and temp on admission)    His seizures have been well controlled on Keppra and Tegretol having no seizures in a number of years until spring 2021  Previously used Depakote for behavior difficulties rather than seizures   recent issues with Na and renal difficulties and Tegretol was discontinued   He remains on Keppra 500mg BID     Over the last 2 years he began to develop a tremor which subsequently changed his Depakote dosing without resolution in said tremor   Neurology questioned Parkinson's disease versus parkinsonisms with his underlying developmental disabilities    He was tried on Neupro which improved his walking but caused increased agitation. Neupro was stopped and Sinemet was started which markedly improved his walking, his tremor -- staff reported he was able to hold his own juice cup. However psychiatry wanted to stop his Sinemet in December because of potential interactions with Zyprexa. Now off Zyprexa and on Sinemet via PEG every 4 hours     Staff noted marked improvement however issues at night but he was not getting his dose    He still has reports of marked diaphoresis  TSH B12 and SARA unrevealing  Homocystine was slightly elevated at 16.1    No seizures have been reported     Objective:    Wt 114 lb (51.7 kg)   BMI 19.57 kg/m²    Not cooperative with BP      General appearance: awake - looking around room -- marked head titubation and tremors    Head: Normocephalic, without obvious abnormality, atraumatic  Extremities: no cyanosis or edema  Pulses: 2+ and symmetric  Skin: no rashes or lesions     Mental Status: awake -- looking around the room     Cranial Nerves:  I: smell    II: visual acuity II: visual fields Bilateral threat response    II: pupils NOMI   III,VII: ptosis    III,IV,VI: extraocular muscles  Follows examiner around room    V: mastication    V: facial light touch sensation  Grimaces to PP bilaterally    V,VII: corneal reflex  Present   VII: facial muscle function - upper     VII: facial muscle function - lower Normal   VIII: hearing    IX: soft palate elevation     IX,X: gag reflex    XI: trapezius strength     XI: sternocleidomastoid strength    XI: neck extension strength     XII: tongue strength       Motor:  Spastic quadriparetic    Minimal resting tremor appreciated today  Still with moderate cogwheel rigidity at the elbows    Sensory:  Grimaces to noxious stim in both hands      DTR:   No reflexes    No Babinski  No Grey's     Laboratory/Radiology:     CBC with Differential:    Lab Results   Component Value Date    WBC 5.6 10/27/2021    RBC 4.08 10/27/2021    HGB 13.2 10/27/2021    HCT 39.5 10/27/2021     10/27/2021    MCV 96.8 10/27/2021    MCH 32.4 10/27/2021    MCHC 33.4 10/27/2021    RDW 11.8 10/27/2021    METASPCT 0.9 02/28/2021    LYMPHOPCT 30.4 10/27/2021    MONOPCT 8.7 10/27/2021    MYELOPCT 0.9 02/28/2021    BASOPCT 0.4 10/27/2021    MONOSABS 0.49 10/27/2021    LYMPHSABS 1.71 10/27/2021    EOSABS 0.15 10/27/2021    BASOSABS 0.02 10/27/2021     CMP:    Lab Results   Component Value Date     10/27/2021    K 3.6 10/27/2021    K 3.6 10/27/2021     10/27/2021    CO2 32 10/27/2021    BUN 19 10/27/2021    CREATININE 0.7 10/27/2021    GFRAA >60 10/27/2021    LABGLOM >60 10/27/2021    GLUCOSE 111 10/27/2021    GLUCOSE 71 05/18/2012    PROT 6.5 10/27/2021    LABALBU 3.5 10/27/2021    LABALBU 4.5 05/18/2012    CALCIUM 8.6 10/27/2021    BILITOT 0.3 10/27/2021    ALKPHOS 89 10/27/2021    AST 30 10/27/2021    ALT 16 10/27/2021     SARA negative  B   RPR non-reactive  Homocystine level 60.1  TSH 2.91  Free T4 7.7    I independently reviewed the lab studies today.     Assessment:     History of developmental disabilities with seizure disorder   Stable on Keppra 500mg BID     Suspected Parkinson's disease   responded well to Sinemet around-the-clock   I also question some autonomic abnormalities given his marked diaphoresis that is reported (not witnessed during examination)    Now admitted with SOB, fever and right lung pneumonia      Plan:     Continue Keppra   Folic acid was added as well as multivitamin/B complex  Sinemet via peg around the clock     RTO 8-12 weeks    Jhony Greene, MARIJA - CNS  10:36 AM  11/24/2021

## 2021-12-06 NOTE — PROGRESS NOTES
[facility]aling Center Followup Visit Note    Referring Physician : Irineo Adams MD  Edgard Bello  MEDICAL RECORD NUMBER:  [id number]  AGE: 73 y.o. GENDER: male  : 1950  EPISODE DATE:  2021    Subjective:     Chief Complaint   Patient presents with    Wound Check     RIGHT FOOT ULCER      HISTORY of PRESENT ILLNESS HPI   Edgard Bello is a 73 y.o. male who presents today in regards to follow up evaluation and treatment of wound/ulcer. That patient's past medical, family and social hx were reviewed and changes were made if present. History of Wound Context: He presents for evaluation of a area on his right foot medial aspect pressure related. He has been bumping into rubbing his foot on his wheelchair. Company apparently has come in on Wednesday to adjust this. They have tried to pad the area without success. 21 physical exam demonstrates wound appreciated medial aspect right foot overlying prominent bone. Covered with devitalized nonviable tissue, with debridement through subcutaneous tissue. No purulence, odor, erythema or increase in temperature. Local care as instructed, wheelchair again needs to be adjusted, offload at all times.   Follow-up as scheduled    Wound/Ulcer Pain Timing/Severity: none  Quality of pain:   Severity:   / 10   Modifying Factors:   Associated Signs/Symptoms:     Ulcer Identification:  Ulcer Type: pressure  Contributing Factors: chronic pressure    Diabetic/Pressure/Non Pressure Ulcers only:  Ulcer: Pressure ulcer, Stage 3    Wound: N/A        PAST MEDICAL HISTORY      Diagnosis Date    Acquired deformity of neck     Autism spectrum     Bilateral cataracts     Blepharochalasis     Cardiomegaly     Cataract of both eyes     Developmental delay     Gastritis     Hyperlipidemia     Hypernatremia     Kyphosis of thoracic region     Mental retardation     SEVERE MR  NON VERBAL, NEEDS WHEELCHAIR FOR LONG DISTANCE    Obsessive compulsive disorder  [facility] [address]     Seizures ([facility] [address]     Tourette disorder      Past Surgical History:   Procedure Laterality Date    TOE AMPUTATION Left     2nd/3rd    UPPER GASTROINTESTINAL ENDOSCOPY N/A 3/2/2021    PEG TUBE INSERTION performed by Maira Monroe MD at [address] [place] [address]     History reviewed. No pertinent family history. Social History     Tobacco Use    Smoking status: Never Smoker    Smokeless tobacco: Never Used   Vaping Use    Vaping Use: Never used   Substance Use Topics    Alcohol use: No    Drug use: No     No Known Allergies  Current Outpatient Medications on File Prior to Encounter   Medication Sig Dispense Refill    calcium-vitamin D (OSCAL) 250-125 MG-UNIT per tablet Take 1 tablet by mouth daily      Ergocalciferol (DRISDOL) 200 MCG/ML drops Take 8,000 Units by mouth daily 6.25 ml 50,000 IU on the 14th of every LLSOL(8620HI/VQ)      folic acid (FOLVITE) 1 MG tablet Take 1 mg by mouth daily      Multiple Vitamin (MULTIVITAMIN ADULT PO) Take by mouth daily      Saccharomyces boulardii (PROBIOTIC) 250 MG CAPS Take by mouth 2 times daily      carbidopa-levodopa (SINEMET)  MG per tablet 2 tablets by PEG Tube route every 4 hours Ok for 2am dose 360 tablet 1    omeprazole (PRILOSEC) 20 MG delayed release capsule 20 mg daily *PER [facility]SE [facility]) VIA PEG TUBE*      levETIRAcetam (KEPPRA) 100 MG/ML solution Take 5 mLs by mouth 2 times daily  3    benztropine (COGENTIN) 1 MG tablet Take 1 mg by mouth 2 times daily       ipratropium-albuterol (DUONEB) 0.5-2.5 (3) MG/3ML SOLN nebulizer solution Inhale 1 vial into the lungs every 4 hours as needed for Shortness of Breath      acetaminophen (TYLENOL) 325 MG tablet Take 650 mg by mouth every 4 hours as needed for Pain or Fever        No current facility-administered medications on file prior to encounter.        REVIEW OF SYSTEMS See HPI    Objective:    BP (!) 102/58   Pulse 60   Temp 97.7 °F (36.5 °C) (Temporal)   Resp 16   Ht 5' 4\" (1.626 m)   Wt 114 lb (51.7 kg)   BMI 19.57 kg/m²   Wt Readings from Last 3 Encounters:   12/06/21 114 lb (51.7 kg)   11/24/21 114 lb (51.7 kg)   11/08/21 114 lb 6 oz (51.9 kg)     PHYSICAL EXAM  CONSTITUTIONAL:   Awake, alert, cooperative   EYES:  lids and lashes normal   ENT: external ears and nose without lesions   NECK:  supple, symmetrical, trachea midline   SKIN:  Open wound     Assessment:     Problem List Items Addressed This Visit     Pressure injury of dorsum of right foot, stage 3 (HCC)          Pre Debridement Measurements:  Are located in the [place]  Documentation Flow Sheet  Post Debridement Measurements:  Wound/Ulcer Descriptions are Pre Debridement except measurements:     Wound (Active)   Number of days:        Wound 12/06/21 Foot Right; Medial #4 PRESSURE (Active)   Wound Image   12/06/21 1421   Wound Etiology Pressure Stage  3 12/06/21 1421   Dressing Status New dressing applied 12/06/21 1456   Wound Cleansed Cleansed with saline 12/06/21 1456   Dressing/Treatment Alginate with Ag; Dry dressing; Roll gauze 12/06/21 1456   Offloading for Diabetic Foot Ulcers Other (comment) 12/06/21 1456   Wound Length (cm) 1.2 cm 12/06/21 1421   Wound Width (cm) 0.7 cm 12/06/21 1421   Wound Depth (cm) 0.3 cm 12/06/21 1421   Wound Surface Area (cm^2) 0.84 cm^2 12/06/21 1421   Wound Volume (cm^3) 0.252 cm^3 12/06/21 1421   Post-Procedure Length (cm) 1.3 cm 12/06/21 1435   Post-Procedure Width (cm) 0.8 cm 12/06/21 1435   Post-Procedure Depth (cm) 0.5 cm 12/06/21 1435   Post-Procedure Surface Area (cm^2) 1.04 cm^2 12/06/21 1435   Post-Procedure Volume (cm^3) 0.52 cm^3 12/06/21 1435   Wound Assessment Pink/red 12/06/21 1421   Drainage Amount Small 12/06/21 1421   Drainage Description Thick; Purulent 12/06/21 1421   Odor None 12/06/21 1421   Fabby-wound Assessment Intact 12/06/21 1421   Number of days: 0          Procedure Note  Indications:  Based on my examination of this patient's wound(s)/ulcer(s) today, debridement is required to promote healing and evaluate the wound base. Performed by: Carlos Bullard DPM    Consent obtained:  Yes    Time out taken:  Yes    Pain Control: Anesthetic  Anesthetic: 4% Lidocaine Liquid Topical     Debridement:Excisional Debridement    Using curette and tissue nippers the wound(s)/ulcer(s) was/were sharply debrided down through and including the removal of subcutaneous tissue. Devitalized Tissue Debrided:  fibrin, biofilm, slough and necrotic/eschar to stimulate bleeding to promote healing, post debridement good bleeding base and wound edges noted    Wound/Ulcer #: 4    Percent of Wound/Ulcer Debrided: 100%    Total Surface Area Debrided:  0.84 sq cm     Estimated Blood Loss:  Minimal  Hemostasis Achieved:  by pressure    Procedural Pain:  0  / 10   Post Procedural Pain:  0 / 10     Response to treatment:  Well tolerated by patient. Plan:   Treatment Note please see attached Discharge Instructions    Written patient dismissal instructions given to patient and signed by patient or POA. Discharge Instructions       Visit Discharge/Physician Orders     Discharge condition: Stable     Assessment of pain at discharge:minimal     Anesthetic used: 4% lidocaine     Discharge to: Home     Left via:Private automobile     Accompanied by: accompanied by caregiver     [facility]     Dressing Orders:right foot apply silver alginate and dry dressing daily    please pad bar on wheelchair         Treatment Orders:  KEEP PRESSURE OFF WOUNDS     EAT FOODS HIGH IN PROTEIN AND VITAMIN C     MULTIVITAMIN DAILY  [address] [place] [address] followup visit __1 week__________________  (Please note your next appointment above and if you are unable to keep, kindly give a 24 hour notice.  Thank you.)     Physician signature:__________________________        If you experience any of the following, please call the [address] [place] [address] during business hours:     * Increase in Pain  * Temperature over 101  * Increase in drainage from your wound  * Drainage with a foul odor  * Bleeding  * Increase in swelling  * Need for compression bandage changes due to slippage, breakthrough drainage.     If you need medical attention outside of the business hours of the [address] [place] [address] Road please contact your PCP or go to the nearest emergency room.                                                                                               Electronically signed by Shae Sotomayor DPM on 12/6/2021 at 3:17 PM

## 2021-12-20 NOTE — PROGRESS NOTES
Wound Healing Center Followup Visit Note    Referring Physician : Kaylan Jerry MD  Marta Manrique  MEDICAL RECORD NUMBER:  [id number]  AGE: 73 y.o. GENDER: male  : 1950  EPISODE DATE:  2021    Subjective:     Chief Complaint   Patient presents with    Wound Check     Right foot ulcer      HISTORY of PRESENT ILLNESS HPI   Marta Manrique is a 73 y.o. male who presents today in regards to follow up evaluation and treatment of wound/ulcer. That patient's past medical, family and social hx were reviewed and changes were made if present. History of Wound Context: He presents for evaluation of a area on his right foot medial aspect pressure related. He has been bumping into rubbing his foot on his wheelchair. Company apparently has come in on Wednesday to adjust this. They have tried to pad the area without success. 21 physical exam demonstrates wound appreciated medial aspect right foot overlying prominent bone. Covered with devitalized nonviable tissue, with debridement through subcutaneous tissue. No purulence, odor, erythema or increase in temperature. Local care as instructed, wheelchair again needs to be adjusted, offload at all times. Follow-up as scheduled    21 physical exam demonstrates wound appreciated medial aspect right foot overlying prominent bone. Area with devitalized nonviable tissue, with debridement through subcutaneous tissue. No purulence, odor, erythema or increase in temperature. Local care as instructed, wheelchair area was adjusted, padded recently.     Wound/Ulcer Pain Timing/Severity: none  Quality of pain:   Severity:   / 10   Modifying Factors:   Associated Signs/Symptoms:     Ulcer Identification:  Ulcer Type: pressure  Contributing Factors: chronic pressure    Diabetic/Pressure/Non Pressure Ulcers only:  Ulcer: Pressure ulcer, Stage 3    Wound: N/A        PAST MEDICAL HISTORY      Diagnosis Date    Acquired deformity of neck     Autism Shortness of Breath      acetaminophen (TYLENOL) 325 MG tablet Take 650 mg by mouth every 4 hours as needed for Pain or Fever        No current facility-administered medications on file prior to encounter.        REVIEW OF SYSTEMS See HPI    Objective:    /70   Pulse 80   Temp 97.1 °F (36.2 °C) (Temporal)   Resp 16   Ht 5' 4\" (1.626 m)   Wt 114 lb (51.7 kg)   BMI 19.57 kg/m²   Wt Readings from Last 3 Encounters:   12/20/21 114 lb (51.7 kg)   12/06/21 114 lb (51.7 kg)   11/24/21 114 lb (51.7 kg)     PHYSICAL EXAM  CONSTITUTIONAL:   Awake, alert, cooperative   EYES:  lids and lashes normal   ENT: external ears and nose without lesions   NECK:  supple, symmetrical, trachea midline   SKIN:  Open wound     Assessment:     Pressure ulcer stage 3    Pre Debridement Measurements:  Are located in the [place]  Documentation Flow Sheet  Post Debridement Measurements:  Wound/Ulcer Descriptions are Pre Debridement except measurements:     Wound (Active)   Number of days:        Wound 12/06/21 Foot Right; Medial #4 PRESSURE (Active)   Wound Image   12/06/21 1421   Wound Etiology Pressure Stage  3 12/06/21 1421   Dressing Status New dressing applied 12/06/21 1456   Wound Cleansed Cleansed with saline 12/06/21 1456   Dressing/Treatment Alginate with Ag;Dry dressing;Roll gauze 12/06/21 1456   Offloading for Diabetic Foot Ulcers Other (comment) 12/06/21 1456   Wound Length (cm) 1.2 cm 12/20/21 1431   Wound Width (cm) 0.7 cm 12/20/21 1431   Wound Depth (cm) 0.2 cm 12/20/21 1431   Wound Surface Area (cm^2) 0.84 cm^2 12/20/21 1431   Change in Wound Size % (l*w) 0 12/20/21 1431   Wound Volume (cm^3) 0.168 cm^3 12/20/21 1431   Wound Healing % 33 12/20/21 1431   Post-Procedure Length (cm) 1.2 cm 12/20/21 1444   Post-Procedure Width (cm) 0.8 cm 12/20/21 1444   Post-Procedure Depth (cm) 0.3 cm 12/20/21 1444   Post-Procedure Surface Area (cm^2) 0.96 cm^2 12/20/21 1444   Post-Procedure Volume (cm^3) 0.288 cm^3 12/20/21 1444 Wound Assessment Pink/red;Fibrin 12/20/21 1431   Drainage Amount Small 12/20/21 1431   Drainage Description Yellow 12/20/21 1431   Odor None 12/20/21 1431   Fabby-wound Assessment Intact 12/20/21 1431   Number of days: 14          Procedure Note  Indications:  Based on my examination of this patient's wound(s)/ulcer(s) today, debridement is required to promote healing and evaluate the wound base. Performed by: Carlos Bullard DPM    Consent obtained:  Yes    Time out taken:  Yes    Pain Control: Anesthetic  Anesthetic: 4% Lidocaine Liquid Topical     Debridement:Excisional Debridement    Using curette and tissue nippers the wound(s)/ulcer(s) was/were sharply debrided down through and including the removal of subcutaneous tissue. Devitalized Tissue Debrided:  fibrin, biofilm, slough and necrotic/eschar to stimulate bleeding to promote healing, post debridement good bleeding base and wound edges noted    Wound/Ulcer #: 4    Percent of Wound/Ulcer Debrided: 100%    Total Surface Area Debrided:  0.84 sq cm     Estimated Blood Loss:  Minimal  Hemostasis Achieved:  by pressure    Procedural Pain:  0  / 10   Post Procedural Pain:  0 / 10     Response to treatment:  Well tolerated by patient. Plan:   Treatment Note please see attached Discharge Instructions    Written patient dismissal instructions given to patient and signed by patient or POA.          Discharge Instructions       Visit Discharge/Physician Orders     Discharge condition: Stable     Assessment of pain at discharge:minimal     Anesthetic used: 4% lidocaine     Discharge to: Home     Left via:Private automobile     Accompanied by: accompanied by caregiver     [facility]     Dressing Orders:right foot apply silver alginate and dry dressing daily    please pad bar on wheelchair         Treatment Orders:  KEEP PRESSURE OFF WOUNDS     EAT FOODS HIGH IN PROTEIN AND VITAMIN C     MULTIVITAMIN DAILY  [facility] followup visit __1 week__________________  (Please note your next appointment above and if you are unable to keep, kindly give a 24 hour notice.  Thank you.)     Physician signature:__________________________        If you experience any of the following, please call the [facility] [address] during business hours:     * Increase in Pain  * Temperature over 101  * Increase in drainage from your wound  * Drainage with a foul odor  * Bleeding  * Increase in swelling  * Need for compression bandage changes due to slippage, breakthrough drainage.     If you need medical attention outside of the business hours of the [facility] [address] please contact your PCP or go to the nearest emergency room.                                                                                                                    Electronically signed by Maritza Carter DPM on 12/20/2021 at 2:47 PM

## 2021-12-28 NOTE — PROGRESS NOTES
[address] Department of Endocrinology Diabetes and Metabolism   [address] [facility] [address]   Phone: [phone]  Fax: [phone]    Date of Service: 12/28/2021  Primary Care Physician: Arcelia Mcclellan MD  Referring physician: Susan Kebede., APRN*  Provider: Claudeen Chaco MD        Reason for the visit:  Diaphoresis     History of Present Illness: The history is provided from medical records, pt is MR Kuldeep Arellano is a 73 y.o. male with past medical history of metal retardation, autism, HLD and other listed below seen today for evaluation of diaphoresis     Per medical records, the patient was in his usual state of health until few months ago when started c/o episodes of profuse  sweating. Episodes 2-3 times every day (day and night). denied Wt loss. There is no h/o headache during the episodes, wheezing or bronchospasm. The patient reported normal blood sugar reading during the episodes. No history of skin rash or skin tags. No history of diarrhea. No changes in shoe/hat size. No h/o cough or SOB     PAST MEDICAL HISTORY   Past Medical History:   Diagnosis Date    Acquired deformity of neck     Autism spectrum     Bilateral cataracts     Blepharochalasis     Cardiomegaly     Cataract of both eyes     Developmental delay     Gastritis     Hyperlipidemia     Hypernatremia     Kyphosis of thoracic region     Mental retardation     SEVERE MR  NON VERBAL, NEEDS WHEELCHAIR FOR LONG DISTANCE    Obsessive compulsive disorder     Parkinsonism (HCC)     Seizures (HCC)     Tourette disorder        PAST SURGICAL HISTORY   Past Surgical History:   Procedure Laterality Date    TOE AMPUTATION Left     2nd/3rd    UPPER GASTROINTESTINAL ENDOSCOPY N/A 3/2/2021    PEG TUBE INSERTION performed by Danuta Couch MD at [address]   Tobacco:   reports that he has never smoked.  He has never used smokeless tobacco.  Alcohol:   reports no history of alcohol use. Drugs:   reports no history of drug use. FAMILY HISTORY   No family history on file. ALLERGIES AND DRUG REACTIONS   No Known Allergies    CURRENT MEDICATIONS   Current Outpatient Medications   Medication Sig Dispense Refill    calcium-vitamin D (OSCAL) 250-125 MG-UNIT per tablet Take 1 tablet by mouth daily      Ergocalciferol (DRISDOL) 200 MCG/ML drops Take 8,000 Units by mouth daily 6.25 ml 50,000 IU on the 14th of every IGLOE(7703JQ/IB)      folic acid (FOLVITE) 1 MG tablet Take 1 mg by mouth daily      Multiple Vitamin (MULTIVITAMIN ADULT PO) Take by mouth daily      Saccharomyces boulardii (PROBIOTIC) 250 MG CAPS Take by mouth 2 times daily      carbidopa-levodopa (SINEMET)  MG per tablet 2 tablets by PEG Tube route every 4 hours Ok for 2am dose 360 tablet 1    omeprazole (PRILOSEC) 20 MG delayed release capsule 20 mg daily *PER [facility] NURSE(JOSE CRUZ) VIA PEG TUBE*      levETIRAcetam (KEPPRA) 100 MG/ML solution Take 5 mLs by mouth 2 times daily  3    ipratropium-albuterol (DUONEB) 0.5-2.5 (3) MG/3ML SOLN nebulizer solution Inhale 1 vial into the lungs every 4 hours as needed for Shortness of Breath      acetaminophen (TYLENOL) 325 MG tablet Take 650 mg by mouth every 4 hours as needed for Pain or Fever       benztropine (COGENTIN) 1 MG tablet Take 1 mg by mouth 2 times daily        No current facility-administered medications for this visit. Review of Systems  Constitutional: No fever, no chills, no diaphoresis, no generalized weakness. HEENT: No blurred vision, No sore throat, no ear pain, no hair loss  Neck: denied any neck swelling, difficulty swallowing,   Cadrdiopulomary: No CP, SOB or palpitation, No orthopnea or PND. No cough or wheezing. GI: No N/V/D, no constipation, No abdominal pain, no melena or hematochezia   : Denied any dysuria, hematuria, flank pain, discharge, or incontinence. Skin: denied any rash, ulcer, Hirsute, or hyperpigmentation.    MSK: denied any joint deformity, joint pain/swelling, muscle pain, or back pain. Neuro: no numbess, no tingling, no weakness,     OBJECTIVE    There were no vitals taken for this visit. BP Readings from Last 4 Encounters:   12/20/21 102/70   12/06/21 (!) 102/58   11/10/21 132/81   11/08/21 (!) 118/56     Wt Readings from Last 6 Encounters:   12/20/21 114 lb (51.7 kg)   12/06/21 114 lb (51.7 kg)   11/24/21 114 lb (51.7 kg)   11/08/21 114 lb 6 oz (51.9 kg)   10/27/21 114 lb 6.4 oz (51.9 kg)   10/11/21 110 lb (49.9 kg)       Physical examination:  General: awake alert, oriented x3, no abnormal position or movements. HEENT: normocephalic non traumatic  Neck: supple, no LN enlargement, no thyromegaly, no thyroid tenderness, no JVD. Pulm: Clear equal air entry no added sounds, no wheezing or rhonchi    CVS: S1 + S2, no murmur, no heave. Dorsalis pedis pulse palpable   Abd: soft lax, no tenderness, no organomegaly, audible bowel sounds. Skin: warm, no lesions, no rash.   Musculoskeletal: No back tenderness, no kyphosis/scoliosis   Neuro: CN intact, sensation normal , muscle power normal.  Psych: normal mood, and affect    Review of Laboratory Data:  I have reviewed the following:  Lab Results   Component Value Date/Time    WBC 5.6 10/27/2021 03:00 AM    RBC 4.08 10/27/2021 03:00 AM    HGB 13.2 10/27/2021 03:00 AM    HCT 39.5 10/27/2021 03:00 AM    MCV 96.8 10/27/2021 03:00 AM    MCH 32.4 10/27/2021 03:00 AM    MCHC 33.4 10/27/2021 03:00 AM    RDW 11.8 10/27/2021 03:00 AM     10/27/2021 03:00 AM    MPV 11.5 10/27/2021 03:00 AM      Lab Results   Component Value Date/Time     10/27/2021 03:00 AM    K 3.6 10/27/2021 03:00 AM    K 3.6 10/27/2021 03:00 AM    CO2 32 (H) 10/27/2021 03:00 AM    BUN 19 10/27/2021 03:00 AM    CREATININE 0.7 10/27/2021 03:00 AM    CALCIUM 8.6 10/27/2021 03:00 AM    LABGLOM >60 10/27/2021 03:00 AM    GFRAA >60 10/27/2021 03:00 AM      Lab Results   Component Value Date/Time    TSH 2.91 05/05/2021 12:00 AM    T4FREE 7.7 05/05/2021 12:00 AM     Lab Results   Component Value Date    LABA1C 5.4 03/18/2016    GLUCOSE 111 10/27/2021    GLUCOSE 71 05/18/2012    LABCREA 64 06/10/2020     Lab Results   Component Value Date    TRIG 146 03/18/2016    HDL 44 03/18/2016    LDLCALC 93 03/18/2016    CHOL 166 03/18/2016     Lab Results   Component Value Date    VITD25 17 06/14/2020    VITD25 39 07/10/2015       ASSESSMENT & RECOMMENDATIONS   Marc Rabago, a 73 y.o.-old male seen in for following issues     Profuse Sweating   · Unlikely to be of endocrine etiology  · Will check BMP, TFT, and Testosterone level today     I personally reviewed external notes from PCP and other patient's care team providers, and personally interpreted labs associated with the above diagnosis. I also ordered labs to further assess and manage the above addressed medical conditions. Return in about 6 months (around 6/28/2022). The above issues were reviewed with the patient who understood and agreed with the plan. Thank you for allowing us to participate in the care of this patient. Please do not hesitate to contact us with any additional questions. Diagnosis Orders   1. Sweating profusely  BASIC METABOLIC PANEL    Testosterone, free, total    TSH    FREE T4     Arizona Necessary MD  Endocrinologist, [facility] End[facility]   [address] [place] [address],[facility] [id number]   Phone: [phone]  Fax: [phone]  ------------------------------  An electronic signature was used to authenticate this note.  Bang Marrufo MD on 12/28/2021 at 12:39 PM

## 2021-12-31 NOTE — ED PROVIDER NOTES
[facility]  Department of Emergency Medicine   ED  Encounter Note  Admit Date/RoomTime: 2021  3:56 PM  ED Room:     NAME: Marjan Corona  : 1950  MRN: [id number]     Chief Complaint:  Rash    History of Present Illness       Marjan Corona is a 73 y.o. old male who presents to the emergency department for evaluation of rash. Patient is MRDD and can provide no history. History obtained both from chart review and speaking to the caregiver over the phone. He was sent in from [facility] for a better living for a rash to his legs. He is DNR [facility]. He is treating currently with podiatry and wound care for a pressure ulcer to the right foot. Over the past few days, staff has noticed a rash to his legs. No known or documented fever. ROS   Pertinent positives and negatives are stated within HPI, all other systems reviewed and are negative. Past Medical History:  has a past medical history of Acquired deformity of neck, Autism spectrum, Bilateral cataracts, Blepharochalasis, Cardiomegaly, Cataract of both eyes, Developmental delay, Gastritis, Hyperlipidemia, Hypernatremia, Kyphosis of thoracic region, Mental retardation, Obsessive compulsive disorder, Parkinsonism ([address] Seizures ([address] and Tourette disorder. Surgical History:  has a past surgical history that includes Toe amputation (Left) and Upper gastrointestinal endoscopy (N/A, 3/2/2021). Social History:  reports that he has never smoked. He has never used smokeless tobacco. He reports that he does not drink alcohol and does not use drugs. Family History: family history is not on file. Allergies: Patient has no known allergies. Physical Exam   Oxygen Saturation Interpretation: Normal.        ED Triage Vitals [21 1614]   BP Temp Temp Source Pulse Resp SpO2 Height Weight   136/76 98.7 °F (37.1 °C) Axillary 116 20 97 % -- --         Constitutional:  Alert, development consistent with age. HEENT:  NC/NT. Airway patent. Eyes:  PERRL, EOMI, no discharge. Ears:  TMs without perforation, injection, or bulging. External canals clear without exudate. Mouth:  Mucous membranes moist without lesions, tongue and gums normal.  Throat:  Pharynx without injection, exudate, or tonsillar hypertrophy. Airway patient. Neck:  Supple. No lymphadenopathy. Respiratory:  Clear to auscultation and breath sounds equal.  CV:  Regular rhythm, mildly tachycardic  GI:  Abdomen Soft, nontender, +BS. Integument: Please see photo below. There is a wound to the right foot with mild surrounding erythema. There is a petechial rash to the left leg. Some scattered purpura to the left buttocks. Neurological:  Orientation age-appropriate unless noted elseware. Motor functions intact.           Lab / Imaging Results   (All laboratory and radiology results have been personally reviewed by myself)  Labs:  Results for orders placed or performed during the hospital encounter of 12/31/21   Culture, Blood 1    Specimen: Blood   Result Value Ref Range    Blood Culture, Routine 24 Hours no growth    COVID-19, Rapid    Specimen: Nasopharyngeal Swab   Result Value Ref Range    SARS-CoV-2, NAAT Not Detected Not Detected   CBC Auto Differential   Result Value Ref Range    WBC 6.0 4.5 - 11.5 E9/L    RBC 3.75 (L) 3.80 - 5.80 E12/L    Hemoglobin 11.9 (L) 12.5 - 16.5 g/dL    Hematocrit 36.1 (L) 37.0 - 54.0 %    MCV 96.3 80.0 - 99.9 fL    MCH 31.7 26.0 - 35.0 pg    MCHC 33.0 32.0 - 34.5 %    RDW 12.9 11.5 - 15.0 fL    Platelets 396 324 - 011 E9/L    MPV 9.1 7.0 - 12.0 fL    Neutrophils % 70.5 43.0 - 80.0 %    Immature Granulocytes % 0.5 0.0 - 5.0 %    Lymphocytes % 20.7 20.0 - 42.0 %    Monocytes % 7.1 2.0 - 12.0 %    Eosinophils % 1.0 0.0 - 6.0 %    Basophils % 0.2 0.0 - 2.0 %    Neutrophils Absolute 4.25 1.80 - 7.30 E9/L    Immature Granulocytes # 0.03 E9/L    Lymphocytes Absolute 1.25 (L) 1.50 - 4.00 E9/L    Monocytes Absolute 0.43 0.10 - 0.95 E9/L Eosinophils Absolute 0.06 0.05 - 0.50 E9/L    Basophils Absolute 0.01 0.00 - 0.20 E9/L   Comprehensive Metabolic Panel   Result Value Ref Range    Sodium 133 132 - 146 mmol/L    Potassium 4.3 3.5 - 5.0 mmol/L    Chloride 98 98 - 107 mmol/L    CO2 24 22 - 29 mmol/L    Anion Gap 11 7 - 16 mmol/L    Glucose 98 74 - 99 mg/dL    BUN 23 6 - 23 mg/dL    CREATININE 0.6 (L) 0.7 - 1.2 mg/dL    GFR Non-African American >60 >=60 mL/min/1.73    GFR African American >60     Calcium 8.3 (L) 8.6 - 10.2 mg/dL    Total Protein 6.7 6.4 - 8.3 g/dL    Albumin 3.3 (L) 3.5 - 5.2 g/dL    Total Bilirubin 0.4 0.0 - 1.2 mg/dL    Alkaline Phosphatase 84 40 - 129 U/L    ALT <5 0 - 40 U/L    AST 19 0 - 39 U/L   Protime-INR   Result Value Ref Range    Protime 13.0 (H) 9.3 - 12.4 sec    INR 1.2    APTT   Result Value Ref Range    aPTT 28.1 24.5 - 35.1 sec   FIBRINOGEN   Result Value Ref Range    Fibrinogen 640 (H) 225 - 540 mg/dL   Urinalysis   Result Value Ref Range    Color, UA Yellow Straw/Yellow    Clarity, UA Clear Clear    Glucose, Ur Negative Negative mg/dL    Bilirubin Urine Negative Negative    Ketones, Urine TRACE (A) Negative mg/dL    Specific Gravity, UA 1.025 1.005 - 1.030    Blood, Urine SMALL (A) Negative    pH, UA 5.5 5.0 - 9.0    Protein, UA Negative Negative mg/dL    Urobilinogen, Urine 0.2 <2.0 E.U./dL    Nitrite, Urine Negative Negative    Leukocyte Esterase, Urine Negative Negative   Lactic Acid, Plasma   Result Value Ref Range    Lactic Acid 0.9 0.5 - 2.2 mmol/L   Microscopic Urinalysis   Result Value Ref Range    WBC, UA 0-1 0 - 5 /HPF    RBC, UA 0-1 0 - 2 /HPF    Bacteria, UA NONE SEEN None Seen /HPF       Imaging: All Radiology results interpreted by Radiologist unless otherwise noted. XR CHEST PORTABLE   Final Result   Suspect retrocardiac infiltrates. Elevated right hemidiaphragm.              ED Course / Medical Decision Making     Medications   0.9 % sodium chloride bolus (0 mLs IntraVENous Stopped 12/31/21 1942) Consults:   None    Procedures:   none    MDM:   Patient presents to the ED for evaluation of rash. Please see above. He had some petechiae on his left foot and some scattered purpura on his buttocks. Septic work-up was pursued as well as peripheral smear and CBC. There was no evidence of thrombocytopenia. COVID-19 swab was negative. White blood cell count was normal at 6.0. He was afebrile here in the emergency department. No obvious signs of sepsis. I do not hear a significant cardiac murmur concerning for endocarditis. His renal function was normal.,  Question IgA vasculitis. Caregiver was updated. Patient was given a referral to dermatology and advised to follow-up with his family doctor as well. If he develops any worsening symptoms or fever over the weekend he will return to the emergency department. Plan of Care/Counseling:  Destini Laboy DO reviewed today's visit with the patient in addition to providing specific details for the plan of care and counseling regarding the diagnosis and prognosis. Questions are answered at this time and are agreeable with the plan. Assessment      1. Rash and other nonspecific skin eruption    2. Purpura ([address]      Plan   Discharged back to group home. Patient condition is stable    New Medications     Discharge Medication List as of 12/31/2021  7:43 PM        Electronically signed by Destini Laboy DO   DD: 12/31/21  **This report was transcribed using voice recognition software. Every effort was made to ensure accuracy; however, inadvertent computerized transcription errors may be present.   END OF ED PROVIDER NOTE        Destini Laboy DO  01/01/22 2650

## 2021-12-31 NOTE — ED NOTES
Bed: 08  Expected date:   Expected time:   Means of arrival:   Comments:  Elayne Rodríguez RN  12/31/21 0993

## 2021-12-31 NOTE — ED TRIAGE NOTES
Sent from [facility] with \"red raised patches all over body no blisters. No new meds\".

## 2022-01-01 ENCOUNTER — APPOINTMENT (OUTPATIENT)
Dept: CT IMAGING | Age: 72
DRG: 871 | End: 2022-01-01
Payer: MEDICARE

## 2022-01-01 ENCOUNTER — HOSPITAL ENCOUNTER (INPATIENT)
Age: 72
LOS: 1 days | DRG: 871 | End: 2022-01-12
Attending: EMERGENCY MEDICINE | Admitting: INTERNAL MEDICINE
Payer: MEDICARE

## 2022-01-01 ENCOUNTER — TELEPHONE (OUTPATIENT)
Dept: ENDOCRINOLOGY | Age: 72
End: 2022-01-01

## 2022-01-01 ENCOUNTER — APPOINTMENT (OUTPATIENT)
Dept: GENERAL RADIOLOGY | Age: 72
DRG: 871 | End: 2022-01-01
Payer: MEDICARE

## 2022-01-01 ENCOUNTER — HOSPITAL ENCOUNTER (OUTPATIENT)
Dept: WOUND CARE | Age: 72
Discharge: HOME | End: 2022-01-03
Payer: MEDICARE

## 2022-01-01 VITALS
DIASTOLIC BLOOD PRESSURE: 66 MMHG | RESPIRATION RATE: 18 BRPM | HEIGHT: 64 IN | WEIGHT: 114 LBS | HEART RATE: 88 BPM | BODY MASS INDEX: 19.46 KG/M2 | SYSTOLIC BLOOD PRESSURE: 108 MMHG | TEMPERATURE: 0.8 F

## 2022-01-01 VITALS
HEIGHT: 64 IN | WEIGHT: 110 LBS | DIASTOLIC BLOOD PRESSURE: 70 MMHG | OXYGEN SATURATION: 93 % | BODY MASS INDEX: 18.78 KG/M2 | TEMPERATURE: 101.2 F | SYSTOLIC BLOOD PRESSURE: 140 MMHG

## 2022-01-01 DIAGNOSIS — E87.0 HYPERNATREMIA: ICD-10-CM

## 2022-01-01 DIAGNOSIS — J96.02 ACUTE RESPIRATORY FAILURE WITH HYPOXIA AND HYPERCAPNIA (HCC): Primary | ICD-10-CM

## 2022-01-01 DIAGNOSIS — J96.01 ACUTE RESPIRATORY FAILURE WITH HYPOXIA AND HYPERCAPNIA (HCC): Primary | ICD-10-CM

## 2022-01-01 LAB
AADO2: 201.8 MMHG
AADO2: 461.9 MMHG
ADENOVIRUS BY PCR: NOT DETECTED
ALBUMIN SERPL-MCNC: 3.8 G/DL (ref 3.5–5.2)
ALP BLD-CCNC: 104 U/L (ref 40–129)
ALT SERPL-CCNC: <5 U/L (ref 0–40)
AMMONIA: 56.1 UMOL/L (ref 16–60)
ANION GAP SERPL CALCULATED.3IONS-SCNC: 6 MMOL/L (ref 7–16)
APTT: 29.7 SEC (ref 24.5–35.1)
AST SERPL-CCNC: 44 U/L (ref 0–39)
B.E.: 0.7 MMOL/L (ref -3–3)
B.E.: 1.2 MMOL/L (ref -3–3)
BACTERIA: ABNORMAL /HPF
BASOPHILS ABSOLUTE: 0.05 E9/L (ref 0–0.2)
BASOPHILS RELATIVE PERCENT: 0.4 % (ref 0–2)
BILIRUB SERPL-MCNC: <0.2 MG/DL (ref 0–1.2)
BILIRUBIN DIRECT: <0.2 MG/DL (ref 0–0.3)
BILIRUBIN URINE: NEGATIVE
BILIRUBIN, INDIRECT: ABNORMAL MG/DL (ref 0–1)
BLOOD CULTURE, ROUTINE: NORMAL
BLOOD, URINE: ABNORMAL
BORDETELLA PARAPERTUSSIS BY PCR: NOT DETECTED
BORDETELLA PERTUSSIS BY PCR: NOT DETECTED
BUN BLDV-MCNC: 36 MG/DL (ref 6–23)
C-REACTIVE PROTEIN: 1.5 MG/DL (ref 0–0.4)
CALCIUM SERPL-MCNC: 9 MG/DL (ref 8.6–10.2)
CHLAMYDOPHILIA PNEUMONIAE BY PCR: NOT DETECTED
CHLORIDE BLD-SCNC: 111 MMOL/L (ref 98–107)
CHP ED QC CHECK: NORMAL
CLARITY: CLEAR
CO2: 31 MMOL/L (ref 22–29)
COHB: 0.4 % (ref 0–1.5)
COHB: 0.6 % (ref 0–1.5)
COLOR: YELLOW
CORONAVIRUS 229E BY PCR: NOT DETECTED
CORONAVIRUS HKU1 BY PCR: NOT DETECTED
CORONAVIRUS NL63 BY PCR: NOT DETECTED
CORONAVIRUS OC43 BY PCR: NOT DETECTED
CORTISOL TOTAL: 30.31 MCG/DL (ref 2.68–18.4)
CREAT SERPL-MCNC: 0.6 MG/DL (ref 0.7–1.2)
CRITICAL: ABNORMAL
CRITICAL: ABNORMAL
DATE ANALYZED: ABNORMAL
DATE ANALYZED: ABNORMAL
DATE OF COLLECTION: ABNORMAL
DATE OF COLLECTION: ABNORMAL
EKG ATRIAL RATE: 104 BPM
EKG P AXIS: 33 DEGREES
EKG P-R INTERVAL: 124 MS
EKG Q-T INTERVAL: 314 MS
EKG QRS DURATION: 68 MS
EKG QTC CALCULATION (BAZETT): 412 MS
EKG R AXIS: 26 DEGREES
EKG T AXIS: 28 DEGREES
EKG VENTRICULAR RATE: 104 BPM
EOSINOPHILS ABSOLUTE: 0 E9/L (ref 0.05–0.5)
EOSINOPHILS RELATIVE PERCENT: 0 % (ref 0–6)
FIO2: 100 %
FIO2: 60 %
GFR AFRICAN AMERICAN: >60
GFR NON-AFRICAN AMERICAN: >60 ML/MIN/1.73
GLUCOSE BLD-MCNC: 111 MG/DL
GLUCOSE BLD-MCNC: 123 MG/DL (ref 74–99)
GLUCOSE URINE: NEGATIVE MG/DL
HCO3: 27 MMOL/L (ref 22–26)
HCO3: 32.5 MMOL/L (ref 22–26)
HCT VFR BLD CALC: 45.6 % (ref 37–54)
HEMOGLOBIN: 13.5 G/DL (ref 12.5–16.5)
HHB: 0.7 % (ref 0–5)
HHB: 1 % (ref 0–5)
HUMAN METAPNEUMOVIRUS BY PCR: NOT DETECTED
HUMAN RHINOVIRUS/ENTEROVIRUS BY PCR: NOT DETECTED
IMMATURE GRANULOCYTES #: 0.29 E9/L
IMMATURE GRANULOCYTES %: 2.1 % (ref 0–5)
INFLUENZA A BY PCR: NOT DETECTED
INFLUENZA B BY PCR: NOT DETECTED
INR BLD: 1.1
KETONES, URINE: NEGATIVE MG/DL
LAB: ABNORMAL
LAB: ABNORMAL
LACTIC ACID: 0.6 MMOL/L (ref 0.5–2.2)
LEUKOCYTE ESTERASE, URINE: NEGATIVE
LIPASE: 51 U/L (ref 13–60)
LYMPHOCYTES ABSOLUTE: 0.72 E9/L (ref 1.5–4)
LYMPHOCYTES RELATIVE PERCENT: 5.3 % (ref 20–42)
Lab: ABNORMAL
Lab: ABNORMAL
MCH RBC QN AUTO: 32 PG (ref 26–35)
MCHC RBC AUTO-ENTMCNC: 29.6 % (ref 32–34.5)
MCV RBC AUTO: 108.1 FL (ref 80–99.9)
METER GLUCOSE: 111 MG/DL (ref 74–99)
METHB: 0.3 % (ref 0–1.5)
METHB: 0.3 % (ref 0–1.5)
MODE: ABNORMAL
MODE: ABNORMAL
MONOCYTES ABSOLUTE: 1.31 E9/L (ref 0.1–0.95)
MONOCYTES RELATIVE PERCENT: 9.6 % (ref 2–12)
MYCOPLASMA PNEUMONIAE BY PCR: NOT DETECTED
NEUTROPHILS ABSOLUTE: 11.22 E9/L (ref 1.8–7.3)
NEUTROPHILS RELATIVE PERCENT: 82.6 % (ref 43–80)
NITRITE, URINE: NEGATIVE
O2 CONTENT: 17.3 ML/DL
O2 CONTENT: 19.2 ML/DL
O2 SATURATION: 99 % (ref 92–98.5)
O2 SATURATION: 99.3 % (ref 92–98.5)
O2HB: 98.1 % (ref 94–97)
O2HB: 98.6 % (ref 94–97)
OPERATOR ID: 46
OPERATOR ID: 46
PARAINFLUENZA VIRUS 1 BY PCR: NOT DETECTED
PARAINFLUENZA VIRUS 2 BY PCR: NOT DETECTED
PARAINFLUENZA VIRUS 3 BY PCR: NOT DETECTED
PARAINFLUENZA VIRUS 4 BY PCR: NOT DETECTED
PATHOLOGIST REVIEW: NORMAL
PATIENT TEMP: 37 C
PATIENT TEMP: 37 C
PCO2: 47.7 MMHG (ref 35–45)
PCO2: 95.6 MMHG (ref 35–45)
PDW BLD-RTO: 13.2 FL (ref 11.5–15)
PEEP/CPAP: 6 CMH2O
PFO2: 1.55 MMHG/%
PFO2: 2.89 MMHG/%
PH BLOOD GAS: 7.15 (ref 7.35–7.45)
PH BLOOD GAS: 7.37 (ref 7.35–7.45)
PH UA: 5 (ref 5–9)
PLATELET # BLD: 356 E9/L (ref 130–450)
PMV BLD AUTO: 8.8 FL (ref 7–12)
PO2: 155.5 MMHG (ref 75–100)
PO2: 173.5 MMHG (ref 75–100)
POTASSIUM REFLEX MAGNESIUM: 5.1 MMOL/L (ref 3.5–5)
PRO-BNP: 611 PG/ML (ref 0–125)
PROCALCITONIN: 0.13 NG/ML (ref 0–0.08)
PROTEIN UA: 100 MG/DL
PROTHROMBIN TIME: 11.8 SEC (ref 9.3–12.4)
RBC # BLD: 4.22 E12/L (ref 3.8–5.8)
RBC UA: ABNORMAL /HPF (ref 0–2)
RESPIRATORY SYNCYTIAL VIRUS BY PCR: NOT DETECTED
RI(T): 1.16
RI(T): 2.97
RR MECHANICAL: 20 B/MIN
SARS-COV-2, NAAT: NOT DETECTED
SARS-COV-2, PCR: NOT DETECTED
SEDIMENTATION RATE, ERYTHROCYTE: 31 MM/HR (ref 0–15)
SODIUM BLD-SCNC: 148 MMOL/L (ref 132–146)
SOURCE, BLOOD GAS: ABNORMAL
SOURCE, BLOOD GAS: ABNORMAL
SPECIFIC GRAVITY UA: >=1.03 (ref 1–1.03)
THB: 12.2 G/DL (ref 11.5–16.5)
THB: 13.7 G/DL (ref 11.5–16.5)
TIME ANALYZED: 1347
TIME ANALYZED: 713
TOTAL PROTEIN: 8.1 G/DL (ref 6.4–8.3)
TROPONIN, HIGH SENSITIVITY: 42 NG/L (ref 0–11)
TSH SERPL DL<=0.05 MIU/L-ACNC: 0.63 UIU/ML (ref 0.27–4.2)
UROBILINOGEN, URINE: 0.2 E.U./DL
VT MECHANICAL: 450 ML
WBC # BLD: 13.6 E9/L (ref 4.5–11.5)
WBC UA: ABNORMAL /HPF (ref 0–5)

## 2022-01-01 PROCEDURE — 83690 ASSAY OF LIPASE: CPT

## 2022-01-01 PROCEDURE — 87088 URINE BACTERIA CULTURE: CPT

## 2022-01-01 PROCEDURE — 85651 RBC SED RATE NONAUTOMATED: CPT

## 2022-01-01 PROCEDURE — 82140 ASSAY OF AMMONIA: CPT

## 2022-01-01 PROCEDURE — 85730 THROMBOPLASTIN TIME PARTIAL: CPT

## 2022-01-01 PROCEDURE — 93005 ELECTROCARDIOGRAM TRACING: CPT | Performed by: EMERGENCY MEDICINE

## 2022-01-01 PROCEDURE — 82962 GLUCOSE BLOOD TEST: CPT

## 2022-01-01 PROCEDURE — 11042 DBRDMT SUBQ TIS 1ST 20SQCM/<: CPT

## 2022-01-01 PROCEDURE — 81001 URINALYSIS AUTO W/SCOPE: CPT

## 2022-01-01 PROCEDURE — 84484 ASSAY OF TROPONIN QUANT: CPT

## 2022-01-01 PROCEDURE — 6360000002 HC RX W HCPCS: Performed by: NURSE PRACTITIONER

## 2022-01-01 PROCEDURE — 6370000000 HC RX 637 (ALT 250 FOR IP): Performed by: INTERNAL MEDICINE

## 2022-01-01 PROCEDURE — 84443 ASSAY THYROID STIM HORMONE: CPT

## 2022-01-01 PROCEDURE — 83880 ASSAY OF NATRIURETIC PEPTIDE: CPT

## 2022-01-01 PROCEDURE — 2580000003 HC RX 258

## 2022-01-01 PROCEDURE — 87449 NOS EACH ORGANISM AG IA: CPT

## 2022-01-01 PROCEDURE — 96365 THER/PROPH/DIAG IV INF INIT: CPT

## 2022-01-01 PROCEDURE — 6370000000 HC RX 637 (ALT 250 FOR IP)

## 2022-01-01 PROCEDURE — 85025 COMPLETE CBC W/AUTO DIFF WBC: CPT

## 2022-01-01 PROCEDURE — 84145 PROCALCITONIN (PCT): CPT

## 2022-01-01 PROCEDURE — 71250 CT THORAX DX C-: CPT

## 2022-01-01 PROCEDURE — 80048 BASIC METABOLIC PNL TOTAL CA: CPT

## 2022-01-01 PROCEDURE — 6360000002 HC RX W HCPCS: Performed by: EMERGENCY MEDICINE

## 2022-01-01 PROCEDURE — 2580000003 HC RX 258: Performed by: NURSE PRACTITIONER

## 2022-01-01 PROCEDURE — 86140 C-REACTIVE PROTEIN: CPT

## 2022-01-01 PROCEDURE — 1200000000 HC SEMI PRIVATE

## 2022-01-01 PROCEDURE — 82533 TOTAL CORTISOL: CPT

## 2022-01-01 PROCEDURE — 71045 X-RAY EXAM CHEST 1 VIEW: CPT

## 2022-01-01 PROCEDURE — 94660 CPAP INITIATION&MGMT: CPT

## 2022-01-01 PROCEDURE — 83605 ASSAY OF LACTIC ACID: CPT

## 2022-01-01 PROCEDURE — 2580000003 HC RX 258: Performed by: EMERGENCY MEDICINE

## 2022-01-01 PROCEDURE — 6370000000 HC RX 637 (ALT 250 FOR IP): Performed by: EMERGENCY MEDICINE

## 2022-01-01 PROCEDURE — 99285 EMERGENCY DEPT VISIT HI MDM: CPT

## 2022-01-01 PROCEDURE — 87635 SARS-COV-2 COVID-19 AMP PRB: CPT

## 2022-01-01 PROCEDURE — 87040 BLOOD CULTURE FOR BACTERIA: CPT

## 2022-01-01 PROCEDURE — 70450 CT HEAD/BRAIN W/O DYE: CPT

## 2022-01-01 PROCEDURE — 6360000002 HC RX W HCPCS

## 2022-01-01 PROCEDURE — 74176 CT ABD & PELVIS W/O CONTRAST: CPT

## 2022-01-01 PROCEDURE — 0202U NFCT DS 22 TRGT SARS-COV-2: CPT

## 2022-01-01 PROCEDURE — 85610 PROTHROMBIN TIME: CPT

## 2022-01-01 PROCEDURE — 82805 BLOOD GASES W/O2 SATURATION: CPT

## 2022-01-01 PROCEDURE — 2700000000 HC OXYGEN THERAPY PER DAY

## 2022-01-01 PROCEDURE — 51702 INSERT TEMP BLADDER CATH: CPT

## 2022-01-01 PROCEDURE — 80076 HEPATIC FUNCTION PANEL: CPT

## 2022-01-01 RX ORDER — POTASSIUM CHLORIDE 20 MEQ/1
40 TABLET, EXTENDED RELEASE ORAL PRN
Status: DISCONTINUED | OUTPATIENT
Start: 2022-01-01 | End: 2022-01-12 | Stop reason: HOSPADM

## 2022-01-01 RX ORDER — SODIUM CHLORIDE 0.9 % (FLUSH) 0.9 %
10 SYRINGE (ML) INJECTION PRN
Status: DISCONTINUED | OUTPATIENT
Start: 2022-01-01 | End: 2022-01-12 | Stop reason: HOSPADM

## 2022-01-01 RX ORDER — BACITRACIN, NEOMYCIN, POLYMYXIN B 400; 3.5; 5 [USP'U]/G; MG/G; [USP'U]/G
OINTMENT TOPICAL ONCE
Status: CANCELLED | OUTPATIENT
Start: 2022-01-01 | End: 2022-01-01

## 2022-01-01 RX ORDER — BENZTROPINE MESYLATE 1 MG/1
1 TABLET ORAL 2 TIMES DAILY
Status: DISCONTINUED | OUTPATIENT
Start: 2022-01-01 | End: 2022-01-12 | Stop reason: HOSPADM

## 2022-01-01 RX ORDER — ACETAMINOPHEN 325 MG/1
650 TABLET ORAL EVERY 6 HOURS PRN
Status: DISCONTINUED | OUTPATIENT
Start: 2022-01-01 | End: 2022-01-12 | Stop reason: HOSPADM

## 2022-01-01 RX ORDER — NICOTINE 14MG/24HR
1 PATCH, TRANSDERMAL 24 HOURS TRANSDERMAL 2 TIMES DAILY
Status: DISCONTINUED | OUTPATIENT
Start: 2022-01-01 | End: 2022-01-12 | Stop reason: HOSPADM

## 2022-01-01 RX ORDER — GINSENG 100 MG
CAPSULE ORAL ONCE
Status: CANCELLED | OUTPATIENT
Start: 2022-01-01 | End: 2022-01-01

## 2022-01-01 RX ORDER — ERGOCALCIFEROL (VITAMIN D2) 200 MCG/ML
8000 DROPS ORAL DAILY
Status: DISCONTINUED | OUTPATIENT
Start: 2022-01-01 | End: 2022-01-12 | Stop reason: HOSPADM

## 2022-01-01 RX ORDER — ONDANSETRON 4 MG/1
4 TABLET, ORALLY DISINTEGRATING ORAL EVERY 8 HOURS PRN
Status: DISCONTINUED | OUTPATIENT
Start: 2022-01-01 | End: 2022-01-12 | Stop reason: HOSPADM

## 2022-01-01 RX ORDER — SODIUM CHLORIDE 9 MG/ML
INJECTION, SOLUTION INTRAVENOUS ONCE
Status: DISCONTINUED | OUTPATIENT
Start: 2022-01-01 | End: 2022-01-01

## 2022-01-01 RX ORDER — SODIUM CHLORIDE 9 MG/ML
INJECTION, SOLUTION INTRAVENOUS ONCE
Status: COMPLETED | OUTPATIENT
Start: 2022-01-01 | End: 2022-01-01

## 2022-01-01 RX ORDER — PANTOPRAZOLE SODIUM 40 MG/1
40 TABLET, DELAYED RELEASE ORAL DAILY
Status: DISCONTINUED | OUTPATIENT
Start: 2022-01-01 | End: 2022-01-12 | Stop reason: HOSPADM

## 2022-01-01 RX ORDER — GENTAMICIN SULFATE 1 MG/G
OINTMENT TOPICAL ONCE
Status: CANCELLED | OUTPATIENT
Start: 2022-01-01 | End: 2022-01-01

## 2022-01-01 RX ORDER — IPRATROPIUM BROMIDE AND ALBUTEROL SULFATE 2.5; .5 MG/3ML; MG/3ML
1 SOLUTION RESPIRATORY (INHALATION) EVERY 4 HOURS PRN
Status: DISCONTINUED | OUTPATIENT
Start: 2022-01-01 | End: 2022-01-12 | Stop reason: HOSPADM

## 2022-01-01 RX ORDER — LEVETIRACETAM 100 MG/ML
500 SOLUTION ORAL 2 TIMES DAILY
Status: DISCONTINUED | OUTPATIENT
Start: 2022-01-01 | End: 2022-01-12 | Stop reason: HOSPADM

## 2022-01-01 RX ORDER — BETAMETHASONE DIPROPIONATE 0.05 %
OINTMENT (GRAM) TOPICAL ONCE
Status: CANCELLED | OUTPATIENT
Start: 2022-01-01 | End: 2022-01-01

## 2022-01-01 RX ORDER — LIDOCAINE 50 MG/G
OINTMENT TOPICAL ONCE
Status: CANCELLED | OUTPATIENT
Start: 2022-01-01 | End: 2022-01-01

## 2022-01-01 RX ORDER — LIDOCAINE HYDROCHLORIDE 20 MG/ML
JELLY TOPICAL ONCE
Status: CANCELLED | OUTPATIENT
Start: 2022-01-01 | End: 2022-01-01

## 2022-01-01 RX ORDER — SODIUM CHLORIDE 9 MG/ML
25 INJECTION, SOLUTION INTRAVENOUS PRN
Status: DISCONTINUED | OUTPATIENT
Start: 2022-01-01 | End: 2022-01-12 | Stop reason: HOSPADM

## 2022-01-01 RX ORDER — BACITRACIN ZINC AND POLYMYXIN B SULFATE 500; 1000 [USP'U]/G; [USP'U]/G
OINTMENT TOPICAL ONCE
Status: CANCELLED | OUTPATIENT
Start: 2022-01-01 | End: 2022-01-01

## 2022-01-01 RX ORDER — ACETAMINOPHEN 650 MG/1
650 SUPPOSITORY RECTAL EVERY 6 HOURS PRN
Status: DISCONTINUED | OUTPATIENT
Start: 2022-01-01 | End: 2022-01-12 | Stop reason: HOSPADM

## 2022-01-01 RX ORDER — LIDOCAINE HYDROCHLORIDE 40 MG/ML
SOLUTION TOPICAL ONCE
Status: CANCELLED | OUTPATIENT
Start: 2022-01-01 | End: 2022-01-01

## 2022-01-01 RX ORDER — FOLIC ACID 1 MG/1
1 TABLET ORAL DAILY
Status: DISCONTINUED | OUTPATIENT
Start: 2022-01-01 | End: 2022-01-12 | Stop reason: HOSPADM

## 2022-01-01 RX ORDER — SODIUM CHLORIDE 9 MG/ML
INJECTION, SOLUTION INTRAVENOUS CONTINUOUS
Status: DISCONTINUED | OUTPATIENT
Start: 2022-01-01 | End: 2022-01-12 | Stop reason: HOSPADM

## 2022-01-01 RX ORDER — SENNA PLUS 8.6 MG/1
1 TABLET ORAL DAILY PRN
Status: DISCONTINUED | OUTPATIENT
Start: 2022-01-01 | End: 2022-01-12 | Stop reason: HOSPADM

## 2022-01-01 RX ORDER — ONDANSETRON 2 MG/ML
4 INJECTION INTRAMUSCULAR; INTRAVENOUS EVERY 6 HOURS PRN
Status: DISCONTINUED | OUTPATIENT
Start: 2022-01-01 | End: 2022-01-12 | Stop reason: HOSPADM

## 2022-01-01 RX ORDER — POLYETHYLENE GLYCOL 3350 17 G/17G
17 POWDER, FOR SOLUTION ORAL DAILY
Status: DISCONTINUED | OUTPATIENT
Start: 2022-01-01 | End: 2022-01-12 | Stop reason: HOSPADM

## 2022-01-01 RX ORDER — ACETAMINOPHEN 160 MG/5ML
15 SOLUTION ORAL ONCE
Status: COMPLETED | OUTPATIENT
Start: 2022-01-01 | End: 2022-01-01

## 2022-01-01 RX ORDER — POTASSIUM CHLORIDE 7.45 MG/ML
10 INJECTION INTRAVENOUS PRN
Status: DISCONTINUED | OUTPATIENT
Start: 2022-01-01 | End: 2022-01-12 | Stop reason: HOSPADM

## 2022-01-01 RX ORDER — CLOBETASOL PROPIONATE 0.5 MG/G
OINTMENT TOPICAL ONCE
Status: CANCELLED | OUTPATIENT
Start: 2022-01-01 | End: 2022-01-01

## 2022-01-01 RX ORDER — LIDOCAINE 40 MG/G
CREAM TOPICAL ONCE
Status: CANCELLED | OUTPATIENT
Start: 2022-01-01 | End: 2022-01-01

## 2022-01-01 RX ORDER — SODIUM CHLORIDE 9 MG/ML
INJECTION, SOLUTION INTRAVENOUS EVERY 8 HOURS
Status: DISCONTINUED | OUTPATIENT
Start: 2022-01-01 | End: 2022-01-12 | Stop reason: HOSPADM

## 2022-01-01 RX ORDER — SODIUM CHLORIDE 0.9 % (FLUSH) 0.9 %
10 SYRINGE (ML) INJECTION EVERY 12 HOURS SCHEDULED
Status: DISCONTINUED | OUTPATIENT
Start: 2022-01-01 | End: 2022-01-12 | Stop reason: HOSPADM

## 2022-01-01 RX ADMIN — PANTOPRAZOLE SODIUM 40 MG: 40 TABLET, DELAYED RELEASE ORAL at 18:40

## 2022-01-01 RX ADMIN — ACETAMINOPHEN 650 MG: 650 SUPPOSITORY RECTAL at 18:50

## 2022-01-01 RX ADMIN — SODIUM CHLORIDE: 9 INJECTION, SOLUTION INTRAVENOUS at 16:21

## 2022-01-01 RX ADMIN — ACETAMINOPHEN 748.62 MG: 650 SOLUTION ORAL at 07:59

## 2022-01-01 RX ADMIN — ENOXAPARIN SODIUM 40 MG: 100 INJECTION SUBCUTANEOUS at 16:21

## 2022-01-01 RX ADMIN — SODIUM CHLORIDE 1500 ML: 9 INJECTION, SOLUTION INTRAVENOUS at 07:47

## 2022-01-01 RX ADMIN — PIPERACILLIN AND TAZOBACTAM 4500 MG: 4; .5 INJECTION, POWDER, FOR SOLUTION INTRAVENOUS at 07:59

## 2022-01-01 RX ADMIN — PIPERACILLIN AND TAZOBACTAM 3375 MG: 3; .375 INJECTION, POWDER, LYOPHILIZED, FOR SOLUTION INTRAVENOUS at 18:39

## 2022-01-01 ASSESSMENT — PAIN SCALES - GENERAL
PAINLEVEL_OUTOF10: 0
PAINLEVEL_OUTOF10: 0

## 2022-01-01 NOTE — ED NOTES
Nursing report phoned to Jose Duarte at patient's home facility, [facility]. Discharge paperwork sent with patient, who is discharged in the care of [facility], aide from the facility who had accompanied patient to ED.       Naomi Espinal RN  12/31/21 2120

## 2022-01-03 NOTE — PROGRESS NOTES
[facility] Followup Visit Note    Referring Physician : Zeeshan Helm MD  Zohra Abrams  MEDICAL RECORD NUMBER:  [id number]  AGE: 73 y.o. GENDER: male  : 1950  EPISODE DATE:  1/3/2022    Subjective:     Chief Complaint   Patient presents with    Wound Check     wound foot       HISTORY of PRESENT ILLNESS HPI   Zohra Abrams is a 73 y.o. male who presents today in regards to follow up evaluation and treatment of wound/ulcer. That patient's past medical, family and social hx were reviewed and changes were made if present. History of Wound Context: He presents for evaluation of a area on his right foot medial aspect pressure related. He has been bumping into rubbing his foot on his wheelchair. Company apparently has come in on Wednesday to adjust this. They have tried to pad the area without success. 21 physical exam demonstrates wound appreciated medial aspect right foot overlying prominent bone. Covered with devitalized nonviable tissue, with debridement through subcutaneous tissue. No purulence, odor, erythema or increase in temperature. Local care as instructed, wheelchair again needs to be adjusted, offload at all times. Follow-up as scheduled    21 physical exam demonstrates wound appreciated medial aspect right foot overlying prominent bone. Area with devitalized nonviable tissue, with debridement through subcutaneous tissue. No purulence, odor, erythema or increase in temperature. Local care as instructed, wheelchair area was adjusted, padded recently. 1-3-22  physical exam demonstrates wound appreciated medial aspect right foot overlying prominent bone. Area with devitalized nonviable tissue, with debridement through subcutaneous tissue, small area exposed bone, clean at present. No purulence, odor, erythema or increase in temperature. ECF/HHA: [facility]- please obtain xray of the right foot. Still getting pressure on area.   Prevalon boot to right foot wear at all times to alleviate pressure. Dressing Orders:right foot apply silver alginate and dry dressing daily. Concern with exposed bone, at risk for additional breakdown, infection. Wound/Ulcer Pain Timing/Severity: none  Quality of pain:   Severity:   / 10   Modifying Factors:   Associated Signs/Symptoms:     Ulcer Identification:  Ulcer Type: pressure  Contributing Factors: chronic pressure    Diabetic/Pressure/Non Pressure Ulcers only:  Ulcer: Pressure ulcer, Stage 3    Wound: N/A        PAST MEDICAL HISTORY      Diagnosis Date    Acquired deformity of neck     Autism spectrum     Bilateral cataracts     Blepharochalasis     Cardiomegaly     Cataract of both eyes     Developmental delay     Gastritis     Hyperlipidemia     Hypernatremia     Kyphosis of thoracic region     Mental retardation     SEVERE MR  NON VERBAL, NEEDS WHEELCHAIR FOR LONG DISTANCE    Obsessive compulsive disorder     Parkinsonism (HCC)     Seizures (HCC)     Tourette disorder      Past Surgical History:   Procedure Laterality Date    TOE AMPUTATION Left     2nd/3rd    UPPER GASTROINTESTINAL ENDOSCOPY N/A 3/2/2021    PEG TUBE INSERTION performed by Travis Barron MD at [address] [place] [address]     History reviewed. No pertinent family history.   Social History     Tobacco Use    Smoking status: Never Smoker    Smokeless tobacco: Never Used   Vaping Use    Vaping Use: Never used   Substance Use Topics    Alcohol use: No    Drug use: No     No Known Allergies  Current Outpatient Medications on File Prior to Encounter   Medication Sig Dispense Refill    calcium-vitamin D (OSCAL) 250-125 MG-UNIT per tablet Take 1 tablet by mouth daily      Ergocalciferol (DRISDOL) 200 MCG/ML drops Take 8,000 Units by mouth daily 6.25 ml 50,000 IU on the 14th of every FWHVI(3910JG/RA)      folic acid (FOLVITE) 1 MG tablet Take 1 mg by mouth daily      Multiple Vitamin (MULTIVITAMIN ADULT PO) Take by mouth daily      Saccharomyces boulardii (PROBIOTIC) 250 MG CAPS Take by mouth 2 times daily      carbidopa-levodopa (SINEMET)  MG per tablet 2 tablets by PEG Tube route every 4 hours Ok for 2am dose 360 tablet 1    omeprazole (PRILOSEC) 20 MG delayed release capsule 20 mg daily *PER [facility] NURSE(JOSE CRUZ) VIA PEG TUBE*      levETIRAcetam (KEPPRA) 100 MG/ML solution Take 5 mLs by mouth 2 times daily  3    ipratropium-albuterol (DUONEB) 0.5-2.5 (3) MG/3ML SOLN nebulizer solution Inhale 1 vial into the lungs every 4 hours as needed for Shortness of Breath      acetaminophen (TYLENOL) 325 MG tablet Take 650 mg by mouth every 4 hours as needed for Pain or Fever       benztropine (COGENTIN) 1 MG tablet Take 1 mg by mouth 2 times daily        No current facility-administered medications on file prior to encounter.        REVIEW OF SYSTEMS See HPI    Objective:    /66   Pulse 88   Temp (!) 0.8 °F (-17.3 °C) (Temporal)   Resp 18   Ht 5' 4\" (1.626 m)   Wt 114 lb (51.7 kg)   BMI 19.57 kg/m²   Wt Readings from Last 3 Encounters:   01/03/22 114 lb (51.7 kg)   12/20/21 114 lb (51.7 kg)   12/06/21 114 lb (51.7 kg)     PHYSICAL EXAM  CONSTITUTIONAL:   Awake, alert, cooperative   EYES:  lids and lashes normal   ENT: external ears and nose without lesions   NECK:  supple, symmetrical, trachea midline   SKIN:  Open wound     Assessment:     Pressure ulcer stage 3    Pre Debridement Measurements:  Are located in the [facility]  Documentation Flow Sheet  Post Debridement Measurements:  Wound/Ulcer Descriptions are Pre Debridement except measurements:     Wound (Active)   Number of days:        Wound 12/06/21 Foot Right; Medial #4 PRESSURE (Active)   Wound Image   12/06/21 1421   Wound Etiology Pressure Stage  3 12/06/21 1421   Dressing Status New dressing applied 12/20/21 1459   Wound Cleansed Cleansed with saline 12/20/21 1459   Dressing/Treatment Alginate with Ag;Dry dressing 12/20/21 1459   Offloading for Diabetic Foot Ulcers Other (comment) patient and signed by patient or POA. Discharge Instructions         Visit Discharge/Physician Orders     Discharge condition: Stable     Assessment of pain at discharge:minimal     Anesthetic used: 4% lidocaine     Discharge to: Home     Left via:Private automobile     Accompanied by: accompanied by caregiver     ECF/[facility]: [facility]- please obtain xray of the right foot     Please order prevalon boot to right foot wear at all times     Dressing Orders:right foot apply silver alginate and dry dressing daily    please pad bar on wheelchair         Treatment Orders:  KEEP PRESSURE OFF WOUNDS     EAT FOODS HIGH IN PROTEIN AND VITAMIN C     MULTIVITAMIN DAILY    [address] [place] [address],[address] followup visit __1 week__________________  (Please note your next appointment above and if you are unable to keep, kindly give a 24 hour notice.  Thank you.)     Physician signature:__________________________        If you experience any of the following, please call the [facility] during business hours:     * Increase in Pain  * Temperature over 101  * Increase in drainage from your wound  * Drainage with a foul odor  * Bleeding  * Increase in swelling  * Need for compression bandage changes due to slippage, breakthrough drainage.     If you need medical attention outside of the business hours of the [facility] please contact your PCP or go to the nearest emergency room.                                                                                                                                         Electronically signed by Jelena Flowers DPM on 1/3/2022 at 2:41 PM

## 2022-01-07 NOTE — TELEPHONE ENCOUNTER
Notify pt,  I have reviewed your recent results    Thyroid hormones are very good but Testosterone level was low     This might explain his sweating episodes    He will likely benefit from testosterone treatment.  Before starting T therapy please check if he has any h/o blood clots     Pt is a nursing home resident

## 2022-01-11 PROBLEM — R56.9 SEIZURES (HCC): Status: RESOLVED | Noted: 2020-03-30 | Resolved: 2022-01-01

## 2022-01-11 PROBLEM — J96.02 ACUTE RESPIRATORY FAILURE WITH HYPOXIA AND HYPERCAPNIA (HCC): Status: ACTIVE | Noted: 2022-01-01

## 2022-01-11 PROBLEM — E86.0 DEHYDRATION WITH HYPERNATREMIA: Status: RESOLVED | Noted: 2020-01-29 | Resolved: 2022-01-01

## 2022-01-11 PROBLEM — E87.20 LACTIC ACIDOSIS: Status: RESOLVED | Noted: 2021-01-01 | Resolved: 2022-01-01

## 2022-01-11 PROBLEM — R82.90 ABNORMAL URINALYSIS: Status: RESOLVED | Noted: 2021-01-01 | Resolved: 2022-01-01

## 2022-01-11 PROBLEM — A41.89 GRAM POSITIVE SEPTICEMIA (HCC): Status: RESOLVED | Noted: 2021-01-01 | Resolved: 2022-01-01

## 2022-01-11 PROBLEM — R09.02 HYPOXIA: Status: RESOLVED | Noted: 2021-01-01 | Resolved: 2022-01-01

## 2022-01-11 PROBLEM — J96.01 ACUTE RESPIRATORY FAILURE WITH HYPOXIA AND HYPERCAPNIA (HCC): Status: ACTIVE | Noted: 2022-01-01

## 2022-01-11 PROBLEM — G40.901 STATUS EPILEPTICUS (HCC): Status: RESOLVED | Noted: 2020-03-28 | Resolved: 2022-01-01

## 2022-01-11 PROBLEM — E87.0 DEHYDRATION WITH HYPERNATREMIA: Status: RESOLVED | Noted: 2020-01-29 | Resolved: 2022-01-01

## 2022-01-11 PROBLEM — R06.89 HYPOVENTILATION SYNDROME: Status: RESOLVED | Noted: 2020-10-29 | Resolved: 2022-01-01

## 2022-01-11 PROBLEM — J96.01 ACUTE RESPIRATORY FAILURE WITH HYPOXIA (HCC): Status: RESOLVED | Noted: 2020-10-29 | Resolved: 2022-01-01

## 2022-01-11 PROBLEM — G40.901 SEIZURE DISORDER, STATUS EPILEPTICUS, CONVULSIVE (HCC): Status: RESOLVED | Noted: 2020-03-31 | Resolved: 2022-01-01

## 2022-01-11 PROBLEM — J18.9 CAP (COMMUNITY ACQUIRED PNEUMONIA): Status: ACTIVE | Noted: 2022-01-01

## 2022-01-11 NOTE — ED NOTES
Spoke with Jasper Almazan, pts legal guardian. She is requesting that patient have  at bedside. Clinical mgr notified. Notified legal guardian that pt is calm and cooperative at this time.      Forest Jefferson RN  01/11/22 1992

## 2022-01-11 NOTE — ED PROVIDER NOTES
Patient 81-year-old [facility] from nursing home presents due to worsening shortness of breath per nursing report. Per caregiver who is with the patient states that he is normally nonverbal at baseline however he has become more short of breath than baseline. Patient is cachectic appearing at baseline nonverbal will not interact. DNR CCA per chart review. Review Of systems is limited due to nonverbal status. The history is provided by the nursing home, the EMS personnel and a caregiver. No  was used. Review of Systems   Unable to perform ROS: Patient nonverbal        Physical Exam  Vitals and nursing note reviewed. Constitutional:       Appearance: He is ill-appearing. Comments: Chronically ill-appearing   HENT:      Head: Normocephalic and atraumatic. Right Ear: External ear normal.      Left Ear: External ear normal.      Mouth/Throat:      Mouth: Mucous membranes are moist.      Pharynx: Oropharynx is clear. No pharyngeal swelling. Eyes:      Pupils: Pupils are equal, round, and reactive to light. Cardiovascular:      Rate and Rhythm: Regular rhythm. Tachycardia present. Heart sounds: Normal heart sounds. No murmur heard. Pulmonary:      Effort: Pulmonary effort is normal. Tachypnea present. No respiratory distress. Breath sounds: Examination of the right-lower field reveals decreased breath sounds. Examination of the left-lower field reveals decreased breath sounds. Decreased breath sounds present. Abdominal:      General: Bowel sounds are normal.      Palpations: Abdomen is soft. Tenderness: There is no abdominal tenderness. Musculoskeletal:      Cervical back: Neck supple. No muscular tenderness. Skin:     General: Skin is warm and dry. Neurological:      Mental Status: He is alert. Mental status is at baseline. He is disoriented and confused.           Procedures       Arterial blood gas Procedure Note                     Indication: arterial blood gases    Consent: Unable to be obtained due to patient's condition. Boaz's Test: Normal    Procedure: The skin over the left radial artery was prepped with betadine and draped in a sterile fashion. Local anesthesia was not performed due to the emergent nature of this procedure. Arterial blood gas drawn. The patient had good distal perfusion after the procedure. The site was then dressed in a sterile fashion. The patient tolerated the procedure well. Complications: None          EKG: This EKG is signed and interpreted by me. Rate: 104  Rhythm: Sinus  Interpretation: no acute changes  Comparison: stable as compared to patient's most recent EKG       MDM  Number of Diagnoses or Management Options  Acute respiratory failure with hypoxia and hypercapnia (HCC)  Hypernatremia  Diagnosis management comments: Patient is a 60-year-old male and is disabled at baseline and nonverbal at baseline presented to the emergency department due to shortness of breath at nursing home. Patient was tachypneic tachycardic and altered per nursing home facility placed on BiPAP on arrival and his physical exam improved he came back to baseline however still requiring BiPAP to oxygenate adequately. Repeat examination improved patient was febrile here with a temperature of 100.8 he was given Tylenol for improvement of his fever in addition he was started on antibiotics for likely aspiration pneumonia. Following this patient was admitted to the hospital for the treatment. ED Course as of 01/11/22 1549   Tue Jan 11, 2022   0809 Pt switched to avaps rr 20 and tv 450 [HOLLY]   1118 Spoke with the patient's POA at length regarding the patient's medical problems and ongoing respiratory compromise.   After discussion POA chose to change the CODE STATUS to limited code with a DO NOT INTUBATE, DO NOT do CPR order. [DM]      ED Course User Index  [DM] Omer Herrera DO  [HOLLY] Terrence Pandya DO        ED Course as of 01/11/22 1552   Tue Jan 11, 2022   0809 Pt switched to avaps rr 20 and tv 450 [HOLLY]   5800 Spoke with the patient's POA at length regarding the patient's medical problems and ongoing respiratory compromise. After discussion POA chose to change the CODE STATUS to limited code with a DO NOT INTUBATE, DO NOT do CPR order. [DM]      ED Course User Index  [DM] Brian Pepe DO  [HOLLY] Aldo Kyle DO       --------------------------------------------- PAST HISTORY ---------------------------------------------  Past Medical History:  has a past medical history of Acquired deformity of neck, Autism spectrum, Bilateral cataracts, Blepharochalasis, Cardiomegaly, Cataract of both eyes, Developmental delay, Gastritis, Hyperlipidemia, Hypernatremia, Kyphosis of thoracic region, Mental retardation, Obsessive compulsive disorder, Parkinsonism ([address] Seizures ([address] and Tourette disorder. Past Surgical History:  has a past surgical history that includes Toe amputation (Left) and Upper gastrointestinal endoscopy (N/A, 3/2/2021). Social History:  reports that he has never smoked. He has never used smokeless tobacco. He reports that he does not drink alcohol and does not use drugs. Family History: family history is not on file. The patients home medications have been reviewed. Allergies: Patient has no known allergies.     -------------------------------------------------- RESULTS -------------------------------------------------    LABS:  Results for orders placed or performed during the hospital encounter of 01/11/22   COVID-19, Rapid    Specimen: Nasopharyngeal Swab   Result Value Ref Range    SARS-CoV-2, NAAT Not Detected Not Detected   Respiratory Panel, Molecular, with COVID-19 (Restricted: peds pts or suitable admitted adults)    Specimen: Nasopharyngeal   Result Value Ref Range    Adenovirus by PCR Not Detected Not Detected    Bordetella parapertussis by PCR Not Detected Not Detected    Bordetella pertussis by PCR Not Detected Not Detected    Chlamydophilia pneumoniae by PCR Not Detected Not Detected    Coronavirus 229E by PCR Not Detected Not Detected    Coronavirus HKU1 by PCR Not Detected Not Detected    Coronavirus NL63 by PCR Not Detected Not Detected    Coronavirus OC43 by PCR Not Detected Not Detected    SARS-CoV-2, PCR Not Detected Not Detected    Human Metapneumovirus by PCR Not Detected Not Detected    Human Rhinovirus/Enterovirus by PCR Not Detected Not Detected    Influenza A by PCR Not Detected Not Detected    Influenza B by PCR Not Detected Not Detected    Mycoplasma pneumoniae by PCR Not Detected Not Detected    Parainfluenza Virus 1 by PCR Not Detected Not Detected    Parainfluenza Virus 2 by PCR Not Detected Not Detected    Parainfluenza Virus 3 by PCR Not Detected Not Detected    Parainfluenza Virus 4 by PCR Not Detected Not Detected    Respiratory Syncytial Virus by PCR Not Detected Not Detected   Lactic Acid, Plasma   Result Value Ref Range    Lactic Acid 0.6 0.5 - 2.2 mmol/L   CBC Auto Differential   Result Value Ref Range    WBC 13.6 (H) 4.5 - 11.5 E9/L    RBC 4.22 3.80 - 5.80 E12/L    Hemoglobin 13.5 12.5 - 16.5 g/dL    Hematocrit 45.6 37.0 - 54.0 %    .1 (H) 80.0 - 99.9 fL    MCH 32.0 26.0 - 35.0 pg    MCHC 29.6 (L) 32.0 - 34.5 %    RDW 13.2 11.5 - 15.0 fL    Platelets 849 406 - 252 E9/L    MPV 8.8 7.0 - 12.0 fL    Neutrophils % 82.6 (H) 43.0 - 80.0 %    Immature Granulocytes % 2.1 0.0 - 5.0 %    Lymphocytes % 5.3 (L) 20.0 - 42.0 %    Monocytes % 9.6 2.0 - 12.0 %    Eosinophils % 0.0 0.0 - 6.0 %    Basophils % 0.4 0.0 - 2.0 %    Neutrophils Absolute 11.22 (H) 1.80 - 7.30 E9/L    Immature Granulocytes # 0.29 E9/L    Lymphocytes Absolute 0.72 (L) 1.50 - 4.00 E9/L    Monocytes Absolute 1.31 (H) 0.10 - 0.95 E9/L    Eosinophils Absolute 0.00 (L) 0.05 - 0.50 E9/L    Basophils Absolute 0.05 0.00 - 0.20 G2/Q   Basic Metabolic Panel w/ Reflex to MG   Result Value Ref Range    Sodium 148 (H) 132 - 146 mmol/L    Potassium reflex Magnesium 5.1 (H) 3.5 - 5.0 mmol/L    Chloride 111 (H) 98 - 107 mmol/L    CO2 31 (H) 22 - 29 mmol/L    Anion Gap 6 (L) 7 - 16 mmol/L    Glucose 123 (H) 74 - 99 mg/dL    BUN 36 (H) 6 - 23 mg/dL    CREATININE 0.6 (L) 0.7 - 1.2 mg/dL    GFR Non-African American >60 >=60 mL/min/1.73    GFR African American >60     Calcium 9.0 8.6 - 10.2 mg/dL   Hepatic Function Panel   Result Value Ref Range    Total Protein 8.1 6.4 - 8.3 g/dL    Albumin 3.8 3.5 - 5.2 g/dL    Alkaline Phosphatase 104 40 - 129 U/L    ALT <5 0 - 40 U/L    AST 44 (H) 0 - 39 U/L    Total Bilirubin <0.2 0.0 - 1.2 mg/dL    Bilirubin, Direct <0.2 0.0 - 0.3 mg/dL    Bilirubin, Indirect see below 0.0 - 1.0 mg/dL   Lipase   Result Value Ref Range    Lipase 51 13 - 60 U/L   Troponin   Result Value Ref Range    Troponin, High Sensitivity 42 (H) 0 - 11 ng/L   Brain Natriuretic Peptide   Result Value Ref Range    Pro- (H) 0 - 125 pg/mL   Protime-INR   Result Value Ref Range    Protime 11.8 9.3 - 12.4 sec    INR 1.1    APTT   Result Value Ref Range    aPTT 29.7 24.5 - 35.1 sec   Sedimentation Rate   Result Value Ref Range    Sed Rate 31 (H) 0 - 15 mm/Hr   C-Reactive Protein   Result Value Ref Range    CRP 1.5 (H) 0.0 - 0.4 mg/dL   Procalcitonin   Result Value Ref Range    Procalcitonin 0.13 (H) 0.00 - 0.08 ng/mL   TSH without Reflex   Result Value Ref Range    TSH 0.634 0.270 - 4.200 uIU/mL   CORTISOL   Result Value Ref Range    Cortisol 30.31 (H) 2.68 - 18.40 mcg/dL   Urinalysis, reflex to microscopic   Result Value Ref Range    Color, UA Yellow Straw/Yellow    Clarity, UA Clear Clear    Glucose, Ur Negative Negative mg/dL    Bilirubin Urine Negative Negative    Ketones, Urine Negative Negative mg/dL    Specific Gravity, UA >=1.030 1.005 - 1.030    Blood, Urine LARGE (A) Negative    pH, UA 5.0 5.0 - 9.0    Protein,  (A) Negative mg/dL    Urobilinogen, Urine 0.2 <2.0 E.U./dL    Nitrite, Urine Negative Negative Leukocyte Esterase, Urine Negative Negative   Ammonia   Result Value Ref Range    Ammonia 56.1 16.0 - 60.0 umol/L   Blood Gas, Arterial   Result Value Ref Range    Date Analyzed [id number]     Time Analyzed 0713     Source: Blood Arterial     pH, Blood Gas 7.149 (LL) 7.350 - 7.450    PCO2 95.6 (HH) 35.0 - 45.0 mmHg    PO2 155.5 (H) 75.0 - 100.0 mmHg    HCO3 32.5 (H) 22.0 - 26.0 mmol/L    B.E. 0.7 -3.0 - 3.0 mmol/L    O2 Sat 99.0 (H) 92.0 - 98.5 %    PO2/FIO2 1.55 mmHg/%    AaDO2 461.9 mmHg    RI(T) 2.97     O2Hb 98.1 (H) 94.0 - 97.0 %    COHb 0.6 0.0 - 1.5 %    MetHb 0.3 0.0 - 1.5 %    O2 Content 19.2 mL/dL    HHb 1.0 0.0 - 5.0 %    tHb (est) 13.7 11.5 - 16.5 g/dL    Mode BIPAP 12/6     FIO2 100.0 %    Date Of Collection      Time Collected      Pt Temp 37.0 C     ID [id number]     Lab [id number]     Critical(s) Notified Handed report to Dr/RN    Microscopic Urinalysis   Result Value Ref Range    WBC, UA 0-1 0 - 5 /HPF    RBC, UA 5-10 (A) 0 - 2 /HPF    Bacteria, UA RARE (A) None Seen /HPF   Blood Gas, Arterial   Result Value Ref Range    Date Analyzed 20220111     Time Analyzed 1347     Source: Blood Arterial     pH, Blood Gas 7.371 7.350 - 7.450    PCO2 47.7 (H) 35.0 - 45.0 mmHg    PO2 173.5 (H) 75.0 - 100.0 mmHg    HCO3 27.0 (H) 22.0 - 26.0 mmol/L    B.E. 1.2 -3.0 - 3.0 mmol/L    O2 Sat 99.3 (H) 92.0 - 98.5 %    PO2/FIO2 2.89 mmHg/%    AaDO2 201.8 mmHg    RI(T) 1.16     O2Hb 98.6 (H) 94.0 - 97.0 %    COHb 0.4 0.0 - 1.5 %    MetHb 0.3 0.0 - 1.5 %    O2 Content 17.3 mL/dL    HHb 0.7 0.0 - 5.0 %    tHb (est) 12.2 11.5 - 16.5 g/dL    Mode AVAPS     FIO2 60.0 %    Rr Mechanical 20.0 b/min    Vt Mechanical 450.0 mL    Peep/Cpap 6.0 cmH2O    Date Of Collection      Time Collected      Pt Temp 37.0 C     ID 0046     Lab [id number]     Critical(s) Notified .  No Critical Values    POCT Glucose   Result Value Ref Range    Glucose 111 mg/dL    QC OK? ok    POCT Glucose   Result Value Ref Range    Meter Glucose 111 (H) 74 - 99 mg/dL   EKG 12 Lead   Result Value Ref Range    Ventricular Rate 104 BPM    Atrial Rate 104 BPM    P-R Interval 124 ms    QRS Duration 68 ms    Q-T Interval 314 ms    QTc Calculation (Bazett) 412 ms    P Axis 33 degrees    R Axis 26 degrees    T Axis 28 degrees       RADIOLOGY:  CT CHEST WO CONTRAST   Final Result   Markedly limited study by the patient's clinical condition and lack of IV and   oral contrast.      Large consolidation with air bronchograms in the right lower lobe, likely   pneumonia. Aspiration or atelectasis cannot be excluded. Patchy ground-glass density in the lingula, due to atelectasis, aspiration,   or pneumonia. Very large amount of retained stool in the distal colon and rectum,   compatible with fecal impaction. CT ABDOMEN PELVIS WO CONTRAST Additional Contrast? None   Final Result   Markedly limited study by the patient's clinical condition and lack of IV and   oral contrast.      Large consolidation with air bronchograms in the right lower lobe, likely   pneumonia. Aspiration or atelectasis cannot be excluded. Patchy ground-glass density in the lingula, due to atelectasis, aspiration,   or pneumonia. Very large amount of retained stool in the distal colon and rectum,   compatible with fecal impaction. CT Head WO Contrast   Final Result   1. There is no acute intracranial abnormality. Specifically, there is no   gross intracranial hemorrhage. 2. Atrophy and periventricular leukomalacia,   3. The examination is significantly limited due to the patient shaking   throughout the examination causing significant motion artifact. Grossly   there is no intracranial hemorrhage. XR CHEST PORTABLE   Final Result   Hiatus hernia containing stomach and colon. Elevated right hemidiaphragm   which may relate to eventration and or paralysis. No definite new abnormal   findings.                ------------------------- NURSING NOTES AND VITALS REVIEWED ---------------------------  Date / Time Roomed:  1/11/2022  6:52 AM  ED Bed Assignment:  28/28    The nursing notes within the ED encounter and vital signs as below have been reviewed. Patient Vitals for the past 24 hrs:   BP Temp Temp src Pulse Resp SpO2 Height Weight   01/11/22 1454 112/69 -- -- 102 22 100 % -- --   01/11/22 1219 -- -- -- -- 20 -- -- --   01/11/22 1013 113/79 -- -- 105 (!) 36 95 % -- --   01/11/22 0940 118/75 -- -- 105 (!) 34 98 % -- --   01/11/22 0826 -- -- -- -- (!) 36 -- -- --   01/11/22 0729 -- -- -- -- (!) 35 -- -- --   01/11/22 0705 -- 100.8 °F (38.2 °C) Axillary -- -- -- -- --   01/11/22 0703 82/65 -- -- 104 22 94 % 5' 4\" (1.626 m) 110 lb (49.9 kg)       Oxygen Saturation Interpretation: Abnormal and Improved after treatment    ------------------------------------------ PROGRESS NOTES ------------------------------------------  Re-evaluation(s):  Time: 5991  Patients symptoms are improving  Repeat physical examination is improved    Counseling:  I have spoken with the caregiver and discussed todays results, in addition to providing specific details for the plan of care and counseling regarding the diagnosis and prognosis. Their questions are answered at this time and they are agreeable with the plan of admission.    --------------------------------- ADDITIONAL PROVIDER NOTES ---------------------------------  Consultations:  Time: 0900. Spoke with Dr. Deanna Dukes. Discussed case. They will admit the patient. This patient's ED course included: a personal history and physicial examination, re-evaluation prior to disposition, multiple bedside re-evaluations, IV medications, cardiac monitoring, continuous pulse oximetry and complex medical decision making and emergency management    This patient has remained hemodynamically stable during their ED course. Diagnosis:  1. Acute respiratory failure with hypoxia and hypercapnia (HCC)    2.  Hypernatremia        Disposition:  Patient's disposition: Admit to telemetry  Patient's condition is stable.          Varghese Allen DO  Resident  01/11/22 4032

## 2022-01-11 NOTE — PROGRESS NOTES
Admission database completed to best of this RN's ability. Care plan and education initiated. Pt from [facility]; plan is to return there. Per facility, pt is completely dependent with ADLs, nonambulatory, and nonverbal. Sharad Eagles lift is used with all transfers. Pt is a DNR CCA. Pt has a peg tube and wears an abdominal binder that he wears AAT. Meds can be given through peg tube, or crushed with applesauce or pudding.

## 2022-01-11 NOTE — Clinical Note
Patient Class: Inpatient [101]   REQUIRED: Diagnosis: [facility] with hypoxia and [facility]capnia [facility] [[id number]]   Estimated Length of Stay: Estimated stay of more than 2 midnights   Admitting Provider: Keith Smith [[id number]]   Telemetry/Cardiac Monitoring Required?: Yes

## 2022-01-11 NOTE — CONSULTS
Lázaro Nguyễn M.D.,[facility]  Markos Hoffman D.O., F.CURTIS.C.O.I., Rhiannon Gillespie M.D. Mar Randle M.D. Aleja Varghese D.O. Patient:  Kendall Tafoya 73 y.o. male MRN: [id number]     Date of Service: 1/11/2022      PULMONARY CONSULTATION    Reason for Consultation: Acute respiratory failure with hypoxia and hypercapnia  Referring Physician: Dr. Dominique Max with the referring physician will be sent via the electronic medical record. Chief Complaint: Altered mental status    CODE STATUS: Limited, DNI  Intubation/Re-intubation No   Defibrillation/Cardioversion Yes   Chest Compressions No   Resuscitative Medications Yes         SUBJECTIVE:  HPI:  Kendall Tafoya is a 73 y.o.  male who we are asked to evaluate for acute respiratory failure with hypoxia and hypercapnia. His caregiver is present at bedside to assist with answering questions. His past medical history includes autism, MRDD, nonverbal, cardiomegaly, obsessive-compulsive disorder, seizures, parkinsonism with tremors, Tourette's syndrome, kyphosis of the thoracic spine, hyponatremia, gastritis, hyperlipidemia, gastritis, dysphagia with PEG tube placement, elevation of right hemidiaphragm, large hiatal hernia. He has received 2 doses of Moderna vaccine against COVID-19. He is over due for his booster. He has not been followed previously by our pulmonary practice. Per his caregiver at bedside he tolerates tube feeds nocturnally with sleep and is on a dysphagia puréed diet during the daytime. He presented to the ED at [facility] on 1/11/2022 with worsening respiratory distress. Per his caregiver he had been having difficulties with his breathing over the past few days. She states he normally has some oxygen and uses a mask at the facility. He was disoriented with tachycardia on admission. Requiring BiPAP for respiratory support. Initial ABG 7.14/95/155/32/0 point/99% on BiPAP 12 over 6 to 100%.   Switched settings to AVAPS tidal volume 450 EPAP +6 FiO2 decreased to 60% and backup rate of 20-7.37/47/173/27/1.2/99%. Radiology review- left lung opacity consistent with hiatal hernia. Elevated right hemidiaphragm. CT chest, abdomen, and pelvis completed without contrast.  Limited study by patient clinical condition and lack of contrast.  Large consolidation with air bronchograms in right lower lobe likely pneumonia. Aspiration or atelectasis cannot be excluded. Patchy groundglass density in the lingula. Large amount of retained stool in the distal colon and rectum compatible with fecal impaction. No free air or fluid in peritoneum. Multilevel spondylosis. Hiatal hernia. Lab testing-respiratory viral panel negative, sodium 148. Potassium 5.1. BUN 36, creatinine 0.6. Procalcitonin 0.3. CRP 1.5. High-sensitivity troponin 42.  proBNP 611. Ammonia 56. AST 44. Cortisol 30.31.,  WBCs 13.6, 0.6, INR 1.1, Sed Rate 31, hemoglobin 13.5. Urinalysis large blood, negative nitrites or leukocyte esterase. Urine culture pending. Currently lying in bed ill-appearing with tachypnea and accessory muscle use. Lying on his right side on NIV in the form of AVAPS.   IV fluids infusing 100 cc/h.   temperature low-grade 100.8 °F.      Past Medical History:   Diagnosis Date    Acquired deformity of neck     Autism spectrum     Bilateral cataracts     Blepharochalasis     Cardiomegaly     Cataract of both eyes     Developmental delay     Gastritis     Hyperlipidemia     Hypernatremia     Kyphosis of thoracic region     Mental retardation     SEVERE MR  NON VERBAL, NEEDS WHEELCHAIR FOR LONG DISTANCE    Obsessive compulsive disorder     Parkinsonism (HCC)     Seizures (HCC)     Tourette disorder        Past Surgical History:   Procedure Laterality Date    TOE AMPUTATION Left     2nd/3rd    UPPER GASTROINTESTINAL ENDOSCOPY N/A 3/2/2021    PEG TUBE INSERTION performed by Cierra Ivey MD at [place] [address] reviewed. No pertinent family history. Social History:   Social History     Socioeconomic History    Marital status: Single     Spouse name: Not on file    Number of children: Not on file    Years of education: Not on file    Highest education level: Not on file   Occupational History    Not on file   Tobacco Use    Smoking status: Never Smoker    Smokeless tobacco: Never Used   Vaping Use    Vaping Use: Never used   Substance and Sexual Activity    Alcohol use: No    Drug use: No    Sexual activity: Not Currently   Other Topics Concern    Not on file   Social History Narrative    Not on file     Social Determinants of Health     Financial Resource Strain:     Difficulty of Paying Living Expenses: Not on file   Food Insecurity:     Worried About [address] in the Last Year: Not on file    Ran Out of Food in the Last Year: Not on file   Transportation Needs:     Lack of Transportation (Medical): Not on file    Lack of Transportation (Non-Medical):  Not on file   Physical Activity:     Days of Exercise per Week: Not on file    Minutes of Exercise per Session: Not on file   Stress:     Feeling of Stress : Not on file   Social Connections:     Frequency of Communication with Friends and Family: Not on file    Frequency of Social Gatherings with Friends and Family: Not on file    Attends Jainism Services: Not on file    Active Member of Clubs or Organizations: Not on file    Attends Club or Organization Meetings: Not on file    Marital Status: Not on file   Intimate Partner Violence:     Fear of Current or Ex-Partner: Not on file    Emotionally Abused: Not on file    Physically Abused: Not on file    Sexually Abused: Not on file   Housing Stability:     Unable to Pay for Housing in the Last Year: Not on file    Number of [place] in the Last Year: Not on file    Unstable Housing in the Last Year: Not on file     Smoking history: The patient is a non-smoker    ETOH:   reports no history of alcohol diminished right , coarse bilateral rhonchi, accessory muscle use  Abdomen: soft, non-tender, non-distended, normal bowel sounds PEG tube clamped  Extremities: warm, no edema, no clubbing fractures  Skin: no rash or lesion  Neurologic: Not responsive, nonverbal, tremoring and shaking intermittently    Pulmonary Function Testing none      Imaging personally reviewed:  CT chest, abdomen, and pelvis completed 1/11/2022  Markedly limited study by the patient's clinical condition and lack of IV and   oral contrast.       Large consolidation with air bronchograms in the right lower lobe, likely   pneumonia. Aspiration or atelectasis cannot be excluded. Patchy ground-glass density in the lingula, due to atelectasis, aspiration,   or pneumonia. Very large amount of retained stool in the distal colon and rectum,   compatible with fecal impaction. Chest x-ray 1/11/2022  FINDINGS:   Opacity at the left base is consistent with a hiatus hernia containing   stomach and colon. Stable heart with normal pulmonary vascularity. Elevation of the right hemidiaphragm may relate to paralysis and or   eventration and was also present previously. Impression   Hiatus hernia containing stomach and colon. Elevated right hemidiaphragm   which may relate to eventration and or paralysis. No definite new abnormal   findings.        Echo:  Not on file    Labs:  Lab Results   Component Value Date    WBC 13.6 01/11/2022    HGB 13.5 01/11/2022    HCT 45.6 01/11/2022    .1 01/11/2022    MCH 32.0 01/11/2022    MCHC 29.6 01/11/2022    RDW 13.2 01/11/2022     01/11/2022    MPV 8.8 01/11/2022     Lab Results   Component Value Date     01/11/2022    K 5.1 01/11/2022     01/11/2022    CO2 31 01/11/2022    BUN 36 01/11/2022    CREATININE 0.6 01/11/2022    LABALBU 3.8 01/11/2022    LABALBU 4.5 05/18/2012    CALCIUM 9.0 01/11/2022    GFRAA >60 01/11/2022    LABGLOM >60 01/11/2022     Lab Results   Component Value Date    PROTIME 11.8 01/11/2022    INR 1.1 01/11/2022     Recent Labs     01/11/22  0711   PROBNP 611*     No results for input(s): TROPONINI in the last 72 hours. Recent Labs     01/11/22  0711   PROCAL 0.13*     This SmartLink has not been configured with any valid records. Micro:  No results for input(s): CULTRESP in the last 72 hours. No results for input(s): LABGRAM in the last 72 hours. No results for input(s): LEGUR in the last 72 hours. No results for input(s): STREPNEUMAGU in the last 72 hours. No results for input(s): LP1UAG in the last 72 hours. Assessment:  Acute respiratory failure with hypoxia  Right lower lobe lung consolidation: probable aspiration  Elevation right hemidiaphragm, likely chronic  Large Hiatal hernia  Likely ongoing aspiration  Large retained stool in colon on CT abd imaging  Basilar atelectasis   MRDD, Non verbal  Parkinsonism tremors  Seizure disorder on keppra  OCD  Tourettes   Autism spectrum  Dysphagia with peg tube placement for nutrition  Protein calorie malnutrition BMI 18.8    Plan:  Oxygen therapy wean to keep >92%  NIV switched to AVAPS for respiratory support  Epap + 6 FIO2 60% BUR 20  Follow ABGs  CT chest imaging reviewed. Trend inflammatory markers, follow procal, sed rate, crp. Bacterial urine Ag's. Respiratory film array negative. Await urine culture  Continue Zosyn for HCAP/Asp coverage  Consult dietician for tube feed recommendations. Speech therapy consult for swallow evaluation. Currently NPO. IV fluids per primary. Monitor Na levels  pallative care consult for goals of care, patient is DNI.   DVT, GI prophylaxis    Thank you for allowing me to participate in the care of [facility]. Please feel free to call with questions.      This plan of care was reviewed in collaboration with Dr. Ynes Colón    Electronically signed by MARIJA Baker CNP on 1/11/2022 at 4:23 PM      Note: This report was completed

## 2022-01-11 NOTE — ED NOTES
Soft restraint applied to left wrist, pt continues to pull at bipap tubing.  Pt resting in bed, caregiver at bedside     Toby Bloom RN  01/11/22 [id number]

## 2022-01-11 NOTE — ED NOTES
Restraints not in use at this time, pt not attempting to remove any equipment, caregiver at bedside. Will continue to monitor.      Yary Jerome RN  01/11/22 7884

## 2022-01-11 NOTE — ED NOTES
Pt repositioned. Mouth suctioned, pt resting in bed, no signs of distress.      Rufus Azul RN  01/11/22 0259

## 2022-01-11 NOTE — ED NOTES
Bed: 28  Expected date:   Expected time:   Means of arrival:   Comments:  ems     Carmen Hernandez RN  01/11/22 1474

## 2022-01-11 NOTE — H&P
Internal Medicine History & Physical     Name: Josselyn Rosado  : 1950  Chief Complaint: Shortness of Breath  Primary Care Physician: Alejandro Norris MD  Admission date: 2022  Date of service: 2022     History of Present Illness  Ritika Kumar is a 73y.o. year old male. Patient with a past medical history of autism, cardiomegaly, developmental delay, hyperlipidemia, severe mental retardation, parkinsons, seizures, tourette disorder. He presented to [facility] ED on  from a nursing home due to worsening shortness of breath. Upon arrival to the ED patient in respiratory distress and he was placed on the bipap with improvement in respiratory status. Patient does have a limited code status, okay for defibrillation and meds, however no intubation and no chest compressions. At baseline patient is nonverbal. Work up in the ED showed a Na 148, K 5.1, CO2 31, BUN 36, Creat 0.6, Procalitonin 0.13, CRP 1.5, , Troponin 42, WBC 13.6, COVID negative, UA negative, ABG's with pH of 7.149, pCo2 95.6, pO2 155.5, HCO3 32.5. EKG revealed sinus tachycardia, CXR showed hiatus hernia containing stomach and colon, elevated right hemidiaphragm. CT of head negative. CT abd/pelvis pending. Fever upon arrival at 100.8. Patient treated with Zosyn and IVF. The history is provided by the nursing home, the EMS personnel and a caregiver. No  was used. Patient is seen sitting up in his bed on bipap. Caregiver at bedside. Patient is not able to answer any questions appropriately. He is alert and awake and in a fetal position. Caregiver at bedside reports patients always lays like this. Patient with G tube, however she tells me he does eat orally as well. William catheter draining dark yellow urine. ED course:   Initial blood work and imaging studies performed. Admission recommended by ED physician. Case discussed with ED provider.  Meds in ED consisted of the following:  Medications   0.9 % sodium chloride infusion (0 mL/hr IntraVENous Stopped 1/11/22 0908)   acetaminophen (TYLENOL) 160 MG/5ML solution 748.62 mg (748.62 mg Per G Tube Given 1/11/22 1859)   piperacillin-tazobactam (ZOSYN) 4,500 mg in dextrose 5 % 100 mL IVPB (mini-bag) (0 mg IntraVENous Stopped 1/11/22 0908)       Past Medical History:   Diagnosis Date    Acquired deformity of neck     Autism spectrum     Bilateral cataracts     Blepharochalasis     Cardiomegaly     Cataract of both eyes     Developmental delay     Gastritis     Hyperlipidemia     Hypernatremia     Kyphosis of thoracic region     Mental retardation     SEVERE MR  NON VERBAL, NEEDS WHEELCHAIR FOR LONG DISTANCE    Obsessive compulsive disorder     Parkinsonism (HCC)     Seizures (HCC)     Tourette disorder        Past Surgical History:   Procedure Laterality Date    TOE AMPUTATION Left     2nd/3rd    UPPER GASTROINTESTINAL ENDOSCOPY N/A 3/2/2021    PEG TUBE INSERTION performed by Nicole Boyle MD at [place] [address] History:  History reviewed. No pertinent family history. Social History  Patient lives at [address] [place] [address]  Employment: Unemployed. Illicit drug use- denies  TOBACCO:   reports that he has never smoked. He has never used smokeless tobacco.  ETOH:   reports no history of alcohol use. Home Medications  Prior to Admission medications    Medication Sig Start Date End Date Taking?  Authorizing Provider   calcium-vitamin D (OSCAL) 250-125 MG-UNIT per tablet Take 1 tablet by mouth daily    Historical Provider, MD   Ergocalciferol (DRISDOL) 200 MCG/ML drops Take 8,000 Units by mouth daily 6.25 ml 50,000 IU on the 14th of every malka(8000IU/ml)    Historical Provider, MD   folic acid (FOLVITE) 1 MG tablet Take 1 mg by mouth daily    Historical Provider, MD   Multiple Vitamin (MULTIVITAMIN ADULT PO) Take by mouth daily    Historical Provider, MD   Saccharomyces boulardii (PROBIOTIC) 250 MG CAPS Take by mouth 2 times daily Historical Provider, MD   carbidopa-levodopa (SINEMET)  MG per tablet 2 tablets by PEG Tube route every 4 hours Ok for 2am dose 5/4/21   MARIJA Multani - CNS   omeprazole (PRILOSEC) 20 MG delayed release capsule 20 mg daily *PER [facility]WAY NURSE(JOSE CRUZ) VIA PEG TUBE*    Historical Provider, MD   levETIRAcetam (KEPPRA) 100 MG/ML solution Take 5 mLs by mouth 2 times daily 3/4/21   Josselyn Schuler MD   ipratropium-albuterol (DUONEB) 0.5-2.5 (3) MG/3ML SOLN nebulizer solution Inhale 1 vial into the lungs every 4 hours as needed for Shortness of Breath    Historical Provider, MD   acetaminophen (TYLENOL) 325 MG tablet Take 650 mg by mouth every 4 hours as needed for Pain or Fever     Historical Provider, MD   benztropine (COGENTIN) 1 MG tablet Take 1 mg by mouth 2 times daily     Historical Provider, MD       Allergies  No Known Allergies    Review of Systems:  Unable to be obtained     Objective  VITALS:  /69   Pulse 102   Temp 100.8 °F (38.2 °C) (Axillary)   Resp 22   Ht 5' 4\" (1.626 m)   Wt 110 lb (49.9 kg)   SpO2 100%   BMI 18.88 kg/m²     Physical Exam:  General: awake, alert, nonverbal, unable to answer questions, in fetal position, is cachetic in appearance, BiPAP in place  Eyes: conjunctivae/corneas clear, sclera non icteric, EOMI  Ears: no obvious scars, no lesions, no masses, hearing intact  Mouth: mucous membranes moist, no obvious oral sores  Head: normocephalic, atraumatic   Neck: no JVD, no adenopathy, no thyromegaly, neck is supple, trachea is midline  Back: ROM limited, no CVA tenderness. Chest: no pain on palpation  Lungs: Diminished breath sounds, on bipap, without rhonchi, crackle, wheezing, or rale, no retractions or use of accessory muscles  Heart: tachycardic rate and regular rhythm, no murmur, normal S1, S2  Abdomen: soft, non-tender; bowel sounds normal; G tube  : William catheter draining dark yellow urine.   Extremities: no lower extremity edema, no cyanosis, no clubbing, 2+ pedal pulses palpated, contracted extremities, wound to right great toe. Skin: normal color, normal texture, normal turgor, no rashes, no lesions  Neurologic:1/5 muscle strength throughout, patient nonverbal, does not answer questions     Labs-   Lab Results   Component Value Date    WBC 13.6 (H) 01/11/2022    HGB 13.5 01/11/2022    HCT 45.6 01/11/2022     01/11/2022     (H) 01/11/2022    K 5.1 (H) 01/11/2022     (H) 01/11/2022    CREATININE 0.6 (L) 01/11/2022    BUN 36 (H) 01/11/2022    CO2 31 (H) 01/11/2022    GLUCOSE 111 01/11/2022    ALT <5 01/11/2022    AST 44 (H) 01/11/2022    INR 1.1 01/11/2022     Lab Results   Component Value Date    CKTOTAL 516 (H) 02/28/2021    CKMB 4.0 02/27/2021    TROPONINI <0.01 03/05/2021         Recent Radiological Studies:  CT CHEST WO CONTRAST   Final Result   Markedly limited study by the patient's clinical condition and lack of IV and   oral contrast.      Large consolidation with air bronchograms in the right lower lobe, likely   pneumonia. Aspiration or atelectasis cannot be excluded. Patchy ground-glass density in the lingula, due to atelectasis, aspiration,   or pneumonia. Very large amount of retained stool in the distal colon and rectum,   compatible with fecal impaction. CT ABDOMEN PELVIS WO CONTRAST Additional Contrast? None   Final Result   Markedly limited study by the patient's clinical condition and lack of IV and   oral contrast.      Large consolidation with air bronchograms in the right lower lobe, likely   pneumonia. Aspiration or atelectasis cannot be excluded. Patchy ground-glass density in the lingula, due to atelectasis, aspiration,   or pneumonia. Very large amount of retained stool in the distal colon and rectum,   compatible with fecal impaction. CT Head WO Contrast   Final Result   1. There is no acute intracranial abnormality.   Specifically, there is no   gross intracranial hemorrhage. 2. Atrophy and periventricular leukomalacia,   3. The examination is significantly limited due to the patient shaking   throughout the examination causing significant motion artifact. Grossly   there is no intracranial hemorrhage. XR CHEST PORTABLE   Final Result   Hiatus hernia containing stomach and colon. Elevated right hemidiaphragm   which may relate to eventration and or paralysis. No definite new abnormal   findings.              Assessment  Active Hospital Problems    Diagnosis     CAP (community acquired pneumonia) [J18.9]      Priority: High    Acute respiratory failure with hypoxia and hypercapnia (HCC) [J96.01, J96.02]      Priority: Medium    Hypernatremia [E87.0]      Priority: Medium    Constipation [K59.00]      Priority: Medium    Sepsis ([address] [A41.9]      Priority: Medium    Moderate protein-calorie malnutrition ([address] [E44.0]     Developmental delay [R62.50]     Pressure injury of dorsum of right foot, stage 3 (HCC) [L89.893]     Autism spectrum disorder associated with neurodevelopmental, mental or behavioral disorder, requiring very substantial support (level 3) [F84.0]     Parkinson disease ([address] [G20]     Atelectasis [J98.11]     Severe dehydration [E86.0]     Atherosclerosis of native artery of both lower extremities (HCC) [I70.203]     Pressure ulcer of toe of right foot, stage 3 ([address] [L89.893]     Seizure disorder ([address] [G40.909]        Patient Active Problem List    Diagnosis Date Noted    CAP (community acquired pneumonia) 01/11/2022     Priority: High    Acute respiratory failure with hypoxia and hypercapnia ([address] 01/11/2022     Priority: Medium    Hypernatremia      Priority: Medium    Constipation 02/24/2021     Priority: Medium    Sepsis ([address] 01/28/2021     Priority: Medium    Moderate protein-calorie malnutrition ([address] 10/25/2021    Developmental delay     Pressure injury of dorsum of right foot, stage 3 ([address] 09/13/2021    Autism spectrum disorder associated with neurodevelopmental, mental or behavioral disorder, requiring very substantial support (level 3) 02/24/2021    Parkinson disease ([facility]     Atelectasis 10/29/2020    Severe dehydration 10/28/2020    Atherosclerosis of native artery of both lower extremities ([facility] 07/10/2018    Pressure ulcer of toe of right foot, stage 3 ([facility] 06/30/2016    Seizure disorder ([facility] 06/10/2013       Plan  · Community-acquired bacterial pneumonia (likely aspiration) with associated hypoxia and hypercapnia and sepsis:   · CT chest noted. · ATB's. · Pneumococcal/legionella urine ag's  · Procalcitonin mildly elevated. · COVID-19 neg in ED. · Pulmonology consult  · SLP consult-- NPO for now-okay for tube feeds via PEG tube  · BiPAP as needed-follow ABG  · CT abdomen/pelvis pending to rule out other etiology. · Fecal impaction:  · CT abdomen/pelvis noted  · Enema  · Adjust bowel regimen  · MRDD:  · Limited CODE STATUS  · Palliative care consultation  · Right foot wound:   · Podiatry consultation  · Continue home medications  · PT/OT as able   · Follow labs  · DVT prophylaxis. · Please see orders for further management and care. ·  for discharge planning  · Discharge plan: TBD pending clinical improvement     SHRUTI Vanegas  1/11/2022  13:41 PM    I can be reached through [employer]. NOTE:  This report was transcribed using voice recognition software. Every effort was made to ensure accuracy; however, inadvertent computerized transcription errors may be present. Addendum: I have personally participated in a face-to-face history and physical exam on the date of service with the patient. I have discussed the case with the resident. I also participated in medical decision making with the resident on the date of service and I agree with all of the pertinent clinical information unless indicated in my editing of the note.  I have reviewed and edited the note above based on my findings during my history, exam, and decision making on the same day of service. Electronically signed by Elaine Dupont DO on 1/11/2022 at 3:36 PM    I can be reached through [address] [place] [address].

## 2022-01-12 LAB
L. PNEUMOPHILA SEROGP 1 UR AG: NORMAL
STREP PNEUMONIAE ANTIGEN, URINE: NORMAL

## 2022-01-12 NOTE — ED NOTES
Spoke with Nohemi Scott to notify that patient passed away. Addressed all guardian's questions and she verbalizes understanding. She will call back with decision of  home.       Christine Burgos RN  [phone]

## 2022-01-12 NOTE — PROGRESS NOTES
Nutrition Assessment     Type and Reason for Visit: Initial,Positive Nutrition Screen,Consult    Nutrition Assessment:  Pt for nutritional assessment d/t (+) Nutrition screen for wounds and consulted for TF ordering and mgmt. Chart reviewed. Unfortunately, pt noted to have passed this AM.  Will sign-off.     Electronically signed by Celio Dinh RD, LD on 1/12/22 at 11:03 AM EST    Contact: ext [phone]

## 2022-01-12 NOTE — ED NOTES
Entered room to check patient. Assessed breath sounds rhonchi noted bilaterally. Patient responding by reaching for nurse's stethoscope. O2 5L n/c intact will continue to monitor.      Sushma Bustillos LPN  34/35/72 [phone]

## 2022-01-12 NOTE — ED NOTES
Assigned RN notified this RN that pt became unresponsive and lost pulses. Dr. Hayes Marvin notified. Pt assessed by Dr. Hayes Marvin. TOD announced at 2210.      Anabel Crane RN  01/11/22 2222

## 2022-01-12 NOTE — DISCHARGE SUMMARY
Internal Medicine Discharge Summary    NAME: Felisa Griffiths :  1950  MRN:  [id number] Harriet Weber MD    ADMITTED: 2022   DISCHARGED: 2022  3:45 PM    ADMITTING PHYSICIAN: Ekta Polk DO    PCP: Linda Pyle MD    CONSULTANT(S):   IP CONSULT TO SOCIAL WORK  IP CONSULT TO PULMONOLOGY  IP CONSULT TO PALLIATIVE CARE  IP CONSULT TO DIETITIAN  IP CONSULT TO PODIATRY     ADMITTING DIAGNOSIS:   Acute respiratory failure with hypoxia and hypercapnia ([address] [J96.01, J96.02]     Please see H&P for further details    DISCHARGE DIAGNOSES:   Active Hospital Problems    Diagnosis     CAP (community acquired pneumonia) [J18.9]      Priority: High    Acute respiratory failure with hypoxia and hypercapnia (HCC) [J96.01, J96.02]      Priority: Medium    Hypernatremia [E87.0]      Priority: Medium    Constipation [K59.00]      Priority: Medium    Sepsis ([address] [A41.9]      Priority: Medium    Moderate protein-calorie malnutrition ([address] [E44.0]     Developmental delay [R62.50]     Pressure injury of dorsum of right foot, stage 3 ([address] [L89.893]     Autism spectrum disorder associated with neurodevelopmental, mental or behavioral disorder, requiring very substantial support (level 3) [F84.0]     Parkinson disease ([address] [G20]     Atelectasis [J98.11]     Severe dehydration [E86.0]     Atherosclerosis of native artery of both lower extremities (HCC) [I70.203]     Pressure ulcer of toe of right foot, stage 3 ([address] [L89.893]     Seizure disorder ([address] [G40.909]        BRIEF HISTORY OF PRESENT ILLNESS: Felisa Griffiths is a 73 y.o. male patient of Linda Pyle MD who  has a past medical history of Acquired deformity of neck, Autism spectrum, Bilateral cataracts, Blepharochalasis, Cardiomegaly, Cataract of both eyes, Developmental delay, Gastritis, Hyperlipidemia, Hypernatremia, Kyphosis of thoracic region, Mental retardation, Obsessive compulsive disorder, Parkinsonism ([address] Seizures ([address] and Tourette disorder. who originally had concerns including Shortness of Breath. at presentation on 2022, and was found to have Acute respiratory failure with hypoxia and hypercapnia ([address] [J96.01, J96.02] after workup. Please see H&P for further details. HOSPITAL COURSE:   The patient presented to the hospital with the chief complaint of Shortness of Breath. The patient was admitted to the hospital.     · Community-acquired bacterial pneumonia (likely aspiration) with associated hypoxia and hypercapnia and sepsis:   ? CT chest noted. ? ATB's.  ? Pneumococcal/legionella urine ag's  ? Procalcitonin mildly elevated. ? COVID-19 neg in ED. ? Pulmonology consult  ? SLP consult-- NPO for now-okay for tube feeds via PEG tube  ? BiPAP as needed-follow ABG  ? CT abdomen/pelvis pending to rule out other etiology. · Fecal impaction:  ? CT abdomen/pelvis noted  ? Enema  ? Adjust bowel regimen  · MRDD:  ? Limited CODE STATUS  ? Palliative care consultation  · Right foot wound:   ? Podiatry consultation    The patient  during this hospital stay      BRIEF PHYSICAL EXAMINATION AND LABORATORIES ON DAY OF DISCHARGE:  VITALS:  BP (!) 140/70   Pulse (!) 0   Temp 101.2 °F (38.4 °C) (Axillary)   Resp (!) 0   Ht 5' 4\" (1.626 m)   Wt 110 lb (49.9 kg)   SpO2 93%   BMI 18.88 kg/m²     Please see note from the same day.      LABS[de-identified]  Recent Labs     22  08   *  --    K 5.1*  --    *  --    CO2 31*  --    BUN 36*  --    CREATININE 0.6*  --    GLUCOSE 123* 111   CALCIUM 9.0  --      Recent Labs     22  07   ALKPHOS 104   ALT <5   AST 44*   PROT 8.1   BILITOT <0.2   BILIDIR <0.2   LABALBU 3.8     Recent Labs     22  0711   WBC 13.6*   RBC 4.22   HGB 13.5   HCT 45.6   .1*   MCH 32.0   MCHC 29.6*   RDW 13.2      MPV 8.8     Lab Results   Component Value Date    LABA1C 5.4 2016    LABA1C 5.2 2015    LABA1C 5.1 2015     Lab Results Component Value Date    INR 1.1 01/11/2022    INR 1.2 12/31/2021    INR 1.0 10/28/2020    PROTIME 11.8 01/11/2022    PROTIME 13.0 (H) 12/31/2021    PROTIME 10.9 10/28/2020      Lab Results   Component Value Date    TSH 0.634 01/11/2022    TSH 1.600 12/28/2021    TSH 2.91 05/05/2021     Lab Results   Component Value Date    TRIG 146 03/18/2016    TRIG 273 (H) 12/18/2015    TRIG 107 09/04/2015    HDL 44 03/18/2016    HDL 54 12/18/2015    HDL 46 09/04/2015    LDLCALC 93 03/18/2016    LDLCALC 84 12/18/2015    LDLCALC 81 09/04/2015     No results for input(s): MG in the last 72 hours. No results for input(s): CKTOTAL, CKMB, TROPONINI in the last 72 hours. No results for input(s): LACTA in the last 72 hours. IMAGING:  CT ABDOMEN PELVIS WO CONTRAST Additional Contrast? None    Result Date: 1/11/2022  EXAMINATION: CT OF THE CHEST WITHOUT CONTRAST; CT OF THE ABDOMEN AND PELVIS WITHOUT CONTRAST 1/11/2022 2:11 pm TECHNIQUE: CT of the chest was performed without the administration of intravenous contrast. Multiplanar reformatted images are provided for review. Dose modulation, iterative reconstruction, and/or weight based adjustment of the mA/kV was utilized to reduce the radiation dose to as low as reasonably achievable.; CT of the abdomen and pelvis was performed without the administration of intravenous contrast. Multiplanar reformatted images are provided for review. Dose modulation, iterative reconstruction, and/or weight based adjustment of the mA/kV was utilized to reduce the radiation dose to as low as reasonably achievable. COMPARISON: 10/22/2021 HISTORY: ORDERING SYSTEM PROVIDED HISTORY: SOB, abdominal pain TECHNOLOGIST PROVIDED HISTORY: Reason for exam:->SOB Decision Support Exception - unselect if not a suspected or confirmed emergency medical condition->Emergency Medical Condition (MA) FINDINGS: THE STUDY IS MARKEDLY LIMITED BY THE PATIENT'S CLINICAL CONDITION AND LACK OF IV AND ORAL CONTRAST.  Chest: Mediastinum: No thoracic aortic aneurysm. No definite adenopathy. No pericardial effusion. Large hiatal hernia. Lungs/pleura: No pneumothorax. No pleural effusion. Right lower lobe consolidation with air bronchograms, likely pneumonia. Patchy ground-glass density in the lingula, due to atelectasis, aspiration, or pneumonia. Soft Tissues/Bones: Mild multilevel thoracic spondylosis. Abdomen and pelvis: Organs: Grossly unremarkable liver, spleen, pancreas, adrenals, and bilateral kidneys. Distended gallbladder containing a 3.3 cm stone. GI/Bowel: Gastrostomy tube in the gastric body. Very large amount of retained stool in the distal colon and rectum, compatible with fecal impaction. No evidence of bowel obstruction. Appendix not identified. No CT evidence of acute appendicitis. Pelvis: William catheter in the bladder. Normal sized prostate. Peritoneum/Retroperitoneum: No free air or free fluid. No adenopathy. Minimal vascular calcification. No abdominal aortic aneurysm. Bones/Soft Tissues: Mild multilevel lumbar spondylosis. Osteoarthritis of the bilateral hip joints. Markedly limited study by the patient's clinical condition and lack of IV and oral contrast. Large consolidation with air bronchograms in the right lower lobe, likely pneumonia. Aspiration or atelectasis cannot be excluded. Patchy ground-glass density in the lingula, due to atelectasis, aspiration, or pneumonia. Very large amount of retained stool in the distal colon and rectum, compatible with fecal impaction. CT Head WO Contrast    Result Date: 1/11/2022  EXAMINATION: CT OF THE HEAD WITHOUT CONTRAST  1/11/2022 10:02 am TECHNIQUE: CT of the head was performed without the administration of intravenous contrast. Dose modulation, iterative reconstruction, and/or weight based adjustment of the mA/kV was utilized to reduce the radiation dose to as low as reasonably achievable. COMPARISON: None.  HISTORY: ORDERING SYSTEM PROVIDED HISTORY: altered mental state TECHNOLOGIST PROVIDED HISTORY: Reason for exam:->altered mental state Has a \"code stroke\" or \"stroke alert\" been called? ->No Decision Support Exception - unselect if not a suspected or confirmed emergency medical condition->Emergency Medical Condition (MA) FINDINGS: The examination is significantly limited due to the motion artifact. The patient was shaking throughout the examination. The patient is also contracted in a fetal position. BRAIN/VENTRICLES: There is no acute intracranial hemorrhage, mass effect or midline shift. No abnormal extra-axial fluid collection. The gray-white differentiation is maintained without evidence of an acute infarct. There is no evidence of hydrocephalus. The ventricles, cisterns and sulci are prominent consistent with atrophy. There is decreased attenuation within the periventricular white matter consistent with periventricular leukomalacia. ORBITS: The visualized portion of the orbits demonstrate no acute abnormality. SINUSES: The visualized paranasal sinuses and mastoid air cells demonstrate no acute abnormality. SOFT TISSUES/SKULL:  No acute abnormality of the visualized skull or soft tissues. 1. There is no acute intracranial abnormality. Specifically, there is no gross intracranial hemorrhage. 2. Atrophy and periventricular leukomalacia, 3. The examination is significantly limited due to the patient shaking throughout the examination causing significant motion artifact. Grossly there is no intracranial hemorrhage. CT CHEST WO CONTRAST    Result Date: 1/11/2022  EXAMINATION: CT OF THE CHEST WITHOUT CONTRAST; CT OF THE ABDOMEN AND PELVIS WITHOUT CONTRAST 1/11/2022 2:11 pm TECHNIQUE: CT of the chest was performed without the administration of intravenous contrast. Multiplanar reformatted images are provided for review.  Dose modulation, iterative reconstruction, and/or weight based adjustment of the mA/kV was utilized to reduce the radiation dose to as low as reasonably achievable.; CT of the abdomen and pelvis was performed without the administration of intravenous contrast. Multiplanar reformatted images are provided for review. Dose modulation, iterative reconstruction, and/or weight based adjustment of the mA/kV was utilized to reduce the radiation dose to as low as reasonably achievable. COMPARISON: 10/22/2021 HISTORY: ORDERING SYSTEM PROVIDED HISTORY: SOB, abdominal pain TECHNOLOGIST PROVIDED HISTORY: Reason for exam:->SOB Decision Support Exception - unselect if not a suspected or confirmed emergency medical condition->Emergency Medical Condition (MA) FINDINGS: THE STUDY IS MARKEDLY LIMITED BY THE PATIENT'S CLINICAL CONDITION AND LACK OF IV AND ORAL CONTRAST. Chest: Mediastinum: No thoracic aortic aneurysm. No definite adenopathy. No pericardial effusion. Large hiatal hernia. Lungs/pleura: No pneumothorax. No pleural effusion. Right lower lobe consolidation with air bronchograms, likely pneumonia. Patchy ground-glass density in the lingula, due to atelectasis, aspiration, or pneumonia. Soft Tissues/Bones: Mild multilevel thoracic spondylosis. Abdomen and pelvis: Organs: Grossly unremarkable liver, spleen, pancreas, adrenals, and bilateral kidneys. Distended gallbladder containing a 3.3 cm stone. GI/Bowel: Gastrostomy tube in the gastric body. Very large amount of retained stool in the distal colon and rectum, compatible with fecal impaction. No evidence of bowel obstruction. Appendix not identified. No CT evidence of acute appendicitis. Pelvis: William catheter in the bladder. Normal sized prostate. Peritoneum/Retroperitoneum: No free air or free fluid. No adenopathy. Minimal vascular calcification. No abdominal aortic aneurysm. Bones/Soft Tissues: Mild multilevel lumbar spondylosis. Osteoarthritis of the bilateral hip joints.      Markedly limited study by the patient's clinical condition and lack of IV and oral contrast. Large consolidation with air bronchograms in the right lower lobe, likely pneumonia. Aspiration or atelectasis cannot be excluded. Patchy ground-glass density in the lingula, due to atelectasis, aspiration, or pneumonia. Very large amount of retained stool in the distal colon and rectum, compatible with fecal impaction. XR CHEST PORTABLE    Result Date: 2022  EXAMINATION: ONE XRAY VIEW OF THE CHEST 2022 7:36 am COMPARISON: None. HISTORY: ORDERING SYSTEM PROVIDED HISTORY: hypoxia short of breath TECHNOLOGIST PROVIDED HISTORY: Reason for exam:->hypoxia short of breath FINDINGS: Opacity at the left base is consistent with a hiatus hernia containing stomach and colon. Stable heart with normal pulmonary vascularity. Elevation of the right hemidiaphragm may relate to paralysis and or eventration and was also present previously. Hiatus hernia containing stomach and colon. Elevated right hemidiaphragm which may relate to eventration and or paralysis. No definite new abnormal findings. XR CHEST PORTABLE    Result Date: 2021  EXAMINATION: ONE XRAY VIEW OF THE CHEST 2021 5:58 pm COMPARISON: None. HISTORY: ORDERING SYSTEM PROVIDED HISTORY: Shortness of breath TECHNOLOGIST PROVIDED HISTORY: Reason for exam:->Shortness of breath FINDINGS: Mild opacity in the retrocardiac region. The right hemidiaphragm is elevated. The heart is prominent in size. There is no pneumothorax. The costophrenic angles are clear. Suspect retrocardiac infiltrates. Elevated right hemidiaphragm. DISPOSITION:  The patient  during this hospital stay     Preparing for this patient's discharge, including paperwork, orders, instructions, and meeting with patient did required 34 minutes.     Electronically signed by Ora Gee DO on 1/15/2022 at 7:58 PM

## 2022-01-12 NOTE — ED NOTES
This RN entering room immediately following LPN to see patient. Pt found to be unresponsive, pulseless, asystole on the monitor. Dr. Linda Davies immediately notified and assessed patient. Pt pronounced at 2210.      Calixto Orozco RN  01/11/22 [id number]

## 2022-01-12 NOTE — ED NOTES
Called to the bedside. Patient is a known DNR-CCA. Patient not responding, not breathing, no palpable heart rate. Patient was pronounced  at 10:10 PM.    Will call family physician to sign the death certificate. Staff spoke with  who released the patient. Staff spoke with state appointed guardian. Spoke with Dr. Fadi Ramirez who will sign the death certificate.        Raj Arita DO  22 [phone]

## 2022-01-13 LAB — URINE CULTURE, ROUTINE: NORMAL

## 2022-01-16 LAB
BLOOD CULTURE, ROUTINE: NORMAL
CULTURE, BLOOD 2: NORMAL

## 2022-06-05 NOTE — ED NOTES
[facility]  08/28/20 0657 Patient is a 57 

## 2024-09-26 NOTE — CONSULTS
Orders:    TSH, 3rd generation; Future     Consult received for psychotropic management. Patient is nonverbal and has a history of severe intellectual disability, autism and OCD. He was admitted on 6/9/20 due to profound hypernatremia. Patient has a guardian Catherine Bella whom I spoke to in great length. Catherine Bella reports she is a  and has been Romero's guardian for the last 21 years. He had been staying at [facility] group home for a long time but they closed and he was sent to a [facility]. Guardian reports over the last several months patient has been having some balancing issues, weight loss and insomnia. He sees a neurologist who is treating him for seizures with Keppra and Tegretrol, and it also appears he is on Sinemet. Guardian reports he has had tremors but was never clearly diagnosed with Parkinson's and there was question if the tremors were Parkinsonian side effects from the antipsychotics. Catherine Bella also reports patient sees nurse practitioner Rachel Pelayo at [facility] for medication management and denies any recent changes to medications. Discussed with guardian that combination of Luvox, Remeron, Zyprexa and Cogentin seems appropriate from my standpoint, and furthermore since he has been experiencing weight loss and insomnia the Remeron and Zyprexa should help with that. Discussed using dose of 15 mg on the Remeron as opposed to 30 mg. I would also recommend having neurology evaluate patient's anti-epileptic regimen and need for the Sinemet. Guardian is questioning if the Keppra and/or Tegretol is causing side effects of weight loss and insomnia. As for disposition Catherine Bella is hoping to transition patient to a different [facility] with 24 hour nursing care. Please call with questions or concerns.     Electronically signed by Kim Suarez MD on 6/19/2020 at 10:11 AM

## (undated) DEVICE — Device

## (undated) DEVICE — GAUZE,SPONGE,POST-OP,4X3,STRL,LF: Brand: MEDLINE

## (undated) DEVICE — DEFENDO AIR WATER SUCTION AND BIOPSY VALVE KIT FOR  OLYMPUS: Brand: DEFENDO AIR/WATER/SUCTION AND BIOPSY VALVE

## (undated) DEVICE — CANNULA NSL ORAL AD FOR CAPNOFLEX CO2 O2 AIRLFE

## (undated) DEVICE — BITEBLOCK 54FR W/ DENT RIM BLOX

## (undated) DEVICE — BINDER ABD SM M W12IN CIRC 30 45IN 4 PNL E CNTCT CLSR POSTOP